# Patient Record
Sex: MALE | Race: WHITE | NOT HISPANIC OR LATINO | Employment: UNEMPLOYED | URBAN - METROPOLITAN AREA
[De-identification: names, ages, dates, MRNs, and addresses within clinical notes are randomized per-mention and may not be internally consistent; named-entity substitution may affect disease eponyms.]

---
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
## 2019-02-14 ENCOUNTER — HOSPITAL ENCOUNTER (EMERGENCY)
Facility: CLINIC | Age: 19
Discharge: HOME | End: 2019-02-14
Attending: EMERGENCY MEDICINE | Admitting: EMERGENCY MEDICINE
Payer: COMMERCIAL

## 2019-02-14 VITALS
DIASTOLIC BLOOD PRESSURE: 74 MMHG | SYSTOLIC BLOOD PRESSURE: 133 MMHG | BODY MASS INDEX: 19.46 KG/M2 | HEART RATE: 89 BPM | RESPIRATION RATE: 18 BRPM | TEMPERATURE: 99.1 F | HEIGHT: 71 IN | WEIGHT: 139 LBS | OXYGEN SATURATION: 100 %

## 2019-02-14 VITALS
DIASTOLIC BLOOD PRESSURE: 84 MMHG | RESPIRATION RATE: 18 BRPM | OXYGEN SATURATION: 99 % | SYSTOLIC BLOOD PRESSURE: 142 MMHG | TEMPERATURE: 98.2 F

## 2019-02-14 DIAGNOSIS — F19.11 HISTORY OF SUBSTANCE ABUSE (H): ICD-10-CM

## 2019-02-14 DIAGNOSIS — Z13.9 ENCOUNTER FOR MEDICAL SCREENING EXAMINATION: ICD-10-CM

## 2019-02-14 DIAGNOSIS — F91.9 DESTRUCTIVE BEHAVIOR: ICD-10-CM

## 2019-02-14 PROCEDURE — 99282 EMERGENCY DEPT VISIT SF MDM: CPT

## 2019-02-14 PROCEDURE — 99285 EMERGENCY DEPT VISIT HI MDM: CPT | Mod: 25

## 2019-02-14 PROCEDURE — 90791 PSYCH DIAGNOSTIC EVALUATION: CPT

## 2019-02-14 ASSESSMENT — MIFFLIN-ST. JEOR: SCORE: 1667.63

## 2019-02-14 NOTE — ED AVS SNAPSHOT
Emergency Department  [address] [place] [address]  Phone:  [phone]  Fax:  [phone]                                    Junior Fernández   MRN: [id number]    Department:   Emergency Department   Date of Visit:  2/14/2019           After Visit Summary Signature Page    I have received my discharge instructions, and my questions have been answered. I have discussed any challenges I see with this plan with the nurse or doctor.    ..........................................................................................................................................  Patient/Patient Representative Signature      ..........................................................................................................................................  Patient Representative Print Name and Relationship to Patient    ..................................................               ................................................  Date                                   Time    ..........................................................................................................................................  Reviewed by Signature/Title    ...................................................              ..............................................  Date                                               Time          22EPIC Rev 08/18

## 2019-02-14 NOTE — ED AVS SNAPSHOT
Emergency Department  [address] [place] [address]  Phone:  [phone]  Fax:  [phone]                                    Junior Fernández   MRN: [id number]    Department:   Emergency Department   Date of Visit:  2/14/2019           After Visit Summary Signature Page    I have received my discharge instructions, and my questions have been answered. I have discussed any challenges I see with this plan with the nurse or doctor.    ..........................................................................................................................................  Patient/Patient Representative Signature      ..........................................................................................................................................  Patient Representative Print Name and Relationship to Patient    ..................................................               ................................................  Date                                   Time    ..........................................................................................................................................  Reviewed by Signature/Title    ...................................................              ..............................................  Date                                               Time          22EPIC Rev 08/18

## 2019-02-14 NOTE — ED NOTES
Bed: ED17  Expected date:   Expected time:   Means of arrival:   Comments:  [address] Mental Health Eval [place] 8256
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
## 2019-02-15 NOTE — DISCHARGE INSTRUCTIONS
Stay with a friend or at a hotel or shelter.   Follow up with the resources that were provided at last visit for your mental health needs.

## 2019-02-15 NOTE — DISCHARGE INSTRUCTIONS
Discharge Instructions  Mental Health Concerns    You were seen today for mental health concerns, such as depression, anxiety, or suicidal thinking. Your provider feels that you do not require hospitalization at this time. However, your symptoms may become worse, and you may need to return to the Emergency Department. Most treatments of depression and suicidal thoughts are a process rather than a single intervention.  Medications and counseling can take several weeks or more to help.    Generally, every Emergency Department visit should have a follow-up clinic visit with either a primary or a specialty clinic/provider. Please follow-up as instructed by your emergency provider today.    By accepting these discharge instructions:  You promise to not harm yourself or others.  You agree that if you feel you are becoming unable to keep that promise, you will do something to help yourself before you do anything to harm yourself or others.   You agree to keep any safety plan arranged on your visit here today.  You agree to take any medication prescribed or recommended by your provider.  If you are getting worse, you can contact a friend or a family member, contact your counselor or family provider, contact a crisis line, or other options discussed with the provider or therapist today.  At any time, you can call 911 and return to the Emergency Department for more help.  You understand that follow-up is essential to your treatment, and you will make and keep appointments recommended on your visit today.    How to improve your mental health and prevent suicide:  Involve others by letting family, friends, counselors know.  Do not isolate yourself.  Avoid alcohol or drugs. Remove weapons, poisons from your home.  Try to stick to routines for eating, sleeping and getting regular exercise.    Try to get into sunlight. Bright natural light not only treats seasonal affective disorder but also depression.  Increase safe activities  that you enjoy.    If you feel worse, contact 1-800-suicide (1-685.389.9113), or call 911, or your primary provider/counselor for additional assistance.    If you were given a prescription for medicine here today, be sure to read all of the information (including the package insert) that comes with your prescription.  This will include important information about the medicine, its side effects, and any warnings that you need to know about.  The pharmacist who fills the prescription can provide more information and answer questions you may have about the medicine.  If you have questions or concerns that the pharmacist cannot address, please call or return to the Emergency Department.   Remember that you can always come back to the Emergency Department if you are not able to see your regular provider in the amount of time listed above, if you get any new symptoms, or if there is anything that worries you.

## 2019-02-18 ENCOUNTER — TELEPHONE (OUTPATIENT)
Dept: FAMILY MEDICINE | Facility: CLINIC | Age: 19
End: 2019-02-18

## 2019-02-18 NOTE — TELEPHONE ENCOUNTER
Patient's mother is calling, she states her son Junior was seen in [facility], brought in by police, due to agitation. H/o chemical dependency and recently stopped using. Mother is asking for psych eval options. Informed her that is usually done in the ED and they can determine if he meets criteria for admitting to psych unit there in hospital. Since he was not admitted, he likely was assessed as not needing placement at psych unit.  He currently has CD eval and has bed in [facility]. Informed her that [facility] can facilitate psych and CD evals. [place] also [place] [facility] [place] advice. [facility] and can go directly to [facility]. Mother states patient wants treatment and is agreeable to going to [facility]. All questions were answered and mother is agreeable to the plan.     Sherri Dhaliwal RN  02/18/19  11:00 AM

## 2019-08-15 ENCOUNTER — HOSPITAL ENCOUNTER (EMERGENCY)
Facility: CLINIC | Age: 19
Discharge: HOME | End: 2019-08-16
Attending: EMERGENCY MEDICINE | Admitting: EMERGENCY MEDICINE
Payer: COMMERCIAL

## 2019-08-15 DIAGNOSIS — R55 SYNCOPE, UNSPECIFIED SYNCOPE TYPE: ICD-10-CM

## 2019-08-15 PROCEDURE — 99284 EMERGENCY DEPT VISIT MOD MDM: CPT | Mod: 25

## 2019-08-15 PROCEDURE — 96360 HYDRATION IV INFUSION INIT: CPT

## 2019-08-15 PROCEDURE — 93005 ELECTROCARDIOGRAM TRACING: CPT

## 2019-08-15 NOTE — ED AVS SNAPSHOT
Emergency Department  [address] [place] [address]  Phone:  [phone]  Fax:  [phone]                                    Junior Fernández   MRN: [id number]    Department:   Emergency Department   Date of Visit:  8/15/2019           After Visit Summary Signature Page    I have received my discharge instructions, and my questions have been answered. I have discussed any challenges I see with this plan with the nurse or doctor.    ..........................................................................................................................................  Patient/Patient Representative Signature      ..........................................................................................................................................  Patient Representative Print Name and Relationship to Patient    ..................................................               ................................................  Date                                   Time    ..........................................................................................................................................  Reviewed by Signature/Title    ...................................................              ..............................................  Date                                               Time          22EPIC Rev 08/18

## 2019-08-16 VITALS
HEART RATE: 77 BPM | OXYGEN SATURATION: 98 % | DIASTOLIC BLOOD PRESSURE: 57 MMHG | TEMPERATURE: 98.6 F | SYSTOLIC BLOOD PRESSURE: 121 MMHG | RESPIRATION RATE: 16 BRPM

## 2019-08-16 LAB
ALBUMIN SERPL-MCNC: 3.8 G/DL (ref 3.4–5)
ALP SERPL-CCNC: 57 U/L (ref 65–260)
ALT SERPL W P-5'-P-CCNC: 15 U/L (ref 0–50)
ANION GAP SERPL CALCULATED.3IONS-SCNC: 8 MMOL/L (ref 3–14)
AST SERPL W P-5'-P-CCNC: 17 U/L (ref 0–35)
BASOPHILS # BLD AUTO: 0 10E9/L (ref 0–0.2)
BASOPHILS NFR BLD AUTO: 0.2 %
BILIRUB SERPL-MCNC: 0.3 MG/DL (ref 0.2–1.3)
BUN SERPL-MCNC: 17 MG/DL (ref 7–30)
CALCIUM SERPL-MCNC: 8.4 MG/DL (ref 8.5–10.1)
CHLORIDE SERPL-SCNC: 108 MMOL/L (ref 98–110)
CO2 SERPL-SCNC: 23 MMOL/L (ref 20–32)
CREAT SERPL-MCNC: 1.03 MG/DL (ref 0.5–1)
DIFFERENTIAL METHOD BLD: NORMAL
EOSINOPHIL # BLD AUTO: 0.1 10E9/L (ref 0–0.7)
EOSINOPHIL NFR BLD AUTO: 0.8 %
ERYTHROCYTE [DISTWIDTH] IN BLOOD BY AUTOMATED COUNT: 12.2 % (ref 10–15)
GFR SERPL CREATININE-BSD FRML MDRD: >90 ML/MIN/{1.73_M2}
GLUCOSE SERPL-MCNC: 91 MG/DL (ref 70–99)
HCT VFR BLD AUTO: 40.3 % (ref 40–53)
HGB BLD-MCNC: 14 G/DL (ref 13.3–17.7)
IMM GRANULOCYTES # BLD: 0 10E9/L (ref 0–0.4)
IMM GRANULOCYTES NFR BLD: 0.2 %
INTERPRETATION ECG - MUSE: NORMAL
LYMPHOCYTES # BLD AUTO: 1.8 10E9/L (ref 0.8–5.3)
LYMPHOCYTES NFR BLD AUTO: 19.8 %
MCH RBC QN AUTO: 29.9 PG (ref 26.5–33)
MCHC RBC AUTO-ENTMCNC: 34.7 G/DL (ref 31.5–36.5)
MCV RBC AUTO: 86 FL (ref 78–100)
MONOCYTES # BLD AUTO: 0.5 10E9/L (ref 0–1.3)
MONOCYTES NFR BLD AUTO: 5.7 %
NEUTROPHILS # BLD AUTO: 6.6 10E9/L (ref 1.6–8.3)
NEUTROPHILS NFR BLD AUTO: 73.3 %
NRBC # BLD AUTO: 0 10*3/UL
NRBC BLD AUTO-RTO: 0 /100
PLATELET # BLD AUTO: 185 10E9/L (ref 150–450)
POTASSIUM SERPL-SCNC: 3.9 MMOL/L (ref 3.4–5.3)
PROT SERPL-MCNC: 6.7 G/DL (ref 6.8–8.8)
RBC # BLD AUTO: 4.68 10E12/L (ref 4.4–5.9)
SODIUM SERPL-SCNC: 139 MMOL/L (ref 133–144)
WBC # BLD AUTO: 9 10E9/L (ref 4–11)

## 2019-08-16 PROCEDURE — 85025 COMPLETE CBC W/AUTO DIFF WBC: CPT | Performed by: EMERGENCY MEDICINE

## 2019-08-16 PROCEDURE — 96360 HYDRATION IV INFUSION INIT: CPT

## 2019-08-16 PROCEDURE — 25000128 H RX IP 250 OP 636: Performed by: EMERGENCY MEDICINE

## 2019-08-16 PROCEDURE — 80053 COMPREHEN METABOLIC PANEL: CPT | Performed by: EMERGENCY MEDICINE

## 2019-08-16 RX ADMIN — SODIUM CHLORIDE 1000 ML: 9 INJECTION, SOLUTION INTRAVENOUS at 00:09

## 2019-08-16 ASSESSMENT — ENCOUNTER SYMPTOMS
NAUSEA: 0
DIAPHORESIS: 0
PALPITATIONS: 0
CHILLS: 1
LIGHT-HEADEDNESS: 1
VOMITING: 0

## 2019-08-16 NOTE — ED PROVIDER NOTES
History     Chief Complaint:    Loss of Consciousness and Drug Overdose      HPI   Junior Fernández is a 18 year old male who presents to the emergency department today with loss of consciousness and drug overdose. The patient states that he had only about 5 hours of sleep last night and drank a lot of caffeine throughout the day. He states he is suppose to take his Seroquel at bed time, but tonight he was tired and took it earlier than normal around 2230 to go to sleep. He states he went to go watch some TV and forgot if he had taken his dose earlier and repeated a second dose. He states he became hungry and went up to the kitchen became light-headed with possible vision changes and passed out, woke up confused, ran into the wall and fell again in the bathroom. He states after falling he was experiencing chills. The patients mother became concerned and called EMS. Patient denies taking 2 doses to hurt himself. He also denies any chest pain, palpitations, diaphoresis, nausea, vomiting before passing out. The parents states that he is acting normally in the ED currently and patient states he is feeling much better.     Allergies:  Seasonal allergies    Medications:    Wellbutrin  Excitalopram oxalate  Seroquel    Past Medical History:    Anisometropia   Anxiety   Attention and concentration deficit   Depression   History of substance abuse   Mild tetrahydrocannabinol (THC) abuse   Multiple nevi   Pneumonia   RAD (reactive airway disease)   SARS (severe acute respiratory syndrome)   Substance abuse    Past Surgical History:    Left monteggia fracture surgery    Family History:    Father - hyperthyroidism, hyperlipidemia  Brother - anxiety    Social History:  The patient was accompanied to the ED by his parents.  Smoking Status: never  Smokeless Tobacco: never  Alcohol Use:  No  Drugs: marijuana and coocaine    Marital Status:  Single     Review of Systems   Constitutional: Positive for chills. Negative for  "diaphoresis.   Eyes: Positive for visual disturbance.   Cardiovascular: Negative for chest pain and palpitations.   Gastrointestinal: Negative for nausea and vomiting.   Neurological: Positive for syncope and light-headedness.   All other systems reviewed and are negative.        Physical Exam   First Vitals:  BP: 114/75  Pulse: 66  Temp: 98.6  F (37  C)  Resp: 16  SpO2: 99 %      Physical Exam  Vitals: reviewed by me  General: Pt seen on [facility], pleasant, cooperative, and alert to conversation  Eyes: Tracking well, clear conjunctiva BL  ENT: MMM, midline trachea.   Lungs:  No tachypnea, no accessory muscle use. No respiratory distress.   CV: Rate as above, regular rhythm.    Abd: Soft, non tender, no guarding, no rebound. Non distended  MSK: no peripheral edema or joint effusion.  No evidence of trauma  Skin: No rash, normal turgor and temperature  Neuro: Clear speech and no facial droop.  Psych: Not RIS, no e/o AH/VH      Emergency Department Course   ECG:  Indication: Drug overdose  Time: 2331  Vent. Rate 93 bpm. WV interval 132. QRS duration 92. QT/QTc 354/440. P-R-T axis 57 104 44.  Normal sinus and rhythm. Rightward axis. Borderline ECG.  Read time: 2333    Laboratory:  CBC: WBC: 9.0, HGB: 14.0, PLT: 185  CMP: Glucose: 91, Creatinine: 1.03 (H), calcium: 8.4 (L), Protein total\" 6.7 (L), Alkphos: 57 (L), o/w WNL    Interventions:  0009 NS 1,000 mLs IV    Emergency Department Course:  Nursing notes and vitals reviewed. (5219) I performed an exam of the patient as documented above.     IV inserted. Medicine administered as documented above. Blood drawn. This was sent to the lab for further testing, results above.     0050 I rechecked the patient and discussed the results of his workup thus far.     Findings and plan explained to the Patient and mother and father. Patient discharged home with instructions regarding supportive care, medications, and reasons to return. The importance of close follow-up was " reviewed.     I personally reviewed the laboratory results with the Patient and mother and father and answered all related questions prior to discharge.      Impression & Plan      Medical Decision Making:  Junior Fernández is a 18 year old male who presents to the ER with loss of consciousness. I think this likely multifactorial, he has endorsed 2 energy drinks as well as some coffee earlier today, has not been eating or drinking, then took a double dose of his Seroquel. Not suprisingly when he stood up off the couch he felt lightheaded, and appeared to faint. He is normal here with a neurological exam that is reassuring. No evidence of a seizure, acting normal per family at bedside as well. I see no troxidome, patient has a normal set of vitals and feels improved with IV fluids. He is ambulatory here in the ER and doing quite well on his feet. No evidence of arrythmogenic syncope, normal EKG as above. Will discharge with very clear return to ED precautions and primary care follow-up.    Diagnosis:    ICD-10-CM    1. Syncope, unspecified syncope type R55        Disposition:  discharged to home    Scribe Disclosure:  I, Kalen Grider, am serving as a scribe at 2:05 AM on 8/16/2019 to document services personally performed by Georgi Stout based on my observations and the provider's statements to me.     8/15/2019    EMERGENCY DEPARTMENT       Georgi Stout MD  08/16/19 0428
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
## 2019-08-21 ENCOUNTER — NURSE TRIAGE (OUTPATIENT)
Dept: NURSING | Facility: CLINIC | Age: 19
End: 2019-08-21

## 2019-08-21 NOTE — TELEPHONE ENCOUNTER
Rolanda (patient's mom) calling stating patient fainted Thursday night at 10 pm. She is currently not present with patient, but reports he is feeling faint again. Rolanda is calling for advice. During call, caller states her son was calling her. FNA was put on hold, then the caller hung up    Amaris Carlton RN/[facility]

## 2019-10-21 ENCOUNTER — OFFICE VISIT (OUTPATIENT)
Dept: FAMILY MEDICINE | Facility: CLINIC | Age: 19
End: 2019-10-21
Payer: COMMERCIAL

## 2019-10-21 VITALS
WEIGHT: 155 LBS | BODY MASS INDEX: 21.7 KG/M2 | DIASTOLIC BLOOD PRESSURE: 78 MMHG | OXYGEN SATURATION: 100 % | RESPIRATION RATE: 16 BRPM | HEART RATE: 75 BPM | SYSTOLIC BLOOD PRESSURE: 124 MMHG | HEIGHT: 71 IN

## 2019-10-21 DIAGNOSIS — Z87.898 HISTORY OF SUBSTANCE USE DISORDER: Chronic | ICD-10-CM

## 2019-10-21 DIAGNOSIS — R55 PRE-SYNCOPE: Primary | ICD-10-CM

## 2019-10-21 DIAGNOSIS — F41.1 GENERALIZED ANXIETY DISORDER: Chronic | ICD-10-CM

## 2019-10-21 PROBLEM — F39 MOOD DISORDER (H): Status: RESOLVED | Noted: 2017-11-20 | Resolved: 2019-10-21

## 2019-10-21 RX ORDER — ARIPIPRAZOLE 5 MG/1
5 TABLET ORAL DAILY
COMMUNITY
End: 2020-01-02

## 2019-10-21 RX ORDER — PROPRANOLOL HYDROCHLORIDE 10 MG/1
10 TABLET ORAL PRN
COMMUNITY
End: 2021-09-03

## 2019-10-21 RX ORDER — BENZTROPINE MESYLATE 0.5 MG/1
0.5 TABLET ORAL 2 TIMES DAILY
COMMUNITY
End: 2020-01-21

## 2019-10-21 ASSESSMENT — ANXIETY QUESTIONNAIRES
6. BECOMING EASILY ANNOYED OR IRRITABLE: MORE THAN HALF THE DAYS
1. FEELING NERVOUS, ANXIOUS, OR ON EDGE: NEARLY EVERY DAY
IF YOU CHECKED OFF ANY PROBLEMS ON THIS QUESTIONNAIRE, HOW DIFFICULT HAVE THESE PROBLEMS MADE IT FOR YOU TO DO YOUR WORK, TAKE CARE OF THINGS AT HOME, OR GET ALONG WITH OTHER PEOPLE: EXTREMELY DIFFICULT
3. WORRYING TOO MUCH ABOUT DIFFERENT THINGS: NEARLY EVERY DAY
5. BEING SO RESTLESS THAT IT IS HARD TO SIT STILL: NEARLY EVERY DAY
GAD7 TOTAL SCORE: 20
2. NOT BEING ABLE TO STOP OR CONTROL WORRYING: NEARLY EVERY DAY
7. FEELING AFRAID AS IF SOMETHING AWFUL MIGHT HAPPEN: NEARLY EVERY DAY

## 2019-10-21 ASSESSMENT — MIFFLIN-ST. JEOR: SCORE: 1739.95

## 2019-10-21 ASSESSMENT — PATIENT HEALTH QUESTIONNAIRE - PHQ9
5. POOR APPETITE OR OVEREATING: NEARLY EVERY DAY
SUM OF ALL RESPONSES TO PHQ QUESTIONS 1-9: 14

## 2019-10-21 NOTE — NURSING NOTE
"18 year old  Chief Complaint   Patient presents with     RECHECK     pt reports some fainting spells. he actually fainted once at the end of Aug.     Anxiety       Blood pressure 124/78, pulse 75, resp. rate 16, height 1.803 m (5' 10.98\"), weight 70.3 kg (155 lb), SpO2 100 %. Body mass index is 21.63 kg/m .  Patient Active Problem List   Diagnosis     Generalized anxiety disorder     Mood disorder (H)       Wt Readings from Last 2 Encounters:   10/21/19 70.3 kg (155 lb) (49 %)*   02/14/19 63 kg (139 lb) (26 %)*     * Growth percentiles are based on CDC (Boys, 2-20 Years) data.     BP Readings from Last 3 Encounters:   10/21/19 124/78   08/16/19 121/57   02/14/19 142/84         Current Outpatient Medications   Medication     ARIPiprazole (ABILIFY) 5 MG tablet     benztropine (COGENTIN) 0.5 MG tablet     melatonin 1 MG TABS tablet     propranolol (INDERAL) 10 MG tablet     buPROPion (WELLBUTRIN SR) 200 MG 12 hr tablet     ESCITALOPRAM OXALATE PO     No current facility-administered medications for this visit.        Social History     Tobacco Use     Smoking status: Never Smoker     Smokeless tobacco: Never Used   Substance Use Topics     Alcohol use: No     Drug use: Yes     Types: Marijuana, \"Crack\" cocaine     Comment: Use       Health Maintenance Due   Topic Date Due     HIV SCREENING  10/23/2014     PREVENTIVE CARE VISIT  11/20/2018     PHQ-2  01/01/2019     INFLUENZA VACCINE (1) 09/01/2019       No results found for: PAP      October 21, 2019 2:43 PM  "
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
## 2019-10-22 ENCOUNTER — ANCILLARY PROCEDURE (OUTPATIENT)
Dept: CARDIOLOGY | Facility: CLINIC | Age: 19
End: 2019-10-22
Attending: FAMILY MEDICINE
Payer: COMMERCIAL

## 2019-10-22 DIAGNOSIS — R55 PRE-SYNCOPE: ICD-10-CM

## 2019-10-22 PROCEDURE — 93227 XTRNL ECG REC<48 HR R&I: CPT | Mod: ZP | Performed by: INTERNAL MEDICINE

## 2019-10-22 PROCEDURE — 93226 XTRNL ECG REC<48 HR SCAN A/R: CPT | Mod: ZF

## 2019-10-22 ASSESSMENT — ANXIETY QUESTIONNAIRES: GAD7 TOTAL SCORE: 20

## 2019-10-22 NOTE — PROGRESS NOTES
Per Dr. Tamayo, patient to have 48 hour holter monitor placed.  Diagnosis: Pre-syncope  Monitor placed: Yes  Patient Instructed: Yes  Patient verbalized understanding: Yes  Holter # 6   Hank Helm

## 2019-10-30 ENCOUNTER — OFFICE VISIT (OUTPATIENT)
Dept: FAMILY MEDICINE | Facility: CLINIC | Age: 19
End: 2019-10-30
Payer: COMMERCIAL

## 2019-10-30 VITALS
RESPIRATION RATE: 16 BRPM | BODY MASS INDEX: 21.35 KG/M2 | OXYGEN SATURATION: 96 % | TEMPERATURE: 98.2 F | SYSTOLIC BLOOD PRESSURE: 121 MMHG | HEART RATE: 81 BPM | DIASTOLIC BLOOD PRESSURE: 85 MMHG | HEIGHT: 71 IN | WEIGHT: 152.5 LBS

## 2019-10-30 DIAGNOSIS — N50.82 SCROTAL PAIN: Primary | ICD-10-CM

## 2019-10-30 DIAGNOSIS — R35.0 URINARY FREQUENCY: ICD-10-CM

## 2019-10-30 LAB
BILIRUBIN UR: NEGATIVE MG/DL
BLOOD UR: NEGATIVE MG/DL
GLUCOSE URINE: NEGATIVE
KETONES UR QL: NEGATIVE MG/DL
LEUKOCYTE ESTERASE UR: NEGATIVE
NITRITE UR QL STRIP: NEGATIVE MG/DL
PH UR STRIP: 7 [PH] (ref 4.5–8)
PROTEIN UR: NEGATIVE MG/DL
SP GR UR STRIP: 1 (ref 1–1)
UROBILINOGEN UR STRIP-ACNC: NORMAL E.U./DL

## 2019-10-30 ASSESSMENT — MIFFLIN-ST. JEOR: SCORE: 1731.12
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
## 2019-10-30 NOTE — NURSING NOTE
"19 year old  Chief Complaint   Patient presents with     Testicular/scrotal Pain     swelling started 2 weeks ago, worse today starting on the right side, now both swollen        Blood pressure (!) 148/81, pulse 81, temperature 98.2  F (36.8  C), temperature source Oral, resp. rate 16, height 1.815 m (5' 11.46\"), weight 69.2 kg (152 lb 8 oz), SpO2 96 %. Body mass index is 21 kg/m .  Patient Active Problem List   Diagnosis     Generalized anxiety disorder     History of substance use disorder       Wt Readings from Last 2 Encounters:   10/30/19 69.2 kg (152 lb 8 oz)   10/21/19 70.3 kg (155 lb) (49 %)*     * Growth percentiles are based on CDC (Boys, 2-20 Years) data.     BP Readings from Last 3 Encounters:   10/30/19 (!) 148/81   10/21/19 124/78   08/16/19 121/57         Current Outpatient Medications   Medication     benztropine (COGENTIN) 0.5 MG tablet     melatonin 1 MG TABS tablet     propranolol (INDERAL) 10 MG tablet     ARIPiprazole (ABILIFY) 5 MG tablet     No current facility-administered medications for this visit.        Social History     Tobacco Use     Smoking status: Never Smoker     Smokeless tobacco: Never Used   Substance Use Topics     Alcohol use: No     Drug use: Not Currently     Types: Marijuana, \"Crack\" cocaine     Comment: Use       Health Maintenance Due   Topic Date Due     HIV SCREENING  10/23/2014     PREVENTIVE CARE VISIT  11/20/2018     INFLUENZA VACCINE (1) 09/01/2019       No results found for: PAP      October 30, 2019 1:54 PM    "

## 2019-10-31 LAB
BACTERIA SPEC CULT: NO GROWTH
C TRACH DNA SPEC QL NAA+PROBE: NEGATIVE
N GONORRHOEA DNA SPEC QL NAA+PROBE: NEGATIVE
SPECIMEN SOURCE: NORMAL

## 2020-01-02 ENCOUNTER — TRANSFERRED RECORDS (OUTPATIENT)
Dept: HEALTH INFORMATION MANAGEMENT | Facility: CLINIC | Age: 20
End: 2020-01-02

## 2020-01-02 ENCOUNTER — HOSPITAL ENCOUNTER (EMERGENCY)
Facility: CLINIC | Age: 20
Discharge: HOME | End: 2020-01-02
Attending: EMERGENCY MEDICINE | Admitting: EMERGENCY MEDICINE
Payer: COMMERCIAL

## 2020-01-02 VITALS
DIASTOLIC BLOOD PRESSURE: 88 MMHG | SYSTOLIC BLOOD PRESSURE: 129 MMHG | RESPIRATION RATE: 15 BRPM | TEMPERATURE: 98 F | HEART RATE: 71 BPM | OXYGEN SATURATION: 94 %

## 2020-01-02 DIAGNOSIS — F32.A DEPRESSION, UNSPECIFIED DEPRESSION TYPE: ICD-10-CM

## 2020-01-02 DIAGNOSIS — Z72.820 SLEEP DEPRIVATION: ICD-10-CM

## 2020-01-02 DIAGNOSIS — R05.9 COUGH: ICD-10-CM

## 2020-01-02 DIAGNOSIS — F19.10 POLYSUBSTANCE ABUSE (H): ICD-10-CM

## 2020-01-02 DIAGNOSIS — R45.86 EMOTIONAL LABILITY: ICD-10-CM

## 2020-01-02 DIAGNOSIS — R11.2 NAUSEA AND VOMITING, INTRACTABILITY OF VOMITING NOT SPECIFIED, UNSPECIFIED VOMITING TYPE: ICD-10-CM

## 2020-01-02 LAB — INTERPRETATION ECG - MUSE: NORMAL

## 2020-01-02 PROCEDURE — 25000132 ZZH RX MED GY IP 250 OP 250 PS 637: Performed by: EMERGENCY MEDICINE

## 2020-01-02 PROCEDURE — 90791 PSYCH DIAGNOSTIC EVALUATION: CPT

## 2020-01-02 PROCEDURE — 99284 EMERGENCY DEPT VISIT MOD MDM: CPT | Mod: Z6 | Performed by: EMERGENCY MEDICINE

## 2020-01-02 PROCEDURE — 99285 EMERGENCY DEPT VISIT HI MDM: CPT | Mod: 25 | Performed by: EMERGENCY MEDICINE

## 2020-01-02 RX ORDER — OLANZAPINE 10 MG/1
10 TABLET, ORALLY DISINTEGRATING ORAL ONCE
Status: COMPLETED | OUTPATIENT
Start: 2020-01-02 | End: 2020-01-02

## 2020-01-02 RX ADMIN — OLANZAPINE 10 MG: 10 TABLET, ORALLY DISINTEGRATING ORAL at 05:37

## 2020-01-02 ASSESSMENT — ENCOUNTER SYMPTOMS
FEVER: 1
COUGH: 0
VOMITING: 1
DIARRHEA: 0
ABDOMINAL PAIN: 0

## 2020-01-02 NOTE — DISCHARGE INSTRUCTIONS
Follow up with your therapist and psychiatrist as planned. Continue to abstain from substances. Return to the ER with new or worsening symptoms. See your primary care physician within a week.

## 2020-01-02 NOTE — ED AVS SNAPSHOT
[facility], [place], Emergency Department  [address]  [facility] [address]  Phone:  [phone]  Fax:  [phone]                                    Junior Fernández   MRN: [id number]    Department:  [facility], Emergency Department   Date of Visit:  1/2/2020           After Visit Summary Signature Page    I have received my discharge instructions, and my questions have been answered. I have discussed any challenges I see with this plan with the nurse or doctor.    ..........................................................................................................................................  Patient/Patient Representative Signature      ..........................................................................................................................................  Patient Representative Print Name and Relationship to Patient    ..................................................               ................................................  Date                                   Time    ..........................................................................................................................................  Reviewed by Signature/Title    ...................................................              ..............................................  Date                                               Time          22EPIC Rev 08/18
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
## 2020-01-02 NOTE — ED TRIAGE NOTES
Patient reports getting recently getting fired from job. Reports suicidal ideations without plan within the last week but currently denies. Patient reports he is sleep deprived.

## 2020-01-02 NOTE — ED PROVIDER NOTES
"  History     Chief Complaint   Patient presents with     Suicidal     Chest Pain     Chest pain center/right chest for last hour, near syncopal episode with EMS     HPI  Junior Fernández is a 19 year old male who presents with a primary complaint of mental health issues. The patient as well as his parents report that he's had 2 previous episodes of substance induced psychosis. Once a few years ago, and the second time in early 2019. He reports that the last time he was started on zyprexa and went into treatment, and still had psychotic symptoms for over a month. Due to some side effects, he's been off all antipsychotics since August. He reports a 3 week period where he was drinking 2-3 drinks per day, last drink 3 days ago. He also smoked marijuana during that time frame and used Vyvance. He states that since he stopped he has developed emotional lability, and insomnia - has had minimal sleep over the last 3 days. No current hallucinations or delusions, but he's worried that he's heading towards psychosis if he doesn't get some sleep. He's requesting a dose of medication to help him sleep. He denies suicidal or homicidal ideation.     He reports that he felt as if he was hyperventilating tonight, which caused him to have some mild right-sided chest soreness.  He states he does not feel he has an acute medical condition, rather feels that his chest is sore from all the hyperventilating.  He states that while in the ambulance he briefly felt as though he was going to pass out, but again relates that he feels he was hyperventilating.  He states he does not feel he needs medical evaluation for this and is declining further medical evaluation, aside from an EKG which is already been done.    Additionally, he tells me he had, \" flu symptoms,\" 3 days ago.  He states that this came on while he was using alcohol and smoking marijuana.  When I asked him to describe his symptoms he told me he had multiple episodes of vomiting " "as well as coughing and fever.  When I asked him further questions to clarify symptoms, the patient became acutely irritable stating that he is really here because he sleep deprived and afraid he is going to become psychotic.  He states he is not answering any more my questions.  He then states,\"You're a fucking moron if you don't know what flu symptoms are.\"  He refuses to cooperate with further questions in a productive fashion at this time.     Past Medical History:   Diagnosis Date     Anisometropia      Anxiety      Depression      Fracture 2003    Left clavicle     Fracture 2005    Left Monteggia     History of substance abuse (H) 2016    THC, ETOH, stimulants     Multiple nevi 10/14/2015     Pneumonia 2003    RUL     RAD (reactive airway disease)     outgrown     SARS (severe acute respiratory syndrome)        Past Surgical History:   Procedure Laterality Date     CIRCUMCISION,OTHER,<28 D/O       FRACTURE SURGERY  2005    Left Monteggia     HC TOOTH EXTRACTION W/FORCEP         Family History   Problem Relation Age of Onset     Anxiety Disorder Maternal Grandmother      Depression Maternal Grandmother      Hypertension Maternal Grandmother      Cerebrovascular Disease Maternal Grandmother      Other - See Comments Mother         on Celexa     Hyperthyroidism Father         Rx'd with methimazole. Father's side with mild allergy/asthma issues     Hyperlipidemia Father      Anxiety Disorder Brother         Stuttering and tics     Lymphoma Maternal Grandfather          from Lymphoma     Other - See Comments Paternal Grandmother         vaso-vagal syncope     Other - See Comments Paternal Grandfather          from kidney/liver CA; CAD and had pacemaker     Heart Disease Paternal Grandfather 65     Arrhythmia No family hx of        Social History     Tobacco Use     Smoking status: Never Smoker     Smokeless tobacco: Never Used   Substance Use Topics     Alcohol use: Yes     " Comment: 3-4 days ago         I have reviewed the Medications, Allergies, Past Medical and Surgical History, and Social History in the Epic system.    Review of Systems   Unable to perform ROS: Other   Constitutional: Positive for fever.   Respiratory: Negative for cough.    Gastrointestinal: Positive for vomiting. Negative for abdominal pain and diarrhea.   Patient not cooperative with complete ROS    Physical Exam   BP: (!) 130/101  Pulse: 77  Temp: 98.6  F (37  C)  Resp: 16  SpO2: 94 %      Physical Exam  Constitutional:       General: He is not in acute distress.     Appearance: He is not diaphoretic.   HENT:      Head: Atraumatic.   Eyes:      General: No scleral icterus.     Pupils: Pupils are equal, round, and reactive to light.   Cardiovascular:      Heart sounds: Normal heart sounds.   Pulmonary:      Effort: No respiratory distress.      Breath sounds: Normal breath sounds.   Abdominal:      Palpations: Abdomen is soft.      Tenderness: There is no abdominal tenderness.   Musculoskeletal:         General: No tenderness.   Skin:     General: Skin is warm.      Findings: No rash.   Psychiatric:      Comments: Irritable, sometimes smiling or laughing inappropriately - then cried.         ED Course        Procedures             EKG Interpretation:      Interpreted by Carolyn Mclean MD  Time reviewed: 0330  Symptoms at time of EKG: None   Rhythm: normal sinus   Rate: 72  Axis: Right Axis Deviation  Ectopy: none  Conduction: normal  ST Segments/ T Waves: No ST-T wave changes  Q Waves: none  Comparison to prior: No old EKG available    Clinical Impression: stable EKG                Critical Care time:  none             Labs Ordered and Resulted from Time of ED Arrival Up to the Time of Departure from the ED - No data to display         Assessments & Plan (with Medical Decision Making)   Patient is very irritable, not cooperative with me fully in regards to obtaining history.  He states he does not feel he  has an acute medical issue and does not desire acute medical work-up, aside from an EKG did cooperate with.  This was stable and compared with previous.    He was more cooperative with the mental health .  He is not acutely suicidal or homicidal.  He has had a history of what was believed to be drug-induced psychosis, and is possible he may be having some chacorta now.  He is concerned that if he does not sleep he will again become psychotic.  He declined admission, states he has a therapy appointment later today as well as psychiatry appointment tomorrow.  He did agree to take a single dose Zyprexa, would like to go home and try to sleep.  He is encouraged to return with concerns, follow-up with primary care.    Dictation Disclaimer: Some of this Note has been completed with voice-recognition dictation software. Although errors are generally corrected real-time, there is the potential for a rare error to be present in the completed chart.      I have reviewed the nursing notes.    I have reviewed the findings, diagnosis, plan and need for follow up with the patient.    Discharge Medication List as of 1/2/2020  5:33 AM          Final diagnoses:   Sleep deprivation   Emotional lability   Cough   Nausea and vomiting, intractability of vomiting not specified, unspecified vomiting type   Depression, unspecified depression type   Polysubstance abuse (H)       1/2/2020   [facility], [place], EMERGENCY DEPARTMENT     Carolyn Mclean MD  01/02/20 0649

## 2020-01-21 ENCOUNTER — OFFICE VISIT (OUTPATIENT)
Dept: FAMILY MEDICINE | Facility: CLINIC | Age: 20
End: 2020-01-21
Payer: COMMERCIAL

## 2020-01-21 VITALS
WEIGHT: 152.08 LBS | BODY MASS INDEX: 20.6 KG/M2 | TEMPERATURE: 98.6 F | RESPIRATION RATE: 16 BRPM | SYSTOLIC BLOOD PRESSURE: 129 MMHG | OXYGEN SATURATION: 96 % | DIASTOLIC BLOOD PRESSURE: 88 MMHG | HEIGHT: 72 IN | HEART RATE: 79 BPM

## 2020-01-21 DIAGNOSIS — Z87.898 HISTORY OF SUBSTANCE USE DISORDER: Chronic | ICD-10-CM

## 2020-01-21 DIAGNOSIS — Z01.89 LABORATORY TEST: Primary | ICD-10-CM

## 2020-01-21 DIAGNOSIS — F41.1 GENERALIZED ANXIETY DISORDER: Chronic | ICD-10-CM

## 2020-01-21 DIAGNOSIS — R51.9 NONINTRACTABLE EPISODIC HEADACHE, UNSPECIFIED HEADACHE TYPE: Primary | ICD-10-CM

## 2020-01-21 RX ORDER — ESCITALOPRAM OXALATE 10 MG/1
TABLET ORAL
COMMUNITY
Start: 2020-01-06 | End: 2020-06-18

## 2020-01-21 RX ORDER — OLANZAPINE 5 MG/1
TABLET ORAL PRN
COMMUNITY
Start: 2020-01-03 | End: 2020-06-18

## 2020-01-21 ASSESSMENT — PAIN SCALES - GENERAL: PAINLEVEL: NO PAIN (1)

## 2020-01-21 ASSESSMENT — MIFFLIN-ST. JEOR: SCORE: 1733.07

## 2020-01-21 NOTE — PROGRESS NOTES
Performed venipunctured for an outside provider.  Specimen get send to .    Terra Heath CMA,ESTER  January 21, 2020 2:55 PM

## 2020-01-21 NOTE — NURSING NOTE
"19 year old  Chief Complaint   Patient presents with     RECHECK     patient wants to do a follow up due to a fall and had a concussion in late August. He has been sensitive to lights ever since.     Sleep Problem     Headache     more frequently lately     Flu Shot       Blood pressure 129/88, pulse 79, temperature 98.6  F (37  C), temperature source Oral, resp. rate 16, height 1.821 m (5' 11.7\"), weight 69 kg (152 lb 1.3 oz), SpO2 96 %. Body mass index is 20.8 kg/m .  Patient Active Problem List   Diagnosis     Generalized anxiety disorder     History of substance use disorder       Wt Readings from Last 2 Encounters:   01/21/20 69 kg (152 lb 1.3 oz)   10/30/19 69.2 kg (152 lb 8 oz)     BP Readings from Last 3 Encounters:   01/21/20 129/88   01/02/20 129/88   10/30/19 121/85         Current Outpatient Medications   Medication     escitalopram (LEXAPRO) 10 MG tablet     melatonin 1 MG TABS tablet     OLANZapine (ZYPREXA) 5 MG tablet     propranolol (INDERAL) 10 MG tablet     benztropine (COGENTIN) 0.5 MG tablet     No current facility-administered medications for this visit.        Social History     Tobacco Use     Smoking status: Never Smoker     Smokeless tobacco: Never Used   Substance Use Topics     Alcohol use: Yes     Comment: 3-4 days ago     Drug use: Yes     Types: Marijuana, \"Crack\" cocaine     Comment: Used marijuanna 3 days ago, cocaine in feburary, vyvanse 3 days ago        Health Maintenance Due   Topic Date Due     DEPRESSION ACTION PLAN  1999     HIV SCREENING  10/23/2014     PREVENTIVE CARE VISIT  11/20/2018     INFLUENZA VACCINE (1) 09/01/2019       No results found for: RAHEEM Heath, ESTER, CMA  January 21, 2020 1:28 PM  "

## 2020-01-21 NOTE — PROGRESS NOTES
"      SUBJECTIVE:   Junior Fernández is a 19 year old male who presents to clinic today for a return visit.    # Head Injury  - Date/Time: 2019  - Mechanism: fainted and hit the back of his head  - was unconsciousness briefly, found by mother soon after    - since then he reports he has been having more headaches, more light sensitivity, and difficult with night vision    # Headache  - Diagnosed headache disorder: no  - Onset: associates onset with mental/cognitive strain, focusing on work or playing video games  - Frequency: more than half the days in a week  - Time to peak intensity: start as severe as they get  - Duration: last 10-15 minutes typically but sometimes longer until he takes medication  - Location: most commonly \"top left\" of head, can be more generalized  - Photophobia: yes, light sensitivity gets worse with headaches. Can still be in room with bright lights but stresses him out and can't look up as much. Being in a bright room can seem to bring on headaches  - Phonophobia: no  - Nausea: no  - Worse with activity?: not worse with exercise, has started back doing weights and cardio at [employer]  - Change with position?: no  - has not identified triggers other than mental strain and bright lights  - Alleviating Factors: stopping whatever as triggering the headache, no change with increased hydration  - NSAID use: rare, a few times in the past few weeks  - Sleep: averages 7-8 hours a night but doesn't have consistent sleep/wake times  - Vision: no changes other than more difficulty with visual \"contrast\" when driving at night. Last vision check was in May 2019.   - Caffeine intake: occasional, not every day    Post-Concussion Symptom Scale:  Each measure scored 0 (absent), 1-2 (mild), 3-4 (moderate), 5-6 (severe)  Headache: 2  Nausea: 0  Vomitin  Balance Problems: 0  Dizziness (spinning or movement sensation): 0  Lightheadedness: 1  Fatigue: 3  Trouble falling asleep: 4  Sleeping more " than usual: 0  Sleeping less than usual: 4  Drowsiness: 3  Sensitivity to light: 5  Sensitivity to noise: 0  Irritability: 2  Sadness: 0  Nervous/Anxious: 3  Feeling more emotional: 1  Numbness or tinglin  Feeling slowed down: 0  Feeling like  in a fog : 0  Difficulty concentrating: 3  Difficulty remembering: 3  Visual problems: 3 (night vision issues)    Total Score: 37      # Anxiety  # History of Substance Use  - follows with psychiatry and therapy  - goes to therapist (Alberto) at [facility], currently seeing twice a week    - was seen in ED for aggressive behavior and suicidal risk on 19  - Started on low dose of Zyprexa 5mg for sleep, took this for a week which helped, takes as needed now  - reports to me that he had relapse with MJ, alcohol, and a Vyvanse tablet at that time  - [facility] but did not feel that it was helpful so stopped last week. Was last in treatment before this in 2019.   - reports he has been sober again for 3 weeks now  - looking into [facility], not currently doing any 12 step programs    - Current regimen: Lexapro 10mg daily, Propranolol 10-20mg three times a day as needed (takes a few times a week), Zyprexa 5mg at bedtime as needed (has taken once in the past 2 weeks)    - planning to go back to college in the fall, thinking about [place]      ROS: Denies fevers, chills, chest pain, difficulty breathing, abdominal pain    Patient Active Problem List   Diagnosis     Generalized anxiety disorder     History of substance use disorder     Current Outpatient Medications   Medication     escitalopram (LEXAPRO) 10 MG tablet     melatonin 1 MG TABS tablet     OLANZapine (ZYPREXA) 5 MG tablet     propranolol (INDERAL) 10 MG tablet     No current facility-administered medications for this visit.      I have reviewed the patient's relevant past medical history.     OBJECTIVE:   /88 (BP Location: Left arm, Patient Position: Chair,  "Cuff Size: Adult Regular)   Pulse 79   Temp 98.6  F (37  C) (Oral)   Resp 16   Ht 1.821 m (5' 11.7\")   Wt 69 kg (152 lb 1.3 oz)   SpO2 96%   BMI 20.80 kg/m      Constitutional: well-appearing, appears stated age  Eyes: conjunctivae without erythema, sclera anicteric.   Skin: no rashes, lesions, or wounds  Psych: affect is full and appropriate, speech is fluent and non-pressured    Neuro: CN 2-12 intact, strength intact and symmetric in upper and lower extremities, DTRs 2+ and symmetric at biceps and patella. Gait normal.     ASSESSMENT AND PLAN:     (R51) Nonintractable episodic headache, unspecified headache type  (primary encounter diagnosis)  Comment: Sound most likely like tension headaches but mild photophobia and clear onset after head injury could be also be consistent with a post-concussive syndrome. Lack of worsening with physical activity would argue against symptoms being concussion related. Recommended optometry evaluation to assess vision, maintaining good hydration, and working on setting a regular sleep schedule. I went back and forth between referring to concussion clinic vs neurology, but I think sending to the headache clinic at the  likely makes the most sense. Based on mild nature of symptoms and normal exam without any concerning features by history, I don't know if referral is even necessary but given how bothersome symptoms have been and the uncertainty of Junior's primary headache disorder I think it is reasonable.   Plan: NEUROLOGY ADULT REFERRAL          (F41.1) Generalized anxiety disorder  (Z87.898) History of substance use disorder  Comment: Recent relapse. Now sober again for 3 weeks. Primarily follows with psychiatry/psychology.       Darius Tamayo MD   [facility]  01/21/2020, 3:35 PM  "

## 2020-01-28 ENCOUNTER — PRE VISIT (OUTPATIENT)
Dept: NEUROLOGY | Facility: CLINIC | Age: 20
End: 2020-01-28

## 2020-01-28 NOTE — TELEPHONE ENCOUNTER
FUTURE VISIT INFORMATION      FUTURE VISIT INFORMATION:    Date: 3/16/2020    Time: 930AM     Location: [facility]  REFERRAL INFORMATION:    Referring provider:  Dr. Tamayo -1/21/2020    Referring providers clinic:  [facility]     Reason for visit/diagnosis  Headaches     RECORDS REQUESTED FROM:       Clinic name Comments Records Status Imaging Status   [facility] 10/19/2019 Care Everywhere N/A

## 2020-03-10 ENCOUNTER — HEALTH MAINTENANCE LETTER (OUTPATIENT)
Age: 20
End: 2020-03-10

## 2020-04-28 NOTE — TELEPHONE ENCOUNTER
FUTURE VISIT INFORMATION      FUTURE VISIT INFORMATION:    Date: 5/5/2020    Time: 4:00 PM    Location: [facility]   REFERRAL INFORMATION:    Referring provider:  Dr. Darius Tamayo     Referring providers clinic:  [facility]     Reason for visit/diagnosis  Chronic nasal congestion     RECORDS REQUESTED FROM:       Clinic name Comments Records Status Imaging Status   [facility]  4/13/2020 [facility] Medical Advice EPIC

## 2020-05-01 ENCOUNTER — MYC MEDICAL ADVICE (OUTPATIENT)
Dept: OTOLARYNGOLOGY | Facility: CLINIC | Age: 20
End: 2020-05-01

## 2020-05-01 ENCOUNTER — TELEPHONE (OUTPATIENT)
Dept: OTOLARYNGOLOGY | Facility: CLINIC | Age: 20
End: 2020-05-01

## 2020-05-01 NOTE — TELEPHONE ENCOUNTER
Spoke with patient regarding virtual visit with Dr. Delgado on 5/5/20. Confirmed patient's email address. Also verified patient's appointment time, and informed him/her that we would be calling again 10-15 minutes prior to the appointment. Patient expressed understanding. Will send Tango message with virtual visit information.    Patient email: [email or web address]  Appointment date: 5/5/20  Appointment time: 4:00pm  MyChart active? YES, Tango message sent.    Emma Amezcua, EMT

## 2020-05-05 ENCOUNTER — VIRTUAL VISIT (OUTPATIENT)
Dept: OTOLARYNGOLOGY | Facility: CLINIC | Age: 20
End: 2020-05-05
Attending: FAMILY MEDICINE
Payer: COMMERCIAL

## 2020-05-05 ENCOUNTER — PRE VISIT (OUTPATIENT)
Dept: OTOLARYNGOLOGY | Facility: CLINIC | Age: 20
End: 2020-05-05

## 2020-05-05 VITALS — WEIGHT: 160 LBS | HEIGHT: 71 IN | BODY MASS INDEX: 22.4 KG/M2

## 2020-05-05 DIAGNOSIS — R09.81 NASAL CONGESTION: ICD-10-CM

## 2020-05-05 DIAGNOSIS — J34.3 NASAL TURBINATE HYPERTROPHY: Primary | ICD-10-CM

## 2020-05-05 RX ORDER — HYDROXYZINE HYDROCHLORIDE 25 MG/1
TABLET, FILM COATED ORAL
COMMUNITY
Start: 2020-04-17 | End: 2020-06-18

## 2020-05-05 ASSESSMENT — MIFFLIN-ST. JEOR: SCORE: 1757.89

## 2020-05-05 ASSESSMENT — PAIN SCALES - GENERAL: PAINLEVEL: NO PAIN (0)

## 2020-05-05 NOTE — PROGRESS NOTES
"Junior Fernández is a 19 year old male who is being evaluated via a billable video visit.      The patient has been notified of following:     \"This video visit will be conducted via a call between you and your physician/provider. We have found that certain health care needs can be provided without the need for an in-person physical exam.  This service lets us provide the care you need with a video conversation.  If a prescription is necessary we can send it directly to your pharmacy.  If lab work is needed we can place an order for that and you can then stop by our lab to have the test done at a later time.    Video visits are billed at different rates depending on your insurance coverage.  Please reach out to your insurance provider with any questions.    If during the course of the call the physician/provider feels a video visit is not appropriate, you will not be charged for this service.\"    Patient has given verbal consent for Video visit? Yes    How would you like to obtain your AVS? Charmainehar    Patient would like the video invitation sent by: Send to e-mail at: [email or web address]    Will anyone else be joining your video visit? No        Video-Visit Details    Type of service:  Video Visit    Video Start Time: 3:55pm  Video End Time: 4:08pm    Originating Location (pt. Location): Home    Distant Location (provider location):  [facility]     Platform used for Video Visit: Elmo Delgado MD    Otolaryngology Adult Consultation    Patient: Junior Fernández  : 2000          HPI:  Junior Fernández is a 19 year old male seen today in the Otolaryngology Clinic for difficulty breathing through his nose.  Patient reports that he has had difficulty breathing through his nose for about a year to a year and a half.  He admits that he used intranasal cocaine around this time.  He has since stopped using cocaine but has had difficulty with nasal congestion since then.  This that " is worse typically alternates.  It gets worse at night.  He does have a history of seasonal allergies in springtime is typically his worst time.  However he has not noticed an increase in nasal congestion this spring.  He feels as noted nasal congestion is year-round.  He has used Flonase on occasion but has not found it to be helpful right away.  He did see his primary care physician who mentioned that he might have a polyp.  He feels pressure along the side of his nose, close to his corner of his eye.  He denies any issues with nosebleeds.    Medications:  Current Outpatient Rx   Medication Sig Dispense Refill     hydrOXYzine (ATARAX) 25 MG tablet        propranolol (INDERAL) 10 MG tablet Take 10 mg by mouth as needed        escitalopram (LEXAPRO) 10 MG tablet        melatonin 1 MG TABS tablet Take 1 mg by mouth nightly as needed for sleep       OLANZapine (ZYPREXA) 5 MG tablet as needed         Allergies: Seasonal allergies and Seroquel [quetiapine]     PMH:  Past Medical History:   Diagnosis Date     Anisometropia      Anxiety      Depression      Fracture 07/01/2003    Left clavicle     Fracture 04/01/2005    Left Monteggia     History of substance abuse (H) 11/23/2016    THC, ETOH, stimulants     Multiple nevi 10/14/2015     Pneumonia 03/01/2003    RUL     RAD (reactive airway disease)     outgrown     SARS (severe acute respiratory syndrome)        PSH:  Past Surgical History:   Procedure Laterality Date     CIRCUMCISION,OTHER,<28 D/O       FRACTURE SURGERY  04/01/2005    Left Monteggia     HC TOOTH EXTRACTION W/FORCEP         FH:  Family History   Problem Relation Age of Onset     Anxiety Disorder Maternal Grandmother      Depression Maternal Grandmother      Hypertension Maternal Grandmother      Cerebrovascular Disease Maternal Grandmother      Other - See Comments Mother         on Celexa     Hyperthyroidism Father         Rx'd with methimazole. Father's side with mild allergy/asthma issues      "Hyperlipidemia Father      Anxiety Disorder Brother         Stuttering and tics     Lymphoma Maternal Grandfather          from Lymphoma     Other - See Comments Paternal Grandmother         vaso-vagal syncope     Other - See Comments Paternal Grandfather          from kidney/liver CA; CAD and had pacemaker     Heart Disease Paternal Grandfather 65     Arrhythmia No family hx of         SH:  Social History     Tobacco Use     Smoking status: Never Smoker     Smokeless tobacco: Never Used   Substance Use Topics     Alcohol use: Yes     Comment: 3-4 days ago     Drug use: Yes     Types: Marijuana, \"Crack\" cocaine     Comment: Used marijuanna 3 days ago, cocaine in feburary, vyvanse 3 days ago        Review of Systems   ENT ROS 2020   Constitutional Problems with sleep   Neurology Headache   Eyes Visual loss   Ears, Nose, Throat Nasal congestion or drainage, Sore throat   Gastrointestinal/Genitourinary Constipation   Allergy/Immunology Allergies or hay fever       Physical Exam:    GEN:  The patient is alert, oriented and in no acute distress.  HEAD:  Head, face scalp is grossly normal.  NOSE:  External nose is straight, skin is normal.               With nasal breathing he does feel like the left side is more blocked today.    Assessment/Plan: Patient presents with difficulty breathing through his nose.  Based on his description I discussed with patient it is unlikely that the nasal polyp is the main culprit for his blockage.  I suspect it is more of a turbinate issue due to the fact that it alternates from side to side.  I would recommend that he start consistently use Flonase for 4 to 6 weeks.  I would need to have him come into clinic so we can better assess to make sure he does not have a significantly deviated septum or polyp present.  I also like to make sure his nasal mucosa looks relatively healthy given the history of cocaine use.  If the Flonase is not helpful I did discuss with him that we " likely within the next need to proceed to a surgical option.

## 2020-06-18 ENCOUNTER — OFFICE VISIT (OUTPATIENT)
Dept: OTOLARYNGOLOGY | Facility: CLINIC | Age: 20
End: 2020-06-18
Payer: COMMERCIAL

## 2020-06-18 VITALS
RESPIRATION RATE: 16 BRPM | WEIGHT: 156 LBS | BODY MASS INDEX: 21.84 KG/M2 | TEMPERATURE: 98.5 F | OXYGEN SATURATION: 100 % | SYSTOLIC BLOOD PRESSURE: 129 MMHG | DIASTOLIC BLOOD PRESSURE: 78 MMHG | HEIGHT: 71 IN | HEART RATE: 64 BPM

## 2020-06-18 DIAGNOSIS — R09.81 NASAL CONGESTION: Primary | ICD-10-CM

## 2020-06-18 DIAGNOSIS — J34.3 NASAL TURBINATE HYPERTROPHY: ICD-10-CM

## 2020-06-18 ASSESSMENT — MIFFLIN-ST. JEOR: SCORE: 1739.74

## 2020-06-18 ASSESSMENT — PAIN SCALES - GENERAL: PAINLEVEL: NO PAIN (0)

## 2020-06-18 NOTE — NURSING NOTE
"Chief Complaint   Patient presents with     RECHECK     FOLLOW UP     Blood pressure 129/78, pulse 64, temperature 98.5  F (36.9  C), resp. rate 16, height 1.803 m (5' 11\"), weight 70.8 kg (156 lb), SpO2 100 %.    Keanu Swanson LPN'    "

## 2020-06-18 NOTE — LETTER
6/18/2020       RE: Junior Fernández  [address]   [place] [address]     Dear Colleague,    Thank you for referring your patient, Junior Fernández, to the [facility] at [facility]. Please see a copy of my visit note below.    CC: nasal congestion    HPI: Patient returns to clinic today for follow-up of difficulty breathing through his nose which is worse at night.  Patient did use the Flonase as directed.  He reports no significant change in his symptoms.  Nasal breathing still is the same.  Currently he feels like his right side is a little bit more restricted than his left side.  Overall the congestion is not too bad though.    PE:  GEN: nad  NOSE: Septum is slightly deviated both to the right and left but it is very mild.  Right turbinate is moderately hypertrophied.  Left turbinate is thin.  No masses or lesions.  Overall the nasal mucosa is very healthy appearing.  No septal perforation    A/P:  Patient presents with difficulty breathing through his nose.  Based off of his history as well as exam today I believe this is due to turbinate hypertrophy.  I reassured him that I did not see any polyps or any significant obvious damage to his nose due to his past history of cocaine use.  He has not responded to nasal steroid spray.  I discussed with him option of turbinate reductions in clinic.  We discussed the goals of the procedure as well as risks including failure to improve the nasal breathing.  Patient would like to proceed.  I will need to develop a protocol so that we can get the patient tested for COVID a couple days prior to the procedure.  We will call him once we have date and protocol in place.    I spent a total of 15 minutes face-to-face with Junior Fernández during today's office visit.  Over 50% of this time was spent counseling the patient on and/or coordinating care as documented in my assessment and plan.      Again, thank you for  allowing me to participate in the care of your patient.      Sincerely,    Savi Delgado MD

## 2020-06-18 NOTE — PATIENT INSTRUCTIONS
Turbinate Reduction:  A turbinate reduction done in clinic is completed under local anesthesia and radiofrequency is used to shrink the turbinates. This process can be slightly uncomfortable but not painful. Most patients do not experience any pain and return to work that day.       What to expect Post Turbinate Reduction:  -Avoid hard nose blowing for 24 hours. Gentle nose blowing for the next 48 hours.  -Irrigate nasal with saline twice daily for the next 2-3 weeks.  You can purchase this over the counter.  2 sprays each side.  This can be purchased over the counter. Generic is okay-[facility] [place]  -You may feel more congested for 1-2 weeks after the treatment and that the full effect of the treatment may not be felt until 4 weeks after procedure.  -Return to clinic in 4-6 weeks for another assessment.        Please call our clinic for any questions,concerns,or worsening symptoms.  Clinic #[phone]

## 2020-06-18 NOTE — PROGRESS NOTES
CC: nasal congestion    HPI: Patient returns to clinic today for follow-up of difficulty breathing through his nose which is worse at night.  Patient did use the Flonase as directed.  He reports no significant change in his symptoms.  Nasal breathing still is the same.  Currently he feels like his right side is a little bit more restricted than his left side.  Overall the congestion is not too bad though.    PE:  GEN: nad  NOSE: Septum is slightly deviated both to the right and left but it is very mild.  Right turbinate is moderately hypertrophied.  Left turbinate is thin.  No masses or lesions.  Overall the nasal mucosa is very healthy appearing.  No septal perforation    A/P:  Patient presents with difficulty breathing through his nose.  Based off of his history as well as exam today I believe this is due to turbinate hypertrophy.  I reassured him that I did not see any polyps or any significant obvious damage to his nose due to his past history of cocaine use.  He has not responded to nasal steroid spray.  I discussed with him option of turbinate reductions in clinic.  We discussed the goals of the procedure as well as risks including failure to improve the nasal breathing.  Patient would like to proceed.  I will need to develop a protocol so that we can get the patient tested for COVID a couple days prior to the procedure.  We will call him once we have date and protocol in place.    I spent a total of 15 minutes face-to-face with Junior Fernández during today's office visit.  Over 50% of this time was spent counseling the patient on and/or coordinating care as documented in my assessment and plan.

## 2020-07-13 ENCOUNTER — TELEPHONE (OUTPATIENT)
Dept: FAMILY MEDICINE | Facility: CLINIC | Age: 20
End: 2020-07-13

## 2020-07-13 NOTE — TELEPHONE ENCOUNTER
[facility] Call Center    Phone Message    May a detailed message be left on voicemail: yes     Reason for Call: Other: Pt needs clinic review for stomach issues. Please call pt to discuss.      Action Taken: Message routed to:  [place] [facility]: Nurses    Travel Screening: Not Applicable

## 2020-07-16 NOTE — PROGRESS NOTES
"  SUBJECTIVE:   Flynn is a 19 year old male who presents to clinic today for a return visit.    # Abdominal Pain  - Symptoms: dull discomfort between umbilicus and groin, \"maybe a little bloated,\" constant throughout the day  - Frequency of symptoms: most days, doesn't always notice it if he doesn't think about it  - Duration of symptoms: months  - worse if he eats a lot or slouches  - has identified fried chicken and mother's \"Mexican lasagna\" as triggers  - no bothered too much by spicy foods otherwise    - Bowel movements less regular than they used to be. Sometimes gets constipated on a regular basis. Stools become \"hard, lumpy.\" Other times stools are thinner  - Usually has 2 BMs a day  - think discomfort improves with defecation  - discomfort is worse with constipation    - no heart burn or reflux  - no rectal bleeding    Wt Readings from Last 5 Encounters:   07/17/20 71.6 kg (157 lb 12 oz)   06/18/20 70.8 kg (156 lb)   05/05/20 72.6 kg (160 lb)   01/21/20 69 kg (152 lb 1.3 oz)   10/30/19 69.2 kg (152 lb 8 oz)       Hemoglobin   Date Value Ref Range Status   07/17/2020 15.2 13.3 - 17.7 g/dL Final   08/16/2019 14.0 13.3 - 17.7 g/dL Final         # Nocturia  - waking up at 5am every morning to urinate for the past 3 months  - disruptive to his sleep  - takes him about 3 minutes to go back to sleep  - no increase in daytime urinating frequency  - will occasionally feel need to urinate again 15-30 minutes later, only will make a little urine  - no pain with urination or hematuria  - no penile discharge  - has not been sexually active in the past 6 months  - doesn't drink liquids past 8pm to try to improve this, hasn't helped    - I saw Flynn for testicular pain, urinary urgency and frequency last October 2019, at which time he had a normal UA, negative urine culture, and negative GC/CT NAAT    - lays in bed for about an hour before going to sleep  - goes to bed about 10:30 and doesn't fall asleep to 11:30  - wakes up " around 5-5:30 to wake up to urinate  - takes 30-60 minutes to fall back asleep  - sleep for another 2 hours then gets up  - feels like he needs 8-9 hours usually to get to sleep  - wakes up feeling not rested    - does not snore as far as he knows  - does wake up with morning headaches  - exercises regularly  - drinks coffee 2-3 days a week, medium or large coffee, not later than 2-3 pm  - will ocassionally have a small amount of soda    - last cocaine use 1.5 years ago, last marijuana use December 2019    ROS: Denies fevers, chills, chest pain, difficulty breathing    Patient Active Problem List   Diagnosis     Generalized anxiety disorder     History of substance use disorder     Current Outpatient Medications   Medication     propranolol (INDERAL) 10 MG tablet     No current facility-administered medications for this visit.        I have reviewed the patient's relevant past medical history.     OBJECTIVE:   /73 (BP Location: Left arm, Patient Position: Sitting, Cuff Size: Adult Regular)   Pulse 104   Temp 97.1  F (36.2  C) (Skin)   Resp 15   Wt 71.6 kg (157 lb 12 oz)   SpO2 97%   BMI 22.00 kg/m      Constitutional: well-appearing, appears stated age  Eyes: conjunctivae without erythema, sclera anicteric.   Abdomen: normal bowel sounds. Mildly tender in right and left mid abdomen. No guarding or rebound tenderness.   Skin: no rashes, lesions, or wounds  Psych: affect is full and appropriate, speech is fluent and non-pressured    Results for orders placed or performed in visit on 07/17/20   Urinalysis ([place])     Status: Abnormal   Result Value Ref Range    Leukocyte Esterase UR Negative Negative    Nitrite Urine Negative Negative mg/dL    Protein UR 1+ (A) Negative mg/dL    Glucose Urine Negative Negative    Ketones Urine Trace (A) Negative mg/dL    Urobilinogen mg/dL 0.2 E.U./dL 0.2 E.U./dL E.U./dL    Bilirubin UR Negative Negative mg/dL    Blood UR Negative Negative mg/dL    pH Urine 5.5 4.5 -  8.0    Specific Gravity Urine 1.0 1.0 - 1.0   CBC with Plt ([facility])     Status: None   Result Value Ref Range    WBC 6.6 4.0 - 11.0 K/uL    RBC 5.14 4.40 - 5.90 M/uL    Hemoglobin 15.2 13.3 - 17.7 g/dL    Hematocrit 46.7 40.0 - 53.0 %    MCV 90.9 78.0 - 100.0 fL    MCH 29.6 26.5 - 35.0 pg    MCHC 32.5 32.0 - 36.0 g/dL    Platelets 251.0 150.0 - 450.0 K/uL    RDW 12.2 %       ASSESSMENT AND PLAN:     (R10.9) Abdominal discomfort  (primary encounter diagnosis)  Comment: Symptoms most consistent with constipation predominant IBS given abdominal discomfort that worsens with change in stool consistency and improves with defecation for greater than 3 months. Checking TSH and CBC to evaluate for hypothyroidism and anemia. Provided with information about low FOD-MAP diet. Encouraged increasing fiber and water intake.   Plan: Urinalysis ([place]), TSH with free T4         reflex, CBC with Plt ([facility])          (R35.1) Nocturia  Comment: UA with 1+ protein and tract ketones. I would not consider Cam's one episode of nocturia nightly a sign of pathology, but it is disruptive to him, primarily because of his already poor sleep. We discussed this and I suggested we focus for now on improving his sleep quality. He is open to this. He is not currently seeing any mental health providers regularly. I have provided him with referral options for CBT-I.   Plan:     Darius Tamayo MD   [facility]  07/17/2020, 4:19 PM

## 2020-07-17 ENCOUNTER — OFFICE VISIT (OUTPATIENT)
Dept: FAMILY MEDICINE | Facility: CLINIC | Age: 20
End: 2020-07-17
Payer: COMMERCIAL

## 2020-07-17 VITALS
DIASTOLIC BLOOD PRESSURE: 73 MMHG | HEART RATE: 104 BPM | SYSTOLIC BLOOD PRESSURE: 121 MMHG | WEIGHT: 157.75 LBS | OXYGEN SATURATION: 97 % | RESPIRATION RATE: 15 BRPM | TEMPERATURE: 97.1 F | BODY MASS INDEX: 22 KG/M2

## 2020-07-17 DIAGNOSIS — R10.9 ABDOMINAL DISCOMFORT: Primary | ICD-10-CM

## 2020-07-17 DIAGNOSIS — R35.1 NOCTURIA: ICD-10-CM

## 2020-07-17 LAB
BILIRUBIN UR: NEGATIVE MG/DL
BLOOD UR: NEGATIVE MG/DL
ERYTHROCYTE [DISTWIDTH] IN BLOOD BY AUTOMATED COUNT: 12.2 %
GLUCOSE URINE: NEGATIVE
HCT VFR BLD AUTO: 46.7 % (ref 40–53)
HEMOGLOBIN: 15.2 G/DL (ref 13.3–17.7)
KETONES UR QL: ABNORMAL MG/DL
LEUKOCYTE ESTERASE UR: NEGATIVE
MCH RBC QN AUTO: 29.6 PG (ref 26.5–35)
MCHC RBC AUTO-ENTMCNC: 32.5 G/DL (ref 32–36)
MCV RBC AUTO: 90.9 FL (ref 78–100)
NITRITE UR QL STRIP: NEGATIVE MG/DL
PH UR STRIP: 5.5 [PH] (ref 4.5–8)
PLATELET # BLD AUTO: 251 K/UL (ref 150–450)
PROTEIN UR: ABNORMAL MG/DL
RBC # BLD AUTO: 5.14 M/UL (ref 4.4–5.9)
SP GR UR STRIP: 1 (ref 1–1)
TSH SERPL DL<=0.005 MIU/L-ACNC: 2.35 MU/L (ref 0.4–4)
UROBILINOGEN UR STRIP-ACNC: ABNORMAL E.U./DL
WBC # BLD AUTO: 6.6 K/UL (ref 4–11)

## 2020-07-17 ASSESSMENT — ANXIETY QUESTIONNAIRES
2. NOT BEING ABLE TO STOP OR CONTROL WORRYING: SEVERAL DAYS
3. WORRYING TOO MUCH ABOUT DIFFERENT THINGS: SEVERAL DAYS
IF YOU CHECKED OFF ANY PROBLEMS ON THIS QUESTIONNAIRE, HOW DIFFICULT HAVE THESE PROBLEMS MADE IT FOR YOU TO DO YOUR WORK, TAKE CARE OF THINGS AT HOME, OR GET ALONG WITH OTHER PEOPLE: NOT DIFFICULT AT ALL
7. FEELING AFRAID AS IF SOMETHING AWFUL MIGHT HAPPEN: NOT AT ALL
GAD7 TOTAL SCORE: 5
5. BEING SO RESTLESS THAT IT IS HARD TO SIT STILL: NOT AT ALL
6. BECOMING EASILY ANNOYED OR IRRITABLE: SEVERAL DAYS
1. FEELING NERVOUS, ANXIOUS, OR ON EDGE: SEVERAL DAYS

## 2020-07-17 ASSESSMENT — PATIENT HEALTH QUESTIONNAIRE - PHQ9
SUM OF ALL RESPONSES TO PHQ QUESTIONS 1-9: 4
5. POOR APPETITE OR OVEREATING: SEVERAL DAYS

## 2020-07-17 NOTE — NURSING NOTE
"19 year old  Chief Complaint   Patient presents with     Abdominal Pain     x 3 months lower abdomen/groin area     Sleep Problem     waking up every morning around 5 or 6 to use bathroom       Blood pressure 121/73, pulse 104, temperature 97.1  F (36.2  C), temperature source Skin, resp. rate 15, weight 71.6 kg (157 lb 12 oz), SpO2 97 %. Body mass index is 22 kg/m .  Patient Active Problem List   Diagnosis     Generalized anxiety disorder     History of substance use disorder       Wt Readings from Last 2 Encounters:   07/17/20 71.6 kg (157 lb 12 oz)   06/18/20 70.8 kg (156 lb)     BP Readings from Last 3 Encounters:   07/17/20 121/73   06/18/20 129/78   01/21/20 129/88         Current Outpatient Medications   Medication     propranolol (INDERAL) 10 MG tablet     No current facility-administered medications for this visit.        Social History     Tobacco Use     Smoking status: Never Smoker     Smokeless tobacco: Never Used   Substance Use Topics     Alcohol use: Yes     Comment: 3-4 days ago     Drug use: Yes     Types: Marijuana, \"Crack\" cocaine     Comment: Used marijuanna 3 days ago, cocaine in [place], vyvanse 3 days ago        Health Maintenance Due   Topic Date Due     DEPRESSION ACTION PLAN  1999     HIV SCREENING  10/23/2014     PREVENTIVE CARE VISIT  11/20/2018     PHQ-9  04/21/2020       No results found for: PAP      July 17, 2020 1:13 PM    "

## 2020-07-17 NOTE — PATIENT INSTRUCTIONS
[email or web address]  [facility] Sleep Psychologist  Appointments: [phone].      Georgi Sr PsyD, FREDDY  [facility]  [address]  [place] [address]  Appointments: [phone] [address]  [place] [address]  Appointments:[phone]      Vanessa Connor [facility]   [address]   [place], [place] [address] [phone]       There are two types of fiber, soluble and insoluble fiber, both of which are important for your health.    Soluble fiber dissolves in water and slows down digestion. It is found in founds like apples, oranges, beans, blueberries, lentils, and oatmeal. It can help improve the symptoms of irritable bowel syndrome, improves your body s sensitivity to insulin, may improve your cholesterol, and reduces the risk of cardiovascular disease and cancer. This is the type of fiber found in psyllium (Metamucil), methylcellulose (Citrucel), and wheat dextrin (Benefiber). Methylcellulose is less likely than psyllium to cause bloating and gas production.    Insoluble fiber does not dissolve in water and helps add bulk to stools. It is found in foods like wheat bran, whole grains, nuts, seeds, carrots, cucumbers, tomatoes, and zucchini. It also helps improve insulin sensitivity and reduces the risk of cardiovascular disease and cancer.

## 2020-07-18 ASSESSMENT — ANXIETY QUESTIONNAIRES: GAD7 TOTAL SCORE: 5

## 2020-12-27 ENCOUNTER — HEALTH MAINTENANCE LETTER (OUTPATIENT)
Age: 20
End: 2020-12-27

## 2021-04-24 ENCOUNTER — HEALTH MAINTENANCE LETTER (OUTPATIENT)
Age: 21
End: 2021-04-24

## 2021-09-03 ENCOUNTER — HOSPITAL ENCOUNTER (INPATIENT)
Facility: CLINIC | Age: 21
LOS: 17 days | Discharge: HOME | DRG: 885 | End: 2021-09-20
Attending: EMERGENCY MEDICINE | Admitting: PSYCHIATRY & NEUROLOGY
Payer: COMMERCIAL

## 2021-09-03 DIAGNOSIS — Z11.52 ENCOUNTER FOR SCREENING LABORATORY TESTING FOR SEVERE ACUTE RESPIRATORY SYNDROME CORONAVIRUS 2 (SARS-COV-2): ICD-10-CM

## 2021-09-03 DIAGNOSIS — G25.9 EXTRAPYRAMIDAL AND MOVEMENT DISORDER: ICD-10-CM

## 2021-09-03 DIAGNOSIS — F51.01 PRIMARY INSOMNIA: Primary | ICD-10-CM

## 2021-09-03 DIAGNOSIS — F29 PSYCHOSIS, UNSPECIFIED PSYCHOSIS TYPE (H): ICD-10-CM

## 2021-09-03 DIAGNOSIS — F41.1 GENERALIZED ANXIETY DISORDER: ICD-10-CM

## 2021-09-03 LAB
AMPHETAMINES UR QL SCN: NORMAL
BARBITURATES UR QL: NORMAL
BENZODIAZ UR QL: NORMAL
CANNABINOIDS UR QL SCN: NORMAL
COCAINE UR QL: NORMAL
OPIATES UR QL SCN: NORMAL
SARS-COV-2 RNA RESP QL NAA+PROBE: NEGATIVE

## 2021-09-03 PROCEDURE — C9803 HOPD COVID-19 SPEC COLLECT: HCPCS | Performed by: EMERGENCY MEDICINE

## 2021-09-03 PROCEDURE — 90791 PSYCH DIAGNOSTIC EVALUATION: CPT

## 2021-09-03 PROCEDURE — U0005 INFEC AGEN DETEC AMPLI PROBE: HCPCS | Performed by: EMERGENCY MEDICINE

## 2021-09-03 PROCEDURE — 99285 EMERGENCY DEPT VISIT HI MDM: CPT | Performed by: EMERGENCY MEDICINE

## 2021-09-03 PROCEDURE — 80307 DRUG TEST PRSMV CHEM ANLYZR: CPT | Performed by: EMERGENCY MEDICINE

## 2021-09-03 PROCEDURE — 99285 EMERGENCY DEPT VISIT HI MDM: CPT | Mod: 25 | Performed by: EMERGENCY MEDICINE

## 2021-09-03 PROCEDURE — 124N000002 HC R&B MH UMMC

## 2021-09-03 RX ORDER — TRAZODONE HYDROCHLORIDE 50 MG/1
50 TABLET, FILM COATED ORAL
Status: DISCONTINUED | OUTPATIENT
Start: 2021-09-03 | End: 2021-09-20 | Stop reason: HOSPADM

## 2021-09-03 RX ORDER — HYDROXYZINE HYDROCHLORIDE 25 MG/1
25 TABLET, FILM COATED ORAL EVERY 4 HOURS PRN
Status: DISCONTINUED | OUTPATIENT
Start: 2021-09-03 | End: 2021-09-20 | Stop reason: HOSPADM

## 2021-09-03 RX ORDER — AMOXICILLIN 250 MG
1 CAPSULE ORAL 2 TIMES DAILY PRN
Status: DISCONTINUED | OUTPATIENT
Start: 2021-09-03 | End: 2021-09-20 | Stop reason: HOSPADM

## 2021-09-03 RX ORDER — RAMELTEON 8 MG/1
8 TABLET ORAL AT BEDTIME
Status: ON HOLD | COMMUNITY
Start: 2021-09-01 | End: 2021-09-20

## 2021-09-03 RX ORDER — OLANZAPINE 15 MG/1
15 TABLET ORAL 2 TIMES DAILY PRN
Status: ON HOLD | COMMUNITY
Start: 2021-09-02 | End: 2021-09-20

## 2021-09-03 RX ORDER — OLANZAPINE 10 MG/2ML
10 INJECTION, POWDER, FOR SOLUTION INTRAMUSCULAR 3 TIMES DAILY PRN
Status: DISCONTINUED | OUTPATIENT
Start: 2021-09-03 | End: 2021-09-20 | Stop reason: HOSPADM

## 2021-09-03 RX ORDER — RAMELTEON 8 MG/1
8 TABLET ORAL AT BEDTIME
Status: DISCONTINUED | OUTPATIENT
Start: 2021-09-04 | End: 2021-09-20 | Stop reason: HOSPADM

## 2021-09-03 RX ORDER — MAGNESIUM HYDROXIDE/ALUMINUM HYDROXICE/SIMETHICONE 120; 1200; 1200 MG/30ML; MG/30ML; MG/30ML
30 SUSPENSION ORAL EVERY 4 HOURS PRN
Status: DISCONTINUED | OUTPATIENT
Start: 2021-09-03 | End: 2021-09-20 | Stop reason: HOSPADM

## 2021-09-03 RX ORDER — OLANZAPINE 15 MG/1
15 TABLET ORAL AT BEDTIME
Status: DISCONTINUED | OUTPATIENT
Start: 2021-09-04 | End: 2021-09-09

## 2021-09-03 RX ORDER — ACETAMINOPHEN 325 MG/1
650 TABLET ORAL EVERY 4 HOURS PRN
Status: DISCONTINUED | OUTPATIENT
Start: 2021-09-03 | End: 2021-09-20 | Stop reason: HOSPADM

## 2021-09-03 RX ORDER — OLANZAPINE 10 MG/1
10 TABLET ORAL 3 TIMES DAILY PRN
Status: DISCONTINUED | OUTPATIENT
Start: 2021-09-03 | End: 2021-09-20 | Stop reason: HOSPADM

## 2021-09-03 ASSESSMENT — ACTIVITIES OF DAILY LIVING (ADL)
TOILETING_ISSUES: NO
CONCENTRATING,_REMEMBERING_OR_MAKING_DECISIONS_DIFFICULTY: NO
DOING_ERRANDS_INDEPENDENTLY_DIFFICULTY: NO
WALKING_OR_CLIMBING_STAIRS_DIFFICULTY: NO
HEARING_DIFFICULTY_OR_DEAF: NO
DRESS: INDEPENDENT
HYGIENE/GROOMING: INDEPENDENT
ORAL_HYGIENE: INDEPENDENT
DIFFICULTY_COMMUNICATING: NO
DRESSING/BATHING_DIFFICULTY: NO
WEAR_GLASSES_OR_BLIND: NO
DIFFICULTY_EATING/SWALLOWING: NO
FALL_HISTORY_WITHIN_LAST_SIX_MONTHS: NO
LAUNDRY: WITH SUPERVISION

## 2021-09-03 ASSESSMENT — ENCOUNTER SYMPTOMS
DECREASED CONCENTRATION: 1
HALLUCINATIONS: 1
NERVOUS/ANXIOUS: 1
SLEEP DISTURBANCE: 1

## 2021-09-03 ASSESSMENT — MIFFLIN-ST. JEOR: SCORE: 1757.42

## 2021-09-04 LAB
ALBUMIN SERPL-MCNC: 4.2 G/DL (ref 3.4–5)
ALP SERPL-CCNC: 54 U/L (ref 40–150)
ALT SERPL W P-5'-P-CCNC: 22 U/L (ref 0–70)
ANION GAP SERPL CALCULATED.3IONS-SCNC: 6 MMOL/L (ref 3–14)
AST SERPL W P-5'-P-CCNC: 17 U/L (ref 0–45)
BASOPHILS # BLD AUTO: 0.1 10E3/UL (ref 0–0.2)
BASOPHILS NFR BLD AUTO: 1 %
BILIRUB SERPL-MCNC: 0.5 MG/DL (ref 0.2–1.3)
BUN SERPL-MCNC: 22 MG/DL (ref 7–30)
CALCIUM SERPL-MCNC: 9 MG/DL (ref 8.5–10.1)
CHLORIDE BLD-SCNC: 109 MMOL/L (ref 94–109)
CHOLEST SERPL-MCNC: 167 MG/DL
CO2 SERPL-SCNC: 23 MMOL/L (ref 20–32)
CREAT SERPL-MCNC: 1.04 MG/DL (ref 0.66–1.25)
EOSINOPHIL # BLD AUTO: 0.2 10E3/UL (ref 0–0.7)
EOSINOPHIL NFR BLD AUTO: 2 %
ERYTHROCYTE [DISTWIDTH] IN BLOOD BY AUTOMATED COUNT: 12.5 % (ref 10–15)
FASTING STATUS PATIENT QL REPORTED: YES
GFR SERPL CREATININE-BSD FRML MDRD: >90 ML/MIN/1.73M2
GLUCOSE BLD-MCNC: 104 MG/DL (ref 70–99)
HCT VFR BLD AUTO: 48.3 % (ref 40–53)
HDLC SERPL-MCNC: 66 MG/DL
HGB BLD-MCNC: 16.3 G/DL (ref 13.3–17.7)
IMM GRANULOCYTES # BLD: 0 10E3/UL
IMM GRANULOCYTES NFR BLD: 0 %
LDLC SERPL CALC-MCNC: 80 MG/DL
LYMPHOCYTES # BLD AUTO: 3.3 10E3/UL (ref 0.8–5.3)
LYMPHOCYTES NFR BLD AUTO: 47 %
MCH RBC QN AUTO: 29.9 PG (ref 26.5–33)
MCHC RBC AUTO-ENTMCNC: 33.7 G/DL (ref 31.5–36.5)
MCV RBC AUTO: 89 FL (ref 78–100)
MONOCYTES # BLD AUTO: 0.7 10E3/UL (ref 0–1.3)
MONOCYTES NFR BLD AUTO: 10 %
NEUTROPHILS # BLD AUTO: 2.8 10E3/UL (ref 1.6–8.3)
NEUTROPHILS NFR BLD AUTO: 40 %
NONHDLC SERPL-MCNC: 101 MG/DL
NRBC # BLD AUTO: 0 10E3/UL
NRBC BLD AUTO-RTO: 0 /100
PLATELET # BLD AUTO: 296 10E3/UL (ref 150–450)
POTASSIUM BLD-SCNC: 3.8 MMOL/L (ref 3.4–5.3)
PROT SERPL-MCNC: 7.7 G/DL (ref 6.8–8.8)
RBC # BLD AUTO: 5.46 10E6/UL (ref 4.4–5.9)
SODIUM SERPL-SCNC: 138 MMOL/L (ref 133–144)
TRIGL SERPL-MCNC: 107 MG/DL
TSH SERPL DL<=0.005 MIU/L-ACNC: 1.38 MU/L (ref 0.4–4)
WBC # BLD AUTO: 6.9 10E3/UL (ref 4–11)

## 2021-09-04 PROCEDURE — 124N000002 HC R&B MH UMMC

## 2021-09-04 PROCEDURE — 250N000013 HC RX MED GY IP 250 OP 250 PS 637: Performed by: NURSE PRACTITIONER

## 2021-09-04 PROCEDURE — 84443 ASSAY THYROID STIM HORMONE: CPT | Performed by: PSYCHIATRY & NEUROLOGY

## 2021-09-04 PROCEDURE — 85025 COMPLETE CBC W/AUTO DIFF WBC: CPT | Performed by: PSYCHIATRY & NEUROLOGY

## 2021-09-04 PROCEDURE — 250N000013 HC RX MED GY IP 250 OP 250 PS 637: Performed by: PSYCHIATRY & NEUROLOGY

## 2021-09-04 PROCEDURE — 80061 LIPID PANEL: CPT | Performed by: PSYCHIATRY & NEUROLOGY

## 2021-09-04 PROCEDURE — 36415 COLL VENOUS BLD VENIPUNCTURE: CPT | Performed by: PSYCHIATRY & NEUROLOGY

## 2021-09-04 PROCEDURE — 80053 COMPREHEN METABOLIC PANEL: CPT | Performed by: PSYCHIATRY & NEUROLOGY

## 2021-09-04 PROCEDURE — 99223 1ST HOSP IP/OBS HIGH 75: CPT | Mod: AI | Performed by: NURSE PRACTITIONER

## 2021-09-04 RX ORDER — GABAPENTIN 300 MG/1
300 CAPSULE ORAL 3 TIMES DAILY PRN
Status: DISCONTINUED | OUTPATIENT
Start: 2021-09-04 | End: 2021-09-20 | Stop reason: HOSPADM

## 2021-09-04 RX ORDER — OLANZAPINE 5 MG/1
5 TABLET ORAL EVERY MORNING
Status: DISCONTINUED | OUTPATIENT
Start: 2021-09-04 | End: 2021-09-09

## 2021-09-04 RX ADMIN — RAMELTEON 8 MG: 8 TABLET ORAL at 20:00

## 2021-09-04 RX ADMIN — GABAPENTIN 300 MG: 300 CAPSULE ORAL at 13:26

## 2021-09-04 RX ADMIN — OLANZAPINE 5 MG: 5 TABLET, FILM COATED ORAL at 08:14

## 2021-09-04 RX ADMIN — OLANZAPINE 15 MG: 15 TABLET, FILM COATED ORAL at 20:00

## 2021-09-04 ASSESSMENT — ACTIVITIES OF DAILY LIVING (ADL)
ORAL_HYGIENE: INDEPENDENT
DRESS: INDEPENDENT
HYGIENE/GROOMING: INDEPENDENT
ORAL_HYGIENE: INDEPENDENT
LAUNDRY: WITH SUPERVISION
HYGIENE/GROOMING: INDEPENDENT
DRESS: INDEPENDENT

## 2021-09-04 NOTE — PLAN OF CARE
"Pt came up to the desk asking when his hold is up. Writer explained to pt that he signed in as a voluntary patient this morning and he will meet with the team on Monday morning to discuss discharge planning. Pt at first seemed confused and did not seem to understand that he had signed himself in voluntary. After explanation, pt seemed accepting of this though continues to appear tense and anxious with flat affect. He has been visible on the unit and has been somewhat social with peers.  Pt denies SI/SIB/HI/AVH. Pt does appear to be preoccupied with internal stimuli at times. When prompted, pt does admit he may be having some paranoia. Pt reported he has heard staff talking about him multiple times this shift and asked if he will get transferred because \"I heard them talking about transferring me\". Pt states \"I try not to listen, but I overhear a lot\". Writer explained that no staff have been saying anything negative about him and that he may have overheard staff talking about transferring in new admission patients from the ER. Pt did appear to believe writer, but continued to appear tense and guarded. Pt reports he was able to get \"some sleep\" last night and states the ramelteon is helpful.     Writer inquired with pt about new allergy in chart to zyprexa. Pt states he does not want to take zyprexa due to restless legs side effects. Writer explained there are options for medications to help with side effects which we could speak to provider about. Pt does not want this however, pt states if the doctors want him to take zyprexa, he will take it because he wants to be able to get out of here as soon as possible. Writer contacted on call provider to discuss that pt now has zyprexa listed in his allergies and pt stating he gets restless legs and whether writer should give him his bedtime dose this evening. Provider stated pt is poor historian and we should still give the medication and to observe if the pt suffers from " "restless legs. Pt was medication compliant without issue. Restless legs have not been observed or reported by pt.     A staff reported to writer that pt misheard the overhead announcement about group and asked them \"what is a caretaker?\". The staff explained psych associates and the nurses are the caretakers here. The pt stated \"ok I accept, where do I go?\". The staff explained about group in the OT room and pt appeared confused, yet walked down the gould toward group, but never went in.   "
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
## 2021-09-04 NOTE — ED NOTES
"ED to Behavioral Floor Handoff    SITUATION  Junior Fernández is a 20 year old male who speaks English and lives in a home with family members The patient arrived in the ED by private car from home with a complaint of Insomnia (has been having difficulty sleeping , has been Rx Ramelteon and Zyprexa \" I just was recently dx with Psychosis because of my insomnia\" Denies SI/HI)  .The patient's current symptoms started/worsened 1 week(s) ago and during this time the symptoms have increased.   In the ED, pt was diagnosed with   Final diagnoses:   Psychosis, unspecified psychosis type (H)        Initial vitals were: BP: 121/75  Pulse: 110  Temp: 98.2  F (36.8  C)  Resp: 16  Weight: 74.4 kg (164 lb)  SpO2: 94 %   --------  Is the patient diabetic? No   If yes, last blood glucose? --     If yes, was this treated in the ED? --  --------  Is the patient inebriated (ETOH) No or Impaired on other substances? No  MSSA done? N/A  Last MSSA score: --    Were withdrawal symptoms treated? N/A  Does the patient have a seizure history? No. If yes, date of most recent seizure--  --------  Is the patient patient experiencing suicidal ideation? denies current or recent suicidal ideation     Homicidal ideation? denies current or recent homicidal ideation or behaviors.    Self-injurious behavior/urges? denies current or recent self injurious behavior or ideation.  ------  Was pt aggressive in the ED No  Was a code called No  Is the pt now cooperative? Yes  -------  Meds given in ED: Medications - No data to display   Family present during ED course? Yes  Family currently present? No    BACKGROUND  Does the patient have a cognitive impairment or developmental disability? No  Allergies:   Allergies   Allergen Reactions     Seasonal Allergies      Seroquel [Quetiapine]      Fainting and slowed breathing    .   Social demographics are   Social History     Socioeconomic History     Marital status: Single     Spouse name: Not on file     " "Number of children: Not on file     Years of education: Not on file     Highest education level: Not on file   Occupational History     Not on file   Tobacco Use     Smoking status: Never Smoker     Smokeless tobacco: Never Used   Substance and Sexual Activity     Alcohol use: Not Currently     Comment: 3-4 days ago     Drug use: Not Currently     Types: Marijuana, \"Crack\" cocaine     Comment: Used marijuanna 3 days ago, cocaine in [place], vyvanse 3 days ago      Sexual activity: Yes     Partners: Female   Other Topics Concern     Not on file   Social History Narrative    Going to start at [employer] Fall 2020     Social Determinants of Health     Financial Resource Strain:      Difficulty of Paying Living Expenses:    Food Insecurity:      Worried About Running Out of Food in the Last Year:      Ran Out of Food in the Last Year:    Transportation Needs:      Lack of Transportation (Medical):      Lack of Transportation (Non-Medical):    Physical Activity:      Days of Exercise per Week:      Minutes of Exercise per Session:    Stress:      Feeling of Stress :    Social Connections:      Frequency of Communication with Friends and Family:      Frequency of Social Gatherings with Friends and Family:      Attends L[employer] day Services:      Active Member of Clubs or Organizations:      Attends Club or Organization Meetings:      Marital Status:    Intimate Partner Violence:      Fear of Current or Ex-Partner:      Emotionally Abused:      Physically Abused:      Sexually Abused:         ASSESSMENT  Labs results   Labs Ordered and Resulted from Time of ED Arrival Up to the Time of Departure from the ED   DRUG ABUSE SCREEN 1 URINE (ED) - Normal   COVID-19 VIRUS (CORONAVIRUS) BY PCR - Normal    Narrative:     Testing was performed using the tammy  SARS-CoV-2 & Influenza A/B Assay on the tammy  Sarah Beth  System.  This test should be ordered for the detection of SARS-COV-2 in individuals who meet SARS-CoV-2 " clinical and/or epidemiological criteria. Test performance is unknown in asymptomatic patients.  This test is for in vitro diagnostic use under the FDA EUA for laboratories certified under CLIA to perform moderate and/or high complexity testing. This test has not been FDA cleared or approved.  A negative test does not rule out the presence of PCR inhibitors in the specimen or target RNA in concentration below the limit of detection for the assay. The possibility of a false negative should be considered if the patient's recent exposure or clinical presentation suggests COVID-19.  [facility] are certified under the Clinical Laboratory Improvement Amendments of 1988 (CLIA-88) as qualified to perform moderate and/or high complexity laboratory testing.   DOCUMENT   URINE DRUGS OF ABUSE SCREEN    Narrative:     The following orders were created for panel order Urine Drugs of Abuse Screen.  Procedure                               Abnormality         Status                     ---------                               -----------         ------                     Drug abuse screen 1 urin...[[id number]]  Normal              Final result                 Please view results for these tests on the individual orders.      Imaging Studies: No results found for this or any previous visit (from the past 24 hour(s)).   Most recent vital signs /75   Pulse 110   Temp 98.2  F (36.8  C) (Oral)   Resp 16   Wt 74.4 kg (164 lb)   SpO2 94%   BMI 22.87 kg/m     Abnormal labs/tests/findings requiring intervention:---   Pain control: pt had none  Nausea control: pt had none    RECOMMENDATION  Are any infection precautions needed (MRSA, VRE, etc.)? No If yes, what infection? --  ---  Does the patient have mobility issues? independently. If yes, what device does the pt use? ---  ---  Is patient on 72 hour hold or commitment? Yes If on 72 hour hold, have hold and rights been given to patient? Yes  Are admitting  orders written if after 10 p.m. ?No  Tasks needing to be completed:---     Anneliese Santiago RN   [facility]--    [phone]   [phone] ED
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
## 2021-09-04 NOTE — PROGRESS NOTES
"Around 0725, pt came up to nursing station and appeared very tense and agitated. Pt stared at writer and stated, \"What did you say was fake.\" Writer responded that they were unsure what pt was talking about. Pt stated, \"You swear the whole truth and nothing but the truth?' Pt then quickly walked away and into room. Pt appears paranoid. Will continue to monitor.   "
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
## 2021-09-04 NOTE — PLAN OF CARE
NOC Shift Report    Pt in bed at beginning of shift, breathing quiet and unlabored. Pt slept through shift. Pt slept 6.75 hours.     No pt complaints or concerns at this time.     No PRNs given. Will continue to monitor.     Precautions: SI,SIB
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
## 2021-09-04 NOTE — ED NOTES
Patient gave permission for his parents Rolanda and Van Fernández to be updated on his care. Parent contact information is available in patient's Facesheet:  PradeepJudsonn (Father) [phone] -- [phone]   KorinRolanda (Mother) [phone] -- [phone]      called and spoke with mother, informed her of inpatient placement 10 / Rajat.
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
## 2021-09-04 NOTE — H&P
"DATE OF ADMISSION: 9/3/2021                                     PATIENT'S [id number]   DATE OF SERVICE: 9/4/2021                                           PATIENT'S: 2000  ADMITTING PROVIDER: Nabeel Glover MD  ATTENDING PROVIDER: Radha MARKS CNP  LEGAL STATUS:  72 Hold, later signed in.   SOURCES OF INFORMATION: Information was obtained from the patient and available records.  CHIEF COMPLAIN: \"Not having self control\".   HISTORY F PRESENT ILLNESS: Per ED note: Junior Fernández is a 20 year old male with hx of psychosis who presents to the ED with family.  They say that about 3 weeks ago he and his mother were driving to his college in [place].  They stopped in [facility] and he took their car and abandoned mom in [place].  He \"ditched\" their car in [place] and then was \"off grid\" for a period of time. He turned off his cell phone and flew to [place].  He was picked up by some substance abuse center in [place].  He was having thoughts that the catholics and the mob were infiltrating the detox center in [place] and he eventually was brought to the hospital where he was placed on a hold and admitted to inpatient mental health.  He was placed on meds and left [place] with his father this past Monday.  He has been having paranoia, delusions and hallucinations since.  He feels that his cell phone is hacked.  He says that his name means the the devil and the kennedy is the devil.  He has not been taking the meds prescribed while inpatient. He makes his dad sleep with the lights on and dad has to talk to him in the bathroom where there isn't any cameras.  He feels that the [employer] refs are communicating to him through the TV.  He is having difficulty sleeping.   He has hx of psychosis in the past and hx of drug abuse in the past. He admits to using thc recently.   Junior Fernández is a 20 year old male with history of substance induced psychotic disorder.  The patient is a somewhat reliable " "historian.  He confirms the information in the previous paragraph.  Reports the reason for admission is \"not having self-control\", unable to explain what this means.  Reports that he has been having issues with his parents who are violating his privacy.  He is not feeling safe at home.  Believes that the mob will kill him and his family.  Wants his family to sleep with the lights on.  States that he went to CD treatment in [place] which he believes was not an actual CD place, it's where the mob made contact with him.  He called the police several times but instead of aresting other people, they locked him up and took his shoelaces.  Prior to going to the CD treatment in [place], the patient reports smoking marijuana and using some alcohol about a week prior to admission. He was then sent to the hospital where he was prescribed Zyprexa.  Once in [place], the patient stopped taking the medication.  He now understands that this was a bad idea. Denies seizures, head injuries, and loss of consciousness.  The patient has not used any alcohol or illegal substances since he came back.  He continues to believe that the government or the [employer] is after him.  He is not able to explain why he has been under surveillance.  He is denying auditory visual hallucinations.  Reports not be able to sleep for 4 days straight.  This has happened again 4 years ago when he used Vyvanse, prescribed to his brother.  He has not used any stimulants since then.  Reports several incidents where he would not sleep for days and would not feel tired.  He is impulsive.  Reports history of anxiety and panic attacks.  Lexapro and hydroxyzine have been helpful.  Anxiety comes and goes.  Unable to come up with symptoms of panic attacks except that he is extremely tense.  Denies feeling depressed.  He reports difficulties with sleep for several years.  He averages few hours a night.  Denies history of PTSD, OCD, eating disorders, " borderline personality disorder.  Reports stabbing himself with a pencil about a week ago as a suicide attempt on the arm.  Denies SIB. Per chart review, the patient was in the emergency room in August for suicidal ideation and in January for insomnia.  He had previous ED visits for similar issues.    SUBSTANCE USE HISTORY:   The patient reports using marijuana and alcohol primarily.  He used Vyvanse once 4 years ago.  The last time he used marijuana and alcohol was couple of weeks ago.  He used to use cocaine but not in many years.  He has been in treatment twice in the past.    PSYCHIATRIC HISTORY:   The patient has a history of substance-induced psychosis, insomnia, anxiety, and possibly bipolar disorder.  He has been hospitalized 3 or 4 times in the past for similar issues.  He was first diagnosed with depression and anxiety in middle school.  He has never had ECT treatment.  He has been Zoloft, Seroquel, citalopram which increased depression, irritability, and anger, escitalopram, Abilify, trazodone, Wellbutrin, mirtazapine, Zyprexa, Lamictal for 1 days only, hydroxyzine, and ramelteon.  No history of suicide attempts except for trying to stab himself with a pencil in his arm about a week ago.  There is no khadra notes indicate serious attempt.  No history of SIB.  PAST MEDICAL HISTORY:   Past Medical History:   Diagnosis Date     Anisometropia      Anxiety      Depression      Fracture 07/01/2003    Left clavicle     Fracture 04/01/2005    Left [facility]     History of substance abuse (H) 11/23/2016    THC, ETOH, stimulants     Multiple nevi 10/14/2015     Pneumonia 03/01/2003    RUL     RAD (reactive airway disease)     outgrown     SARS (severe acute respiratory syndrome)        Past Surgical History:   Procedure Laterality Date     CIRCUMCISION,OTHER,<28 D/O       FRACTURE SURGERY  04/01/2005    Left [facility]     HC TOOTH EXTRACTION W/FORCEP         ALLERGIES:    Allergies   Allergen Reactions     Seasonal  Allergies      Seroquel [Quetiapine]      Fainting and slowed breathing      FAMILY HISTORY:  Family history is positive depression in mom.  Family History   Problem Relation Age of Onset     Anxiety Disorder Maternal Grandmother      Depression Maternal Grandmother      Hypertension Maternal Grandmother      Cerebrovascular Disease Maternal Grandmother      Other - See Comments Mother         on Celexa     Hyperthyroidism Father         Rx'd with methimazole. Father's side with mild allergy/asthma issues     Hyperlipidemia Father      Anxiety Disorder Brother         Stuttering and tics     Lymphoma Maternal Grandfather          from Lymphoma     Other - See Comments Paternal Grandmother         vaso-vagal syncope     Other - See Comments Paternal Grandfather          from kidney/liver CA; CAD and had pacemaker     Heart Disease Paternal Grandfather 65     Arrhythmia No family hx of        SOCIAL HISTORY:         The patient is from [place].  He has 1 younger brother.  He is single.  No children.  He lives with his parents.  He has some college.  His major is finances.  Denies  history.  Reports legal history of destroying properties in his family home.  MEDICAL REVIEW OF SYSTEM: Please refer to the review of systems done by Judith Aquino MD on 2021, which I reviewed and confirmed. The remaining 10-point systems review was negative based on my exam.   MEDICATIONS PRIOR TO ADMISSION:   Prior to Admission medications    Medication Sig Start Date End Date Taking? Authorizing Provider   OLANZapine (ZYPREXA) 15 MG tablet Take 15 mg by mouth 2 times daily as needed  21  Yes Reported, Patient   ramelteon (ROZEREM) 8 MG tablet Take 8 mg by mouth At Bedtime 21  Yes Reported, Patient     LABORATORY DATA:   Recent Results (from the past 672 hour(s))   Drug abuse screen 1 urine (ED)    Collection Time: 21  6:18 PM   Result Value Ref Range    Amphetamines Urine Screen Negative Screen Negative  "   Barbiturates Urine Screen Negative Screen Negative    Benzodiazepines Urine Screen Negative Screen Negative    Cannabinoids Urine Screen Negative Screen Negative    Cocaine Urine Screen Negative Screen Negative    Opiates Urine Screen Negative Screen Negative   Asymptomatic COVID-19 Virus (Coronavirus) by PCR Nasopharyngeal    Collection Time: 09/03/21  8:48 PM    Specimen: Nasopharyngeal; Swab   Result Value Ref Range    SARS CoV2 PCR Negative Negative     PHYSICAL EXAMINATON:   Temp: 97.5  F (36.4  C) Temp src: Oral BP: 103/68 Pulse: 93   Resp: 16 SpO2: 96 % O2 Device: None (Room air)    5' 11\" 161 lbs 0 oz Body mass index is 22.45 kg/m .  MENTAL STATUS EXAM: Patient is a very pleasant,  male who is clean, and dressed in hospital scrubs.  He is laying in bed comfortably.  Does not appear to be in any distress.  Eye contact is good, mood is good, affect is anxious, speech is clear and coherent, psychomotor behavior is negative for agitation retardation, thought process is logical oriented, no loose associations, thought content is positive for paranoia and delusions, negative for auditory visual hallucinations, negative for suicidal homicidal ideation, insight and judgment are fair, he is oriented to self, date, place, and partially to situation, attention span and concentration are intact, recent remote memory are intact, he has no problems expressing himself, adequate for the level of education and training.    DIAGNOSIS:  1.  Unspecified psychosis.  Rule out bipolar disorder with psychosis versus substance-induced psychotic disorder  2.  Polysubstance abuse, primarily alcohol and marijuana.   3.  History of cocaine abuse, in sustained remission.  PLAN AND RECOMMENDATIONS: The patient is a very pleasant,  male who was admitted with paranoia and delusions.  The patient was agreeable to the following changes:   --Restart Zyprexa 15 mg at bedtime.    --Start Zyprexa 5 mg every morning.  "   --Continue ramelteon 8 mg at bedtime.    --Additional medications will include gabapentin for anxiety, hydroxyzine, Zyprexa, and trazodone.    --Blood work was reviewed and is unremarkable.    --U tox is negative.    --Internal medicine to follow-up on medical problems.    --Estimated length of stay 5 to 7 days.    --Disposition, to home.    --The patient was on 72 hours hold however, signed in voluntary.   --The patient was consulted on nature of illness and treatment options.   --Care was coordinated with the treatment team.  Attestation: Patient has been seen and evaluated by eugene MARKS CNP  9/4/2021  7:58 AM  This note was created with the help of Dragon dictation system. All grammatical/typing errors or context distortion are unintentional and inherent to software.

## 2021-09-04 NOTE — PHARMACY-ADMISSION MEDICATION HISTORY
Admission Medication History Completed by Pharmacy    See [facility] Admission Navigator for allergy information, preferred outpatient pharmacy, prior to admission medications and immunization status.     Medication History Sources:     Surescripts (fill history), CareEverywhere, and patient interview (via telephone)    Changes made to PTA medication list (reason):    Added: None    Deleted: None    Changed: None    Additional Information:    Ramelteon - new rx from 9/1/21; patient reports only took 1 dose prior to admission.    Prior to Admission medications    Medication Sig Last Dose Taking? Auth Provider   OLANZapine (ZYPREXA) 15 MG tablet Take 15 mg by mouth 2 times daily as needed  9/2/2021 at Unknown time Yes Reported, Patient   ramelteon (ROZEREM) 8 MG tablet Take 8 mg by mouth At Bedtime 9/3/2021 at Unknown time Yes Reported, Patient       Date completed: 09/04/21    Medication history completed by:     Nicollette McMann, PharmD  [facility] Building: Ascom *[id number]

## 2021-09-04 NOTE — PROGRESS NOTES
09/03/21 9521   Patient Belongings   Did you bring any home meds/supplements to the hospital?  No   Patient Belongings Put in Hospital Secure Location (Security or Locker, etc.) shoes;clothing;cell phone/electronics   Belongings Search Yes   Clothing Search Yes   Second Staff Shirley CINTRON               In Locker: 1 black/grey sweatpants, 1 pair of black socks, 1 light grey underarmor t-shirt, 1 white allbirds, 1 iPhone, wallet w/ various I.D's and contact cards    Security: James Roxie Juli ([id number])    Admission:  I am responsible for any personal items that are not sent to the safe or pharmacy.  [facility] is not responsible for loss, theft or damage of any property in my possession.    Signature:  _________________________________ Date: _______  Time: _____                                              Staff Signature:  ____________________________ Date: ________  Time: _____      2nd Staff person, if patient is unable/unwilling to sign:    Signature: ________________________________ Date: ________  Time: _____     Discharge:  [facility] has returned all of my personal belongings:    Signature: _________________________________ Date: ________  Time: _____                                          Staff Signature:  ____________________________ Date: ________  Time: _____

## 2021-09-04 NOTE — PLAN OF CARE
Pt has been withdrawn. Pt was out for medications, meals and to shower. Pt paced the hallway some. Pt is very paranoid. Pt came to the nurses station several times this shift, telling staff to stop talking about him or explaining why he did something. Pt took am zyprexa without issue. Ate breakfast and lunch. Pt is not social with peers or staff. Made some phone calls. Signed a DIMITRIS for mom and dad, first declined to do so. Pt signed in voluntary. NP requested VS recheck, pt became agitated with this and refused. He then called a female PA martha ospina and walked away to his room. Writer offered gabapentin or zyprexa for high anxiety and agitation. Pt was willing to take the gabapentin and stated the male PA could take his VS. On call was notified of this . VS much better. HR 91 vs 120 this am.

## 2021-09-05 PROCEDURE — 124N000002 HC R&B MH UMMC

## 2021-09-05 PROCEDURE — 250N000013 HC RX MED GY IP 250 OP 250 PS 637: Performed by: NURSE PRACTITIONER

## 2021-09-05 PROCEDURE — 250N000013 HC RX MED GY IP 250 OP 250 PS 637: Performed by: PSYCHIATRY & NEUROLOGY

## 2021-09-05 RX ORDER — BENZTROPINE MESYLATE 1 MG/1
2 TABLET ORAL DAILY PRN
Status: DISCONTINUED | OUTPATIENT
Start: 2021-09-05 | End: 2021-09-06

## 2021-09-05 RX ADMIN — BENZTROPINE MESYLATE 2 MG: 1 TABLET ORAL at 14:59

## 2021-09-05 RX ADMIN — OLANZAPINE 5 MG: 5 TABLET, FILM COATED ORAL at 08:59

## 2021-09-05 RX ADMIN — OLANZAPINE 10 MG: 10 TABLET, FILM COATED ORAL at 17:59

## 2021-09-05 RX ADMIN — OLANZAPINE 15 MG: 15 TABLET, FILM COATED ORAL at 20:16

## 2021-09-05 RX ADMIN — RAMELTEON 8 MG: 8 TABLET ORAL at 20:16

## 2021-09-05 ASSESSMENT — ACTIVITIES OF DAILY LIVING (ADL)
HYGIENE/GROOMING: INDEPENDENT
DRESS: INDEPENDENT
HYGIENE/GROOMING: INDEPENDENT
ORAL_HYGIENE: INDEPENDENT
ORAL_HYGIENE: INDEPENDENT
LAUNDRY: WITH SUPERVISION
LAUNDRY: WITH SUPERVISION
DRESS: INDEPENDENT

## 2021-09-05 NOTE — PLAN OF CARE
Pt observed in the milieu isolative and withdrawn for the most part to self. Pt presented with blunted flat affect but calm mood and brightens upon approach. Pt appears less preoccupied and paranoid this shift. Pt denies any anxiety and depression this shift. Pt took a shower this shift. Pt appropriate and social with staff and peers when out in the lounge.  Pt denies SI/Self harm thoughts, urges, plan, and intent. Pt denies any pain this shift. Pt was medication compliant after  lots of teaching and encouragement. Pt's dad visited and pt reported he enjoyed the company, Pt reported sleep and appetite as adequate.Staff will continue to monitor and support.

## 2021-09-05 NOTE — PLAN OF CARE
"When staff knocked on pt door to check VS, pt stated \"i'm staying in my room because the  told me to\". Writer went to check in with pt to make sure he was feeling safe. Pt tells writer \"the  is protecting me, they have been flying planes over here and where I was before to check on me\". Writer asked him why  is protecting him and he states \"there was a threat made on my life by the [employer], they think I am the devil because of my name\". Pt states \"I know it sounds crazy but it's true I swear\". Writer provided emotional support and also attempted to orient pt to reality by telling him there have not been any planes flying over. The pt told writer \"yes there has, ask the day staff\". Pt endorsed anxiety, however states he does feel safe here, just safer in his room because he feels his room is where the  can monitor his safety best. Pt states he feels like he has a lot of energy to expend and would like to go for a run if he was at home. Writer offered him to go on the stationary bike for awhile and pt did accept. he rode the bike for about 10 min and then returned to his room. Writer offered PRN zyprexa and pt declined. Staff will continue to assess. Pt ate 100% of dinner.     Pt denies SI/SIB.    After dinner, pt requested for PRN zyprexa for sleep. Writer explained that this is not ordered for sleep. Pt did appear anxious and is clearly having delusional and paranoid thinking, so zyprexa 10 mg was given. Later, pt comes to writer and apologizes stating \"I really was anxious earlier and i'm feeling better now\".      Around 2045, pt comes to desk and states, \"what was that big white medication you gave me earlier, because it was way too big to be 10 mg of zyprexa\". Writer explained it was the 10 mg zyprexa he had asked for and then pulled the medication out of the pyxis to show the packaging to the patient. Pt then stated \"ok I believe you, but I still don't think you guys have " "my best interest\". Writer explained that we do and pt walked away.     Pt denies any medication side effects. Pt denies getting restless legs from zyprexa and states \"maybe it was from caffeine\".   "

## 2021-09-06 PROCEDURE — 250N000013 HC RX MED GY IP 250 OP 250 PS 637: Performed by: PSYCHIATRY & NEUROLOGY

## 2021-09-06 PROCEDURE — 250N000013 HC RX MED GY IP 250 OP 250 PS 637: Performed by: NURSE PRACTITIONER

## 2021-09-06 PROCEDURE — 124N000002 HC R&B MH UMMC

## 2021-09-06 RX ORDER — BENZTROPINE MESYLATE 1 MG/1
2 TABLET ORAL DAILY PRN
Status: DISCONTINUED | OUTPATIENT
Start: 2021-09-06 | End: 2021-09-15

## 2021-09-06 RX ADMIN — OLANZAPINE 15 MG: 15 TABLET, FILM COATED ORAL at 20:48

## 2021-09-06 RX ADMIN — RAMELTEON 8 MG: 8 TABLET ORAL at 20:48

## 2021-09-06 RX ADMIN — HYDROXYZINE HYDROCHLORIDE 25 MG: 25 TABLET, FILM COATED ORAL at 17:03

## 2021-09-06 RX ADMIN — OLANZAPINE 5 MG: 5 TABLET, FILM COATED ORAL at 08:37

## 2021-09-06 NOTE — PROGRESS NOTES
"SPIRITUAL HEALTH SERVICES  SPIRITUAL ASSESSMENT Progress Note  [facility] Station 10     REFERRAL SOURCE: I did visit this morning patient Junior per Epic consult order. I introduced myself as the on-call  and shared all the info about the SHS. However, pt said, \"I am okay now and I don't have anything to share with you at this moment but if I do, I will let you know.\" Pt was so polite and respected my presence as a  but he is not interested the  support at this time. I informed him well, that if he change his mind that the unit  is willing to come for another  visit and the pt agreed on that.    PLAN: The unit  will follow up the pt to provide spiritual care as needed.     Komal Branch M.Div. ([place]), M.Th., D.Min., [facility]  Staff   Pager [phone]    "

## 2021-09-06 NOTE — PLAN OF CARE
Shift Summary  Mental  Pt is calm and cooperative with the interview questions. Mood is normal and affect is flat. Denies suicidal and homicidal ideations as well as auditory and visual hallucinations. Seems more relaxed compared to this morning. Pt came to nursing station around 1700 and requested bed time Zyprexa. Upon questioning why this early, pt said he wants to sleep. This writer encouraged pt to take hydroxyzine prn, eat dinner and watch TV a little bit with peer. Pt agreed. Denies all Mental Health Symptoms.   Visible in milieu for meals and short period of time after dinner. Isolative and withdrawn to self. Cognition appears intact. Talked about his parents especially his dad and expressed some anger towards him. Pt declined to elaborate on it. Pt said going to [place] was best way to stay away from parents ,but for now he needs to focus on his recovery. Insight and judgement is fair. No disorganized behavior noted. No evidence of psychosis, paranoid or delusional thoughts noted this evening.   Prn: Hydroxyzine.   Physical  Pt alert and oriented to self and place. Denies pain. Vital sings WNL (see flow sheet for details). Good medication compliance is noted. Seems tolerating medications well. No side effect reported by pt or noted by this writer. Appetite adequate. Ate dinner. No problem with bowel and bladder per pt.   Prn: none  Continue to monitor pt's status Q 15 minutes and stabilize the patient's symptoms with the use of medications and therapeutic programming.

## 2021-09-06 NOTE — PLAN OF CARE
NOC Shift Report     Pt in bed at beginning of shift, breathing quiet and unlabored. Pt slept through shift. Pt slept 7 hours.      No pt complaints or concerns at this time.      No PRNs given. Will continue to monitor.      Precautions: SI,SIB

## 2021-09-06 NOTE — PLAN OF CARE
Shift Summary  Mental  Pt is calm and cooperative with the interview questions. Eye contact is appropriate. Speech has normal rate, tone and volume and is not pushed or pressured. Mood is irritable with full affect. Pt does not look depressed, anxious or manic. Thought content is significant for apparent paranoia and delusions. No disorganized behavior noted. Denies suicidal and homicidal ideations as well as auditory and visual hallucinations. Insight and judgment are impaired. Pt is interested in quitting smoking ,but feels that he is going through Nicotine withdraw. Nicotine gums was offered and pt took it.   Prn: nicotine gums   Physical  Pt alert and oriented to self and place. Denies pain. Vital sings WNL (see flow sheet for details). Slept 6.75 hours last night. Good medication compliance is noted. Appetite adequate. Ate both breakfast and lunch. No problem with bowel and bladder per pt.   Prn: none  Continue to monitor pt's status Q 15 minutes and stabilize the patient's symptoms with the use of medications and therapeutic programming.

## 2021-09-06 NOTE — PROGRESS NOTES
Pt approached this writer complaining of restless legs and feeling of leg twitching and attributed the symptoms to medication side effects related to Zyprexa. Pt reported that Cogentin has been the mediation that works for him when he has experienced the symptoms in the past. On call provider was notified and order obtained and medication was administered to the patient.

## 2021-09-07 PROCEDURE — 250N000013 HC RX MED GY IP 250 OP 250 PS 637: Performed by: PSYCHIATRY & NEUROLOGY

## 2021-09-07 PROCEDURE — 250N000013 HC RX MED GY IP 250 OP 250 PS 637: Performed by: NURSE PRACTITIONER

## 2021-09-07 PROCEDURE — 99232 SBSQ HOSP IP/OBS MODERATE 35: CPT | Performed by: PSYCHIATRY & NEUROLOGY

## 2021-09-07 PROCEDURE — 124N000002 HC R&B MH UMMC

## 2021-09-07 RX ADMIN — RAMELTEON 8 MG: 8 TABLET ORAL at 19:56

## 2021-09-07 RX ADMIN — OLANZAPINE 5 MG: 5 TABLET, FILM COATED ORAL at 08:45

## 2021-09-07 RX ADMIN — OLANZAPINE 15 MG: 15 TABLET, FILM COATED ORAL at 19:56

## 2021-09-07 ASSESSMENT — ACTIVITIES OF DAILY LIVING (ADL)
ORAL_HYGIENE: INDEPENDENT
DRESS: INDEPENDENT
LAUNDRY: WITH SUPERVISION
HYGIENE/GROOMING: INDEPENDENT

## 2021-09-07 NOTE — PLAN OF CARE
Problem: Suicide Risk  Goal: Absence of Self-Harm  Outcome: Improving     Patient is awake in bed at start of shift. Isolative and withdrawn. Flat and blunted affect. Smiles upon approach. Calm, pleasant and cooperative. Stated he feels he is getting better and improving since admission. He denied SI, SIB, auditory visual hallucinations. He denied being depressed but endorsed a little anxiety but didn't require a PRN medication. He denied pain when asked. He said that the side effect of Zyprexa is it makes him more restless. His mom, Rolanda placed a call asking for updates regarding patient's status and writer spoke with her. Ate well during snacks and at supper. Compliant with medications. Did not attend groups this shift. Will continue to monitor and assess pt.

## 2021-09-07 NOTE — PLAN OF CARE
Assessment/Intervention/Current Symtoms and Care Coordination  CTC (writer) met with trx team, provided update, and reviewed pt's chart. CTC completed intial plan of care team note. CTC met with pt to introduce themselves and review trx/d/d planing. Pt expressed being open to alternative options to returning straight home, but would prefer to go home from the hospital if possible. Pt requested being set up with programing to help him develop skills with interacting/socializing/communicating with others. Pt reported being open to medications, but long term doesn't want to be on them. Pt was able to clarify current outpatient providers. During interview pt appear to be tracking well overall, was pleasant, and cooperative with CTC questions. However, pt appeared to be masking symptoms, minimizing what brought him into the hospital, and presented as responding to internal stimuli during the conversation by his gaze switching between CTC and something behind or to the side of CTC (ie. distracted).     Discharge Plan or Goal  TBD     Barriers to Discharge   Safe discharge plan, ongoing symptom severity (MDD with SI), and medication evaluation/assessment.     Referral Status  TBD     Legal Status  VOLUNTARY

## 2021-09-07 NOTE — PLAN OF CARE
BEHAVIORAL TEAM DISCUSSION    Participants: Dr. Glory FREITAS, Lelo Beaver RN, Aakash Fine [facility]  Progress: New Admit  Anticipated length of stay: 7 days  Continued Stay Criteria/Rationale: Evaluation and assessment for hospitalization  Medical/Physical: None reported in team  Precautions:   Behavioral Orders   Procedures     Code 1 - Restrict to Unit     Discontinue 1:1 attendant for suicide risk     Order Specific Question:   I have performed an in person assessment of the patient     Answer:   Based on this assessment the patient no longer requires a one on one attendant at this point in time.     Routine Programming     As clinically indicated     Self Injury Precaution     Status 15     Every 15 minutes.     Suicide precautions     Patients on Suicide Precautions should have a Combination Diet ordered that includes a Diet selection(s) AND a Behavioral Tray selection for Safe Tray - with utensils, or Safe Tray - NO utensils       Plan: Continue evaluation and assessment for stabilization and aftercare needs.  Rationale for change in precautions or plan: None.      Problem: Behavioral Health Plan of Care  Goal: Patient-Specific Goal (Individualization)  9/7/2021 1123 by Rod Fine  Flowsheets (Taken 9/7/2021 1123)  Patient Vulnerabilities:    adverse childhood experience(s)    lacks insight into illness    occupational insecurity    substance abuse/addiction    limited support system    poor impulse control  Patient-Specific Goals (Include Timeframe): Unable to assess at this time due to acuity, will reassess  Patient Personal Strengths:    family/social support    stable living environment    positive educational history    expressive of emotions

## 2021-09-07 NOTE — PLAN OF CARE
Problem: Psychotic Symptoms  Goal: Psychotic Symptoms  Description: Signs and symptoms of listed problems will be absent or manageable.  Outcome: No Change  Flowsheets (Taken 9/7/2021 0502)  Psychotic Symptoms Assessed: sleep  Note: Pt continues to sleep through the night with no concerns noted.     NOC Shift Report    Pt in bed at beginning of shift, breathing quiet and unlabored. Pt slept through shift. Pt slept 6.75 hours.     No pt complaints or concerns at this time.     No PRNs given. Will continue to monitor.     Precautions: Suicide, Self-Injury

## 2021-09-07 NOTE — PROGRESS NOTES
"[facility], [place]   Psychiatric Progress Note        Interim History:     The patient's care was discussed with the treatment team during the daily team meeting and/or staff's chart notes were reviewed.  Staff report patient is calm and cooperative but remains pretty isolative and withdrawn to self. Pt's insight and judgement are improving.     Met with patient:  Pt appears to be in a good mood along with congruent affect. Pt denies any auditory or visual hallucinations. Pt admit to having what seemed like a manic episode prior to admission and shared that he had nearly four sleepless nights after abruptly discontinuing all substance use including marijuana, alcohol, and his antidepressant medications.Currently, pt is med compliant but seems hesitant about long term med use. Pt spoke about his plans and goals to continue school after taking a semester break to focus on his health. Pt denies any SI/SIB/HI. He denied having any med side effects. He asked us about discharge, we advised him that he would be discharged soon enough after preparations for his discharge are completed. He indicated that he understood and had no further questions or concerns.             Medications:       OLANZapine  15 mg Oral At Bedtime     OLANZapine  5 mg Oral QAM     ramelteon  8 mg Oral At Bedtime          Allergies:     Allergies   Allergen Reactions     Seasonal Allergies      Seroquel [Quetiapine]      Fainting and slowed breathing      Zyprexa [Olanzapine] Other (See Comments)     Restless leg symptoms, arm twitching          Labs:   No results found for this or any previous visit (from the past 24 hour(s)).       Psychiatric Examination:     BP (!) 140/74   Pulse 99   Temp 99.1  F (37.3  C) (Tympanic)   Resp 16   Ht 1.803 m (5' 11\")   Wt 73 kg (161 lb)   SpO2 97%   BMI 22.45 kg/m    Weight is 161 lbs 0 oz  Body mass index is 22.45 kg/m .     Orthostatic Vitals       Most Recent      Sitting " Orthostatic /78 09/06 0831    Sitting Orthostatic Pulse (bpm) 83 09/06 0831    Standing Orthostatic /82 09/06 0831    Standing Orthostatic Pulse (bpm) 89 09/06 0831           Appearance: dressed in hospital scrubs, appeared as age stated and neatly groomed  Attitude:  cooperative  Eye Contact:  fair  Mood:  good  Affect:  mood congruent  Speech:  clear, coherent  Psychomotor Behavior:  no evidence of tardive dyskinesia, dystonia, or tics  Throught Process:  logical and linear  Associations:  no loose associations  Thought Content:  no evidence of suicidal ideation or homicidal ideation  Insight:  good  Judgement:  fair  Oriented to:  time, person, and place  Attention Span and Concentration:  intact  Recent and Remote Memory:  intact    Clinical Global Impressions  First:  Considering your total clinical experience with this particular patient population, how severe are the patient's symptoms at this time?: 7 (09/04/21 0756)  Compared to the patient's condition at the START of treatment, this patient's condition is: 7 (09/04/21 0756)  Most recent:  Considering your total clinical experience with this particular patient population, how severe are the patient's symptoms at this time?: 7 (09/04/21 0756)  Compared to the patient's condition at the START of treatment, this patient's condition is: 7 (09/04/21 0756)         Precautions:     Behavioral Orders   Procedures     Code 1 - Restrict to Unit     Discontinue 1:1 attendant for suicide risk     Order Specific Question:   I have performed an in person assessment of the patient     Answer:   Based on this assessment the patient no longer requires a one on one attendant at this point in time.     Routine Programming     As clinically indicated     Self Injury Precaution     Status 15     Every 15 minutes.     Suicide precautions     Patients on Suicide Precautions should have a Combination Diet ordered that includes a Diet selection(s) AND a Behavioral Tray  selection for Safe Tray - with utensils, or Safe Tray - NO utensils            DIagnoses:   1.  Bipolar disorder with psychosis versus substance-induced psychotic disorder  2.  Polysubstance abuse, primarily alcohol and marijuana.   3.  History of cocaine abuse, in sustained remission.         Plan:     No medication changes today. Will continue to provide support and structure and work on discharge preparations. Will, likely, be ready for discharge in 2-4 days.      I was present with PA student who participated in the service and in the documentation of the note. I have verified the history and personally performed the physical exam and medical decision making. I agree with the assessment and plan of care as documented in the note.     Sammy Earl MD  [facility] Psychiatry

## 2021-09-07 NOTE — PROGRESS NOTES
09/07/21 1425   General Information   Has Not Attended OT as of: 09/07/21     Plan: OT staff will meet with pt to review the role of occupational therapy and explain the value of having them involved in their treatment plan including options to meet current needs/self-identified goals. As group attendance is established, Pt will be given self-assessment to inform OT initial assessment.

## 2021-09-08 PROCEDURE — 99232 SBSQ HOSP IP/OBS MODERATE 35: CPT | Performed by: PSYCHIATRY & NEUROLOGY

## 2021-09-08 PROCEDURE — 250N000013 HC RX MED GY IP 250 OP 250 PS 637: Performed by: PSYCHIATRY & NEUROLOGY

## 2021-09-08 PROCEDURE — 124N000002 HC R&B MH UMMC

## 2021-09-08 PROCEDURE — 250N000013 HC RX MED GY IP 250 OP 250 PS 637: Performed by: NURSE PRACTITIONER

## 2021-09-08 RX ADMIN — OLANZAPINE 15 MG: 15 TABLET, FILM COATED ORAL at 20:18

## 2021-09-08 RX ADMIN — RAMELTEON 8 MG: 8 TABLET ORAL at 20:18

## 2021-09-08 RX ADMIN — OLANZAPINE 5 MG: 5 TABLET, FILM COATED ORAL at 08:50

## 2021-09-08 NOTE — PROGRESS NOTES
"SPIRITUAL HEALTH SERVICES  SPIRITUAL ASSESSMENT Progress Note  [facility] ([facility] [place] 10[facility]     REFERRAL SOURCE: Pt follow up     At time of visit, patient was in his room.  He told me that he has a personal relationship with God, which he expresses by praying and hopes to attend [employer] after he is discharged (he identifies as [employer]).  Patient told me his relationship with God is \"stronger than ever\" right now.  Pt identified no needs at this time from the , and I let him know we would continue to be available for any needs as he continues his stay in the hospital.  He agreed to let staff know if he wanted another visit.    PLAN: SHS will remain available     Usha Munoz    Pager: [phone]    "

## 2021-09-08 NOTE — PLAN OF CARE
Shift Summary  Legal status: Voluntary  Mental  Visible in milieu. Isolative and withdrawn to self. Pleasant and cooperative with the interview questions. Mood is calm and affect is flat. Thought content is significant for apparent paranoia and delusions ,however pt is able to mask his symptoms well. Denies suicidal and homicidal ideations, self injury behavior, racing thought as well as auditory and visual hallucinations. Insight and judgment are imporving. Cognition appears intact to interviewing including orientation, recent and remote memory and concentrations. Did not participate in any group activity in spite of being encouraged many times.   Prn: none  Physical  Pt alert and oriented x 3. Denies pain. Vital sings WNL (see flow sheet for details). Slept 7.0 hours last night. Good medication compliance is noted. Seems tolerating medications well. Pt reported to evening RN being restless possibly from Zyprexa ,but no side effect reported by pt to this writer or noted by this writer. Appetite adequate. Ate both breakfast and lunch. No problem with bowel and bladder per pt.   Prn: none  Continue to monitor pt's status Q 15 minutes and stabilize the patient's symptoms with the use of medications and therapeutic programming.
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
## 2021-09-08 NOTE — PROGRESS NOTES
"[facility], [place]   Psychiatric Progress Note        Interim History:   The patient's care was discussed with the treatment team during the daily team meeting and/or staff's chart notes were reviewed.  Staff report patient is pleasant, calm and cooperative but is still pretty isolative and withdrawn to self. Though pt presents very well, it was also reported that he seems to mask his symptoms well and appears to be responding to internal stimuli.     Met with patient: Pt presented with a flat affect and  expressed he is in a lower mood today but was in better control of his impulses. His impulses as he describes are to lash out in anger and to remain confined to his room. Pt believes he was told by someone to stay in his room, he was assured that staff continue to encourage the contrary and should try to be out in the milieu more often. Pt was not able to identify exactly who told him and when asked directly if he was experiencing Auditory hallucinations and voices told him to stay at his room, he denied that. Pt denies any SI/SIB/HI.          Medications:       OLANZapine  15 mg Oral At Bedtime     OLANZapine  5 mg Oral QAM     ramelteon  8 mg Oral At Bedtime          Allergies:     Allergies   Allergen Reactions     Seasonal Allergies      Seroquel [Quetiapine]      Fainting and slowed breathing      Zyprexa [Olanzapine] Other (See Comments)     Restless leg symptoms, arm twitching          Labs:   No results found for this or any previous visit (from the past 24 hour(s)).       Psychiatric Examination:     /80   Pulse 89   Temp 98.2  F (36.8  C) (Oral)   Resp 16   Ht 1.803 m (5' 11\")   Wt 73 kg (161 lb)   SpO2 97%   BMI 22.45 kg/m    Weight is 161 lbs 0 oz  Body mass index is 22.45 kg/m .  Orthostatic Vitals         Most Recent      Sitting Orthostatic /80 09/08 0828    Sitting Orthostatic Pulse (bpm) 89 09/08 0828    Standing Orthostatic /83 09/08 0828    " Standing Orthostatic Pulse (bpm) 132 09/08 0828            Appearance: awake, alert, appeared as age stated, poorly groomed and cooperative  Attitude:  guarded  Eye Contact:  fair  Mood:  sad   Affect:  intensity is flat  Speech:  clear, coherent, monotonous  Psychomotor Behavior:  no evidence of tardive dyskinesia, dystonia, or tics  Throught Process:  overall, logical  Associations:  no loose associations  Thought Content:  no evidence of suicidal ideation or homicidal ideation  Insight: fair  Judgement:  fair  Oriented to:  time, person, and place  Attention Span and Concentration:  intact  Recent and Remote Memory:  intact    Clinical Global Impressions  First:  Considering your total clinical experience with this particular patient population, how severe are the patient's symptoms at this time?: 7 (09/04/21 0756)  Compared to the patient's condition at the START of treatment, this patient's condition is: 7 (09/04/21 0756)  Most recent:  Considering your total clinical experience with this particular patient population, how severe are the patient's symptoms at this time?: 7 (09/04/21 0756)  Compared to the patient's condition at the START of treatment, this patient's condition is: 7 (09/04/21 0756)         Precautions:     Behavioral Orders   Procedures     Code 1 - Restrict to Unit     Discontinue 1:1 attendant for suicide risk     Order Specific Question:   I have performed an in person assessment of the patient     Answer:   Based on this assessment the patient no longer requires a one on one attendant at this point in time.     Routine Programming     As clinically indicated     Self Injury Precaution     Status 15     Every 15 minutes.     Suicide precautions     Patients on Suicide Precautions should have a Combination Diet ordered that includes a Diet selection(s) AND a Behavioral Tray selection for Safe Tray - with utensils, or Safe Tray - NO utensils            DIagnoses:   1.  Bipolar disorder with  psychosis versus substance-induced psychotic disorder  2.  Rule out developing schizophrenia spectrum disorder.  3.  Polysubstance abuse, primarily alcohol and marijuana.   4.  History of cocaine abuse, in sustained remission.         Plan:      No medication changes today. Will continue to provide support and structure and work on discharge preparation.  CTC will refer to Navigate program.      I was present with PA student who participated in the service and in the documentation of the note. I have verified the history and personally performed the physical exam and medical decision making. I agree with the assessment and plan of care as documented in the note.     Sammy Earl MD  [facility] Psychiatry

## 2021-09-08 NOTE — PLAN OF CARE
NOC Shift Report     Pt in bed at beginning of shift, breathing quiet and unlabored. Pt slept through shift. Pt slept 7 hours.      No pt complaints or concerns at this time.      No PRNs given. Will continue to monitor.    Markel Stokes RN

## 2021-09-08 NOTE — PLAN OF CARE
Assessment/Intervention/Current Symtoms and Care Coordination  CTC (writer) met with trx team, provided update, and reviewed pt's chart. Trx team discussed pt's case and agreed that the Navigate would be a good referral at this time. CTC went to check in with pt, but he was sleeping at the time, and CTC decided not to rouse pt today. AVS was started.      Discharge Plan or Goal  TBD     Barriers to Discharge   Safe discharge plan, ongoing symptom severity (MDD with SI), and medication evaluation/assessment.     Referral Status  In progress, being referred to Navigate program     Legal Status  VOLUNTARY

## 2021-09-09 PROCEDURE — 250N000013 HC RX MED GY IP 250 OP 250 PS 637: Performed by: PSYCHIATRY & NEUROLOGY

## 2021-09-09 PROCEDURE — 250N000013 HC RX MED GY IP 250 OP 250 PS 637: Performed by: NURSE PRACTITIONER

## 2021-09-09 PROCEDURE — 99207 PR CDG-MDM COMPONENT: MEETS MODERATE - DOWN CODED: CPT | Performed by: PSYCHIATRY & NEUROLOGY

## 2021-09-09 PROCEDURE — 99232 SBSQ HOSP IP/OBS MODERATE 35: CPT | Performed by: PSYCHIATRY & NEUROLOGY

## 2021-09-09 PROCEDURE — 124N000002 HC R&B MH UMMC

## 2021-09-09 RX ORDER — OLANZAPINE 10 MG/1
10 TABLET ORAL AT BEDTIME
Status: DISCONTINUED | OUTPATIENT
Start: 2021-09-09 | End: 2021-09-20 | Stop reason: HOSPADM

## 2021-09-09 RX ORDER — RISPERIDONE 0.5 MG/1
0.5 TABLET ORAL 2 TIMES DAILY
Status: DISCONTINUED | OUTPATIENT
Start: 2021-09-09 | End: 2021-09-13

## 2021-09-09 RX ADMIN — RISPERIDONE 0.5 MG: 0.5 TABLET ORAL at 19:45

## 2021-09-09 RX ADMIN — OLANZAPINE 5 MG: 5 TABLET, FILM COATED ORAL at 08:31

## 2021-09-09 RX ADMIN — OLANZAPINE 10 MG: 10 TABLET, FILM COATED ORAL at 20:31

## 2021-09-09 RX ADMIN — RAMELTEON 8 MG: 8 TABLET ORAL at 20:30

## 2021-09-09 RX ADMIN — HYDROXYZINE HYDROCHLORIDE 25 MG: 25 TABLET, FILM COATED ORAL at 19:45

## 2021-09-09 ASSESSMENT — ACTIVITIES OF DAILY LIVING (ADL)
DRESS: INDEPENDENT
ORAL_HYGIENE: INDEPENDENT
ORAL_HYGIENE: INDEPENDENT
LAUNDRY: WITH SUPERVISION
DRESS: INDEPENDENT
HYGIENE/GROOMING: INDEPENDENT
HYGIENE/GROOMING: INDEPENDENT

## 2021-09-09 ASSESSMENT — MIFFLIN-ST. JEOR: SCORE: 1757.87

## 2021-09-09 NOTE — PROGRESS NOTES
Behavioral Health  Note    Behavioral Health  Spirituality Group Note    [address]    Name: Junior Fernández YOB: 2000   MRN: [id number] Age: 20 year old      Patient attended -led group, which included discussion of spirituality, coping with illness and perseverance.    Patient attended group for 1.0 hrs.    The patient actively participated in group discussion and patient demonstrated an appreciation of topic's application for their personal circumstances.    Anneliese Mayers MDiv  Associate   Pager [phone]  Office [phone]

## 2021-09-09 NOTE — PROGRESS NOTES
[facility], [place]   Psychiatric Progress Note        Interim History:     The patient's care was discussed with the treatment team during the daily team meeting and/or staff's chart notes were reviewed.  Staff report patient is pleasant, calm and cooperative but is still pretty isolative and withdrawn to self. Though pt presents very well, it was also reported that he seems to mask his symptoms well and appears to be responding to internal stimuli. This has not changed since yesterday. Staff also report that patient appears to be paranoid about staff's intentions towards him.    Met with patient: he was seen in presence of PA student. He was more open today than yesterday and immediately told us that he had fear that he would be killed on the orders of [place] president. When asked what made him think that, he denied that he heard any voice/voices telling him that, but admitted that he got this idea in the same way he got previous ideas that [employer] wanted to kill him. 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 He could not explain why he thought that and what made him so influential. He did agree with us that if he were to be discharged from this hospital at his present state of mind, he would not last in community for a long time and would be readmitted. He agreed with our recommendations to evaluate his meds and, if needed, to replace them with others. He agreed to stay at this hospital. He also agreed with us that his ideas could be a part of his mental illness.         Medications:       OLANZapine  10 mg Oral At Bedtime     ramelteon  8 mg Oral At Bedtime     risperiDONE  0.5 mg Oral BID          Allergies:     Allergies   Allergen Reactions     Seasonal Allergies      Seroquel [Quetiapine]      Fainting and slowed breathing      Zyprexa [Olanzapine] Other (See Comments)     Restless leg symptoms, arm twitching        "   Labs:   No results found for this or any previous visit (from the past 24 hour(s)).       Psychiatric Examination:     /81   Pulse 87   Temp 98  F (36.7  C) (Oral)   Resp 16   Ht 1.803 m (5' 11\")   Wt 73.1 kg (161 lb 1.6 oz)   SpO2 96%   BMI 22.47 kg/m    Weight is 161 lbs 1.6 oz  Body mass index is 22.47 kg/m .    Orthostatic Vitals       Most Recent      Sitting Orthostatic /81 09/09 0845    Sitting Orthostatic Pulse (bpm) 123 09/09 0845    Standing Orthostatic /83 09/09 0845    Standing Orthostatic Pulse (bpm) 124 09/09 0845         Appearance: awake, alert, appeared as age stated, poorly groomed and cooperative  Attitude: less guarded  Eye Contact:  fair  Mood:  sad   Affect:  intensity is restricted  Speech:  clear, coherent, monotonous  Psychomotor Behavior:  no evidence of tardive dyskinesia, dystonia, or tics  Throught Process:  Overall, illogical  Associations:  no loose associations  Thought Content:  no evidence of suicidal ideation or homicidal ideation, delusions are present  Insight: partial  Judgement:  fair  Oriented to:  time, person, and place  Attention Span and Concentration:  intact  Recent and Remote Memory:  intact    Clinical Global Impressions  First:  Considering your total clinical experience with this particular patient population, how severe are the patient's symptoms at this time?: 7 (09/04/21 0756)  Compared to the patient's condition at the START of treatment, this patient's condition is: 7 (09/04/21 0756)  Most recent:  Considering your total clinical experience with this particular patient population, how severe are the patient's symptoms at this time?: 7 (09/04/21 0756)  Compared to the patient's condition at the START of treatment, this patient's condition is: 7 (09/04/21 0756)         Precautions:     Behavioral Orders   Procedures     Code 1 - Restrict to Unit     Discontinue 1:1 attendant for suicide risk     Order Specific Question:   I have " performed an in person assessment of the patient     Answer:   Based on this assessment the patient no longer requires a one on one attendant at this point in time.     Routine Programming     As clinically indicated     Self Injury Precaution     Status 15     Every 15 minutes.     Suicide precautions     Patients on Suicide Precautions should have a Combination Diet ordered that includes a Diet selection(s) AND a Behavioral Tray selection for Safe Tray - with utensils, or Safe Tray - NO utensils            DIagnoses:     1.  Bipolar disorder with psychosis versus substance-induced psychotic disorder  2.  Rule out developing schizophrenia spectrum disorder.  3.  Polysubstance abuse, primarily alcohol and marijuana.   4.  History of cocaine abuse, in sustained remission.         Plan:        Will start tapering off Zyprexa and start on Risperdal. Will continue to provide support and structure and work on discharge preparation. CTC will refer to Navigate program.      I was present with PA student who participated in the service and in the documentation of the note. I have verified the history and personally performed the physical exam and medical decision making. I agree with the assessment and plan of care as documented in the note.     Sammy Earl MD  [facility] Psychiatry

## 2021-09-09 NOTE — PLAN OF CARE
NOC Shift Report     Pt in bed at beginning of shift, breathing quiet and unlabored. Pt slept through shift. Pt slept 6.75 hours.      No pt complaints or concerns at this time.      No PRNs given. Will continue to monitor.    Markel Stokes RN

## 2021-09-09 NOTE — PLAN OF CARE
Pt has withdrawn and minimally social with peers and staff.   Pt came to the medication window this am and requested his am medications without prompting. Pt attended group. Flat affect Calm mood. Distractible.  Denies mental health concerns. Continues to appear paranoid at times. Medication compliant. States he is eating and sleeping good. Ate breakfast and lunch. 6.75 hours recorded for last night. Pt plans to discharge back home with parents at discharge.

## 2021-09-09 NOTE — PLAN OF CARE
Assessment/Intervention/Current Symtoms and Care Coordination  ZUNILDA (writer) met with trx team, provided update, and reviewed pt's chart. ZUNILDA spoke with pt's mom, Rolanda [phone], to discuss pt's baseline, current presentation, and navigate program. Rolanda was supportive of the [facility] program referral and reported that parents are supportive of therapy and meds are requirements to return home. Pt's baseline appears to be anxiety with intermittent depression, pt being described primarily as a worrier with poor sleep. Pt also has a hx of drug induced psychosis, with events happening in 2016, 2019, and now in 2021, with Rolanda recalling it taking almost a month to come out of it previously. Pt also went to [facility] in the past due to CD issues, though Rolanda thought it was mostly related to alcohol use. However, pt came in positive for cannabinoids and Rolanda reported that pt had been doing better about not drinking until two weeks ago. Mom does not think that pt has psychosis-like symptoms as baseline. She does report that pt hx has not stayed on meds and has been resistant to do so, due to believing they don't work or he doesn't need them. Mom requested a callback from attending to discuss meds and wanted passed on that previously it was recommended that pt not be on Seroquel due to poor response by another psychiatrist. ZUNILDA called [facility] again today and left a message requesting a callback to set up intake assessment. ZUNILDA did not hear back and will call them again tomorrow.      Discharge Plan or Goal  TBD     Barriers to Discharge   Safe discharge plan, ongoing symptom severity (MDD with SI), and medication evaluation/assessment.     Referral Status  In progress, being referred to [facility] program     Legal Status  VOLUNTARY

## 2021-09-10 ENCOUNTER — TELEPHONE (OUTPATIENT)
Dept: PSYCHIATRY | Facility: CLINIC | Age: 21
End: 2021-09-10

## 2021-09-10 PROCEDURE — 99232 SBSQ HOSP IP/OBS MODERATE 35: CPT | Performed by: PSYCHIATRY & NEUROLOGY

## 2021-09-10 PROCEDURE — 250N000013 HC RX MED GY IP 250 OP 250 PS 637: Performed by: PSYCHIATRY & NEUROLOGY

## 2021-09-10 PROCEDURE — 124N000002 HC R&B MH UMMC

## 2021-09-10 PROCEDURE — 90853 GROUP PSYCHOTHERAPY: CPT

## 2021-09-10 RX ADMIN — RISPERIDONE 0.5 MG: 0.5 TABLET ORAL at 21:17

## 2021-09-10 RX ADMIN — RISPERIDONE 0.5 MG: 0.5 TABLET ORAL at 08:39

## 2021-09-10 RX ADMIN — RAMELTEON 8 MG: 8 TABLET ORAL at 21:02

## 2021-09-10 NOTE — PROGRESS NOTES
09/10/21 1800   Groups   Details   (Psychotherapy)   Number of patients attending the group:  4  Group Length:  1 Hours    Group Therapy Type: Psychotherapy    Summary of Group / Topics Discussed:      The  Psychotherapy group goal is to promote insight to positive choice and change. Group processing is within a supportive and safe environment. Patients will process emotions using verbal group and expressive psychotherapy interventions including visual art/writing interventions.    Group interventions support patients by: cultivating resilience, creative self expression, self compassion, communication/social skills and supports and self efficacy/empowerment    Modalities to reach these goals include: positive/solution focused psychology , Narrative psychology and Expressive Arts Therapies    Subjective -patient report of mood today- good    Objective/ Intervention- Goal of group and Therapeutic modality utilized- affirmation and process     Group Response- engaged    Patient Response-Pt was engaged. He said he exercised on bike today which was positive. He spoke about family system anxiety.    Steven Jones, ATTILA, ATR-BC

## 2021-09-10 NOTE — PLAN OF CARE
Shift Summary  Legal status: Voluntary  Mental  Pt stayed in his room all this shift except for meals. Mood is calm and affect is flat. Cooperative.  Thought process is tangential, circumstantial or disorganized. Thought content is significant for apparent paranoia, delusions thought, psychosis or disorganized behavior. Pt asked this writer if he is dying tomorrow. Pt said from lefty on saff conversation, he knows he will die tomorrow. This writer assured pt that he is not dying tomorrow and voices are not real.   Denie suicidal/homicidal ideations, self injury behavior, racing thought as well as auditory and visual hallucinations. Insight and judgment are fair. Endorses both anxiety and depression. No evidence of self harm behavior. Did not participate in any group therapeutic therapy. Pt aware of new medication which he started yesterday (Respirdal). Denied need for education for it.  Prn: none  Physical  Pt alert and oriented x 3. Denies pain. Vital sings WNL (see flow sheet for details). No psychomotor abnormalities are noted. Slept 7.0 hours last night. Good medication compliance is noted. Seems tolerating medications well. No side effect reported by pt or noted by this writer. Appetite adequate. Ate both breakfast and lunch. No problem with bowel and bladder per pt.   Prn: none  Continue to monitor pt's status Q 15 minutes and stabilize the patient's symptoms with the use of medications and therapeutic programming.

## 2021-09-10 NOTE — PLAN OF CARE
"Patient has been cooperative this shift. However, he reported being \"low.\" staff check in with him earlier during the shift and he appeared paranoid stating \"just so you know I didn't do what you think I did. You know what Im talking about.\" He later came out reporting anxiety and was given PRN medications. He ate dinner and got a visit from dad. He has been isolative to his room however. Patient came to the medication window for his night time medication. He denies SI, SIB, HI or hallucinations. He still feels low and was appreciative that RN checked on him. He went to bed after taking his medication.  "

## 2021-09-10 NOTE — PROGRESS NOTES
"[facility], [place]   Psychiatric Progress Note        Interim History:   The patient's care was discussed with the treatment team during the daily team meeting and/or staff's chart notes were reviewed.  Staff report patient appears to be pleasant, calm, and cooperative. Pt is paranoid and appears to be responding to internal stimuli. Pt is isolative and socially withdrawn. Pt denies SI/SIB/HI.    Met with patient: Pt appears to be calm but sad mood. When asked how patient is feeling, he responded \"not okay at this very moment\". His reasoning was that he believes he will still be executed. Pt also expressed how he was sad that his visit with his father the previous evening may be the last time he sees him which is also why he is sad. Pt had no complaints about his medication he is med compliant for now and has no drug reactions. Pt was urged to go out into the milieu and attend group; he reported having social anxiety and just doesn't know how to socialize with others. We also encouraged him to shave and later on saw him standing in front of mirror and shaving.  Current med plan for pt is to taper him off of Zyprexa as Risperdal is slowly initiated. Pt denies symptoms of SI/SIB/HI.         Medications:       OLANZapine  10 mg Oral At Bedtime     ramelteon  8 mg Oral At Bedtime     risperiDONE  1 mg Oral BID          Allergies:     Allergies   Allergen Reactions     Seasonal Allergies      Seroquel [Quetiapine]      Fainting and slowed breathing      Zyprexa [Olanzapine] Other (See Comments)     Restless leg symptoms, arm twitching          Labs:   No results found for this or any previous visit (from the past 24 hour(s)).       Psychiatric Examination:     /88   Pulse 104   Temp 97.7  F (36.5  C) (Tympanic)   Resp 16   Ht 1.803 m (5' 11\")   Wt 73.1 kg (161 lb 1.6 oz)   SpO2 96%   BMI 22.47 kg/m    Weight is 161 lbs 1.6 oz  Body mass index is 22.47 kg/m .     Orthostatic " Vitals       Most Recent      Sitting Orthostatic /81 09/09 0845    Sitting Orthostatic Pulse (bpm) 123 09/09 0845    Standing Orthostatic /83 09/09 0845    Standing Orthostatic Pulse (bpm) 124 09/09 0845            Appearance: awake, alert, appeared as age stated and well groomed  Attitude:  cooperative  Eye Contact:  fair  Mood:  sad   Affect:  restricted range  Speech:  clear, coherent, monotonous   Psychomotor Behavior:  no evidence of tardive dyskinesia, dystonia, or tics  Throught Process: somewhat  illogical  Associations:  no loose associations  Thought Content:  no evidence of suicidal ideation or homicidal ideation, delusional ideas of persecution are present.   Insight:  partial  Judgement:  fair  Oriented to:  time, person, and place  Attention Span and Concentration:  intact  Recent and Remote Memory:  intact    Clinical Global Impressions  First:  Considering your total clinical experience with this particular patient population, how severe are the patient's symptoms at this time?: 7 (09/04/21 0756)  Compared to the patient's condition at the START of treatment, this patient's condition is: 7 (09/04/21 0756)  Most recent:  Considering your total clinical experience with this particular patient population, how severe are the patient's symptoms at this time?: 7 (09/04/21 0756)  Compared to the patient's condition at the START of treatment, this patient's condition is: 7 (09/04/21 0756)         Precautions:     Behavioral Orders   Procedures     Code 1 - Restrict to Unit     Discontinue 1:1 attendant for suicide risk     Order Specific Question:   I have performed an in person assessment of the patient     Answer:   Based on this assessment the patient no longer requires a one on one attendant at this point in time.     Routine Programming     As clinically indicated     Self Injury Precaution     Status 15     Every 15 minutes.     Suicide precautions     Patients on Suicide Precautions  should have a Combination Diet ordered that includes a Diet selection(s) AND a Behavioral Tray selection for Safe Tray - with utensils, or Safe Tray - NO utensils            DIagnoses:   1.  Bipolar disorder with psychosis versus substance-induced psychotic disorder  2.  Rule out developing schizophrenia spectrum disorder.  3.  Polysubstance abuse, primarily alcohol and marijuana.   4.  History of cocaine abuse, in sustained remission.         Plan:    Will continue tapering off Zyprexa and increase Risperdal. Will continue to provide support and structure.     I was present with PA student who participated in the service and in the documentation of the note. I have verified the history and personally performed the physical exam and medical decision making. I agree with the assessment and plan of care as documented in the note.     Sammy Earl MD  [facility]iat[facility]

## 2021-09-10 NOTE — TELEPHONE ENCOUNTER
Is the patient between the ages of 15-40? Yes  Is the patient experiencing or recently experienced symptoms of psychosis?  Hospitalized twice before for similar symptoms but at that time it was thought to be drug induced. Now that is not believed to be the case.  How long has pt been taking anti-psychotics? Has been prescribed meds in the past but not taken long term. Currently prescribed zyprexa and risperdal - started during current hospitalization on 9/4/21. Patient was on seroquel in the past for a short period but psychiatrist thought it wasn't a good med for him, didn't stay on it.

## 2021-09-10 NOTE — PLAN OF CARE
Assessment/Intervention/Current Symtoms and Care Coordination  ZUNILDA (writer) met with trx team, provided update, and reviewed pt's chart. ZUNILDA was able to connect with the [facility] program to arrange an intake assessment. Currently pt can't be seen until October, so pt should be provided temporary follow-up with psychiatry and therapy while waiting upon discharge. ZUNILDA continued to coordinate care and updated AVS. Pt continues to express being concerned that people are out to get him, but does not want to discuss it at length. Pt's insight appears to be limited at this time.      Discharge Plan or Goal  TBD     Barriers to Discharge   Safe discharge plan, ongoing symptom severity (MDD with SI), and medication evaluation/assessment.     Referral Status  [facility] program intake assessment - see AVS  Therapy and psychiatry in process    Legal Status  VOLUNTARY

## 2021-09-11 PROCEDURE — 124N000002 HC R&B MH UMMC

## 2021-09-11 PROCEDURE — 250N000013 HC RX MED GY IP 250 OP 250 PS 637: Performed by: PSYCHIATRY & NEUROLOGY

## 2021-09-11 RX ADMIN — RAMELTEON 8 MG: 8 TABLET ORAL at 20:22

## 2021-09-11 RX ADMIN — RISPERIDONE 0.5 MG: 0.5 TABLET ORAL at 20:22

## 2021-09-11 RX ADMIN — OLANZAPINE 10 MG: 10 TABLET, FILM COATED ORAL at 20:22

## 2021-09-11 RX ADMIN — RISPERIDONE 0.5 MG: 0.5 TABLET ORAL at 09:19

## 2021-09-11 NOTE — PLAN OF CARE
"Patient has been calm and cooperative this shift. He is isolative to his room. He came out for dinner and a visit from mother. He then went back to his room. He denies SI, SIB, HI or hallucinations. He reports anxiety of 8/10 but refused medications for it. He also refused his nighttime medications stating \"I don't need it.\" Staff had to encourage him before he took Rozerem for sleep. He said \"I will talk to the doctor about my medications.\" patient came out 5minutes later saying \"I actually change my mind. I would like to take one of the antipsychotics.\" he requested risperdal and he went to bed after taking it. Vitals are WNL.   "

## 2021-09-11 NOTE — PLAN OF CARE
NOC Shift Report     Pt in bed at beginning of shift, breathing quiet and unlabored. Pt slept through shift. Pt slept 5.75 hours.      No pt complaints or concerns at this time.      No PRNs given. Will continue to monitor.    Markel Stokes RN

## 2021-09-11 NOTE — PLAN OF CARE
Shift Summary  Pt responding to internal stimuli. Pt said he is happy that did not die because he heard from staff yesterday that he is dying today. Still denies hallucination. Isolative and withdrawn to self. Declined to talk to mom on phone. Expressed some anger toward parents. Stayed in room all this shift. Denies suicidal/homicidal ideations, self injury behavior, racing thought as well as auditory and visual hallucinations. Insight and judgment are fair.   Denies pain. Declined checking vital signs.  No psychomotor abnormalities are noted. Slept 5.75 hours last night. Good medication compliance is noted. Seems tolerating medications well. No side effect reported by pt or noted by this writer. Appetite adequate. Ate both breakfast ,but declined lunch No problem with bowel and bladder per pt.   Prn: none  Continue to monitor pt's status Q 15 minutes and stabilize the patient's symptoms with the use of medications and therapeutic programming.

## 2021-09-12 PROCEDURE — 250N000013 HC RX MED GY IP 250 OP 250 PS 637: Performed by: PSYCHIATRY & NEUROLOGY

## 2021-09-12 PROCEDURE — 124N000002 HC R&B MH UMMC

## 2021-09-12 RX ADMIN — OLANZAPINE 10 MG: 10 TABLET, FILM COATED ORAL at 20:09

## 2021-09-12 RX ADMIN — RISPERIDONE 0.5 MG: 0.5 TABLET ORAL at 08:33

## 2021-09-12 ASSESSMENT — MIFFLIN-ST. JEOR: SCORE: 1748.35

## 2021-09-12 NOTE — PLAN OF CARE
Patient has been isolative to his room all shift. He only came out for dinner. He had a check in at the start of the shift and told RN that he plans on staying in his room. His vitals were WNl except for an elevated BP. He denies SI, SIB, HI or hallucinations. Patient rated anxiety at 5/10 and said his mood was fine. However, during the shift it was reported that he was observed sobbing in his room. He told staff he was ok and was thankful for being checked on. He also did not want any assistance or medications. Patient took his night time medications this shift. RN will continue to monitor.

## 2021-09-12 NOTE — PLAN OF CARE
Shift Summary  Mental  Isolative and withdrawn as usual. Stayed in room all this shift. Cooperative. Mood is calm and affect is flat. Cognition appears intact. Still responding to internal stimuli ,but denies it. Pt is good in masking his symptoms. Not welling to share any insight or thought during interview. No delusional statement noted for today as of yet. Visible in milieu for meals and back to room quickly after eating. Pt was reading book in room. No phone conversation with family.   Prn: none  Physical  Pt alert and oriented x 3. Denies pain. BP seems slightly high possibly due to anxiety. Per reported anxiety and being nervous during meal time around staff and other peers. Declined Bp re-check. Denies any signs or symptoms of high Bp. No psychomotor abnormalities are noted. Slept 7.0 hours last night. Good medication compliance is noted. Seems tolerating medications well. No side effect reported by pt or noted by this writer. Appetite adequate. Ate both breakfast and lunch. No problem with bowel and bladder per pt.   Prn: none  Continue to monitor pt's status Q 15 minutes and stabilize the patient's symptoms with the use of medications and therapeutic programming.

## 2021-09-12 NOTE — PROGRESS NOTES
09/12/21 0600   Sleep/Rest/Relaxation   Sleep/Rest/Relaxation appears asleep   Night Time # Hours 7 hours     Patient appeared to have had a restful night. Was observed with even and unlabored breathing during safety checks. He did not request any prn medication. No safety concerns were noted.

## 2021-09-12 NOTE — PLAN OF CARE
Shift Summary  Visible in milieu watching football games with other peers. Isolative and withdrawn to self. Mood is calm and affect is flat. No hallucination noted. Denies suicidal/homicidal ideations, self injury behavior, racing thought as well as auditory and visual hallucinations. No evidence of self harm behavior noted. Pt reported that he is ready to be discharge home. This writer told pt his plan of care will be review by team tomorrow and pt will be updated. Father visited pt this evening. They played card. Visit went ok.   Pt called mom and told mom that president Araceli is after him to kill him. Upon checking in with pt he denied everything.   Declined Rozerem and Risperdal. Pt said he just need one anti psychotic medication. Scheduled Zyprexa was given per request.    Denies pain. Ate dinner in dinning room. Bp 153/83 sitting. Pt is asymptomatic.   Prn: none

## 2021-09-13 PROCEDURE — 99207 PR CDG-MDM COMPONENT: MEETS MODERATE - DOWN CODED: CPT | Performed by: PSYCHIATRY & NEUROLOGY

## 2021-09-13 PROCEDURE — 124N000002 HC R&B MH UMMC

## 2021-09-13 PROCEDURE — 99232 SBSQ HOSP IP/OBS MODERATE 35: CPT | Performed by: PSYCHIATRY & NEUROLOGY

## 2021-09-13 PROCEDURE — 250N000013 HC RX MED GY IP 250 OP 250 PS 637: Performed by: PSYCHIATRY & NEUROLOGY

## 2021-09-13 RX ORDER — HALOPERIDOL 5 MG/1
5 TABLET ORAL 2 TIMES DAILY
Status: DISCONTINUED | OUTPATIENT
Start: 2021-09-13 | End: 2021-09-16

## 2021-09-13 RX ADMIN — OLANZAPINE 10 MG: 10 TABLET, FILM COATED ORAL at 20:09

## 2021-09-13 RX ADMIN — HALOPERIDOL 5 MG: 5 TABLET ORAL at 20:09

## 2021-09-13 ASSESSMENT — ACTIVITIES OF DAILY LIVING (ADL)
HYGIENE/GROOMING: INDEPENDENT
ORAL_HYGIENE: INDEPENDENT
LAUNDRY: WITH SUPERVISION
DRESS: INDEPENDENT

## 2021-09-13 NOTE — PLAN OF CARE
"Assessment/Intervention/Current Symtoms and Care Coordination  ZUNILDA (writer) met with trx team, provided update, and reviewed pt's chart. ZUNILDA met with pt today, who was very pleasant and much brighter today. Pt had better eye contact and did not appear to be distracted during the conversation like last week. Pt also was more awake today and CTC observed pt spending more time in the milieu, lounge area watching TV with peers and using the exorcize bike. ZUNILDA discussed with pt that his parents are willing to let him go back to their home, but require pt to remain on meds and see a therapist. Pt was agreeable to both, but did express some belief that he can manage his MH symptoms with minimal meds. [facility] also informed pt about his [facility] program referral, which he was fine with. Pt expressed feeling bored and wanting to go home sooner rather then latter. Pt denied SI, HI, AVH, delusional thinking, and excessive anxiety/fear. Pt did admit to feeling \"down\" today and likened it to depression, but was uncertain if it was depression or just a low mood feeling. Pt admitted to not having a great meeting with the doctor today, but didn't want to elaborate. [facility] encouraged pt to work with attending to determine medications he was willing to take longer term and consider it in order to reduce his chances of coming back to the hospital in the future. Pt was receptive to this and agreed to talk with the doctor further about it.      Discharge Plan or Goal  Tentatively, pt will discharge back to his parents home with outpatient follow-up with therapy and psychiatry. Pt has been referred to the navigate program as well for an intake appointment.      Barriers to Discharge   Safe discharge plan, ongoing symptom severity (MDD with SI), and medication evaluation/assessment.     Referral Status  [facility] program intake assessment - see AVS  Therapy and psychiatry in process     Legal Status  VOLUNTARY  "

## 2021-09-13 NOTE — PROGRESS NOTES
"[facility], [place]   Psychiatric Progress Note        Interim History:   The patient's care was discussed with the treatment team during the daily team meeting and/or staff's chart notes were reviewed.  Staff report patient is calm with a flat affect. Pt has not been med compliant and declined two Risperdal doses. Pt is eager to get discharged. He denies any psychotic symptoms but presents otherwise. Talked about being afraid that President Araceli is being after him and wants him executed.     Met with patient: Pt is calm with a low mood and restricted affect. Pt denies current symptoms of SI/AH/VH and paranoia. Pt believes patients and staff are intentionally provoking him. He reports a few instances where he felt staff would slam his door in the middle of the night and call him names. He doesn't feel like his peers like him and he could just tell by their actions. Pt was asked about his med refusal, he says he feels that Risperdal makes his \"heart pound\" and doesn't like to feel slow and tired. He was offered an increase in his P.M. Risperdal dose - he declined. Pt wants to discontinue this med, he was offered an alternative Haldol. Pt was very hesitant; after much persuasion, he agreed. He made it clear that he didn't want to take neuroleptics, in general. said that he didn't believed that his symptoms were due to his MH as they were very real to him. Pt's primary concern was being committed; he expressed how he really doesn't want that to happen. Pt was urged to be receptive and open to treatment.          Medications:       OLANZapine  10 mg Oral At Bedtime     ramelteon  8 mg Oral At Bedtime     risperiDONE  0.5 mg Oral BID          Allergies:     Allergies   Allergen Reactions     Seasonal Allergies      Seroquel [Quetiapine]      Fainting and slowed breathing      Zyprexa [Olanzapine] Other (See Comments)     Restless leg symptoms, arm twitching          Labs:   No results found " "for this or any previous visit (from the past 24 hour(s)).       Psychiatric Examination:     /70 (BP Location: Left arm)   Pulse 110   Temp 97.6  F (36.4  C) (Tympanic)   Resp 16   Ht 1.803 m (5' 11\")   Wt 72.1 kg (159 lb)   SpO2 97%   BMI 22.18 kg/m    Weight is 159 lbs 0 oz  Body mass index is 22.18 kg/m .     Orthostatic Vitals       Most Recent      Sitting Orthostatic /70 09/13 0818    Sitting Orthostatic Pulse (bpm) 110 09/13 0818    Standing Orthostatic /72 09/13 0818    Standing Orthostatic Pulse (bpm) 130 09/13 0818          Appearance: awake, alert. Adequately groomed   Attitude:  guarded  Eye Contact:  fair  Mood:  sad   Affect:  restricted range,    Speech:  clear, coherent, monotonous   Psychomotor Behavior:  no evidence of tardive dyskinesia, dystonia, or tics  Throught Process: somewhat  illogical  Associations:  no loose associations  Thought Content:  no evidence of suicidal ideation or homicidal ideation, but has delusional ideas: believes staff and peers are taunting him  Insight:  partial  Judgement:  fair  Oriented to:  time, person, and place  Attention Span and Concentration:  intact  Recent and Remote Memory:  intact    Clinical Global Impressions  First:  Considering your total clinical experience with this particular patient population, how severe are the patient's symptoms at this time?: 7 (09/04/21 0756)  Compared to the patient's condition at the START of treatment, this patient's condition is: 7 (09/04/21 0756)  Most recent:  Considering your total clinical experience with this particular patient population, how severe are the patient's symptoms at this time?: 7 (09/11/21 0756)  Compared to the patient's condition at the START of treatment, this patient's condition is: 7 (09/11/21 0756)         Precautions:     Behavioral Orders   Procedures     Code 1 - Restrict to Unit     Discontinue 1:1 attendant for suicide risk     Order Specific Question:   I have " performed an in person assessment of the patient     Answer:   Based on this assessment the patient no longer requires a one on one attendant at this point in time.     Routine Programming     As clinically indicated     Self Injury Precaution     Status 15     Every 15 minutes.     Suicide precautions     Patients on Suicide Precautions should have a Combination Diet ordered that includes a Diet selection(s) AND a Behavioral Tray selection for Safe Tray - with utensils, or Safe Tray - NO utensils            DIagnoses:   1.  Bipolar disorder with psychosis versus substance-induced psychotic disorder  2.  Rule out developing schizophrenia spectrum disorder.  3.  Polysubstance abuse, primarily alcohol and marijuana.   4.  History of cocaine abuse, in sustained remission.         Plan:     Will continue to taper off of Zyprexa and Risperdal and initiate Haldol. Will continue to provide support and structure.      I was present with PA student who participated in the service and in the documentation of the note. I have verified the history and personally performed the physical exam and medical decision making. I agree with the assessment and plan of care as documented in the note.     Sammy Earl MD  [facility] Psychiatry

## 2021-09-13 NOTE — PLAN OF CARE
Shift Summary  Legal status: Voluntary  Mental  Pt is isolative and withdrawn to self. Visible in milieu only for meals. Denies suicidal/homicidal ideations, self injury behavior, racing thought as well as auditory and visual hallucinations. Mood is calm and affect is flat. Insight and judgement is impaired. Still responding to internal stimuli. Pt is able to mask his symptoms well. Denies any hallucination. Declined to take scheduled Risperidone. Provider Dr Earl was updated. Pt said he is going to be ok with only Zyprexa. No self harm evidence. Did not participate in any group activity.   Prn: none  Physical  Pt alert and oriented x 3. Denies pain. Bp and pulse elevated. Pt is asymtomatic. No psychomotor abnormalities are noted. Slept 6.75 hours last night. Appetite adequate. Ate both breakfast and lunch. No problem with bowel and bladder per pt. Took shower.   Prn: none  Continue to monitor pt's status Q 15 minutes and stabilize the patient's symptoms with the use of medications and therapeutic programming.

## 2021-09-13 NOTE — PLAN OF CARE
Problem: Sleep Disturbance  Goal: Adequate Sleep/Rest  9/13/2021 0616 by Angy Mike, RN  Outcome: Improving      Pt in bed sleeping at start of shift, breathing and unlabored. Appears to have slept 6.75 hours. No PRN medications needed this shift.    Pt continues on Suicide and Self-injury precautions with no any related events noted.    Will continue to monitor and assess pt.

## 2021-09-14 PROCEDURE — 124N000002 HC R&B MH UMMC

## 2021-09-14 PROCEDURE — 99233 SBSQ HOSP IP/OBS HIGH 50: CPT | Performed by: PSYCHIATRY & NEUROLOGY

## 2021-09-14 PROCEDURE — 250N000013 HC RX MED GY IP 250 OP 250 PS 637: Performed by: PSYCHIATRY & NEUROLOGY

## 2021-09-14 RX ORDER — OLANZAPINE 2.5 MG/1
2.5 TABLET, FILM COATED ORAL EVERY MORNING
Status: DISCONTINUED | OUTPATIENT
Start: 2021-09-15 | End: 2021-09-16

## 2021-09-14 RX ADMIN — BENZTROPINE MESYLATE 2 MG: 1 TABLET ORAL at 12:26

## 2021-09-14 RX ADMIN — HYDROXYZINE HYDROCHLORIDE 25 MG: 25 TABLET, FILM COATED ORAL at 18:20

## 2021-09-14 RX ADMIN — OLANZAPINE 10 MG: 10 TABLET, FILM COATED ORAL at 20:54

## 2021-09-14 RX ADMIN — HALOPERIDOL 5 MG: 5 TABLET ORAL at 08:56

## 2021-09-14 ASSESSMENT — ACTIVITIES OF DAILY LIVING (ADL)
LAUNDRY: WITH SUPERVISION
DRESS: INDEPENDENT;SCRUBS (BEHAVIORAL HEALTH)
HYGIENE/GROOMING: INDEPENDENT
HYGIENE/GROOMING: INDEPENDENT
LAUNDRY: WITH SUPERVISION
DRESS: INDEPENDENT
ORAL_HYGIENE: INDEPENDENT
ORAL_HYGIENE: INDEPENDENT

## 2021-09-14 ASSESSMENT — MIFFLIN-ST. JEOR: SCORE: 1760.6

## 2021-09-14 NOTE — PLAN OF CARE
"Pt was isolative and withdrawn to his room for most of the shift, he came out for meals, to get medication, and to walk in the halls for a short while. Pt's affect was blunted/flat and his mood was calm. Pt denied all mental health concerns including SI, SIB, AVH, and HI. Pt did not appear to be responding to any internal stimuli. Pt made some paranoid and delusional statements to writer. He asked writer if he had any chance to discharge today with his dad, who visited during visiting hour, and writer informed him that the provider has to make the decision to allow him to leave. Writer asked if he wanted to go home and he stated \"I want more good times with my dad. I am terrified for my life and I just want to feel safe again.\" Writer asked if he felt safe here and he said \"yeah I feel safe here\". Writer asked if he would feel safe if he left the hospital and he stated \"No, I don't think I would be safe if I left right now\". Writer asked if he thought he would harm himself or if someone would harm him and he replied \"Well I really can't say. All I can say is that if I were to leave it wouldn't be good. I can't say if someone is out to get me or not but I know I should be here tonight\". Writer provided reassurance that he is safe. Pt then asked if writer was Quaker and he later told another nurse that he thinks the catholics are out to get him and his dad and [facility] Pt was medication compliant with all medications except for the Rozerem, he stated \"I don't need that medication for sleep tonight, I sleep fine\". Pt did not endorsed pain and the only side effect he mentioned was an increase in appetite from the zyprexa.   "

## 2021-09-14 NOTE — PLAN OF CARE
BEHAVIORAL TEAM DISCUSSION    Participants: Dr. Rajat FREITAS, Waldemar Parrish RN, Aakash Fine [facility]  Progress: Improvement. Pt's presentation has improved and he appears to be responding to internal stimuli less. However, pt continues to present with paranoid thoughts related to other wanting to kill/harm him. Pt's report to trx team are inconsistent, with him endorsing the paranoid thoughts to attending and denying all MH symptoms to CTC. Pt has stated both a willingness to engage in medication changes, but is also resistant to recommendations made by attending.   Anticipated length of stay: 7 days  Continued Stay Criteria/Rationale: Ongoing symptoms severity (paranoid thinking/delusions), medication changes/evaluation, and Coordinating aftercare support.  Medical/Physical: Pt was reported to be tachycardic and RN staff will continue to monitor.  Precautions:   Behavioral Orders   Procedures     Code 1 - Restrict to Unit     Discontinue 1:1 attendant for suicide risk     Order Specific Question:   I have performed an in person assessment of the patient     Answer:   Based on this assessment the patient no longer requires a one on one attendant at this point in time.     Routine Programming     As clinically indicated     Self Injury Precaution     Status 15     Every 15 minutes.     Suicide precautions     Patients on Suicide Precautions should have a Combination Diet ordered that includes a Diet selection(s) AND a Behavioral Tray selection for Safe Tray - with utensils, or Safe Tray - NO utensils       Plan: Pt will be discharged back home with therapy and psychiatry in place with an intake referral to the [facility]. Pt's family has reported being willing to have pt move back to their home if pt is taking meds and going to therapy, which was passed onto pt and he expressed being agreeable to these conditions. However, pt has been flippant about medication when meeting with attending. Trx team will continue to  work with pt to encourage medication recommendations to best support pt's discharge back to home with family.  Rationale for change in precautions or plan: Ongoing stabilization and aftercare support efforts.

## 2021-09-14 NOTE — PLAN OF CARE
"Nursing Assessment    Recent Vitals: B/P: 138/84, T: 98, P: 94, R: 16     Sleep:  Hours of sleep at night: 7    General Shift Summary  Patient has been visible in the milieu, keeps to himself, presents as calm and cooperative. Groups were encouraged however he refused. Patient used the bike. When asking about spiritual thoughts and if he was having any he stated \"Well yes, not me specifically, no grandiosity, so no I am not having spiritual thoughts.\". Denies SI/HI/SIB/AVH. Hygiene is good. He is eating well. Incite and judgment are poor. Concentration is fair.    Patient is medication compliant. He stated his morning meds are making him feel tense and restless like he he to move around. He requested Cogentin and was provided this at 1215. Denies pain.    Plan is to continue to monitor patient status q 15 mins, assess response to medications, and maintain the patients safety.    Ida Oneil RN MSN  "

## 2021-09-14 NOTE — PLAN OF CARE
"Pt complains of \"restlessness and akathisia\" and requests PRN cogentin.  Pt already received his daily PRN of cogentin 2 mg at 1226 today. Writer did call on-call provider to see if there is anything else we could give. On-call states 2 mg daily is large enough dose and to continue monitoring for possible anxiety. Pt given PRN hydroxyzine 25 mg for anxiety and states this was also helpful with the restless feeling he was having. Pt did request that his cogentin be changed to be 1 mg BID PRN instead of 2 mg daily PRN. Pt also asking for Ramelteon to be PRN for sleep instead of scheduled \"because I don't want to get used to it and have it stop working\". These requests were added to provider sticky note. Pt refused his scheduled Ramelton tonight for above reason. Pt states he did not sleep well last night. Writer encouraged him to come and take the ramelteon if he is having trouble sleeping. Pt verbalized understanding. Pt also refused scheduled Haldol tonight because he feels it is causing him to have the restless/akathisia feelings. He states \"I agree that I need an antipsychotic, but I would rather take just zyprexa and not haldol\". Pt states he has been feeling slightly better stating vaguely \"the medications help with some of the symptoms\" but would not volunteer details. Pt has not displayed any paranoid behavior or made delusional statements this shift. He denies SI/SIB/HI/AVH. He had a visit with his dad tonight which appeared to go well.   "

## 2021-09-14 NOTE — PROGRESS NOTES
[facility], [place]   Psychiatric Progress Note        Interim History:     The patient's care was discussed with the treatment team during the daily team meeting and/or staff's chart notes were reviewed.  Staff report patient is calm with a flat affect. Pt with some encouragement took Haldol, but made it clear that he didn't like that med. Said to RN that Haldol made him restless. He requested and was given Cogentin, reported some improvement after taking it. Reported to RN that he was still afraid that if he were to be discharged home, he would not be safe. Specifically, said that he was afraid of catholics.    Met with patient: Pt was seen in his room. He was superficially pleasant, but made it clear that he didn't want to take haldol. Stated that he, in general, would prefer not to be on any neuroleptics, period and would, rather deal with his mental illness by eating healthy food and exercising. We reminded him that he previously promised to take Risperdal and refused to take it after taking for only couple of days. We also discussed that he earlier reported feeling restless with Abilify and having problems with breathing with Seroquel. Junior was not apologetic and stated that he still would not take Haldol or any other med than Zyprexa. He was not receptive when we told him that he was on Zyprexa and it didn't seem to help with his paranoia, despite being on 20 mg of it. Junior insisted on being on Zyprexa only. We agreed to try to increase his Zyprexa dose and for now hold off Haldol. Junior indicated that he would like to leave this hospital, but, eventually, agreed with us that leaving now would be premature and that risk of rehospitalization was high.  He had no other questions or concerns.         Medications:       haloperidol  5 mg Oral BID     OLANZapine  10 mg Oral At Bedtime     [START ON 9/15/2021] OLANZapine  2.5 mg Oral QAM     ramelteon  8 mg Oral At Bedtime        "   Allergies:     Allergies   Allergen Reactions     Seasonal Allergies      Seroquel [Quetiapine]      Fainting and slowed breathing           Labs:   No results found for this or any previous visit (from the past 24 hour(s)).       Psychiatric Examination:     /68   Pulse 100   Temp 98.6  F (37  C) (Oral)   Resp 16   Ht 1.803 m (5' 11\")   Wt 73.3 kg (161 lb 11.2 oz)   SpO2 97%   BMI 22.55 kg/m    Weight is 161 lbs 11.2 oz  Body mass index is 22.55 kg/m .     Orthostatic Vitals       Most Recent      Sitting Orthostatic /84 09/14 0849    Sitting Orthostatic Pulse (bpm) 94 09/14 0849    Standing Orthostatic /83 09/14 0849    Standing Orthostatic Pulse (bpm) 134 09/14 0849         Appearance: awake, alert. Adequately groomed   Attitude:  guarded  Eye Contact:  fair  Mood:  sad   Affect:  restricted range,    Speech:  clear, coherent, monotonous   Psychomotor Behavior:  no evidence of tardive dyskinesia, dystonia, or tics  Throught Process: somewhat  illogical  Associations:  no loose associations  Thought Content:  no evidence of suicidal ideation or homicidal ideation, but has delusional ideas of persecution  Insight: poor  Judgement: poor  Oriented to:  time, person, and place  Attention Span and Concentration:  intact  Recent and Remote Memory:  intact    Clinical Global Impressions  First:  Considering your total clinical experience with this particular patient population, how severe are the patient's symptoms at this time?: 7 (09/04/21 0756)  Compared to the patient's condition at the START of treatment, this patient's condition is: 7 (09/04/21 0756)  Most recent:  Considering your total clinical experience with this particular patient population, how severe are the patient's symptoms at this time?: 7 (09/11/21 0756)  Compared to the patient's condition at the START of treatment, this patient's condition is: 7 (09/11/21 0756)         Precautions:     Behavioral Orders   Procedures     " Code 1 - Restrict to Unit     Discontinue 1:1 attendant for suicide risk     Order Specific Question:   I have performed an in person assessment of the patient     Answer:   Based on this assessment the patient no longer requires a one on one attendant at this point in time.     Routine Programming     As clinically indicated     Self Injury Precaution     Status 15     Every 15 minutes.     Suicide precautions     Patients on Suicide Precautions should have a Combination Diet ordered that includes a Diet selection(s) AND a Behavioral Tray selection for Safe Tray - with utensils, or Safe Tray - NO utensils            DIagnoses:   1.  Bipolar disorder with psychosis versus substance-induced psychotic disorder  2.  Rule out developing schizophrenia spectrum disorder.  3.  Polysubstance abuse, primarily alcohol and marijuana.   4.  History of cocaine abuse, in sustained remission.         Plan:     Will increase Zyprexa and hold off Haldol. Will continue to provide support and structure. Patient agrees to stay at this hospital voluntarily.     I was present with PA student who participated in the service and in the documentation of the note. I have verified the history and personally performed the physical exam and medical decision making. I agree with the assessment and plan of care as documented in the note.     Sammy Earl MD  [facility]

## 2021-09-14 NOTE — PLAN OF CARE
Assessment/Intervention/Current Symtoms and Care Coordination  CTC (writer) met with trx team, provided update, and reviewed pt's chart. CTC complete weekly plan of care team note. CTC worked on coordinating follow-up that would work with the navigate program. CTC spoke with trx team, to review what was discussed with the family/mom yesterday, including the condition that pt be on medication and attend therapy. Attending team reported that pt has been resistant to additional med changes, and continues to be resistant to neuroleptic medication recommendations. CTC will follow-up with family tomorrow after follow-up appointments have been made/confirmed.      Discharge Plan or Goal  Tentatively, pt will discharge back to his parents home with outpatient follow-up with therapy and psychiatry. Pt has been referred to the [facility] program as well for an intake appointment.      Barriers to Discharge   Safe discharge plan, ongoing symptom severity (MDD with SI), and medication evaluation/assessment.     Referral Status  [facility] program intake assessment - see AVS  Therapy and psychiatry in process     Legal Status  VOLUNTARY

## 2021-09-15 LAB — SARS-COV-2 RNA RESP QL NAA+PROBE: NEGATIVE

## 2021-09-15 PROCEDURE — 250N000013 HC RX MED GY IP 250 OP 250 PS 637: Performed by: PSYCHIATRY & NEUROLOGY

## 2021-09-15 PROCEDURE — 90853 GROUP PSYCHOTHERAPY: CPT

## 2021-09-15 PROCEDURE — 124N000002 HC R&B MH UMMC

## 2021-09-15 PROCEDURE — 99232 SBSQ HOSP IP/OBS MODERATE 35: CPT | Performed by: PSYCHIATRY & NEUROLOGY

## 2021-09-15 PROCEDURE — U0005 INFEC AGEN DETEC AMPLI PROBE: HCPCS | Performed by: PSYCHIATRY & NEUROLOGY

## 2021-09-15 RX ORDER — BENZTROPINE MESYLATE 1 MG/1
1 TABLET ORAL EVERY 4 HOURS PRN
Status: DISCONTINUED | OUTPATIENT
Start: 2021-09-15 | End: 2021-09-20 | Stop reason: HOSPADM

## 2021-09-15 RX ADMIN — OLANZAPINE 2.5 MG: 2.5 TABLET, FILM COATED ORAL at 08:53

## 2021-09-15 RX ADMIN — OLANZAPINE 10 MG: 10 TABLET, FILM COATED ORAL at 20:36

## 2021-09-15 ASSESSMENT — ACTIVITIES OF DAILY LIVING (ADL)
ORAL_HYGIENE: INDEPENDENT
DRESS: INDEPENDENT
ORAL_HYGIENE: INDEPENDENT
HYGIENE/GROOMING: INDEPENDENT
DRESS: INDEPENDENT
LAUNDRY: WITH SUPERVISION
HYGIENE/GROOMING: INDEPENDENT

## 2021-09-15 NOTE — PROGRESS NOTES
09/15/21 1500   Groups   Details Processing Group   Number of patients attending the group:  3  Group Length:  1 Hours    Group Therapy     Summary of Group / Topics Discussed:      The  Psychotherapy group goal is to promote insight to positive choice and change. Group processing is within a supportive and safe environment. Patients will process emotions using verbal group and expressive psychotherapy interventions.        Assessment: CTC (writer) lead a group about stress identification, experiencing stress, and stress coping skill development. CTC provided psychoeducation on topic matter and encouraged group discussion for peers to share information. CTC also lead group through worksheets to help them demonstrate the topic matter being discussed.             Patient Response: Pt actively participated in activity and completed the worksheets/handouts. Pt responded to group questions and was able to demonstrate a understanding of topic matter. Pt identified how he could pro-actively utilize his coping skills and provided a good example of how practicing a coping skill can make using it easier over time.

## 2021-09-15 NOTE — PROGRESS NOTES
"[facility], [place]   Psychiatric Progress Note        Interim History:   The patient's care was discussed with the treatment team during the daily team meeting and/or staff's chart notes were reviewed.  Staff report patient is calm and more present in the milieu. Pt refused haldol dose and was willing to take zyprexa. Pt's reasoning is that haldol gives him akathisias.    Met with patient: Pt appears to be in a better mood with brighter affect. Pt wanted to make it known that he meant no disrespect declining his haldol because he truly believes it gives him med side affects of akathisias. For now, pt has no intention in taking his scheduled haldol. For that reason, he doesn't think he'll be needing his initial request for scheduled Cogentin. Pt was asked if he was having some of the same delusional thoughts he was having over the weekend, he says that it is minimal. It was noticed patient seemed to be contemplating and took long to respond; he was reassured that there are no right or wrong answers and the we only want to gain insight to his current status. He shared that though his thoughts of feeling unsafe are still present he is better at suppressing them. Pt inquired about discharge plans or date; he was told that there will need to be a discussion w/his parents before plans are set. Pt still denies SI/SIB/AVH.         Medications:       haloperidol  5 mg Oral BID     OLANZapine  10 mg Oral At Bedtime     OLANZapine  2.5 mg Oral QAM     ramelteon  8 mg Oral At Bedtime          Allergies:     Allergies   Allergen Reactions     Seasonal Allergies      Seroquel [Quetiapine]      Fainting and slowed breathing           Labs:   No results found for this or any previous visit (from the past 24 hour(s)).       Psychiatric Examination:     /68   Pulse 100   Temp 98.6  F (37  C) (Oral)   Resp 16   Ht 1.803 m (5' 11\")   Wt 73.3 kg (161 lb 11.2 oz)   SpO2 97%   BMI 22.55 kg/m  "   Weight is 161 lbs 11.2 oz  Body mass index is 22.55 kg/m .  Orthostatic Vitals       Most Recent      Sitting Orthostatic /84 09/14 0849    Sitting Orthostatic Pulse (bpm) 94 09/14 0849    Standing Orthostatic /83 09/14 0849    Standing Orthostatic Pulse (bpm) 134 09/14 0849            Appearance: awake, alert, adequately groomed and dressed in hospital scrubs  Attitude:  somewhat cooperative  Eye Contact:  fair  Mood:  better  Affect:  restricted range  Speech:  clear, coherent  Psychomotor Behavior:  no evidence of tardive dyskinesia, dystonia, or tics; pt reports having akathisias or restlessness  Throught Process:  logical  Associations:  no loose associations  Thought Content:  no evidence of suicidal ideation or homicidal ideation  Insight:  partial  Judgement:  fair  Oriented to:  time, person, and place  Attention Span and Concentration:  intact  Recent and Remote Memory:  intact    Clinical Global Impressions  First:  Considering your total clinical experience with this particular patient population, how severe are the patient's symptoms at this time?: 7 (09/04/21 0756)  Compared to the patient's condition at the START of treatment, this patient's condition is: 7 (09/04/21 0756)  Most recent:  Considering your total clinical experience with this particular patient population, how severe are the patient's symptoms at this time?: 7 (09/04/21 0756)  Compared to the patient's condition at the START of treatment, this patient's condition is: 7 (09/04/21 0756)         Precautions:     Behavioral Orders   Procedures     Code 1 - Restrict to Unit     Discontinue 1:1 attendant for suicide risk     Order Specific Question:   I have performed an in person assessment of the patient     Answer:   Based on this assessment the patient no longer requires a one on one attendant at this point in time.     Routine Programming     As clinically indicated     Self Injury Precaution     Status 15     Every 15  minutes.     Suicide precautions     Patients on Suicide Precautions should have a Combination Diet ordered that includes a Diet selection(s) AND a Behavioral Tray selection for Safe Tray - with utensils, or Safe Tray - NO utensils            DIagnoses:     1.  Bipolar disorder with psychosis versus substance-induced psychotic disorder  2.  Rule out developing schizophrenia spectrum disorder.  3.  Polysubstance abuse, primarily alcohol and marijuana.   4.  History of cocaine abuse, in sustained remission.       Plan:   Will continue to provide support and structure.

## 2021-09-15 NOTE — PLAN OF CARE
Pt has been withdrawn. Denies mental health issues. Pt showered. Declined to take haldol and was more than willing to take the 2.5 of zyprexa this am. Pt ate breakfast. Requested to have cogentin increased to BID PRN for akathisia and  was made aware of this.  Denies SI and SIB. Flat affect. Calm mood.  Covid swab preformed.

## 2021-09-15 NOTE — PLAN OF CARE
Assessment/Intervention/Current Symtoms and Care Coordination  ZUNILDA (writer) met with trx team, provided update, and reviewed pt's chart. [facility] was able to find a psychiatrist that would be able to see pt within 2 weeks, updating AVS. CTC is in the process of making a referral to the transition clinic for therapy to provide coverage while pt waits to attend his intake with [facility]. Pt was observed in the milieu more, and appeared to interact with peers slightly more. CTC met with pt briefly to check in and update him about the referral for psychiatry and that one for therapy was being made. Pt asked about when he would discharge and CTC informed him that the trx team would discuss this tomorrow morning, as medication was still being worked on. Pt confirmed that he was still working with the attending on changing meds. Pt declined having any other questions or concerns at this time.      Discharge Plan or Goal  Tentatively, pt will discharge back to his parents home with outpatient follow-up with therapy and psychiatry. Pt has been referred to the [facility] program as well for an intake appointment.      Barriers to Discharge   Safe discharge plan, ongoing symptom severity (MDD with SI), and medication evaluation/assessment.     Referral Status  [facility] program intake assessment - see AVS  [facility] - Psychiatry, see AVS (9/15/21)  [facility]- in progress    Legal Status  VOLUNTARY

## 2021-09-15 NOTE — PLAN OF CARE
"Pt was isolative and withdrawn to his room for most of the shift he told writer \"I tried socializing more this morning and early afternoon and now I just need some alone time\". Pt's affect appeared to be incongruent with how his mood was. He was smiling during the entire interaction but it appeared forced. Pt's mood was calm this shift. Pt denied all mental health concerns including SI, SIB, AVH, HI, anxiety and depression. Pt was agreeable with taking his zyprexa but declined to take the scheduled haldol and reported it made him feel \"sluggish and tired and it gives me akathisia like my legs won't stop moving\". Pt also did not want to take the scheduled Rozerem he told writer \"I really only want to take that as a PRN for when I'm anxious and can't sleep, I also don't want to build a tolerance up\". Pt denied pain and his VS were WNL. Pt did have one visitor this evening and he said the visit went well.   "

## 2021-09-15 NOTE — PLAN OF CARE
Pt slept for a total of 7 hours during this overnight shift. No PRN medications were administered and no concerns were noted.

## 2021-09-16 PROCEDURE — 124N000002 HC R&B MH UMMC

## 2021-09-16 PROCEDURE — G0177 OPPS/PHP; TRAIN & EDUC SERV: HCPCS

## 2021-09-16 PROCEDURE — 250N000013 HC RX MED GY IP 250 OP 250 PS 637: Performed by: PSYCHIATRY & NEUROLOGY

## 2021-09-16 PROCEDURE — 99232 SBSQ HOSP IP/OBS MODERATE 35: CPT | Performed by: PSYCHIATRY & NEUROLOGY

## 2021-09-16 RX ORDER — OLANZAPINE 5 MG/1
5 TABLET ORAL EVERY MORNING
Status: DISCONTINUED | OUTPATIENT
Start: 2021-09-17 | End: 2021-09-20 | Stop reason: HOSPADM

## 2021-09-16 RX ADMIN — OLANZAPINE 2.5 MG: 2.5 TABLET, FILM COATED ORAL at 08:23

## 2021-09-16 RX ADMIN — OLANZAPINE 10 MG: 10 TABLET, FILM COATED ORAL at 21:18

## 2021-09-16 ASSESSMENT — ACTIVITIES OF DAILY LIVING (ADL)
ORAL_HYGIENE: INDEPENDENT
HYGIENE/GROOMING: INDEPENDENT
LAUNDRY: WITH SUPERVISION
DRESS: INDEPENDENT

## 2021-09-16 ASSESSMENT — MIFFLIN-ST. JEOR: SCORE: 1757.87

## 2021-09-16 NOTE — PROGRESS NOTES
"[facility], [place]   Psychiatric Progress Note         Interim History:   The patient's care was discussed with the treatment team during the daily team meeting and/or staff's chart notes were reviewed.  Staff report patient is pleasant, calm and cooperative but is still pretty isolative and withdrawn to self. Though pt presents very well, it was also reported that he seems to mask his symptoms well and appears to be responding to internal stimuli.      Met with patient: Pt presented with a flat affect and  expressed he is in a lower mood today but was in better control of his impulses. His impulses as he describes are to lash out in anger and to remain confined to his room. Pt believes he was told by someone to stay in his room, he was assured that staff continue to encourage the contrary and should try to be out in the milieu more often. Pt was not able to identify exactly who told him and when asked directly if he was experiencing Auditory hallucinations and voices told him to stay at his room, he denied that. Pt denies any SI/SIB/HI.           Medications:        OLANZapine  15 mg Oral At Bedtime     OLANZapine  5 mg Oral QAM     ramelteon  8 mg Oral At Bedtime           Allergies:            Allergies   Allergen Reactions     Seasonal Allergies       Seroquel [Quetiapine]         Fainting and slowed breathing      Zyprexa [Olanzapine] Other (See Comments)       Restless leg symptoms, arm twitching           Labs:   Recent Results   No results found for this or any previous visit (from the past 24 hour(s)).          Psychiatric Examination:      /80   Pulse 89   Temp 98.2  F (36.8  C) (Oral)   Resp 16   Ht 1.803 m (5' 11\")   Wt 73 kg (161 lb)   SpO2 97%   BMI 22.45 kg/m    Weight is 161 lbs 0 oz  Body mass index is 22.45 kg/m .  Orthostatic Vitals           Most Recent       Sitting Orthostatic /80 09/08 0828     Sitting Orthostatic Pulse (bpm) 89 09/08 0828     " Standing Orthostatic /83 09/08 0828     Standing Orthostatic Pulse (bpm) 132 09/08 0828                Appearance: awake, alert, appeared as age stated, poorly groomed and cooperative  Attitude:  guarded  Eye Contact:  fair  Mood:  sad   Affect:  intensity is flat  Speech:  clear, coherent, monotonous  Psychomotor Behavior:  no evidence of tardive dyskinesia, dystonia, or tics  Throught Process:  overall, logical  Associations:  no loose associations  Thought Content:  no evidence of suicidal ideation or homicidal ideation  Insight: fair  Judgement:  fair  Oriented to:  time, person, and place  Attention Span and Concentration:  intact  Recent and Remote Memory:  intact     Clinical Global Impressions  First:  Considering your total clinical experience with this particular patient population, how severe are the patient's symptoms at this time?: 7 (09/04/21 0756)  Compared to the patient's condition at the START of treatment, this patient's condition is: 7 (09/04/21 0756)  Most recent:  Considering your total clinical experience with this particular patient population, how severe are the patient's symptoms at this time?: 7 (09/04/21 0756)  Compared to the patient's condition at the START of treatment, this patient's condition is: 7 (09/04/21 0756)          Precautions:            Behavioral Orders   Procedures     Code 1 - Restrict to Unit     Discontinue 1:1 attendant for suicide risk       Order Specific Question:   I have performed an in person assessment of the patient       Answer:   Based on this assessment the patient no longer requires a one on one attendant at this point in time.     Routine Programming       As clinically indicated     Self Injury Precaution     Status 15       Every 15 minutes.     Suicide precautions       Patients on Suicide Precautions should have a Combination Diet ordered that includes a Diet selection(s) AND a Behavioral Tray selection for Safe Tray - with utensils, or Safe  Tray - NO utensils              DIagnoses:   1.  Bipolar disorder with psychosis versus substance-induced psychotic disorder  2.  Rule out developing schizophrenia spectrum disorder.  3.  Polysubstance abuse, primarily alcohol and marijuana.   4.  History of cocaine abuse, in sustained remission.          Plan:      No medication changes today. Will continue to provide support and structure and work on discharge preparation.  CTC will refer to Navigate program.       I was present with PA student who participated in the service and in the documentation of the note. I have verified the history and personally performed the physical exam and medical decision making. I agree with the assessment and plan of care as documented in the note.      Sammy Earl MD  [facility] Psychiatry

## 2021-09-16 NOTE — PROGRESS NOTES
"[facility], [place]   Psychiatric Progress Note        Interim History:   The patient's care was discussed with the treatment team during the daily team meeting and/or staff's chart notes were reviewed.  Staff report patient is calm with flat affect. He is still isolative and withdrawn to self, and not attending groups. Pt denies SI/SIB/HI/AVH.     Met with patient: Pt was seen in his room. He appears to be in good mood with an affect that brightens on approach. We discussed medication changes with pt and he was informed that Zyprexa dose will increased and discontinuing Haldol (given his firm refusal). Pt was agreeable to these changes. Pt was asked about his general BP reading, he shared that it typically runs high. Pt was recommended to follow up with his PCP about his BP, he agreed. Pt inquired some information about his discharge plans, he was told possibly early next week given current medication changes and further need for improvement- since it was only recent when pt was having delusional thoughts. Pt reports no med side effect.          Medications:       haloperidol  5 mg Oral BID     OLANZapine  10 mg Oral At Bedtime     OLANZapine  2.5 mg Oral QAM     ramelteon  8 mg Oral At Bedtime          Allergies:     Allergies   Allergen Reactions     Seasonal Allergies      Seroquel [Quetiapine]      Fainting and slowed breathing           Labs:   No results found for this or any previous visit (from the past 24 hour(s)).       Psychiatric Examination:     /79   Pulse 91   Temp 97.6  F (36.4  C) (Oral)   Resp 16   Ht 1.803 m (5' 11\")   Wt 73.1 kg (161 lb 1.6 oz)   SpO2 96%   BMI 22.47 kg/m    Weight is 161 lbs 1.6 oz  Body mass index is 22.47 kg/m .      Appearance: awake, alert, adequately groomed and dressed in hospital scrubs  Attitude:  somewhat cooperative  Eye Contact:  fair  Mood:  good  Affect:  intensity is brighter  Speech:  clear, coherent  Psychomotor " Behavior:  no evidence of tardive dyskinesia, dystonia, or tics  Throught Process:  logical  Associations:  no loose associations  Thought Content:  no evidence of suicidal ideation or homicidal ideation, paranoia is still present  Insight:  partial  Judgement:  fair  Oriented to:  time, person, and place  Attention Span and Concentration:  intact  Recent and Remote Memory:  intact    Clinical Global Impressions  First:  Considering your total clinical experience with this particular patient population, how severe are the patient's symptoms at this time?: 7 (09/04/21 0756)  Compared to the patient's condition at the START of treatment, this patient's condition is: 7 (09/04/21 0756)  Most recent:  Considering your total clinical experience with this particular patient population, how severe are the patient's symptoms at this time?: 4 (9/15/2021)  Compared to the patient's condition at the START of treatment, this patient's condition is: 3 (09/15/21 0756)         Precautions:     Behavioral Orders   Procedures     Code 1 - Restrict to Unit     Discontinue 1:1 attendant for suicide risk     Order Specific Question:   I have performed an in person assessment of the patient     Answer:   Based on this assessment the patient no longer requires a one on one attendant at this point in time.     Routine Programming     As clinically indicated     Self Injury Precaution     Status 15     Every 15 minutes.     Suicide precautions     Patients on Suicide Precautions should have a Combination Diet ordered that includes a Diet selection(s) AND a Behavioral Tray selection for Safe Tray - with utensils, or Safe Tray - NO utensils            DIagnoses:   1.  Bipolar disorder with psychosis versus substance-induced psychotic disorder  2.  Rule out developing schizophrenia spectrum disorder.  3.  Polysubstance abuse, primarily alcohol and marijuana.   4.  History of cocaine abuse, in sustained remission.       Plan:   To increase dose  of Zyprexa. Will discontinue Haldol.  Continue to provide support and structure and continue to work on discharge planning.      I was present with PA student who participated in the service and in the documentation of the note. I have verified the history and personally performed the physical exam and medical decision making. I agree with the assessment and plan of care as documented in the note.     Sammy Earl MD  [facility] Psychiatry     9/16/2021

## 2021-09-16 NOTE — PROGRESS NOTES
"[facility], [place]   Psychiatric Progress Note         Interim History:   The patient's care was discussed with the treatment team during the daily team meeting and/or staff's chart notes were reviewed.  Staff report patient is calm and more present in the milieu. Pt refused haldol dose and was willing to take zyprexa. Pt's reasoning is that haldol gives him akathisias.     Met with patient: Pt appears to be in a better mood with brighter affect. Pt wanted to make it known that he meant no disrespect declining his haldol because he truly believes it gives him med side affects of akathisias. For now, pt has no intention in taking his scheduled haldol. For that reason, he doesn't think he'll be needing his initial request for scheduled Cogentin. Pt was asked if he was having some of the same delusional thoughts he was having over the weekend, he says that it is minimal. It was noticed patient seemed to be contemplating and took long to respond; he was reassured that there are no right or wrong answers and the we only want to gain insight to his current status. He shared that though his thoughts of feeling unsafe are still present he is better at suppressing them. Pt inquired about discharge plans or date; he was told that there will need to be a discussion w/his parents before plans are set. Pt still denies SI/SIB/AVH.          Medications:        haloperidol  5 mg Oral BID     OLANZapine  10 mg Oral At Bedtime     OLANZapine  2.5 mg Oral QAM     ramelteon  8 mg Oral At Bedtime           Allergies:            Allergies   Allergen Reactions     Seasonal Allergies       Seroquel [Quetiapine]         Fainting and slowed breathing            Labs:   Recent Results   No results found for this or any previous visit (from the past 24 hour(s)).          Psychiatric Examination:      /68   Pulse 100   Temp 98.6  F (37  C) (Oral)   Resp 16   Ht 1.803 m (5' 11\")   Wt 73.3 kg (161 lb 11.2 " oz)   SpO2 97%   BMI 22.55 kg/m    Weight is 161 lbs 11.2 oz  Body mass index is 22.55 kg/m .        Orthostatic Vitals        Most Recent       Sitting Orthostatic /84 09/14 0849     Sitting Orthostatic Pulse (bpm) 94 09/14 0849     Standing Orthostatic /83 09/14 0849     Standing Orthostatic Pulse (bpm) 134 09/14 0849                Appearance: awake, alert, adequately groomed and dressed in hospital scrubs  Attitude:  somewhat cooperative  Eye Contact:  fair  Mood:  better  Affect:  restricted range  Speech:  clear, coherent  Psychomotor Behavior:  no evidence of tardive dyskinesia, dystonia, or tics; pt reports having akathisias or restlessness  Throught Process:  logical  Associations:  no loose associations  Thought Content:  no evidence of suicidal ideation or homicidal ideation  Insight:  partial  Judgement:  fair  Oriented to:  time, person, and place  Attention Span and Concentration:  intact  Recent and Remote Memory:  intact     Clinical Global Impressions  First:  Considering your total clinical experience with this particular patient population, how severe are the patient's symptoms at this time?: 7 (09/04/21 0756)  Compared to the patient's condition at the START of treatment, this patient's condition is: 7 (09/04/21 0756)  Most recent:  Considering your total clinical experience with this particular patient population, how severe are the patient's symptoms at this time?: 7 (09/04/21 0756)  Compared to the patient's condition at the START of treatment, this patient's condition is: 7 (09/04/21 0756)          Precautions:            Behavioral Orders   Procedures     Code 1 - Restrict to Unit     Discontinue 1:1 attendant for suicide risk       Order Specific Question:   I have performed an in person assessment of the patient       Answer:   Based on this assessment the patient no longer requires a one on one attendant at this point in time.     Routine Programming       As clinically  indicated     Self Injury Precaution     Status 15       Every 15 minutes.     Suicide precautions       Patients on Suicide Precautions should have a Combination Diet ordered that includes a Diet selection(s) AND a Behavioral Tray selection for Safe Tray - with utensils, or Safe Tray - NO utensils              DIagnoses:      1.  Bipolar disorder with psychosis versus substance-induced psychotic disorder  2.  Rule out developing schizophrenia spectrum disorder.  3.  Polysubstance abuse, primarily alcohol and marijuana.   4.  History of cocaine abuse, in sustained remission.       Plan:   Will continue to provide support and structure and work on discharge planning.      I was present with PA student who participated in the service and in the documentation of the note. I have verified the history and personally performed the physical exam and medical decision making. I agree with the assessment and plan of care as documented in the note.     Sammy Earl MD  [facility] Psychiatry

## 2021-09-16 NOTE — PLAN OF CARE
Assessment/Intervention/Current Symtoms and Care Coordination  ZUNILDA (writer) met with trx team, provided update, and reviewed pt's chart. ZUNILDA spoke with pt's mom, Rolanda [phone], to see how the family felt pt was doing. Rolanda reported that both her  and herself had visited pt in the past few days and overall pt appeared to be doing better. She did notice some odd tracking, but overall wasn't concerned. ZUNILDA updated her about discharge plan including referrals. ZUNILDA also discussed pt returning to their home next Monday or Tuesday, which she stated would be fine if pt continue to show improvement. CTC called transition clinic to confirm they received the referral, which they said they would callback about. ZUNILDA did not hear from them by end of day. ZUNILDA met with pt to review current discharge plan/referrals, and informed him that current discharge would be early next week, Monday or Tuesday. Pt was accepting of this and declined having further questions/concern after meeting with ZUNILDA.     Discharge Plan or Goal  Tentatively, pt will discharge back to his parents home with outpatient follow-up with therapy and psychiatry. Pt has been referred to the navigate program as well for an intake appointment.      Barriers to Discharge   Safe discharge plan, ongoing symptom severity (MDD with SI), and medication evaluation/assessment.     Referral Status  [facility]igate program intake assessment - see [facility], see [facility] (9/15/21)  [facility]- in progress (waiting for confirmation that pt was placed on list 9/16/21)     Legal Status  VOLUNTARY

## 2021-09-16 NOTE — PROGRESS NOTES
"   09/16/21 1400   General Information   Date Initially Attended OT 09/16/21       Intervention: Pt attended 1 of 3 OT groups:General Health and Coping Group with 2 peers.     General Health and Coping:     Group skills/Focus:  leisure education,  symptom management/strategies, coping with stress       Topic detail: Pt participated in board game with peers involving visual-spatial processing (Mercedes) and engaged in conversation about coping skills to be used during time in hospital and after discharge.     Patient Response: Demonstrated understanding of material, was respectful and contributes to conversation    Additional information: Pt contributed to conversation about coping skills when peer asked for helpful skills by recommending reading as a coping skill that has worked well for him. He noted that \"it's like exercising where it sucks at first but then once you get into it, its nice to get you out of your head\".     Concentrated on task:  duration of  group - 45 min.      Mood/Affect:  Flat       Plan: Patient encouraged to maintain attendance for continued ongoing support in working towards occupational therapy goals to support overall treatment/care. More information is needed to complete initial OT assessment. As group attendance is established, OT will provide pt with self-assessment form to inform treatment planning/goal setting, and provide explanation of the benefit of participation in occupational therapy services to overall treatment/care during hospitalization.   "

## 2021-09-16 NOTE — PLAN OF CARE
Shift Summary  Legal status: Voluntary  Mental  Pt visible in milieu only for meals. Isolative and withdrawn to self. Mood is calm and affect is flat. Pt has appropriate eye contact. Does not seem overtly depressed, anxious or irritable. However pt is very good masking his symptoms. Denies suicidal/homicidal ideations, self injury behavior, racing thought as well as auditory and visual hallucinations. Insight and judgement seems poor. Did not take Haldol because pt feels that does not  needs it. No evidence of self harm behavior. Did not participate in any group activity. No delusional statement noted from pt.   Prn: none  Physical  Pt alert and oriented x 3. Denies pain. Bp elevated. Pt is asymptomatic. Later declined Bp re-check. No psychomotor abnormalities are noted. Slept 7.0 hours last night. Appetite adequate. Ate both breakfast and lunch. No problem with bowel and bladder per pt.   Prn: none  Continue to monitor pt's status Q 15 minutes and stabilize the patient's symptoms with the use of medications and therapeutic programming.

## 2021-09-17 PROCEDURE — 124N000002 HC R&B MH UMMC

## 2021-09-17 PROCEDURE — 99231 SBSQ HOSP IP/OBS SF/LOW 25: CPT | Performed by: PSYCHIATRY & NEUROLOGY

## 2021-09-17 PROCEDURE — 250N000013 HC RX MED GY IP 250 OP 250 PS 637: Performed by: PSYCHIATRY & NEUROLOGY

## 2021-09-17 PROCEDURE — G0177 OPPS/PHP; TRAIN & EDUC SERV: HCPCS

## 2021-09-17 RX ADMIN — OLANZAPINE 10 MG: 10 TABLET, FILM COATED ORAL at 20:25

## 2021-09-17 RX ADMIN — OLANZAPINE 5 MG: 5 TABLET, FILM COATED ORAL at 08:35

## 2021-09-17 ASSESSMENT — ACTIVITIES OF DAILY LIVING (ADL)
ORAL_HYGIENE: INDEPENDENT
LAUNDRY: WITH SUPERVISION
ORAL_HYGIENE: INDEPENDENT
DRESS: SCRUBS (BEHAVIORAL HEALTH)
HYGIENE/GROOMING: INDEPENDENT
HYGIENE/GROOMING: INDEPENDENT
DRESS: INDEPENDENT
LAUNDRY: UNABLE TO COMPLETE

## 2021-09-17 NOTE — PROGRESS NOTES
"[facility], [place]   Psychiatric Progress Note         Interim History:   The patient's care was discussed with the treatment team during the daily team meeting and/or staff's chart notes were reviewed.  Staff report patient is calm with a flat affect. Pt has not been med compliant and declined two Risperdal doses. Pt is eager to get discharged. He denies any psychotic symptoms but presents otherwise. Talked about being afraid that [employer] is being after him and wants him executed.      Met with patient: Pt is calm with a low mood and restricted affect. Pt denies current symptoms of SI/AH/VH and paranoia. Pt believes patients and staff are intentionally provoking him. He reports a few instances where he felt staff would slam his door in the middle of the night and call him names. He doesn't feel like his peers like him and he could just tell by their actions. Pt was asked about his med refusal, he says he feels that Risperdal makes his \"heart pound\" and doesn't like to feel slow and tired. He was offered an increase in his P.M. Risperdal dose - he declined. Pt wants to discontinue this med, he was offered an alternative Haldol. Pt was very hesitant; after much persuasion, he agreed. He made it clear that he didn't want to take neuroleptics, in general. said that he didn't believed that his symptoms were due to his MH as they were very real to him. Pt's primary concern was being committed; he expressed how he really doesn't want that to happen. Pt was urged to be receptive and open to treatment.           Medications:        OLANZapine  10 mg Oral At Bedtime     ramelteon  8 mg Oral At Bedtime     risperiDONE  0.5 mg Oral BID           Allergies:            Allergies   Allergen Reactions     Seasonal Allergies       Seroquel [Quetiapine]         Fainting and slowed breathing      Zyprexa [Olanzapine] Other (See Comments)       Restless leg symptoms, arm twitching           Labs: " "  Recent Results   No results found for this or any previous visit (from the past 24 hour(s)).          Psychiatric Examination:      /70 (BP Location: Left arm)   Pulse 110   Temp 97.6  F (36.4  C) (Tympanic)   Resp 16   Ht 1.803 m (5' 11\")   Wt 72.1 kg (159 lb)   SpO2 97%   BMI 22.18 kg/m    Weight is 159 lbs 0 oz  Body mass index is 22.18 kg/m .            Orthostatic Vitals        Most Recent       Sitting Orthostatic /70 09/13 0818     Sitting Orthostatic Pulse (bpm) 110 09/13 0818     Standing Orthostatic /72 09/13 0818     Standing Orthostatic Pulse (bpm) 130 09/13 0818             Appearance: awake, alert. Adequately groomed   Attitude:  guarded  Eye Contact:  fair  Mood:  sad   Affect:  restricted range,    Speech:  clear, coherent, monotonous   Psychomotor Behavior:  no evidence of tardive dyskinesia, dystonia, or tics  Throught Process: somewhat  illogical  Associations:  no loose associations  Thought Content:  no evidence of suicidal ideation or homicidal ideation, but has delusional ideas: believes staff and peers are taunting him  Insight:  partial  Judgement:  fair  Oriented to:  time, person, and place  Attention Span and Concentration:  intact  Recent and Remote Memory:  intact     Clinical Global Impressions  First:  Considering your total clinical experience with this particular patient population, how severe are the patient's symptoms at this time?: 7 (09/04/21 0756)  Compared to the patient's condition at the START of treatment, this patient's condition is: 7 (09/04/21 0756)  Most recent:  Considering your total clinical experience with this particular patient population, how severe are the patient's symptoms at this time?: 7 (09/11/21 0756)  Compared to the patient's condition at the START of treatment, this patient's condition is: 7 (09/11/21 0756)          Precautions:            Behavioral Orders   Procedures     Code 1 - Restrict to Unit     Discontinue 1:1 " attendant for suicide risk       Order Specific Question:   I have performed an in person assessment of the patient       Answer:   Based on this assessment the patient no longer requires a one on one attendant at this point in time.     Routine Programming       As clinically indicated     Self Injury Precaution     Status 15       Every 15 minutes.     Suicide precautions       Patients on Suicide Precautions should have a Combination Diet ordered that includes a Diet selection(s) AND a Behavioral Tray selection for Safe Tray - with utensils, or Safe Tray - NO utensils              DIagnoses:   1.  Bipolar disorder with psychosis versus substance-induced psychotic disorder  2.  Rule out developing schizophrenia spectrum disorder.  3.  Polysubstance abuse, primarily alcohol and marijuana.   4.  History of cocaine abuse, in sustained remission.          Plan:      Will continue to taper off of Zyprexa and Risperdal and initiate Haldol. Will continue to provide support and structure.       I was present with PA student who participated in the service and in the documentation of the note. I have verified the history and personally performed the physical exam and medical decision making. I agree with the assessment and plan of care as documented in the note.      Sammy Earl MD  [facility] Psychiatry

## 2021-09-17 NOTE — PLAN OF CARE
Problem: Sleep Disturbance  Goal: Adequate Sleep/Rest  Outcome: No Change  Note: Pt continues to sleep through the night with no concerns noted.     NOC Shift Report    Pt in bed at beginning of shift, breathing quiet and unlabored. Pt slept through shift. Pt slept 7 hours.     No pt complaints or concerns at this time.     No PRNs given. Will continue to monitor.     Precautions: Suicide, Self-Injury

## 2021-09-17 NOTE — PLAN OF CARE
"Pt has been isolative to his room all shift. He is not social with peers. He did come out for dinner and medications. He states he has been \"better, less paranoid\". Pt appears to be guarded and not sharing a lot about how he has been thinking/feeling. Affect appears to be full range, but seems incongruent. Pt denies AVH. Pt content with haldol being discontinued and continues to state that zyprexa is the best medication for him but does not give a reason why he believes this. Pt denies SI/SIB. Pt denies anxiety today and states his anxiety is \"more situational\". He expresses excitement for possible discharge home next week. He states his parents are a good support system for him. Pt reports poor sleep last night. Writer encouraged pt to take his Ramelteon tonight as this has been helpful for him in the past. Pt stated he will think about it. When medication pass came, pt declined the ramelteon. He only took his scheduled zyprexa.   "

## 2021-09-17 NOTE — PROGRESS NOTES
"[facility], [place]   Psychiatric Progress Note         Interim History:      The patient's care was discussed with the treatment team during the daily team meeting and/or staff's chart notes were reviewed.  Staff report patient is calm and cooperative but remains pretty isolative and withdrawn to self. Pt's insight and judgement are improving.      Met with patient:  Pt appears to be in a good mood along with congruent affect. Pt denies any auditory or visual hallucinations. Pt admit to having what seemed like a manic episode prior to admission and shared that he had nearly four sleepless nights after abruptly discontinuing all substance use including marijuana, alcohol, and his antidepressant medications.Currently, pt is med compliant but seems hesitant about long term med use. Pt spoke about his plans and goals to continue school after taking a semester break to focus on his health. Pt denies any SI/SIB/HI. He denied having any med side effects. He asked us about discharge, we advised him that he would be discharged soon enough after preparations for his discharge are completed. He indicated that he understood and had no further questions or concerns.               Medications:        OLANZapine  15 mg Oral At Bedtime     OLANZapine  5 mg Oral QAM     ramelteon  8 mg Oral At Bedtime           Allergies:            Allergies   Allergen Reactions     Seasonal Allergies       Seroquel [Quetiapine]         Fainting and slowed breathing      Zyprexa [Olanzapine] Other (See Comments)       Restless leg symptoms, arm twitching           Labs:   Recent Results   No results found for this or any previous visit (from the past 24 hour(s)).          Psychiatric Examination:      BP (!) 140/74   Pulse 99   Temp 99.1  F (37.3  C) (Tympanic)   Resp 16   Ht 1.803 m (5' 11\")   Wt 73 kg (161 lb)   SpO2 97%   BMI 22.45 kg/m    Weight is 161 lbs 0 oz  Body mass index is 22.45 kg/m .          "   Orthostatic Vitals        Most Recent       Sitting Orthostatic /78 09/06 0831     Sitting Orthostatic Pulse (bpm) 83 09/06 0831     Standing Orthostatic /82 09/06 0831     Standing Orthostatic Pulse (bpm) 89 09/06 0831             Appearance: dressed in hospital scrubs, appeared as age stated and neatly groomed  Attitude:  cooperative  Eye Contact:  fair  Mood:  good  Affect:  mood congruent  Speech:  clear, coherent  Psychomotor Behavior:  no evidence of tardive dyskinesia, dystonia, or tics  Throught Process:  logical and linear  Associations:  no loose associations  Thought Content:  no evidence of suicidal ideation or homicidal ideation  Insight:  good  Judgement:  fair  Oriented to:  time, person, and place  Attention Span and Concentration:  intact  Recent and Remote Memory:  intact     Clinical Global Impressions  First:  Considering your total clinical experience with this particular patient population, how severe are the patient's symptoms at this time?: 7 (09/04/21 0756)  Compared to the patient's condition at the START of treatment, this patient's condition is: 7 (09/04/21 0756)  Most recent:  Considering your total clinical experience with this particular patient population, how severe are the patient's symptoms at this time?: 7 (09/04/21 0756)  Compared to the patient's condition at the START of treatment, this patient's condition is: 7 (09/04/21 0756)          Precautions:            Behavioral Orders   Procedures     Code 1 - Restrict to Unit     Discontinue 1:1 attendant for suicide risk       Order Specific Question:   I have performed an in person assessment of the patient       Answer:   Based on this assessment the patient no longer requires a one on one attendant at this point in time.     Routine Programming       As clinically indicated     Self Injury Precaution     Status 15       Every 15 minutes.     Suicide precautions       Patients on Suicide Precautions should have a  Combination Diet ordered that includes a Diet selection(s) AND a Behavioral Tray selection for Safe Tray - with utensils, or Safe Tray - NO utensils              DIagnoses:   1.  Bipolar disorder with psychosis versus substance-induced psychotic disorder  2.  Polysubstance abuse, primarily alcohol and marijuana.   3.  History of cocaine abuse, in sustained remission.          Plan:      No medication changes today. Will continue to provide support and structure and work on discharge preparations. Will, likely, be ready for discharge in 2-4 days.       I was present with PA student who participated in the service and in the documentation of the note. I have verified the history and personally performed the physical exam and medical decision making. I agree with the assessment and plan of care as documented in the note.      Sammy Earl MD  [facility] Psychiatry

## 2021-09-17 NOTE — PROGRESS NOTES
"[facility], [place]   Psychiatric Progress Note         Interim History:   The patient's care was discussed with the treatment team during the daily team meeting and/or staff's chart notes were reviewed.  Staff report patient appears to be pleasant, calm, and cooperative. Pt is paranoid and appears to be responding to internal stimuli. Pt is isolative and socially withdrawn. Pt denies SI/SIB/HI.     Met with patient: Pt appears to be calm but sad mood. When asked how patient is feeling, he responded \"not okay at this very moment\". His reasoning was that he believes he will still be executed. Pt also expressed how he was sad that his visit with his father the previous evening may be the last time he sees him which is also why he is sad. Pt had no complaints about his medication he is med compliant for now and has no drug reactions. Pt was urged to go out into the milieu and attend group; he reported having social anxiety and just doesn't know how to socialize with others. We also encouraged him to shave and later on saw him standing in front of mirror and shaving.  Current med plan for pt is to taper him off of Zyprexa as Risperdal is slowly initiated. Pt denies symptoms of SI/SIB/HI.          Medications:        OLANZapine  10 mg Oral At Bedtime     ramelteon  8 mg Oral At Bedtime     risperiDONE  1 mg Oral BID           Allergies:            Allergies   Allergen Reactions     Seasonal Allergies       Seroquel [Quetiapine]         Fainting and slowed breathing      Zyprexa [Olanzapine] Other (See Comments)       Restless leg symptoms, arm twitching           Labs:   Recent Results   No results found for this or any previous visit (from the past 24 hour(s)).          Psychiatric Examination:      /88   Pulse 104   Temp 97.7  F (36.5  C) (Tympanic)   Resp 16   Ht 1.803 m (5' 11\")   Wt 73.1 kg (161 lb 1.6 oz)   SpO2 96%   BMI 22.47 kg/m    Weight is 161 lbs 1.6 oz  Body mass " index is 22.47 kg/m .            Orthostatic Vitals        Most Recent       Sitting Orthostatic /81 09/09 0845     Sitting Orthostatic Pulse (bpm) 123 09/09 0845     Standing Orthostatic /83 09/09 0845     Standing Orthostatic Pulse (bpm) 124 09/09 0845                Appearance: awake, alert, appeared as age stated and well groomed  Attitude:  cooperative  Eye Contact:  fair  Mood:  sad   Affect:  restricted range  Speech:  clear, coherent, monotonous   Psychomotor Behavior:  no evidence of tardive dyskinesia, dystonia, or tics  Throught Process: somewhat  illogical  Associations:  no loose associations  Thought Content:  no evidence of suicidal ideation or homicidal ideation, delusional ideas of persecution are present.   Insight:  partial  Judgement:  fair  Oriented to:  time, person, and place  Attention Span and Concentration:  intact  Recent and Remote Memory:  intact     Clinical Global Impressions  First:  Considering your total clinical experience with this particular patient population, how severe are the patient's symptoms at this time?: 7 (09/04/21 0756)  Compared to the patient's condition at the START of treatment, this patient's condition is: 7 (09/04/21 0756)  Most recent:  Considering your total clinical experience with this particular patient population, how severe are the patient's symptoms at this time?: 7 (09/04/21 0756)  Compared to the patient's condition at the START of treatment, this patient's condition is: 7 (09/04/21 0756)          Precautions:            Behavioral Orders   Procedures     Code 1 - Restrict to Unit     Discontinue 1:1 attendant for suicide risk       Order Specific Question:   I have performed an in person assessment of the patient       Answer:   Based on this assessment the patient no longer requires a one on one attendant at this point in time.     Routine Programming       As clinically indicated     Self Injury Precaution     Status 15       Every 15  minutes.     Suicide precautions       Patients on Suicide Precautions should have a Combination Diet ordered that includes a Diet selection(s) AND a Behavioral Tray selection for Safe Tray - with utensils, or Safe Tray - NO utensils              DIagnoses:   1.  Bipolar disorder with psychosis versus substance-induced psychotic disorder  2.  Rule out developing schizophrenia spectrum disorder.  3.  Polysubstance abuse, primarily alcohol and marijuana.   4.  History of cocaine abuse, in sustained remission.          Plan:    Will continue tapering off Zyprexa and increase Risperdal. Will continue to provide support and structure.      I was present with PA student who participated in the service and in the documentation of the note. I have verified the history and personally performed the physical exam and medical decision making. I agree with the assessment and plan of care as documented in the note.      Sammy Earl MD  [facility] Psychiatry

## 2021-09-17 NOTE — PLAN OF CARE
Assessment/Intervention/Current Symtoms and Care Coordination  ZUNILDA (writer) met with trx team, provided update, and reviewed pt's chart. CTC spoke with transition clinic and coordinated bridging therapy appointment. AVS was updated. No significant changes. Pt will be discharging on Monday.     Discharge Plan or Goal  Tentatively, pt will discharge back to his parents home with outpatient follow-up with therapy and psychiatry. Pt has been referred to the navigate program as well for an intake appointment.      Barriers to Discharge   Safe discharge plan, ongoing symptom severity (MDD with SI), and medication evaluation/assessment.     Referral Status  [facility] program intake assessment - see AVS  [facility] - Psychiatry, see AVS (9/15/21)  Transition/[facility]- therapy, see AVS (9/17/21)    Legal Status  VOLUNTARY

## 2021-09-17 NOTE — PLAN OF CARE
"  Problem: OT General Care Plan  Goal: OT Goal 1  Description: Pt will demonstrate consistent engagement in OT group activities that support recovery as evidenced by participating in >1 scheduled OT group/day for 5 days.     OT Groups Attended: Mental Health Management     Affect/Hygiene/Presentation: Calm/pleasant, engaged, congruent affect. No apparent hygiene concerns.     Patient Performance/Response: Pt actively participated in a mental health management group with 5 patients for 60 minutes focusing on reduction of anxiety, improvement of mood, coping skill exploration, and increase in self-compassion. The practice was offered via supportive approaches including a self-compassion journaling exercise, therapeutic discussion, deep breathing, gentle stretching, essential oils, and a loving kindness meditation. Pt verbalized feeling \"more relaxed\" at the end of the group, and was able to remain focused for the full duration.     Plan: Pt will be encouraged to maintain group attendance for continued ongoing assessment and support in completion of occupational therapy treatment goals.     Outcome: Improving     "

## 2021-09-17 NOTE — PLAN OF CARE
Pt observed in the milieu majority of this shift, social with select peers and staff. Pt presented with blunted affect and calm mood but affect brightens upon approach.Pt attending and participating in unit groups/activities.  Pt denies SI/Self harm thoughts, urges, plan, and intent. Pt denies pain this shift as well as hallucinations. Pt was medication compliant and denies any side effects. Pt's dad will be visiting this evening at 7 pm. Staff will continue to monitor and support. Discharge plan. Patient to discharge on Monday.

## 2021-09-17 NOTE — PROGRESS NOTES
[facility], [place]   Psychiatric Progress Note         Interim History:      The patient's care was discussed with the treatment team during the daily team meeting and/or staff's chart notes were reviewed.  Staff report patient is pleasant, calm and cooperative but is still pretty isolative and withdrawn to self. Though pt presents very well, it was also reported that he seems to mask his symptoms well and appears to be responding to internal stimuli. This has not changed since yesterday. Staff also report that patient appears to be paranoid about staff's intentions towards him.     Met with patient: he was seen in presence of PA student. He was more open today than yesterday and immediately told us that he had fear that he would be killed on the orders of [place] president. When asked what made him think that, he denied that he heard any voice/voices telling him that, but admitted that he got this idea in the same way he got previous ideas that [employer] wanted to kill him. 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 He could not explain why he thought that and what made him so influential. He did agree with us that if he were to be discharged from this hospital at his present state of mind, he would not last in community for a long time and would be readmitted. He agreed with our recommendations to evaluate his meds and, if needed, to replace them with others. He agreed to stay at this hospital. He also agreed with us that his ideas could be a part of his mental illness.          Medications:        OLANZapine  10 mg Oral At Bedtime     ramelteon  8 mg Oral At Bedtime     risperiDONE  0.5 mg Oral BID           Allergies:            Allergies   Allergen Reactions     Seasonal Allergies       Seroquel [Quetiapine]         Fainting and slowed breathing      Zyprexa [Olanzapine] Other (See Comments)       Restless leg symptoms,  "arm twitching           Labs:   Recent Results   No results found for this or any previous visit (from the past 24 hour(s)).          Psychiatric Examination:      /81   Pulse 87   Temp 98  F (36.7  C) (Oral)   Resp 16   Ht 1.803 m (5' 11\")   Wt 73.1 kg (161 lb 1.6 oz)   SpO2 96%   BMI 22.47 kg/m    Weight is 161 lbs 1.6 oz  Body mass index is 22.47 kg/m .           Orthostatic Vitals        Most Recent       Sitting Orthostatic /81 09/09 0845     Sitting Orthostatic Pulse (bpm) 123 09/09 0845     Standing Orthostatic /83 09/09 0845     Standing Orthostatic Pulse (bpm) 124 09/09 0845          Appearance: awake, alert, appeared as age stated, poorly groomed and cooperative  Attitude: less guarded  Eye Contact:  fair  Mood:  sad   Affect:  intensity is restricted  Speech:  clear, coherent, monotonous  Psychomotor Behavior:  no evidence of tardive dyskinesia, dystonia, or tics  Throught Process:  Overall, illogical  Associations:  no loose associations  Thought Content:  no evidence of suicidal ideation or homicidal ideation, delusions are present  Insight: partial  Judgement:  fair  Oriented to:  time, person, and place  Attention Span and Concentration:  intact  Recent and Remote Memory:  intact     Clinical Global Impressions  First:  Considering your total clinical experience with this particular patient population, how severe are the patient's symptoms at this time?: 7 (09/04/21 0756)  Compared to the patient's condition at the START of treatment, this patient's condition is: 7 (09/04/21 0756)  Most recent:  Considering your total clinical experience with this particular patient population, how severe are the patient's symptoms at this time?: 7 (09/04/21 0756)  Compared to the patient's condition at the START of treatment, this patient's condition is: 7 (09/04/21 0756)          Precautions:            Behavioral Orders   Procedures     Code 1 - Restrict to Unit     Discontinue 1:1 " attendant for suicide risk       Order Specific Question:   I have performed an in person assessment of the patient       Answer:   Based on this assessment the patient no longer requires a one on one attendant at this point in time.     Routine Programming       As clinically indicated     Self Injury Precaution     Status 15       Every 15 minutes.     Suicide precautions       Patients on Suicide Precautions should have a Combination Diet ordered that includes a Diet selection(s) AND a Behavioral Tray selection for Safe Tray - with utensils, or Safe Tray - NO utensils              DIagnoses:      1.  Bipolar disorder with psychosis versus substance-induced psychotic disorder  2.  Rule out developing schizophrenia spectrum disorder.  3.  Polysubstance abuse, primarily alcohol and marijuana.   4.  History of cocaine abuse, in sustained remission.          Plan:         Will start tapering off Zyprexa and start on Risperdal. Will continue to provide support and structure and work on discharge preparation. CTC will refer to [facility] program.       I was present with PA student who participated in the service and in the documentation of the note. I have verified the history and personally performed the physical exam and medical decision making. I agree with the assessment and plan of care as documented in the note.      Sammy Earl MD  [facility] Psychiatry

## 2021-09-17 NOTE — PROGRESS NOTES
"[facility], [place]   Psychiatric Progress Note        Interim History:   The patient's care was discussed with the treatment team during the daily team meeting and/or staff's chart notes were reviewed.  Staff report patient is calm and present in the milieu. He has been participating in groups. He denies SI/SIB.     Met with patient: Pt appears to be in a good mood, his affect is still flat. He reports attending some groups and that his social anxiety wasn't as big of a barrier as it typically is. Pt does not present with overt paranoia and delusional thinking. Pt is compliant with Zyprexa and has no med side affects. He is up to date about his upcoming discharge plans for Monday. Pt had no further questions or concerns.          Medications:       OLANZapine  10 mg Oral At Bedtime     OLANZapine  5 mg Oral QAM     ramelteon  8 mg Oral At Bedtime          Allergies:     Allergies   Allergen Reactions     Seasonal Allergies      Seroquel [Quetiapine]      Fainting and slowed breathing           Labs:   No results found for this or any previous visit (from the past 24 hour(s)).       Psychiatric Examination:     /89   Pulse 88   Temp 97.6  F (36.4  C) (Tympanic)   Resp 16   Ht 1.803 m (5' 11\")   Wt 73.1 kg (161 lb 1.6 oz)   SpO2 97%   BMI 22.47 kg/m    Weight is 161 lbs 1.6 oz  Body mass index is 22.47 kg/m .  Orthostatic Vitals       Most Recent      Sitting Orthostatic /81 09/16 0809    Sitting Orthostatic Pulse (bpm) 92 09/16 0809    Standing Orthostatic /96 09/17 0800    Standing Orthostatic Pulse (bpm) 105 09/17 0800        Appearance: awake, alert, adequately groomed and dressed in hospital scrubs  Attitude:  somewhat cooperative  Eye Contact:  fair  Mood:  good  Affect:  intensity is flat  Speech:  clear, coherent  Psychomotor Behavior:  no evidence of tardive dyskinesia, dystonia, or tics  Throught Process:  logical  Associations:  no loose " associations  Thought Content:  no evidence of suicidal ideation or homicidal ideation  Insight:  partial  Judgement:  fair  Oriented to:  time, person, and place  Attention Span and Concentration:  intact  Recent and Remote Memory:  intact    Clinical Global Impressions  First:  Considering your total clinical experience with this particular patient population, how severe are the patient's symptoms at this time?: 7 (09/04/21 0756)  Compared to the patient's condition at the START of treatment, this patient's condition is: 7 (09/04/21 0756)  Most recent:  Considering your total clinical experience with this particular patient population, how severe are the patient's symptoms at this time?: 4 (9/17/2021)  Compared to the patient's condition at the START of treatment, this patient's condition is: 3 (9/17/2021)         Precautions:     Behavioral Orders   Procedures     Code 1 - Restrict to Unit     Discontinue 1:1 attendant for suicide risk     Order Specific Question:   I have performed an in person assessment of the patient     Answer:   Based on this assessment the patient no longer requires a one on one attendant at this point in time.     Routine Programming     As clinically indicated     Self Injury Precaution     Status 15     Every 15 minutes.     Suicide precautions     Patients on Suicide Precautions should have a Combination Diet ordered that includes a Diet selection(s) AND a Behavioral Tray selection for Safe Tray - with utensils, or Safe Tray - NO utensils            DIagnoses:   1.  Bipolar disorder with psychosis versus substance-induced psychotic disorder  2.  Rule out developing schizophrenia spectrum disorder.  3.  Polysubstance abuse, primarily alcohol and marijuana.   4.  History of cocaine abuse, in sustained remission.         Plan:   Continue to provide support and structure. No medication changes today.   Work towards a discharge for Monday.

## 2021-09-18 PROCEDURE — G0177 OPPS/PHP; TRAIN & EDUC SERV: HCPCS

## 2021-09-18 PROCEDURE — 124N000002 HC R&B MH UMMC

## 2021-09-18 PROCEDURE — 250N000013 HC RX MED GY IP 250 OP 250 PS 637: Performed by: PSYCHIATRY & NEUROLOGY

## 2021-09-18 RX ADMIN — RAMELTEON 8 MG: 8 TABLET ORAL at 20:13

## 2021-09-18 RX ADMIN — OLANZAPINE 10 MG: 10 TABLET, FILM COATED ORAL at 20:13

## 2021-09-18 RX ADMIN — OLANZAPINE 5 MG: 5 TABLET, FILM COATED ORAL at 07:51

## 2021-09-18 ASSESSMENT — ACTIVITIES OF DAILY LIVING (ADL)
ORAL_HYGIENE: INDEPENDENT
HYGIENE/GROOMING: INDEPENDENT
LAUNDRY: WITH SUPERVISION
DRESS: SCRUBS (BEHAVIORAL HEALTH)
HYGIENE/GROOMING: INDEPENDENT
DRESS: SCRUBS (BEHAVIORAL HEALTH)
ORAL_HYGIENE: INDEPENDENT
LAUNDRY: WITH SUPERVISION

## 2021-09-18 NOTE — PLAN OF CARE
"Pt was visible in the milieu during the majority of the evening shift. Pt attended and participated in the evening therapeutic group. Pt sat in the lounge and watched movie/tv with peers interacting and socializing selectively. Pt had a visitor and reported that his visitation went well. Pt was calm, cooperative, and his affect was neutral to bright upon approach. Pt appears to have adequate hygiene and stated that he showered this morning. Pt denies SI/SIB/HI, A/V hallucinations, paranoia, anxiety and depression. Pt excitingly talked about his upcoming discharge. Pt ate dinner and had snacks. Pt denies physical pain, discomfort and had no other concerns. Pt was medication compliant, but declined to take Rozerem stating that \"I don't need\". The writer asked if there is a particular reason that he doesn't want to take Rozerem and pt replied that \"I don't need\". Pt expressed his wishes for having Rozerem as a PRN.       "

## 2021-09-18 NOTE — PLAN OF CARE
MH Assessment    Patient is alert and oriented, calm and cooperative. Patient's affect brightens upon approach. Speech is Normal. Patient's insight is Adequate. Patient is Neatly groomed, he showered this evening. Patient has been actively participating in group, present in the milieu, paces the gould and keeps to himself. The patient voices their needs appropriately. Patient denies depression, anxiety, SI,SIB, racing thoughts,Hallucinations, and HI. No evidence of delusional thinking or psychotic symptoms observed. He reported feeling ready to discharge and excited about it. Patient has  been eating, hydrating, and sleeping adequately. Patient is medication compliant, doesn't need reminders. Medication side effects were not observed or reported this shift. From what writer could assess, patient's skin is intact, free from injuries or open sores. Plan is to continue to monitor patient status q 15 mins, assess response to medications, and maintain the patients safety.    Vital signs are stable  Denied pain

## 2021-09-18 NOTE — PROGRESS NOTES
" 09/17/21 2100   Groups   Details    (Psychotherapy)   Number of patients attending the group:  4  Group Length:  1 Hours     Group Therapy Type: Psychotherapy     Summary of Group / Topics Discussed:Psychotherapy        The  Psychotherapy group goal is to promote insight to positive choice and change. Group processing is within a supportive and safe environment. Patients will process emotions using verbal group and expressive psychotherapy interventions including visual art/writing interventions.     Group interventions support patients by: fostering self awareness, creative self expression and mental health management     Modalities to reach these goals include: Mindfulness practices     Subjective -patient report of mood today- \" OK\"     Objective/ Intervention- Goal of group and Therapeutic modality utilized- Self Compassion,coloring, writing compassion mantra/ affirmation.     Group Response- engaged      Patient Response-Pt was pleasant, cooperative and engaged. He did a nice job challenging negative self talk and coming up with a self compassion affirmation.      Steven Jones, ATTILA, ATR-BC               "

## 2021-09-18 NOTE — PLAN OF CARE
Problem: Sleep Disturbance  Goal: Adequate Sleep/Rest  Outcome: No Change  Note: Pt continues to sleep through the night with no concerns noted.     NOC Shift Report    Pt in bed at beginning of shift, breathing quiet and unlabored. Pt slept through shift. Pt slept 6.5 hours.     No pt complaints or concerns at this time.     No PRNs given. Will continue to monitor.     Precautions: Suicide, Self-Injury

## 2021-09-18 NOTE — PROGRESS NOTES
Cam participated in OT clinic this morning, where he initiated a chosen project (sticker mosaic), followed through with plan, and asked for support with supplies as needed. Quiet and focused on task.      09/18/21 1400   Occupational Therapy   Type of Intervention structured groups   Response Initiates, socially acceptable   Hours 1

## 2021-09-19 PROCEDURE — 124N000002 HC R&B MH UMMC

## 2021-09-19 PROCEDURE — 250N000013 HC RX MED GY IP 250 OP 250 PS 637: Performed by: PSYCHIATRY & NEUROLOGY

## 2021-09-19 RX ADMIN — OLANZAPINE 5 MG: 5 TABLET, FILM COATED ORAL at 08:13

## 2021-09-19 RX ADMIN — HYDROXYZINE HYDROCHLORIDE 25 MG: 25 TABLET, FILM COATED ORAL at 09:29

## 2021-09-19 RX ADMIN — OLANZAPINE 10 MG: 10 TABLET, FILM COATED ORAL at 20:35

## 2021-09-19 RX ADMIN — RAMELTEON 8 MG: 8 TABLET ORAL at 20:35

## 2021-09-19 ASSESSMENT — ACTIVITIES OF DAILY LIVING (ADL)
DRESS: INDEPENDENT
DRESS: INDEPENDENT
LAUNDRY: WITH SUPERVISION
ORAL_HYGIENE: INDEPENDENT
ORAL_HYGIENE: INDEPENDENT
HYGIENE/GROOMING: INDEPENDENT
HYGIENE/GROOMING: INDEPENDENT
LAUNDRY: WITH SUPERVISION

## 2021-09-19 NOTE — PLAN OF CARE
Pt was visible in the milieu and was seen socializing and interacting with peers. Pt sat  in the lounge most of the shift and watched tv/movie with peers. Pt has a full range affect, calm mood, normal speech and no evidence of psychotic symptoms . Pt denies SI/SIB/HI, anxiety, depression and other mental health symptoms. Pt is eating well and hydrating well. Pt had a family visit which went well.     Pt denies pain and has no concerns.     Vital signs are stable. No PRNs given to pt this evening shift.    Pt is medication compliant and even took the ramelteon that he was refusing to take in the past. No observed or stated medication side effect.

## 2021-09-19 NOTE — PLAN OF CARE
Pt was isolative and withdrawn to his room for most of the shift. He came out for meals and to get medications but spent the rest of his time reading or resting in bed. He was polite and cooperative with staff. Pt's affect was blunted/flat and his mood was calm and he did brighten some upon approach. Pt denied all mental health symptoms including SI, SIB, AVH, and HI. Pt was medication compliant and endorsed facial tension earlier as a side effect. He asked for hydroxyzine 25 mg and received that as a PRN. Pt's VS were WNL and he denied pain.

## 2021-09-20 VITALS
DIASTOLIC BLOOD PRESSURE: 80 MMHG | HEIGHT: 71 IN | BODY MASS INDEX: 22.55 KG/M2 | SYSTOLIC BLOOD PRESSURE: 125 MMHG | RESPIRATION RATE: 16 BRPM | HEART RATE: 90 BPM | WEIGHT: 161.1 LBS | OXYGEN SATURATION: 96 % | TEMPERATURE: 98.1 F

## 2021-09-20 PROCEDURE — 250N000013 HC RX MED GY IP 250 OP 250 PS 637: Performed by: PSYCHIATRY & NEUROLOGY

## 2021-09-20 PROCEDURE — 99239 HOSP IP/OBS DSCHRG MGMT >30: CPT | Performed by: PSYCHIATRY & NEUROLOGY

## 2021-09-20 RX ORDER — RAMELTEON 8 MG/1
8 TABLET ORAL
Qty: 30 TABLET | Refills: 0 | Status: SHIPPED | OUTPATIENT
Start: 2021-09-20 | End: 2021-12-09

## 2021-09-20 RX ORDER — OLANZAPINE 5 MG/1
5 TABLET ORAL EVERY MORNING
Qty: 30 TABLET | Refills: 1 | Status: SHIPPED | OUTPATIENT
Start: 2021-09-21 | End: 2021-12-09

## 2021-09-20 RX ORDER — OLANZAPINE 10 MG/1
10 TABLET ORAL AT BEDTIME
Qty: 30 TABLET | Refills: 1 | Status: SHIPPED | OUTPATIENT
Start: 2021-09-20 | End: 2021-12-09

## 2021-09-20 RX ORDER — BENZTROPINE MESYLATE 1 MG/1
1 TABLET ORAL EVERY 4 HOURS PRN
Qty: 30 TABLET | Refills: 1 | Status: SHIPPED | OUTPATIENT
Start: 2021-09-20 | End: 2021-12-09

## 2021-09-20 RX ORDER — HYDROXYZINE HYDROCHLORIDE 25 MG/1
25 TABLET, FILM COATED ORAL EVERY 4 HOURS PRN
Qty: 60 TABLET | Refills: 1 | Status: SHIPPED | OUTPATIENT
Start: 2021-09-20 | End: 2021-12-09

## 2021-09-20 RX ORDER — GABAPENTIN 300 MG/1
300 CAPSULE ORAL 3 TIMES DAILY PRN
Qty: 90 CAPSULE | Refills: 1 | Status: SHIPPED | OUTPATIENT
Start: 2021-09-20 | End: 2021-12-09

## 2021-09-20 RX ADMIN — OLANZAPINE 5 MG: 5 TABLET, FILM COATED ORAL at 08:37

## 2021-09-20 RX ADMIN — HYDROXYZINE HYDROCHLORIDE 25 MG: 25 TABLET, FILM COATED ORAL at 09:38

## 2021-09-20 NOTE — PLAN OF CARE
48 Hours Nursing Assessment/Discharge note  Shift Summary: Pt alert and oriented x 3. Presents polite, cooperative and calm. Able to communicate needs. Speech is clear and coherent Affect is flat and mood is calm. Poor concentrations. Insight and judgement are improving. Hopeful for future. Visible in milieu but not social with other patient. Appetite adequate. Pt is medication compliance. Slept 7.0 hours last night.     Discharged today. All discharge medications and instructions were reviewed with pt. Copy of discharge instruction and unit address/phone number given to pt. Walking, escorted down stairs and transferred to car safely.     At the time of discharge, pt denied any SI, SIB, HI, hallucination, racing thought, suicidal or homicidal ideations. No evidence of psychosis or paranoid/delusional thoughts. Denies anxiety and depression. Pt denies access to guns. Denies feeling hopeless or helpless. Pt is determined to not be an immediate danger to himself, family and others.     Discharge medication: [employer] [facility] # [id number]  Discharge palace: Home with parents  Transportation: Father's car  Outcome: Progressing ok. Hopefully continues to take his medication.   New Medications Today: None  Prn Medication given/Reason/effectiveness: None  Pain: Denies   Sensitivity/side effect: Tolerating medications well. No side effect reported by pt or noted by this writer.  Valuable: Given to pt from security

## 2021-09-20 NOTE — PLAN OF CARE
Pt was visible in the milieu for almost the entire shift, he watched TV in the lounge and was social with some of his peers. Pt's affect was blunted/flat but brightened some upon approach. Pt's mood was calm. Pt denied all mental health concerns including SI, SIB, AVH, HI, anxiety and depression. He was medication compliant and reported no medication side effects. Pt had one visitor this evening, dad, and he said the visit went well. Pt reports feeling excited and hopeful for discharge tomorrow. VS were WNL and he denied pain.

## 2021-09-20 NOTE — DISCHARGE INSTRUCTIONS
Behavioral Discharge Planning and Instructions    Summary: You were admitted on 9/3/2021 due to Disorganized Thinking/Behaviors, Delusional Thoughts and Psychotic Symptomology. You were treated by Dr. Sammy Earl MD and discharged on 09/20/21 from [facility] to Home    Main Diagnosis:  1.  Bipolar disorder with psychosis versus substance-induced psychotic disorder  2.  Rule out developing schizophrenia spectrum disorder.  3.  Polysubstance abuse, primarily alcohol and marijuana.   4.  History of cocaine abuse, in sustained remission.    Health Care Follow-up:   Therapy Appointment, [facility] Please Fax AVS  Date/Time: 9/22/21 at 12:00pm, VIRTUAL APPOINTMENT ([facility])  Provider: Dat Varela  Phone: [phone]  The details for this appointment are located in your [facility] appointment list as well. Pleas log into [facility] to attend this meeting. If you have questions or concerns please call them.    Med-Management Appointment, [facility] Please Fax AVS  Date/Time: 9/29/21 at 2:30pm, IN-PERSON APPOINTMENT    Provider: Stephy Membreno  Address: [facility] [address], [place] [address]  Phone: [phone]  Fax: [phone]  The details for this appointment have also been forwarded to your [facility] MyChart. This is intended to provide you medication-management support while waiting for the [facility] program to coordinate psychiatry services with you after your intake. Please call [facility] if you have any questions or concerns about this appointment.     [facility]/First Episode Program Intake Appointment  Date/Time: 10/12/21 at 11:00am, IN-PERSON APPOINTMENT  Provider: Carolyn Cardona  Address: [facility] [address] [place] [address]  Phone: [phone] (Option 2)    [facility]  Address: [address], [address], [place] [address]  Phone: [phone] or  [phone]  Fax: [phone]  [facility] offer partial hospitalization or intensive outpatient programs specializing in: OCD/anxiety Disorders, Trauma Recovery (Partial Hospitalization only), Depression Treatment (FOCUS program)    [facility] [place]  Address: [address] [place] [address], [address], [place] [address]  Phone: [phone] or [phone]  Fax: [phone]  [facility] offer partial hospitalization programs specializing in: OCD/anxiety Disorders, MICD Recovery, Depression Treatment (FOCUS program)    Attend all scheduled appointments with your outpatient providers. Call at least 24 hours in advance if you need to reschedule an appointment to ensure continued access to your outpatient providers.     Major Treatments, Procedures and Findings:  You were provided with: a psychiatric assessment, assessed for medical stability, medication evaluation and/or management, group therapy and milieu management    Symptoms to Report: increased confusion, losing more sleep, mood getting worse, thoughts of suicide or increased urges/cravings to use    Early warning signs can include: increased depression or anxiety sleep disturbances increased thoughts or behaviors of suicide or self-harm  increased unusual thinking, such as paranoia or hearing voices or increased urges/cravings to use    Safety and Wellness:  Take all medicines as directed.  Make no changes unless your doctor suggests them.      Follow treatment recommendations.  Refrain from alcohol and non-prescribed drugs.  If there is a concern for safety, call 911.    Resources:   Crisis Intervention: 420.560.9684 or 571-418-9879 (TTY: 816.943.1233).  Call anytime for help.  National [place] on Mental Illness (www.[place].charla.org): 432.154.1179 or 180-897-4147.  Suicide Awareness Voices of Education (SAVE) (www.save.org): 791-243-TBOB (5933)  National Suicide Prevention Line (www.mentalhealthmn.org): 217-669-GXAA (2732)  Mental  "Health Consumer/Survivor Network of [place] (www.mhcsn.net): 457-345-5213 or 396-413-3041  Mental Health Association of [place] (www.mentalhealth.org): 478.682.7174 or 779-316-0321  Self- Management and Recovery Training., SMART-- Toll free: 385.287.3612  www.dloHaiti  [facility] Crisis (COPE) Response - Adult 039 737-8207  Text 4 Life: txt \"LIFE\" to 86480 for immediate support and crisis intervention    General Medication Instructions:   See your medication sheet(s) for instructions.   Take all medicines as directed.  Make no changes unless your doctor suggests them.   Go to all your doctor visits.  Be sure to have all your required lab tests. This way, your medicines can be refilled on time.  Do not use any drugs not prescribed by your doctor.  Avoid alcohol.    Advance Directives:   Scanned document on file with [facility]? No scanned doc  Is document scanned? Pt states no documents  Honoring Choices Your Rights Handout: Informed and given  Was more information offered? Pt declined    The Treatment team has appreciated the opportunity to work with you. If you have any questions or concerns about your recent admission, you can contact the unit which can receive your call 24 hours a day, 7 days a week. They will be able to get in touch with a Provider if needed. The unit number is [phone].  "

## 2021-09-20 NOTE — PLAN OF CARE
Assessment/Intervention/Current Symtoms and Care Coordination  ZUNILDA (writer) met with trx team, provided update, and reviewed pt's chart. CTC called family again today to confirm that pt was discharging, to coordinate pick-up and clarify community pharmacy location if needed. Pt's dad, Zheng, will be providing transportation and staff will call him to let him know when he can start coming down to  pt, due to living 30-45 minutes away. CTC followed-up with Zheng once discharge order was placed, leaving VM to confirm that pt was ready for  and that his meds would be at the community pharmacy. AVS was finalized.     Discharge Plan or Goal  Tentatively, pt will discharge back to his parents home with outpatient follow-up with therapy and psychiatry. Pt has been referred to the navigate program as well for an intake appointment.      Barriers to Discharge   Safe discharge plan, ongoing symptom severity (MDD with SI), and medication evaluation/assessment.     Referral Status  [facility] program intake assessment - see AVS  [facility] - Psychiatry, see AVS (9/15/21)  [facility]- therapy, see AVS (9/17/21)     Legal Status  VOLUNTARY

## 2021-09-21 ENCOUNTER — PATIENT OUTREACH (OUTPATIENT)
Dept: CARE COORDINATION | Facility: CLINIC | Age: 21
End: 2021-09-21

## 2021-09-21 DIAGNOSIS — Z71.89 OTHER SPECIFIED COUNSELING: ICD-10-CM
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
## 2021-09-21 NOTE — PROGRESS NOTES
Clinic Care Coordination Contact  [facility]/Voicemail       Clinical Data: Care Coordinator Outreach  Reason for referral: TCM outreach  Outreach attempted x 1.  Left message on patient's voicemail with call back information and requested return call.  Plan: Care Coordinator will try to reach patient again in 1-2 business days.       Emma Cintron  Community Health Worker  [facility]  Ph: [phone]

## 2021-09-22 NOTE — PROGRESS NOTES
Clinic Care Coordination Contact    Background: Care Coordination referral placed from [facility] discharge report for reason of patient meeting criteria for a TCM outreach call by [facility] [place] team.    Assessment: Upon chart review, CCRC Team member will cancel/close the referral for TCM outreach due to reason below:     Patient has a follow up appointment with an appropriate provider today for hospital discharge.     Emma Cintron  Community Health Worker  [facility]  Ph: [phone]

## 2021-10-04 ENCOUNTER — TELEPHONE (OUTPATIENT)
Dept: BEHAVIORAL HEALTH | Facility: CLINIC | Age: 21
End: 2021-10-04

## 2021-10-04 NOTE — TELEPHONE ENCOUNTER
"This writer returned voicemail left by pt's mother, Rolanda, inquiring regarding pt's attendance and recent concerns re: overall fx. Review of chart reflects authorization to communicate with parents and share information which \"...may include scheduling, medical and billing info.\"    This writer informed pt's mother that pt had missed his appt with TC, and that pt coordinators had also attempted to be in contact post-discharge from . She reported that he did not relate the same information when they had asked him the previous week, and that she was unsure if he had attended scheduled appt w/ [facility], as noted in AVS from recent hospitalization. This writer encouraged pt's mother to verify his transition to [facility] as he awaits intake with [facility], and that he could schedule transitional OP counseling sessions with this writer if services with [facility] had not yet begun.    Pt's mother to verify transition of services and/or contact this writer to schedule TC appt as needed.    Cash Varela, [facility]  10.04.2021    "

## 2021-10-09 ENCOUNTER — HEALTH MAINTENANCE LETTER (OUTPATIENT)
Age: 21
End: 2021-10-09

## 2021-10-10 ENCOUNTER — NURSE TRIAGE (OUTPATIENT)
Dept: NURSING | Facility: CLINIC | Age: 21
End: 2021-10-10

## 2021-10-10 NOTE — TELEPHONE ENCOUNTER
Parent Calling with concerns about patient isolating in room and not eating or drinking. Parent is concerned that he may be falling into psychosis again. Unable to triage patient. Parents have been able to talk briefly to him and at this point he has agreed to take more with them tomorrow. There is an intake meeting scheduled for the [facility] Program for Tuesday.    RN/Writer advised parent to call 911 if patient becomes suicidal or combative in any way.    Leah Matamoros RN  10/10/21 5:37 PM  [facility] Nurse Advisor       Reason for Disposition    [1] Caller is not with the adult (patient) AND [2] probable NON-URGENT symptoms    Protocols used: INFORMATION ONLY CALL - NO TRIAGE-A-

## 2021-10-12 ENCOUNTER — OFFICE VISIT (OUTPATIENT)
Dept: PSYCHIATRY | Facility: CLINIC | Age: 21
End: 2021-10-12
Payer: COMMERCIAL

## 2021-10-12 ENCOUNTER — TELEPHONE (OUTPATIENT)
Dept: PSYCHIATRY | Facility: CLINIC | Age: 21
End: 2021-10-12
Payer: COMMERCIAL

## 2021-10-12 DIAGNOSIS — F29 PSYCHOSIS, UNSPECIFIED PSYCHOSIS TYPE (H): Primary | ICD-10-CM
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
## 2021-10-12 NOTE — TELEPHONE ENCOUNTER
What is the concern that needs to be addressed by a nurse? Pt just had an intake appt today, mother has questions about what follow up is and what information she can get. Please call back.     May a detailed message be left on [employer]? Yes     Date of last office visit: 10/12/21    Message routed to: darío gan

## 2021-10-14 ENCOUNTER — TELEPHONE (OUTPATIENT)
Dept: BEHAVIORAL HEALTH | Facility: CLINIC | Age: 21
End: 2021-10-14

## 2021-10-14 ENCOUNTER — TELEPHONE (OUTPATIENT)
Dept: FAMILY MEDICINE | Facility: CLINIC | Age: 21
End: 2021-10-14

## 2021-10-14 NOTE — TELEPHONE ENCOUNTER
Anthony with [facility] is caller. He has questions about patient, informed caller that patient last at this clinic in July 2020 and no medication management from providers at this clinic at this time. All questions answered.   Sherri Dhaliwal MS RN-BC  10/14/21  11:06 AM

## 2021-10-14 NOTE — TELEPHONE ENCOUNTER
Who is calling? Anthony from [facility]  Reason for Call:Unknown requested to speak with a nurse    Lesley
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
## 2021-10-15 ASSESSMENT — ANXIETY QUESTIONNAIRES
1. FEELING NERVOUS, ANXIOUS, OR ON EDGE: NOT AT ALL
2. NOT BEING ABLE TO STOP OR CONTROL WORRYING: NOT AT ALL
GAD7 TOTAL SCORE: 2
7. FEELING AFRAID AS IF SOMETHING AWFUL MIGHT HAPPEN: NOT AT ALL
5. BEING SO RESTLESS THAT IT IS HARD TO SIT STILL: MORE THAN HALF THE DAYS
6. BECOMING EASILY ANNOYED OR IRRITABLE: NOT AT ALL
3. WORRYING TOO MUCH ABOUT DIFFERENT THINGS: NOT AT ALL

## 2021-10-15 ASSESSMENT — PATIENT HEALTH QUESTIONNAIRE - PHQ9
SUM OF ALL RESPONSES TO PHQ QUESTIONS 1-9: 0
5. POOR APPETITE OR OVEREATING: NOT AT ALL

## 2021-10-16 ASSESSMENT — ANXIETY QUESTIONNAIRES: GAD7 TOTAL SCORE: 2

## 2021-12-07 NOTE — PROGRESS NOTES
ASSESSMENT AND PLAN:     (F29) Psychosis, unspecified psychosis type (H)  (primary encounter diagnosis)  (F25.0) Schizoaffective disorder, bipolar type (H)  (Z87.898) History of substance use disorder  Comment: Unfortunately, Cam is in opposition to his mental health care team regarding his need for medication, which makes his situation quite challenging.  At the moment, he is complying with treatment as ordered by the court, but I think it is going to be very difficult for him to build trust in his care team.    He has appropriate and timely follow up scheduling with case management, counseling, and psychiatry, for ongoing management.     I encouraged him to express his preference to not be on medication to his family now that their relationship has improved, so that they know what/how he is feeling.      (Z23) Need for prophylactic vaccination and inoculation against influenza  Comment: Also encouraged scheduling COVID booster through pharmacy.   Plan: INFLUENZA VACCINE IM > 6 MONTHS VALENT IIV4         (AFLURIA/FLUZONE)         (R20.0) Left arm numbness  Comment: Improving. Presumably due to peripheral nerve injury during suicide attempt or subsequent repair. Expect recovery but advised Cam that it could take several months.   Plan:     (D62) Anemia due to blood loss, acute  Comment: Plan to recheck hemoglobin with next labs.  Plan:     Review of prior external note(s) from - [facility]where information from [facility] reviewed  I spent a total of 43 minutes on the day of the visit.   Time spent doing chart review, history and exam, documentation and further activities per the note    Darius Tamayo MD   12/07/21, 2:59 PM      SUBJECTIVE:   Junior Fernández is a 21 year old male who presents to clinic today for hospital discharge follow up.    Hospital/Nursing Home/IP Rehab Facility: [facility]  Date of Admission: 10/14/21  Date of Discharge: 11/29/21  Reason(s) for Admission: suicide  "attempt    Summary of hospitalization:  Hospital discharge summary not available but reviewed discharge instructions and hospital progress notes available in CareEverywhere.     # Schizoaffective disorder, bipolar type  # AUD, severe  # Cannabis use disorder, mild  # Cocaine use disorder, mild, in sustained remission  # History of amphetamine abuse  # Tobacco use disorder  # Acute psychosis  - hospitalized at [facility] 9/3/21 - 9/20/21 for a psychotic episode  - prior to that was in an inpatient substance use disorder treatment facility in [place], followed by inpatient psychiatric admission in [facility].     - Found by his father after suicide attempt 10/14/21 - cutting left arm  - Was taken to the OR, vascular surgery and trauma repaired arterial injuries  - Psychiatry recommended inpatient psychiatric admission.   - had paranoia, auditory hallucinations (including some command hallucinations, lacking incite into hallucinations), SI  - he was committed with court permission to use antipsychotic treatment without his consent  - was started on olanzapine and titrated up to 10mg twice a day, discharged on 20mg at bedtime    - Cam does agree that he was experiencing paranoid thoughts, denies that he was hearing voices  - thinks this all started with a psychotic episode  - was taking drugs over the summer, was stressed about starting back at school  - thinks that drug withdrawal and anxiety over returning to school triggered psychotic episode that lead to suicide attempt  - doesn't consider his suicide attempt a \"true suicide attempt\" and denies SI now  - has been sober since early August, beforehand was using nicotine, alcohol, and cannabis. Denies stimulant, amphematine, cocaine, opioid, hallucination, use.   - has done 12 steps in the past but feels that his social anxiety limits his benefit from them. Not doing any meetings right now    - really doesn't like taking the antipsychotic  - make it hard to think, makes it " "hard to have conversations because \"everything is slower\", gaining weight  - would prefer to not be taking any medications or working with therapist if he wasn't court-ordered to do so  - he does believe that he should not be using substances right now and \"possibly\" forever due to the withdrawal effects he experiences when he stops    - reports things have gotten better with relationship with family at home  - he has not talked about his opposition to medications with his family yet but is open to doing so     # Left Hand Numbness  - Had left hand numbness. Plastic surgery evaluated and recommended reassessing as swelling in arm improved.   - denies hand numbness  - still having some numbness along anterior left forearm    Post Discharge Medication Reconciliation: discharge medications reconciled, continue medications without change.  Problems taking medications regularly:  None  Medication changes since discharge: None    Diagnostic Tests/Treatments reviewed.      Other Healthcare Providers Involved in Patient s Care:  - 12/13/21 Therapist Steven Pina with [facility]  - 12/15/21 with psychiatry Lupe Walsh with [facility]  - 1/20/22 Navigate Program DA Carolyn Flores with [facility]    Care Coordination  - Linda Sorensen, [employer], ph: [phone]; fax: [phone].  - Brianna James,  [facility], ph: [phone] ; fax: [phone].  *Your assigned  will be Gray Flores, Brianna is her supervisor. Gray and Brianna will be contact with you following discharge from the hospital.   - has appointment with Gray tomorrow      Update since discharge: stable.    Plan of care communicated with patient    Review of Systems:   Constitutional - no fevers, chills, night sweats, unintentional weight loss/gain   Eyes - no vision concerns   Ears/Nose/Throat - no hearing concerns, no dysphagia/odynophagia, congestion   Cardiovascular - no chest pain, " "palpitations   Pulmonary - no shortness of breath, wheezing, coughing   GI - no abdominal pain, constipation, diarrhea, nausea, vomiting    - no dysuria, polyuria, hematuria   Musculoskeletal - no joint or muscle pain  Integument - no rash   Neuro - as above   Heme - no easy bruising/bleeding   Endocrine - no polyuria, weight loss/gain, dry skin, excessive sweating, hair loss   Psychiatric - as above   Allergic/Immunologic - no history of anaphylaxis, no history of recurrent infections    Patient Active Problem List   Diagnosis     Generalized anxiety disorder     History of substance use disorder     Psychosis, unspecified psychosis type (H)     Past Surgical History:   Procedure Laterality Date     CIRCUMCISION,OTHER,<28 D/O       FRACTURE SURGERY  2005    Left [place]     HC TOOTH EXTRACTION W/FORCEP       Family History   Problem Relation Age of Onset     Anxiety Disorder Maternal Grandmother      Depression Maternal Grandmother      Hypertension Maternal Grandmother      Cerebrovascular Disease Maternal Grandmother      Other - See Comments Mother         on Celexa     Hyperthyroidism Father         Rx'd with methimazole. Father's side with mild allergy/asthma issues     Hyperlipidemia Father      Anxiety Disorder Brother         Stuttering and tics     Lymphoma Maternal Grandfather          from Lymphoma     Other - See Comments Paternal Grandmother         vaso-vagal syncope     Other - See Comments Paternal Grandfather          from kidney/liver CA; CAD and had pacemaker     Heart Disease Paternal Grandfather 65     Arrhythmia No family hx of      Social History     Tobacco Use     Smoking status: Never Smoker     Smokeless tobacco: Never Used   Substance Use Topics     Alcohol use: Not Currently     Comment: 3-4 days ago     Drug use: Not Currently     Types: Marijuana, \"Crack\" cocaine     Comment: Used marijuanna 3 days ago, cocaine in [place], vyvanse 3 days ago      Social History " "    Social History Narrative    Completed first year at [employer] Fall 2020-Spring 2021    Taking a break [id number]    Living with parents and brother       Current Outpatient Medications   Medication     OLANZapine (ZYPREXA) 20 MG tablet     Vitamin D, Cholecalciferol, 25 MCG (1000 UT) TABS     No current facility-administered medications for this visit.     I have reviewed the patient's past medical, surgical, family, and social history.     OBJECTIVE:   /87 (BP Location: Right arm, Patient Position: Chair, Cuff Size: Adult Regular)   Pulse 106   Temp 97.9  F (36.6  C) (Temporal)   Resp 16   Ht 1.87 m (6' 1.62\")   Wt 82.1 kg (181 lb)   SpO2 95%   BMI 23.48 kg/m      Constitutional: well-appearing, appears stated age  Eyes: conjunctivae without erythema, sclera anicteric. Pupils equal, round, and reactive to light.   Skin: well healed scars on anterior left forearm  Psych: affect is blunted, speech is fluent and non-pressured, does not appear to be responding to internal stimuli    Neuro: absent fine touch with cotton swab along a small section (approx 10x5cm) of skin anterior forearm from most distal scar down.  strength intact and symmetric.   "

## 2021-12-09 ENCOUNTER — OFFICE VISIT (OUTPATIENT)
Dept: FAMILY MEDICINE | Facility: CLINIC | Age: 21
End: 2021-12-09
Payer: COMMERCIAL

## 2021-12-09 VITALS
DIASTOLIC BLOOD PRESSURE: 87 MMHG | TEMPERATURE: 97.9 F | WEIGHT: 181 LBS | HEIGHT: 74 IN | OXYGEN SATURATION: 95 % | HEART RATE: 106 BPM | BODY MASS INDEX: 23.23 KG/M2 | RESPIRATION RATE: 16 BRPM | SYSTOLIC BLOOD PRESSURE: 127 MMHG

## 2021-12-09 DIAGNOSIS — Z23 NEED FOR PROPHYLACTIC VACCINATION AND INOCULATION AGAINST INFLUENZA: ICD-10-CM

## 2021-12-09 DIAGNOSIS — Z87.898 HISTORY OF SUBSTANCE USE DISORDER: Chronic | ICD-10-CM

## 2021-12-09 DIAGNOSIS — F25.0 SCHIZOAFFECTIVE DISORDER, BIPOLAR TYPE (H): Chronic | ICD-10-CM

## 2021-12-09 DIAGNOSIS — F29 PSYCHOSIS, UNSPECIFIED PSYCHOSIS TYPE (H): Primary | ICD-10-CM

## 2021-12-09 DIAGNOSIS — R20.0 LEFT ARM NUMBNESS: ICD-10-CM

## 2021-12-09 DIAGNOSIS — D62 ANEMIA DUE TO BLOOD LOSS, ACUTE: ICD-10-CM

## 2021-12-09 PROBLEM — F17.200 TOBACCO USE DISORDER: Status: ACTIVE | Noted: 2021-11-05

## 2021-12-09 PROBLEM — F41.1 GENERALIZED ANXIETY DISORDER: Chronic | Status: RESOLVED | Noted: 2017-07-27 | Resolved: 2021-12-09

## 2021-12-09 RX ORDER — OLANZAPINE 20 MG/1
20 TABLET ORAL AT BEDTIME
Status: ON HOLD | COMMUNITY
End: 2022-03-24

## 2021-12-09 RX ORDER — MULTIVIT-MIN/IRON/FOLIC ACID/K 18-600-40
CAPSULE ORAL
COMMUNITY
End: 2021-12-09

## 2021-12-09 ASSESSMENT — PAIN SCALES - GENERAL: PAINLEVEL: NO PAIN (0)

## 2021-12-09 ASSESSMENT — MIFFLIN-ST. JEOR: SCORE: 1884.76

## 2021-12-09 NOTE — NURSING NOTE
"21 year old  Chief Complaint   Patient presents with     Hospital F/U     due to Schizoaffective episode.       Blood pressure 127/87, pulse 106, temperature 97.9  F (36.6  C), temperature source Temporal, resp. rate 16, height 1.87 m (6' 1.62\"), weight 82.1 kg (181 lb), SpO2 95 %. Body mass index is 23.48 kg/m .  Patient Active Problem List   Diagnosis     Generalized anxiety disorder     History of substance use disorder     Psychosis, unspecified psychosis type (H)       Wt Readings from Last 2 Encounters:   12/09/21 82.1 kg (181 lb)   09/16/21 73.1 kg (161 lb 1.6 oz)     BP Readings from Last 3 Encounters:   12/09/21 127/87   09/20/21 125/80   07/17/20 121/73         Current Outpatient Medications   Medication     OLANZapine (ZYPREXA) 20 MG tablet     benztropine (COGENTIN) 1 MG tablet     gabapentin (NEURONTIN) 300 MG capsule     hydrOXYzine (ATARAX) 25 MG tablet     OLANZapine (ZYPREXA) 10 MG tablet     OLANZapine (ZYPREXA) 5 MG tablet     ramelteon (ROZEREM) 8 MG tablet     No current facility-administered medications for this visit.       Social History     Tobacco Use     Smoking status: Never Smoker     Smokeless tobacco: Never Used   Substance Use Topics     Alcohol use: Not Currently     Comment: 3-4 days ago     Drug use: Not Currently     Types: Marijuana, \"Crack\" cocaine     Comment: Used marijuanna 3 days ago, cocaine in [place], vyvanse 3 days ago        Health Maintenance Due   Topic Date Due     ADVANCE CARE PLANNING  Never done     HIV SCREENING  Never done     HEPATITIS C SCREENING  Never done     PREVENTIVE CARE VISIT  11/20/2018     INFLUENZA VACCINE (1) 09/01/2021     COVID-19 Vaccine (3 - Booster for Moderna series) 12/12/2021       No results found for: RAHEEM Heath, ESTRE, CMA  December 9, 2021 11:22 AM  "

## 2022-01-20 ENCOUNTER — OFFICE VISIT (OUTPATIENT)
Dept: PSYCHIATRY | Facility: CLINIC | Age: 22
End: 2022-01-20
Payer: COMMERCIAL

## 2022-01-20 DIAGNOSIS — F29 PSYCHOSIS, UNSPECIFIED PSYCHOSIS TYPE (H): Primary | ICD-10-CM

## 2022-01-20 NOTE — PROGRESS NOTES
"[facility] NAVIGATE Diagnostic Assessment  A part of the [facility] First Episode of Psychosis Treatment Program    Junior Fernández MRN# [id number]   Age: 21 year old YOB: 2000      Date of Evaluation: 1/20/22  Start Time: 10:10am; End Time: 11:20am         Contributors to the Assessment     Chart Reviewed.   Interview completed with Junior Fernández.  Cassandra Palmer, Psychometrist, was present to administer the MINI interview.    Releases of information signed by Junior for , Gray Flores at [facility] (Phone: [phone])  Collateral information obtained from chart review.         Chief Complaint      \"I am required to be here as part of a commitment;\" has a country case with [facility]         History of Present Illness      Junior Fernández is a 21 year old male who presents for evaluation for [facility] NAVIGATE services to treat first episode psychosis.    Per medical records:  FEP Screening completed 10/12/21; please see chart review for details.       Per patient's report:  Patient reports is woven throughout the following assessment.     Per family's report:  No family present.          Psychiatric Review of Systems (Completed M.I.N.I. Version 7.0.2: Yes)     A. DEPRESSION  Past 2 Weeks:  none    Past Episode:  low mood nearly every day and anhedonia most of the time     Patient reports depression onset as early as 7th grade when he had a hard time socializing and fitting in with peers. He again experienced low mood in high school \"sporadically;\" last experienced low mood for two+ weeks at age 18.     B. SUICIDALITY: Current: Yes, risk High  -denies current SI, denies intent and plan  -denies current SIB/Self Injurious Behavior  -denies current HI    Reports suicide attempt in October 2021. Has thought of a plan to end his life with a gun. Denies access to firearms. Discussed safety plan to include reaching out to family for support and using crisis hotlines.     C. " LOVE/HYPOMANIA  Current Episode:  none    Past Episode:  elevated mood/energy, persistent irritability, grandiosity, pressured speech, distractability , increased drive and risk taking     Patient reports during drug use he may experience symptoms during and for two months after. Outside of substance use, the patient reports only experiencing the above symptoms for hours at a time.     D. PANIC:  none    E. AGORAPHOBIA:  none    F. SOCIAL ANXIETY:  none    G. OBSESSIVE-COMPULSIVE:  none    H. TRAUMA:  none    I. ALCOHOL & J. NON-ALCOHOL:  See below    K. PSYCHOSIS:     Present Symptoms:  none  Past Symptoms:  paranoia, thought broadcasting, ideas of reference and odd beliefs per family/friends     Patient reports three episode of psychosis:  1. 14 yo using stimulants; psychosis during use only  2. 19 yo using stimulants; psychosis during use and 2-3 months following use  3. 22 yo had a lot of life sterssors, alcohol, cannabis use; psychosis symptoms August-November 2021    L-M. EATING DISORDER: none    N. GENERALIZED ANXIETY:  none    O. RULE OUT MEDICAL, ORGANIC OR DRUG CAUSES FOR ALL DISORDERS  During any current disorder or past mood episode, patient reports:  A. Substance use or withdrawal: Yes and No  B. Medical illness: No    P. ANTISOCIAL PERSONALITY:  before age 14 - skipped school, ran away from home overnight, or stayed out against parents rules, deliberately damaged property or started fires and deliberately hurt people or animals and since age 14 -  impulsivity, physically fought or assaulted others, exposed others or yourself to danger without caring and lacked guilt          Past Psychiatric History     Past diagnoses:   Per [facility] October 2021, schizoaffective disorder  Per patient, depression, substance induced bipolar, schizophrenia, and unspecified psychosis    DISCHARGE DIAGNOSES per note on 9/20/21:    1.  Schizophrenia spectrum disorder is likely.  Rule out bipolar affective  "disorder.  2.  Acute chacorta, severe, with psychotic features.    3.  Polysubstance abuse, primarily alcohol and marijuana.    4.  History of cocaine abuse, in sustained remission.    Past medication trials: Abilify possibly    Hospitalizations: 2-3  Admitted in [place] for several weeks in August, was assessed back in [place] and admitted at [facility] from 9/3/21-9/20/21. [facility] hospitalization in October 2021.     Commitment: Yes, Current Nguyen order: Yes    ECT trials: No    Suicide attempts: Yes - October 2021    Self-injurious behavior: No    Violent behavior: No    Outpatient Programs & Services [Psychotherapy, DBT, Day Treatment, Eating Disorder Tx etc]:   [facility] for med mgmt and psychotherapy         Substance Use History: (review CAGE-AID)     Caffeine: 2 cups/day of coffee, sometimes    Tobacco: none; past use in the form of chewing, cigarretes and vaping; last use was August and was daily   Age of first tobacco use: 15   Amount of tobacco used per week: none   Frequency of use over the last 6 months: none    ETOH: none currently, last drink was in August at a frequency of weekly or daily when at its most frequent     Age of first alcohol use: 13    Cannabis: None currently, last use in August; used daily in high school; has had craving but going \"fine. Not that hard to stop\"   Age of first cannabis use: 14    Other Drugs: adderral and cocaine in high school and just after - last use Feb 2019   Age of first other drug use: 15   Number of days patient used other drugs over the last 30 days: none    CD treatment hx: Yes - March 2019 at [facility] with IP for one month and 8 mo OP; \"I viewed it as overkill. I have been able to stop using when I needed to. I learned some helpful things\"    Withdrawal hx: No    Current sober supports include family.         Past Medical History:      Patient Active Problem List    Diagnosis Date Noted     Left arm numbness 12/09/2021     Priority: Medium     - left " forearm, related to arm lacerations and repair 10/2021       Schizoaffective disorder, bipolar type (H) 11/05/2021     Priority: Medium     Tobacco use disorder 11/05/2021     Priority: Medium     Psychosis, unspecified psychosis type (H) 09/03/2021     Priority: Medium     History of substance use disorder 10/21/2019     Priority: Medium     - most recent substance use August 2021 (alcohol, nicotine, THC)  - has previously also used cocaine         Primary Care Physician: Darius Tamayo  Last PCP Appointment Date: Unknown    Medical problems: No  Surgical history: Yes - October 2021 cut on arm  - vein required surgical repair    History of seizures? No  History of head trauma/loss of consciousness? Yes - concussion at age 4 (severe) and another in middle school (mild); not aware of any brain imaging.           Allergies:      Allergies   Allergen Reactions     Seasonal Allergies      Seroquel [Quetiapine]      Fainting and slowed breathing             Medications:     Current Outpatient Medications   Medication Sig Dispense Refill     cholecalciferol 50 MCG (2000 UT) tablet Take 50 mcg by mouth daily       OLANZapine (ZYPREXA) 20 MG tablet Take 20 mg by mouth At Bedtime               Social History:      Living situation: Junior lives with mom and dad, and brother (20 yo), in a Private Residence.      Relationships: Significant relationships include:  Mom  Dad  20 yo brother  Aunts and Uncles    Get along pretty well, with family    Not currently connected with friends; reports he is taking a break from school and comfortable with taking a break from social life as well.     Education: Junior s highest level of education is grade school and some college. [employer]; took off this Fall and Spring off. Does not think he will be going back to that school. Was living in [place] for the previous academic year.  He does want to go to school. 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 He does want to work. Content with where things are at.    Finances: Junior is financial supported by Family Support.    Spiritual considerations: No    Cultural influences: Junior identifies is race as White. Junior reports  No  to cultural considerations to take into account when providing treatment.     Sexuality:  Junior identifies as male with unknown sexual orientation and preferred pronouns he, him, his.    Strengths & Coping Strategies:    Strengths: book smart, kind, empathetic  Coping: run, lift weights    Legal Hx: Yes - charged with 5th degree domestic assault in 2019 - plead not guilty so had a stay of ajudication   - During second episode of psychosis where he broke things in parents home    Abuse Hx: No     Hx: No           Developmental History:     Unremarkable. No hx 504 or IEP.         Family History:     Family history of: mom (anxiety and depression), no known hx of psychosis; denies history of completed suicides.         Most Recent Labs & Vitals (per EPIC):     Recent Labs   Lab Test 09/04/21  0733 11/20/17  1432   CHOL 167 167   TRIG 107 173*   LDL 80 69   HDL 66 63     Recent Labs   Lab Test 09/04/21  0733 08/16/19  0010 11/20/17  1428   * 91 80.0     Recent Labs   Lab Test 09/04/21  0733 07/17/20  1354 08/16/19  0010 11/20/17  1432   WBC 6.9  --  9.0 5.8   ANEU  --   --  6.6  --    HGB 16.3 15.2 14.0 15.6     --  185 266       There were no vitals taken for this visit.    RECENT BRAIN IMAGING:  Unknown          Screening/Assessment Measures     PHQ9 was completed today, 1/20/22  Scored at 6    Over the last 2 weeks, how often have you been bothered by any the following problems?    1. Little interest or pleasure in doing things: 0 - Not at all  2. Feeling down, depressed, or hopeless: 0 - Not at all  3. Trouble falling or staying asleep, or sleeping too  much: 3 - Nearly every day  4. Feeling tired or having little energy: 1 - Several days  5. Poor appetite or overeatin - More than half the days  6. Feeling bad about yourself - or that you are a failure or have let yourself or your family down: 0 - Not at all  7. Trouble concentrating on things, such as reading the newspaper or watching television: 0 - Not at all  8. Moving or speaking so slowly that other people could have noticed. Or the opposite-being so fidgety or restless that you have been moving around a lot more than usual: 0 - Not at all  9. Thoughts that you would be better off dead, or of hurting yourself in some way: 0 - Not at all    If you checked off any problems, how difficulty have these problems made it for you to do your work, take care of things at home, or get along with other people? Not difficult at all     GAD7 was completed today, 22  Scored at 3    Over the last 2 weeks, how often have you been bothered by the following problems?    1. Feeling nervous, anxious or on edge: 0 - Not at all  2. Not being able to stop or control worryin - Several days  3. Worrying too much about different things: 1 - Several days  4. Trouble relaxin - Not at all  5. Being so restless that it is hard to sit still: 0 - Not at all  6. Becoming easily annoyed or irritable: 0 - Not at all  7. Feeling afraid as if something awful might happen: 1 - Several days     CAGE-AID was completed today, 22  1. In the last three months, have you felt you should cut down or stop drinking or using drugs? yes  2. In the last three months, has anyone annoyed you or gotten on your nerves by telling you to cut down or stop drinking or using drugs? yes  3. In the last three months, have you felt guilty or bad about how much you drink or use drugs? yes  4. In the last three months, have you been waking up wanting to have an alcoholic drink or use drugs? no         Mental Status Exam     Alertness: alert  and  oriented  Appearance: adequately groomed  Behavior/Demeanor: cooperative and pleasant, with good  eye contact   Speech: regular rate and rhythm  Language: intact. Preferred language identified as English.  Psychomotor: normal or unremarkable  Mood: description consistent with euthymia  Affect: full range; was congruent to mood; was congruent to content  Thought Process/Associations: unremarkable  Thought Content:  Reports none;  Denies suicidal and violent ideation  Perception:  Reports none;  Denies auditory hallucinations and visual hallucinations  Insight: adequate   Judgment: adequate for safety  Cognition: does  appear grossly intact; formal cognitive testing was not done         Psychiatric Diagnoses     Unspecified schizophrenia spectrum and other psychotic disorder (298.9, F29); hx substance induced psychosis  Alcohol use disorder, moderate  Cannabis use disorder, moderate         Assessment     Jnuior Fernández is a 21 year old single (never )  male with a psychiatry history of psychosis and substance use who presents for evaluation to determine eligibility for enrollment in [facility] NAVIGATE services.     Today, Junior presents as a Adequate historian with Adequate insight.  He reports first onset of psychotic symptoms at age 15 in the context of substance use; reports this is his third episode of psychosis. Areas of functional impairment include family / partner relationships , social relationships, physical health, occupational performance and emotional wellbeing . Substance use seems to be a past concern. He does admit the aforementioned symptoms are worse with substance use.      Junior s reported symptoms could be consistent with an episode of major depression with psychotic features, bipolar disorder with psychotic features, schizoaffective disorder or a manifestation of positive/negative symptoms in schizophrenia. A substance induced component is also possible given history of  use.     Safety plan was discussed and included use of crisis hotlines, visiting the nearest ED or calling 9-1-1 with safety concerns for self or others.     Junior is currently participating in outpatient med mgmt and psychotherapy services at [facility]. He does seem appropriate for NAVIGATE due to diagnosis and limited use of antipsychotic medications. Writer has provided verbal and/or written information about [facility] NAVIGATE in the context of treating schizophrenia spectrum disorders. Patient reports he would rather stay with his current mental health providers. Discussed that Navigate is voluntary, patients are not committed to Navigate specifically. Patient opts to forgo services with Navigate at this time.     Junior agrees to treatment with the capacity to do so. Agrees to call clinic for any problems. The patient understands to call 911 or come to the nearest ED if life threatening or urgent symptoms present.         Plan     No follow-up planned. Patient reports he would rather stay with his current mental health providers. Discussed that Navigate is voluntary, patients are not committed to Navigate specifically. Patient opts to forgo services with Navigate at this time.     SEA Nina

## 2022-03-07 ENCOUNTER — TELEPHONE (OUTPATIENT)
Dept: PSYCHIATRY | Facility: CLINIC | Age: 22
End: 2022-03-07
Payer: COMMERCIAL

## 2022-03-07 NOTE — TELEPHONE ENCOUNTER
Writer wrote a letter addressed to the patient with treatment recommendations as a result of our 1/20/22 diagnostic evaluation.

## 2022-03-07 NOTE — TELEPHONE ENCOUNTER
"Patient requested to speak with Carolyn Flores. Said \"He had done Eval's for Navigate program few months ago but it was determined the services we offer would be redundant to services he was already receiving.\" Patient's  Theresa Flores is wanting that in writing. He did not have fax or email for it to be sent but had direct number to contact Theresa at [phone]  "

## 2022-03-07 NOTE — TELEPHONE ENCOUNTER
Writer returned call to patient.  Let him know that SEA Leon put a letter in his chart.  He is able to access it and will give it to his case management team.

## 2022-03-23 ENCOUNTER — HOSPITAL ENCOUNTER (EMERGENCY)
Facility: CLINIC | Age: 22
Discharge: NURSING, REHAB OR LONG-TERM CARE | End: 2022-03-24
Attending: FAMILY MEDICINE | Admitting: FAMILY MEDICINE
Payer: COMMERCIAL

## 2022-03-23 ENCOUNTER — TELEPHONE (OUTPATIENT)
Dept: BEHAVIORAL HEALTH | Facility: CLINIC | Age: 22
End: 2022-03-23
Payer: COMMERCIAL

## 2022-03-23 DIAGNOSIS — Z11.52 ENCOUNTER FOR SCREENING LABORATORY TESTING FOR SEVERE ACUTE RESPIRATORY SYNDROME CORONAVIRUS 2 (SARS-COV-2): ICD-10-CM

## 2022-03-23 DIAGNOSIS — R46.89 AGGRESSIVE BEHAVIOR OF ADULT: ICD-10-CM

## 2022-03-23 DIAGNOSIS — F25.0 SCHIZOAFFECTIVE DISORDER, BIPOLAR TYPE (H): ICD-10-CM

## 2022-03-23 PROCEDURE — 90791 PSYCH DIAGNOSTIC EVALUATION: CPT

## 2022-03-23 PROCEDURE — C9803 HOPD COVID-19 SPEC COLLECT: HCPCS

## 2022-03-23 PROCEDURE — 87635 SARS-COV-2 COVID-19 AMP PRB: CPT | Performed by: FAMILY MEDICINE

## 2022-03-23 PROCEDURE — 80307 DRUG TEST PRSMV CHEM ANLYZR: CPT | Performed by: FAMILY MEDICINE

## 2022-03-23 PROCEDURE — 99285 EMERGENCY DEPT VISIT HI MDM: CPT | Mod: 25

## 2022-03-23 PROCEDURE — 250N000013 HC RX MED GY IP 250 OP 250 PS 637: Performed by: FAMILY MEDICINE

## 2022-03-23 PROCEDURE — 99285 EMERGENCY DEPT VISIT HI MDM: CPT | Performed by: FAMILY MEDICINE

## 2022-03-23 RX ORDER — OLANZAPINE 10 MG/1
10 TABLET, ORALLY DISINTEGRATING ORAL 2 TIMES DAILY PRN
Status: DISCONTINUED | OUTPATIENT
Start: 2022-03-23 | End: 2022-03-24 | Stop reason: HOSPADM

## 2022-03-23 RX ADMIN — LURASIDONE HYDROCHLORIDE 60 MG: 40 TABLET, FILM COATED ORAL at 22:41

## 2022-03-24 ENCOUNTER — TELEPHONE (OUTPATIENT)
Dept: BEHAVIORAL HEALTH | Facility: CLINIC | Age: 22
End: 2022-03-24

## 2022-03-24 ENCOUNTER — HOSPITAL ENCOUNTER (INPATIENT)
Facility: CLINIC | Age: 22
LOS: 18 days | Discharge: HOME | DRG: 885 | End: 2022-04-11
Attending: PSYCHIATRY & NEUROLOGY | Admitting: PSYCHIATRY & NEUROLOGY
Payer: COMMERCIAL

## 2022-03-24 VITALS
SYSTOLIC BLOOD PRESSURE: 130 MMHG | RESPIRATION RATE: 16 BRPM | OXYGEN SATURATION: 99 % | TEMPERATURE: 96.7 F | HEART RATE: 90 BPM | DIASTOLIC BLOOD PRESSURE: 80 MMHG

## 2022-03-24 DIAGNOSIS — F41.9 ANXIETY: ICD-10-CM

## 2022-03-24 DIAGNOSIS — F25.0 SCHIZOAFFECTIVE DISORDER, BIPOLAR TYPE (H): Chronic | ICD-10-CM

## 2022-03-24 DIAGNOSIS — G47.09 OTHER INSOMNIA: Primary | ICD-10-CM

## 2022-03-24 PROBLEM — F20.9 SCHIZOPHRENIA (H): Status: ACTIVE | Noted: 2022-03-24

## 2022-03-24 PROCEDURE — 99223 1ST HOSP IP/OBS HIGH 75: CPT | Performed by: NURSE PRACTITIONER

## 2022-03-24 PROCEDURE — 250N000013 HC RX MED GY IP 250 OP 250 PS 637: Performed by: PSYCHIATRY & NEUROLOGY

## 2022-03-24 PROCEDURE — 128N000001 HC R&B CD/MH ADULT

## 2022-03-24 PROCEDURE — 250N000013 HC RX MED GY IP 250 OP 250 PS 637: Performed by: NURSE PRACTITIONER

## 2022-03-24 PROCEDURE — 250N000013 HC RX MED GY IP 250 OP 250 PS 637: Performed by: FAMILY MEDICINE

## 2022-03-24 RX ORDER — LURASIDONE HYDROCHLORIDE 60 MG/1
TABLET, FILM COATED ORAL EVERY EVENING
Status: ON HOLD | COMMUNITY
End: 2022-04-11

## 2022-03-24 RX ORDER — OLANZAPINE 10 MG/2ML
10 INJECTION, POWDER, FOR SOLUTION INTRAMUSCULAR 3 TIMES DAILY PRN
Status: DISCONTINUED | OUTPATIENT
Start: 2022-03-24 | End: 2022-04-11 | Stop reason: HOSPADM

## 2022-03-24 RX ORDER — HYDROXYZINE HYDROCHLORIDE 25 MG/1
25 TABLET, FILM COATED ORAL EVERY 4 HOURS PRN
Status: DISCONTINUED | OUTPATIENT
Start: 2022-03-24 | End: 2022-04-11 | Stop reason: HOSPADM

## 2022-03-24 RX ORDER — OLANZAPINE 10 MG/1
10 TABLET ORAL 3 TIMES DAILY PRN
Status: DISCONTINUED | OUTPATIENT
Start: 2022-03-24 | End: 2022-04-11 | Stop reason: HOSPADM

## 2022-03-24 RX ORDER — PHENOL 1.4 %
10 AEROSOL, SPRAY (ML) MUCOUS MEMBRANE
Status: ON HOLD | COMMUNITY
End: 2022-07-11

## 2022-03-24 RX ORDER — AMOXICILLIN 250 MG
1 CAPSULE ORAL 2 TIMES DAILY PRN
Status: DISCONTINUED | OUTPATIENT
Start: 2022-03-24 | End: 2022-04-11 | Stop reason: HOSPADM

## 2022-03-24 RX ORDER — PALIPERIDONE 3 MG/1
3 TABLET, EXTENDED RELEASE ORAL DAILY
Status: COMPLETED | OUTPATIENT
Start: 2022-03-24 | End: 2022-03-27

## 2022-03-24 RX ORDER — ACETAMINOPHEN 325 MG/1
650 TABLET ORAL EVERY 4 HOURS PRN
Status: DISCONTINUED | OUTPATIENT
Start: 2022-03-24 | End: 2022-04-11 | Stop reason: HOSPADM

## 2022-03-24 RX ORDER — TRAZODONE HYDROCHLORIDE 50 MG/1
50 TABLET, FILM COATED ORAL
Status: DISCONTINUED | OUTPATIENT
Start: 2022-03-24 | End: 2022-04-11 | Stop reason: HOSPADM

## 2022-03-24 RX ORDER — MAGNESIUM HYDROXIDE/ALUMINUM HYDROXICE/SIMETHICONE 120; 1200; 1200 MG/30ML; MG/30ML; MG/30ML
30 SUSPENSION ORAL EVERY 4 HOURS PRN
Status: DISCONTINUED | OUTPATIENT
Start: 2022-03-24 | End: 2022-04-11 | Stop reason: HOSPADM

## 2022-03-24 RX ADMIN — TRAZODONE HYDROCHLORIDE 50 MG: 50 TABLET ORAL at 21:14

## 2022-03-24 RX ADMIN — PALIPERIDONE 3 MG: 3 TABLET, EXTENDED RELEASE ORAL at 15:36

## 2022-03-24 RX ADMIN — OLANZAPINE 10 MG: 10 TABLET, ORALLY DISINTEGRATING ORAL at 01:21

## 2022-03-24 ASSESSMENT — ACTIVITIES OF DAILY LIVING (ADL)
CONCENTRATING,_REMEMBERING_OR_MAKING_DECISIONS_DIFFICULTY: NO
TOILETING_ISSUES: NO
HYGIENE/GROOMING: INDEPENDENT
DRESS: INDEPENDENT
FALL_HISTORY_WITHIN_LAST_SIX_MONTHS: NO
DRESSING/BATHING_DIFFICULTY: NO
WALKING_OR_CLIMBING_STAIRS_DIFFICULTY: NO
DOING_ERRANDS_INDEPENDENTLY_DIFFICULTY: NO
DIFFICULTY_EATING/SWALLOWING: NO
ORAL_HYGIENE: INDEPENDENT
HYGIENE/GROOMING: INDEPENDENT
DRESS: INDEPENDENT
LAUNDRY: WITH SUPERVISION
ORAL_HYGIENE: INDEPENDENT
WEAR_GLASSES_OR_BLIND: NO
CHANGE_IN_FUNCTIONAL_STATUS_SINCE_ONSET_OF_CURRENT_ILLNESS/INJURY: NO

## 2022-03-24 NOTE — PLAN OF CARE
Goal Outcome Evaluation:    Patient was admitted from [facility] at about 0520 hrs, he was calm and non-aggressive, partially compliant with admission procedure, because he needed to sleep. Admission was partially completed. No pain was reported by patient and no PRN was given. We will continue to observe him and report any changes.

## 2022-03-24 NOTE — PLAN OF CARE
03/24/22 1012   Individualization/Patient Specific Goals   Patient Personal Strengths community support;family/social support;socioeconomic stability;positive educational history   Patient Vulnerabilities lacks insight into illness;substance abuse/addiction   Patient-Specific Goals (Include Timeframe) 7-10 days   Interprofessional Rounds   Participants psychiatrist;social work;OT;nursing;pharmacy   Team Discussion   Participants RN - EO; ZUNILDA - JL; OT - AF; Pharmacist - MC; Provider - AA   Progress new admission   Anticipated length of stay 7-10 days   Continued Stay Criteria/Rationale new admission   Anticipated Discharge Disposition   (to be determined)      03/24/22 1012   Individualization/Patient Specific Goals   Patient Personal Strengths community support;family/social support;socioeconomic stability;positive educational history   Patient Vulnerabilities lacks insight into illness;substance abuse/addiction   Patient-Specific Goals (Include Timeframe) 7-10 days   Interprofessional Rounds   Participants psychiatrist;social work;OT;nursing;pharmacy   Team Discussion   Participants RN Casandra EO; CTC - JL; OT - AF; Pharmacist - MC; Provider - AA   Progress new admission   Anticipated length of stay 7-10 days   Continued Stay Criteria/Rationale new admission   Anticipated Discharge Disposition   (to be determined)     PRECAUTIONS AND SAFETY    Behavioral Orders   Procedures    Code 1 - Restrict to Unit    Routine Programming     As clinically indicated    Status 15     Every 15 minutes.    Suicide precautions     Patients on Suicide Precautions should have a Combination Diet ordered that includes a Diet selection(s) AND a Behavioral Tray selection for Safe Tray - with utensils, or Safe Tray - NO utensils       On CIWA

## 2022-03-24 NOTE — PHARMACY-ADMISSION MEDICATION HISTORY
Admission medication history interview status for the 3/24/2022 admission is complete. See EPIC admission navigator for allergy information, pharmacy, prior to admission medications and immunization status.     Medication history interview sources:  patient interview and EHR    Changes made to PTA medication list (reason)  Added: Latuda 60 mg q PM  Deleted: olanzapine 20 mfg q hs, melatonin 10 mg hs prn  Changed: none    Additional medication history information (including reliability of information, actions taken by pharmacist):None    Medication history completed by: pharmacist in interview with patient      Prior to Admission medications    Medication Sig Last Dose Taking? Auth Provider   cholecalciferol 50 MCG (2000 UT) tablet Take 50 mcg by mouth daily More than a month at Unknown time Yes Reported, Patient   lurasidone (LATUDA) 60 MG TABS tablet Take by mouth every evening Take with food PM at Unknown time Yes Unknown, Entered By History   Melatonin 10 MG TABS tablet Take 10 mg by mouth nightly as needed for sleep Unknown at Unknown time Yes Unknown, Entered By History
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
## 2022-03-24 NOTE — PLAN OF CARE
Problem: Depression  Goal: Improved Mood  Outcome: Ongoing, Not Progressing     Problem: Suicidal Behavior  Goal: Suicidal Behavior is Absent or Managed  Outcome: Ongoing, Progressing     Problem: Anxiety  Goal: Anxiety Reduction or Resolution  Outcome: Ongoing, Not Progressing        Patient endorsed anxiety 7/10, depression 8/10 and denied all other psych symptoms. Patient appears to be sad and  tearful this afternoon, declined to express this feeling. Withdrawn and isolative to the room, but visible for meals. Med compliant and contracted for safety. Declined prn anti-anxiety med.

## 2022-03-24 NOTE — ED NOTES
Pt. transported to  [place] [id number] with EMS.  Pt. calm and cooperative at time of transfer.  No acute issues noted.
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
## 2022-03-24 NOTE — SAFE
Junior Fernández  March 23, 2022  SAFE Note    Critical Safety Issues: Pt presents for assessment of psychotic symptoms, recently medication change, pt becoming more irritable, aggressive, paranoid despite believed compliance and no substance abuse, currently under commitment, recommend inpatient admissions on 72 hour hold.      Current Suicidal Ideation/Self-Injurious Concerns/Methods: None - N/A      Current or Historical Inappropriate Sexual Behavior: No      Current or Historical Aggression/Homicidal Ideation: Agitation/Hyperactivity and Impaired Self-Control      Triggers: Increased symptoms    Updated care team: Yes: ED staff and Central Intake    For additional details see full LMHP assessment.       Georgi Morel, MSW, LICSW
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
## 2022-03-24 NOTE — TELEPHONE ENCOUNTER
R: 4500 / Antonio  01:56 - Called [facility]. Awaiting callback from provider. 02:57 - On call, David, accepts for MH admission. Placed pt in queue for 4500. 03:48 - Notified unit. Report in 15 minutes. 03:51 - Notified [facility]

## 2022-03-25 ENCOUNTER — TELEPHONE (OUTPATIENT)
Dept: PSYCHIATRY | Facility: CLINIC | Age: 22
End: 2022-03-25
Payer: COMMERCIAL

## 2022-03-25 LAB
CHOLEST SERPL-MCNC: 158 MG/DL
HDLC SERPL-MCNC: 46 MG/DL
LDLC SERPL CALC-MCNC: 97 MG/DL
TRIGL SERPL-MCNC: 73 MG/DL
TSH SERPL DL<=0.005 MIU/L-ACNC: 1.71 UIU/ML (ref 0.3–5)

## 2022-03-25 PROCEDURE — 99232 SBSQ HOSP IP/OBS MODERATE 35: CPT | Performed by: NURSE PRACTITIONER

## 2022-03-25 PROCEDURE — 128N000001 HC R&B CD/MH ADULT

## 2022-03-25 PROCEDURE — 80061 LIPID PANEL: CPT | Performed by: PSYCHIATRY & NEUROLOGY

## 2022-03-25 PROCEDURE — 250N000013 HC RX MED GY IP 250 OP 250 PS 637: Performed by: NURSE PRACTITIONER

## 2022-03-25 PROCEDURE — 36415 COLL VENOUS BLD VENIPUNCTURE: CPT | Performed by: PSYCHIATRY & NEUROLOGY

## 2022-03-25 PROCEDURE — 84443 ASSAY THYROID STIM HORMONE: CPT | Performed by: PSYCHIATRY & NEUROLOGY

## 2022-03-25 RX ORDER — ESCITALOPRAM OXALATE 10 MG/1
10 TABLET ORAL DAILY
Status: DISCONTINUED | OUTPATIENT
Start: 2022-03-25 | End: 2022-03-28

## 2022-03-25 RX ORDER — PALIPERIDONE 6 MG/1
6 TABLET, EXTENDED RELEASE ORAL DAILY
Status: COMPLETED | OUTPATIENT
Start: 2022-03-28 | End: 2022-03-31

## 2022-03-25 RX ORDER — BUPROPION HYDROCHLORIDE 150 MG/1
150 TABLET ORAL DAILY
Status: DISCONTINUED | OUTPATIENT
Start: 2022-03-25 | End: 2022-03-25

## 2022-03-25 RX ADMIN — ESCITALOPRAM OXALATE 10 MG: 10 TABLET ORAL at 13:45

## 2022-03-25 RX ADMIN — PALIPERIDONE 3 MG: 3 TABLET, EXTENDED RELEASE ORAL at 08:21

## 2022-03-25 ASSESSMENT — ACTIVITIES OF DAILY LIVING (ADL)
LAUNDRY: UNABLE TO COMPLETE
HYGIENE/GROOMING: INDEPENDENT
DRESS: SCRUBS (BEHAVIORAL HEALTH)
HYGIENE/GROOMING: INDEPENDENT
ORAL_HYGIENE: INDEPENDENT
ORAL_HYGIENE: INDEPENDENT

## 2022-03-25 NOTE — PLAN OF CARE
Problem: Depression  Goal: Improved Mood  3/25/2022 0515 by Matheus Hall RN  Outcome: Ongoing, Progressing  3/24/2022 2040 by Matheus Hall RN  Outcome: Ongoing, Progressing     Problem: Anxiety  Goal: Anxiety Reduction or Resolution  3/25/2022 0515 by Matheus Hall RN  Outcome: Ongoing, Progressing  3/24/2022 2040 by Matheus Hall RN  Outcome: Ongoing, Progressing     D: Pt was in the bed all through the night and appeared to have slept fine with even and non-labored respiration observed during safety check. No complaint by Pt thus far. Patient denies pain, SI, HI, AV hallucination, and SIB.      A: Performed Q15 minute routine safety check per unit care protocol.      R: Pt slept majority of the night. Staff will continue to monitor Pt.

## 2022-03-25 NOTE — PROGRESS NOTES
"PSYCHIATRY  PROGRESS NOTE     DATE OF SERVICE   3/25/2022         CHIEF COMPLAINT   \"I am not happy to be here\"       SUBJECTIVE   Nursing reports :  Patient calm and cooperative, spent majority of the shift isolative to room. 72 Hr. Hold paperwork explained and handed to patient. Endorsed anxiety 7/10, depression 6/10, refused interventions.  denies pain and all other psych symptoms. Patient  is withdrawn and quiet, refused breakfast, out in the lounge for lunch and group. Med compliant and contracted for safety     reports: Assessment/Intervention/Current Symptoms and Care Coordination  Call received from patient's Mental Health  Theresa Flores with [facility] [phone] confirming that revocation was completed and will be faxed to the unit.  She also spoke with the [facility] and they are willing to have patient enroll.  She spoke with patient's parents and it is unclear if they are willing to have patient return home.     Consulted with provider and met with patient in the comfort room.  Patient remains guarded but cooperative.  He reported feeling \"anxious, frustrated and mad bout the commitment.\"  Patient stated willingness to comply with treatment recommendations despite his disagreement with them.  Discussed considerations for long term stability and life goals.  Patient reported willingness to comply with IM medication but stated he is generally opposed to anti-psychotic medication as it \"makes me more sick.\"  He gave several examples of how he feels the medication makes him drowsy and slows his cognitive processing.  Discussed revocation and recommendation for Navigate program.  Patient stated understanding and denied other concerns or questions.       OBJECTIVE   Chart reviewed and patient assessed. Patient was seen and evaluated in the consult room by himself. Upon patient interview, patient reports he is not happy to be here,saying he was depressed last night but starting " "feeling okay after accepting the fact he has to get better before he can be discharged. Patient presented as guarded, depressed, anxious with delayed responses during this assessment. Patient continues to be paranoid about his parents talking behind his back. Patient states he is no longer upset at his parents, saying he does not blame them because they are only embarrassed by him. Patient states his parents have right to be embarrassed by him because of his long struggle with mental illness and addiction. Patient states he has been depressed for sometime,saying he lacks motivation to do anything including taking his medications. Patient denies suicidal and homicidal thoughts. He also denies both auditory and visual hallucinations. Endorsed anxiety related to being in the hospital. Apart from paranoid ideation about his parents, no overt psychosis observed. Writer informed patient that he has been placed on a 72 hour hold due to patient's recent aggressive behaviors towards parents in the setting of treatment recommendations noncompliance. Patient informed patient his CM is planning to revoke his provisional discharge. Though patient took the news of being on 72 hour hold calmly, he did ask a rhetorical question saying \" If I don't take medication after leaving the hospital last time, what makes you think I'm going to take it this time even with the court order?\" Writer reiterated the importance of medication compliance as it will help with symptoms stabilizations while reducing frequent ED visits and inpatient admission. Writer discussed starting patient on Wellbutrin targeting depression and anxiety which he consented to. Patient later approached writer requesting to be placed on Lexapro instead of Wellbutrin, saying lexapro worked well for him the last time he was on it. Lexapro 10 mg daily started.        MEDICATIONS   Medications:  Scheduled Meds:    - Psychiatric Emergency -   Other See Admin Instructions     " paliperidone  3 mg Oral Daily     Continuous Infusions:  PRN Meds:.acetaminophen, alum & mag hydroxide-simethicone, hydrOXYzine, OLANZapine **OR** OLANZapine, senna-docusate, traZODone    Medication adherence issues: MS Med Adherence Y/N: No  Medication side effects: MEDICATION SIDE EFFECTS: no side effects reported  Benefit: Yes / No: Yes       ROS   A comprehensive review of systems was negative.       MENTAL STATUS EXAM   Vitals: BP (!) 143/88 (BP Location: Right arm, Patient Position: Sitting, Cuff Size: Adult Regular)   Pulse 93   Temp 98.3  F (36.8  C) (Oral)   Resp 18   SpO2 98%     Appearance:  Casually groomed  Mood:  {Mood: Depressed   Affect: blunted  was congruent to speech  Suicidal Ideation: PRESENT / ABSENT: absent   Homicidal Ideation: PRESENT / ABSENT: absent   Thought process: linear,no loose association noted   Thought content: denies suicidal and homicidal ideation, no delusion though endorses paranoid ideation.   Fund of Knowledge: Average  Attention/Concentration: Fair  Language ability:  Intact  Memory:  Immediate recall intact, Short-term memory intact and Long-term memory intact  Insight:  limited.  Judgement: limited  Orientation: Yes, x4  Psychomotor Behavior: denies tardive dyskinesia, dystonia or tics  Muscle Strength and Tone: MuscleStrength: Normal  Gait and Station: Normal       LABS   personally reviewed.     No results found for: PHENYTOIN, PHENOBARB, VALPROATE, CBMZ       DIAGNOSIS   Principal Problem:    Schizoaffective disorder, bipolar type (H)    Active Problem List:  Patient Active Problem List   Diagnosis     History of substance use disorder     Psychosis, unspecified psychosis type (H)     Schizoaffective disorder, bipolar type (H)     Tobacco use disorder     Left arm numbness     Schizophrenia (H)          PLAN   1. Ongoing education given regarding diagnostic and treatment options with risks, benefits and alternatives and adequate verbalization of understanding.  2.  Medications: Invega 3 mg daily                           Lexapro 10 mg daily  3. Hospitalist consult: Hospitalist to see patient as needed  4. Legal: 72 hour hold  5.  to follow regarding collecting and reviewing collateral information, referrals and disposition planning      Risk Assessment: [facility] RISK ASSESSMENT: Patient able to contract for safety and Patient on precautions    Coordination of Care:   Treatment Plan reviewed and physician signed, Care discussed with Care/Treatment Team Members, Chart reviewed and Patient seen      Re-Certification I certify that the inpatient psychiatric facility services furnished since the previous certification were, and continue to be, medically necessary for, either, treatment which could reasonably be expected to improve the patient s condition or diagnostic study and that the hospital records indicate that the services furnished were, either, intensive treatment services, admission and related services necessary for diagnostic study, or equivalent services.     I certify that the patient continues to need, on a daily basis, active treatment furnished directly by or requiring the supervision of inpatient psychiatric facility personnel.   I estimate about 10 days days of hospitalization is necessary for proper treatment of the patient. My plans for post-hospital care for this patient are  home with family     SELINA Gates CNP    -     3/25/2022     -     10:58 AM    Total time  25 minutes with > 50%spent on coordination of cares and psycho-education.    This note was created with help of Dragon dictation system. Grammatical / typing errors are not intentional.    SELINA Gates CNP

## 2022-03-25 NOTE — TELEPHONE ENCOUNTER
What is the concern that needs to be addressed by a nurse? Pt is now interested in joining the navigate program after originally denying after the first intake appt, please call back to discuss options.     May a detailed message be left on voicemail? yes    Date of last office visit: 1/20/22    Message routed to: darío disla

## 2022-03-25 NOTE — PLAN OF CARE
Problem: Anxiety  Goal: Anxiety Reduction or Resolution  Outcome: Ongoing, Progressing   Goal Outcome Evaluation:    Plan of Care Reviewed With: patient        Problem: Behavioral Health Plan of Care  Goal: Plan of Care Review  Recent Flowsheet Documentation  Taken 3/25/2022 1043 by Honey Jimenez RN  Plan of Care Reviewed With: patient  Goal: Adheres to Safety Considerations for Self and Others  Outcome: Met  Intervention: Develop and Maintain Individualized Safety Plan  Recent Flowsheet Documentation  Taken 3/25/2022 1043 by Honey Jimenez, RN  Safety Measures: safety rounds completed  Goal: Absence of New-Onset Illness or Injury  Intervention: Identify and Manage Fall Risk  Recent Flowsheet Documentation  Taken 3/25/2022 1043 by Honey Jimenez, RN  Safety Measures: safety rounds completed      Patient calm and cooperative, spent majority of the shift isolative to room. 72 Hr. Hold paperwork explained and handed to patient. Endorsed anxiety 7/10, depression 6/10, refused interventions.  denies pain and all other psych symptoms. Patient  is withdrawn and quiet, refused breakfast, out in the lounge for lunch and group. Med compliant and contracted for safety.

## 2022-03-25 NOTE — PLAN OF CARE
"Assessment/Intervention/Current Symptoms and Care Coordination  Call received from patient's Mental Health  Theresa Flores with [facility] [phone] confirming that revocation was completed and will be faxed to the unit.  She also spoke with the [facility] Program and they are willing to have patient enroll.  She spoke with patient's parents and it is unclear if they are willing to have patient return home.    Consulted with provider and met with patient in the comfort room.  Patient remains guarded but cooperative.  He reported feeling \"anxious, frustrated and mad bout the commitment.\"  Patient stated willingness to comply with treatment recommendations despite his disagreement with them.  Discussed considerations for long term stability and life goals.  Patient reported willingness to comply with IM medication but stated he is generally opposed to anti-psychotic medication as it \"makes me more sick.\"  He gave several examples of how he feels the medication makes him drowsy and slows his cognitive processing.  Discussed revocation and recommendation for Navigate program.  Patient stated understanding and denied other concerns or questions.    Discharge Plan or Goal  Home with Navigate program follow-up vs. IRTS    Barriers to Discharge   Symptom stabilization, transition to [facility]    Referral Status  None at this time    Important Contacts  Mental Health : Theresa Flores with [facility] [phone]    Legal Status  Revocation.  Patient is under MI Commitment with [facility] in [employer].    Chetna Kunz [facility], 3/25/2022, 3:17 PM       "

## 2022-03-25 NOTE — PLAN OF CARE
Problem: Depression  Goal: Improved Mood  Outcome: Ongoing, Progressing     Problem: Anxiety  Goal: Anxiety Reduction or Resolution  Outcome: Ongoing, Progressing

## 2022-03-26 PROBLEM — G47.09 OTHER INSOMNIA: Status: ACTIVE | Noted: 2022-03-26

## 2022-03-26 PROBLEM — R00.0 SINUS TACHYCARDIA: Status: ACTIVE | Noted: 2022-03-26

## 2022-03-26 PROBLEM — F41.9 ANXIETY: Status: ACTIVE | Noted: 2022-03-26

## 2022-03-26 LAB
ALBUMIN SERPL-MCNC: 4.7 G/DL (ref 3.5–5)
ALP SERPL-CCNC: 50 U/L (ref 45–120)
ALT SERPL W P-5'-P-CCNC: 15 U/L (ref 0–45)
ANION GAP SERPL CALCULATED.3IONS-SCNC: 14 MMOL/L (ref 5–18)
AST SERPL W P-5'-P-CCNC: 15 U/L (ref 0–40)
BASOPHILS # BLD AUTO: 0 10E3/UL (ref 0–0.2)
BASOPHILS NFR BLD AUTO: 1 %
BILIRUB SERPL-MCNC: 0.7 MG/DL (ref 0–1)
BUN SERPL-MCNC: 9 MG/DL (ref 8–22)
CALCIUM SERPL-MCNC: 9.8 MG/DL (ref 8.5–10.5)
CHLORIDE BLD-SCNC: 102 MMOL/L (ref 98–107)
CO2 SERPL-SCNC: 23 MMOL/L (ref 22–31)
CREAT SERPL-MCNC: 1.02 MG/DL (ref 0.7–1.3)
EOSINOPHIL # BLD AUTO: 0 10E3/UL (ref 0–0.7)
EOSINOPHIL NFR BLD AUTO: 0 %
ERYTHROCYTE [DISTWIDTH] IN BLOOD BY AUTOMATED COUNT: 13 % (ref 10–15)
GFR SERPL CREATININE-BSD FRML MDRD: >90 ML/MIN/1.73M2
GLUCOSE BLD-MCNC: 91 MG/DL (ref 70–125)
HCT VFR BLD AUTO: 44.9 % (ref 40–53)
HGB BLD-MCNC: 15.7 G/DL (ref 13.3–17.7)
IMM GRANULOCYTES # BLD: 0 10E3/UL
IMM GRANULOCYTES NFR BLD: 0 %
LYMPHOCYTES # BLD AUTO: 1.2 10E3/UL (ref 0.8–5.3)
LYMPHOCYTES NFR BLD AUTO: 28 %
MCH RBC QN AUTO: 29.4 PG (ref 26.5–33)
MCHC RBC AUTO-ENTMCNC: 35 G/DL (ref 31.5–36.5)
MCV RBC AUTO: 84 FL (ref 78–100)
MONOCYTES # BLD AUTO: 0.3 10E3/UL (ref 0–1.3)
MONOCYTES NFR BLD AUTO: 7 %
NEUTROPHILS # BLD AUTO: 2.6 10E3/UL (ref 1.6–8.3)
NEUTROPHILS NFR BLD AUTO: 64 %
NRBC # BLD AUTO: 0 10E3/UL
NRBC BLD AUTO-RTO: 0 /100
PLATELET # BLD AUTO: 238 10E3/UL (ref 150–450)
POTASSIUM BLD-SCNC: 3.8 MMOL/L (ref 3.5–5)
PROT SERPL-MCNC: 7.5 G/DL (ref 6–8)
RBC # BLD AUTO: 5.34 10E6/UL (ref 4.4–5.9)
SODIUM SERPL-SCNC: 139 MMOL/L (ref 136–145)
WBC # BLD AUTO: 4.1 10E3/UL (ref 4–11)

## 2022-03-26 PROCEDURE — 250N000013 HC RX MED GY IP 250 OP 250 PS 637: Performed by: PSYCHIATRY & NEUROLOGY

## 2022-03-26 PROCEDURE — 80053 COMPREHEN METABOLIC PANEL: CPT | Performed by: NURSE PRACTITIONER

## 2022-03-26 PROCEDURE — 85025 COMPLETE CBC W/AUTO DIFF WBC: CPT | Performed by: NURSE PRACTITIONER

## 2022-03-26 PROCEDURE — 93010 ELECTROCARDIOGRAM REPORT: CPT | Performed by: INTERNAL MEDICINE

## 2022-03-26 PROCEDURE — 128N000001 HC R&B CD/MH ADULT

## 2022-03-26 PROCEDURE — 93005 ELECTROCARDIOGRAM TRACING: CPT | Performed by: NURSE PRACTITIONER

## 2022-03-26 PROCEDURE — 999N000157 HC STATISTIC RCP TIME EA 10 MIN

## 2022-03-26 PROCEDURE — 999N000250 HC STATISTIC ECG ASSIST

## 2022-03-26 PROCEDURE — 250N000013 HC RX MED GY IP 250 OP 250 PS 637: Performed by: NURSE PRACTITIONER

## 2022-03-26 PROCEDURE — 93005 ELECTROCARDIOGRAM TRACING: CPT

## 2022-03-26 PROCEDURE — 36415 COLL VENOUS BLD VENIPUNCTURE: CPT | Performed by: NURSE PRACTITIONER

## 2022-03-26 RX ORDER — HYDRALAZINE HYDROCHLORIDE 25 MG/1
25 TABLET, FILM COATED ORAL EVERY 6 HOURS PRN
Status: DISCONTINUED | OUTPATIENT
Start: 2022-03-26 | End: 2022-04-11 | Stop reason: HOSPADM

## 2022-03-26 RX ADMIN — TRAZODONE HYDROCHLORIDE 50 MG: 50 TABLET ORAL at 21:24

## 2022-03-26 RX ADMIN — HYDROXYZINE HYDROCHLORIDE 25 MG: 25 TABLET, FILM COATED ORAL at 13:05

## 2022-03-26 RX ADMIN — PALIPERIDONE 3 MG: 3 TABLET, EXTENDED RELEASE ORAL at 08:18

## 2022-03-26 RX ADMIN — HYDROXYZINE HYDROCHLORIDE 25 MG: 25 TABLET, FILM COATED ORAL at 08:19

## 2022-03-26 RX ADMIN — ESCITALOPRAM OXALATE 10 MG: 10 TABLET ORAL at 08:18

## 2022-03-26 ASSESSMENT — ACTIVITIES OF DAILY LIVING (ADL)
DRESS: SCRUBS (BEHAVIORAL HEALTH)
LAUNDRY: WITH SUPERVISION
HYGIENE/GROOMING: HANDWASHING;SHOWER;INDEPENDENT
PREVIOUS_RESPONSIBILITIES: MEDICATION MANAGEMENT;DRIVING
LAUNDRY: UNABLE TO COMPLETE
ORAL_HYGIENE: INDEPENDENT
ORAL_HYGIENE: INDEPENDENT
HYGIENE/GROOMING: INDEPENDENT
DRESS: SCRUBS (BEHAVIORAL HEALTH)

## 2022-03-26 NOTE — PROGRESS NOTES
"   03/26/22 1100   Occupational Therapy Psychosocial Assessment   Assessment Type Interview;Interview Form;Chart Review;Interdisciplinary Team Report   Prior Level of Function   People in Home parent(s)   Current Living Arrangements house   Transportation Anticipated car, drives self   Instrumental Activities of Daily Living (IADL)   Previous Responsibilities medication management;driving   Functional Mobility   Functional Mobility Independents    Therapy Assessment   Patient Presentation Pt presents as guarded and delayed with responses, though willing to talk with therapist in a limited manner.  Pt has been mainly isolative to his room since admission.  Pt is avoidant of eye contact and gave a report on reason for admission that was consistent with chart review.  Pt lacks a daily routine and currently struggles with self management when upset or angry.  Pt does voice a desire to change this, though his willingness to participate in recovery strategies is limited, including willingness to take his medications.  Per chart review pt was agitation, irritable and paranoid prior to admit, police were even called to his residence due to physical acting out.   Patient-identified areas of success Concentrating on own tasks;Transportation;Managing basic needs (food, medicine, sleep)   Patient-identified areas of difficulty Motivation/mood;Memory problems;Finances;Lack of satisfying daily routine   Patient-identified sources of support Safe place to live;Pets;Access to email, social media, phone calls;Professional support   Patient-identified emotional, physical or mental health concerns \"Agitation, I have been increasingly upset at home ovr the past month, constantly feeling stressed out.\"   Patient-identified coping stategies for these concerns \"exercise, hanging out with my dad and brother, watching T.V and playing video games\"   Patient-identified stressors or changes in the past year \"commitment\"   Patient-identified " "values \"family\"   Patient's goal for the future \"Get a job and live independently.\"   Patient-identified community resources    Patient's goals to work on with OT Feel better;Learn ways to stay well and avoid coming to the hospital;Handle own frustrations   Clinical Impression   Patient Personal Strengths ability to maintain sobriety;community support;family/social support;independent living skills   Pt appears to have the following barriers/limitations Limited insight into mental health symptoms;Medication noncompliance;Low confidence;Poorly managed mental health symptoms;Lack of daily routine   Patient's barriers/limitations contribute to Exacerbation of mental health symptoms;Poor follow through with treatment recommendations;Limited cooperation with care team;Limited active engagement in recovery strategies;Lack of participation in therapeutic programming;Limited pursuit of recovery strategies   Planned Interventions Encourage group attendance;Identify and develop coping strategies;Identify and develop relapse prevention plan;Continue to build rapport;Engage in activities for insight development;Encourage engagement in meaningful activities;Encourage improved social interaction skills;Improve self-management skills     "

## 2022-03-26 NOTE — PLAN OF CARE
Problem: Depression  Goal: Improved Mood  Outcome: Ongoing, Progressing     Problem: Anxiety  Goal: Anxiety Reduction or Resolution  Outcome: Ongoing, Progressing   Goal Outcome Evaluation:    Plan of Care Reviewed With: patient       pt. Seen by N.P. for elevated pulse and BP, ECG 12- lead performed and labs drawn. Staff should encourage PRN Trazodone and atarax per N.P.  pt. Denies cardiac symptoms, he was calm and cooperative, spent majority of the shift isolative to room.  Endorsed anxiety 8 Atarax x 2 effective, depression 7  denies pain and all other psych symptoms. Patient  is withdrawn and quiet,  Med compliant and contracted for safety.

## 2022-03-26 NOTE — CONSULTS
[facility]  Consult Note - Hospitalist Service  Date of Admission:  3/24/2022  Consult Requested by: Quinn GARCIA  Reason for Consult: elevated blood pressure and heart rate     Assessment & Plan   Junior Fernández is a 21 year old male admitted on 3/24/2022 to [place] inpatient behavioral health unit for management of schizo affective disorder, bipolar type with episode of aggression and psychosis.  Presented to [facility] emergency department on 3/23/2022 after patient's parents were concerned about aggressive and paranoid behaviors.  Past medical history includes also includes anxiety, insomnia, vitamin D deficiency, elevated heart rate and elevated blood pressures. Holter monitor placed in 2019 which showed no abnormalities.      Suicide attempt October 2021 with left forearm laceration.  This laceration required vascular surgery consult and 10/14/2021 left arm wound exploration with repair/ligation of venous injuries and brachial artery repair.  Patient also required blood transfusions at that time.     Hospital medicine consulted to evaluate elevated heart rate and blood pressure.    ADDENDUM 3:50 PM:   - Reviewed EKG. NSR with ventricular rate 80. QT/QTC within normal range  - likely transient tachycardia related to acute psychiatric illness, anxiety and insomnia  - would favor on-going management of psychiatric illness, promotion of food and fluids which should improved both bp and heart rate  - CMP and CBC within normal range  - would not recommend daily anti-hypertensive at this time. Will add hydralazine 25 mg po every 6 hours as needed for sbp >170. Notify medicine if sbp consistently >170  - Discussed plan with patient and he is agreeable. Medicine will sign off    Elevated blood pressure without previous diagnosis of hypertension   -Blood pressure goal less than 140/90.  Blood pressure this afternoon 146/81, 133/73, 170/84.  Last evening blood pressure 160/71 sitting and  120/68 standing.  Heart rate 106-125  -No headaches, blurred vision, lower extremity swelling.  No chest pain, dizziness, palpitations or shortness of breath  -Had Holter monitor placed in 2019.  According to patient, no abnormalities found.  No previous heart history  -No recent lab work.  Reviewed lab results from November 2021.  -Check EKG, CMP and CBC today  -Patient does not required scheduled daily antihypertensive at this time.  Elevated blood pressure and heart rate are likely secondary to psychiatric illness and anxiety.  Hydroxyzine is available as needed.  He took a dose this morning which helped his anxiety.  He did not sleep last night.  Had trazodone 2 nights ago but did not take it last night.  Encouraged patient to utilize the as needed medications available to him and discussed with nursing  -Continue to monitor.  Consider a as needed hydralazine if blood pressures consistently greater than 160 systolic.  May also consider a low-dose propranolol scheduled daily    Sinus tachycardia  -Reassuring as client has no significant past heart history, no current chest pain, palpitations or shortness of breath  -Holter monitor in 2019.  Results not available but patient said it did not show any abnormalities  -EKG today  -Favor treatment of anxiety symptoms and promote sleep which will likely help heart rate.  Patient is not eating and drinking much.  I encouraged him to drink at least 2 L of fluid per day    Insomnia  -Trazodone available as needed.  Patient took a dose 2 nights ago which helped, but did not take a dose last evening and did not sleep.  Discussed with nursing to offer this at bedtime    Anxiety  -Psychiatry managing.  Recommended to patient utilization of as needed medications available to treat anxiety and spoke to nursing    Decreased appetite  -Did not eat breakfast this morning and patient says his appetite has been diminished for several days.  He is going to try to eat some lunch.  He  "has a glass of water at bedside and is drinking fluids.  I encouraged him to eat his meals and drink 2 L of fluids per day  -We will check CMP and assess his total protein and albumin level    Schizoaffective disorder, bipolar type  -Psychiatry managing     Total time spent on the unit 40 minutes in reviewing the record, examination of patient, lab results and completing documentation. 22 minutes was spent in discussion and counseling with patient concerning diagnosis, medications, lab results and treatment plan.  Signs and symptoms of uncontrolled hypertension discussed.  Avoiding high salt foods, anxiety management also discussed.  Care discussed and coordinated with patient and nurse.     SELINA [facility]  Securely message with the Vocera Web Console (learn more here)  Text page via [employer] Paging/Directory       ______________________________________________________________________    Chief Complaint   Elevated blood pressure and heart rate. \"  I am not doing good.  I am worrying so much about what is going to happen and what I have done.\"    History is obtained from the patient and chart review    History of Present Illness   Junior \"Cam\" MITZI Fernández is a 21 year old male admitted on 3/24/2022 to [place] inpatient behavioral health unit for management of schizo affective disorder, bipolar type with episode of aggression and psychosis.  Presented to [facility] emergency department on 3/23/2022 after patient's parents were concerned about aggressive and paranoid behaviors.  Past medical history includes also includes anxiety, insomnia, vitamin D deficiency, elevated heart rate and elevated blood pressures. Holter monitor placed in 2019 which showed no abnormalities.Suicide attempt October 2021 with left forearm laceration.  This laceration required vascular surgery consult and 10/14/2021 left arm wound exploration with repair/ligation of venous injuries and brachial " "artery repair.  Patient also required blood transfusions at that time.   Hospital medicine consulted to evaluate elevated heart rate and blood pressure.  Flynn was lying in his bed.  He is calm and cooperative, however appears anxious and somewhat slow to respond to questions.  Makes eye contact.  CAM tells me, \"I am not doing good.  I am really worried about what is going to happen and about all the things I have done.\"  He did not sleep last night.  He took trazodone 2 nights ago but did not take it last night.  He had a dose of hydroxyzine this morning which he thinks helped his anxiety a bit.  He says his anxiety currently is 7 out of 10.  We discussed his blood pressure and heart rate which have been elevated.  He has no history of heart disease or arrhythmias.  He remembers having a Holter monitor placed in 2019, which is verified in the past medical records.  Past records do not show the results of the Holter monitor, however patient tells me that \"it didn't show anything.\"  No recent colds or viruses.  Denies fever, chills, blurred vision, headaches, shortness of breath, palpitations, dizziness, chest pain, lower extremity swelling or nausea.  No constipation or loose stools.  Denies pain.  Patient tells me he has not been eating that much.  He did not eat breakfast this morning.  He is drinking some water.  He is going to try to eat some lunch.      Review of Systems   The 10 point Review of Systems is negative other than noted in the HPI     Past Medical History    I have reviewed this patient's medical history and updated it with pertinent information if needed.   Past Medical History:   Diagnosis Date     Anisometropia      Anxiety      Depression      Elevated blood pressure reading without diagnosis of hypertension      Fracture 07/01/2003    Left clavicle     Fracture 04/01/2005    Left [place]     History of substance abuse (H) 11/23/2016    THC, ETOH, stimulants     Multiple nevi 10/14/2015     " "Other insomnia      Pneumonia 2003    RUL     RAD (reactive airway disease)     outgrown     SARS (severe acute respiratory syndrome)      Sinus tachycardia        Past Surgical History   I have reviewed this patient's surgical history and updated it with pertinent information if needed.  Past Surgical History:   Procedure Laterality Date     CIRCUMCISION,OTHER,<28 D/O       FRACTURE SURGERY  2005    Left [place]     HC TOOTH EXTRACTION W/FORCEP       REPAIR ARTERY BRACHIAL Left 10/2021       Social History   I have reviewed this patient's social history and updated it with pertinent information if needed.  Social History     Tobacco Use     Smoking status: Never Smoker     Smokeless tobacco: Never Used   Substance Use Topics     Alcohol use: Not Currently     Comment: 3-4 days ago     Drug use: Not Currently     Types: Marijuana, \"Crack\" cocaine     Comment: Used marijuanna 3 days ago, cocaine in feburary, vyvanse 3 days ago        Family History   I have reviewed this patient's family history and updated it with pertinent information if needed.  Family History   Problem Relation Age of Onset     Anxiety Disorder Maternal Grandmother      Depression Maternal Grandmother      Hypertension Maternal Grandmother      Cerebrovascular Disease Maternal Grandmother      Other - See Comments Mother         on Celexa     Hyperthyroidism Father         Rx'd with methimazole. Father's side with mild allergy/asthma issues     Hyperlipidemia Father      Anxiety Disorder Brother         Stuttering and tics     Lymphoma Maternal Grandfather          from Lymphoma     Other - See Comments Paternal Grandmother         vaso-vagal syncope     Other - See Comments Paternal Grandfather          from kidney/liver CA; CAD and had pacemaker     Heart Disease Paternal Grandfather 65     Arrhythmia No family hx of        Medications   I have reviewed this patient's current medications    Allergies   Allergies   Allergen " Reactions     Seasonal Allergies      Seroquel [Quetiapine]      Fainting and slowed breathing        Physical Exam   Vital Signs: Temp: 98.1  F (36.7  C) Temp src: Oral BP: (!) 170/84 Pulse: (!) 125   Resp: 18 SpO2: 95 % O2 Device: None (Room air)    Weight: 0 lbs 0 oz  General: Alert, cooperative, in bed, slightly anxious,  no apparent distress  HEENT: Normocephalic, atraumatic, mucous membranes pink and moist, no lymphadenopathy   Respiratory: Lung sounds clear bilaterally, non-labored  Cardiovascular: S1, S2, rhythm rate regular, ventricular rate 110 bpm, negative murmur, negative lower extremity edema  Gastrointestinal: Bowel sounds positive x4, nondistended and non-tender  Musculoskeletal: No joint deformities  Neurological: Alert and oriented x3, BLANQUITA, speech intact, moves all extremities equally, sensation intact    Data   I personally reviewed the EKG tracing showing pending.  No results found for this or any previous visit (from the past 24 hour(s)).  Most Recent 3 CBC's:Recent Labs   Lab Test 09/04/21  0733 07/17/20  1354 08/16/19  0010 11/20/17  1432   WBC 6.9  --  9.0 5.8   HGB 16.3 15.2 14.0 15.6   MCV 89 90.9 86 93     --  185 266

## 2022-03-26 NOTE — PROGRESS NOTES
03/26/22 1049   Engagement   Intervention Group   Topic Detail Super Sc[employer]atch Art for creative expression, concentration, attention to detail, follow through, coping, symptom managment, healthy leisure, improving self-esteem and socialization   Attendance Did not attend

## 2022-03-26 NOTE — PLAN OF CARE
"Problem: Depression  Goal: Improved Mood  Outcome: Ongoing, Progressing     Problem: Behavioral Health Plan of Care  Goal: Adheres to Safety Considerations for Self and Others  Outcome: Ongoing, Progressing     Problem: Suicidal Behavior  Goal: Suicidal Behavior is Absent or Managed  Outcome: Ongoing, Progressing   Goal Outcome Evaluation:    [id number]  Pt isolative to room all evening, up for snack only ad peggy. Pleasant, no scheduled HS medications. Pulse rechecked per previous RN and was 99, improvement from earlier in day. Denied SI, hallucinations, pain. Endorsed anxiety 7 and depression 4 but reported \"manageable\".  Declined PRN's.  Pt remained in bed all NOC shift, No Safety or behavioral concerns overnight. Pt remains on 15 min rounding for safety.      "

## 2022-03-27 PROCEDURE — 128N000001 HC R&B CD/MH ADULT

## 2022-03-27 PROCEDURE — 250N000013 HC RX MED GY IP 250 OP 250 PS 637: Performed by: NURSE PRACTITIONER

## 2022-03-27 RX ADMIN — PALIPERIDONE 3 MG: 3 TABLET, EXTENDED RELEASE ORAL at 08:21

## 2022-03-27 RX ADMIN — ESCITALOPRAM OXALATE 10 MG: 10 TABLET ORAL at 08:21

## 2022-03-27 ASSESSMENT — ACTIVITIES OF DAILY LIVING (ADL)
HYGIENE/GROOMING: INDEPENDENT
ORAL_HYGIENE: INDEPENDENT
DRESS: SCRUBS (BEHAVIORAL HEALTH)
HYGIENE/GROOMING: INDEPENDENT
LAUNDRY: WITH SUPERVISION
LAUNDRY: WITH SUPERVISION
DRESS: INDEPENDENT
ORAL_HYGIENE: INDEPENDENT

## 2022-03-27 NOTE — PLAN OF CARE
Problem: Anxiety  Goal: Anxiety Reduction or Resolution  Outcome: Ongoing, Progressing   Intervention: Develop and Maintain Individualized Safety Plan  Recent Flowsheet Documentation  Taken 3/27/2022 0900 by Honey Jimenez, RN  Safety Measures: safety rounds completed  Goal: Absence of New-Onset Illness or Injury  Intervention: Identify and Manage Fall Risk  Recent Flowsheet Documentation  Taken 3/27/2022 0900 by Honey Jimenez, RN  Safety Measures: safety rounds completed   Goal Outcome Evaluation:    Plan of Care Reviewed With: patient       Pt. Out in the milieu for meals only, spent majority of the shift resting in bed, isolative to self. Pt. Reports feeling dizzy when standing, staff encouraging fluids Continue to encourage patient to utilize PRN for anxiety and sleep, Endorses anxiety 5  depression 0 denies pain, SI/HI/ and hallucinations and other psych symptoms. Pt. Refused PRN medication, Med compliant and contracted for safety.

## 2022-03-27 NOTE — PLAN OF CARE
Problem: Suicidal Behavior  Goal: Suicidal Behavior is Absent or Managed  Outcome: Ongoing, Progressing     Problem: Anxiety  Goal: Anxiety Reduction or Resolution  Outcome: Ongoing, Progressing     Problem: Depression  Goal: Improved Mood  Outcome: Ongoing, Progressing   Goal Outcome Evaluation:    [id number] Pt Remained in bed all NOC, asleep. Slept 7+ hours. No needs or behaviors noted overnight. Pt remains on 15 min safety checks per Unit Protocol.

## 2022-03-27 NOTE — PLAN OF CARE
Problem: Suicidal Behavior  Goal: Suicidal Behavior is Absent or Managed  Outcome: Ongoing, Progressing  Intervention: Provide Immediate and Ongoing Protective Physical Environment  Recent Flowsheet Documentation  Taken 3/26/2022 1630 by Shonda Hunt RN  Safe Transition Promotion: personal safety plan developed     Problem: Depression  Goal: Improved Mood  Outcome: Ongoing, Progressing  Intervention: Monitor and Manage Depressive Symptoms  Recent Flowsheet Documentation  Taken 3/26/2022 1630 by Shonda Hunt RN  Supportive Measures:    active listening utilized    journaling promoted    positive reinforcement provided    verbalization of feelings encouraged     Problem: Anxiety  Goal: Anxiety Reduction or Resolution  Outcome: Ongoing, Progressing  Intervention: Promote Anxiety Reduction  Recent Flowsheet Documentation  Taken 3/26/2022 1630 by Shonda Hunt RN  Supportive Measures:    active listening utilized    journaling promoted    positive reinforcement provided    verbalization of feelings encouraged     Problem: Behavioral Health Plan of Care  Goal: Plan of Care Review  Outcome: Ongoing, Progressing  Flowsheets (Taken 3/26/2022 1630)  Plan of Care Reviewed With: patient  Patient Agreement with Plan of Care: agrees  Goal: Patient-Specific Goal (Individualization)  Outcome: Ongoing, Progressing  Goal: Adheres to Safety Considerations for Self and Others  Outcome: Ongoing, Progressing  Intervention: Develop and Maintain Individualized Safety Plan  Recent Flowsheet Documentation  Taken 3/26/2022 1630 by Shonda Hunt RN  Safety Measures:    environmental rounds completed    safety rounds completed  Goal: Absence of New-Onset Illness or Injury  Outcome: Ongoing, Progressing  Intervention: Identify and Manage Fall Risk  Recent Flowsheet Documentation  Taken 3/26/2022 1630 by Shonda Hunt RN  Safety Measures:    environmental rounds completed    safety rounds completed  Goal: Optimal Comfort and  Wellbeing  Outcome: Ongoing, Progressing  Intervention: Provide Person-Centered Care  Recent Flowsheet Documentation  Taken 3/26/2022 1630 by Shonda Hunt RN  Trust Relationship/Rapport:    care explained    empathic listening provided    questions answered    thoughts/feelings acknowledged    reassurance provided  Goal: Optimized Coping Skills in Response to Life Stressors  Outcome: Ongoing, Progressing  Intervention: Promote Effective Coping Strategies  Recent Flowsheet Documentation  Taken 3/26/2022 1630 by Shonda Hunt RN  Supportive Measures:    active listening utilized    journaling promoted    positive reinforcement provided    verbalization of feelings encouraged  Goal: Develops/Participates in Therapeutic [place] to Support Successful Transition  Outcome: Ongoing, Progressing  Intervention: Foster Therapeutic [place]  Recent Flowsheet Documentation  Taken 3/26/2022 1630 by Shonda Hunt RN  Trust Relationship/Rapport:    care explained    empathic listening provided    questions answered    thoughts/feelings acknowledged    reassurance provided  Goal: Team Discussion  Outcome: Ongoing, Progressing   Goal Outcome Evaluation:    Plan of Care Reviewed With: patient       Patient's seen by NP for elevated pulse and BP, ECG 12- lead performed and labs drawn. No concerns at this time per NP report. Continue encourage patient to utilize PRN for anxiety and sleep. PRN Hydralazine for SBP >170. Denies cardiac symptoms, BP this evening WNL. Patient calm and cooperative, spent majority of the shift isolative to room except for meals and snacks.  Denies anxiety, depression, SI/HI/SIB, pain, hallucinations and other psych symptoms. Med compliant and contracted for safety. Patient's dad visited this evening. Patient's encouraged to utilize Atarax but declined.

## 2022-03-28 PROCEDURE — 99232 SBSQ HOSP IP/OBS MODERATE 35: CPT | Performed by: NURSE PRACTITIONER

## 2022-03-28 PROCEDURE — 128N000001 HC R&B CD/MH ADULT

## 2022-03-28 PROCEDURE — 90853 GROUP PSYCHOTHERAPY: CPT

## 2022-03-28 PROCEDURE — 250N000013 HC RX MED GY IP 250 OP 250 PS 637: Performed by: NURSE PRACTITIONER

## 2022-03-28 RX ADMIN — ESCITALOPRAM OXALATE 10 MG: 10 TABLET ORAL at 08:45

## 2022-03-28 RX ADMIN — PALIPERIDONE 6 MG: 6 TABLET, EXTENDED RELEASE ORAL at 08:45

## 2022-03-28 NOTE — PLAN OF CARE
"Assessment/Intervention/Current Symptoms and Care Coordination  Consulted with provider and IDT.  Met with patient at bedside.  Observed that patient may intermittent eye contact and his responses were brief and often delayed.  Patient reported awareness of transition to [facility].  When asked to describe his overall mental health patient stated \"not well.\"  He reported ongoing agitation which he feels has not improved since admission.  Ongoing paranoia noted as patient reports that he continues to feel \"upset with my parents for talking behind my back.\"  He stated that he believes other people on the unit are doing this also \"but I'm not upset with them.\"  Patient also reported \"my mind jumps from thought to thought\" and \"It's hard to come up with an answer\" when asked questions.  Discussed possible follow up with the [facility] after discharge.  Patient stated he doesn't want to do this but is willing if required.     Discharge Plan or Goal  Home with [facility] program follow-up vs. IRTS     Barriers to Discharge   Symptom stabilization, transition to [facility]     Referral Status  None at this time     Important Contacts  Mental Health : Theresa Flores with [facility] [phone]     Legal Status  Revocation.  Patient is under MI Commitment with [facility] in [employer].    LORENA Davis, LICSW 3:02 PM 3/28/2022       "

## 2022-03-28 NOTE — PROGRESS NOTES
"PSYCHIATRY  PROGRESS NOTE     DATE OF SERVICE   3/28/2022         CHIEF COMPLAINT   \"I am doing great\"       SUBJECTIVE   Nursing reports :Pt was polite on approach, stated that he slept well. He denied pain or discomfort, denied thoughts of self harm or harm toward others and he contracted for safety. Pt was isolative most of the morning, but later he was out and social with peers and staff. He was medication compliant and he denied side effects. He attend some unit activities and had adequate intake     reports: Assessment/Intervention/Current Symptoms and Care Coordination I have discussed patient with the  during earlier today during the IDT. Plan remains for the CM to revoke provisional discharge with the plan to transitioning from oral Invega to LEÓN.          OBJECTIVE   Chart reviewed and patient assessed. Patient was seen and evaluated in his room while sitting on his bed looking outside from the window. Upon patient interview, patient reports having a great weekend,saying he was able to talk to his parents and happy their relationship is now back to normal. Patient reports he is currently working on anger management, saying inability to effectively manage his anger is what led to this hospitalization. Patient presents as calm, appropriate but guarged. Patient was observed laughing with himself when writer entered his room. Patient continued to laugh intermittently during this evaluation, apparently responding to internal stimuli. When writer inquired further about the laughter, he stated he was just reflecting on everything that has witnessed this past week. Patient will not give further explanation than this. Patient denies suicidal and homicidal ideations. He also denies both auditory and visual hallucinations. He denies anxiety but endorses depression due to continued hospitalization. Writer reiterated plan to transitioning from oral Invega to LEÓN with the first short to be " given around 3/4. Patient verbalized good understanding. Lexapro titrated to 15 mg daily starting tomorrow 3/29 to effectively target depression.        MEDICATIONS   Medications:  Scheduled Meds:    - Psychiatric Emergency -   Other See Admin Instructions     escitalopram  10 mg Oral Daily     paliperidone  6 mg Oral Daily     Continuous Infusions:  PRN Meds:.acetaminophen, alum & mag hydroxide-simethicone, hydrALAZINE, hydrOXYzine, OLANZapine **OR** OLANZapine, senna-docusate, traZODone    Medication adherence issues: MS Med Adherence Y/N: No  Medication side effects: MEDICATION SIDE EFFECTS: no side effects reported  Benefit: Yes / No: Yes       ROS   A comprehensive review of systems was negative.       MENTAL STATUS EXAM   Vitals: BP (!) 146/83 (BP Location: Left arm, Patient Position: Sitting, Cuff Size: Adult Regular)   Pulse 98   Temp 98.1  F (36.7  C) (Oral)   Resp 19   SpO2 96%     Appearance:  Casually groomed  Mood:  {Mood: Depressed   Affect: blunted  was congruent to speech  Suicidal Ideation: PRESENT / ABSENT: absent   Homicidal Ideation: PRESENT / ABSENT: absent   Thought process: linear,no loose association noted   Thought content: denies suicidal and homicidal ideation, no delusion though endorses paranoid ideation.   Fund of Knowledge: Average  Attention/Concentration: Fair  Language ability:  Intact  Memory:  Immediate recall intact, Short-term memory intact and Long-term memory intact  Insight:  limited.  Judgement: limited  Orientation: Yes, x4  Psychomotor Behavior: denies tardive dyskinesia, dystonia or tics  Muscle Strength and Tone: MuscleStrength: Normal  Gait and Station: Normal       LABS   personally reviewed.     No results found for: PHENYTOIN, PHENOBARB, VALPROATE, CBMZ       DIAGNOSIS   Principal Problem:    Schizoaffective disorder, bipolar type (H)    Active Problem List:  Patient Active Problem List   Diagnosis     History of substance use disorder     Psychosis, unspecified  psychosis type (H)     Schizoaffective disorder, bipolar type (H)     Tobacco use disorder     Left arm numbness     Schizophrenia (H)     Anxiety     Sinus tachycardia     Other insomnia          PLAN   1. Ongoing education given regarding diagnostic and treatment options with risks, benefits and alternatives and adequate verbalization of understanding.  2. Medications: Invega 6 mg daily 3/28                           Lexapro 15 mg daily starting 3/29  3. Hospitalist consult: Hospitalist to see patient as needed  4. Legal: 72 hour hold  5.  to follow regarding collecting and reviewing collateral information, referrals and disposition planning      Risk Assessment: [facility] RISK ASSESSMENT: Patient able to contract for safety and Patient on precautions    Coordination of Care:   Treatment Plan reviewed and physician signed, Care discussed with Care/Treatment Team Members, Chart reviewed and Patient seen      Re-Certification I certify that the inpatient psychiatric facility services furnished since the previous certification were, and continue to be, medically necessary for, either, treatment which could reasonably be expected to improve the patient s condition or diagnostic study and that the hospital records indicate that the services furnished were, either, intensive treatment services, admission and related services necessary for diagnostic study, or equivalent services.     I certify that the patient continues to need, on a daily basis, active treatment furnished directly by or requiring the supervision of inpatient psychiatric facility personnel.   I estimate about 10 days days of hospitalization is necessary for proper treatment of the patient. My plans for post-hospital care for this patient are  home with family     SELINA Gates CNP    -     3/28/2022     -     1:09 PM    Total time  25 minutes with > 50%spent on coordination of cares and psycho-education.    This note was created  with help of Dragon dictation system. Grammatical / typing errors are not intentional.    SELINA Gates CNP

## 2022-03-28 NOTE — PROGRESS NOTES
03/28/22 1315   Engagement   Intervention Group   Topic Detail OT: Education on symptom management and creative hands-on endeavor (Super Scratch Art) for creative expression, concentration, attention to detail, follow through, coping, symptom management, healthy leisure, improving self-esteem, and socialization   Attendance Did not attend     Pt meeting with CTC during invite and therapist unable to return to room for follow-up invite.

## 2022-03-28 NOTE — TELEPHONE ENCOUNTER
Carolyn Flores, [facility]; [facility]  Caller: Unspecified (3 days ago,  1:34 PM)  [facility]ate[facility] Emma,     Can you triage this call and explain/confirm the following?     [facility] currently has a wait list. It could be weeks to several months before an opening in the [facility] program is anticipated. If interested in being place on the wait list, let us know. The next step would be for the  to reach out once there is an opening. Given there is a wait, we encourage individuals to either establish care elsewhere in the interim (e.g. med mgmt and therapy) and/or continue to see their current mental health providers until there is a [facility] opening.     Thanks!   Carolyn                 Writer called Gray back and left detailed message with the message above.

## 2022-03-28 NOTE — PLAN OF CARE
Problem: Suicidal Behavior  Goal: Suicidal Behavior is Absent or Managed  Outcome: Ongoing, Progressing     Problem: Behavioral Health Plan of Care  Goal: Adheres to Safety Considerations for Self and Others  Outcome: Ongoing, Progressing   Goal Outcome Evaluation:

## 2022-03-28 NOTE — PROVIDER NOTIFICATION
03/28/22 1300   Engagement   Intervention Group   Topic Detail SW Process   Attendance Attended   Patient Response Needs reinforcement/repetition to learn materials   Concentrated on Task 30 - 45 min   Cognition Preoccupied   Mood/Affect Pleasant   Social/Behavioral Cooperative   Thought Content Reality oriented     GROUP LENGTH (MINS):   40   RELEVANT PRIMARY DIAGNOSIS: Patient Active Problem List   Diagnosis     History of substance use disorder     Psychosis, unspecified psychosis type (H)     Schizoaffective disorder, bipolar type (H)     Tobacco use disorder     Left arm numbness     Schizophrenia (H)     Anxiety     Sinus tachycardia     Other insomnia        TREATMENT MODALITY:      []CBT       []DBT       []ACT       []Interpersonal psychotherapy                                 [x]Psychoeducational therapy       []Skill development       []Other:        ATTENDANCE:        []Stayed for entire group     [x]Arrived late    [] Left early       # OF PATIENTS IN GROUP: 7   BILLABLE:     [x]Yes            []No   Notes:

## 2022-03-28 NOTE — PLAN OF CARE
Goal Outcome Evaluation:    Plan of Care Reviewed With: patient     Patient presents calm and pleasant all evening,  denied pain and other psych symptoms.  Patient noted to spent more time in his room, was encourage to socialized with peers. No behaviors observed.  Ate 100% dinner with good fluids intake. Will continue plan of care.

## 2022-03-28 NOTE — PLAN OF CARE
Problem: Suicidal Behavior  Goal: Suicidal Behavior is Absent or Managed  Outcome: Ongoing, Progressing     Problem: Depression  Goal: Improved Mood  Outcome: Ongoing, Progressing     Problem: Anxiety  Goal: Anxiety Reduction or Resolution  Outcome: Ongoing, Progressing     Problem: Behavioral Health Plan of Care  Goal: Plan of Care Review  Outcome: Ongoing, Progressing   Goal Outcome Evaluation:  Pt was polite on approach, stated that he slept well. He denied pain or discomfort, denied thoughts of self harm or harm toward others and he contracted for safety. Pt was isolative most of the morning, but later he was out and social with peers and staff. He was medication compliant and he denied side effects. He attend some unit activities and had adequate intake.

## 2022-03-29 PROCEDURE — 250N000013 HC RX MED GY IP 250 OP 250 PS 637: Performed by: NURSE PRACTITIONER

## 2022-03-29 PROCEDURE — 99232 SBSQ HOSP IP/OBS MODERATE 35: CPT | Performed by: NURSE PRACTITIONER

## 2022-03-29 PROCEDURE — 128N000001 HC R&B CD/MH ADULT

## 2022-03-29 RX ADMIN — PALIPERIDONE 6 MG: 6 TABLET, EXTENDED RELEASE ORAL at 08:21

## 2022-03-29 RX ADMIN — ESCITALOPRAM OXALATE 15 MG: 5 TABLET, FILM COATED ORAL at 08:21

## 2022-03-29 NOTE — PROGRESS NOTES
Patient appeared to rest well during noc shift, slept approx 7 hours thus far, no reports of pain or discomfort, no prn's adm or requested, no precautionary behaviors noted or reported, 15 minute safety checks performed per protocol, will continue to monitor.

## 2022-03-29 NOTE — PROGRESS NOTES
03/29/22 1132   Engagement   Intervention Group   Topic Detail Bridge to Self Confidence processing activity for building self awareness and insight, coping, sharing thoughts/feelings, symptom management, reality based activity, healthy leisure, and expanding verbal communication skills   Attendance Did not attend

## 2022-03-29 NOTE — PLAN OF CARE
Goal Outcome Evaluation:    Problem: Suicidal Behavior  Goal: Suicidal Behavior is Absent or Managed  Outcome: Ongoing, Progressing     Problem: Depression  Goal: Improved Mood  Outcome: Ongoing, Progressing     Problem: Anxiety  Goal: Anxiety Reduction or Resolution  Outcome: Ongoing, Progressing  Pt is up on the unit and stated that he slept well. His goal is to try and be out of his room more. Pt was medication  compliant, denied thoughts of self harm or  harm toward others and he contracted for safety. Pt appeared to be smiling and laughing to himself inappropriately, appears to be responding to internal stimuli, but he denied it.

## 2022-03-29 NOTE — PROGRESS NOTES
03/29/22 1315   Engagement   Intervention Group   Topic Detail OT: Education on healthy leisure with creative hands on endeavor and cognitive wellness (detailed scratch art, Chips, and Spot-It) for creative expression, attention to detail, concentration, follow through, coping, relaxation, healthy leisure, symptom management, reality-based activity, and socialization   Attendance Did not attend   Reason for Not Attending Refused     Did not attend after face to face invite.

## 2022-03-29 NOTE — PLAN OF CARE
"Assessment/Intervention/Current Symptoms and Care Coordination  Met with patient at bedside.  Patient was resting with shades drawn and lights out upon approach.  He reported that he \"skipped lunch\" as he feels \"more stressed\" and has \"more symptoms\" when he is in the hospital than when at home.  Inquired about activities patient enjoys at home which he was able to identify as running, playing video games and spending time with his father.  Attempted to assess patient's symptoms.  Patient did state that \"it's hard to control what thoughts come up.\"  Upon further inquiry patient reported that these are often \"destructive thoughts\" that start small but then increase.  After sharing this patient retracted his statement and said that \"they're really all normal thoughts.\"  Writer observed that patient passed for extended periods of time before answering writer's questions.  He also was observed smiling and laughing to himself.  Patient denied responding to internal stimuli but this cannot be ruled out based on patient's presentation.  Patient reported that he was no longer taking Invega, only medication for sleep.  \"I don't need anything else.\"  Writer later confirmed that patient had been taking prescribed Invega.  Mentioned option of IRTS to patient.  Patient stated he prefers to go home.  Patient denied other concerns or questions.      Spoke with patient's mother and provided update.  She confirmed that patient is able to return home after discharge but she does support patient's participation in the [facility] program after discharge.  Mother reported that patient has not been calling as often and does appear \"more delayed and out of it\" on the phone.    Discharge Plan or Goal  Home with [facility] program follow-up vs. IRTS     Barriers to Discharge   Symptom stabilization, transition to [facility]     Referral Status  None at this time     Important Contacts  Mental Health : Theresa Flores with [facility] " [phone]     Legal Status  Revocation.  Patient is under MI Commitment with [facility] in [place]    LORENA Davis, LICSW 1:48 PM 3/29/2022

## 2022-03-29 NOTE — PROGRESS NOTES
"PSYCHIATRY  PROGRESS NOTE     DATE OF SERVICE   3/29/2022         CHIEF COMPLAINT   \"I am fine\"       SUBJECTIVE   Nursing reports :Pt is up on the unit and stated that he slept well. His goal is to try and be out of his room more. Pt was medication  compliant, denied thoughts of self harm or  harm toward others and he contracted for safety. Pt appeared to be smiling and laughing to himself, appears to be responding to internal stimuli, but he denied it     reports: Assessment/Intervention/Current Symptoms and Care Coordination I have discussed patient with the  during earlier today during the IDT. Plan remains for the CM to revoke provisional discharge with the plan to transitioning to LEÓN. Patient not open to going to IRTS facility as he preferred returning home to his parents. Patient's mother confirmed he can return home with outpatient support like the [facility] Program.          OBJECTIVE   Chart reviewed and patient assessed. Patient was seen and evaluated in his room while sitting on his bed reading a book. Upon patient interview, patient states he feels as though he can manage his anger very well now, saying he is prepared to utilize his coping skill at home when he discharges. Patient presents as calm, cooperative with somewhat odd affects. Patient noted to be laughing inappropriately at interval throughout this assessment. When writer inquired why the patient was overly excited, patient paused for a few second and started laughing again, saying \"it's not something you will like to hear\". Patient refused to share what makes him laughed inappropriately. Though he denied both auditory and visual hallucination, he appeared to be internally stimulated as evidenced by continued inappropriate laughter despite. Patient denies suicidal and homicidal ideations. He denies anxiety but endorses depression due to continued hospitalization. Lexapro and invega titrated with good tolerability.   "       MEDICATIONS   Medications:  Scheduled Meds:    - Psychiatric Emergency -   Other See Admin Instructions     escitalopram  15 mg Oral Daily     paliperidone  6 mg Oral Daily     Continuous Infusions:  PRN Meds:.acetaminophen, alum & mag hydroxide-simethicone, hydrALAZINE, hydrOXYzine, OLANZapine **OR** OLANZapine, senna-docusate, traZODone    Medication adherence issues: MS Med Adherence Y/N: No  Medication side effects: MEDICATION SIDE EFFECTS: no side effects reported  Benefit: Yes / No: Yes       ROS   A comprehensive review of systems was negative.       MENTAL STATUS EXAM   Vitals: /72   Pulse 96   Temp 98.3  F (36.8  C) (Oral)   Resp 16   SpO2 99%     Appearance:  Casually groomed  Mood:  {Mood: Depressed   Affect: blunted  was congruent to speech  Suicidal Ideation: PRESENT / ABSENT: absent   Homicidal Ideation: PRESENT / ABSENT: absent   Thought process: linear,no loose association noted   Thought content: denies suicidal and homicidal ideation, no delusion though endorses paranoid ideation.   Fund of Knowledge: Average  Attention/Concentration: Fair  Language ability:  Intact  Memory:  Immediate recall intact, Short-term memory intact and Long-term memory intact  Insight:  limited.  Judgement: limited  Orientation: Yes, x4  Psychomotor Behavior: denies tardive dyskinesia, dystonia or tics  Muscle Strength and Tone: MuscleStrength: Normal  Gait and Station: Normal       LABS   personally reviewed.     No results found for: PHENYTOIN, PHENOBARB, VALPROATE, CBMZ       DIAGNOSIS   Principal Problem:    Schizoaffective disorder, bipolar type (H)    Active Problem List:  Patient Active Problem List   Diagnosis     History of substance use disorder     Psychosis, unspecified psychosis type (H)     Schizoaffective disorder, bipolar type (H)     Tobacco use disorder     Left arm numbness     Schizophrenia (H)     Anxiety     Sinus tachycardia     Other insomnia          PLAN   1. Ongoing education  given regarding diagnostic and treatment options with risks, benefits and alternatives and adequate verbalization of understanding.  2. Medications: Invega 6 mg daily 3/28                           Lexapro 15 mg daily starting 3/29  3. Hospitalist consult: Hospitalist to see patient as needed  4. Legal: 72 hour hold  5.  to follow regarding collecting and reviewing collateral information, referrals and disposition planning      Risk Assessment: [facility] RISK ASSESSMENT: Patient able to contract for safety and Patient on precautions    Coordination of Care:   Treatment Plan reviewed and physician signed, Care discussed with Care/Treatment Team Members, Chart reviewed and Patient seen      Re-Certification I certify that the inpatient psychiatric facility services furnished since the previous certification were, and continue to be, medically necessary for, either, treatment which could reasonably be expected to improve the patient s condition or diagnostic study and that the hospital records indicate that the services furnished were, either, intensive treatment services, admission and related services necessary for diagnostic study, or equivalent services.     I certify that the patient continues to need, on a daily basis, active treatment furnished directly by or requiring the supervision of inpatient psychiatric facility personnel.   I estimate about 10 days days of hospitalization is necessary for proper treatment of the patient. My plans for post-hospital care for this patient are  home with family     SELINA Gates CNP    -     3/29/2022     -     1:43 PM    Total time  25 minutes with > 50%spent on coordination of cares and psycho-education.    This note was created with help of Dragon dictation system. Grammatical / typing errors are not intentional.    SELINA Gates CNP

## 2022-03-30 PROCEDURE — 99232 SBSQ HOSP IP/OBS MODERATE 35: CPT | Performed by: NURSE PRACTITIONER

## 2022-03-30 PROCEDURE — 128N000001 HC R&B CD/MH ADULT

## 2022-03-30 PROCEDURE — 250N000013 HC RX MED GY IP 250 OP 250 PS 637: Performed by: NURSE PRACTITIONER

## 2022-03-30 RX ADMIN — ESCITALOPRAM OXALATE 15 MG: 5 TABLET, FILM COATED ORAL at 08:14

## 2022-03-30 RX ADMIN — PALIPERIDONE 6 MG: 6 TABLET, EXTENDED RELEASE ORAL at 08:14

## 2022-03-30 ASSESSMENT — ACTIVITIES OF DAILY LIVING (ADL)
ORAL_HYGIENE: INDEPENDENT
LAUNDRY: WITH SUPERVISION
HYGIENE/GROOMING: INDEPENDENT
DRESS: INDEPENDENT
DRESS: INDEPENDENT
HYGIENE/GROOMING: INDEPENDENT
ORAL_HYGIENE: INDEPENDENT
LAUNDRY: WITH SUPERVISION

## 2022-03-30 NOTE — PLAN OF CARE
"Assessment/Intervention/Current Symptoms and Care Coordination  Approached patient in his room around 1100.  Patient was lying in bed with lights out and shades drawn.  He was willing to meet with writer, turn and lights and was superficially cordial.  Patient was difficult to engage in casual conversation responding with 1 or 2 word answers and no spontaneous back and forth in conversation.  When asked questions related to mental health symptoms, patient responded \"I don't want to say.\"  Provided education to patient about commitment, development of discharge plans, and mental health diagnosis and treatment in general.  When asked about his diagnosis patient commented that \"I had psychosis in the past but I don't think I'll have it again.\"  Encouraged patient to be forthcoming in sharing his experience so we can best provide treatment.  Encouraged patient to engage on the unit and suggested options for coping with paranoia and social anxiety.  Patient stated understanding but remained guarded.  Patient stated that he is not interested in the [facility] Program but \"I'll do it if I have to.\"  Observed that patient smiled and laughed to himself intermittently during interaction without correlation to the topic of conversation.  Patient was agreeable to opening his shades during conversation as writer shared that is had snowed outside.  Patient denied concerns or questions.  Immediately upon writer exiting the room patient closed his blinds, turned off his lights, and closed the door.      Discharge Plan or Goal  Home with [facility] program follow-up vs. IRTS     Barriers to Discharge   Symptom stabilization, transition to [facility]     Referral Status  None at this time     Important Contacts  Mental Health : Theresa Flores with [facility] [phone]     Legal Status  Revocation.  Patient is under MI Commitment with [facility] in [place]  "

## 2022-03-30 NOTE — PLAN OF CARE
Problem: Suicidal Behavior  Goal: Suicidal Behavior is Absent or Managed  Outcome: Ongoing, Progressing     Problem: Depression  Goal: Improved Mood  Outcome: Ongoing, Progressing     Problem: Anxiety  Goal: Anxiety Reduction or Resolution  Outcome: Ongoing, Progressing     Problem: Behavioral Health Plan of Care  Goal: Plan of Care Review  Outcome: Ongoing, Progressing   Goal Outcome Evaluation:    Plan of Care Reviewed With: (P) patient    Patient isolated himself in room most of the shift, declined breakfast but out for lunch with a lot of encouragement. Denied pain and all psych symptoms and contracted for safety.  Patient medications complaint, no outburst behaviors noted or reported. Will continue plan of care.

## 2022-03-30 NOTE — PLAN OF CARE
Problem: Suicidal Behavior  Goal: Suicidal Behavior is Absent or Managed  Outcome: Ongoing, Progressing     Problem: Sleep Disturbance  Goal: Adequate Sleep/Rest  Outcome: Ongoing, Progressing    D: Pt was in the bed all through the night and appeared to have slept fine with even and non-labored respiration observed during safety check. No complaint by Pt thus far. Patient denies pain, SI, HI, AV hallucination, and SIB. Will continue to monitor Pt.     A: Performed Q15 minute routine safety check     R: Pt slept majority of the night.

## 2022-03-30 NOTE — PROGRESS NOTES
"PSYCHIATRY  PROGRESS NOTE     DATE OF SERVICE   3/30/2022         CHIEF COMPLAINT   \"I'm okay\"       SUBJECTIVE   Nursing reports : Calm, polite and cooperative. Spent time in his room but came outside for meals. He was medication compliant. He denies psych symptoms     reports: Assessment/Intervention/Current Symptoms and Care Coordination Approached patient in his room around 1100.  Patient was lying in bed with lights out and shades drawn.  He was willing to meet with writer, turn and lights and was superficially cordial.  Patient was difficult to engage in casual conversation responding with 1 or 2 word answers and no spontaneous back and forth in conversation.  When asked questions related to mental health symptoms, patient responded \"I don't want to say.\"  Provided education to patient about commitment, development of discharge plans, and mental health diagnosis and treatment in general.  Encouraged patient to be forthcoming in sharing his experience so we can best provide treatment.  Encouraged patient to engage on the unit and suggested options for coping with paranoia and social anxiety.  Patient stated understanding but remained guarded.  Patient stated that he is not interested in the Navigate Program but \"I'll do it if I have to.\"  Observed that patient smiled and laughed to himself intermittently during interaction without correlation to the topic of conversation.  Patient was agreeable to opening his shades during conversation as writer shared that is had snowed.  Patient denied concerns or questions.  Immediately upon writer exiting the room patient closed his blinds, turned off his lights and closed the door.         OBJECTIVE   Chart reviewed and patient assessed. Patient was seen and evaluated in the consult room by himself. Patient presents as calm, cooperative, guarded and somewhat paranoid. Responses continued to be delayed. When writer inquired about the delayed responses, he " stated he has lots of things on his mind that he is currently processing. Patient refused to share what is on his mind when requested by the writer, saying they are nothing too problematic. Patient denies having intrusive thoughts. Patient denies suicidal and homicidal ideations. Patient denies both auditory and visual hallucinations. No inappropriate laugher noted today during this assessment unlike the previous days. Patient does mention he does not like taking medication, saying he is just taking his medication so he can be discharged from the hospital. Lexapro and invega titrated with good tolerability.         MEDICATIONS   Medications:  Scheduled Meds:    - Psychiatric Emergency -   Other See Admin Instructions     escitalopram  15 mg Oral Daily     paliperidone  6 mg Oral Daily     Continuous Infusions:  PRN Meds:.acetaminophen, alum & mag hydroxide-simethicone, hydrALAZINE, hydrOXYzine, OLANZapine **OR** OLANZapine, senna-docusate, traZODone    Medication adherence issues: MS Med Adherence Y/N: No  Medication side effects: MEDICATION SIDE EFFECTS: no side effects reported  Benefit: Yes / No: Yes       ROS   A comprehensive review of systems was negative.       MENTAL STATUS EXAM   Vitals: /72   Pulse 96   Temp 97.9  F (36.6  C) (Oral)   Resp 18   SpO2 95%     Appearance:  Casually groomed  Mood:  {Mood: Depressed   Affect: blunted  was congruent to speech  Suicidal Ideation: PRESENT / ABSENT: absent   Homicidal Ideation: PRESENT / ABSENT: absent   Thought process: linear,no loose association noted   Thought content: denies suicidal and homicidal ideation, no delusion though endorses paranoid ideation.   Fund of Knowledge: Average  Attention/Concentration: Fair  Language ability:  Intact  Memory:  Immediate recall intact, Short-term memory intact and Long-term memory intact  Insight:  limited.  Judgement: limited  Orientation: Yes, x4  Psychomotor Behavior: denies tardive dyskinesia, dystonia or  tics  Muscle Strength and Tone: MuscleStrength: Normal  Gait and Station: Normal       LABS   personally reviewed.     No results found for: PHENYTOIN, PHENOBARB, VALPROATE, CBMZ       DIAGNOSIS   Principal Problem:    Schizoaffective disorder, bipolar type (H)    Active Problem List:  Patient Active Problem List   Diagnosis     History of substance use disorder     Psychosis, unspecified psychosis type (H)     Schizoaffective disorder, bipolar type (H)     Tobacco use disorder     Left arm numbness     Schizophrenia (H)     Anxiety     Sinus tachycardia     Other insomnia          PLAN   1. Ongoing education given regarding diagnostic and treatment options with risks, benefits and alternatives and adequate verbalization of understanding.  2. Medications: Invega 6 mg daily 3/28                           Lexapro 15 mg daily starting 3/29  3. Hospitalist consult: Hospitalist to see patient as needed  4. Legal: 72 hour hold  5.  to follow regarding collecting and reviewing collateral information, referrals and disposition planning      Risk Assessment: [facility] RISK ASSESSMENT: Patient able to contract for safety and Patient on precautions    Coordination of Care:   Treatment Plan reviewed and physician signed, Care discussed with Care/Treatment Team Members, Chart reviewed and Patient seen      Re-Certification I certify that the inpatient psychiatric facility services furnished since the previous certification were, and continue to be, medically necessary for, either, treatment which could reasonably be expected to improve the patient s condition or diagnostic study and that the hospital records indicate that the services furnished were, either, intensive treatment services, admission and related services necessary for diagnostic study, or equivalent services.     I certify that the patient continues to need, on a daily basis, active treatment furnished directly by or requiring the supervision of  inpatient psychiatric facility personnel.   I estimate about 10 days days of hospitalization is necessary for proper treatment of the patient. My plans for post-hospital care for this patient are  home with family     SELINA Gates CNP    -     3/30/2022     -     1:09 PM    Total time  25 minutes with > 50%spent on coordination of cares and psycho-education.    This note was created with help of Dragon dictation system. Grammatical / typing errors are not intentional.    SELINA Gates CNP

## 2022-03-31 LAB
ANION GAP SERPL CALCULATED.3IONS-SCNC: 11 MMOL/L (ref 5–18)
BASOPHILS # BLD AUTO: 0 10E3/UL (ref 0–0.2)
BASOPHILS NFR BLD AUTO: 1 %
BUN SERPL-MCNC: 13 MG/DL (ref 8–22)
CALCIUM SERPL-MCNC: 9.6 MG/DL (ref 8.5–10.5)
CHLORIDE BLD-SCNC: 106 MMOL/L (ref 98–107)
CO2 SERPL-SCNC: 25 MMOL/L (ref 22–31)
CREAT SERPL-MCNC: 0.87 MG/DL (ref 0.7–1.3)
EOSINOPHIL # BLD AUTO: 0 10E3/UL (ref 0–0.7)
EOSINOPHIL NFR BLD AUTO: 1 %
ERYTHROCYTE [DISTWIDTH] IN BLOOD BY AUTOMATED COUNT: 13.1 % (ref 10–15)
GFR SERPL CREATININE-BSD FRML MDRD: >90 ML/MIN/1.73M2
GLUCOSE BLD-MCNC: 113 MG/DL (ref 70–125)
GLUCOSE BLDC GLUCOMTR-MCNC: 98 MG/DL (ref 70–99)
HCT VFR BLD AUTO: 43.6 % (ref 40–53)
HGB BLD-MCNC: 15.3 G/DL (ref 13.3–17.7)
IMM GRANULOCYTES # BLD: 0 10E3/UL
IMM GRANULOCYTES NFR BLD: 0 %
LYMPHOCYTES # BLD AUTO: 1.4 10E3/UL (ref 0.8–5.3)
LYMPHOCYTES NFR BLD AUTO: 34 %
MCH RBC QN AUTO: 29.3 PG (ref 26.5–33)
MCHC RBC AUTO-ENTMCNC: 35.1 G/DL (ref 31.5–36.5)
MCV RBC AUTO: 83 FL (ref 78–100)
MONOCYTES # BLD AUTO: 0.4 10E3/UL (ref 0–1.3)
MONOCYTES NFR BLD AUTO: 9 %
NEUTROPHILS # BLD AUTO: 2.2 10E3/UL (ref 1.6–8.3)
NEUTROPHILS NFR BLD AUTO: 55 %
NRBC # BLD AUTO: 0 10E3/UL
NRBC BLD AUTO-RTO: 0 /100
PLATELET # BLD AUTO: 251 10E3/UL (ref 150–450)
POTASSIUM BLD-SCNC: 3.7 MMOL/L (ref 3.5–5)
RBC # BLD AUTO: 5.23 10E6/UL (ref 4.4–5.9)
SARS-COV-2 RNA RESP QL NAA+PROBE: NEGATIVE
SODIUM SERPL-SCNC: 142 MMOL/L (ref 136–145)
WBC # BLD AUTO: 4 10E3/UL (ref 4–11)

## 2022-03-31 PROCEDURE — 87635 SARS-COV-2 COVID-19 AMP PRB: CPT | Performed by: NURSE PRACTITIONER

## 2022-03-31 PROCEDURE — 99233 SBSQ HOSP IP/OBS HIGH 50: CPT

## 2022-03-31 PROCEDURE — 93010 ELECTROCARDIOGRAM REPORT: CPT | Performed by: INTERNAL MEDICINE

## 2022-03-31 PROCEDURE — 93005 ELECTROCARDIOGRAM TRACING: CPT

## 2022-03-31 PROCEDURE — 36415 COLL VENOUS BLD VENIPUNCTURE: CPT

## 2022-03-31 PROCEDURE — 999N000157 HC STATISTIC RCP TIME EA 10 MIN

## 2022-03-31 PROCEDURE — 99233 SBSQ HOSP IP/OBS HIGH 50: CPT | Performed by: NURSE PRACTITIONER

## 2022-03-31 PROCEDURE — 85025 COMPLETE CBC W/AUTO DIFF WBC: CPT

## 2022-03-31 PROCEDURE — 128N000001 HC R&B CD/MH ADULT

## 2022-03-31 PROCEDURE — 250N000013 HC RX MED GY IP 250 OP 250 PS 637: Performed by: NURSE PRACTITIONER

## 2022-03-31 PROCEDURE — 80048 BASIC METABOLIC PNL TOTAL CA: CPT

## 2022-03-31 RX ORDER — HYDROXYZINE HYDROCHLORIDE 25 MG/1
25 TABLET, FILM COATED ORAL ONCE
Status: DISCONTINUED | OUTPATIENT
Start: 2022-03-31 | End: 2022-03-31

## 2022-03-31 RX ADMIN — PALIPERIDONE 6 MG: 6 TABLET, EXTENDED RELEASE ORAL at 08:08

## 2022-03-31 RX ADMIN — ESCITALOPRAM OXALATE 15 MG: 5 TABLET, FILM COATED ORAL at 08:08

## 2022-03-31 ASSESSMENT — ACTIVITIES OF DAILY LIVING (ADL)
LAUNDRY: WITH SUPERVISION
HYGIENE/GROOMING: INDEPENDENT
ORAL_HYGIENE: INDEPENDENT
DRESS: INDEPENDENT
LAUNDRY: WITH SUPERVISION
ORAL_HYGIENE: WITH ASSISTANCE
DRESS: INDEPENDENT
HYGIENE/GROOMING: INDEPENDENT

## 2022-03-31 NOTE — TELEPHONE ENCOUNTER
Carolyn Flores, [facility]  P Me Psychiatry Rn  Hello,     Can you contact this person back and triage for me? I think it is on Junior Fern[facility]dez. If so, you can pass along the information in my last inbasket message about him that is documented in the 3/25 telephone encounter.     I received this in an email from a [place]  - [facility]  from [employer] tried to call you on my line. Shonda Kunz [phone]. Patient is CS, you did a DA with them in January.     Thanks!   Carolyn         Writer called and left a message for Shonda asking for a return call.  Clinic number provided.

## 2022-03-31 NOTE — PROGRESS NOTES
03/31/22 0915   Engagement   Intervention Group   Topic Detail OT: Education on physical and cognitive wellness with interactive social activity (exercise dice & Jeremie Name It) to increase concentration, focus, memory recall, coping with stress, engagement in healthy distractions, social engagement, physical wellness, cognitive wellness, and overall wellness   Attendance Did not attend

## 2022-03-31 NOTE — PROGRESS NOTES
Pt slept throughout the shift, no labored breathing noted during 15 minutes safety checks. Staff will continue to follow plan of care.

## 2022-03-31 NOTE — PROGRESS NOTES
"PSYCHIATRY  PROGRESS NOTE     DATE OF SERVICE   3/31/2022         CHIEF COMPLAINT   \"I'm okay\"       SUBJECTIVE   Nursing reports :Patient having elevated heart rate, hospitalitist, consulted , EKG and labs ordered. Patient encourage to increase fluids intake. Denied pain and all psych symptoms and contracted for safety.  Patient medications complaint, no outburst behaviors noted or reported.  Phlebotomist was drawing blood this afternoon then noted patient leaning towards one side and eyes closing. RRT activated then patient was lowered to the floor,vital signs stable. Patient was transported to room via wheelchair for safety. Patient doing okay at this moment, alert and oriented x4.Will continue to monitor, no new orders.      reports: Assessment/Intervention/Current Symptoms and Care Coordination .Approached patient in his room where patient was lying in bed awake with lights out and shades drawn.  Patient remains guarded and brief.  He denied any mental health symptoms but continues to appear to be responding to internal stimuli as evidenced by delayed responses and staring at times.  Patient stated that he doesn't think he has a mental illness, \"I'm just stressed.\"  When writer reminded patient of history of mental health symptoms, patient stated 'I don't want to talk about.\"  Patient did report difficulty with social anxiety and a desire to return home.  Patient stated he doesn't like taking medications and feels they are not helpful.  \"I just feel generally not well.\"  Patient signed an CRISTINA for the Navigate Program. Spoke with Anne RN with [facility] [phone].  Patient was placed on their waitlist.  They do not have any current openings and wait time is a couple weeks to a couple months.       Spoke with patient's mother Rolanda [phone] and provided update. Rolanda reported that patient has been less responsive on the phone and when his dad visited on " Tuesday.  Left message for patient's Mental Health : Theresa Flores with [facility] [phone] with update.         OBJECTIVE   Chart reviewed and patient assessed. Patient was seen and evaluated in his room by himself while lying in bed resting.  Patient presents as calm, guarded and somewhat paranoid. He appears to be responding to internal stimuli as he kept laughing with himself while responses continued to be delayed, though he denied hearing voices. When inquired why patient has not been going to group, he stated he has not benefited much from the few ones he has attended. Patient stated he had a good phone conversation with his dad yesterday, saying dad would like him to return home when he leaves the hospital. Patient denies suicidal and homicidal ideations. Patient denies both auditory and visual hallucinations. Patient also denies depression, saying anxiety is at baseline. Invega titrated to 9 mg starting Friday 04/1. Later this afternoon, Code rapid response was called on patient due to having pre-syncope as his blood was being drawn by the Phlebotomist this afternoon. Patient stabilized after the episode with the VS returning to normal. When writer interviewed patient about his perception regarding the loss of consciousness during blood draw, he stated all he could remember was feeling dizzy as the blood was being drawn.. Patient stated he had passed out before in the community though under different circumstances. Patient currently denies dizziness, chest pain and anxiety. Writer had earlier placed hospitalist consult for elevated pulse when first notified by the charge nurse today. I called and updated patient's farther about the treatment plan and patient's responses to treatment in the hospital.      MEDICATIONS   Medications:  Scheduled Meds:    escitalopram  15 mg Oral Daily     paliperidone  6 mg Oral Daily     Continuous Infusions:  PRN Meds:.acetaminophen, alum & mag  hydroxide-simethicone, hydrALAZINE, hydrOXYzine, OLANZapine **OR** OLANZapine, senna-docusate, traZODone    Medication adherence issues: MS Med Adherence Y/N: No  Medication side effects: MEDICATION SIDE EFFECTS: no side effects reported  Benefit: Yes / No: Yes       ROS   A comprehensive review of systems was negative.       MENTAL STATUS EXAM   Vitals: /72   Pulse 96   Temp 98.6  F (37  C) (Oral)   Resp 17   SpO2 95%     Appearance:  Casually groomed  Mood:  {Mood: Depressed   Affect: blunted  was congruent to speech  Suicidal Ideation: PRESENT / ABSENT: absent   Homicidal Ideation: PRESENT / ABSENT: absent   Thought process: linear,no loose association noted   Thought content: denies suicidal and homicidal ideation, no delusion though endorses paranoid ideation.   Fund of Knowledge: Average  Attention/Concentration: Fair  Language ability:  Intact  Memory:  Immediate recall intact, Short-term memory intact and Long-term memory intact  Insight:  limited.  Judgement: limited  Orientation: Yes, x4  Psychomotor Behavior: denies tardive dyskinesia, dystonia or tics  Muscle Strength and Tone: MuscleStrength: Normal  Gait and Station: Normal       LABS   personally reviewed.     No results found for: PHENYTOIN, PHENOBARB, VALPROATE, CBMZ       DIAGNOSIS   Principal Problem:    Schizoaffective disorder, bipolar type (H)    Active Problem List:  Patient Active Problem List   Diagnosis     History of substance use disorder     Psychosis, unspecified psychosis type (H)     Schizoaffective disorder, bipolar type (H)     Tobacco use disorder     Left arm numbness     Schizophrenia (H)     Anxiety     Sinus tachycardia     Other insomnia          PLAN   1. Ongoing education given regarding diagnostic and treatment options with risks, benefits and alternatives and adequate verbalization of understanding.  2. Medications: Invega 6 mg daily 3/28 discontinued 3/31/22,invega 9 mg daily starting 4/1                             Lexapro 15 mg daily starting 3/29  3. Hospitalist consult: Hospitalist to see patient as needed  4. Legal: Committed and Nguyen  5.  to follow regarding collecting and reviewing collateral information, referrals and disposition planning      Risk Assessment: [facility] RISK ASSESSMENT: Patient able to contract for safety and Patient on precautions    Coordination of Care:   Treatment Plan reviewed and physician signed, Care discussed with Care/Treatment Team Members, Chart reviewed and Patient seen      Re-Certification I certify that the inpatient psychiatric facility services furnished since the previous certification were, and continue to be, medically necessary for, either, treatment which could reasonably be expected to improve the patient s condition or diagnostic study and that the hospital records indicate that the services furnished were, either, intensive treatment services, admission and related services necessary for diagnostic study, or equivalent services.     I certify that the patient continues to need, on a daily basis, active treatment furnished directly by or requiring the supervision of inpatient psychiatric facility personnel.   I estimate about 10 days days of hospitalization is necessary for proper treatment of the patient. My plans for post-hospital care for this patient are  home with family     SELINA Gates CNP    -     3/31/2022     -     11:38 AM    Total time  25 minutes with > 50%spent on coordination of cares and psycho-education.    This note was created with help of Dragon dictation system. Grammatical / typing errors are not intentional.    SELINA Gates CNP

## 2022-03-31 NOTE — PLAN OF CARE
03/31/22 1008   Individualization/Patient Specific Goals   Patient Personal Strengths ability to maintain sobriety;community support;family/social support;independent living skills   Patient Vulnerabilities lacks insight into illness;substance abuse/addiction   Anxieties, Fears or Concerns social anxiety   Patient-Specific Goals (Include Timeframe) 7-10 days   Interprofessional Rounds   Participants social work;OT;nursing;pharmacy;advanced practice nurse   Team Discussion   Participants RN - AA; CTC - JL; OT - RF; laure - DHAVAL; Provider - AA   Anticipated length of stay 7-10 days   Plan anticipated discharge home following initiation of LEÓN   Anticipated Discharge Disposition home or self-care     PRECAUTIONS AND SAFETY    Behavioral Orders   Procedures    Cheeking Precautions (behavioral units)    Code 1 - Restrict to Unit    Routine Programming     As clinically indicated    Status 15     Every 15 minutes.    Suicide precautions     Patients on Suicide Precautions should have a Combination Diet ordered that includes a Diet selection(s) AND a Behavioral Tray selection for Safe Tray - with utensils, or Safe Tray - NO utensils         Safety  Safety WDL: WDL  Patient Location: patient room, own  Observed Behavior: calm  Observed Behavior (Comment): sleeping  Safety Measures: safety rounds completed  Diversional Activity: television  Suicidality: Status 15  Assault: status 15  Elopement: status 15

## 2022-03-31 NOTE — PLAN OF CARE
Problem: Depression  Goal: Improved Mood  Outcome: Ongoing, Progressing     Problem: Anxiety  Goal: Anxiety Reduction or Resolution  Outcome: Ongoing, Progressing   Goal Outcome Evaluation:         Pt observed to have flat affect. Pt behavior observed as cooperative. Pt was visible on the unit for evening meal. Pt rated depression 4/10, anxiety 4/10, and no pain. Pt denied having SI or HI. Pt denied having hallucinations and all other psych symptoms. Pt contracted for safety. Pt remains on suicide and cheeking precautions for safety and monitoring at this time.

## 2022-03-31 NOTE — PROGRESS NOTES
03/31/22 1315   Engagement   Intervention Group   Topic Detail OT: Education on focus and concentration with creative hands on endeavor (beadie animals) creative expression, visual processing, logic/problem solving, coping, symptom management, reality based activity, healthy leisure, frustration tolerance, follow through, and socialization   Attendance Did not attend   Reason for Not Attending Refused     Did not attend after face to face invite. Pt entered group area, looked at samples on the table, and left group; pt did not return.

## 2022-03-31 NOTE — SIGNIFICANT EVENT
Significant Event Note    Time of event: 3:00 PM March 31, 2022    Description of event:  Rapid Response. Pt was near the nursing area sitting in a chair getting his blood drawn. Phlebotomist noted patient leaning to one side and eyes closing. Staff assisted him to the floor and called the Rapid. Prior to rapid, patient heart rate was up in the 150s. Vital signs during rapid he was normotensive and heart rate dropped to the 70s-80s. Pt denies hitting his head, losing consciousness, blurry vision, floaters. Blood sugar normal. Pt was able to answer questions. Appeared very pale and slightly diaphoretic  Differential diagnosis include situational syncope, vasovagal, cardioinhibitory response, vasodepressor response or a mix from the sudden drop in heart rate or the lab draw itself.   Had not received any medications close to event.   Increasing Invega. Adverse effects Per UptoDate: Cardiovascular: Tachycardia (3% to 14%), orthostatic hypotension (IM: <1%; oral: 2% to 4%)     Plan:  He should drink a full glass of water with his dose of Invega and take care with position changes after. Patient should be in bed for future lab draws.   CBC, BMP, EKG pending  - EKG - NSR without signs of left ventricular hypertrophy, ectopy, ischemia, ST or T waves changes, or prolonged QT    Discussed with: bedside nurse, Dr Janeen Sousa, APRN CNP

## 2022-03-31 NOTE — PROGRESS NOTES
[facility]    Medicine Progress Note - Hospitalist Service    Date of Admission:  3/24/2022    Assessment & Plan        Junior Fernández is a 21 year old male admitted on 3/24/2022 to [place] inpatient behavioral health unit for management of schizo affective disorder, bipolar type with episode of aggression and psychosis.  Presented to [facility] emergency department on 3/23/2022 after patient's parents were concerned about aggressive and paranoid behaviors.  Past medical history includes also includes anxiety, insomnia, vitamin D deficiency, elevated heart rate and elevated blood pressures. [facility] [place] was consulted 3/26 for tachycardia and high blood pressures. Asked to see again for heart rate up to 157 while standing.     # Tachycardia   Pt has no significant past heart history. Denies any type of pain, chest pain, palpitations, shortness of breath. Normotensive. Pt cannot think of anything that changed today. He states he is eating and drinking enough. Denied need for supplements between meals or different options of beverages like Gatorade. Pt does say he feels anxious. Had a benson monitor placed in 2019. According to patient, no abnormalities were found.   - EKG - NSR without signs of left ventricular hypertrophy, ectopy, ischemia, ST or T waves changes, or prolonged QT   - BMP- pending   - CBC- WNL, not anemic  - use hydroxyzine prn anxiety, will schedule a dose to be given now and see if that helps lower heart rate  - conclusion from Vandana Mclean CNP was that: likely transient tachycardia related to acute psychiatric illness, anxiety and insomnia    # Presyncope  Has a history of pre-syncope with workup back in 2019. Causes were found to be decreased sleep, increased caffeine intake, and accidental extra dose of nighttime seroquel. Had a normal CMP, CBC, EKG. Was thought to be from panic attacks.   - Rapid Response. Pt was near the nursing area sitting in a chair getting his blood  drawn. Phlebotomist noted patient leaning to one side and eyes closing. Staff assisted him to the floor and called the Rapid. Prior to rapid, patient heart rate was up in the 150s. Vital signs during rapid he was normotensive and heart rate dropped to the 70s-80s. Pt denies hitting his head, losing consciousness, blurry vision, floaters. Blood sugar normal. Pt was able to answer questions. Appeared very pale and slightly diaphoretic  - Differential diagnosis include situational syncope, vasovagal, cardioinhibitory response, vasodepressor response or a mix from the sudden drop in heart rate or the lab draw itself.   - Had not received any medications close to event.   - Increasing Invega. Adverse effects Per UptoDate: Cardiovascular: Tachycardia (3% to 14%), orthostatic hypotension (IM: <1%; oral: 2% to 4%)    - He should drink a full glass of water with his dose of Invega and take care with position changes after. Patient should be in bed for future lab draws.   - EKG, BMP, CBC noted above    Total time spent on the unit 30 minutes in reviewing the record, examination of patient, lab results and completing documentation. 25 minutes was spent in discussion and counseling with patient concerning diagnosis, medications, lab results and treatment plan. Care discussed and coordinated with patient and nurse.           Diet: Regular Diet Adult    DVT Prophylaxis: Low Risk/Ambulatory with no VTE prophylaxis indicated  Du Catheter: Not present  Central Lines: None  Cardiac Monitoring: None  Code Status: Full Code      Disposition Plan   Expected Discharge: 04/12/2022     Anticipated discharge location: home    Delays: per psychiatry      The patient's care was discussed with the Bedside Nurse and Patient.    SELINA Chung [facility]  Hospitalist Service  [facility]  Securely message with the Vocera Web Console (learn more here)  Text page via [employer] Paging/Directory         Clinically Significant  Risk Factors Present on Admission                    ______________________________________________________________________    Interval History   12 point review of systems negative except for pertinent positives mentioned in the HPI and Assessment and Plan.    Data reviewed today: I reviewed all medications, new labs and imaging results over the last 24 hours. I personally reviewed the EKG tracing showing NSR.    Physical Exam   Vital Signs: Temp: 98.6  F (37  C) Temp src: Oral       Resp: 17 SpO2: 95 % O2 Device: None (Room air)    Weight: 0 lbs 0 oz  Constitutional: awake, alert, cooperative, no apparent distress, and appears stated age  Respiratory: No increased work of breathing, good air exchange, clear to auscultation bilaterally, no crackles or wheezing  Cardiovascular: tachycardic with regular rhythm  Neuropsychiatric: General: normal, calm and normal eye contact    Data   Recent Labs   Lab 03/31/22  1500 03/31/22  1457 03/26/22  1429   WBC 4.0  --  4.1   HGB 15.3  --  15.7   MCV 83  --  84     --  238     --  139   POTASSIUM 3.7  --  3.8   CHLORIDE 106  --  102   CO2 25  --  23   BUN 13  --  9   CR 0.87  --  1.02   ANIONGAP 11  --  14   KURTIS 9.6  --  9.8    98 91   ALBUMIN  --   --  4.7   PROTTOTAL  --   --  7.5   BILITOTAL  --   --  0.7   ALKPHOS  --   --  50   ALT  --   --  15   AST  --   --  15

## 2022-03-31 NOTE — PLAN OF CARE
"Assessment/Intervention/Current Symptoms and Care Coordination  Approached patient in his room where patient was lying in bed awake with lights out and shades drawn.  Patient remains guarded and brief.  He denied any mental health symptoms but continues to appear to be responding to internal stimuli as evidenced by delayed responses and staring at times.  Patient stated that he doesn't think he has a mental illness, \"I'm just stressed.\"  When writer reminded patient of history of mental health symptoms, patient stated 'I don't want to talk about.\"  Patient did report difficulty with social anxiety and a desire to return home.  Patient stated he doesn't like taking medications and feels they are not helpful.  \"I just feel generally not well.\"  Patient signed an CRISTINA for the [facility] Program.      Spoke with RODDY Rodríguez with [facility] [phone].  Patient was placed on their waitlist.  They do not have any current openings and wait time is a couple weeks to a couple months.      Spoke with patient's mother Rolanda [phone] and provided update. Rolanda reported that patient has been less responsive on the phone and when his dad visited on Tuesday.  Left message for patient's Mental Health : Theresa Flores with [facility] [phone] with update.     Discharge Plan or Goal  Home with Navigate program follow-up.  Day treatment or PHP may need to be considered if long wait for Navigate program.     Barriers to Discharge   Symptom stabilization, transition to [facility]     Referral Status  Patient is on the waitlist for [facility]      Important Contacts  Mental Health : Theresa Flores with [facility] [phone]  ROIs on file for mother and father.     Legal Status  Revocation.  Patient is under MI Commitment with [facility] in [place].    LORENA Davis, LICSW 10:26 AM 3/31/2022     "

## 2022-03-31 NOTE — TELEPHONE ENCOUNTER
Writer received a call back from Shonda the  on the unit.  She would like to put patient on the waitlist.  They will check back in when he is ready for discharge.

## 2022-03-31 NOTE — PLAN OF CARE
Problem: Suicidal Behavior  Goal: Suicidal Behavior is Absent or Managed  Outcome: Ongoing, Progressing     Problem: Depression  Goal: Improved Mood  Outcome: Ongoing, Progressing     Problem: Anxiety  Goal: Anxiety Reduction or Resolution  Outcome: Ongoing, Progressing   Goal Outcome Evaluation:    Plan of Care Reviewed With: patient     Patient having elevated heart rate, hospitalitist, consulted , EKG and labs ordered. Patient encourage to increase fluids intake. Denied pain and all psych symptoms and contracted for safety.  Patient medications complaint, no outburst behaviors noted or reported.    Phlebotomist was drawing blood this afternoon then noted patient leaning towards one side and eyes closing. RRT activated then patient was lowered to the floor,vital signs stable. Patient was transported to room via wheelchair for safety. Patient doing okay at this moment, alert and oriented x4.Will continue to monitor, no new orders.

## 2022-04-01 LAB
ATRIAL RATE - MUSE: 91 BPM
DIASTOLIC BLOOD PRESSURE - MUSE: NORMAL MMHG
INTERPRETATION ECG - MUSE: NORMAL
P AXIS - MUSE: 54 DEGREES
PR INTERVAL - MUSE: 136 MS
QRS DURATION - MUSE: 96 MS
QT - MUSE: 340 MS
QTC - MUSE: 418 MS
R AXIS - MUSE: 96 DEGREES
SYSTOLIC BLOOD PRESSURE - MUSE: NORMAL MMHG
T AXIS - MUSE: 27 DEGREES
VENTRICULAR RATE- MUSE: 91 BPM

## 2022-04-01 PROCEDURE — 250N000013 HC RX MED GY IP 250 OP 250 PS 637: Performed by: NURSE PRACTITIONER

## 2022-04-01 PROCEDURE — 128N000001 HC R&B CD/MH ADULT

## 2022-04-01 PROCEDURE — 99232 SBSQ HOSP IP/OBS MODERATE 35: CPT

## 2022-04-01 PROCEDURE — 99232 SBSQ HOSP IP/OBS MODERATE 35: CPT | Performed by: NURSE PRACTITIONER

## 2022-04-01 RX ADMIN — PALIPERIDONE 9 MG: 6 TABLET, EXTENDED RELEASE ORAL at 08:58

## 2022-04-01 RX ADMIN — ESCITALOPRAM OXALATE 15 MG: 5 TABLET, FILM COATED ORAL at 08:57

## 2022-04-01 ASSESSMENT — ACTIVITIES OF DAILY LIVING (ADL)
LAUNDRY: UNABLE TO COMPLETE
ORAL_HYGIENE: INDEPENDENT
DRESS: INDEPENDENT
HYGIENE/GROOMING: INDEPENDENT
DRESS: INDEPENDENT
LAUNDRY: WITH SUPERVISION
HYGIENE/GROOMING: INDEPENDENT
ORAL_HYGIENE: INDEPENDENT

## 2022-04-01 NOTE — PLAN OF CARE
Problem: Relapse Prevention  Goal: Engage in OT Group  Description: Pt will attend at least 2 OT groups this review period.  Outcome: Ongoing, Not Progressing      Problem: Social Interaction  Goal: Communicate Appropriately  Description: Patient will respond in an open and reality based manner.  (Free of guardedness or paranoia.) Target 50% of interactions.  Outcome: Ongoing, Progressing     Goal review completed:     Patient Junior Fernández. Patient has attended and engaged in zero OT group since admission on 3/24/22. Per team report patient has been giving CTC more information but remains very guarded; CTC reports she feels patient is not being forthcoming regarding presence of symptoms because patient does not believe he has a mental illness. Patient has been isolating in his room and appears to be only out for meals. Patient entered one OT group during introduction, looked at the sample project on the table, and left. Patient will benefit from further focus on goal as he prepares for a safe discharge into the community. Care plan goals have been reviewed and are appropriate. Continue to establish rapport and encourage group participation with focus on goal area/s for further progress. Continue to correspond with interdisciplinary team regarding ongoing treatment plan, potential changes in patient's status, and discharge planning.    Therapist: Shaye Bustillo OT  4/1/2022

## 2022-04-01 NOTE — PROGRESS NOTES
"PSYCHIATRY  PROGRESS NOTE     DATE OF SERVICE   4/1/2022         CHIEF COMPLAINT   \"doing better\"       SUBJECTIVE   Nursing reports : Pt denied anxiety,depression, SI,HI, contracts for safety. Pt is medication compliant, staff will continue to monitor.     reports: Assessment/Intervention/Current Symptoms and Care Coordination Writer met with Cam, introduced self and role as coverage worker for his primary . Patient states, \"I feel ok, I get anxious here and there\". Per chart: pt seen by hospitalist due to Rapid Response yesterday. Writer discussed discharge plans to attend [facility], and informed him of unknown wait time to start. Writer offered to look for another program for him in the area where he lives near [facility]. Writer will explore options and discuss with patient and family. No other questions/concerns at this time         OBJECTIVE   Chart reviewed and patient assessed. Patient was seen and evaluated in his room by himself while lying in bed resting.  Patient presents as calm, pleasant paranoid and guarded. Patient states he is doing much better physically, saying he has not experienced any dizziness of feeling of passing out since last evening. Patient states he slept better last night compared to previous days. Patient who reports having lots going on in his mind, declined to share his thoughts with the writer despite much encouragements. Patient states what is on his mind are not symptoms or medication related, saying he is able to deal with it by himself. Patient does mention a times he feels as though certain individuals hating on him but will not elaborate further. Patient appears to be responding to internal stimuli as he continues to smile and laugh with himself. He denies suicidal and homicidal ideations. He also denies both auditory and visual hallucinations. Patient endorses anxiety and mild depression related to being in the hospital. Invega titrated " to 9 mg daily targeting mood, perception,thinking and behaviors.       MEDICATIONS   Medications:  Scheduled Meds:    escitalopram  15 mg Oral Daily     paliperidone  9 mg Oral Daily     Continuous Infusions:  PRN Meds:.acetaminophen, alum & mag hydroxide-simethicone, hydrALAZINE, hydrOXYzine, OLANZapine **OR** OLANZapine, senna-docusate, traZODone    Medication adherence issues: MS Med Adherence Y/N: No  Medication side effects: MEDICATION SIDE EFFECTS: no side effects reported  Benefit: Yes / No: Yes       ROS   A comprehensive review of systems was negative.       MENTAL STATUS EXAM   Vitals: /72   Pulse 96   Temp 98.3  F (36.8  C)   Resp 18   SpO2 96%     Appearance:  Casually groomed  Mood:  {Mood: Depressed   Affect: blunted  was congruent to speech  Suicidal Ideation: PRESENT / ABSENT: absent   Homicidal Ideation: PRESENT / ABSENT: absent   Thought process: linear,no loose association noted   Thought content: denies suicidal and homicidal ideation, no delusion though endorses paranoid ideation.   Fund of Knowledge: Average  Attention/Concentration: Fair  Language ability:  Intact  Memory:  Immediate recall intact, Short-term memory intact and Long-term memory intact  Insight:  limited.  Judgement: limited  Orientation: Yes, x4  Psychomotor Behavior: denies tardive dyskinesia, dystonia or tics  Muscle Strength and Tone: MuscleStrength: Normal  Gait and Station: Normal       LABS   personally reviewed.     No results found for: PHENYTOIN, PHENOBARB, VALPROATE, CBMZ       DIAGNOSIS   Principal Problem:    Schizoaffective disorder, bipolar type (H)    Active Problem List:  Patient Active Problem List   Diagnosis     History of substance use disorder     Psychosis, unspecified psychosis type (H)     Schizoaffective disorder, bipolar type (H)     Tobacco use disorder     Left arm numbness     Schizophrenia (H)     Anxiety     Sinus tachycardia     Other insomnia          PLAN   1. Ongoing education given  regarding diagnostic and treatment options with risks, benefits and alternatives and adequate verbalization of understanding.  2. Medications: Invega 6 mg daily 3/28 discontinued 3/31/22,invega 9 mg daily starting 4/1                            Lexapro 15 mg daily starting 3/29  3. Hospitalist consult: Hospitalist to see patient as needed  4. Legal: Committed and Nguyen  5.  to follow regarding collecting and reviewing collateral information, referrals and disposition planning      Risk Assessment: [facility] RISK ASSESSMENT: Patient able to contract for safety and Patient on precautions    Coordination of Care:   Treatment Plan reviewed and physician signed, Care discussed with Care/Treatment Team Members, Chart reviewed and Patient seen      Re-Certification I certify that the inpatient psychiatric facility services furnished since the previous certification were, and continue to be, medically necessary for, either, treatment which could reasonably be expected to improve the patient s condition or diagnostic study and that the hospital records indicate that the services furnished were, either, intensive treatment services, admission and related services necessary for diagnostic study, or equivalent services.     I certify that the patient continues to need, on a daily basis, active treatment furnished directly by or requiring the supervision of inpatient psychiatric facility personnel.   I estimate about 10 days days of hospitalization is necessary for proper treatment of the patient. My plans for post-hospital care for this patient are  home with family     SELINA Gates CNP    -     2/1/2022     -     2:29 PM    Total time  25 minutes with > 50%spent on coordination of cares and psycho-education.    This note was created with help of Dragon dictation system. Grammatical / typing errors are not intentional.    SELINA Gates CNP

## 2022-04-01 NOTE — PROGRESS NOTES
[facility]    Medicine Progress Note - Hospitalist Service    Date of Admission:  3/24/2022    Assessment & Plan        Junior Fernández is a 21 year old male admitted on 3/24/2022 to [place] inpatient behavioral health unit for management of schizo affective disorder, bipolar type with episode of aggression and psychosis.  Presented to [facility] emergency department on 3/23/2022 after patient's parents were concerned about aggressive and paranoid behaviors.  Past medical history includes also includes anxiety, insomnia, vitamin D deficiency, elevated heart rate and elevated blood pressures. [facility] was consulted 3/26 for tachycardia and high blood pressures. Asked to see again for heart rate up to 157 while standing.      # Tachycardia   Pt has no significant past heart history. Denies any type of pain, chest pain, palpitations, shortness of breath. Normotensive. Pt cannot think of anything that changed today. He states he is eating and drinking enough. Denied need for supplements between meals or different options of beverages like Gatorade. Pt does say he feels anxious. Had a benson monitor placed in 2019. According to patient, no abnormalities were found.   - HR returned to a normal rate during rapid response 3/31   - 4/1 HR back in the one teens- continue to monitor  - EKG - NSR without signs of left ventricular hypertrophy, ectopy, ischemia, ST or T waves changes, or prolonged QT   - BMP- normal electrolytes and kidney function   - CBC- WNL, not anemic  - use hydroxyzine prn anxiety  - conclusion from Vandana Mclean CNP was that: likely transient tachycardia related to acute psychiatric illness, anxiety and insomnia. Could also be related to psych medications     # Presyncope  Has a history of pre-syncope with workup back in 2019. Causes were found to be decreased sleep, increased caffeine intake, and accidental extra dose of nighttime seroquel. Had a normal CMP, CBC, EKG. Was thought to  be from panic attacks.   - Rapid Response 3/31 around 1500. Pt was near the nursing area sitting in a chair getting his blood drawn. Phlebotomist noted patient leaning to one side and eyes closing. Staff assisted him to the floor and called the Rapid. Prior to rapid, patient heart rate was up in the 150s. Vital signs during rapid he was normotensive and heart rate dropped to the 70s-80s. Pt denies hitting his head, losing consciousness, blurry vision, floaters. Blood sugar normal. Pt was able to answer questions. Appeared very pale and slightly diaphoretic  - orthostatic blood pressures Q shift.   - Differential diagnosis include situational syncope, vasovagal, cardioinhibitory response, vasodepressor response or a mix from the sudden drop in heart rate or the lab draw itself.   - Had not received any medications close to event.   - Increasing Invega to 9 mg 4/1. Adverse effects Per UptoDate: Cardiovascular: Tachycardia (3% to 14%), orthostatic hypotension (IM: <1%; oral: 2% to 4%).     - He should drink a full glass of water with his dose of Invega and take care with position changes after. Patient should be in bed for future lab draws.   - EKG, BMP, CBC noted above    Total time spent on the unit 20 minutes in reviewing the record, examination of patient, lab results and completing documentation. 5 minutes was spent in discussion and counseling with patient concerning diagnosis, medications, lab results and treatment plan. Care discussed and coordinated with patient and nurse.        Diet: Regular Diet Adult    DVT Prophylaxis: Low Risk/Ambulatory with no VTE prophylaxis indicated  Du Catheter: Not present  Central Lines: None  Cardiac Monitoring: None  Code Status: Full Code      Disposition Plan   Expected Discharge: 04/12/2022     Anticipated discharge location: home    Delays: per psychiatry team, as long as no more pre syncopal episodes     The patient's care was discussed with the Patient.    Lachelle BETANCUR  SELINA Sousa CNP  Hospitalist Service  [facility]  Securely message with the Engage Web Console (learn more here)  Text page via [employer] Paging/Directory         Clinically Significant Risk Factors Present on Admission                    ______________________________________________________________________    Interval History   12 point review of systems negative except for pertinent positives mentioned in the HPI and Assessment and Plan.    Data reviewed today: I reviewed all medications, new labs and imaging results over the last 24 hours. I personally reviewed no images or EKG's today.    Physical Exam   Vital Signs: Temp: 98.3  F (36.8  C) Temp src: Oral       Resp: 18 SpO2: 96 % O2 Device: None (Room air)    Weight: 0 lbs 0 oz  Constitutional: awake, alert, cooperative, no apparent distress, and appears stated age  Respiratory: No increased work of breathing, good air exchange, clear to auscultation bilaterally, no crackles or wheezing  Cardiovascular: Normal apical impulse, regular rate and rhythm, normal S1 and S2, no S3 or S4, and no murmur noted  Musculoskeletal: There is no redness, warmth, or swelling of the joints.  Full range of motion noted.  Motor strength is 5 out of 5 all extremities bilaterally.  Tone is normal.  Neurologic: Awake, alert, oriented to name, place and time.  Cranial nerves II-XII are grossly intact.  Motor is 5 out of 5 bilaterally.  Cerebellar finger to nose, heel to shin intact.  Sensory is intact.  Babinski down going, Romberg negative, and gait is normal.  Neuropsychiatric: General: normal, calm and normal eye contact    Data   Recent Labs   Lab 03/31/22  1500 03/31/22  1457 03/26/22  1429   WBC 4.0  --  4.1   HGB 15.3  --  15.7   MCV 83  --  84     --  238     --  139   POTASSIUM 3.7  --  3.8   CHLORIDE 106  --  102   CO2 25  --  23   BUN 13  --  9   CR 0.87  --  1.02   ANIONGAP 11  --  14   KURTIS 9.6  --  9.8    98 91   ALBUMIN  --    --  4.7   PROTTOTAL  --   --  7.5   BILITOTAL  --   --  0.7   ALKPHOS  --   --  50   ALT  --   --  15   AST  --   --  15      [id number] Comprehensive

## 2022-04-01 NOTE — PROGRESS NOTES
04/01/22 1315   Engagement   Intervention Group   Topic Detail OT: Education on healthy leisure engagement and social engagement with interactive social activities (bowling & bag toss) to increase coping with stress, symptom management, healthy distraction engagement, physical movement, social engagement, healthy leisure engagement, and prosocial behavior   Attendance Did not attend   Reason for Not Attending Refused     Did not attend after face to face invite.

## 2022-04-01 NOTE — PLAN OF CARE
Problem: Behavioral Health Plan of Care  Goal: Adheres to Safety Considerations for Self and Others  Outcome: Ongoing, Progressing     Problem: Sleep Disturbance  Goal: Adequate Sleep/Rest  Outcome: Ongoing, Progressing    No c/o pain or discomfort this shift. Pt. Slept greater than 6 hours. Safety checks completed every 15 minutes per policy. Suicide and Cheeking Precautions continued. No suicidal ideation reported this shift. No medications given this shift.

## 2022-04-01 NOTE — PLAN OF CARE
"Assesssment/Intervention/Current Symptoms and Care Coordination  Writer met with Cam, introduced self and role as coverage worker for his primary . Patient states, \"I feel ok, I get anxious here and there\". Per chart: pt seen by hospitalist due to Rapid Response yesterday. Writer discussed discharge plans to attend [facility] Program, and informed him of unknown wait time to start. Writer offered to look for another program for him in the area where he lives near [facility]. Writer will explore options and discuss with patient and family. No other questions/concerns at this time.      Discharge Plan or Goal  Home with Navigate program follow-up.  Day treatment or PHP may need to be considered if long wait for Navigate program.     Barriers to Discharge   Symptom stabilization, transition to LA[facility], Care Coordination     Referral Status  Patient is on the waitlist for [facility]: [phone]      Important Contacts  Mental Health : Theresa Flores with [facility] [phone]  ROIs on file for mother:  Rolanda [phone] and father.  CRISTINA for the Navigate Program.       Legal Status  Revocation.  Patient is under MI Commitment with [facility] in [place]  "

## 2022-04-01 NOTE — PROGRESS NOTES
Pt denied anxiety,depression, SI,HI, contracts for safety. Pt is medication compliant, staff will continue to monitor.

## 2022-04-01 NOTE — PLAN OF CARE
Problem: Suicidal Behavior  Goal: Suicidal Behavior is Absent or Managed  Outcome: Ongoing, Progressing   Goal Outcome Evaluation:

## 2022-04-01 NOTE — PROGRESS NOTES
04/01/22 0915   Engagement   Intervention Group   Topic Detail OT: Education on social and cognitive wellness with interactive social activities (Belen Landis & Larry) to increase concentration, focus, attention to task/detail, learning a new task, sequencing, healthy leisure engagement, reminiscing, sharing about oneself, and social engagement   Attendance Did not attend

## 2022-04-02 PROCEDURE — 128N000001 HC R&B CD/MH ADULT

## 2022-04-02 PROCEDURE — 90853 GROUP PSYCHOTHERAPY: CPT

## 2022-04-02 PROCEDURE — 250N000013 HC RX MED GY IP 250 OP 250 PS 637: Performed by: NURSE PRACTITIONER

## 2022-04-02 RX ADMIN — PALIPERIDONE 9 MG: 6 TABLET, EXTENDED RELEASE ORAL at 09:16

## 2022-04-02 ASSESSMENT — ACTIVITIES OF DAILY LIVING (ADL)
ORAL_HYGIENE: INDEPENDENT
ORAL_HYGIENE: INDEPENDENT;PROMPTS;WITH ASSISTANCE
HYGIENE/GROOMING: INDEPENDENT
LAUNDRY: WITH SUPERVISION
DRESS: INDEPENDENT
HYGIENE/GROOMING: HANDWASHING;INDEPENDENT;PROMPTS
DRESS: SCRUBS (BEHAVIORAL HEALTH)

## 2022-04-02 NOTE — PROGRESS NOTES
04/02/22 1058   Engagement   Intervention Group   Topic Detail OT: Positive affirmation calendars for insight development, strategy for coping with symptoms, opportunity for healthy leisure, socializing, and meaningful activity.   Attendance Did not attend   Reason for Not Attending Refused

## 2022-04-02 NOTE — PLAN OF CARE
Problem: Suicidal Behavior  Goal: Suicidal Behavior is Absent or Managed  Outcome: Ongoing, Progressing     Problem: Depression  Goal: Improved Mood  Outcome: Ongoing, Progressing     Problem: Anxiety  Goal: Anxiety Reduction or Resolution  Outcome: Ongoing, Progressing     Problem: Sleep Disturbance  Goal: Adequate Sleep/Rest  Outcome: Ongoing, Progressing   Goal Outcome Evaluation:    Plan of Care Reviewed With: patient      Pt endorses anxiety and rates at 6/10, depression 7/10, but denies the remainder of psych symptoms and contracts for safety. He declined intervention for anxiety. Pt is isolative in room all shift, but comes out for meals. Pt is pleasant on approach. He remains calm and cooperative. No concerns noted or verbalized this time. Will continue with same plan of care.

## 2022-04-02 NOTE — PLAN OF CARE
Problem: Behavioral Health Plan of Care  Goal: Adheres to Safety Considerations for Self and Others  Intervention: Develop and Maintain Individualized Safety Plan  Recent Flowsheet Documentation  Taken 4/2/2022 0235 by Mandi Olivarez, RN  Safety Measures:   environmental rounds completed   safety rounds completed     Problem: Suicidal Behavior  Goal: Suicidal Behavior is Absent or Managed  Outcome: Ongoing, Progressing     Problem: Sleep Disturbance  Goal: Adequate Sleep/Rest  Outcome: Ongoing, Progressing  Intervention: Promote Sleep/Rest  Recent Flowsheet Documentation  Taken 4/2/2022 0235 by Mandi Olivarez, RN  Sleep/Rest Enhancement: noise level reduced   Goal Outcome Evaluation:           Patient had 7 hrs of sleep. Safety interventions maintained throughout shift. No precautionary behaviors noted. Will continue to monitor.

## 2022-04-02 NOTE — PLAN OF CARE
04/02/22 1340   Group Therapy Session   Group Attendance attended group session   Time Session Began 1115   Time Session Ended 1200   Total Time patient participated (minutes) 10   Total # Attendees 5   Group Type psychoeducation   Group Topic Covered coping skills/lifestyle management   Group Session Detail Grounding   Patient Response/Contribution cooperative with task;did not share thoughts verbally;other (see comments)   Patient Response Detail Patient accepted handout left after check in

## 2022-04-03 PROCEDURE — 128N000001 HC R&B CD/MH ADULT

## 2022-04-03 PROCEDURE — 250N000013 HC RX MED GY IP 250 OP 250 PS 637: Performed by: NURSE PRACTITIONER

## 2022-04-03 RX ADMIN — ESCITALOPRAM OXALATE 15 MG: 5 TABLET, FILM COATED ORAL at 08:07

## 2022-04-03 RX ADMIN — PALIPERIDONE 9 MG: 6 TABLET, EXTENDED RELEASE ORAL at 08:06

## 2022-04-03 ASSESSMENT — ACTIVITIES OF DAILY LIVING (ADL)
ORAL_HYGIENE: INDEPENDENT
DRESS: SCRUBS (BEHAVIORAL HEALTH)
HYGIENE/GROOMING: INDEPENDENT
ORAL_HYGIENE: INDEPENDENT
HYGIENE/GROOMING: INDEPENDENT
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
## 2022-04-03 NOTE — PLAN OF CARE
Problem: Suicidal Behavior  Goal: Suicidal Behavior is Absent or Managed  Outcome: Ongoing, Progressing  Intervention: Provide Immediate and Ongoing Protective Physical Environment  Recent Flowsheet Documentation  Taken 4/3/2022 0200 by Mandi Olivarez RN  Safe Transition Promotion: protective factors promoted     Problem: Behavioral Health Plan of Care  Goal: Adheres to Safety Considerations for Self and Others  Intervention: Develop and Maintain Individualized Safety Plan  Recent Flowsheet Documentation  Taken 4/3/2022 0200 by Mandi Olivarez, RN  Safety Measures:    environmental rounds completed    safety rounds completed     Problem: Sleep Disturbance  Goal: Adequate Sleep/Rest  Intervention: Promote Sleep/Rest  Recent Flowsheet Documentation  Taken 4/3/2022 0200 by Mandi Olivarez, RN  Sleep/Rest Enhancement:    noise level reduced    comfort measures   Goal Outcome Evaluation:          Patient had 7 hrs of sleep. Safety interventions maintained throughout shift. No precautionary behaviors noted. Will continue to monitor

## 2022-04-03 NOTE — PLAN OF CARE
Patient denies  SI/HI depression and anxiety and has minimal interaction with others. Affect is flat, sad and he needs encouragement to remain med compliant and increase his nutritional intake and share feelings /stressors versus bottle ing up inside- we continue to offer frequent 1:1's   Problem: Depression  Goal: Improved Mood  Outcome: Ongoing, Progressing  Intervention: Monitor and Manage Depressive Symptoms  Recent Flowsheet Documentation  Taken 4/3/2022 1100 by Steven Duarte, RN  Supportive Measures: active listening utilized   Goal Outcome Evaluation:    Plan of Care Reviewed With: patient

## 2022-04-04 PROCEDURE — 99233 SBSQ HOSP IP/OBS HIGH 50: CPT | Performed by: NURSE PRACTITIONER

## 2022-04-04 PROCEDURE — 250N000013 HC RX MED GY IP 250 OP 250 PS 637: Performed by: NURSE PRACTITIONER

## 2022-04-04 PROCEDURE — 128N000001 HC R&B CD/MH ADULT

## 2022-04-04 RX ORDER — ESCITALOPRAM OXALATE 10 MG/1
20 TABLET ORAL DAILY
Status: DISCONTINUED | OUTPATIENT
Start: 2022-04-05 | End: 2022-04-11 | Stop reason: HOSPADM

## 2022-04-04 RX ADMIN — ESCITALOPRAM OXALATE 15 MG: 5 TABLET, FILM COATED ORAL at 08:07

## 2022-04-04 RX ADMIN — PALIPERIDONE 9 MG: 6 TABLET, EXTENDED RELEASE ORAL at 08:07

## 2022-04-04 ASSESSMENT — ACTIVITIES OF DAILY LIVING (ADL)
ORAL_HYGIENE: INDEPENDENT
ORAL_HYGIENE: INDEPENDENT
DRESS: SCRUBS (BEHAVIORAL HEALTH)
HYGIENE/GROOMING: INDEPENDENT
HYGIENE/GROOMING: INDEPENDENT

## 2022-04-04 NOTE — PLAN OF CARE
Problem: Depression  Goal: Improved Mood  Outcome: Ongoing, Progressing     Problem: Behavioral Health Plan of Care  Goal: Adheres to Safety Considerations for Self and Others  Outcome: Ongoing, Progressing  Intervention: Develop and Maintain Individualized Safety Plan  Recent Flowsheet Documentation  Taken 4/4/2022 1100 by Radha Ma RN  Safety Measures: safety rounds completed   Goal Outcome Evaluation:    Plan of Care Reviewed With: patient        Pt Med compliant. Pt observed to have flat affect. Pt behavior observed as cooperative.  Pt was visible on the unit.. Pt was A/O and thought content observed to be clear and organized. Pt rated depression 2/10, anxiety 2/10 and no pain. Pt denied having SI or HI. Pt denied having hallucinations and all other psych symptoms. Pt contracted for safety.  Pt encouraged to seek out staff with any questions or concerns that pt has. Pt remains on 15 minute checks for safety and monitoring at this time.
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
## 2022-04-04 NOTE — PLAN OF CARE
Problem: Depression  Goal: Improved Mood  4/4/2022 1659 by Radha Ma, RN  Outcome: Ongoing, Progressing  4/4/2022 1140 by Radha Ma RN  Outcome: Ongoing, Progressing     Problem: Anxiety  Goal: Anxiety Reduction or Resolution  4/4/2022 1659 by Radha Ma, RN  Outcome: Ongoing, Progressing  4/4/2022 1140 by Radha Ma RN  Outcome: Ongoing, Progressing   Goal Outcome Evaluation:    Plan of Care Reviewed With: patient      Pt observed to have flat affect. Pt behavior observed as cooperative and calm. Pt napped in room at the beginning of shift.  Pt was A/O and thought content observed to be clear and organized. Pt rated depression and anxiety as low and no pain. Pt denied having SI or HI. Pt denied having hallucinations and all other psych symptoms. Pt contracted for safety. No negative behavior observed this shift. Pt remains on 15 minute checks for safety and monitoring at this time.

## 2022-04-04 NOTE — PROGRESS NOTES
Pt slept through the shift without any problems. Checked patient every 15 minutes, he remained safe. Will continue to monitor patient.

## 2022-04-04 NOTE — PROVIDER NOTIFICATION
04/04/22 1300   Engagement   Intervention Group   Topic Detail SW Process   Attendance Attended   Patient Response Expressed feelings/issues   Concentrated on Task 15 - 30 min   Cognition Preoccupied   Mood/Affect Flat   Social/Behavioral Cooperative   Thought Content [place]     GROUP LENGTH (MINS):   35   RELEVANT PRIMARY DIAGNOSIS: Patient Active Problem List   Diagnosis     History of substance use disorder     Psychosis, unspecified psychosis type (H)     Schizoaffective disorder, bipolar type (H)     Tobacco use disorder     Left arm numbness     Schizophrenia (H)     Anxiety     Sinus tachycardia     Other insomnia        TREATMENT MODALITY:      []CBT       []DBT       []ACT       []Interpersonal psychotherapy                                 [x]Psychoeducational therapy       [x]Skill development       []Other:        ATTENDANCE:        []Stayed for entire group     []Arrived late    [x] Left early       # OF PATIENTS IN GROUP: 8   BILLABLE:     []Yes            [x]No   Notes:

## 2022-04-04 NOTE — PLAN OF CARE
Problem: Suicidal Behavior  Goal: Suicidal Behavior is Absent or Managed  Outcome: Ongoing, Progressing     Problem: Depression  Goal: Improved Mood  Outcome: Ongoing, Progressing     Problem: Anxiety  Goal: Anxiety Reduction or Resolution  Outcome: Ongoing, Progressing     Problem: Behavioral Health Plan of Care  Goal: Plan of Care Review  Outcome: Ongoing, Progressing  Flowsheets (Taken 4/3/2022 1700)  Plan of Care Reviewed With: patient  Patient Agreement with Plan of Care: agrees     Problem: Sleep Disturbance  Goal: Adequate Sleep/Rest  Outcome: Ongoing, Progressing   Goal Outcome Evaluation:    Plan of Care Reviewed With: patient      Pt denies pain, all psych symptoms and contracts for safety. Up and visible on the unit attending group, but not engaging with peers and staff. Pt is pleasant to approach, affect is flat and blunted. He remains calm and cooperative. No behavioral concerns noted thus far. Writer received a call from pt's mom who stated that pt called her and told her that voices from the TV are telling him that he is going to die tomorrow. That pt also stated that he does not want to bother staff that is why he keeps to himself and does not seek staff for anything. Writer informed pt's mom to encourage pt to seek staff with any concerns for that is what staff is here for. Writer also promised to pass it on so staff could encourage pt to speak out free. Pt could not be contacted at this time because he was in bed already. No other concerns noted or verbalized.

## 2022-04-05 PROCEDURE — 99232 SBSQ HOSP IP/OBS MODERATE 35: CPT | Performed by: NURSE PRACTITIONER

## 2022-04-05 PROCEDURE — 250N000013 HC RX MED GY IP 250 OP 250 PS 637: Performed by: NURSE PRACTITIONER

## 2022-04-05 PROCEDURE — 128N000001 HC R&B CD/MH ADULT

## 2022-04-05 RX ADMIN — ESCITALOPRAM OXALATE 20 MG: 10 TABLET ORAL at 10:08

## 2022-04-05 ASSESSMENT — ACTIVITIES OF DAILY LIVING (ADL)
ORAL_HYGIENE: INDEPENDENT
ORAL_HYGIENE: INDEPENDENT
DRESS: INDEPENDENT
HYGIENE/GROOMING: INDEPENDENT
HYGIENE/GROOMING: INDEPENDENT
DRESS: INDEPENDENT

## 2022-04-05 NOTE — PLAN OF CARE
Goal Outcome Evaluation:      Patient approached with AM Escitalopram; pt stated he did not want to take the medication because he is not depressed. Writer encouraged compliance and educated patient about the possible side effects of abruptly stopping an anti-depressant. Patient continued to refuse.   Thai Finley RN

## 2022-04-05 NOTE — PLAN OF CARE
Assesssment/Intervention/Current Symptoms and Care Coordination  Writer met with patient today, and explained [facility] options. Pt agreeable to signing ROIs. Pt was observed pacing the halls and appeared to be experiencing internal stimuli. Patient presented as calm and cooperative.   Writer Left message with [facility] in   [place] [phone] to inquire about intake process and availability.  Writer connected with pt's mother and provided an update. [facility] and [facility] was discussed, and she is in agreement.       Discharge Plan or Goal  Home with Navigate program follow-up.  Day treatment or PHP may need to be considered if long wait for Navigate program.     Barriers to Discharge   Symptom stabilization, transition to LAIM, Care Coordination     Referral Status  Patient is on the waitlist for [facility]: [phone]      Important Contacts  Mental Health : Theresa Flores with [facility] [phone]  ROIs on file for mother:  Rolanda [phone] and father.  CRISTINA for the Navigate Program.      Psychiatrist: [facility], [id number], [address], [place] [address].  [phone]  Therapist: [facility]     Legal Status  Revocation.  Patient is under MI Commitment with [facility] in [place]

## 2022-04-05 NOTE — PLAN OF CARE
"Problem: Suicidal Behavior  Goal: Suicidal Behavior is Absent or Managed  Outcome: Ongoing, Progressing     Problem: Anxiety  Goal: Anxiety Reduction or Resolution  Outcome: Ongoing, Progressing     Problem: Sleep Disturbance  Goal: Adequate Sleep/Rest  Outcome: Ongoing, Progressing   Goal Outcome Evaluation:    1930-0730 Pt up on the unit ad peggy this evening between room and lounge. Denied all psych symptoms or pain, reported feeling \"slow\" after receiving his first dose of IM Invega today. No HS scheduled medications and denied the need for PRN's. Pt contracted for safety. Pt remains on 15 min rounding her unit protocol.      "

## 2022-04-05 NOTE — PLAN OF CARE
Problem: Behavioral Health Plan of Care  Goal: Plan of Care Review  Recent Flowsheet Documentation  Taken 4/5/2022 1000 by Thai Finley RN  Plan of Care Reviewed With: patient  Patient Agreement with Plan of Care: agrees   Goal Outcome Evaluation:    Plan of Care Reviewed With: patient        Patient calm and cooperative; pleasant on approach. Anxiety and depression 0/10; denies all other psych symptoms. Noted to be responding to internal stimuli at times. Denies pain. Isolative and withdrawn. Present on unit for meals only. Initially refused am medication but was agreeable after talking to provider. Uneventful shift. Will continue to monitor.  Thai Finley RN

## 2022-04-05 NOTE — PROGRESS NOTES
"   04/05/22 1315   Engagement   Intervention Group   Topic Detail OT: Education on healthy leisure activities with creative hands on endeavor (fuse bead project) for creative expression, organization/planning, fine motor skills, coping, relaxation, building self-esteem, reality based activity, symptom management, and an opportunity for socialization   Attendance Attended   Patient Response Demonstrated understanding of materials provided;Was respectful   Concentrated on Task duration of group   Cognition Goal-directed;Follows through with task   Mood/Affect Content   Social/Behavioral Self-talk/hallucinating;Cooperative   Thought Content [place]     Pt reported during check-in he enjoys \"playing video games\" a healthy activity on a rainy day. Pt sat among peers to complete project but did not engage in social interactions with peers. Pt intermittently appeared internally stimulated as he was observed to be to smiling, despite not engaging with peers. Pt reported to therapist at the end of group he enjoyed keeping himself busy during group, even though he wasn't able to complete his project. Pt declined to problem solve way to complete project with 5 minutes left and elected to dump his beads. Pt progressing towards goal.         "

## 2022-04-05 NOTE — PROGRESS NOTES
Pt was pleasant and mildly invested in the group activities.  Pt remained for the full group session and demonstrated an understanding of all of the activities after a quick demonstration was given.       04/05/22 1123   Engagement   Intervention Group   Topic Detail Social questions, Left/Center/Right, and Chips games for coping, symptom management, turn taking/expanding social skills, delayed gratification, frustration tolerance, and an opportunity to experience success   Attendance Attended   Patient Response Demonstrated understanding of materials provided;Accepted feedback;Was respectful   Concentrated on Task duration of group   Cognition Goal-directed   Mood/Affect Pleasant   Social/Behavioral Engaged

## 2022-04-06 PROCEDURE — 99232 SBSQ HOSP IP/OBS MODERATE 35: CPT | Performed by: NURSE PRACTITIONER

## 2022-04-06 PROCEDURE — 250N000013 HC RX MED GY IP 250 OP 250 PS 637: Performed by: NURSE PRACTITIONER

## 2022-04-06 PROCEDURE — 128N000001 HC R&B CD/MH ADULT

## 2022-04-06 RX ADMIN — ESCITALOPRAM OXALATE 20 MG: 10 TABLET ORAL at 09:15

## 2022-04-06 ASSESSMENT — ACTIVITIES OF DAILY LIVING (ADL)
DRESS: INDEPENDENT
HYGIENE/GROOMING: INDEPENDENT
ORAL_HYGIENE: INDEPENDENT
LAUNDRY: WITH SUPERVISION
HYGIENE/GROOMING: INDEPENDENT

## 2022-04-06 NOTE — PROGRESS NOTES
Pt had a quiet day shift. Out early for am group but then rested off and on in room after that. Out for meals. Pt continues to deny any depression or anxiety. Denies SI. Guarded but cooperative.     Appetite good. Pt suspicious of staff at times but was med compliant and follows unit rules.

## 2022-04-06 NOTE — PROGRESS NOTES
04/06/22 1315   Engagement   Intervention Group   Topic Detail OT: Education on attention to task and creative hands on endeavor (tie pillows) to increase concentration, attention to task/detail, sequencing, following directions, problem solving, healthy leisure engagement, coping with stress, healthy distraction engagement, and social engagement   Attendance Did not attend   Reason for Not Attending Refused     Pt initially stated he wanted to attend group after being advised of activity. Pt entered group area and left before check-in process could begin. Pt did not return.

## 2022-04-06 NOTE — PROGRESS NOTES
"PSYCHIATRY  PROGRESS NOTE     DATE OF SERVICE   4/6/2022         CHIEF COMPLAINT   \"Everyhing is good\"       SUBJECTIVE   Nursing reports :  Patient refused his scheduled Lexapro despite encouragement. Patient attended community meeting. He denies all psych symtoms.      reports: Assessment/Intervention/Current Symptoms and Care Coordination I have reviewed patient with the  earlier today and the plan remains for patient discharge back home after transitioning to [facility]. Outpatient appointments for medication management and Therapy made today.         OBJECTIVE   Chart reviewed and patient assessed. Patient was seen and evaluated in the day area while attending the community meeting. Patient was excused out of the meeting and went back to the meeting after the completion of my assessment. Patient presents as calm, pleasant and less guarded and internally stimulated.  Patient observed smiling with himself during this meeting. When writer inquired why about his smiling presentation, he stated \"it's just a random thought on my head\". When writer encouraged him to share thought, he stated he just thought about the reason he came to the hospital and declined to explain further. Patient denies suicidal and homicidal ideations. He also denies both auditory and visual hallucinations. Of note, writer was present when patient refused to take his Lexapro when the primary nurse offered him this morning, saying he does not it. Patient insisted he would not take the lexapro despite education and encouragement by this writer and the primary nurse. Patient later told writer he can only take Laxapro if writer allows him to leave this week of which he already knew leaving this week is not realistic and not part of discharge plan.          MEDICATIONS   Medications:  Scheduled Meds:    escitalopram  20 mg Oral Daily     [START ON 4/11/2022] paliperidone  156 mg Intramuscular Once     [START ON 5/9/2022] " paliperidone  156 mg Intramuscular q28 days     Continuous Infusions:  PRN Meds:.acetaminophen, alum & mag hydroxide-simethicone, hydrALAZINE, hydrOXYzine, OLANZapine **OR** OLANZapine, senna-docusate, traZODone    Medication adherence issues: MS Med Adherence Y/N: No  Medication side effects: MEDICATION SIDE EFFECTS: no side effects reported  Benefit: Yes / No: Yes       ROS   A comprehensive review of systems was negative.       MENTAL STATUS EXAM   Vitals: /76 (BP Location: Right arm, Patient Position: Sitting, Cuff Size: Adult Regular)   Pulse 87   Temp 97.7  F (36.5  C) (Oral)   Resp 18   SpO2 96%     Appearance:  Casually groomed  Mood:  {Mood: Depressed   Affect: blunted  was congruent to speech  Suicidal Ideation: PRESENT / ABSENT: absent   Homicidal Ideation: PRESENT / ABSENT: absent   Thought process: linear,no loose association noted   Thought content: denies suicidal and homicidal ideation, no delusion though endorses paranoid ideation.   Fund of Knowledge: Average  Attention/Concentration: Fair  Language ability:  Intact  Memory:  Immediate recall intact, Short-term memory intact and Long-term memory intact  Insight:  limited.  Judgement: limited  Orientation: Yes, x4  Psychomotor Behavior: denies tardive dyskinesia, dystonia or tics  Muscle Strength and Tone: MuscleStrength: Normal  Gait and Station: Normal       LABS   personally reviewed.     No results found for: PHENYTOIN, PHENOBARB, VALPROATE, CBMZ       DIAGNOSIS   Principal Problem:    Schizoaffective disorder, bipolar type (H)    Active Problem List:  Patient Active Problem List   Diagnosis     History of substance use disorder     Psychosis, unspecified psychosis type (H)     Schizoaffective disorder, bipolar type (H)     Tobacco use disorder     Left arm numbness     Schizophrenia (H)     Anxiety     Sinus tachycardia     Other insomnia          PLAN   1. Ongoing education given regarding diagnostic and treatment options with risks,  benefits and alternatives and adequate verbalization of understanding.  2. Medications: Invega 6 mg daily 3/28 discontinued 3/31/22,invega 9 mg daily starting 4/1. Discontinued 4/5 after receiving the first                          invega shot.                            Invega 234 mg shot was administered this day 4/5/22                            Lexapro 15 mg daily starting 3/29, titrated to 20 mg starting 4/5/22  3. Hospitalist consult: Hospitalist to see patient as needed  4. Legal: Committed and Nguyen  5.  to follow regarding collecting and reviewing collateral information, referrals and disposition planning      Risk Assessment: [facility] RISK ASSESSMENT: Patient able to contract for safety and Patient on precautions    Coordination of Care:   Treatment Plan reviewed and physician signed, Care discussed with Care/Treatment Team Members, Chart reviewed and Patient seen      Re-Certification I certify that the inpatient psychiatric facility services furnished since the previous certification were, and continue to be, medically necessary for, either, treatment which could reasonably be expected to improve the patient s condition or diagnostic study and that the hospital records indicate that the services furnished were, either, intensive treatment services, admission and related services necessary for diagnostic study, or equivalent services.     I certify that the patient continues to need, on a daily basis, active treatment furnished directly by or requiring the supervision of inpatient psychiatric facility personnel.   I estimate about 10 days days of hospitalization is necessary for proper treatment of the patient. My plans for post-hospital care for this patient are  home with family     SELINA Gates CNP    -     4/6/2022     -     11:50 AM    Total time  25 minutes with > 50%spent on coordination of cares and psycho-education.    This note was created with help of Dragon dictation  system. Grammatical / typing errors are not intentional.    SELINA Gates CNP

## 2022-04-06 NOTE — PLAN OF CARE
"  Problem: Suicidal Behavior  Goal: Suicidal Behavior is Absent or Managed  Outcome: Ongoing, Progressing   Goal Outcome Evaluation:    Plan of Care Reviewed With: patient      Denies SI  Problem: Depression  Goal: Improved Mood  Outcome: Ongoing, Progressing     Problem: Depression  Goal: Improved Mood  Outcome: Ongoing, Progressing   Denies depression or anxiety  Pt initially refused morning lexapro. Provider notified. Pt then came back to this writer and said \"I guess I will tke those pills\" lexapro given.           "

## 2022-04-06 NOTE — PLAN OF CARE
Problem: Suicidal Behavior  Goal: Suicidal Behavior is Absent or Managed  Outcome: Ongoing, Progressing     Problem: Sleep Disturbance  Goal: Adequate Sleep/Rest  Outcome: Ongoing, Progressing   Goal Outcome Evaluation:        Pt slept for more than six hours during the night shift and no psych symptoms observed or reported during the night shift. Safety checks completed per protocol

## 2022-04-06 NOTE — PLAN OF CARE
Problem: Suicidal Behavior  Goal: Suicidal Behavior is Absent or Managed  Outcome: Ongoing, Progressing  Flowsheets (Taken 4/6/2022 1625)  Mutually Determined Action Steps (Suicidal Behavior Absent/Managed): other (see comments)     Problem: Depression  Goal: Improved Mood  Outcome: Ongoing, Progressing  Intervention: Monitor and Manage Depressive Symptoms  Recent Flowsheet Documentation  Taken 4/6/2022 1605 by Mitzi Cedeno RN  Supportive Measures:    active listening utilized    decision-making supported    positive reinforcement provided     Problem: Anxiety  Goal: Anxiety Reduction or Resolution  Outcome: Ongoing, Progressing  Intervention: Promote Anxiety Reduction  Recent Flowsheet Documentation  Taken 4/6/2022 1605 by Mitzi Cedeno RN  Supportive Measures:    active listening utilized    decision-making supported    positive reinforcement provided   Goal Outcome Evaluation:    Plan of Care Reviewed With: patient           Pt is isolative /guarded; spends most of time in his room. Pt is calm, alert and oriented on approach. Out on milieu for group,  and meals only. Pt reports no psychiatric symptoms, pain or discomfort.  Monitoring continues with plan of care.

## 2022-04-06 NOTE — DISCHARGE INSTRUCTIONS
Behavioral Discharge Planning and Instructions    Summary: You were admitted on 3/24/2022  due to psychosis.  You were treated by psychiatry and discharged on 04/11/2022 from [facility] to Home    Main Diagnosis: Schizoaffective disorder, bipolar type     Health Care Follow-up:     Appointment Type: Intake for Adult Day Program  Provider: ATTILA Farmer  Date & Time: Friday April 22, 2022 at 11am (video appt.) AND Thursday April 28, 2022 at 10am (in person).  Clinic: [facility]  [facility]  [address]  [place], [place] [address] [phone]     Appointment Type: Medication Management  Provider: Lupe Walsh  Date & Time:   April 14, 2022 at 12:05pm  Clinic: [facility]  [address], [address]  [place] [address]  [phone]  Note: This is a Video appt.    Appointment Type: Therapy  Provider:  Steven Pina  Date & Time:  April 18, 2022 at 12noon  Clinic: [facility]  [address], [address]  [place] [address]  [phone]  Note: This is an in person  appt.     Your mental health  is:  Theresa Flores with [facility] [phone]    You are on the wait list for the Navigate Program:  [facility]  [address], [phone]  Attend all scheduled appointments with your outpatient providers. Call at least 24 hours in advance if you need to reschedule an appointment to ensure continued access to your outpatient providers.     Major Treatments, Procedures and Findings:  You were provided with: a psychiatric assessment, assessed for medical stability, medication evaluation and/or management, group therapy and milieu management    Symptoms to Report: feeling more aggressive, increased confusion, losing more sleep, mood getting worse or thoughts of suicide    Early warning signs can include: increased depression or anxiety sleep disturbances increased thoughts  or behaviors of suicide or self-harm  increased unusual thinking, such as paranoia or hearing voices    Safety and Wellness:  Take all medicines as directed.  Make no changes unless your doctor suggests them.      Follow treatment recommendations.  Refrain from alcohol and non-prescribed drugs.  If there is a concern for safety, call 911.    Resources:   Crisis Intervention: 722.334.3433 or 618-319-2368 (TTY: 505.881.3023).  Call anytime for help.  National [place] [employer] Mental Illness (www.[place].charla.org): 699.768.9153 or 593-826-5596.  [facility] Crisis Response [phone]  [facility] Crisis Response [phone]  [facility] Crisis Response [phone]  [facility] Crisis (COPE) Response - Adult [phone]  [facility] Crisis Response - Adult [phone]  [facility] Rapid Response [phone]    General Medication Instructions:   See your medication sheet(s) for instructions.   Take all medicines as directed.  Make no changes unless your doctor suggests them.   Go to all your doctor visits.  Be sure to have all your required lab tests. This way, your medicines can be refilled on time.  Do not use any drugs not prescribed by your doctor.  Avoid alcohol.    Advance Directives:   Scanned document on file with [facility]? No scanned doc  Is document scanned? Pt states no documents  Honoring Choices Your Rights Handout: Informed and given  Was more information offered? Materials given    The Treatment team has appreciated the opportunity to work with you. If you have any questions or concerns about your recent admission, you can contact the unit which can receive your call 24 hours a day, 7 days a week. They will be able to get in touch with a Provider if needed. The unit number is [phone].

## 2022-04-06 NOTE — PROVIDER NOTIFICATION
04/06/22 1400   Engagement   Intervention Group   Topic Detail SW Process   Attendance Attended   Patient Response No evidence of learning   Concentrated on Task 5 - 10 min   Cognition Preoccupied   Mood/Affect Flat   Social/Behavioral Disengaged   Thought Content Disorganized     GROUP LENGTH (MINS):   30   RELEVANT PRIMARY DIAGNOSIS: Patient Active Problem List   Diagnosis     History of substance use disorder     Psychosis, unspecified psychosis type (H)     Schizoaffective disorder, bipolar type (H)     Tobacco use disorder     Left arm numbness     Schizophrenia (H)     Anxiety     Sinus tachycardia     Other insomnia        TREATMENT MODALITY:      []CBT       []DBT       []ACT       []Interpersonal psychotherapy                                 [x]Psychoeducational therapy       [x]Skill development       []Other:        ATTENDANCE:        []Stayed for entire group     [x]Arrived late    [x] Left early       # OF PATIENTS IN GROUP: 8   BILLABLE:     []Yes            [x]No   Notes:

## 2022-04-06 NOTE — PLAN OF CARE
Assesssment/Intervention/Current Symptoms and Care Coordination  Writer met with patient today and discussed discharge planning. Patient presented as cooperative and agreeable.  Writer scheduled below appointments for IOP and confirmed appointments for medication management and therapy all with [facility]. No return phone call back from [facility] [place] [facility]: First Episode Psychosis IOP as yet-Sindy [phone] to inquire about intake process and availability.  Writer connected with pt's mother and provided an update.    Discharge Plan or Goal  Home with [facility] and on waiting list for Navigate program.     Barriers to Discharge   Symptom stabilization, transition to [facility], Care Coordination     Referral Status  Patient is on the waitlist for [facility]: [phone]      Important Contacts  Mental Health : Theresa Flores with [facility] [phone]  ROIs on file for mother:  Rolanda [phone] and father.  CRISTINA for the Navigate Program.      Appointment Type: Intake for Adult Day Program  Provider: ATTILA Farmer  Date & Time: Friday April 22, 2022 at 11am (video appt.) AND Thursday April 28, 2022 at 10am (in person).  Clinic: [facility]  [facility]  [address]  [address]  [place] [address] [phone]     Appointment Type: Medication Management  Provider: Lupe Walsh  Date & Time:   April 14, 2022 at 12:05pm  Clinic: [facility]  [address]  [place] [address]  [phone]  Note: This is a Video appt.    Appointment Type: Therapy  Provider:  Steven Pina  Date & Time:  April 18, 2022 at 12noon  Clinic: [facility]  [address], [address]  [place] [address]  [phone]  Note: This is an in person  appt.    Legal Status  Revocation.  Patient is under MI Commitment with Patrick in [employer]

## 2022-04-07 PROCEDURE — 99232 SBSQ HOSP IP/OBS MODERATE 35: CPT | Performed by: NURSE PRACTITIONER

## 2022-04-07 PROCEDURE — 250N000013 HC RX MED GY IP 250 OP 250 PS 637: Performed by: NURSE PRACTITIONER

## 2022-04-07 PROCEDURE — 128N000001 HC R&B CD/MH ADULT

## 2022-04-07 RX ADMIN — ESCITALOPRAM OXALATE 20 MG: 10 TABLET ORAL at 08:09

## 2022-04-07 ASSESSMENT — ACTIVITIES OF DAILY LIVING (ADL)
HYGIENE/GROOMING: INDEPENDENT
ORAL_HYGIENE: INDEPENDENT
DRESS: SCRUBS (BEHAVIORAL HEALTH)
ORAL_HYGIENE: INDEPENDENT
LAUNDRY: WITH SUPERVISION
DRESS: SCRUBS (BEHAVIORAL HEALTH)
HYGIENE/GROOMING: INDEPENDENT

## 2022-04-07 NOTE — CARE PLAN
Assesssment/Intervention/Current Symptoms and Care Coordination  Writer met with patient today and discussed discharge plan. Writer observed patient going to morning community group meeting. He presented as pleasant and happy. Patient continues to appear as though he is listening to something inside his head. Writer consulted with psychiatric provider at team meeting this morning.  Writer left message with patient's  about provisional discharge set for Monday 4/11/2022.    Discharge Plan or Goal  Home with [facility] and on waiting list for Navigate program.     Barriers to Discharge   Symptom stabilization, transition to [facility], Care Coordination     Referral Status  Patient is on the waitlist for [facility]: [phone]      Important Contacts  Mental Health : Theresa Flores with [facility] [phone]  ROIs on file for mother:  Rolanda [phone] and father.  CRISTINA for the Navigate Program.       Appointment Type: Intake for Adult Day Program  Provider: ATTILA Farmer  Date & Time: Friday April 22, 2022 at 11am (video appt.) AND Thursday April 28, 2022 at 10am (in person).  Clinic: [facility]  [facility]  [address]  [place] [address] [phone]     Appointment Type: Medication Management  Provider: Lupe Walsh  Date & Time:   April 14, 2022 at 12:05pm  Clinic: [facility]  [address]  [place] [address]  [phone]  Note: This is a Video appt.     Appointment Type: Therapy  Provider:  Steven Pina  Date & Time:  April 18, 2022 at 12noon  Clinic: [facility]  [address]  [place] [address]  [phone]  Note: This is an in person  appt.     Legal Status  Revocation.  Patient is under MI Commitment with Patrick in [employer]

## 2022-04-07 NOTE — PLAN OF CARE
Problem: Suicidal Behavior  Goal: Suicidal Behavior is Absent or Managed  Outcome: Ongoing, Progressing     Problem: Depression  Goal: Improved Mood  Outcome: Ongoing, Progressing  Intervention: Monitor and Manage Depressive Symptoms  Recent Flowsheet Documentation  Taken 4/7/2022 1100 by Steven Duarte RN  Supportive Measures: active listening utilized   Goal Outcome Evaluation:    Plan of Care Reviewed With: patient     Patient denies SI/HI and admits to mild depression 3/10, anxiety 3/10 but with encouragement and education has remained med compliant. Patient denies pain and AH/VH, He is encouraged to attend groups and engage

## 2022-04-07 NOTE — PLAN OF CARE
Problem: Suicidal Behavior  Goal: Suicidal Behavior is Absent or Managed  Outcome: Ongoing, Progressing     Problem: Depression  Goal: Improved Mood  Outcome: Ongoing, Progressing     Problem: Behavioral Health Plan of Care  Goal: Plan of Care Review  Outcome: Ongoing, Progressing     Problem: Sleep Disturbance  Goal: Adequate Sleep/Rest  Outcome: Ongoing, Progressing   Goal Outcome Evaluation:      Pt appeared sleeping for more than 7 hours this shift  Safety checks completed per protocol  No pain or discomfort noted or verbalized  No precautionary behaviors observed or reported

## 2022-04-07 NOTE — PROGRESS NOTES
"PSYCHIATRY  PROGRESS NOTE     DATE OF SERVICE   4/7/2022         CHIEF COMPLAINT   \"I'm fine\"       SUBJECTIVE   Nursing reports : Patient denies SI/HI and admits to mild depression 3/10, anxiety 3/10 but with encouragement and education has remained med compliant. Patient denies pain and AH/VH, He is encouraged to attend groups and engage     reports: Assessment/Intervention/Current Symptoms and Care Coordination I have reviewed patient with the  earlier today.   Writer met with patient today and discussed discharge plan. He continues to appear as though he is listening to something inside his head. Writer consulted with psychiatric provider at team meeting this morning.  Writer left message with patient's  about provisional discharge set for Monday 4/11/2022.       OBJECTIVE   Chart reviewed and patient assessed. Patient was seen and evaluated in his room while lying in bed resting. Patient reports doing well, saying he has been to couple of groups. Patient also reports taking his scheduled Lexapro this morning without no being prompt. Patient still takes longer time to answer certain questions but response latency is overall improving. For instance, he paused for several seconds before he answered questions related to intrusive thoughts. Patient appears to be responding to internal stimuli though he continues to deny this. Patient talks about the activity he enjoys doing at home. Affect becomes more brighter when talking about video games. Patient denies suicidal and homicidal ideations. He also denies both auditory and visual hallucinations.            MEDICATIONS   Medications:  Scheduled Meds:    escitalopram  20 mg Oral Daily     [START ON 4/11/2022] paliperidone  156 mg Intramuscular Once     [START ON 5/9/2022] paliperidone  156 mg Intramuscular q28 days     Continuous Infusions:  PRN Meds:.acetaminophen, alum & mag hydroxide-simethicone, hydrALAZINE, hydrOXYzine, " OLANZapine **OR** OLANZapine, senna-docusate, traZODone    Medication adherence issues: MS Med Adherence Y/N: No  Medication side effects: MEDICATION SIDE EFFECTS: no side effects reported  Benefit: Yes / No: Yes       ROS   A comprehensive review of systems was negative.       MENTAL STATUS EXAM   Vitals: /68 (BP Location: Right arm, Patient Position: Sitting, Cuff Size: Adult Regular)   Pulse 95   Temp 98.1  F (36.7  C) (Oral)   Resp 16   SpO2 96%     Appearance:  Casually groomed  Mood:  {Mood: Depressed   Affect: blunted  was congruent to speech  Suicidal Ideation: PRESENT / ABSENT: absent   Homicidal Ideation: PRESENT / ABSENT: absent   Thought process: linear,no loose association noted   Thought content: denies suicidal and homicidal ideation, no delusion though endorses paranoid ideation.   Fund of Knowledge: Average  Attention/Concentration: Fair  Language ability:  Intact  Memory:  Immediate recall intact, Short-term memory intact and Long-term memory intact  Insight:  limited.  Judgement: limited  Orientation: Yes, x4  Psychomotor Behavior: denies tardive dyskinesia, dystonia or tics  Muscle Strength and Tone: MuscleStrength: Normal  Gait and Station: Normal       LABS   personally reviewed.     No results found for: PHENYTOIN, PHENOBARB, VALPROATE, CBMZ       DIAGNOSIS   Principal Problem:    Schizoaffective disorder, bipolar type (H)    Active Problem List:  Patient Active Problem List   Diagnosis     History of substance use disorder     Psychosis, unspecified psychosis type (H)     Schizoaffective disorder, bipolar type (H)     Tobacco use disorder     Left arm numbness     Schizophrenia (H)     Anxiety     Sinus tachycardia     Other insomnia          PLAN   1. Ongoing education given regarding diagnostic and treatment options with risks, benefits and alternatives and adequate verbalization of understanding.  2. Medications: Invega 6 mg daily 3/28 discontinued 3/31/22,invega 9 mg daily  starting 4/1. Discontinued 4/5 after receiving the first                          invega shot.                            Invega 234 mg shot was administered this day 4/5/22                            Lexapro 15 mg daily starting 3/29, titrated to 20 mg starting 4/5/22  3. Hospitalist consult: Hospitalist to see patient as needed  4. Legal: Committed and Nguyen  5.  to follow regarding collecting and reviewing collateral information, referrals and disposition planning      Risk Assessment: [facility] RISK ASSESSMENT: Patient able to contract for safety and Patient on precautions    Coordination of Care:   Treatment Plan reviewed and physician signed, Care discussed with Care/Treatment Team Members, Chart reviewed and Patient seen      Re-Certification I certify that the inpatient psychiatric facility services furnished since the previous certification were, and continue to be, medically necessary for, either, treatment which could reasonably be expected to improve the patient s condition or diagnostic study and that the hospital records indicate that the services furnished were, either, intensive treatment services, admission and related services necessary for diagnostic study, or equivalent services.     I certify that the patient continues to need, on a daily basis, active treatment furnished directly by or requiring the supervision of inpatient psychiatric facility personnel.   I estimate about 10 days days of hospitalization is necessary for proper treatment of the patient. My plans for post-hospital care for this patient are  home with family     SELINA Gates CNP    -     4/7/2022     -     12:34 PM    Total time  25 minutes with > 50%spent on coordination of cares and psycho-education.    This note was created with help of Dragon dictation system. Grammatical / typing errors are not intentional.    SELINA Gates CNP

## 2022-04-08 LAB — SARS-COV-2 RNA RESP QL NAA+PROBE: NEGATIVE

## 2022-04-08 PROCEDURE — 250N000013 HC RX MED GY IP 250 OP 250 PS 637: Performed by: NURSE PRACTITIONER

## 2022-04-08 PROCEDURE — 87635 SARS-COV-2 COVID-19 AMP PRB: CPT | Performed by: NURSE PRACTITIONER

## 2022-04-08 PROCEDURE — 128N000001 HC R&B CD/MH ADULT

## 2022-04-08 PROCEDURE — 99232 SBSQ HOSP IP/OBS MODERATE 35: CPT | Performed by: NURSE PRACTITIONER

## 2022-04-08 RX ADMIN — ESCITALOPRAM OXALATE 20 MG: 10 TABLET ORAL at 08:13

## 2022-04-08 ASSESSMENT — ACTIVITIES OF DAILY LIVING (ADL)
LAUNDRY: WITH SUPERVISION
ORAL_HYGIENE: INDEPENDENT
ORAL_HYGIENE: INDEPENDENT
HYGIENE/GROOMING: INDEPENDENT
DRESS: SCRUBS (BEHAVIORAL HEALTH)
DRESS: SCRUBS (BEHAVIORAL HEALTH);INDEPENDENT
HYGIENE/GROOMING: INDEPENDENT

## 2022-04-08 NOTE — PLAN OF CARE
Problem: Suicidal Behavior  Goal: Suicidal Behavior is Absent or Managed  Outcome: Ongoing, Progressing     Problem: Depression  Goal: Improved Mood  Outcome: Ongoing, Progressing  Intervention: Monitor and Manage Depressive Symptoms  Recent Flowsheet Documentation  Taken 4/7/2022 1900 by Mitzi Cedeno RN  Supportive Measures:    active listening utilized    problem-solving facilitated    self-care encouraged     Problem: Anxiety  Goal: Anxiety Reduction or Resolution  Outcome: Ongoing, Progressing  Intervention: Promote Anxiety Reduction  Recent Flowsheet Documentation  Taken 4/7/2022 1900 by Mitzi Cedeno RN  Supportive Measures:    active listening utilized    problem-solving facilitated    self-care encouraged     Problem: Behavioral Health Plan of Care  Goal: Plan of Care Review  Outcome: Ongoing, Progressing  Flowsheets  Taken 4/7/2022 2029  Plan of Care Reviewed With: patient  Overall Patient Progress: improving  Patient Agreement with Plan of Care: agrees  Taken 4/7/2022 1900  Plan of Care Reviewed With: patient  Patient Agreement with Plan of Care: agrees  Goal: Adheres to Safety Considerations for Self and Others  Outcome: Ongoing, Progressing  Intervention: Develop and Maintain Individualized Safety Plan  Recent Flowsheet Documentation  Taken 4/7/2022 1900 by Mitzi Cedeno RN  Safety Measures: safety rounds completed  Goal: Absence of New-Onset Illness or Injury  Outcome: Ongoing, Progressing  Intervention: Identify and Manage Fall Risk  Recent Flowsheet Documentation  Taken 4/7/2022 1900 by Mitzi Cedeno RN  Safety Measures: safety rounds completed   Goal Outcome Evaluation:    Plan of Care Reviewed With: patient     Overall Patient Progress: improving     Pt continues to be isolative, but all over seems to demonstrate improved mental health. Affect full range/consistent with mood.Spent most of time in room and comes out for meals, and group. Pt is pleasant/ cooperative upon approach. Denies SI/HI,  A/VH, anxiety , depression and contracts for safety.  No behavior concerns thus far

## 2022-04-08 NOTE — PLAN OF CARE
Problem: Relapse Prevention  Goal: Engage in OT Group  Description: Pt will attend at least 2 OT groups this review period.  Outcome: Ongoing, Progressing     Problem: Social Interaction  Goal: Communicate Appropriately  Description: Patient will respond in an open and reality based manner.  (Free of guardedness or paranoia.) Target 50% of interactions.  Outcome: Ongoing, Progressing     Goal review completed:     Patient Junior Fernández. Patient has attended and engaged in 2/11 OT groups this review period; on 1/11 occasions patient agreed to attend group, entered group area, and left before check-in activity began. On the two occassions patient attended and engaged in group, he appeared internally stimulated on one occasion. Despite internal stimulation, patient was able to engage in the activity correct and remained goal-directed for group. Overall patient continues to isolate in his room and appears to be only out for meals. Patient will benefit from further focus on goal as he prepares for a safe discharge into the community. Care plan goals have been reviewed and are appropriate. Continue to establish rapport and encourage group participation with focus on goal area/s for further progress. Continue to correspond with interdisciplinary team regarding ongoing treatment plan, potential changes in patient's status, and discharge planning.    Therapist: Shaye Bustillo OT  4/8/2022

## 2022-04-08 NOTE — PROGRESS NOTES
04/08/22 1315   Engagement   Intervention Group   Topic Detail OT: Education on healthy support systems and creative hands on endeavor (card making) to increase concentration, attention to task/detail, problem solving, reaching out to supports, creative expression, symptom management, healthy leisure engagement, and social engagement   Attendance Did not attend

## 2022-04-08 NOTE — PLAN OF CARE
Assessment/Intervention/Current Symtoms and Care Coordination    Writer is covering for primary CTC. Writer reviewed chart and received discharge planning information from CTC yesterday. Writer received a VM from patient's CM Theresa reporting she would be visiting patient on the unit today with patient's father. Theresa requests provisional discharge be sent to her. Writer informed her the PD will be sent along with discharge paperwork at discharge on Monday.      Discharge Plan or Goal  Home Monday, with [facility] and on waiting list for [facility] program.      Barriers to Discharge   Symptom stabilization, transition to [facility], Care Coordination     Referral Status  Patient is on the waitlist for [facility]: [phone]      Important Contacts  Mental Health : Theresa Flores with [facility] [phone]  ROIs on file for mother:  Rolanda [phone] and father.  CRISTINA for the [facility] Program.       Appointment Type: Intake for Adult Day Program  Provider: ATTILA Farmer  Date & Time: Friday April 22, 2022 at 11am (video appt.) AND Thursday April 28, 2022 at 10am (in person).  Clinic: [facility]  [facility]  [address]  [place], [place] [id number] [phone]     Appointment Type: Medication Management  Provider: Lupe Walsh  Date & Time:   April 14, 2022 at 12:05pm  Clinic: [facility]  [address]  [place] [address]  [phone]  Note: This is a Video appt.     Appointment Type: Therapy  Provider:  Steven Pina  Date & Time:  April 18, 2022 at 12noon  Clinic: [facility]  [address], [address]  [place] [address]  [phone]  Note: This is an in person  appt.     Legal Status  Revocation.  Patient is under MI Commitment with [facility] in [place]      Aline Hogue, [facility], 4/8/2022, 11:26 AM

## 2022-04-08 NOTE — PROGRESS NOTES
"PSYCHIATRY  PROGRESS NOTE     DATE OF SERVICE   4/8/2022         CHIEF COMPLAINT   \"Everything is fine\"       SUBJECTIVE   Nursing reports : Patient isolative . He was encouraged to attend group. Patient denies SI/HI and admits to mild depression 3/10, anxiety 3/10 . Patient denies pain and AH/VH, He was medication compliant.    reports: Assessment/Intervention/Current Symptoms and Care Coordination I have reviewed patient with the  earlier today. Plan remains for patient to discharge home after getting his invega shot on Monday 4/11. CM and parent are planing to visit patient this evening. PD to be sent to CM at discharge.   .       OBJECTIVE   Chart reviewed and patient assessed. Patient was seen and evaluated in the consult room by himself. Patient reports doing great, saying he is looking forward to discharging back home this coming Monday. Patient who appeared more conversational and futuristic today states he is planning to look for a part time job with the future plan to move to his own apartment. Patient still noted to be smiling with himself though not frequent like before. Patient continues to deny being internally stimulated. He continues to need much encouragement to attend and participate in the unit programming. Patient took scheduled medication with no hesitation today per the staff RN. Patient denies suicidal and homicidal ideations. He also denies both auditory and visual hallucinations. No overt psychosis noted during this assessment. Plan remains for patient to discharge after receiving his second Invega Shot this coming Monday 4/11.           MEDICATIONS   Medications:  Scheduled Meds:    escitalopram  20 mg Oral Daily     [START ON 4/11/2022] paliperidone  156 mg Intramuscular Once     [START ON 5/9/2022] paliperidone  156 mg Intramuscular q28 days     Continuous Infusions:  PRN Meds:.acetaminophen, alum & mag hydroxide-simethicone, hydrALAZINE, hydrOXYzine, " "OLANZapine **OR** OLANZapine, senna-docusate, traZODone    Medication adherence issues: MS Med Adherence Y/N: No  Medication side effects: MEDICATION SIDE EFFECTS: no side effects reported  Benefit: Yes / No: Yes       ROS   A comprehensive review of systems was negative.       MENTAL STATUS EXAM   Vitals: /82 (BP Location: Left arm, Patient Position: Sitting, Cuff Size: Adult Regular)   Pulse 96   Temp 98.3  F (36.8  C) (Oral)   Resp 17   SpO2 97%     Appearance:  Casually groomed  Mood:  \"great\"  Affect: blunted  was congruent to speech  Suicidal Ideation: PRESENT / ABSENT: absent   Homicidal Ideation: PRESENT / ABSENT: absent   Thought process: linear,no loose association noted   Thought content: denies suicidal and homicidal ideation, no delusion though endorses paranoid ideation.   Fund of Knowledge: Average  Attention/Concentration: Fair  Language ability:  Intact  Memory:  Immediate recall intact, Short-term memory intact and Long-term memory intact  Insight:  Fair  Judgement: Fair  Orientation: Yes, x4  Psychomotor Behavior: denies tardive dyskinesia, dystonia or tics  Muscle Strength and Tone: MuscleStrength: Normal  Gait and Station: Normal       LABS   personally reviewed.     No results found for: PHENYTOIN, PHENOBARB, VALPROATE, CBMZ       DIAGNOSIS   Principal Problem:    Schizoaffective disorder, bipolar type (H)    Active Problem List:  Patient Active Problem List   Diagnosis     History of substance use disorder     Psychosis, unspecified psychosis type (H)     Schizoaffective disorder, bipolar type (H)     Tobacco use disorder     Left arm numbness     Schizophrenia (H)     Anxiety     Sinus tachycardia     Other insomnia          PLAN   1. Ongoing education given regarding diagnostic and treatment options with risks, benefits and alternatives and adequate verbalization of understanding.  2. Medications: Invega 6 mg daily 3/28 discontinued 3/31/22,invega 9 mg daily starting 4/1. " Discontinued 4/5 after receiving the first                          invega shot.                            Invega 234 mg shot was administered this day 4/5/22                            Lexapro 15 mg daily starting 3/29, titrated to 20 mg starting 4/5/22  3. Hospitalist consult: Hospitalist to see patient as needed  4. Legal: Committed and Nguyen  5.  to follow regarding collecting and reviewing collateral information, referrals and disposition planning      Risk Assessment: [facility] RISK ASSESSMENT: Patient able to contract for safety and Patient on precautions    Coordination of Care:   Treatment Plan reviewed and physician signed, Care discussed with Care/Treatment Team Members, Chart reviewed and Patient seen      Re-Certification I certify that the inpatient psychiatric facility services furnished since the previous certification were, and continue to be, medically necessary for, either, treatment which could reasonably be expected to improve the patient s condition or diagnostic study and that the hospital records indicate that the services furnished were, either, intensive treatment services, admission and related services necessary for diagnostic study, or equivalent services.     I certify that the patient continues to need, on a daily basis, active treatment furnished directly by or requiring the supervision of inpatient psychiatric facility personnel.   I estimate about 10 days days of hospitalization is necessary for proper treatment of the patient. My plans for post-hospital care for this patient are  home with family     SELINA Gates CNP    -     4/8/2022     -     2:14 PM    Total time  25 minutes with > 50%spent on coordination of cares and psycho-education.    This note was created with help of Dragon dictation system. Grammatical / typing errors are not intentional.    SELINA Gates CNP

## 2022-04-08 NOTE — PLAN OF CARE
Problem: Suicidal Behavior  Goal: Suicidal Behavior is Absent or Managed  Outcome: Ongoing, Progressing  Intervention: Provide Immediate and Ongoing Protective Physical Environment  Recent Flowsheet Documentation  Taken 4/8/2022 0106 by Mandi Olivarez, RN  Safe Transition Promotion: protective factors promoted     Problem: Sleep Disturbance  Goal: Adequate Sleep/Rest  Intervention: Promote Sleep/Rest  Recent Flowsheet Documentation  Taken 4/8/2022 0106 by Mandi Olivarez, RN  Sleep/Rest Enhancement:   comfort measures   noise level reduced   Goal Outcome Evaluation:        Patient had greater than 6 hrs of sleep during this shift. No suicide behaviors noted. Safety checks completed per unit policy. Will continue to monitor.

## 2022-04-08 NOTE — PLAN OF CARE
Problem: Suicidal Behavior  Goal: Suicidal Behavior is Absent or Managed  Outcome: Met  Intervention: Provide Immediate and Ongoing Protective Physical Environment  Recent Flowsheet Documentation  Taken 4/8/2022 1000 by Steven Duarte RN  Safe Transition Promotion: protective factors promoted   Goal Outcome Evaluation:    Plan of Care Reviewed With: patient     Patient denies SI/HI and depression and anxiety, and pain and is able to contract for safety. Patient has met with family and SW today and looks forward to discharge 4-11-22, he has remained med compliant and we continue to educate and encourage group participation for continually learning / utilizing of coping skills and   Importance of med management

## 2022-04-09 PROCEDURE — 128N000001 HC R&B CD/MH ADULT

## 2022-04-09 PROCEDURE — 250N000013 HC RX MED GY IP 250 OP 250 PS 637: Performed by: NURSE PRACTITIONER

## 2022-04-09 RX ADMIN — ESCITALOPRAM OXALATE 20 MG: 10 TABLET ORAL at 08:08

## 2022-04-09 ASSESSMENT — ACTIVITIES OF DAILY LIVING (ADL)
ORAL_HYGIENE: INDEPENDENT
HYGIENE/GROOMING: INDEPENDENT
LAUNDRY: WITH SUPERVISION
ORAL_HYGIENE: INDEPENDENT
DRESS: SCRUBS (BEHAVIORAL HEALTH)
DRESS: SCRUBS (BEHAVIORAL HEALTH);INDEPENDENT
HYGIENE/GROOMING: INDEPENDENT

## 2022-04-09 NOTE — PLAN OF CARE
Problem: Suicidal Behavior  Goal: Suicidal Behavior is Absent or Managed  Outcome: Ongoing, Progressing     Problem: Anxiety  Goal: Anxiety Reduction or Resolution  Outcome: Ongoing, Progressing  Intervention: Promote Anxiety Reduction  Recent Flowsheet Documentation  Taken 4/8/2022 1800 by Clara Aleman RN  Family/Support System Care:   involvement promoted   support provided     Problem: Behavioral Health Plan of Care  Goal: Adheres to Safety Considerations for Self and Others  Outcome: Ongoing, Progressing  Intervention: Develop and Maintain Individualized Safety Plan  Recent Flowsheet Documentation  Taken 4/8/2022 1800 by Clara Aleman RN  Safety Measures:   safety rounds completed   suicide assessment completed   Patient denies all psych sx. Denies auditory hallucination however he is noted smiling to himself. Patient is isolative in room and to self. Spent majority of the shift in his room. Quiet and calm. Patient visible during mealtimes. No medication this shift. No suicidal behavior noted.

## 2022-04-09 NOTE — PROGRESS NOTES
Pt was present during community meeting, though pt declined face to face invite to join OT group directly afterwards.       04/09/22 1100   Engagement   Intervention Group   Topic Detail Happiness Collages for creative expression, self awareness/building insight, coping, relaxation, symptom management, reality based activity, healthy leisure and socialization   Attendance Did not attend   Reason for Not Attending Refused

## 2022-04-09 NOTE — PROGRESS NOTES
Pt slept through the shift without any problems. Checked patient every 15 minutes,he remained safe. Will continue to monitor patient.

## 2022-04-10 PROCEDURE — 128N000001 HC R&B CD/MH ADULT

## 2022-04-10 PROCEDURE — 250N000013 HC RX MED GY IP 250 OP 250 PS 637: Performed by: NURSE PRACTITIONER

## 2022-04-10 RX ADMIN — ESCITALOPRAM OXALATE 20 MG: 10 TABLET ORAL at 08:00

## 2022-04-10 ASSESSMENT — ACTIVITIES OF DAILY LIVING (ADL)
DRESS: SCRUBS (BEHAVIORAL HEALTH)
DRESS: INDEPENDENT
LAUNDRY: WITH SUPERVISION
ORAL_HYGIENE: INDEPENDENT
HYGIENE/GROOMING: INDEPENDENT
ORAL_HYGIENE: INDEPENDENT
HYGIENE/GROOMING: INDEPENDENT

## 2022-04-10 NOTE — PLAN OF CARE
Problem: Depression  Goal: Improved Mood  Intervention: Monitor and Manage Depressive Symptoms  Recent Flowsheet Documentation  Taken 4/10/2022 0831 by Honey Jimenez, RN  Supportive Measures: self-care encouraged   Goal Outcome Evaluation:    Plan of Care Reviewed With: patient      Pt.calm, cooperative and quiet, out in the lounge for meals and community group, Isolative to self. Endorses anxiety and depression 0, Denies pain and all other psych sx.

## 2022-04-10 NOTE — PLAN OF CARE
Problem: Suicidal Behavior  Goal: Suicidal Behavior is Absent or Managed  Outcome: Ongoing, Progressing     Problem: Depression  Goal: Improved Mood  Outcome: Ongoing, Progressing  Intervention: Monitor and Manage Depressive Symptoms  Recent Flowsheet Documentation  Taken 4/9/2022 1700 by Clara Aleman RN  Family/Support System Care:   involvement promoted   support provided     Problem: Anxiety  Goal: Anxiety Reduction or Resolution  Outcome: Ongoing, Progressing  Intervention: Promote Anxiety Reduction  Recent Flowsheet Documentation  Taken 4/9/2022 1700 by Clara Aleman RN  Family/Support System Care:   involvement promoted   support provided   Patient remains isolative in room. No interaction noted with peers. Denies all  psych sx. Comes out only for meals. Pleasant and calm. Pt says he has no problem withy appetite and sleep. No suicidal behavior noted. Patient's dad came to visit.

## 2022-04-10 NOTE — PLAN OF CARE
Goal Outcome Evaluation:      Patient slept all night, no aggressive behavior seen. Safety checks were done every 15 minutes, no pain reported and patient denied anxiety,  depression and hallucination. No PRN given and no medication scheduled for this shift.

## 2022-04-11 VITALS
RESPIRATION RATE: 18 BRPM | OXYGEN SATURATION: 96 % | HEART RATE: 78 BPM | DIASTOLIC BLOOD PRESSURE: 81 MMHG | TEMPERATURE: 97.9 F | SYSTOLIC BLOOD PRESSURE: 136 MMHG

## 2022-04-11 PROCEDURE — 99239 HOSP IP/OBS DSCHRG MGMT >30: CPT | Performed by: NURSE PRACTITIONER

## 2022-04-11 PROCEDURE — 250N000013 HC RX MED GY IP 250 OP 250 PS 637: Performed by: NURSE PRACTITIONER

## 2022-04-11 RX ORDER — PALIPERIDONE PALMITATE 156 MG/ML
156 INJECTION INTRAMUSCULAR
Qty: 1 ML | Refills: 0 | Status: ON HOLD | OUTPATIENT
Start: 2022-05-09 | End: 2022-05-24

## 2022-04-11 RX ORDER — TRAZODONE HYDROCHLORIDE 50 MG/1
50 TABLET, FILM COATED ORAL
Qty: 60 TABLET | Refills: 0 | Status: SHIPPED | OUTPATIENT
Start: 2022-04-11 | End: 2022-05-24

## 2022-04-11 RX ORDER — ESCITALOPRAM OXALATE 20 MG/1
20 TABLET ORAL DAILY
Qty: 30 TABLET | Refills: 0 | Status: ON HOLD | OUTPATIENT
Start: 2022-04-12 | End: 2022-05-24

## 2022-04-11 RX ADMIN — ESCITALOPRAM OXALATE 20 MG: 10 TABLET ORAL at 08:04

## 2022-04-11 ASSESSMENT — ACTIVITIES OF DAILY LIVING (ADL)
ORAL_HYGIENE: INDEPENDENT
HYGIENE/GROOMING: INDEPENDENT
DRESS: INDEPENDENT

## 2022-04-11 NOTE — PLAN OF CARE
Discharge plan or goal: Home with outpatient supports    Today's Plan: Writer met with patient and consulted with psychiatric provider during team meeting. Writer relayed concerns from patient's father to provider. Followup with community provider essential for continuity of care. Writer reviewed with patient his upcoming appointments, treatment plan going forward and details of his provisional discharge. Pt appeared to understand and signed the provisional discharge.  Pt has the following outpatient appointment(s) scheduled:  Appointment Type: Intake for Adult Day Program  Provider: ATTILA Farmer  Date & Time: Friday April 22, 2022 at 11am (video appt.) AND Thursday April 28, 2022 at 10am (in person).  Clinic: [facility]  [facility]  [address]  [place] [address] [phone]     Appointment Type: Medication Management  Provider: Lupe Walsh  Date & Time:   April 14, 2022 at 12:05pm  Clinic: [facility]  [address], [address]  [place] [address]  [phone]  Note: This is a Video appt.    Appointment Type: Therapy  Provider:  Steven Pina  Date & Time:  April 18, 2022 at 12noon  Clinic: [facility]  [address], [address]  [place] [address]  [phone]  Note: This is an in person  appt.         You are on the wait list for the Navigate Program:  [facility]igate [facility]  [address], [phone]   Pt has the following professional community support(s):   Your mental health  is:  Theresa Flores with [facility] [phone]    Legal Status:  Civil commitment with lopez.  Provisional discharge to home completed.    Diagnosis/Procedure:   Patient Active Problem List   Diagnosis     History of substance use disorder     Psychosis, unspecified psychosis type (H)     Schizoaffective disorder, bipolar type (H)     Tobacco use disorder     Left arm numbness      Schizophrenia (H)     Anxiety     Sinus tachycardia     Other insomnia       Care Rounds Attendance:   CM   RN   Charge RN   OT/TR  MD/CNP    LORENA Palomares [facility], 4/11/2022, 1:07 PM

## 2022-04-11 NOTE — PLAN OF CARE
Problem: Depression  Goal: Improved Mood  Outcome: Ongoing, Progressing     Problem: Anxiety  Goal: Anxiety Reduction or Resolution  Outcome: Ongoing, Progressing        Patient presented with a flat and blunted affect. Pleasant and cooperative, slow responding to questions. Patient continues to be isolative and withdrawn, came out for meals. Patient denied having any anxiety, depression, SI/SIB, HI, AH/VH;contracted for safety.    Magdalene Kraus RN

## 2022-04-11 NOTE — PLAN OF CARE
04/11/22 1301   Individualization/Patient Specific Goals   Patient Personal Strengths ability to maintain sobriety;community support;family/social support;independent living skills   Patient Vulnerabilities lacks insight into illness;substance abuse/addiction   Patient-Specific Goals (Include Timeframe) Return home after stabilizing with outpatient supports on 4/11/2022.   Interprofessional Rounds   Participants social work;OT;nursing;pharmacy;advanced practice nurse   Team Discussion   Participants RN - Sofia BRITO; CTC - Rosales CURTIS, OT - Jess CHU, Pharmacist - Di JAMES; Provider - Stevan ORTEGA   Progress progressing   Anticipated length of stay Discharging 4/11/2022   Continued Stay Criteria/Rationale Discharging 4/11/2022   Medical/Physical no significant events   Plan Return home after stabilizing with outpatient supports 4/11/2022   Anticipated Discharge Disposition home with family   PRECAUTIONS AND SAFETY    Behavioral Orders   Procedures    Cheeking Precautions (behavioral units)    Code 1 - Restrict to Unit    Routine Programming     As clinically indicated    Status 15     Every 15 minutes.    Suicide precautions     Patients on Suicide Precautions should have a Combination Diet ordered that includes a Diet selection(s) AND a Behavioral Tray selection for Safe Tray - with utensils, or Safe Tray - NO utensils         Safety  Safety WDL: WDL  Patient Location: patient room, own  Observed Behavior: sleeping  Observed Behavior (Comment):  (quiet)  Safety Measures: safety rounds completed  Diversional Activity: television  Suicidality: Status 15  Assault: status 15  Elopement: status 15  Sexual: status 15

## 2022-04-11 NOTE — DISCHARGE SUMMARY
PSYCHIATRY  DISCHARGE SUMMARY     DATE OF DISCHARGE   04/11/2022           DISCHARGE DIAGNOSIS   Schizoaffective disorder, bipolar type (H)    Patient Active Problem List   Diagnosis     History of substance use disorder     Psychosis, unspecified psychosis type (H)     Schizoaffective disorder, bipolar type (H)     Tobacco use disorder     Left arm numbness     Schizophrenia (H)     Anxiety     Sinus tachycardia     Other insomnia          REASON FOR ADMISSION   This is a 21 year old male with history of Schizoaffective disorder, bipolar type (H). This is a 21 year old male with the history of Schizoaffective disorder bipolar type, psychosis and suicide attempts who presented to the ED with the parents due to concerns related to increased agitation, irritability and paranoia. Reportedly, patient became increasingly agitated and paranoid yesterday, flipping table, slamming doors, making threatening gestures towards his mother while being physically aggressive towards his father. Parents reportedly called 911. Patient agreed to go to the hospital when the police arrived. Patient's strange behaviors noticed after recent changes to his medication. He recently started on Latuda as he complained Zyprexa made him tired all the time. Patient's legal status is voluntary. Patient is on commitment with possible revocation of provisional discharge by the .      Patient presents as somewhat guarded, calm and appropriate. He appeared to be minimizing symptoms as he only answered short yes or no question. Patient stated he came to the hospital following little argument between him and his parents, saying he should have handled the situation better. Patient denies currently denies both suicidal and homicidal ideations. He denies both auditory and visual hallucinations. Patient denies depression but endorses anxiety due to being in the hospital. Patient states he is currently taking Latuda 60 mg daily but has not seen  many much improvement. I discussed starting patient on Invega with the plan to transitioning to LEÓN. I discussed medication's benefits, risk and side effects with the patient. Patient is aware invega is part of his Jarvised order and agreeable to taking medication. Latuda discontinued due to poor efficacy           HOSPITAL COURSE   Admitted due to aforementioned presentation.  Education regarding diagnostic and treatment options with risks, benefits and alternatives and adequate verbalization of understanding.  Discussed reviewed in further detail, stressors and events leading to presentation  Upon arrival, the multidisciplinary treatment team met (RN, OT, , Physician) on a daily basis to discuss patient care and treatment planning. The psychiatric inpatient setting provided close nursing supervision and access to multiple treatment modalities and programming (group therapy, OT, one-to-one therapy.) Patient support systems such as family, , and other care providers were contacted as appropriate for collateral information and treatment planning.     Patient's provisional discharged revoked by the  during this hospitalization as legal status changed to full commitment with Nguyen. Patient was started on Invega 3 mg daily and continued to titrate up to 9 mg daily targeting psychosis as Latuda discontinued due to poor tolerability. Patient tolerated oral invega well. Patient agreeable to transitioning to Long Acting Injectable Invega Sustenna to help with medication non-compliance. Patient started on Lexapro 5 mg and titrated up to 20 mg targeting Anxiety, depression and intrusive thoughts with good tolerability and efficacy. Patient received the first and second invega short  while inpatient and will start the monthly injection in the outpatient basis. Patient started reporting improvement in symptoms as he exhibited improvement in mood, thinking and perception. He denied suicidal  ideation as well as self injurious behaviors. Intrusive thoughts intensity and frequency reduced considerably. Sleep improved and remained stabilized. Appetite atbaseline.   Patient was followed by the medical team during this hospitalization managing his situational high BP and one time near syncope episode.     Patient usually isolative himself to room for the most part, he did attend and participate in unit programming during this hospitalization. At the time of discharge, there appeared to be no evidence that the patient posed an imminent threat to self or others and a reasonable discharge plan was in place, we determined that the patient had achieved optimal medical benefit from hospitalization and was appropriate for outpatient level of care. Patient discharged home with outpatient supports. Provisional discharge paperwork completed by patient.  ...................................................... 's discharge summary......................................................  Discharge plan or goal: Home with outpatient supports     Today's Plan: Writer met with patient and consulted with psychiatric provider during team meeting. Writer relayed concerns from patient's father to provider. Followup with community provider essential for continuity of care. Writer reviewed with patient his upcoming appointments, treatment plan going forward and details of his provisional discharge. Pt appeared to understand and signed the provisional discharge.  Pt has the following outpatient appointment(s) scheduled:  Appointment Type: Intake for Adult Day Program  Provider: ATTILA Farmer  Date & Time: Friday April 22, 2022 at 11am (video appt.) AND Thursday April 28, 2022 at 10am (in person).  Clinic: [facility]  [facility]  [address]  [address]  [place] [address] [phone]     Appointment Type: Medication Management  Provider: Lupe Walsh  Date & Time:   April  "14, 2022 at 12:05pm  Clinic: [facility]  [address]  [place] [address]  [phone]  Note: This is a Video appt.     Appointment Type: Therapy  Provider:  Steven Pina  Date & Time:  April 18, 2022 at 12noon  Clinic: [facility]  [address], [address]  [place] [address]  [phone]  Note: This is an in person  appt.           You are on the wait list for the Navigate Program:  [facility]  [address], [phone]   Pt has the following professional community support(s):   Your mental health  is:  Theresa Flores with [facility] [phone]     Legal Status:  Civil commitment with lopez.     MENTAL STATUS EXAM   Vitals: /81 (Patient Position: Sitting)   Pulse 78   Temp 97.9  F (36.6  C) (Oral)   Resp 18   SpO2 96%     Mental Status:  Appearance:  Neatly groomed  Mood:  \" Happy about leaving the hospital and be with his family\"  Affect: full range was was congruent to speech Mood congruent, intensity is normal and reactive  Suicidal Ideation: PRESENT / ABSENT: absent   Homicidal Ideation: PRESENT / ABSENT: absent   Thought process: linear and organized   Thought content: denies suicidal ideation, violent ideation, delusions and paranoid ideation.   Fund of Knowledge: Average  Attention/Concentration: Fair  Language ability:  Intact  Memory:  Immediate recall intact, Short-term memory intact and Long-term memory intact  Insight:  fair.  Judgement: fair  Orientation: x 4  Psychomotor Behavior: denies tardive dyskinesia, dystonia or tics  Muscle Strength and Tone: MuscleStrength: Normal  Gait and Station: Normal         DISCHARGE MEDICATIONS   Discharge Medication Options:   Discharge Medication List as of 4/11/2022 12:39 PM      START taking these medications    Details   escitalopram (LEXAPRO) 20 MG tablet Take 1 tablet (20 mg) by mouth daily, Disp-30 tablet, R-0, E-Prescribe    "   paliperidone (INVEGA SUSTENNA) 156 MG/ML SEPIDEH injection Inject 1 mL (156 mg) into the muscle every 28 (twenty-eight) days Next INVEGA SUSTENNA INJECTION DUE ON 5/9/22, Disp-1 mL, R-0, Local Print      traZODone (DESYREL) 50 MG tablet Take 1 tablet (50 mg) by mouth nightly as needed for sleep (may repeat after 60 minutes), Disp-60 tablet, R-0, E-Prescribe         CONTINUE these medications which have NOT CHANGED    Details   cholecalciferol 50 MCG (2000 UT) tablet Take 50 mcg by mouth daily, Historical      Melatonin 10 MG TABS tablet Take 10 mg by mouth nightly as needed for sleep, Historical         STOP taking these medications       lurasidone (LATUDA) 60 MG TABS tablet Comments:   Reason for Stopping:               Medication adherence issues: MS Med Adherence Y/N: No  Medication side effects: MEDICATION SIDE EFFECTS: no side effects reported         DISCHARGE PLAN   1.  Education given regarding diagnostic and treatment options with risks, benefits and alternatives with adequate verbalization of understanding.  2.  Discharge to Home with outpatient support.  Upon detailed review of risk factors, patient amenable for release.   3.  Continue aforementioned medications and associated medication changes with follow-up by outpatient mental health provider.  4.  Crisis management planning in place.    5.  Continue efforts for sobriety.  6.  Nursing and  to review further discharge recommendations.     TOTAL TIME:  Greater than 30 minutes for discharge planning.    This note was created with help of Dragon dictation system. Grammatical / typing errors are not intentional.    SELINA Gates CNP

## 2022-04-11 NOTE — PLAN OF CARE
Problem: Suicidal Behavior  Goal: Suicidal Behavior is Absent or Managed  Outcome: Ongoing, Progressing     Problem: Sleep Disturbance  Goal: Adequate Sleep/Rest  Outcome: Ongoing, Progressing     Problem: Sleep Disturbance  Goal: Adequate Sleep/Rest  Outcome: Ongoing, Progressing     Patient has slept throughout the night. No safety concerns noted during Q 15 minute safety check rounds. Patient denied shortness of breath, pain, assault, cheeking, SI, HI, SIB, and A/VH. .Patient denies any concerns. Will continue to support per care plan and monitor.

## 2022-04-11 NOTE — PLAN OF CARE
Goal Outcome Evaluation:    Plan of Care Reviewed With: patient      Pt discharged to home today via father. Discharge paperwork explained to pt and pt allowed to ask questions.

## 2022-04-11 NOTE — PLAN OF CARE
Occupational Therapy Discharge Note    Patient Name:  Junior Fernández      Problem: Relapse Prevention  Goal: Engage in OT Group  Description: Pt will attend at least 2 OT groups this review period.  Outcome: Adequate for Care Transition     Problem: Social Interaction  Goal: Communicate Appropriately  Description: Patient will respond in an open and reality based manner.  (Free of guardedness or paranoia.) Target 50% of interactions.  Outcome: Adequate for Care Transition       D: Refer to daily doc flowsheets for details of progress toward goals.    A: Improvement seen in decreased severity of MH sx's.    P: Patient discharging to home with Adult Day Program and outpatient supports as directed by inpatient provider and social work.   Discontinue OT Care Plan goals and interventions.      Jess Patel OTR/L  Date: 4/11/2022  Time: 3:16 PM

## 2022-04-19 LAB
ATRIAL RATE - MUSE: 80 BPM
DIASTOLIC BLOOD PRESSURE - MUSE: NORMAL MMHG
INTERPRETATION ECG - MUSE: NORMAL
P AXIS - MUSE: 23 DEGREES
PR INTERVAL - MUSE: 134 MS
QRS DURATION - MUSE: 94 MS
QT - MUSE: 364 MS
QTC - MUSE: 419 MS
R AXIS - MUSE: 98 DEGREES
SYSTOLIC BLOOD PRESSURE - MUSE: NORMAL MMHG
T AXIS - MUSE: 62 DEGREES
VENTRICULAR RATE- MUSE: 80 BPM

## 2022-05-06 ENCOUNTER — TELEPHONE (OUTPATIENT)
Dept: BEHAVIORAL HEALTH | Facility: CLINIC | Age: 22
End: 2022-05-06
Payer: COMMERCIAL

## 2022-05-06 ENCOUNTER — HOSPITAL ENCOUNTER (INPATIENT)
Facility: CLINIC | Age: 22
LOS: 16 days | Discharge: NURSING, REHAB OR LONG-TERM CARE | DRG: 885 | End: 2022-05-26
Attending: PSYCHIATRY & NEUROLOGY | Admitting: PSYCHIATRY & NEUROLOGY
Payer: COMMERCIAL

## 2022-05-06 DIAGNOSIS — F20.3 UNDIFFERENTIATED SCHIZOPHRENIA (H): Primary | ICD-10-CM

## 2022-05-06 DIAGNOSIS — F41.9 ANXIETY: ICD-10-CM

## 2022-05-06 DIAGNOSIS — Z20.822 LAB TEST NEGATIVE FOR COVID-19 VIRUS: ICD-10-CM

## 2022-05-06 DIAGNOSIS — F25.0 SCHIZOAFFECTIVE DISORDER, BIPOLAR TYPE (H): Chronic | ICD-10-CM

## 2022-05-06 DIAGNOSIS — F25.9: ICD-10-CM

## 2022-05-06 PROCEDURE — 99291 CRITICAL CARE FIRST HOUR: CPT | Performed by: PSYCHIATRY & NEUROLOGY

## 2022-05-06 PROCEDURE — 250N000013 HC RX MED GY IP 250 OP 250 PS 637: Performed by: PSYCHIATRY & NEUROLOGY

## 2022-05-06 PROCEDURE — C9803 HOPD COVID-19 SPEC COLLECT: HCPCS | Performed by: PSYCHIATRY & NEUROLOGY

## 2022-05-06 PROCEDURE — 99284 EMERGENCY DEPT VISIT MOD MDM: CPT | Performed by: PSYCHIATRY & NEUROLOGY

## 2022-05-06 PROCEDURE — 87635 SARS-COV-2 COVID-19 AMP PRB: CPT | Performed by: PSYCHIATRY & NEUROLOGY

## 2022-05-06 PROCEDURE — 80307 DRUG TEST PRSMV CHEM ANLYZR: CPT | Performed by: PSYCHIATRY & NEUROLOGY

## 2022-05-06 PROCEDURE — 90791 PSYCH DIAGNOSTIC EVALUATION: CPT

## 2022-05-06 RX ORDER — LORAZEPAM 1 MG/1
1 TABLET ORAL ONCE
Status: COMPLETED | OUTPATIENT
Start: 2022-05-06 | End: 2022-05-06

## 2022-05-06 RX ADMIN — LORAZEPAM 1 MG: 1 TABLET ORAL at 22:50

## 2022-05-06 ASSESSMENT — ENCOUNTER SYMPTOMS
CONSTITUTIONAL NEGATIVE: 1
SLEEP DISTURBANCE: 1
MUSCULOSKELETAL NEGATIVE: 1
HALLUCINATIONS: 1
DECREASED CONCENTRATION: 1
EYES NEGATIVE: 1
CONFUSION: 1
CARDIOVASCULAR NEGATIVE: 1
NEUROLOGICAL NEGATIVE: 1
GASTROINTESTINAL NEGATIVE: 1
RESPIRATORY NEGATIVE: 1
NERVOUS/ANXIOUS: 1

## 2022-05-06 NOTE — TELEPHONE ENCOUNTER
"  S: 5:41p Beatriz w/ DEC called Intake w/ clinical on a 21/M present in the [place] [facility] w/ psychosis.    B:  The pt is currently at the [place] [facility] BIB police after parents called due to son asking parents for a gun within the household. Pt is psychotic; believes his dad is someone else and his dad is going to hurt him. Pt believes his real dad is somewhere else on earth. Pt reports \"God wants me to suffer for what I've done. God wants me to burn myself, or rip my penis off.\"  No SI. Pt reports SIB: has cut himself in the past, not currently. Pt denies AH/VH. Stressors: None indicated. Pt is currently calm and cooperative.    Guardian: Parents?? **In question**    The pts MH Hx is as follows: Schizoaffective, bipolar     The pt denies using any substances.    The pt has been/not been IP for MH before. Pt was at [facility] for 3 weeks and discharge on 4/11.     Medications are listed in Epic. The pt is not complaint.     OP Services: Therapist? Pt couldn't provide that info    There is no concern for aggression this visit. There is no concern for HI.     Per  the pt can ambulate independently.     Per  the pt is indep with ADLs.    The pt does not have any known medical concerns.     Covid collected.  Utox collected.    A: Vol; guardians     R: The pt is currently in the [place] [facility] awaiting bed placement.    5:52pm Pt has been added to the work-list. Intake waiting labs for bed placement. Intake working on identifying appropriate bed placement.         "

## 2022-05-06 NOTE — ED NOTES
Received a call from Brianna James,  for his Case Management Team. Brianna stated last night around 0000 pt walked into parent's room and turned the light on and was asking for the guns. He has been decompensating recently. Pt receives Invega injections and is due for the next one Monday. Parents expressed to her that they do not feel safe at home with pt's odd behavior.     Brianna James, [phone]

## 2022-05-06 NOTE — ED NOTES
Bed: HW01  Expected date: 5/6/22  Expected time: 2:37 PM  Means of arrival:   Comments:  Hen [address]  21M  Mental Health   On Hold

## 2022-05-06 NOTE — ED NOTES
5/6/2022  Junior Fernández 2000     [facility] Crisis Assessment    Patient was assessed: remote  Patient location: [facility] ED  Was a release of information signed: No. Reason: pt too psychotic    Referral Data and Chief Complaint  Cam is a 21 year old who uses he/him pronouns. Patient presented to the ED via police and was referred to the ED by family/friends. Patient is presenting to the ED for the following concerns: psychosis.      Informed Consent and Assessment Methods    Patient is his own guardian. Writer met with patient and explained the crisis assessment process, including applicable information disclosures and limits to confidentiality, assessed understanding of the process, and obtained consent to proceed with the assessment. Patient was observed to be able to participate in the assessment as evidenced by verbal engagement. Assessment methods included conducting a formal interview with patient, review of medical records, collaboration with medical staff, and obtaining relevant collateral information from family and community providers when available.    Narrative Summary of Presenting Problem and Current Functioning  What led to the patient presenting for crisis services, factors that make the crisis life threatening or complex, stressors, how is this disrupting the patient's life, and how current functioning is in comparison to baseline. How is patient presenting during the assessment.     Pt was brought in by the police after his parents, with whom he lives, called them because pt woke them up in the middle of the night asking if they had a gun. Parents contacted pt's  who recommended pt be seen and pt was put in a protective custody hold. Pt is currently under commitment. Pt was hospitalized at [facility] from 3/24/22-4/11/22 and pt admits that he has not taken his oral medication since then. Pt has a diagnosis of schizoaffective disorder, bipolar type. Pt does receive an Invega shot and he  "is due to get his next one in three days. Pt reported that he believes his father who he lives with is someone else and his real father is \"somewhere else on earth.\" Pt thought his father was going to hurt him last night because \"he looked mad.\" Pt reported that he believes he should stay at the hospital because \"God wants to punish me for the things I have done, by turning my back on him.\" Pt also believes that God wants pt to \"rip my tulio off or cut myself in half.\" When asked if pt would do those things, pt reported he would not cut himself in half but he might burn himself and he is \"most likely to cut or pull my tulio off.\" Pt's parents reported they want pt to be admitted because pt has been decopensating over the last couple of weeks and parents do not feel safe with him at home. Pt reported his coping skills are running and walking and jerking off and playing video games all day.        Collateral Information  Parents Van and Rolanda Langley, Epic notes, previous DEC assessment    Risk Assessment    Risk of Harm to Self     ESS-6  1.a. Over the past 2 weeks, have you had thoughts of killing yourself? No  1.b. Have you ever attempted to kill yourself and, if yes, when did this last happen? Yes September 2021 via cutting   2. Recent or current suicide plan? No   3. Recent or current intent to act on ideation? No  4. Lifetime psychiatric hospitalization? Yes  5. Pattern of excessive substance use? No  6. Current irritability, agitation, or aggression? Yes  Scoring note: BOTH 1a and 1b must be yes for it to score 1 point, if both are not yes it is zero. All others are 1 point per number. If all questions 1a/1b - 6 are no, risk is negligible. If one of 1a/1b is yes, then risk is mild. If either question 2 or 3, but not both, is yes, then risk is automatically moderate regardless of total score. If both 2 and 3 are yes, risk is automatically high regardless of total score.     Score: 3, moderate risk    The patient " has the following risk factors for suicide: isolation, lack of support, poor decision making, poor impulse control, prior suicide attempt, psychosis and significant behavioral changes    Is the patient experiencing current suicidal ideation: No    Is the patient engaging in preparatory suicide behaviors (formulating how to act on plan, giving away possessions, saying goodbye, displaying dramatic behavior changes, etc)? No    Does the patient have access to firearms or other lethal means? no    The patient has the following protective factors: social support, established relationship community mental health provider(s) and safe/stable housing    Support system information: parents,     Patient strengths: Pt is willing to be admitted    Does the patient engage in non-suicidal self-injurious behavior (NSSI/SIB)? yes. Method:cutting Frequency:not able to provide details Duration:not able to provide details History: not able to provide details    Is the patient vulnerable to sexual exploitation?  Yes pt is a vulnerable adult    Is the patient experiencing abuse or neglect? no    Is the patient a vulnerable adult? Yes      Risk of Harm to Others  The patient has the following risk factors of harm to others: agitation, history of violence and impaired self-control    Does the patient have thoughts of harming others? No    Is the patient engaging in sexually inappropriate behavior?  no       Current Substance Abuse    Is there recent substance abuse? no    Was a urine drug screen or blood alcohol level obtained: Yes results not in at this time          Current Symptoms/Concerns    Symptoms  Attention, hyperactivity, and impulsivity symptoms present: No    Anxiety symptoms present: No      Appetite symptoms present: No     Behavioral difficulties present: Yes: Agitation, Apathy and Impulsivity/Disinhibition     Cognitive impairment symptoms present: No    Depressive symptoms present: No    Eating disorder symptoms  present: No    Learning disabilities, cognitive challenges, and/or developmental disorder symptoms present: No     Manic/hypomanic symptoms present: No    Personality and interpersonal functioning difficulties present : Yes: Emotional Deregulation, Impaired Impulse Control and Impaired Interpersonal Functioning    Psychosis symptoms present: Yes: Paranoia      Sleep difficulties present: No    Substance abuse disorder symptoms present: No     Trauma and stressor related symptoms present: No       Mental Status Exam   Affect: Flat   Appearance: Appropriate    Attention Span/Concentration: Attentive?    Eye Contact: Intense   Fund of Knowledge: Delayed    Language /Speech Content: Fluent   Language /Speech Volume: Normal    Language /Speech Rate/Productions: Pressured    Recent Memory: Variable   Remote Memory: Variable   Mood: Apathetic    Orientation to Person: Yes    Orientation to Place: Yes   Orientation to Time of Day: Yes    Orientation to Date: Yes    Situation (Do they understand why they are here?): Yes    Psychomotor Behavior: Underactive    Thought Content: Paranoia   Thought Form: Intact       Mental Health and Substance Abuse History    History  Current and historical diagnoses or mental health concerns: schizoaffective disorder, bipolar type    Prior  services (inpatient, programmatic care, outpatient, etc) : Yes many admissions, most recent [facility] 3/24/22-4/11/22    Has the patient used [facility] crisis team services before?: No    History of substance abuse: No    Prior RALPH services (inpatient, programmatic care, detox, outpatient, etc) : No    History of commitment: Yes pt is currently under committment    Family history of MH/RALPH: No    Trauma history: No    Medication  Psychotropic medications: Invega, pt is non-compliant with oral medications    Current Care Team  Primary Care Provider: not able to provide details    Psychiatrist: not able to provide details    Therapist: not able to provide  details    : Brianna James, [phone]  CTSS or ARMHS: No    ACT Team: No    Other: No    Biopsychosocial Information    Socioeconomic Information  Current living situation: lives with parents    Employment/income source: not employed    Relevant legal issues: none noted    Cultural, Shinto, or spiritual influences on mental health care: Congregation    Is the patient active in the  or a : No      Relevant Medical Concerns   Patient identifies concerns with completing ADLs? No     Patient can ambulate independently? Yes     Other medical concerns? No     History of concussion or TBI? No        Diagnosis  Schizoaffective disorder, Bipolar type F25.0    Therapeutic Intervention  The following therapeutic methodologies were employed when working with the patient: establishing rapport, active listening, assessing dimensions of crisis, solution focused brief therapy, safety planning and treatment planning. Patient response to intervention: indiffernt.      Disposition  Recommended disposition: Inpatient Mental Health      Reviewed case and recommendations with attending provider. Attending Name: Max      Attending concurs with disposition: Yes      Patient concurs with disposition: Yes      Guardian concurs with disposition: NA     Final disposition: Inpatient mental health .     Inpatient Details (if applicable):  Is patient admitted voluntarily:Yes  And pt has pt is under committment  Patient aware of potential for transfer if there is not appropriate placement? Yes     Patient is willing to travel outside of the [facility] for placement? No      Behavioral Intake Notified? Yes: Date: 5/6/22 Time: 5:45pm.       Clinical Substantiation of Recommendations   Rationale with supporting factors for disposition and diagnosis.     Pt is psychotic and presents a risk for harm to himself and others.       Assessment Details  Patient interview started at: 5:25pm and completed at: 5:40pm.    Total  duration spent on the patient case in minutes: 1.25 hrs     CPT code(s) utilized: 26682 - Psychotherapy for Crisis - 60 (30-74*) min       Aftercare and Safety Planning  Follow up plans with MH/RALPH services: No      Aftercare plan placed in the AVS and provided to patient: No. Rationale: pt admitted    LORENA Espinoza

## 2022-05-06 NOTE — ED PROVIDER NOTES
ED Provider Note  [facility]      History     Chief Complaint   Patient presents with     Mental Health Problem     Schizo-affective disorder. Came from parents home     HPI  Junior Fernández is a 21 year old male who is here via EMS from parent's home. Patient has history of schizoaffective disorder. He has been hospitalized here previously, with latest hospitalization from 3/24/22 - 4/11/22. He was hospitalized 3 times last fall. He had attempted suicide by cutting his forearm in September 2021. He receives injectable Invega. He admits to not taking his oral meds. He admits to disclosing his fears about his safety regarding father is out to harm him; hence, he inquired whether they have guns in the home. He also feels guilty about turning his back on God. He has urges to cut himself in half. He feels his parents do not like him and wish for him to be dead. Parents grew concerned about his fears and called police.    Patient is in emotional and behavioral control here. He is cooperative and engaging. He appears subdued and guarded. He exhibits thought blocking. He has ongoing paranoia and fears for his safety.    Patient denies recent drug use. He has engaged in self-injury and had cut on his forearm 1-2 days ago. The cut is healing without evidence of infection. He denies other medical concerns. He denies COVID symptoms.    Please see DEC Crisis Assessment on 5/6/22 in Epic for further details.    PERSONAL MEDICAL HISTORY  Past Medical History:   Diagnosis Date     Anisometropia      Anxiety      Depression      Elevated blood pressure reading without diagnosis of hypertension      Fracture 07/01/2003    Left clavicle     Fracture 04/01/2005    Left Monteggia     History of substance abuse (H) 11/23/2016    THC, ETOH, stimulants     History of suicide attempt     10/2021 laceration of left arm     Multiple nevi 10/14/2015     Other insomnia      Pneumonia 03/01/2003    RUL     RAD  (reactive airway disease)     outgrown     SARS (severe acute respiratory syndrome)      Sinus tachycardia      PAST SURGICAL HISTORY  Past Surgical History:   Procedure Laterality Date     CIRCUMCISION,OTHER,<28 D/O       FRACTURE SURGERY  2005    Left Monteggia     HC TOOTH EXTRACTION W/FORCEP       REPAIR ARTERY BRACHIAL Left 10/2021     FAMILY HISTORY  Family History   Problem Relation Age of Onset     Anxiety Disorder Maternal Grandmother      Depression Maternal Grandmother      Hypertension Maternal Grandmother      Cerebrovascular Disease Maternal Grandmother      Other - See Comments Mother         on Celexa     Hyperthyroidism Father         Rx'd with methimazole. Father's side with mild allergy/asthma issues     Hyperlipidemia Father      Anxiety Disorder Brother         Stuttering and tics     Lymphoma Maternal Grandfather          from Lymphoma     Other - See Comments Paternal Grandmother         vaso-vagal syncope     Other - See Comments Paternal Grandfather          from kidney/liver CA; CAD and had pacemaker     Heart Disease Paternal Grandfather 65     Arrhythmia No family hx of      SOCIAL HISTORY  Social History     Tobacco Use     Smoking status: Never Smoker     Smokeless tobacco: Never Used   Substance Use Topics     Alcohol use: Not Currently     Comment: 3-4 days ago     MEDICATIONS  No current facility-administered medications for this encounter.     Current Outpatient Medications   Medication     cholecalciferol 50 MCG (2000) tablet     escitalopram (LEXAPRO) 20 MG tablet     Melatonin 10 MG TABS tablet     [START ON 2022] paliperidone (INVEGA SUSTENNA) 156 MG/ML SEPIDEH injection     traZODone (DESYREL) 50 MG tablet     ALLERGIES  Allergies   Allergen Reactions     Seasonal Allergies      Seroquel [Quetiapine]      Fainting and slowed breathing           Review of Systems   Constitutional: Negative.    HENT: Negative.    Eyes: Negative.    Respiratory: Negative.   "  Cardiovascular: Negative.    Gastrointestinal: Negative.    Genitourinary: Negative.    Musculoskeletal: Negative.    Skin: Negative.    Neurological: Negative.    Psychiatric/Behavioral: Positive for confusion, decreased concentration, hallucinations, self-injury and sleep disturbance. The patient is nervous/anxious.    All other systems reviewed and are negative.        Physical Exam   BP: 122/63  Pulse: (!) 156  Temp: 97.9  F (36.6  C)  Resp: 16  Height: 180.3 cm (5' 11\")  Weight: 74.8 kg (165 lb)  SpO2: 96 %  Physical Exam  Vitals and nursing note reviewed.   HENT:      Head: Normocephalic.   Eyes:      Pupils: Pupils are equal, round, and reactive to light.   Pulmonary:      Effort: Pulmonary effort is normal.   Musculoskeletal:         General: Normal range of motion.      Cervical back: Normal range of motion.   Neurological:      General: No focal deficit present.      Mental Status: He is alert.   Psychiatric:         Attention and Perception: He is inattentive. He does not perceive auditory or visual hallucinations.         Mood and Affect: Mood normal. Affect is blunt and inappropriate.         Speech: Speech is delayed.         Behavior: Behavior normal. Behavior is not agitated, aggressive, hyperactive or combative. Behavior is cooperative.         Thought Content: Thought content is paranoid and delusional.         Cognition and Memory: Cognition and memory normal.         Judgment: Judgment is inappropriate.         ED Course      Procedures         Mental Health Risk Assessment      PSS-3    Date and Time Over the past 2 weeks have you felt down, depressed, or hopeless? Over the past 2 weeks have you had thoughts of killing yourself? Have you ever attempted to kill yourself? When did this last happen? User   05/06/22 1529 yes yes yes more than 6 months ago IT      C-SSRS ([place])    Date and Time Q1 Wished to be Dead (Past Month) Q2 Suicidal Thoughts (Past Month) Q3 Suicidal Thought Method Q4 " Suicidal Intent without Specific Plan Q5 Suicide Intent with Specific Plan Q6 Suicide Behavior (Lifetime) Within the Past 3 Months? RETIRED: Level of Risk per Screen Screening Not Complete User   05/06/22 1529 yes yes no yes no yes -- -- -- IT              Suicide assessment completed by mental health (D.E.C., LCSW, etc.)       No results found for any visits on 05/06/22.  Medications - No data to display     Assessments & Plan (with Medical Decision Making)   Patient is here for a mental health evaluation. He has schizoaffective disorder versus schizophrenia. He has been struggling with heighten guilt and fears that parents do not like him and he is a burden to them. He inquired whether there are guns in the home and was fearful that parents would use them to kill him. He also wants to die. He appears impaired in his thought process.     Patient has decompensated from his mental illness and needs admission for stabilization. He is too unsafe for any outpatient intervention due to his decompensated state. He agrees to be hospitalized. Parents - allegedly his guardians - consent. He is referred. He is medically cleared.    Parents reports he is his own guardian. As he is voluntarily checking himself into the hospital he is allowed to be under voluntary status. If he changes his mind, then he should be placed on a 72 hour hold.    I have reviewed the nursing notes. I have reviewed the findings, diagnosis, plan and need for follow up with the patient.    New Prescriptions    No medications on file       Final diagnoses:   Acute exacerbation of subchronic schizoaffective schizophrenia (H)       --  Claus Santana MD  [facility] EMERGENCY DEPARTMENT  5/6/2022     Claus Santana MD  05/06/22 1745       Claus Santana MD  05/06/22 2015

## 2022-05-06 NOTE — ED TRIAGE NOTES
Picked up by EMS from parents' home. History of schizo-affective disorder. Woke up asking parents where the guns in the home were. Agitated with police so parents requested he be brought in for assessment.

## 2022-05-06 NOTE — ED NOTES
"Pt's parents informed that pt does not want them in the room and does not want any info shared to them.  Father stated that if pt being discharged \" we have to know, we don't want him back if we don't know what is going with him. I hope the doctor has talked to his CareTeam.\" Per pt's Father stated that pt is on \"commitment\".  Pt's Mother stated \" we want him admitted\"  Message shared with MD  "

## 2022-05-06 NOTE — ED NOTES
Junior Fernández  May 6, 2022  SAFE Note    Critical Safety Issues:       Current Suicidal Ideation/Self-Injurious Concerns/Methods: None - N/A      Current or Historical Inappropriate Sexual Behavior: No      Current or Historical Aggression/Homicidal Ideation: Impaired Self-Control      Triggers: unknown    Guardianship Status: [facility] Guardian: is his own guardian.. Guardianship paperwork is in [employer] / Epic ACP tab.     Updated care team: Yes:     For additional details see full [facility] assessment.       LORENA Espinoza

## 2022-05-07 ENCOUNTER — TELEPHONE (OUTPATIENT)
Dept: BEHAVIORAL HEALTH | Facility: CLINIC | Age: 22
End: 2022-05-07
Payer: COMMERCIAL

## 2022-05-07 PROCEDURE — 250N000011 HC RX IP 250 OP 636

## 2022-05-07 PROCEDURE — 250N000013 HC RX MED GY IP 250 OP 250 PS 637: Performed by: EMERGENCY MEDICINE

## 2022-05-07 RX ORDER — ESCITALOPRAM OXALATE 20 MG/1
20 TABLET ORAL DAILY
Status: DISCONTINUED | OUTPATIENT
Start: 2022-05-07 | End: 2022-05-26 | Stop reason: HOSPADM

## 2022-05-07 RX ORDER — TRAZODONE HYDROCHLORIDE 50 MG/1
50 TABLET, FILM COATED ORAL
Status: DISCONTINUED | OUTPATIENT
Start: 2022-05-07 | End: 2022-05-26 | Stop reason: HOSPADM

## 2022-05-07 RX ORDER — OLANZAPINE 10 MG/2ML
10 INJECTION, POWDER, FOR SOLUTION INTRAMUSCULAR ONCE
Status: COMPLETED | OUTPATIENT
Start: 2022-05-07 | End: 2022-05-07

## 2022-05-07 RX ORDER — OLANZAPINE 10 MG/2ML
INJECTION, POWDER, FOR SOLUTION INTRAMUSCULAR
Status: COMPLETED
Start: 2022-05-07 | End: 2022-05-07

## 2022-05-07 RX ADMIN — OLANZAPINE 10 MG: 10 INJECTION, POWDER, FOR SOLUTION INTRAMUSCULAR at 05:40

## 2022-05-07 RX ADMIN — ESCITALOPRAM OXALATE 20 MG: 20 TABLET ORAL at 17:32

## 2022-05-07 NOTE — ED NOTES
[facility] ED Mental Health Handoff Note:       Brief HPI:  This is a 21 year old male signed out to me by Dr. English.  See initial ED Provider note for full details of the presentation.  Patient required Zyprexa for agitation and swinging at security on the previous shift.  No issues during my shift.    Home meds reviewed and ordered/administered: Yes    Medically stable for inpatient mental health admission: Yes.    Evaluated by mental health: Yes. The recommendation is for inpatient mental health treatment. Bed search in process    Safety concerns: At the time I received sign out, there were no safety concerns.    Hold Status:  Active Orders   N/A            Exam:   Patient Vitals for the past 24 hrs:   BP Temp Temp src Pulse Resp SpO2   05/07/22 0800 122/74 -- -- 100 18 97 %   05/06/22 2309 128/80 98.8  F (37.1  C) Oral 104 16 96 %   05/06/22 1814 (!) 143/72 99.1  F (37.3  C) Oral (!) 131 16 --   05/06/22 1659 110/55 -- -- (!) 141 16 95 %       ED Course:    Medications   LORazepam (ATIVAN) tablet 1 mg (1 mg Oral Given 5/6/22 2250)   OLANZapine (zyPREXA) injection 10 mg (10 mg Intramuscular Given 5/7/22 0540)            There were no significant events during my shift.    Patient was signed out to the oncoming provider, Dr. Omalley      Impression:    ICD-10-CM    1. Acute exacerbation of subchronic schizoaffective schizophrenia (H)  F25.9        Plan:    1. Awaiting inpatient mental health admission/transfer.      RESULTS:   Results for orders placed or performed during the hospital encounter of 05/06/22 (from the past 24 hour(s))   Urine Drugs of Abuse Screen     Status: Normal    Collection Time: 05/06/22  5:42 PM    Narrative    The following orders were created for panel order Urine Drugs of Abuse Screen.  Procedure                               Abnormality         Status                     ---------                               -----------         ------                     Drug abuse screen 1  urin...[[id number]]  Normal              Final result                 Please view results for these tests on the individual orders.   Drug abuse screen 1 urine (ED)     Status: Normal    Collection Time: 05/06/22  5:42 PM   Result Value Ref Range    Amphetamines Urine Screen Negative Screen Negative    Barbiturates Urine Screen Negative Screen Negative    Benzodiazepines Urine Screen Negative Screen Negative    Cannabinoids Urine Screen Negative Screen Negative    Cocaine Urine Screen Negative Screen Negative    Opiates Urine Screen Negative Screen Negative   Asymptomatic COVID-19 Virus (Coronavirus) by PCR Nasopharyngeal     Status: Normal    Collection Time: 05/06/22  5:48 PM    Specimen: Nasopharyngeal; Swab   Result Value Ref Range    SARS CoV2 PCR Negative Negative    Narrative    Testing was performed using the tammy  SARS-CoV-2 & Influenza A/B Assay on the tammy  [employer]  System.  This test should be ordered for the detection of SARS-COV-2 in individuals who meet SARS-CoV-2 clinical and/or epidemiological criteria. Test performance is unknown in asymptomatic patients.  This test is for in vitro diagnostic use under the FDA EUA for laboratories certified under CLIA to perform moderate and/or high complexity testing. This test has not been FDA cleared or approved.  A negative test does not rule out the presence of PCR inhibitors in the specimen or target RNA in concentration below the limit of detection for the assay. The possibility of a false negative should be considered if the patient's recent exposure or clinical presentation suggests COVID-19.  [facility] are certified under the Clinical Laboratory Improvement Amendments of 1988 (CLIA-88) as qualified to perform moderate and/or high complexity laboratory testing.             MD Michael Maher Kyle, MD  05/07/22 1559

## 2022-05-07 NOTE — TELEPHONE ENCOUNTER
R:  Per chart, pt is his own guardian and [facility]. Bed search update @ 02:15:    [facility]: No appropriate beds available  [facility] @ cap  [facility]: @ cap per website  [facility]:@ cap per website  [facility]: @ cap per website  [facility]: @ cap per website  [facility]: @ cap per website  [facility]: @ cap per website  [facility]: @ cap per website    Pt remains on work list until appropriate placement is available

## 2022-05-07 NOTE — ED NOTES
Within an hour after restraint an in person face to face assessment was completed at 0620, including an evaluation of the patient's immediate reaction to the intervention, behavioral assessment and review/assessment of history, drugs and medications, recent labs, etc., and behavioral condition.  The patient experienced: No adverse physical outcome from seclusion/restraint initiation.  The intervention of restraint or seclusion needs to continue.     Rod English,   05/07/22 0700

## 2022-05-07 NOTE — ED NOTES
Pt has been staying in his room, withdrawn, flat affect. Pt takes awhile to answer any questions.  Pt independent with ADL's and ambulation.  Denies SI/HI. Offered patient personal hygiene items.  Pt declined.

## 2022-05-07 NOTE — ED NOTES
"Pt states to writer that we ARE ALLOWED to speak with his parents if they call. Writer asked if he wanted me to call right now and update them and he stated \"that is not necessary at this time\". Writer ensured he understands he can revoke this at anytime he wishes as well.   "
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
## 2022-05-07 NOTE — ED NOTES
"Pt came out of the room and started walking fast towards the exit door.  Security officers able to bring pt back to the room without any incidents. Writer offered pt medication, pt immediately responded \" I'll take the Ativan now\" Ativan given as previously ordered.  "

## 2022-05-08 PROCEDURE — 250N000013 HC RX MED GY IP 250 OP 250 PS 637: Performed by: EMERGENCY MEDICINE

## 2022-05-08 RX ADMIN — ESCITALOPRAM OXALATE 20 MG: 20 TABLET ORAL at 09:13

## 2022-05-08 NOTE — TELEPHONE ENCOUNTER
Inpatient Bed Call Log 5/08/2022 done at 6pm    Adults:    [facility] is posting 0 beds. Per call @ cap.     [facility] is posting 0 beds. Per Terri @ cap, call back at 8pm.    [facility] is posting 0 beds. Per Quynh, @ cap.    [facility] is posting 0 beds. Per Terri @ cap, call back at 8pm.    [facility] is posting 0 beds. Per Juice @ cap     [facility] is posting 0 beds. Per Terri @ cap, call back at 8pm.    [facility] is posting 0 beds. Per call @ cap.     [facility] is posting 0 beds. Per Terri @ cap, call back at 8pm.    [facility] is posting 0 beds. Mixed unit 12+. Low acuity only. Per Sherry @ cap.     [facility] is positing 0 beds. No aggression. Per Terri @ cap, call back at 8pm.    [facility] is posting 0 beds. Per Marguerite, @ cap.     [facility] is posting 0 beds. Low acuity only. No answer.     [facility] is posting 0 beds. Per Terri @ cap, call back at 8pm.    [facility] is posting 0 beds. Low acuity. No answer.    [facility] is posting 0 beds. 72 hr hold required. (Pt in review)     [facility] is posting 0 beds. No answer.    [facility] is posting 0 beds. Vol only, No Hx of aggression, violence or assault. No sexual offenders. No 72 hr holds. (Pt in review)     [facility] is posting 0 beds. (Must have the cognitive ability to do programming. No aggressive or violent behavior or recent HX in the last 2 yrs. MH must be primary.) (Pt in review)     [facility] is posting 0 beds. Low acuity only. Violence and aggression capped.  Per call @ cap.     [facility] is posting 0 beds. Low acuity, Neg Covid. Per website @cap     [facility] is posting 0 beds. Covid neg. Vol only. Combined adolescent and adult unit. No aggressive or violent behavior. No registered sex offenders. Per website @cap    [facility] is posting 6 beds. Call for details. Per Lupe @cap    [facility] is  posting 0 beds. No answer.     6pm No appropriate bed available.

## 2022-05-08 NOTE — ED NOTES
Pt has been calm and cooperative with cares. Pt took long nap and woke around 17:30. Pt was given dinner and extra drinks and used bathroom. Vitals obtained and medications given. Pt went back to sleep.

## 2022-05-08 NOTE — ED NOTES
[facility] ED Mental Health Handoff Note:       Brief HPI:  Patient was signed out to me by previous physician see initial ED Provider note for full details of the presentation.   Home meds reviewed and ordered/administered: Yes    Medically stable for inpatient mental health admission: Yes.    Evaluated by mental health: Yes. The recommendation is for inpatient mental health treatment. Bed search in process    Safety concerns: At the time I received sign out, there were no safety concerns.      Emergency department course:  Patient was signed out to me by previous physician.  Currently awaiting transfer or admission for mental health.  Patient was sleeping comfortably overnight during my shift.  There were no issues during my shift.  Patient care will be signed out to the oncoming physician.       Rod English,   05/08/22 0650

## 2022-05-08 NOTE — TELEPHONE ENCOUNTER
R:  No appropriate beds available within [facility] at this time. Per chart, pt is his own guardian and [facility]. Bed search update @ 04:37:    [facility]: @ cap per website  [facility]:@ cap per website  [facility]: @ cap per website  [facility]: @ cap per website  [facility]: @ cap per website  [facility]: @ cap per website  [facility]: @ cap per website    Pt remains on work list until appropriate placement is available

## 2022-05-09 ENCOUNTER — TELEPHONE (OUTPATIENT)
Dept: BEHAVIORAL HEALTH | Facility: CLINIC | Age: 22
End: 2022-05-09
Payer: COMMERCIAL

## 2022-05-09 PROCEDURE — 250N000013 HC RX MED GY IP 250 OP 250 PS 637: Performed by: EMERGENCY MEDICINE

## 2022-05-09 RX ADMIN — ESCITALOPRAM OXALATE 20 MG: 20 TABLET ORAL at 08:32

## 2022-05-09 NOTE — PHARMACY-CONSULT NOTE
Pharmacy consult note to assess conditions to be met prior to continuing Invega Sustenna while hospitalized.     The following conditions must be met to dispense Invega Sustenna per hospital policy:   1. Patient has been on Invega Sustenna at least 3 weeks prior to admission.   2. Patient has received both initiation doses.   3. Dose will be administered one week after the next scheduled outpatient maintenance dose is due.   4. The patient must still be hospitalized on the administration date determined by the pharmacy consult (one week after the next scheduled outpatient dose).   5. Order must include  dispense as written.    6. If six months or more have passed since the last maintenance dose, do not restart Invega Sustenna in the hospital, defer to outpatient treatment.     The criteria listed above allow for continuation of Invega Sustenna while this patient is hospitalized. The last dose of Invega Sustenna 156 mg was administered on 4/11/22 prior to admission (per MAR, during previous admission at [facility]). The next dose is due on 5/9/22, and will be scheduled for 5/16/22 (one week after the next scheduled outpatient dose) as listed above in condition #3. Medication may be administered sooner than 5/16/22 if using patient's own supply.    Please contact unit decentralized pharmacist if you have questions or concerns.    Nicollette McMann, PharmD  [facility]  Emergency Department: Ascom *[phone]

## 2022-05-09 NOTE — ED NOTES
Triage & Transition Services, Extended Care     Junior Fernández  May 9, 2022       Cam is followed related to Long wait time for admission: 69+ hours in the ED. Please see initial DEC/[facility] Crisis Assessment completed by Beatriz Alcala on 5/6/2022 for complete assessment information. Notable concerns include psychosis,commitment.  Aggression.    Clinician spoke with patient's nurse, Chris and received update.  Patient's  sent over some paperwork for him to sign.  Patient signed it.  Nurse states it appears to be paperwork regarding the continuation of his commitment.   Patient is scheduled for a hearing today at 1:15pm.  Requested the paperwork be sent to intake as well.  Nurse states patient is agreeable to visiting with clinician.    Met with patient via IPad.  He is currently calm.  Responses are primarily minimal 1-2 word replies that are dismissive.  Patient states he signed a waiver for the continuation of his commitment. Patient states he is okay with the plan for admission.       Current Supports and Contact Information  Clinician had left a message for patient's , Brianna James  [phone], to clarify commitment and revocation status or plans.  Received a call back.   confirms plan for revocation.  She strongly advocates for patient's admission.  She confirms patient was scheduled for a hearing today and she suspects patient's  had him sign a waiver for the hearing and agree to a shorter commitment time frame.   states patient's parents are fearful.  She reports patient went to his parents home in the night saying this is it and looking for guns.  CM states patient has been decompensating for quite some time.  CM notes the Invega was helpful at first and does not last the entirely until his next injection.  Parents reported to her noticing a down turn about 1.5 weeks ago.  Patient has not been following recommendations.  He is calm one moment and escalated  the next with concerns for violence.  CM states patient is due for his Invega injection today and inquires if he has received it.  Clinician clarified with Dr. Villaseñor that patient is currently not on a 72 hour hold.  Dr Villaseñor states he will order patient's Invega.   Updated  and central intake.   states she will work on getting revocation paperwork to us this afternoon.  Requested it be faxed the the main ED and central intake.    Plan  Plan for inpatient mental health.   revoking his provisional discharge.  Dr. Villaseñor is ordering patient's Invega injection which is due today.    Extended Care will follow and meet with patient/family/care team as able or requested.     Odette Mccain, [facility]  Extended Care   [phone]

## 2022-05-09 NOTE — TELEPHONE ENCOUNTER
R:  No appropriate beds available within [facility] at this time. Per chart, pt is his own guardian and [place] - currently [place]. Bed search update @ 05:53:     [facility]: @ cap per website  [facility]:@ cap per website  [facility]: @ cap per website  [facility]: @ cap per website  [facility]: @ cap per website  [facility]: @ cap per website  [facility]: @ cap per website     Pt remains on work list until appropriate placement is available

## 2022-05-10 PROBLEM — F25.9: Status: ACTIVE | Noted: 2022-05-10

## 2022-05-10 PROCEDURE — 250N000013 HC RX MED GY IP 250 OP 250 PS 637: Performed by: CLINICAL NURSE SPECIALIST

## 2022-05-10 PROCEDURE — 99222 1ST HOSP IP/OBS MODERATE 55: CPT | Mod: AI | Performed by: CLINICAL NURSE SPECIALIST

## 2022-05-10 PROCEDURE — 250N000013 HC RX MED GY IP 250 OP 250 PS 637: Performed by: EMERGENCY MEDICINE

## 2022-05-10 PROCEDURE — 124N000002 HC R&B MH UMMC

## 2022-05-10 RX ORDER — MAGNESIUM HYDROXIDE/ALUMINUM HYDROXICE/SIMETHICONE 120; 1200; 1200 MG/30ML; MG/30ML; MG/30ML
30 SUSPENSION ORAL EVERY 4 HOURS PRN
Status: DISCONTINUED | OUTPATIENT
Start: 2022-05-10 | End: 2022-05-26 | Stop reason: HOSPADM

## 2022-05-10 RX ORDER — HYDROXYZINE HYDROCHLORIDE 25 MG/1
25-50 TABLET, FILM COATED ORAL EVERY 4 HOURS PRN
Status: DISCONTINUED | OUTPATIENT
Start: 2022-05-10 | End: 2022-05-26 | Stop reason: HOSPADM

## 2022-05-10 RX ORDER — ACETAMINOPHEN 325 MG/1
650 TABLET ORAL EVERY 4 HOURS PRN
Status: DISCONTINUED | OUTPATIENT
Start: 2022-05-10 | End: 2022-05-26 | Stop reason: HOSPADM

## 2022-05-10 RX ORDER — PALIPERIDONE 3 MG/1
3 TABLET, EXTENDED RELEASE ORAL AT BEDTIME
Status: DISCONTINUED | OUTPATIENT
Start: 2022-05-10 | End: 2022-05-26 | Stop reason: HOSPADM

## 2022-05-10 RX ORDER — OLANZAPINE 10 MG/2ML
10 INJECTION, POWDER, FOR SOLUTION INTRAMUSCULAR 3 TIMES DAILY PRN
Status: DISCONTINUED | OUTPATIENT
Start: 2022-05-10 | End: 2022-05-26 | Stop reason: HOSPADM

## 2022-05-10 RX ORDER — TRAZODONE HYDROCHLORIDE 50 MG/1
50 TABLET, FILM COATED ORAL
Status: DISCONTINUED | OUTPATIENT
Start: 2022-05-10 | End: 2022-05-26 | Stop reason: HOSPADM

## 2022-05-10 RX ORDER — OLANZAPINE 5 MG/1
5-10 TABLET ORAL 3 TIMES DAILY PRN
Status: DISCONTINUED | OUTPATIENT
Start: 2022-05-10 | End: 2022-05-26 | Stop reason: HOSPADM

## 2022-05-10 RX ADMIN — ESCITALOPRAM OXALATE 20 MG: 20 TABLET ORAL at 08:56

## 2022-05-10 RX ADMIN — PALIPERIDONE 3 MG: 3 TABLET, EXTENDED RELEASE ORAL at 21:32

## 2022-05-10 ASSESSMENT — ACTIVITIES OF DAILY LIVING (ADL)
DRESS: SCRUBS (BEHAVIORAL HEALTH);INDEPENDENT
CONCENTRATING,_REMEMBERING_OR_MAKING_DECISIONS_DIFFICULTY: NO
WALKING_OR_CLIMBING_STAIRS_DIFFICULTY: NO
NUMBER_OF_TIMES_PATIENT_HAS_FALLEN_WITHIN_LAST_SIX_MONTHS: 0
TOILETING_ISSUES: NO
DOING_ERRANDS_INDEPENDENTLY_DIFFICULTY: NO
WEAR_GLASSES_OR_BLIND: NO
FALL_HISTORY_WITHIN_LAST_SIX_MONTHS: NO
ORAL_HYGIENE: INDEPENDENT
DIFFICULTY_EATING/SWALLOWING: NO
DRESSING/BATHING_DIFFICULTY: NO
HYGIENE/GROOMING: INDEPENDENT
CHANGE_IN_FUNCTIONAL_STATUS_SINCE_ONSET_OF_CURRENT_ILLNESS/INJURY: NO
LAUNDRY: WITH SUPERVISION
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
## 2022-05-10 NOTE — PLAN OF CARE
05/10/22 1150   Patient Belongings   Did you bring any home meds/supplements to the hospital?  No   Patient Belongings locker   Patient Belongings Put in Hospital Secure Location (Security or Locker, etc.) other (see comments)   Belongings Search Yes   Clothing Search Yes   Second Staff Ali (PA from 3B)       Unit Bin  - 1 grey t shirt  - 1 pair navy blue joggers  - 1 pair socks  Brought in on 05/25/2022  - 1 pair of shoes  - 1 pair of socks  - 1 boxers  - 1 pants  - 1 shorts  - 1 t-shirt  - 1 sweatshirt  Sent to Security (envelope #[phone])  - $35    Remains w/ Pt  - I pair boxers    Brought in 5/12  - 1 pair boxers   - 1 pack wet wipes  - Hospital hygiene products   A               Admission:  I am responsible for any personal items that are not sent to the safe or pharmacy.  [facility] is not responsible for loss, theft or damage of any property in my possession.    Signature:  _________________________________ Date: _______  Time: _____                                              Staff Signature:  ____________________________ Date: ________  Time: _____      2nd Staff person, if patient is unable/unwilling to sign:    Signature: ________________________________ Date: ________  Time: _____     Discharge:  [facility] has returned all of my personal belongings:    Signature: _________________________________ Date: ________  Time: _____                                          Staff Signature:  ____________________________ Date: ________  Time: _____

## 2022-05-10 NOTE — TELEPHONE ENCOUNTER
R:  No appropriate beds available within [facility] at this time.     Per chart, pt is his own guardian and [place] - currently voluntary. Bed search update @ 03:32:     [facility]: @ cap per website  [facility]:@ cap per website  [facility]: @ cap per website  [facility]: @ cap per website  [facility]: @ cap per website  [facility]: @ cap per website  [facility]: @ cap per website     Pt remains on work list until appropriate placement is available
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
## 2022-05-10 NOTE — TELEPHONE ENCOUNTER
R: Patient cleared and ready for behavioral bed placement: Yes    8:44am Intake paged provider Nikki for pt placement on 4A/[facility].     10:11am Intake paged provider Nikki for pt placement on 4A/[facility].     10:12 Intake received a call from provider Nikki accepting the pt onto the unit 4A/[facility].     10:18am Intake called 4A to provide disposition to charge (Chantelle). Nurse to nurse: 11am.    10:21am Intake called [facility] and provided placement info to [facility].

## 2022-05-10 NOTE — PROGRESS NOTES
1100 Writer called ED to obtain nurse to nurse report. Patient is able to be transported to  [facility] as soon as possible.ED will send patient soon.

## 2022-05-10 NOTE — PLAN OF CARE
05/10/22 1500   General Information   Has Not Attended OT as of: 05/10/22     Pt has not attended scheduled occupational therapy sessions. Encourage attendance and participation. Pt will be given self-assessment form, and OT staff will explain the purpose of including them in their treatment plan and offer options for meeting their needs.

## 2022-05-10 NOTE — H&P
"Admitted: 05/06/2022    CHIEF COMPLAINT:  Evaluation for psychosis.    IDENTIFYING INFORMATION:  Junior Fernández is a 21-year-old single  male presenting with increased psychosis and suicidal thinking.    HISTORY OF PRESENT ILLNESS:  Junior Fernández is a 21-year-old single  male presenting with a history of schizoaffective disorder.  The patient is presenting with breakthrough symptoms of paranoia.  The patient reports feeling guilty about turning his back on God.  The patient states \"I thought Prince Coyle was God.\"  The patient also stated \"I thought I was God's son.\"  Patient reports that his parents do not like him and wish to be dead.  Per mother's report, the patient came into the parents' room during the night and asked where are the guns. The patient was also throwing things away like his wallet prior to his breakthrough symptoms.  Goal for this hospitalization is stabilization with medications.    PSYCHIATRIC REVIEW OF SYSTEMS:  The patient reports that he does not feel he is depressed at this time.  The patient reports that he does prefer to be alone, he does not have friends.  He has been isolating.  He prefers to watch TV and play video games.  The patient denies suicidal ideation at this time, although the patient was vague about his suicidal thinking, at 1 time saying yes, he did have passive thoughts and at other times saying no, he did not.  The patient reports he has social anxiety.  Patient reports he has had a panic attack in the past.  The patient is not endorsing any symptoms of chacorta.  The patient denies auditory and visual hallucinations.  The patient reports he no longer thinks that Prince Coyle is God or that he is God's son.  The patient denies any symptoms of PTSD, eating disorder or OCD.  The patient denies auditory or visual hallucinations.  The patient denies any paranoid thinking.    PSYCHIATRIC HISTORY:  The patient has been hospitalized in 09/2021 and 10/2021 for " psychosis.  The patient reports that he had a suicide attempt by cutting his left arm in the bathtub in October.  The patient has a history of commitment starting in 11/2021 and ending on 05/13/2022.  Commitment was extended. [facility]  Ms. Theresa Kowalski, another [facility]  is Brianna James.  The patient has medication management with Lupe Roberts at [facility] in [place], has therapy with Case Soliman at [facility] in [place].  Mother reports that the patient has missing appointments.  The patient has a history of being treated with Invega Sustenna.      PAST MEDICAL HISTORY:  Anisometropia, sinus tachycardia, and SARS.  Admission labs are pending.    ALLERGIES:  SEROQUEL, SEASONAL ALLERGIES.    SUBSTANCE ABUSE HISTORY:  U-tox is negative.  The patient reports substance abuse history of alcohol and cannabis.    FAMILY HISTORY:  Parents are .  The patient has a younger brother.  Mother endorses depression and anxiety.    SOCIAL HISTORY:  The patient lives at home with parents.  The patient does not drive.  The patient denies trauma history.  The patient graduated from [employer] in 2019.  The patient reports less than a semester at [employer] at [employer] for 1 week.  The patient reports he was at [employer] for 1 year studying business.  The patient currently is unemployed and spends most of his time watching TV and playing video games.    MEDICAL REVIEW OF SYSTEMS:  Reviewed documentation for a 10-point systems review completed by Claus Santana MD, dated 05/06/2022.  Only changes noted is that the patient is not confused.  He is alert, oriented.  She is denying hallucinations.    PHYSICAL EXAMINATION:    VITAL SIGNS:  Blood pressure 122/63, pulse 66, temperature 97.9 Fahrenheit, respirations 16, height 5 feet 11 inches, weight 165 pounds, SpO2 at 96%.  Reviewed documentation for physical examination completed by Claus Santana MD, dated 05/06/2022.  Patient is not been taking any paranoid or delusional statements at this time.    MENTAL STATUS EXAMINATION:  The patient appears his stated age.  He is dressed in scrubs, adequate hygiene.  The patient was in his room, lying down.  He was cooperative in meeting with provider in the interview room.  He was calm and cooperative throughout the interview.  Eye contact:  Adequate.  She did not display psychomotor abnormalities.  Speech was spontaneous, but he used a very soft volume.  He elaborated appropriately.  Mood is described as okay.  Affect blunted, not congruent.  Thought process was linear.  Associations were intact.  Thought content did not display evidence of psychosis.  He denies passive suicidal thinking.  No active intent.  He denies homicidal thinking.  Insight and judgment appear to be fair.  Cognition appears intact to interviewing including orientation to person, place, time, situation, use of language and fund of knowledge.  Recent and remote memory are grossly intact.  Muscle strength, tone, gait appears normal upon observation.    ASSESSMENT:    1.  Schizoaffective disorder with psychosis, severe, with psychosis.  2.  History of mood disorder.  3.  History of cannabis use disorder.    PLAN:    1.  The patient has been admitted to Behavioral Unit 4A on a voluntary basis.  The patient currently is under MI commitment with [facility] which includes Zyprexa, Abilify, Prolixin and Invega.  The patient is in the hospital on a voluntary basis.  Current  plans on revoking provisional discharge.  2.  Discussed medications with the patient and with mother.  The patient had breakthrough symptoms after being on Invega Sustenna long-acting injectable at 156 mg.  Patient received of 234 mg Invega Sustenna on 4/4/2022 and 156 mg 7 days later (4/11) for loading doses.   The patient received Invega Sustenna 156  mgin November. Maintenance dose of Invega Sustenna  156 mg  05/09/2022. Provider will add oral 3  mg of Invega to prevent breakthrough symptoms for patient.  We discussed risks, benefits, and side effects of medications with the patient and with mother.  3.  Psychosocial treatments to be addressed with CTC.  Provider discussed outpatient treatment with mother.  [facility] reports [facility] for patient since he has not been doing well and not following through with treatment.  4.  Estimated length of stay is 3 to 5 days.    Debra A. Naegele, APRN, CNS        D: 05/10/2022   T: 05/10/2022   MT: MARLON    Name:     ROYCE FOWLER  MRN:      [id number]        Account:     [id number]   :      2000           Admitted:    2022       Document: [id number]

## 2022-05-11 LAB
ALBUMIN SERPL-MCNC: 4.3 G/DL (ref 3.4–5)
ALP SERPL-CCNC: 51 U/L (ref 40–150)
ALT SERPL W P-5'-P-CCNC: 24 U/L (ref 0–70)
ANION GAP SERPL CALCULATED.3IONS-SCNC: 7 MMOL/L (ref 3–14)
AST SERPL W P-5'-P-CCNC: 12 U/L (ref 0–45)
BASOPHILS # BLD AUTO: 0.1 10E3/UL (ref 0–0.2)
BASOPHILS NFR BLD AUTO: 1 %
BILIRUB SERPL-MCNC: 0.6 MG/DL (ref 0.2–1.3)
BUN SERPL-MCNC: 17 MG/DL (ref 7–30)
CALCIUM SERPL-MCNC: 9.3 MG/DL (ref 8.5–10.1)
CHLORIDE BLD-SCNC: 104 MMOL/L (ref 94–109)
CHOLEST SERPL-MCNC: 160 MG/DL
CO2 SERPL-SCNC: 27 MMOL/L (ref 20–32)
CREAT SERPL-MCNC: 0.93 MG/DL (ref 0.66–1.25)
EOSINOPHIL # BLD AUTO: 0.2 10E3/UL (ref 0–0.7)
EOSINOPHIL NFR BLD AUTO: 3 %
ERYTHROCYTE [DISTWIDTH] IN BLOOD BY AUTOMATED COUNT: 13 % (ref 10–15)
GFR SERPL CREATININE-BSD FRML MDRD: >90 ML/MIN/1.73M2
GLUCOSE BLD-MCNC: 98 MG/DL (ref 70–99)
HCT VFR BLD AUTO: 45.1 % (ref 40–53)
HDLC SERPL-MCNC: 65 MG/DL
HGB BLD-MCNC: 15.2 G/DL (ref 13.3–17.7)
IMM GRANULOCYTES # BLD: 0 10E3/UL
IMM GRANULOCYTES NFR BLD: 0 %
LDLC SERPL CALC-MCNC: 78 MG/DL
LYMPHOCYTES # BLD AUTO: 1.9 10E3/UL (ref 0.8–5.3)
LYMPHOCYTES NFR BLD AUTO: 28 %
MCH RBC QN AUTO: 30 PG (ref 26.5–33)
MCHC RBC AUTO-ENTMCNC: 33.7 G/DL (ref 31.5–36.5)
MCV RBC AUTO: 89 FL (ref 78–100)
MONOCYTES # BLD AUTO: 0.6 10E3/UL (ref 0–1.3)
MONOCYTES NFR BLD AUTO: 9 %
NEUTROPHILS # BLD AUTO: 3.9 10E3/UL (ref 1.6–8.3)
NEUTROPHILS NFR BLD AUTO: 59 %
NONHDLC SERPL-MCNC: 95 MG/DL
NRBC # BLD AUTO: 0 10E3/UL
NRBC BLD AUTO-RTO: 0 /100
PLATELET # BLD AUTO: 264 10E3/UL (ref 150–450)
POTASSIUM BLD-SCNC: 3.8 MMOL/L (ref 3.4–5.3)
PROT SERPL-MCNC: 7.8 G/DL (ref 6.8–8.8)
RBC # BLD AUTO: 5.07 10E6/UL (ref 4.4–5.9)
SODIUM SERPL-SCNC: 138 MMOL/L (ref 133–144)
TRIGL SERPL-MCNC: 87 MG/DL
TSH SERPL DL<=0.005 MIU/L-ACNC: 0.94 MU/L (ref 0.4–4)
WBC # BLD AUTO: 6.7 10E3/UL (ref 4–11)

## 2022-05-11 PROCEDURE — 80061 LIPID PANEL: CPT | Performed by: CLINICAL NURSE SPECIALIST

## 2022-05-11 PROCEDURE — 250N000013 HC RX MED GY IP 250 OP 250 PS 637: Performed by: EMERGENCY MEDICINE

## 2022-05-11 PROCEDURE — 84443 ASSAY THYROID STIM HORMONE: CPT | Performed by: CLINICAL NURSE SPECIALIST

## 2022-05-11 PROCEDURE — 124N000002 HC R&B MH UMMC

## 2022-05-11 PROCEDURE — 36415 COLL VENOUS BLD VENIPUNCTURE: CPT | Performed by: CLINICAL NURSE SPECIALIST

## 2022-05-11 PROCEDURE — 80053 COMPREHEN METABOLIC PANEL: CPT | Performed by: CLINICAL NURSE SPECIALIST

## 2022-05-11 PROCEDURE — 99232 SBSQ HOSP IP/OBS MODERATE 35: CPT | Performed by: CLINICAL NURSE SPECIALIST

## 2022-05-11 PROCEDURE — 82040 ASSAY OF SERUM ALBUMIN: CPT | Performed by: CLINICAL NURSE SPECIALIST

## 2022-05-11 PROCEDURE — 250N000013 HC RX MED GY IP 250 OP 250 PS 637: Performed by: CLINICAL NURSE SPECIALIST

## 2022-05-11 PROCEDURE — 85014 HEMATOCRIT: CPT | Performed by: CLINICAL NURSE SPECIALIST

## 2022-05-11 RX ADMIN — PALIPERIDONE 3 MG: 3 TABLET, EXTENDED RELEASE ORAL at 21:11

## 2022-05-11 RX ADMIN — ESCITALOPRAM OXALATE 20 MG: 20 TABLET ORAL at 08:28

## 2022-05-11 ASSESSMENT — ACTIVITIES OF DAILY LIVING (ADL)
LAUNDRY: WITH SUPERVISION
DRESS: INDEPENDENT
DRESS: INDEPENDENT
LAUNDRY: WITH SUPERVISION
ORAL_HYGIENE: INDEPENDENT
HYGIENE/GROOMING: INDEPENDENT
ORAL_HYGIENE: INDEPENDENT

## 2022-05-11 NOTE — PLAN OF CARE
Psych:  Pt presents flat. Pt denies SI/SIB/AH/VH/HI. Pt spent most of the evening withdrawn to his room. Pt started taking Invega this evening, RN offered education on this, pt denied.    Medical:  Denies any pain. Pt reports appetite and sleep are good.    PRNs Given:  None    Goal Outcome Evaluation:  Problem: Suicidal Behavior  Goal: Suicidal Behavior is Absent or Managed  Outcome: Ongoing, Progressing

## 2022-05-11 NOTE — PROGRESS NOTES
[facility], [place]   Psychiatric Progress Note        Interim History:   The patient's care was discussed with the treatment team during the daily team meeting and/or staff's chart notes were reviewed.  Staff report patient is viisble in the milieu.     Psychiatric symptoms and interventions:   Patient presents with a flat affect. He was able to attend to the conversation.   Patient reports he is isolating to his room because he prefers to be alone . Patient denies psychotic thinking. He denies grandiose thinking. Patient has been cooperative with taking oral Invega. Provider explained oral Invega will help with breakthrough psychosis. Patient denies suicidal thinking. No behavior issues.     Provider called mother and explained why oral Invega was added. She agreed .     Patient states he has trouble interacting with people. Patient has been up for meds and meals. Not going to groups. Patient he does not see the reason he needs to take medications. Patient reports he knows he needs to take medications because of his Nguyen.          Medications:       escitalopram  20 mg Oral Daily     paliperidone  3 mg Oral At Bedtime          Allergies:     Allergies   Allergen Reactions     Seasonal Allergies      Seroquel [Quetiapine]      Fainting and slowed breathing           Labs:     Recent Results (from the past 24 hour(s))   Lipid panel    Collection Time: 05/11/22  8:17 AM   Result Value Ref Range    Cholesterol 160 <200 mg/dL    Triglycerides 87 <150 mg/dL    Direct Measure HDL 65 >=40 mg/dL    LDL Cholesterol Calculated 78 <=100 mg/dL    Non HDL Cholesterol 95 <130 mg/dL   Comprehensive metabolic panel    Collection Time: 05/11/22  8:17 AM   Result Value Ref Range    Sodium 138 133 - 144 mmol/L    Potassium 3.8 3.4 - 5.3 mmol/L    Chloride 104 94 - 109 mmol/L    Carbon Dioxide (CO2) 27 20 - 32 mmol/L    Anion Gap 7 3 - 14 mmol/L    Urea Nitrogen 17 7 - 30 mg/dL    Creatinine 0.93 0.66 -  "1.25 mg/dL    Calcium 9.3 8.5 - 10.1 mg/dL    Glucose 98 70 - 99 mg/dL    Alkaline Phosphatase 51 40 - 150 U/L    AST 12 0 - 45 U/L    ALT 24 0 - 70 U/L    Protein Total 7.8 6.8 - 8.8 g/dL    Albumin 4.3 3.4 - 5.0 g/dL    Bilirubin Total 0.6 0.2 - 1.3 mg/dL    GFR Estimate >90 >60 mL/min/1.73m2   TSH with free T4 reflex and/or T3 as indicated    Collection Time: 05/11/22  8:17 AM   Result Value Ref Range    TSH 0.94 0.40 - 4.00 mU/L   CBC with platelets and differential    Collection Time: 05/11/22  8:17 AM   Result Value Ref Range    WBC Count 6.7 4.0 - 11.0 10e3/uL    RBC Count 5.07 4.40 - 5.90 10e6/uL    Hemoglobin 15.2 13.3 - 17.7 g/dL    Hematocrit 45.1 40.0 - 53.0 %    MCV 89 78 - 100 fL    MCH 30.0 26.5 - 33.0 pg    MCHC 33.7 31.5 - 36.5 g/dL    RDW 13.0 10.0 - 15.0 %    Platelet Count 264 150 - 450 10e3/uL    % Neutrophils 59 %    % Lymphocytes 28 %    % Monocytes 9 %    % Eosinophils 3 %    % Basophils 1 %    % Immature Granulocytes 0 %    NRBCs per 100 WBC 0 <1 /100    Absolute Neutrophils 3.9 1.6 - 8.3 10e3/uL    Absolute Lymphocytes 1.9 0.8 - 5.3 10e3/uL    Absolute Monocytes 0.6 0.0 - 1.3 10e3/uL    Absolute Eosinophils 0.2 0.0 - 0.7 10e3/uL    Absolute Basophils 0.1 0.0 - 0.2 10e3/uL    Absolute Immature Granulocytes 0.0 <=0.4 10e3/uL    Absolute NRBCs 0.0 10e3/uL          Psychiatric Examination:     /73 (BP Location: Right arm)   Pulse 71   Temp 97.4  F (36.3  C) (Temporal)   Resp 15   Ht 1.803 m (5' 11\")   Wt 74.8 kg (165 lb)   SpO2 98%   BMI 23.01 kg/m    Weight is 165 lbs 0 oz  Body mass index is 23.01 kg/m .  Orthostatic Vitals  Report      Most Recent      Standing Orthostatic /87 05/10 1143    Standing Orthostatic Pulse (bpm) 91 05/10 1143            Appearance: awake, alert, adequately groomed and dressed in hospital scrubs  Attitude:  guarded  Eye Contact:  good  Mood:  better  Affect:  intensity is blunted  Speech:  normal prosody  Psychomotor Behavior:  no evidence of " tardive dyskinesia, dystonia, or tics  Throught Process:  goal oriented  Associations:  no loose associations  Thought Content:  no evidence of suicidal ideation or homicidal ideation, no auditory hallucinations present and no visual hallucinations present  Insight:  limited  Judgement:  limited  Oriented to:  time, person, and place  Attention Span and Concentration:  intact  Recent and Remote Memory:  intact    Clinical Global Impressions  First:     Most recent:            Precautions:     Behavioral Orders   Procedures     Code 1 - Restrict to Unit     Discontinue 1:1 attendant for suicide risk     Order Specific Question:   I have performed an in person assessment of the patient     Answer:   Based on this assessment the patient no longer requires a one on one attendant at this point in time.     Order Specific Question:   Rationale     Answer:   Routine observations are sufficient to monitor safety.     Order Specific Question:   Rationale     Answer:   Modifications to care environment made to mitigate safety risk     Order Specific Question:   Rationale     Answer:   Patient States able to remain safe in hospital     Routine Programming     As clinically indicated     Status 15     Every 15 minutes.     Suicide precautions     Patients on Suicide Precautions should have a Combination Diet ordered that includes a Diet selection(s) AND a Behavioral Tray selection for Safe Tray - with utensils, or Safe Tray - NO utensils            DIagnoses:   1.  Schizoaffective disorder with psychosis, severe, with psychosis.  2.  History of mood disorder.  3.  History of cannabis use disorder.         Plan:     Legal status: Patient is under MI commitment with [facility]. CM revoked provisional discharge. Patient is in hospital on a voluntary basis.     Medication management:   Invega Sustenna maintenence dose given on 5/9 in [place] [facility]   Added oral Invega 3 mg to address breakthrough psychotic symptoms.   escitalopram 20 mg  to address mood flattening from Invega.     Medical: Anisometropia, sinus tachycardia, and SARS  Admission labs are unremarkable, COVID screen negative, UTOX negative.     Behavioral/psychological/social;   Encouraged patient to attend therapeutic hospital programming as tolerated.     Disposition:   Reason for continued hospitalization: Patient experiencing breakthrough psychotic symptoms, PD is revoked.   Stabilization with medications, provide structured and supportive environemt, group   Estimated length of stay is 3-5 days   Discharge: TBD

## 2022-05-11 NOTE — PLAN OF CARE
Problem: Suicidal Behavior  Goal: Suicidal Behavior is Absent or Managed  Outcome: Ongoing, Progressing  Flowsheets (Taken 5/11/2022 2204)  Mutually Determined Action Steps (Suicidal Behavior Absent/Managed):    verbalizes safety check rationale    shares suicidal thoughts   Goal Outcome Evaluation:    Plan of Care Reviewed With: patient. Pt denies questions at this time.      Pt is calm and cooperative with staff. Pt is denying SI, SIB and HI at this time. Pt denies pain and adverse reactions related to medications. Pt is present in the milieu although not interacting with other patients. Pt is withdrawn and quiet. Pt declining attendance in groups.    Pt is eating well and tolerating PO intake. Denies bowel issues. Vital signs were stable. Will continue to monitor.

## 2022-05-11 NOTE — PLAN OF CARE
Pt observed in the milieu most of the shift withdrawn to self. Pt presents with blunted flat affect and calm mood. Pt brightens up upon approach. Pt denies SI/SIB/HI/AVH as well as anxiety and depression this shift. Pt attended unit groups this shift. Pt denied any paranoid thoughts this shift. Pt reported sleep and appetite as adequate. Staff will continue to monitor and support with current POC

## 2022-05-11 NOTE — PLAN OF CARE
05/11/22 1149   Individualization/Patient Specific Goals   Patient Personal Strengths family/social support   Patient Vulnerabilities limited social skills;limited support system;history of unsuccessful treatment;poor impulse control;lacks insight into illness   Interprofessional Rounds   Summary Day 2. Patient is on a commitment with Nguyen and a revoked PD. Pt's CM wants IRTS referrals made.   Participants advanced practice nurse;nursing;CTC   Team Discussion   Participants Deb Naegele APRN CNP, Avis MAURO, Presley ROMAN RN   Progress No progresss yet, pt is staying isolative to his room   Anticipated length of stay 2-3 weeks   Continued Stay Criteria/Rationale PD was revoked today, need to pursue IRTS placements.   Plan Patient has been admitted to the psychiatric unit for stabilization.  Patient will be seen and assessed by psychiatric doctor.  Medication changes will be reviewed and monitored.  Groups will be offered to assist patient with increasing knowledge, strategies, and copping techniques to help manage mental health.  CTC will meet with patient to provide individualized mental health resources and support throughout patient s hospitalization.  CTC will assist with developing a Care Plan and after care appointments.   Rationale for change in precautions or plan New admission   Safety Plan Monitor pt on the unit for safety   Anticipated Discharge Disposition IRTS     PRECAUTIONS AND SAFETY    Behavioral Orders   Procedures    Code 1 - Restrict to Unit    Discontinue 1:1 attendant for suicide risk     Order Specific Question:   I have performed an in person assessment of the patient     Answer:   Based on this assessment the patient no longer requires a one on one attendant at this point in time.     Order Specific Question:   Rationale     Answer:   Routine observations are sufficient to monitor safety.     Order Specific Question:   Rationale     Answer:   Modifications to care environment made to  mitigate safety risk     Order Specific Question:   Rationale     Answer:   Patient States able to remain safe in hospital    Routine Programming     As clinically indicated    Status 15     Every 15 minutes.    Suicide precautions     Patients on Suicide Precautions should have a Combination Diet ordered that includes a Diet selection(s) AND a Behavioral Tray selection for Safe Tray - with utensils, or Safe Tray - NO utensils         Safety  Safety WDL: WDL  Patient Location: patient room, own  Observed Behavior: calm, sleeping  Safety Measures: environmental rounds completed, safety rounds completed, self-directed behavior promoted, suicide check-in completed  Suicidality: Status 15, Behavioral scrubs (pajamas), Minimal furniture in room, Minimal personal belongings in room

## 2022-05-11 NOTE — PLAN OF CARE
Assessment/Intervention/Current Symptoms and Care Coordination  WR attended team and reviewed chart.     Received call from Gray Flores [phone]. She is the  for the pt, Brianna James is the alternate CCM that was assisting while Gray was on vacation. She is making sure the Notice of Intent to Revoke comes in shortly, and also requested that WR complete IRTS referrals. Reasoning for IRTS: Pt has had his PD revoked 2x recently so they believe he needs a higher level of care upon discharge.     Received Notice of Intent to Revoke PD and Revoked PD. Gave a copy to patient, he expressed understanding. HUC to make copies for chart and scanning.    WR contacted pt's mother Rolanda [phone]. She wanted an update. Mom thinks IRTS is going to be a hard sell     Discharge Plan or Goal  IRTS    Barriers to Discharge   New revoked PD. Pt needs stabilization and medication management.     Referral Status  Will submit IRTS referrals tomorrow     Legal Status  Commitment/Nguyen  PD is being revoked

## 2022-05-12 PROCEDURE — 250N000013 HC RX MED GY IP 250 OP 250 PS 637: Performed by: CLINICAL NURSE SPECIALIST

## 2022-05-12 PROCEDURE — 250N000013 HC RX MED GY IP 250 OP 250 PS 637: Performed by: EMERGENCY MEDICINE

## 2022-05-12 PROCEDURE — 99232 SBSQ HOSP IP/OBS MODERATE 35: CPT | Performed by: CLINICAL NURSE SPECIALIST

## 2022-05-12 PROCEDURE — 124N000002 HC R&B MH UMMC

## 2022-05-12 PROCEDURE — G0177 OPPS/PHP; TRAIN & EDUC SERV: HCPCS

## 2022-05-12 RX ADMIN — ESCITALOPRAM OXALATE 20 MG: 20 TABLET ORAL at 08:04

## 2022-05-12 RX ADMIN — PALIPERIDONE 3 MG: 3 TABLET, EXTENDED RELEASE ORAL at 21:36

## 2022-05-12 ASSESSMENT — ACTIVITIES OF DAILY LIVING (ADL)
ORAL_HYGIENE: INDEPENDENT
HYGIENE/GROOMING: INDEPENDENT
DRESS: INDEPENDENT
LAUNDRY: WITH SUPERVISION

## 2022-05-12 NOTE — PLAN OF CARE
Assessment/Intervention/Current Symtoms and Care Coordination:    Upcoming appointments:  Date Time Provider Department   5/26/2022 11:00 AM Darius Tamayo MD Primary Care     Chart Review    Provided copies of intent to revoke and Order for Continued Commitment to patient.    Discharge Plan or Goal:  Pending stabilization & development of a safe discharge plan.  Considerations include: Intensive Residential Treatment Services (IRTS):      Barriers to Discharge:  Patient requires further psychiatric stabilization due to current symptomology and Medication management with possible adjustments and PD revocation.    Referral Status:  IRTS referrels still need to be made.    Legal Status:  Patient is under MI commitment in [facility] with [facility] PD revoked.

## 2022-05-12 NOTE — PLAN OF CARE
Occupational Therapy Group Note:     05/12/22 1500   Group Therapy Session   Group Attendance attended group session   Time Session Began 1315   Time Session Ended 1510   Total Time patient participated (minutes) 115   Total # Attendees 5   Group Type task skill   Group Topic Covered cognitive activities;coping skills/lifestyle management;problem-solving   Group Session Detail OT clinic   Patient Response/Contribution cooperative with task;organized   Patient Response Detail Pt actively participated in occupational therapy clinic to facilitate coping skill exploration, creative expression within personally meaningful activities, and clinical observation of social, cognitive, and kinesthetic performance skills. Pt response: Independent to initiate, gather materials, sequence, and adjust to workspace demands as needed. Demonstrated excellent focus, planning, and problem solving for this moderately complex, cognitive, goal-directed task. Efficient and organized in his task approach. Able to ask for assistance and appeared comfortable interacting with peers and staff when conversation was initiated with him, otherwise spent a majority of group quietly focused on his task. Calm and polite on approach. Will continue to assess.

## 2022-05-12 NOTE — PLAN OF CARE
Problem: Plan of Care - These are the overarching goals to be used throughout the patient stay.    Goal: Optimal Comfort and Wellbeing  Outcome: Ongoing, Progressing   Goal Outcome Evaluation:  Pt was up this morning and ate and took his medication. He denies SI. He declined to talk at this moment. We will continue to assess.

## 2022-05-12 NOTE — PROGRESS NOTES
"[facility], [place]   Psychiatric Progress Note        Interim History:   The patient's care was discussed with the treatment team during the daily team meeting and/or staff's chart notes were reviewed.  Staff report patient isolates to room.     Psychiatric symptoms and interventions:   Continues to isolate to his room. Patient was agreeable to try to attend one group.   Patient is not endorsing psychotic thinking. He denies auditory hallucinations. He denies suicidal thinking.     Patient has been cooperative with taking oral Invega. Patient denies side effects.     Sleep and appetite are adequate.     Oral Invega 3 mg added to address break throu psychotic symptoms . Patient is on Invega Sustenna [id number] mg maintenance dose. Next dose should be increased to 234 mg due to break through symptoms.          Medications:       escitalopram  20 mg Oral Daily     paliperidone  3 mg Oral At Bedtime          Allergies:     Allergies   Allergen Reactions     Seasonal Allergies      Seroquel [Quetiapine]      Fainting and slowed breathing           Labs:   No results found for this or any previous visit (from the past 24 hour(s)).       Psychiatric Examination:     /84   Pulse 78   Temp 98.2  F (36.8  C) (Oral)   Resp 15   Ht 1.803 m (5' 11\")   Wt 74.8 kg (165 lb)   SpO2 97%   BMI 23.01 kg/m    Weight is 165 lbs 0 oz  Body mass index is 23.01 kg/m .  Orthostatic Vitals  Report    None        Appearance: awake, alert, adequately groomed and dressed in hospital scrubs  Attitude:  guarded  Eye Contact:  good  Mood:  better  Affect:  intensity is blunted  Speech:  normal prosody  Psychomotor Behavior:  no evidence of tardive dyskinesia, dystonia, or tics  Throught Process:  goal oriented  Associations:  no loose associations  Thought Content:  no evidence of suicidal ideation or homicidal ideation, no auditory hallucinations present and no visual hallucinations present  Insight:  " limited  Judgement:  limited  Oriented to:  time, person, and place  Attention Span and Concentration:  intact  Recent and Remote Memory:  intact     Clinical Global Impressions  First:     Most recent:            Precautions:     Behavioral Orders   Procedures     Code 1 - Restrict to Unit     Discontinue 1:1 attendant for suicide risk     Order Specific Question:   I have performed an in person assessment of the patient     Answer:   Based on this assessment the patient no longer requires a one on one attendant at this point in time.     Order Specific Question:   Rationale     Answer:   Routine observations are sufficient to monitor safety.     Order Specific Question:   Rationale     Answer:   Modifications to care environment made to mitigate safety risk     Order Specific Question:   Rationale     Answer:   Patient States able to remain safe in hospital     Routine Programming     As clinically indicated     Status 15     Every 15 minutes.     Suicide precautions     Patients on Suicide Precautions should have a Combination Diet ordered that includes a Diet selection(s) AND a Behavioral Tray selection for Safe Tray - with utensils, or Safe Tray - NO utensils            DIagnoses:   1.  Schizoaffective disorder with psychosis, severe, with psychosis.  2.  History of mood disorder.  3.  History of cannabis use disorder.         Plan:      Legal status: Patient is under MI commitment with [facility]. CM revoked provisional discharge. Patient is in hospital on a voluntary basis.      Medication management:   Invega Sustenna maintenence dose given on 5/9 in [facility]   Added oral Invega 3 mg to address breakthrough psychotic symptoms.   escitalopram 20 mg to address mood flattening from Invega.      Medical: Anisometropia, sinus tachycardia, and SARS  Admission labs are unremarkable, COVID screen negative, UTOX negative.      Behavioral/psychological/social;   Encouraged patient to attend [facility]  programming as tolerated.      Disposition:   Reason for continued hospitalization: Patient experiencing breakthrough psychotic symptoms, PD is revoked.   Stabilization with medications, provide structured and supportive environemt, group   Estimated length of stay is 3-5 days   Discharge: TBD

## 2022-05-13 PROCEDURE — 99232 SBSQ HOSP IP/OBS MODERATE 35: CPT | Performed by: CLINICAL NURSE SPECIALIST

## 2022-05-13 PROCEDURE — 124N000002 HC R&B MH UMMC

## 2022-05-13 PROCEDURE — 250N000013 HC RX MED GY IP 250 OP 250 PS 637: Performed by: EMERGENCY MEDICINE

## 2022-05-13 PROCEDURE — 250N000013 HC RX MED GY IP 250 OP 250 PS 637: Performed by: CLINICAL NURSE SPECIALIST

## 2022-05-13 RX ADMIN — ESCITALOPRAM OXALATE 20 MG: 20 TABLET ORAL at 08:09

## 2022-05-13 RX ADMIN — PALIPERIDONE 3 MG: 3 TABLET, EXTENDED RELEASE ORAL at 21:08

## 2022-05-13 ASSESSMENT — ACTIVITIES OF DAILY LIVING (ADL)
LAUNDRY: WITH SUPERVISION
HYGIENE/GROOMING: INDEPENDENT
DRESS: INDEPENDENT
ORAL_HYGIENE: INDEPENDENT
DRESS: INDEPENDENT
LAUNDRY: WITH SUPERVISION
ORAL_HYGIENE: INDEPENDENT
HYGIENE/GROOMING: INDEPENDENT

## 2022-05-13 NOTE — DISCHARGE INSTRUCTIONS
Behavioral Discharge Planning and Instructions    Summary: You were admitted on 5/6/2022  due to Psychotic Symptomology, Suicidal Ideations, and Self Injurious Behaviors.  You were treated by [facility] and discharged on 5/26/22 from [facility].   [address] [place] [address].     You are being discharged under a Provisional Discharge Agreement as you are under [facility].  You have been given a copy of your Provisional Discharge Agreement.      Main Diagnosis:   1.  Schizoaffective disorder with psychosis, severe, with psychosis.  2.  History of mood disorder.  3.  History of cannabis use disorder.     Health Care Follow-up:   : Gray Flores [phone]; Fax [phone]     Medication Management: Friday, June 17th, 2022 at 7:30am (virtual visit)   [facility]  [address], [address]  [place], [place] [id number] [phone]     Therapy:  Thursday, June 2nd, 2022 at 11am (in person visit)   [facility]  [address], [address]  [place], [place] [id number] [phone]    Attend all scheduled appointments with your outpatient providers. Call at least 24 hours in advance if you need to reschedule an appointment to ensure continued access to your outpatient providers.     Major Treatments, Procedures and Findings:  You were provided with: a psychiatric assessment, assessed for medical stability, medication evaluation and/or management, and group therapy    Symptoms to Report: feeling more aggressive, increased confusion, losing more sleep, mood getting worse, or thoughts of suicide    Early warning signs can include: increased depression or anxiety sleep disturbances increased thoughts or behaviors of suicide or self-harm  increased unusual thinking, such as paranoia or hearing voices    Safety and Wellness:  Take all medicines as directed.  Make no changes unless your  "doctor suggests them.      Follow treatment recommendations.  Refrain from alcohol and non-prescribed drugs.  If there is a concern for safety, call 911.    Resources:   Crisis Intervention: 154.839.1222 or 470-728-7955 (TTY: 830.773.9360).  Call anytime for help.  [employer] [place] [employer] Mental Illness (www.[place].charla.org): 611.525.4830 or 995-784-3218.  [facility] Crisis (COPE) Response - Adult 059 052-7262  Text 4 Life: txt \"LIFE\" to 26719 for immediate support and crisis intervention  Crisis text line: Text \"[place]\" to 238785. Free, confidential, 24/7.    General Medication Instructions:   See your medication sheet(s) for instructions.   Take all medicines as directed.  Make no changes unless your doctor suggests them.   Go to all your doctor visits.  Be sure to have all your required lab tests. This way, your medicines can be refilled on time.  Do not use any drugs not prescribed by your doctor.  Avoid alcohol.    Advance Directives:   Scanned document on file with [employer]? No scanned doc  Is document scanned? Pt unable to confirm  Honoring Choices Your Rights Handout: Informed and given  Was more information offered? Pt unable to request    The Treatment team has appreciated the opportunity to work with you. If you have any questions or concerns about your recent admission, you can contact the unit which can receive your call 24 hours a day, 7 days a week. They will be able to get in touch with a Provider if needed. The unit number is [phone] .  "

## 2022-05-13 NOTE — PLAN OF CARE
Writer submitted referrals to the following IRTS Programs; [place], [facility], [facility], [facility], [facility], [facility] and [facility] . Follow up calls will need to be made regarding bed availability.

## 2022-05-13 NOTE — PLAN OF CARE
"  Problem: Suicidal Behavior  Goal: Suicidal Behavior is Absent or Managed  Outcome: Ongoing, Progressing   Goal Outcome Evaluation:      Patient stated: \"I feel OK today\". Denied suicidal thoughts or wishing to be dead. Rated depression at 3/10, anxiety at 2/10. Denied HI, hallucinations, or delusions.     Behavior was controlled, patient followed directions and communicated needs well. Spent the evening isolating to his room. Patient came out for dinner and after he ate went back to his room. Patient was visited by his dad this evening. Visit appeared to go well.    Mood was calm. Affect was blunted. Memory is intact. patient kept self clean and well groomed.     Goal for the shift: to participate in groups.   Concerns: none verbalized this shift. No questions for staff.    Patient denied pain and all other physical issues.     Took medications as prescribed. Denied side effects, none assessed by staff at this time.     PRNs: None                 "

## 2022-05-13 NOTE — PLAN OF CARE
Problem: Suicidal Behavior  Goal: Suicidal Behavior is Absent or Managed  Outcome: Ongoing, Progressing       Pt isolated in his room for the entire evening. Pt did come out to eat his supper but returns to his room shortly after. Pt did not attend groups. Pt was medication compliant and polite upon approach. During check in he denies SI, SIB, AH, VH, and HI. Will continue to monitor.

## 2022-05-13 NOTE — PLAN OF CARE
"  Problem: Suicidal Behavior  Goal: Suicidal Behavior is Absent or Managed  Outcome: Ongoing, Progressing     Pt isolated in his room for the majority of the morning and afternoon. He came out to eat breakfast and lunch but returns to his room shortly after eating. Pt was medication compliant. He didnot attend groups. Pt mother called at 1400 and wanted an update on Cam. Mother states \"Cam can be introverted and he doesn't have many friends. He doesn't talk to his friends from high school anymore and he has a difficult time making them.'\" Writer reassured mother that staff and I would do our best to help him feel comfortable around his peers. During check in he denies SI, SIB, AH, VH, and HI. Will continue to monitor.     "

## 2022-05-13 NOTE — PROGRESS NOTES
"[facility], [place]   Psychiatric Progress Note        Interim History:   The patient's care was discussed with the treatment team during the daily team meeting and/or staff's chart notes were reviewed.  Staff report patient  patient isolates to room.      Psychiatric symptoms and interventions:   Patient reports he went to 2 groups yesterday. Patient asked about discharge. Provider explained that a higher level of care is recommended due to his 2 recent hospitalizations. Provider discussed IRT's with patient. Provider explained he will need to attend groups. Patient said he would go to more groups.     Patient denies psychotic symptoms. He denies suicidal thinking. He denies paranoia.     Patient has been cooperative with taking oral Invega. Patient denies side effects.      Sleep and appetite are adequate.      Oral Invega 3 mg added to address break throu psychotic symptoms . Patient is on Invega Sustenna [id number] mg maintenance dose. Next dose should be increased to 234 mg due to break through symptoms.             Medications:       escitalopram  20 mg Oral Daily     paliperidone  3 mg Oral At Bedtime          Allergies:     Allergies   Allergen Reactions     Seasonal Allergies      Seroquel [Quetiapine]      Fainting and slowed breathing           Labs:   No results found for this or any previous visit (from the past 24 hour(s)).       Psychiatric Examination:     /84   Pulse 78   Temp 97.4  F (36.3  C) (Temporal)   Resp 15   Ht 1.803 m (5' 11\")   Wt 74.8 kg (165 lb)   SpO2 96%   BMI 23.01 kg/m    Weight is 165 lbs 0 oz  Body mass index is 23.01 kg/m .  Orthostatic Vitals  Report      Most Recent      Sitting Orthostatic /79 05/13 0820    Sitting Orthostatic Pulse (bpm) 68 05/13 0820    Standing Orthostatic /78 05/13 0820    Standing Orthostatic Pulse (bpm) 92 05/13 0820            Appearance: awake, alert, adequately groomed and dressed in hospital " scrubs  Attitude:  guarded  Eye Contact:  good  Mood:  better  Affect:  intensity is blunted  Speech:  normal prosody  Psychomotor Behavior:  no evidence of tardive dyskinesia, dystonia, or tics  Throught Process:  goal oriented  Associations:  no loose associations  Thought Content:  no evidence of suicidal ideation or homicidal ideation, no auditory hallucinations present and no visual hallucinations present  Insight:  limited  Judgement:  limited  Oriented to:  time, person, and place  Attention Span and Concentration:  intact  Recent and Remote Memory:  intact       Clinical Global Impressions  First:     Most recent:            Precautions:     Behavioral Orders   Procedures     Code 1 - Restrict to Unit     Discontinue 1:1 attendant for suicide risk     Order Specific Question:   I have performed an in person assessment of the patient     Answer:   Based on this assessment the patient no longer requires a one on one attendant at this point in time.     Order Specific Question:   Rationale     Answer:   Routine observations are sufficient to monitor safety.     Order Specific Question:   Rationale     Answer:   Modifications to care environment made to mitigate safety risk     Order Specific Question:   Rationale     Answer:   Patient States able to remain safe in hospital     Routine Programming     As clinically indicated     Status 15     Every 15 minutes.     Suicide precautions     Patients on Suicide Precautions should have a Combination Diet ordered that includes a Diet selection(s) AND a Behavioral Tray selection for Safe Tray - with utensils, or Safe Tray - NO utensils            DIagnoses:   1.  Schizoaffective disorder with psychosis, severe, with psychosis.  2.  History of mood disorder.  3.  History of cannabis use disorder.         Plan:      Legal status: Patient is under MI commitment with [facility]. CM revoked provisional discharge. Patient is in hospital on a voluntary basis.      Medication  management:   Invega Sustenna maintenence dose given on 5/9 in [place] [facility]   Added oral Invega 3 mg to address breakthrough psychotic symptoms.   escitalopram 20 mg to address mood flattening from Invega.      Medical: Anisometropia, sinus tachycardia, and SARS  Admission labs are unremarkable, COVID screen negative, UTOX negative.      Behavioral/psychological/social;   Encouraged patient to attend therapeutic hospital programming as tolerated.      Disposition:   Reason for continued hospitalization: Patient experiencing breakthrough psychotic symptoms, PD is revoked.   Stabilization with medications, provide structured and supportive environemt, group   Estimated length of stay is 3-5 days   Discharge: IRT's

## 2022-05-14 PROCEDURE — 250N000013 HC RX MED GY IP 250 OP 250 PS 637: Performed by: CLINICAL NURSE SPECIALIST

## 2022-05-14 PROCEDURE — 124N000002 HC R&B MH UMMC

## 2022-05-14 PROCEDURE — 250N000013 HC RX MED GY IP 250 OP 250 PS 637: Performed by: EMERGENCY MEDICINE

## 2022-05-14 RX ADMIN — PALIPERIDONE 3 MG: 3 TABLET, EXTENDED RELEASE ORAL at 21:01

## 2022-05-14 RX ADMIN — ESCITALOPRAM OXALATE 20 MG: 20 TABLET ORAL at 09:19

## 2022-05-14 ASSESSMENT — ACTIVITIES OF DAILY LIVING (ADL)
HYGIENE/GROOMING: INDEPENDENT
LAUNDRY: WITH SUPERVISION
DRESS: INDEPENDENT
ORAL_HYGIENE: INDEPENDENT

## 2022-05-14 NOTE — PLAN OF CARE
"  Problem: Suicidal Behavior  Goal: Suicidal Behavior is Absent or Managed  Outcome: Ongoing, Progressing   Goal Outcome Evaluation:       Patient stated: \"I feel better today\". Denied suicidal thoughts or wishing to be dead. Rated depression at 2/10, anxiety at 1/10. Denied HI, hallucinations, or delusions.      Behavior was controlled, patient followed directions and communicated needs well. Spent the evening out on the unit, participated in offered groups, watched TV and socialized with other patients. Patient is less isolative today.     Mood was calm. Affect was blunted. Memory is intact. patient kept self clean and well groomed.      Goal for the shift: to participate in groups.   Concerns: none verbalized this shift. No questions for staff.     Patient denied pain and all other physical issues.      Took medications as prescribed. Denied side effects, none assessed by staff at this time.      PRNs: None                 "

## 2022-05-14 NOTE — PLAN OF CARE
Problem: Suicidal Behavior  Goal: Suicidal Behavior is Absent or Managed  Outcome: Ongoing, Progressing     Pt has been calm and cooperative this morning and afternoon. He ate his breakfast and spent more time in the milieu today. Pt was medication compliant and alternated between room and lounge. During check in he denies SI, SIB, AH, VH, and HI. Will continue to monitor.

## 2022-05-15 PROCEDURE — 124N000002 HC R&B MH UMMC

## 2022-05-15 PROCEDURE — 250N000013 HC RX MED GY IP 250 OP 250 PS 637: Performed by: CLINICAL NURSE SPECIALIST

## 2022-05-15 PROCEDURE — 250N000013 HC RX MED GY IP 250 OP 250 PS 637: Performed by: EMERGENCY MEDICINE

## 2022-05-15 RX ADMIN — PALIPERIDONE 3 MG: 3 TABLET, EXTENDED RELEASE ORAL at 21:03

## 2022-05-15 RX ADMIN — ESCITALOPRAM OXALATE 20 MG: 20 TABLET ORAL at 10:07

## 2022-05-15 RX ADMIN — NICOTINE POLACRILEX 2 MG: 2 GUM, CHEWING ORAL at 12:43

## 2022-05-15 ASSESSMENT — ACTIVITIES OF DAILY LIVING (ADL)
HYGIENE/GROOMING: INDEPENDENT
DRESS: INDEPENDENT
DRESS: INDEPENDENT
ORAL_HYGIENE: INDEPENDENT
LAUNDRY: WITH SUPERVISION
ORAL_HYGIENE: INDEPENDENT
LAUNDRY: WITH SUPERVISION
HYGIENE/GROOMING: INDEPENDENT

## 2022-05-15 NOTE — PLAN OF CARE
"  Problem: Suicidal Behavior  Goal: Suicidal Behavior is Absent or Managed  Outcome: Ongoing, Progressing   Goal Outcome Evaluation:       Patient stated: \"I feel better today\". Denied suicidal thoughts or wishing to be dead. Rated depression at 2/10, anxiety at 1/10. Denied HI, hallucinations, or delusions.      Behavior was controlled, patient followed directions and communicated needs well. Spent the majority of the  evening isolating and sleeping in his room.     Mood was calm. Affect was blunted. Memory is intact. patient kept self clean and well groomed.      Goal for the shift: to participate in groups.   Concerns: none verbalized this shift. No questions for staff.     Patient denied pain and all other physical issues.      Took medications as prescribed. Denied side effects, none assessed by staff at this time.      PRNs: None                 "

## 2022-05-15 NOTE — PLAN OF CARE
Problem: Suicidal Behavior  Goal: Suicidal Behavior is Absent or Managed  Outcome: Ongoing, Progressing   Goal Outcome Evaluation:    Pt was calm and cooperative. He came out to the [facility] for breakfast. He was social with select peers. He was medication compliant. He watched movies with peers and played games. At noon he ate his lunch. During check in he denies SI, SIB, AH, VH, and HI. Will continue to monitor.

## 2022-05-16 ENCOUNTER — HEALTH MAINTENANCE LETTER (OUTPATIENT)
Age: 22
End: 2022-05-16

## 2022-05-16 PROCEDURE — 124N000002 HC R&B MH UMMC

## 2022-05-16 PROCEDURE — G0177 OPPS/PHP; TRAIN & EDUC SERV: HCPCS

## 2022-05-16 PROCEDURE — 99232 SBSQ HOSP IP/OBS MODERATE 35: CPT | Performed by: CLINICAL NURSE SPECIALIST

## 2022-05-16 PROCEDURE — 250N000013 HC RX MED GY IP 250 OP 250 PS 637: Performed by: CLINICAL NURSE SPECIALIST

## 2022-05-16 PROCEDURE — 250N000013 HC RX MED GY IP 250 OP 250 PS 637: Performed by: EMERGENCY MEDICINE

## 2022-05-16 RX ADMIN — ESCITALOPRAM OXALATE 20 MG: 20 TABLET ORAL at 09:03

## 2022-05-16 RX ADMIN — NICOTINE POLACRILEX 2 MG: 2 GUM, CHEWING ORAL at 06:18

## 2022-05-16 RX ADMIN — NICOTINE POLACRILEX 2 MG: 2 GUM, CHEWING ORAL at 09:05

## 2022-05-16 RX ADMIN — PALIPERIDONE 3 MG: 3 TABLET, EXTENDED RELEASE ORAL at 21:09

## 2022-05-16 ASSESSMENT — ACTIVITIES OF DAILY LIVING (ADL)
HYGIENE/GROOMING: INDEPENDENT
DRESS: INDEPENDENT
ORAL_HYGIENE: INDEPENDENT
HYGIENE/GROOMING: INDEPENDENT
LAUNDRY: WITH SUPERVISION
DRESS: INDEPENDENT
LAUNDRY: WITH SUPERVISION
ORAL_HYGIENE: INDEPENDENT

## 2022-05-16 NOTE — PLAN OF CARE
Assessment/Intervention/Current Symptoms and Care Coordination:  Attend Team Meeting  Review Chart  Spoke with Lupe at [facility], they have the referral and have beds 2 -3 weeks out          Discharge Plan or Goal  Pending stabilization & development of a safe discharge plan.  Considerations include: Intensive Residential Treatment Services (IRTS): Several referrals in place         Barriers to Discharge   Patient requires further psychiatric stabilization due to current symptomology        Referral Status  [facility]idential Treatment Services (IRTS):         Legal Status  Patient is under MI commitment in [facility]

## 2022-05-16 NOTE — PLAN OF CARE
Pt was seen isolating in his room more than the last couple days. When in his room pt enjoys reading [employer]. Pt did eat his breakfast and lunch. He was medication compliant. During check in pt denies SI, SIB, AH, VH, and HI. Pt did not attend groups. Pt was encouraged to join groups and spend more time in the milieu. Will continue to monitor.

## 2022-05-16 NOTE — PROGRESS NOTES
"[facility], [place]   Psychiatric Progress Note        Interim History:   The patient's care was discussed with the treatment team during the daily team meeting and/or staff's chart notes were reviewed.  Staff report patient has been going to groups.      Psychiatric symptoms and interventions:   Patient reports his mood is improving and he denies suicidal ideation. Patient has been putting in more effort in attending groups. Patient is not endorsing any psychotic symptoms.   Patient has been cooperative in taking oral Invega as supplement to Invega Sustenna LEÓN.     Patient denies side effects from medications.     Sleep and appetite are adequate.      Oral Invega 3 mg added to address break throu psychotic symptoms . Patient is on Invega Sustenna LEÓN 156 mg maintenance dose. Next dose should be increased to 234 mg due to break through symptoms.          Medications:       escitalopram  20 mg Oral Daily     paliperidone  3 mg Oral At Bedtime          Allergies:     Allergies   Allergen Reactions     Seasonal Allergies      Seroquel [Quetiapine]      Fainting and slowed breathing           Labs:   No results found for this or any previous visit (from the past 24 hour(s)).       Psychiatric Examination:     /84   Pulse 77   Temp 97.9  F (36.6  C) (Oral)   Resp 15   Ht 1.803 m (5' 11\")   Wt 73.9 kg (163 lb)   SpO2 97%   BMI 22.73 kg/m    Weight is 163 lbs 0 oz  Body mass index is 22.73 kg/m .  Orthostatic Vitals  Report      Most Recent      Sitting Orthostatic /80 05/15 0900    Sitting Orthostatic Pulse (bpm) 95 05/15 0900    Standing Orthostatic /75 05/16 0838    Standing Orthostatic Pulse (bpm) 103 05/16 0838             Appearance: awake, alert, adequately groomed and dressed in hospital scrubs  Attitude:  guarded  Eye Contact:  good  Mood:  better  Affect:  intensity is blunted  Speech:  normal prosody  Psychomotor Behavior:  no evidence of tardive dyskinesia, " dystonia, or tics  Throught Process:  goal oriented  Associations:  no loose associations  Thought Content:  no evidence of suicidal ideation or homicidal ideation, no auditory hallucinations present and no visual hallucinations present  Insight:  limited  Judgement:  limited  Oriented to:  time, person, and place  Attention Span and Concentration:  intact  Recent and Remote Memory:  intact         Clinical Global Impressions  First:     Most recent:            Precautions:     Behavioral Orders   Procedures     Code 1 - Restrict to Unit     Discontinue 1:1 attendant for suicide risk     Order Specific Question:   I have performed an in person assessment of the patient     Answer:   Based on this assessment the patient no longer requires a one on one attendant at this point in time.     Order Specific Question:   Rationale     Answer:   Routine observations are sufficient to monitor safety.     Order Specific Question:   Rationale     Answer:   Modifications to care environment made to mitigate safety risk     Order Specific Question:   Rationale     Answer:   Patient States able to remain safe in hospital     Routine Programming     As clinically indicated     Status 15     Every 15 minutes.     Suicide precautions     Patients on Suicide Precautions should have a Combination Diet ordered that includes a Diet selection(s) AND a Behavioral Tray selection for Safe Tray - with utensils, or Safe Tray - NO utensils            DIagnoses:   1.  Schizoaffective disorder with psychosis, severe, with psychosis.  2.  History of mood disorder.  3.  History of cannabis use disorder.         Plan:   Legal status: Patient is under MI commitment with [facility]. CM revoked provisional discharge. Patient is in hospital on a voluntary basis.      Medication management:   Invega Sustenna maintenence dose given on 5/9 in [facility]   Added oral Invega 3 mg to address breakthrough psychotic symptoms.   escitalopram 20 mg to address mood  flattening from Invega.      Medical: Anisometropia, sinus tachycardia, and SARS  Admission labs are unremarkable, COVID screen negative, UTOX negative.      Behavioral/psychological/social;   Encouraged patient to attend therapeutic hospital programming as tolerated.      Disposition:   Reason for continued hospitalization: Patient experiencing breakthrough psychotic symptoms, PD is revoked.   Stabilization with medications, provide structured and supportive environemt, group   Estimated length of stay is 3-5 days   Discharge: IRT's

## 2022-05-16 NOTE — PLAN OF CARE
05/16/22 1505   Group Therapy Session   Group Attendance attended group session   Time Session Began 1030   Time Session Ended 1215   Total Time patient participated (minutes) 52   Total # Attendees 6   Group Type expressive therapy;task skill   Group Topic Covered coping skills/lifestyle management;balanced lifestyle   Group Session Detail OT Clinic   Patient Response/Contribution listened actively;cooperative with task   Patient Response Detail Pt IND self-selected cognitive worksheets to engage in for the group duration. Pt did not ask for supplies as needed, but when prompted he accepted. Pt remained guarded and quiet, but was appreciative of group upon his exit from group.

## 2022-05-17 PROCEDURE — 124N000002 HC R&B MH UMMC

## 2022-05-17 PROCEDURE — 99232 SBSQ HOSP IP/OBS MODERATE 35: CPT | Performed by: CLINICAL NURSE SPECIALIST

## 2022-05-17 PROCEDURE — 250N000013 HC RX MED GY IP 250 OP 250 PS 637: Performed by: CLINICAL NURSE SPECIALIST

## 2022-05-17 PROCEDURE — 250N000013 HC RX MED GY IP 250 OP 250 PS 637: Performed by: EMERGENCY MEDICINE

## 2022-05-17 RX ADMIN — NICOTINE POLACRILEX 2 MG: 2 GUM, CHEWING ORAL at 11:10

## 2022-05-17 RX ADMIN — ESCITALOPRAM OXALATE 20 MG: 20 TABLET ORAL at 09:06

## 2022-05-17 RX ADMIN — PALIPERIDONE 3 MG: 3 TABLET, EXTENDED RELEASE ORAL at 21:12

## 2022-05-17 ASSESSMENT — ACTIVITIES OF DAILY LIVING (ADL)
HYGIENE/GROOMING: INDEPENDENT
DRESS: INDEPENDENT
HYGIENE/GROOMING: INDEPENDENT
LAUNDRY: WITH SUPERVISION
ORAL_HYGIENE: INDEPENDENT
DRESS: INDEPENDENT
ORAL_HYGIENE: INDEPENDENT
LAUNDRY: WITH SUPERVISION

## 2022-05-17 NOTE — PROGRESS NOTES
"[facility], [place]   Psychiatric Progress Note        Interim History:   The patient's care was discussed with the treatment team during the daily team meeting and/or staff's chart notes were reviewed.  Staff report patient has been going to groups.      Psychiatric symptoms and interventions:   Patient has been making more of an effort to go to groups. Patient reports he is agreeable to go to an IRT's. Patient reports he is an introvert and does not like socializing.     Patient deneis any depressive symptoms.  He deneis suicidal thinking. Patient does not endorse any psychotic symptoms. Patient is organized. He is able to participate in group.     Patient denies side effects from medications. He will continue oral Invega until his nest Invega Sustenna LEÓN which should be increased to 234 mg.     Sleep and appetite are adequate.        Medications:       escitalopram  20 mg Oral Daily     paliperidone  3 mg Oral At Bedtime          Allergies:     Allergies   Allergen Reactions     Seasonal Allergies      Seroquel [Quetiapine]      Fainting and slowed breathing           Labs:   No results found for this or any previous visit (from the past 24 hour(s)).       Psychiatric Examination:     /78 (BP Location: Right arm)   Pulse 82   Temp 97.2  F (36.2  C) (Oral)   Resp 16   Ht 1.803 m (5' 11\")   Wt 71.8 kg (158 lb 6.4 oz)   SpO2 96%   BMI 22.09 kg/m    Weight is 158 lbs 6.4 oz  Body mass index is 22.09 kg/m .  Orthostatic Vitals  Report      Most Recent      Sitting Orthostatic /78 05/17 0700    Sitting Orthostatic Pulse (bpm) 82 05/17 0700    Standing Orthostatic /80 05/17 0700    Standing Orthostatic Pulse (bpm) 81 05/17 0700           Appearance: awake, alert, adequately groomed and dressed in hospital scrubs  Attitude:  guarded  Eye Contact:  good  Mood:  better  Affect:  intensity is blunted  Speech:  normal prosody  Psychomotor Behavior:  no evidence of " tardive dyskinesia, dystonia, or tics  Throught Process:  goal oriented  Associations:  no loose associations  Thought Content:  no evidence of suicidal ideation or homicidal ideation, no auditory hallucinations present and no visual hallucinations present  Insight:  limited  Judgement:  limited  Oriented to:  time, person, and place  Attention Span and Concentration:  intact  Recent and Remote Memory:  intact         Clinical Global Impressions  First:     Most recent:            Precautions:     Behavioral Orders   Procedures     Code 1 - Restrict to Unit     Discontinue 1:1 attendant for suicide risk     Order Specific Question:   I have performed an in person assessment of the patient     Answer:   Based on this assessment the patient no longer requires a one on one attendant at this point in time.     Order Specific Question:   Rationale     Answer:   Routine observations are sufficient to monitor safety.     Order Specific Question:   Rationale     Answer:   Modifications to care environment made to mitigate safety risk     Order Specific Question:   Rationale     Answer:   Patient States able to remain safe in hospital     Routine Programming     As clinically indicated     Status 15     Every 15 minutes.     Suicide precautions     Patients on Suicide Precautions should have a Combination Diet ordered that includes a Diet selection(s) AND a Behavioral Tray selection for Safe Tray - with utensils, or Safe Tray - NO utensils            DIagnoses:   1.  Schizoaffective disorder with psychosis, severe, with psychosis.  2.  History of mood disorder.  3.  History of cannabis use disorder.            Plan:   Legal status: Patient is under MI commitment with [facility]. CM revoked provisional discharge. Patient is in hospital on a voluntary basis.      Medication management:   Invega Sustenna maintenence dose given on 5/9 in [place] ED   Added oral Invega 3 mg to address breakthrough psychotic symptoms.   escitalopram  20 mg to address mood flattening from Invega.      Medical: Anisometropia, sinus tachycardia, and SARS  Admission labs are unremarkable, COVID screen negative, UTOX negative.      Behavioral/psychological/social;   Encouraged patient to attend therapeutic hospital programming as tolerated.      Disposition:   Reason for continued hospitalization: Patient experiencing breakthrough psychotic symptoms, PD is revoked.   Stabilization with medications, provide structured and supportive environemt, group   Estimated length of stay is 3-5 days   Discharge: IRT's

## 2022-05-17 NOTE — PLAN OF CARE
Problem: Plan of Care - These are the overarching goals to be used throughout the patient stay.    Goal: Plan of Care Review/Shift Note  Outcome: Ongoing, Progressing  Flowsheets  Taken 5/16/2022 2034  Plan of Care Reviewed With: patient  Taken 5/16/2022 1645  Plan of Care Reviewed With: patient   Goal Outcome Evaluation:    Plan of Care Reviewed With: patient and patient's mother . Pt is calm and cooperative. Pt denies questions. Pt is denying thoughts of SI, SIB and hallucinations at this time. Pt is fredy for safety. Pt has been present in the milieu and watched movies with other patients. Pt did not attend evening group.     Pt's father visited and they played cards. Pt did not receive any PRNs this shift. Pt is compliant with medications and denies adverse effects from medications. Pt ate well and tolerating oral intake. Vital signs have remained stable and will continue to monitor patient.

## 2022-05-17 NOTE — PLAN OF CARE
Problem: Seclusion/Restraint, Behavioral  Goal: Absence of Harm or Injury  Outcome: Ongoing, Progressing   Goal Outcome Evaluation:\    Pt has been calm and cooperative this morning and afternoon. He came out to the lounge this morning and watched tv. Pt was medication compliant and ate his breakfast. During check in pt denies SI, SIB, AH, VH, and HI. Will continue to monitor.

## 2022-05-18 PROCEDURE — 250N000013 HC RX MED GY IP 250 OP 250 PS 637: Performed by: CLINICAL NURSE SPECIALIST

## 2022-05-18 PROCEDURE — 250N000013 HC RX MED GY IP 250 OP 250 PS 637: Performed by: EMERGENCY MEDICINE

## 2022-05-18 PROCEDURE — 99232 SBSQ HOSP IP/OBS MODERATE 35: CPT | Performed by: CLINICAL NURSE SPECIALIST

## 2022-05-18 PROCEDURE — 124N000002 HC R&B MH UMMC

## 2022-05-18 RX ADMIN — PALIPERIDONE 3 MG: 3 TABLET, EXTENDED RELEASE ORAL at 21:14

## 2022-05-18 RX ADMIN — ESCITALOPRAM OXALATE 20 MG: 20 TABLET ORAL at 08:02

## 2022-05-18 NOTE — PROGRESS NOTES
05/18/22 0927   Individualization/Patient Specific Goals   Patient Personal Strengths family/social support   Patient Vulnerabilities limited social skills;limited support system;history of unsuccessful treatment;poor impulse control;lacks insight into illness   Anxieties, Fears or Concerns Social Anxiety   Individualized Care Needs Encouragement to go to groups   Patient-Specific Goals (Include Timeframe) Symptom stabilization, decrease in social anxiety   Interprofessional Rounds   Summary Dicussed pt progress on unit and discharge planning   Participants advanced practice nurse;nursing;CTC;OT   Team Discussion   Participants Deb Naegele APRN CNP, Destinee Ashraf CTC, Steven Cintron, RODDY, Jessenia Ng, OT, Елена James, Nurse Sup   Progress Improving   Anticipated length of stay 2-3 weeks   Continued Stay Criteria/Rationale PD was revoked today, need to pursue IRTS placements.   Plan CTC will coordinate disposition and after care plan   Rationale for change in precautions or plan No Change   Anticipated Discharge Disposition IRTS   PRECAUTIONS AND SAFETY    Behavioral Orders   Procedures    Code 1 - Restrict to Unit    Discontinue 1:1 attendant for suicide risk     Order Specific Question:   I have performed an in person assessment of the patient     Answer:   Based on this assessment the patient no longer requires a one on one attendant at this point in time.     Order Specific Question:   Rationale     Answer:   Routine observations are sufficient to monitor safety.     Order Specific Question:   Rationale     Answer:   Modifications to care environment made to mitigate safety risk     Order Specific Question:   Rationale     Answer:   Patient States able to remain safe in hospital    Routine Programming     As clinically indicated    Status 15     Every 15 minutes.    Suicide precautions     Patients on Suicide Precautions should have a Combination Diet ordered that includes a Diet selection(s) AND a Behavioral  Tray selection for Safe Tray - with utensils, or Safe Tray - NO utensils         Safety  Safety WDL: WDL  Patient Location: patient room, own  Observed Behavior: calm  Observed Behavior (Comment): reading  Safety Measures: safety plan reviewed, self-directed behavior promoted, suicide assessment completed, suicide check-in completed, safety rounds completed  Diversional Activity: journaling, music, reading  Suicidality: Status 15, Behavioral scrubs (rajiv)

## 2022-05-18 NOTE — PROGRESS NOTES
"[facility], [place]   Psychiatric Progress Note        Interim History:   The patient's care was discussed with the treatment team during the daily team meeting and/or staff's chart notes were reviewed.  Staff report patient has been going to groups.      Psychiatric symptoms and interventions:   Patient denies psychotic symptoms. He is endorsing organized thinking. He does not endorse AH or VH. tient denies any depressive symptoms.     Patient denies side effects from medications. He will continue oral Invega until his next Invega Sustenna LEÓN which should be increased to 234 mg.      Sleep and appetite are adequate.          Medications:       escitalopram  20 mg Oral Daily     paliperidone  3 mg Oral At Bedtime          Allergies:     Allergies   Allergen Reactions     Seasonal Allergies      Seroquel [Quetiapine]      Fainting and slowed breathing           Labs:   No results found for this or any previous visit (from the past 24 hour(s)).       Psychiatric Examination:     /76 (BP Location: Left arm)   Pulse 84   Temp 98.4  F (36.9  C) (Oral)   Resp 16   Ht 1.803 m (5' 11\")   Wt 71.8 kg (158 lb 6.4 oz)   SpO2 96%   BMI 22.09 kg/m    Weight is 158 lbs 6.4 oz  Body mass index is 22.09 kg/m .  Orthostatic Vitals  Report      Most Recent      Sitting Orthostatic /78 05/17 0700    Sitting Orthostatic Pulse (bpm) 82 05/17 0700    Standing Orthostatic /80 05/17 0700    Standing Orthostatic Pulse (bpm) 81 05/17 0700           Appearance: awake, alert, adequately groomed and dressed in hospital scrubs  Attitude:  guarded  Eye Contact:  good  Mood:  better  Affect:  intensity is blunted  Speech:  normal prosody  Psychomotor Behavior:  no evidence of tardive dyskinesia, dystonia, or tics  Throught Process:  goal oriented  Associations:  no loose associations  Thought Content:  no evidence of suicidal ideation or homicidal ideation, no auditory hallucinations present " and no visual hallucinations present  Insight:  limited  Judgement:  limited  Oriented to:  time, person, and place  Attention Span and Concentration:  intact  Recent and Remote Memory:  intact    Clinical Global Impressions  First:     Most recent:            Precautions:     Behavioral Orders   Procedures     Code 1 - Restrict to Unit     Discontinue 1:1 attendant for suicide risk     Order Specific Question:   I have performed an in person assessment of the patient     Answer:   Based on this assessment the patient no longer requires a one on one attendant at this point in time.     Order Specific Question:   Rationale     Answer:   Routine observations are sufficient to monitor safety.     Order Specific Question:   Rationale     Answer:   Modifications to care environment made to mitigate safety risk     Order Specific Question:   Rationale     Answer:   Patient States able to remain safe in hospital     Routine Programming     As clinically indicated     Status 15     Every 15 minutes.     Suicide precautions     Patients on Suicide Precautions should have a Combination Diet ordered that includes a Diet selection(s) AND a Behavioral Tray selection for Safe Tray - with utensils, or Safe Tray - NO utensils            DIagnoses:   1.  Schizoaffective disorder with psychosis, severe, with psychosis.  2.  History of mood disorder.  3.  History of cannabis use disorder.            Plan:     Legal status: Patient is under MI commitment with [facility]. CM revoked provisional discharge. Patient is in hospital on a voluntary basis.      Medication management:   Invega Sustenna maintenence dose given on 5/9 in [place] ED   Added oral Invega 3 mg to address breakthrough psychotic symptoms.   escitalopram 20 mg to address mood flattening from Invega.      Medical: Anisometropia, sinus tachycardia, and SARS  Admission labs are unremarkable, COVID screen negative, UTOX negative.      Behavioral/psychological/social;    Encouraged patient to attend therapeutic hospital programming as tolerated.      Disposition:   Reason for continued hospitalization: Patient experiencing breakthrough psychotic symptoms, PD is revoked.   Stabilization with medications, provide structured and supportive environemt, group   Estimated length of stay is 3-5 days   Discharge: IRT's

## 2022-05-18 NOTE — PLAN OF CARE
Problem: Suicidal Behavior  Goal: Suicidal Behavior is Absent or Managed  Flowsheets (Taken 5/17/2022 1913)  Mutually Determined Action Steps (Suicidal Behavior Absent/Managed):    shares suicidal thoughts    verbalizes safety check rationale   Goal Outcome Evaluation: met    Plan of Care Reviewed With: patient. Pt denies questions and expressed understanding.    Pt continue to deny all thoughts of SI, SIB and HI. Pt denies hallucinations. Pt denies all adverse reactions from medications. Pt had been present in the milieu and conversing with other patients. Pt had a visitor in which he played cards with in the dinning room. Pt tolerating PO intake and ate 100% of his dinner. Pt denied the need for PRN's.      Pt is compliant with his activities of daily living and treatment plan including medications. Vital signs have been stable and will monitor.

## 2022-05-18 NOTE — PROGRESS NOTES
NOC Shift Report    Pt in bed at beginning of shift, breathing quiet and unlabored. Pt slept through shift. Pt slept 7 hours.     No pt complaints or concerns at this time.     No PRNs given. Will continue to monitor.     Precautions:Suicide

## 2022-05-18 NOTE — PLAN OF CARE
Assessment/Intervention/Current Symptoms and Care Coordination:  -Attended Team Meeting and Reviewed Chart  -WR reached out to [place] re: IRTS referral. Pt has interview Friday at 10am with Chuyita from [place]  -WR called [facility] ([facility]) intake to follow up on IRTS referrals. Areli in intake said that she didn't receive a referral on this pt and asked for information to be resent.   -WR resent info to [facility]- IRTS.      Discharge Plan or Goal: IRTS        Barriers to Discharge: IRTS acceptance and bed availability        Referral Status  Intensive Residential Treatment Services (IRTS):   PT was referred to the follow IRTS on 5/13  [place], [facility], [facility], [facility] [place], [facility], [facility] and [facility]         Legal Status  Patient is under MI commitment in [facility]

## 2022-05-19 PROCEDURE — 250N000013 HC RX MED GY IP 250 OP 250 PS 637: Performed by: CLINICAL NURSE SPECIALIST

## 2022-05-19 PROCEDURE — 250N000013 HC RX MED GY IP 250 OP 250 PS 637: Performed by: EMERGENCY MEDICINE

## 2022-05-19 PROCEDURE — 124N000002 HC R&B MH UMMC

## 2022-05-19 PROCEDURE — 99232 SBSQ HOSP IP/OBS MODERATE 35: CPT | Performed by: CLINICAL NURSE SPECIALIST

## 2022-05-19 RX ADMIN — ESCITALOPRAM OXALATE 20 MG: 20 TABLET ORAL at 07:45

## 2022-05-19 RX ADMIN — NICOTINE POLACRILEX 2 MG: 2 GUM, CHEWING ORAL at 08:55

## 2022-05-19 RX ADMIN — PALIPERIDONE 3 MG: 3 TABLET, EXTENDED RELEASE ORAL at 21:22

## 2022-05-19 RX ADMIN — NICOTINE POLACRILEX 2 MG: 2 GUM, CHEWING ORAL at 10:35

## 2022-05-19 NOTE — PLAN OF CARE
"  Problem: Suicidal Behavior  Goal: Suicidal Behavior is Absent or Managed  Outcome: Ongoing, Progressing   Goal Outcome Evaluation:    Plan of Care Reviewed With: patient   Patient stated: \"I feel better today\". Denied suicidal thoughts or wishing to be dead. Rated depression at 2/10, anxiety at 2/10. Denied HI, hallucinations, or delusions.      Behavior was controlled, patient followed directions and communicated needs well. Spent the majority of the  evening isolating and sleeping in his room.     Mood was calm. Affect was blunted. Memory is intact. patient kept self clean and well groomed.      Goal for the shift: to participate in groups.   Concerns: none verbalized this shift. No questions for staff.     Patient denied pain and all other physical issues.      Took medications as prescribed. Denied side effects, none assessed by staff at this time.      PRNs: None               "

## 2022-05-19 NOTE — PLAN OF CARE
"  Problem: Suicidal Behavior  Goal: Suicidal Behavior is Absent or Managed  Outcome: Ongoing, Not Progressing   Goal Outcome Evaluation:  /76 (BP Location: Left arm, Patient Position: Sitting, Cuff Size: Adult Regular)   Pulse 78   Temp 98.8  F (37.1  C) (Temporal)   Resp 16   Ht 1.803 m (5' 11\")   Wt 71.8 kg (158 lb 6.4 oz)   SpO2 95%   BMI 22.09 kg/m       Pt was sleep before dinner and came out for dinner and he went back to his room after dinner. Good appetite and fluid intake. Denied having pain and discomfort. Pt was isolated in his room , encouraged him to come out and socialized with other pt pt refused. He did not participated in a group activity. Pt denied having SI, SIB, AH, VH and anxiety. He was calm ,cooperative and complaint with his meds.                "

## 2022-05-19 NOTE — PROGRESS NOTES
"[facility], [place]   Psychiatric Progress Note        Interim History:   The patient's care was discussed with the treatment team during the daily team meeting and/or staff's chart notes were reviewed.  Staff report patient  has been going to groups.      Psychiatric symptoms and interventions:     Patient presents with a full affect. Patient is looking forward in phone interview on Friday for IRT's. Provider explained to patient about writing down questions before the interview so that he gets his needs met.     Patient's mood is improving. He deneis suicidal thinking. Patient does not endorse psychotic thinking. He does not endorse AH or VH.     Patient has been cooperative with his medications.     Oral Invega 3 mg added to address break through psychotic symptoms . Patient is on Invega Sustenna LEÓN 156 mg maintenance dose. Next dose should be increased to 234 mg due to break through symptoms.         Medications:       escitalopram  20 mg Oral Daily     paliperidone  3 mg Oral At Bedtime          Allergies:     Allergies   Allergen Reactions     Seasonal Allergies      Seroquel [Quetiapine]      Fainting and slowed breathing           Labs:   No results found for this or any previous visit (from the past 24 hour(s)).       Psychiatric Examination:     BP 97/67   Pulse 95   Temp 97.8  F (36.6  C) (Temporal)   Resp 16   Ht 1.803 m (5' 11\")   Wt 71.8 kg (158 lb 6.4 oz)   SpO2 96%   BMI 22.09 kg/m    Weight is 158 lbs 6.4 oz  Body mass index is 22.09 kg/m .  Orthostatic Vitals  Report      Most Recent      Standing Orthostatic /58 05/19 0807    Standing Orthostatic Pulse (bpm) 94 05/19 0807        Appearance: awake, alert, adequately groomed and dressed in hospital scrubs  Attitude:  guarded  Eye Contact:  good  Mood:  better  Affect:  intensity is blunted  Speech:  normal prosody  Psychomotor Behavior:  no evidence of tardive dyskinesia, dystonia, or tics  Throught " Process:  goal oriented  Associations:  no loose associations  Thought Content:  no evidence of suicidal ideation or homicidal ideation, no auditory hallucinations present and no visual hallucinations present  Insight:  limited  Judgement:  limited  Oriented to:  time, person, and place  Attention Span and Concentration:  intact  Recent and Remote Memory:  intact        Clinical Global Impressions  First:     Most recent:            Precautions:     Behavioral Orders   Procedures     Code 1 - Restrict to Unit     Discontinue 1:1 attendant for suicide risk     Order Specific Question:   I have performed an in person assessment of the patient     Answer:   Based on this assessment the patient no longer requires a one on one attendant at this point in time.     Order Specific Question:   Rationale     Answer:   Routine observations are sufficient to monitor safety.     Order Specific Question:   Rationale     Answer:   Modifications to care environment made to mitigate safety risk     Order Specific Question:   Rationale     Answer:   Patient States able to remain safe in hospital     Routine Programming     As clinically indicated     Status 15     Every 15 minutes.     Suicide precautions     Patients on Suicide Precautions should have a Combination Diet ordered that includes a Diet selection(s) AND a Behavioral Tray selection for Safe Tray - with utensils, or Safe Tray - NO utensils            DIagnoses:   1.  Schizoaffective disorder with psychosis, severe, with psychosis.  2.  History of mood disorder.  3.  History of cannabis use disorder.          Plan:   Legal status: Patient is under MI commitment with [facility]. CM revoked provisional discharge. Patient is in hospital on a voluntary basis.      Medication management:   Invega Sustenna maintenence dose given on 5/9 in [place] ED   Added oral Invega 3 mg to address breakthrough psychotic symptoms.   escitalopram 20 mg to address mood flattening from Invega.       Medical: Anisometropia, sinus tachycardia, and SARS  Admission labs are unremarkable, COVID screen negative, UTOX negative.      Behavioral/psychological/social;   Encouraged patient to attend therapeutic hospital programming as tolerated.      Disposition:   Reason for continued hospitalization: Patient experiencing breakthrough psychotic symptoms, PD is revoked.   Stabilization with medications, provide structured and supportive environemt, group   Estimated length of stay is 3-5 days   Discharge: IRT's

## 2022-05-19 NOTE — PLAN OF CARE
Assessment/Intervention/Current Symptoms and Care Coordination:  -Attended Team Meeting and Reviewed Chart  -WR informed pt of his phone interview for tomorrow with [place]-IRTS.     Discharge Plan or Goal: IRTS        Barriers to Discharge: IRTS acceptance and bed availability        Referral Status  Intensive Residential Treatment Services (IRTS):   PT was referred to the follow IRTS on 5/13  [place], [facility], [facility], [facility] [place], [facility], [facility] and [facility]         Legal Status  Patient is under MI commitment in [facility]

## 2022-05-20 PROCEDURE — 124N000002 HC R&B MH UMMC

## 2022-05-20 PROCEDURE — 250N000013 HC RX MED GY IP 250 OP 250 PS 637: Performed by: EMERGENCY MEDICINE

## 2022-05-20 PROCEDURE — 250N000013 HC RX MED GY IP 250 OP 250 PS 637: Performed by: CLINICAL NURSE SPECIALIST

## 2022-05-20 PROCEDURE — 99232 SBSQ HOSP IP/OBS MODERATE 35: CPT | Performed by: CLINICAL NURSE SPECIALIST

## 2022-05-20 RX ADMIN — PALIPERIDONE 3 MG: 3 TABLET, EXTENDED RELEASE ORAL at 21:56

## 2022-05-20 RX ADMIN — ESCITALOPRAM OXALATE 20 MG: 20 TABLET ORAL at 08:19

## 2022-05-20 NOTE — PLAN OF CARE
"  Problem: Suicidal Behavior  Goal: Suicidal Behavior is Absent or Managed  Outcome: Ongoing, Progressing   Goal Outcome Evaluation: Patient is awake early as usual routine per pt. Presents with a flat, blunted affect. Agreeable to check in with writer,denies SI/SIB AH or VH,denies anxiety or depression.    Pt is compliant with scheduled meds, denies side effects.    Pt Showered.  Pt received a phone call from the group home and his goal today was \"for the phone call to go okay\", he said the call went okay.    No PRN's administered,Will continue to monitor.                        "

## 2022-05-20 NOTE — PROGRESS NOTES
"[facility], [place]   Psychiatric Progress Note        Interim History:   The patient's care was discussed with the treatment team during the daily team meeting and/or staff's chart notes were reviewed.  Staff report patient has been going to groups.      Psychiatric symptoms and interventions:   Patient completed his interview with Mary. Patient reports the interview went well. Patient continues to stabilize. He has been going to some groups. Patient tends to isolate to self. He does not endorse psychosis or auditory hallucinations. He does not endorse suicidal ideation.     Sleep and appetite are adequate.     Patient has been cooperative with his medications.      Oral Invega 3 mg added to address break through psychotic symptoms . Patient is on Invega Sustenna LEÓN 156 mg maintenance dose. Next dose should be increased to 234 mg due to break through symptoms.         Medications:       escitalopram  20 mg Oral Daily     paliperidone  3 mg Oral At Bedtime          Allergies:     Allergies   Allergen Reactions     Seasonal Allergies      Seroquel [Quetiapine]      Fainting and slowed breathing           Labs:   No results found for this or any previous visit (from the past 24 hour(s)).       Psychiatric Examination:     /76   Pulse 95   Temp 96.8  F (36  C) (Temporal)   Resp 16   Ht 1.803 m (5' 11\")   Wt 71.8 kg (158 lb 6.4 oz)   SpO2 95%   BMI 22.09 kg/m    Weight is 158 lbs 6.4 oz  Body mass index is 22.09 kg/m .  Orthostatic Vitals  Report      Most Recent      Standing Orthostatic /76 05/20 0850    Standing Orthostatic Pulse (bpm) 111 05/20 0850        Appearance: awake, alert, adequately groomed and dressed in hospital scrubs  Attitude:  guarded  Eye Contact:  good  Mood:  better  Affect:  intensity is blunted  Speech:  normal prosody  Psychomotor Behavior:  no evidence of tardive dyskinesia, dystonia, or tics  Throught Process:  goal oriented  Associations:  no " loose associations  Thought Content:  no evidence of suicidal ideation or homicidal ideation, no auditory hallucinations present and no visual hallucinations present  Insight:  limited  Judgement:  limited  Oriented to:  time, person, and place  Attention Span and Concentration:  intact  Recent and Remote Memory:  intact        Clinical Global Impressions  First:     Most recent:            Precautions:     Behavioral Orders   Procedures     Code 1 - Restrict to Unit     Discontinue 1:1 attendant for suicide risk     Order Specific Question:   I have performed an in person assessment of the patient     Answer:   Based on this assessment the patient no longer requires a one on one attendant at this point in time.     Order Specific Question:   Rationale     Answer:   Routine observations are sufficient to monitor safety.     Order Specific Question:   Rationale     Answer:   Modifications to care environment made to mitigate safety risk     Order Specific Question:   Rationale     Answer:   Patient States able to remain safe in hospital     Routine Programming     As clinically indicated     Status 15     Every 15 minutes.     Suicide precautions     Patients on Suicide Precautions should have a Combination Diet ordered that includes a Diet selection(s) AND a Behavioral Tray selection for Safe Tray - with utensils, or Safe Tray - NO utensils            DIagnoses:   1.  Schizoaffective disorder with psychosis, severe, with psychosis.  2.  History of mood disorder.  3.  History of cannabis use disorder.         Plan:   Legal status: Patient is under MI commitment with [facility]. CM revoked provisional discharge. Patient is in hospital on a voluntary basis.      Medication management:   Invega Sustenna maintenence dose given on 5/9 in [place] ED   Added oral Invega 3 mg to address breakthrough psychotic symptoms.   escitalopram 20 mg to address mood flattening from Invega.      Medical: Anisometropia, sinus tachycardia,  and SARS  Admission labs are unremarkable, COVID screen negative, UTOX negative.      Behavioral/psychological/social;   Encouraged patient to attend therapeutic hospital programming as tolerated.      Disposition:   Reason for continued hospitalization: Patient experiencing breakthrough psychotic symptoms, PD is revoked.   Stabilization with medications, provide structured and supportive environemt, group   Estimated length of stay is 3-5 days   Discharge: IRT's

## 2022-05-21 PROCEDURE — 250N000013 HC RX MED GY IP 250 OP 250 PS 637: Performed by: CLINICAL NURSE SPECIALIST

## 2022-05-21 PROCEDURE — 124N000002 HC R&B MH UMMC

## 2022-05-21 PROCEDURE — 90853 GROUP PSYCHOTHERAPY: CPT

## 2022-05-21 PROCEDURE — 250N000013 HC RX MED GY IP 250 OP 250 PS 637: Performed by: EMERGENCY MEDICINE

## 2022-05-21 RX ADMIN — PALIPERIDONE 3 MG: 3 TABLET, EXTENDED RELEASE ORAL at 21:26

## 2022-05-21 RX ADMIN — ESCITALOPRAM OXALATE 20 MG: 20 TABLET ORAL at 07:58

## 2022-05-21 RX ADMIN — NICOTINE POLACRILEX 2 MG: 2 GUM, CHEWING ORAL at 17:46

## 2022-05-21 ASSESSMENT — ACTIVITIES OF DAILY LIVING (ADL)
HYGIENE/GROOMING: INDEPENDENT
ORAL_HYGIENE: INDEPENDENT
LAUNDRY: UNABLE TO COMPLETE
DRESS: INDEPENDENT

## 2022-05-21 NOTE — PLAN OF CARE
"  Problem: Suicidal Behavior  Goal: Suicidal Behavior is Absent or Managed  Outcome: Ongoing, Not Progressing   Goal Outcome Evaluation:      /68   Pulse 89   Temp 98  F (36.7  C) (Oral)   Resp 16   Ht 1.803 m (5' 11\")   Wt 71.8 kg (158 lb 6.4 oz)   SpO2 96%   BMI 22.09 kg/m      Alert and oriented x 4. Good appetite and fluid intake. Denied having pain and discomfort. Pt had full affect, and bright insight. Pt was in his room isolated and came out to watch TV for about 30mins then his mom came to visit and he played cards with her. Pt is excited to get discharge to IRTS next week. 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 He was calm and cooperative and complaint with his medications. Pt denied having SI, SIB, AH, VH and anxiety. Pt likes to be alone most of the time.               "

## 2022-05-21 NOTE — PLAN OF CARE
"  Problem: Suicidal Behavior  Goal: Suicidal Behavior is Absent or Managed  Outcome: Ongoing, Progressing   Goal Outcome Evaluation:    Plan of Care Reviewed With: patient     Pt had a good day. Pleasant, and cooperative with staff.  Isolative and withdrawn to self. Does not socialize with staff.  Paces the hallway for the majority of the shift.  Pt ate 100% of breakfast and lunch.  Drinks adequate fluids. Medication compliant.  Did not attend any group activities this shift.  No c/o pain or discomfort.  Pt's goal today is to get \"a good visit with dad\".  Denies all mental health.   Pt's dad came to visit,visit went well.  Will continue to monitor.              "

## 2022-05-21 NOTE — PLAN OF CARE
"  Problem: Suicidal Behavior  Goal: Suicidal Behavior is Absent or Managed  Outcome: Ongoing, Progressing  Flowsheets (Taken 5/21/2022 1813)  Mutually Determined Action Steps (Suicidal Behavior Absent/Managed): verbalizes safety check rationale  Intervention: Provide Immediate and Ongoing Protective Physical Environment  Flowsheets (Taken 5/21/2022 1813)  Safe Transition Promotion:    access to lethal means addressed    protective factors promoted    personal safety plan developed   Goal Outcome Evaluation:    Plan of Care Reviewed With: patient     Patient is visible in the milieu, likes pacing in the hallway, denies all mental health symptoms, patient said  \" I feel good \".. Patient is  isolative and withdrawn to self but brightens up upon approach.  Patient had good appetite and attended groups.  Remained calm and cooperative with staff.  Took all scheduled meds without issues.   Will continue to monitor.     Requested for prn nicotine gum x1.  "

## 2022-05-22 PROCEDURE — 124N000002 HC R&B MH UMMC

## 2022-05-22 PROCEDURE — 250N000013 HC RX MED GY IP 250 OP 250 PS 637: Performed by: EMERGENCY MEDICINE

## 2022-05-22 PROCEDURE — 250N000013 HC RX MED GY IP 250 OP 250 PS 637: Performed by: CLINICAL NURSE SPECIALIST

## 2022-05-22 RX ADMIN — ESCITALOPRAM OXALATE 20 MG: 20 TABLET ORAL at 08:34

## 2022-05-22 RX ADMIN — PALIPERIDONE 3 MG: 3 TABLET, EXTENDED RELEASE ORAL at 22:23

## 2022-05-22 NOTE — PLAN OF CARE
05/21/22 211   Group Therapy Session   Group Attendance attended group session   Time Session Began 2015   Time Session Ended 2100   Total Time patient participated (minutes) 45   Total # Attendees 6   Group Type psychotherapeutic   Group Topic Covered coping skills/lifestyle management   Group Session Detail Discussed relationships, boundaries, and traits. Watched the relationship tree by [employer] video. Discussed the video, the group members shared areas in their lives with which it resonated and the struggle of figuring out what relationships are 'roots' and which are 'leaves or branches'. Used the bullseye diagram to discuss levels of intimacy and boundaries.   Patient Response/Contribution did not discuss personal experience;listened actively   Patient Response Detail Pt was very quiet, did appear to attend to what others were saying

## 2022-05-22 NOTE — PLAN OF CARE
Problem: Suicidal Behavior  Goal: Suicidal Behavior is Absent or Managed  Outcome: Ongoing, Progressing  Flowsheets (Taken 5/22/2022 0840)  Mutually Determined Action Steps (Suicidal Behavior Absent/Managed): (Denies suicidal thoughts) shares suicidal thoughts  Note: Patient had a good night sleep reported by NOC (7hrs). Patient remains calm, pleasant, cooperative and compliant with medication.   Patient denies all mental health symptoms this shift. However, patient presents with a flat affect but brightens up upon approach.   Patient has been pacing the hallway and laughing to himself most of the time.   Patient's appetite is good, he is eating and drinking well (100% of breakfast and lunch).   APPETITE: Good  PRN: None

## 2022-05-22 NOTE — PLAN OF CARE
Problem: Suicidal Behavior  Goal: Suicidal Behavior is Absent or Managed  5/22/2022 4463 by Elder Oropeza RN  Outcome: Ongoing, Not Progressing  Note: Patient had uneventful shift. Patient spent the most part of the shift sleeping in his room. Patient's affect is flat, brightens upon approach. Patient paces the gould when awake and smiles to himself most of the time. Patient denies all mental health symptoms but appears to be responding to internal stimuli. However, patient remains calm, cooperative and compliant with medications. Denies any medication side effects at this time.   Patient is eating and drinking well. No safety concerns recorded this shift.   APPETITE: Good  PRN: None given this shift.

## 2022-05-23 PROCEDURE — 250N000013 HC RX MED GY IP 250 OP 250 PS 637: Performed by: EMERGENCY MEDICINE

## 2022-05-23 PROCEDURE — 250N000013 HC RX MED GY IP 250 OP 250 PS 637: Performed by: CLINICAL NURSE SPECIALIST

## 2022-05-23 PROCEDURE — 124N000002 HC R&B MH UMMC

## 2022-05-23 PROCEDURE — 99232 SBSQ HOSP IP/OBS MODERATE 35: CPT | Performed by: CLINICAL NURSE SPECIALIST

## 2022-05-23 RX ADMIN — ESCITALOPRAM OXALATE 20 MG: 20 TABLET ORAL at 08:50

## 2022-05-23 RX ADMIN — PALIPERIDONE 3 MG: 3 TABLET, EXTENDED RELEASE ORAL at 20:22

## 2022-05-23 RX ADMIN — NICOTINE POLACRILEX 2 MG: 2 GUM, CHEWING ORAL at 11:21

## 2022-05-23 ASSESSMENT — ACTIVITIES OF DAILY LIVING (ADL)
ORAL_HYGIENE: INDEPENDENT
HYGIENE/GROOMING: INDEPENDENT
ORAL_HYGIENE: INDEPENDENT
HYGIENE/GROOMING: INDEPENDENT
DRESS: INDEPENDENT
DRESS: STREET CLOTHES
HYGIENE/GROOMING: INDEPENDENT
DRESS: INDEPENDENT
LAUNDRY: UNABLE TO COMPLETE
LAUNDRY: UNABLE TO COMPLETE
ORAL_HYGIENE: INDEPENDENT

## 2022-05-23 NOTE — PLAN OF CARE
Assessment/Intervention/Current Symptoms and Care Coordination  CTC checked in with patient this afternoon.  He confirms he had his phone interview with [facility] at 10am today. He indicates the interview went ok, but did not want to meet with writer for further conversation regarding the interview.    Writer took call from Nima Bass MA treatment supervisor at [facility] who indicates patient is appropriate for admission.  She will be sending over the admission packet for completion and we will further discuss admission date etc tomorrow.   She indicates the need for us to send the patient with a 30 day supply of medications at the time of admission.  CTC will continue to work on arrangements.      Discharge Plan or Goal  IRTS when stable    Barriers to Discharge   Medication adjustments continue to better target current symptoms. Needs IRTS bed    Referral Status  IRTS referrals in process  [facility] and [facility]    Legal Status  Commitment/[facility] Marlys, revoked ETHEL

## 2022-05-23 NOTE — PLAN OF CARE
Problem: Seclusion/Restraint, Behavioral  Goal: Absence of Harm or Injury  Outcome: Ongoing, Progressing   Goal Outcome Evaluation: Patient reports feeling safe and denies SI, SIB, and Hallucinations but appears to be responding to internal stimuli due to smiling to self while pacing in the hallways. Patient ate 100% of meals without issue and denies pain or having other concerns. Patient reports his mood is content and appears calm and affect appears calm. Patient has been getting good quality of sleep and feels rested when getting up. Patient's affect is animated and expressive; patient reports he has no goal for today. Patient declined to attend groups and remained withdrawn and isolative to him self in his room. Patient avoids social contact. Patient accepted a phone call from [facility] at 1000 today for interview for placement.

## 2022-05-23 NOTE — PROGRESS NOTES
"[facility], [place]   Psychiatric Progress Note        Interim History:   The patient's care was discussed with the treatment team during the daily team meeting and/or staff's chart notes were reviewed.  Staff report patient goes to some groups.   as been going to groups.      Psychiatric symptoms and interventions:   Patient presents as somewhat disheveled. He was in his room. Patient smiled and was pleasant. Patient reports his mother visited him over the weekend the and visit went well.     Patient interviewed with Ace this morning. Patient felt the [place] went well. He thinks he would have more  independence at [facility].     Patient's mood continues to improve. He denies suicidal thinking. Patient denies AH, VH and his thinking is organized. Patient is cooperative with his medications. He deneis side effect form Invega.     Oral Invega 3 mg added to address break through psychotic symptoms . Patient is on Invega Sustenna LEÓN 156 mg maintenance dose. Next dose should be increased to 234 mg due to break through symptoms. Next dose due on 6/13         Medications:       escitalopram  20 mg Oral Daily     paliperidone  3 mg Oral At Bedtime          Allergies:     Allergies   Allergen Reactions     Seasonal Allergies      Seroquel [Quetiapine]      Fainting and slowed breathing           Labs:   No results found for this or any previous visit (from the past 24 hour(s)).       Psychiatric Examination:     BP (!) 143/77 (BP Location: Left arm, Patient Position: Sitting, Cuff Size: Adult Regular)   Pulse 94   Temp 97.4  F (36.3  C) (Temporal)   Resp 16   Ht 1.803 m (5' 11\")   Wt 71.8 kg (158 lb 6.4 oz)   SpO2 96%   BMI 22.09 kg/m    Weight is 158 lbs 6.4 oz  Body mass index is 22.09 kg/m .  Orthostatic Vitals  Report      Most Recent      Sitting Orthostatic /84 05/23 0900    Sitting Orthostatic Pulse (bpm) 80 05/23 0900    Standing Orthostatic /73 05/23 0900 "    Standing Orthostatic Pulse (bpm) 101 05/23 0900             Appearance: awake, alert, adequately groomed and dressed in hospital scrubs  Attitude:  guarded  Eye Contact:  good  Mood:  better  Affect:  intensity is blunted  Speech:  normal prosody  Psychomotor Behavior:  no evidence of tardive dyskinesia, dystonia, or tics  Throught Process:  goal oriented  Associations:  no loose associations  Thought Content:  no evidence of suicidal ideation or homicidal ideation, no auditory hallucinations present and no visual hallucinations present  Insight:  limited  Judgement:  limited  Oriented to:  time, person, and place  Attention Span and Concentration:  intact  Recent and Remote Memory:  intact      Clinical Global Impressions  First:     Most recent:            Precautions:     Behavioral Orders   Procedures     Code 1 - Restrict to Unit     Discontinue 1:1 attendant for suicide risk     Order Specific Question:   I have performed an in person assessment of the patient     Answer:   Based on this assessment the patient no longer requires a one on one attendant at this point in time.     Order Specific Question:   Rationale     Answer:   Routine observations are sufficient to monitor safety.     Order Specific Question:   Rationale     Answer:   Modifications to care environment made to mitigate safety risk     Order Specific Question:   Rationale     Answer:   Patient States able to remain safe in hospital     Routine Programming     As clinically indicated     Status 15     Every 15 minutes.     Suicide precautions     Patients on Suicide Precautions should have a Combination Diet ordered that includes a Diet selection(s) AND a Behavioral Tray selection for Safe Tray - with utensils, or Safe Tray - NO utensils            DIagnoses:   1.  Schizoaffective disorder with psychosis, severe, with psychosis.  2.  History of mood disorder.  3.  History of cannabis use disorder.          Plan:     Legal status: Patient is  under MI commitment with [facility]. CM revoked provisional discharge. Patient is in hospital on a voluntary basis.      Medication management:   Invega Sustenna maintenence dose given on 5/9 in [place] ED   Added oral Invega 3 mg to address breakthrough psychotic symptoms.   escitalopram 20 mg to address mood flattening from Invega.      Medical: Anisometropia, sinus tachycardia, and SARS  Admission labs are unremarkable, COVID screen negative, UTOX negative.      Behavioral/psychological/social;   Encouraged patient to attend therapeutic hospital programming as tolerated.      Disposition:   Reason for continued hospitalization: Patient experiencing breakthrough psychotic symptoms, PD is revoked.   Stabilization with medications, provide structured and supportive environemt, group   Estimated length of stay is 3-5 days   Discharge: IRT's

## 2022-05-24 ENCOUNTER — DOCUMENTATION ONLY (OUTPATIENT)
Dept: BEHAVIORAL HEALTH | Facility: CLINIC | Age: 22
End: 2022-05-24
Payer: COMMERCIAL

## 2022-05-24 LAB — SARS-COV-2 RNA RESP QL NAA+PROBE: NEGATIVE

## 2022-05-24 PROCEDURE — 99232 SBSQ HOSP IP/OBS MODERATE 35: CPT | Performed by: CLINICAL NURSE SPECIALIST

## 2022-05-24 PROCEDURE — U0005 INFEC AGEN DETEC AMPLI PROBE: HCPCS | Performed by: CLINICAL NURSE SPECIALIST

## 2022-05-24 PROCEDURE — 250N000013 HC RX MED GY IP 250 OP 250 PS 637: Performed by: EMERGENCY MEDICINE

## 2022-05-24 PROCEDURE — 250N000013 HC RX MED GY IP 250 OP 250 PS 637: Performed by: CLINICAL NURSE SPECIALIST

## 2022-05-24 PROCEDURE — 124N000002 HC R&B MH UMMC

## 2022-05-24 RX ORDER — PALIPERIDONE 3 MG/1
3 TABLET, EXTENDED RELEASE ORAL AT BEDTIME
Qty: 30 TABLET | Refills: 0 | Status: SHIPPED | OUTPATIENT
Start: 2022-05-24 | End: 2022-07-11

## 2022-05-24 RX ORDER — ESCITALOPRAM OXALATE 20 MG/1
20 TABLET ORAL DAILY
Qty: 30 TABLET | Refills: 0 | Status: SHIPPED | OUTPATIENT
Start: 2022-05-24 | End: 2022-07-11

## 2022-05-24 RX ADMIN — ESCITALOPRAM OXALATE 20 MG: 20 TABLET ORAL at 08:14

## 2022-05-24 RX ADMIN — PALIPERIDONE 3 MG: 3 TABLET, EXTENDED RELEASE ORAL at 20:11

## 2022-05-24 RX ADMIN — NICOTINE POLACRILEX 2 MG: 2 GUM, CHEWING ORAL at 11:00

## 2022-05-24 ASSESSMENT — ACTIVITIES OF DAILY LIVING (ADL)
HYGIENE/GROOMING: INDEPENDENT
ORAL_HYGIENE: INDEPENDENT
DRESS: INDEPENDENT
LAUNDRY: WITH SUPERVISION

## 2022-05-24 NOTE — PROGRESS NOTES
Prior Authorization **APPROVED**    Authorization Effective Date: 4/24/2022  Authorization Expiration Date: 5/24/2023  Medication: Paliperidone ER 3mg tabs **APPROVED**  Approved Dose/Quantity: 1 tablet daily  Reference #: CoverMyMeds Key: [id number] - [place] Case ID: [id number]   Insurance Company: [employer] - Phone [phone] Fax [phone]  Expected CoPay: $0.00     CoPay Card Available: No    Foundation Assistance Needed: n/a  Which Pharmacy is filling the prescription (Not needed for infusion/clinic administered): [facility] [place], [place] - [address]  Pharmacy Notified: Yes  Patient Notified: Yes  Comments:  Discharge pharmacy sent patient with supply while auth is pending. *Retroactively Billed*          Jess Collins CPhT  [place] [facility] Liaison  Pronouns: She/Her/Hers    [facility]  [address] [facility]  [address] [facility] [address], [place] [address]   [email or web address]  [email or web address]   Phone: [phone]  Pager: [phone]  Fax: [phone]

## 2022-05-24 NOTE — PLAN OF CARE
Assessment/Intervention/Current Symptoms and Care Coordination  Application form for [facility] was completed, sent and accepted. Patient can be admitted on 5/26.  He will need to arrive between 10 and 11am. He will need a 30 day supply of medication to take with him.  He is pleased to hear this. He indicates he will tell his family and [facility] can also inform them and provide details.  He feels certain that his mother will be able to transport him at 10am on Thursday.  We will confirm this. Patient's mental health  Gray Flores, [facility], [phone] has been informed and is in agreement with plan.   Mom Rolanda reached out to [facility] for details.  She plans to contact [employer] to confirm the coverage at [facility]. Otherwise is also in favor of the plan.  She will plan on continued communication with [facility] to finalize discharge.  She can be reached at [phone].    Discharge Plan or Goal  [facility] on 5/26/22.    Barriers to Discharge   Medication management continues, placement bed not available yet.    Referral Status  [facility] referral completed and patient has been accepted.    Legal Status  Commitment/[facility]/PD revoked.  [facility] [id number]

## 2022-05-24 NOTE — PLAN OF CARE
Precautions: Suicide    Legal Status: Committed, [facility]    Mental Health:  Pt presents calm and cooperative. Pt denies SI/SIB/AH/VH/HI. Pt denies any anxiety. Pt is mostly withdrawn to his room and does not interact much with others. Pt does laugh and smile to self, appearing to be responding to internal stimuli.    Medical:  Pt denies any pain or acute concerns. Pt reports sleep and appetite are good.    PRNs Given:  None    Goal Outcome Evaluation:  Problem: Suicidal Behavior  Goal: Suicidal Behavior is Absent or Managed  Outcome: Ongoing, Progressing

## 2022-05-24 NOTE — PLAN OF CARE
Problem: Seclusion/Restraint, Behavioral  Goal: Absence of Harm or Injury  Outcome: Ongoing, Progressing   Goal Outcome Evaluation: Patient remains safe and denies all mental health symptoms and says he is not having thoughts of SI/SIB nor hallucinations. Patient appears to be responding to internal stimuli due to seeing patient smiling to self at various times through out the shift. Patient reports mood is positive and he feels good today. Patient remains animated and makes appropriate requests through out shift.     Patient has been accepted by [facility] for placement and is expected to discharge 5/26/22 and patient will need to leave the hospital to be admitted in [place] 1000 and will have to leave here around 0930 and this time will be confirmed tomorrow.

## 2022-05-24 NOTE — PROGRESS NOTES
"[facility], [place]   Psychiatric Progress Note        Interim History:   The patient's care was discussed with the treatment team during the daily team meeting and/or staff's chart notes were reviewed.  Staff report patient goes to some groups    Psychiatric symptoms and interventions:   Patient presented with a smile. He is looking [place] to [place] [employer]. He reports his mood as \"good\". He denies suicidal ideation. Patient deneis AH and paranoia. Patient demonstrates organized thinking. Patient reports social anxiety but has attend some groups.      Patient has been cooperative with taking his medications. Patient denies side effects.     Patient reports adequate sleep and appetite.     Patient was cooperative with COVID screen.          Medications:       escitalopram  20 mg Oral Daily     [START ON 6/13/2022] paliperidone  234 mg Intramuscular Q28 Days     paliperidone  3 mg Oral At Bedtime          Allergies:     Allergies   Allergen Reactions     Seasonal Allergies      Seroquel [Quetiapine]      Fainting and slowed breathing           Labs:   No results found for this or any previous visit (from the past 24 hour(s)).       Psychiatric Examination:     /75 (BP Location: Left arm, Patient Position: Sitting, Cuff Size: Adult Regular)   Pulse 75   Temp 97.6  F (36.4  C) (Temporal)   Resp 16   Ht 1.803 m (5' 11\")   Wt 71.8 kg (158 lb 6.4 oz)   SpO2 97%   BMI 22.09 kg/m    Weight is 158 lbs 6.4 oz  Body mass index is 22.09 kg/m .  Orthostatic Vitals  Report      Most Recent      Sitting Orthostatic /84 05/23 0900    Sitting Orthostatic Pulse (bpm) 80 05/23 0900    Standing Orthostatic /73 05/23 0900    Standing Orthostatic Pulse (bpm) 101 05/23 0900          Appearance: awake, alert, adequately groomed and dressed in hospital scrubs  Attitude:  guarded  Eye Contact:  good  Mood: good  Affect:  full range  Speech:  normal prosody  Psychomotor Behavior:  no " evidence of tardive dyskinesia, dystonia, or tics  Throught Process:  goal oriented  Associations:  no loose associations  Thought Content:  no evidence of suicidal ideation or homicidal ideation, no auditory hallucinations present and no visual hallucinations present  Insight:  limited  Judgement:  limited  Oriented to:  time, person, and place  Attention Span and Concentration:  intact      Clinical Global Impressions  First:     Most recent:            Precautions:     Behavioral Orders   Procedures     Code 1 - Restrict to Unit     Discontinue 1:1 attendant for suicide risk     Order Specific Question:   I have performed an in person assessment of the patient     Answer:   Based on this assessment the patient no longer requires a one on one attendant at this point in time.     Order Specific Question:   Rationale     Answer:   Routine observations are sufficient to monitor safety.     Order Specific Question:   Rationale     Answer:   Modifications to care environment made to mitigate safety risk     Order Specific Question:   Rationale     Answer:   Patient States able to remain safe in hospital     Routine Programming     As clinically indicated     Status 15     Every 15 minutes.     Suicide precautions     Patients on Suicide Precautions should have a Combination Diet ordered that includes a Diet selection(s) AND a Behavioral Tray selection for Safe Tray - with utensils, or Safe Tray - NO utensils            DIagnoses:   1.  Schizoaffective disorder with psychosis, severe, with psychosis.  2.  History of mood disorder.  3.  History of cannabis use disorder.       Plan:      Legal status: Patient is under MI commitment with [facility]. CM revoked provisional discharge. Patient is in hospital on a voluntary basis.      Medication management:   Invega Sustenna maintenence dose given on 5/9 in [place] ED , Next dose is due on 6/13/2022  Added oral Invega 3 mg to address breakthrough psychotic symptoms.    escitalopram 20 mg to address mood flattening from Invega.      Medical: Anisometropia, sinus tachycardia, and SARS  Admission labs are unremarkable, COVID screen negative, UTOX negative.      Behavioral/psychological/social;   Encouraged patient to attend therapeutic hospital programming as tolerated.      Disposition:   Reason for continued hospitalization: Patient experiencing breakthrough psychotic symptoms, PD is revoked.   Stabilization with medications, provide structured and supportive environemt, group   Discharge to [facility] on Wednesday 5/24. Meds ordered, paperwork is completed, COVID screen is negative.

## 2022-05-25 PROCEDURE — 250N000013 HC RX MED GY IP 250 OP 250 PS 637: Performed by: CLINICAL NURSE SPECIALIST

## 2022-05-25 PROCEDURE — 124N000002 HC R&B MH UMMC

## 2022-05-25 PROCEDURE — 99232 SBSQ HOSP IP/OBS MODERATE 35: CPT | Performed by: CLINICAL NURSE SPECIALIST

## 2022-05-25 PROCEDURE — 250N000013 HC RX MED GY IP 250 OP 250 PS 637: Performed by: EMERGENCY MEDICINE

## 2022-05-25 RX ADMIN — ESCITALOPRAM OXALATE 20 MG: 20 TABLET ORAL at 07:48

## 2022-05-25 RX ADMIN — PALIPERIDONE 3 MG: 3 TABLET, EXTENDED RELEASE ORAL at 20:00

## 2022-05-25 NOTE — PLAN OF CARE
Assessment/Intervention/Current Symptoms and Care Coordination  Confirmed with [facility] addmissions [phone] that admission is scheduled for tomorrow 5/26 between 10-11am. The covid test he had yesterday is fine.  He should be sent with 30 day supply of meds. Mom will  at 9:30.     Discharge Plan or Goal  [facility] IRTS on 5/26/22.     Barriers to Discharge   Medication management continues, placement bed not available yet.     Referral Status  [facility] referral completed and patient has been accepted.     Legal Status  Commitment/[facility]/PD revoked.  [facility] [id number]  PD and change of status form faxed to 's office and CM.

## 2022-05-25 NOTE — PLAN OF CARE
Problem: Plan of Care - These are the overarching goals to be used throughout the patient stay.    Goal: Plan of Care Review/Shift Note  Description: The Plan of Care Review/Shift note should be completed every shift.  The Outcome Evaluation is a brief statement about your assessment that the patient is improving, declining, or no change.  This information will be displayed automatically on your shift note.  Outcome: Ongoing, Progressing   Goal Outcome Evaluation:         Pt took a nap at start of shift. Came out around dinner time. Ate 100%. Denies feeling depressed or anxious. Attended groups. Parents visited. Med compliant.

## 2022-05-25 NOTE — PROGRESS NOTES
"[facility], [place]   Psychiatric Progress Note        Interim History:   The patient's care was discussed with the treatment team during the daily team meeting and/or staff's chart notes were reviewed.  Staff report patient goes to some groups     Psychiatric symptoms and interventions:   Patient presents with a bright affect. Provider observed patient laughing and smiling to self. Patient deneis any auditory hallucinations Patient did not appear distracted in conversation. Patient denies paranoia.     Patient is able to describe his medications and why he is taking them. Patient knows conditions of PD. Provider educated patient on the detremental effect of cannabis on his mood and possible adverse effects with his prescribed medications.     Patient reports adequate sleep and appetite..     COVID screen negative.          Medications:       escitalopram  20 mg Oral Daily     [START ON 6/13/2022] paliperidone  234 mg Intramuscular Q28 Days     paliperidone  3 mg Oral At Bedtime          Allergies:     Allergies   Allergen Reactions     Seasonal Allergies      Seroquel [Quetiapine]      Fainting and slowed breathing           Labs:   No results found for this or any previous visit (from the past 24 hour(s)).       Psychiatric Examination:     /86   Pulse 81   Temp 97.5  F (36.4  C) (Oral)   Resp 16   Ht 1.803 m (5' 11\")   Wt 71.8 kg (158 lb 6.4 oz)   SpO2 97%   BMI 22.09 kg/m    Weight is 158 lbs 6.4 oz  Body mass index is 22.09 kg/m .  Orthostatic Vitals  Report      Most Recent      Standing Orthostatic /86 05/25 0805    Standing Orthostatic Pulse (bpm) 77 05/25 0805        Appearance: awake, alert, adequately groomed and dressed in hospital scrubs  Attitude:  guarded  Eye Contact:  good  Mood: good  Affect:  full range  Speech:  normal prosody  Psychomotor Behavior:  no evidence of tardive dyskinesia, dystonia, or tics  Throught Process:  goal " oriented  Associations:  no loose associations  Thought Content:  no evidence of suicidal ideation or homicidal ideation, no auditory hallucinations present and no visual hallucinations present  Insight:  limited  Judgement:  limited  Oriented to:  time, person, and place  Attention Span and Concentration:  intact      Clinical Global Impressions  First:     Most recent:            Precautions:     Behavioral Orders   Procedures     Code 1 - Restrict to Unit     Discontinue 1:1 attendant for suicide risk     Order Specific Question:   I have performed an in person assessment of the patient     Answer:   Based on this assessment the patient no longer requires a one on one attendant at this point in time.     Order Specific Question:   Rationale     Answer:   Routine observations are sufficient to monitor safety.     Order Specific Question:   Rationale     Answer:   Modifications to care environment made to mitigate safety risk     Order Specific Question:   Rationale     Answer:   Patient States able to remain safe in hospital     Routine Programming     As clinically indicated     Status 15     Every 15 minutes.          DIagnoses:   1.  Schizoaffective disorder with psychosis, severe, with psychosis.  2.  History of mood disorder.  3.  History of cannabis use disorder.         Plan:   Legal status: Patient is under MI commitment with [facility]. CM revoked provisional discharge. Patient is in hospital on a voluntary basis.      Medication management:   Invega Sustenna maintenence dose given on 5/9 in [facility] , Next dose is due on 6/13/2022  Added oral Invega 3 mg to address breakthrough psychotic symptoms.   escitalopram 20 mg to address mood flattening from Invega.      Medical: Anisometropia, sinus tachycardia, and SARS  Admission labs are unremarkable, COVID screen negative, UTOX negative.      Behavioral/psychological/social;   Encouraged patient to attend therapeutic hospital programming as tolerated.       Disposition:   Reason for continued hospitalization: Patient experiencing breakthrough psychotic symptoms, PD is revoked.   Stabilization with medications, provide structured and supportive environemt, group   Discharge to [facility] on Thursday 5/25. Meds ordered, paperwork is completed, COVID screen is negative.

## 2022-05-25 NOTE — PLAN OF CARE
Problem: Suicidal Behavior  Goal: Suicidal Behavior is Absent or Managed  Outcome: Met   Goal Outcome Evaluation:      Pt has presented as pleasant but is walking the [place] laughing and talking to self this morning which has increased since last week but he is easily engaged in conversation.  He is waiting for discharge.

## 2022-05-26 VITALS
HEART RATE: 75 BPM | DIASTOLIC BLOOD PRESSURE: 88 MMHG | OXYGEN SATURATION: 97 % | RESPIRATION RATE: 16 BRPM | WEIGHT: 168.6 LBS | TEMPERATURE: 97.1 F | SYSTOLIC BLOOD PRESSURE: 131 MMHG | BODY MASS INDEX: 23.6 KG/M2 | HEIGHT: 71 IN

## 2022-05-26 PROCEDURE — 250N000013 HC RX MED GY IP 250 OP 250 PS 637: Performed by: EMERGENCY MEDICINE

## 2022-05-26 PROCEDURE — 99239 HOSP IP/OBS DSCHRG MGMT >30: CPT | Performed by: CLINICAL NURSE SPECIALIST

## 2022-05-26 RX ADMIN — ESCITALOPRAM OXALATE 20 MG: 20 TABLET ORAL at 08:30

## 2022-05-26 NOTE — DISCHARGE SUMMARY
"Psychiatric Discharge Summary    Junior Fernández MRN# [id number]   Age: 21 year old YOB: 2000     Date of Admission:  5/6/2022  Date of Discharge:  5/26/2022  Admitting Physician:  Sammy Earl MD  Discharge Physician:  Debra A. Naegele, APRN CNS (Contact: [phone])         Event Leading to Hospitalization:   Junior Fernández is a 21-year-old single  male presenting with a history of schizoaffective disorder.  The patient is presenting with breakthrough symptoms of paranoia.  The patient reports feeling guilty about turning his back on God.  The patient states \"I thought Prince Coyle was God.\"  The patient also stated \"I thought I was God's son.\"  Patient reports that his parents do not like him and wish to be dead.  Per mother's report, the patient came into the parents' room during the night and asked where are the guns. The patient was also throwing things away like his wallet prior to his breakthrough symptoms.  Goal for this hospitalization is stabilization with medications.            See Admission note by Naegele, Debra Ann, APRN CNS found on 5/10/2022  for additional details.          DIagnoses:     1.  Schizoaffective disorder with psychosis, severe, with psychosis.  2.  History of mood disorder.  3.  History of cannabis use disorder.            Labs:     Results for orders placed or performed during the hospital encounter of 05/06/22   Drug abuse screen 1 urine (ED)     Status: Normal   Result Value Ref Range    Amphetamines Urine Screen Negative Screen Negative    Barbiturates Urine Screen Negative Screen Negative    Benzodiazepines Urine Screen Negative Screen Negative    Cannabinoids Urine Screen Negative Screen Negative    Cocaine Urine Screen Negative Screen Negative    Opiates Urine Screen Negative Screen Negative   Asymptomatic COVID-19 Virus (Coronavirus) by PCR Nasopharyngeal     Status: Normal    Specimen: Nasopharyngeal; Swab   Result Value Ref Range    SARS " CoV2 PCR Negative Negative    Narrative    Testing was performed using the tammy  SARS-CoV-2 & Influenza A/B Assay on the tammy  Sarah [employer]  System.  This test should be ordered for the detection of SARS-COV-2 in individuals who meet SARS-CoV-2 clinical and/or epidemiological criteria. Test performance is unknown in asymptomatic patients.  This test is for in vitro diagnostic use under the FDA EUA for laboratories certified under CLIA to perform moderate and/or high complexity testing. This test has not been FDA cleared or approved.  A negative test does not rule out the presence of PCR inhibitors in the specimen or target RNA in concentration below the limit of detection for the assay. The possibility of a false negative should be considered if the patient's recent exposure or clinical presentation suggests COVID-19.  [facility] are certified under the Clinical Laboratory Improvement Amendments of 1988 (CLIA-88) as qualified to perform moderate and/or high complexity laboratory testing.   Lipid panel     Status: Normal   Result Value Ref Range    Cholesterol 160 <200 mg/dL    Triglycerides 87 <150 mg/dL    Direct Measure HDL 65 >=40 mg/dL    LDL Cholesterol Calculated 78 <=100 mg/dL    Non HDL Cholesterol 95 <130 mg/dL    Narrative    Cholesterol  Desirable:  <200 mg/dL    Triglycerides  Normal:  Less than 150 mg/dL  Borderline High:  150-199 mg/dL  High:  200-499 mg/dL  Very High:  Greater than or equal to 500 mg/dL    Direct Measure HDL  Female:  Greater than or equal to 50 mg/dL   Male:  Greater than or equal to 40 mg/dL    LDL Cholesterol  Desirable:  <100mg/dL  Above Desirable:  100-129 mg/dL   Borderline High:  130-159 mg/dL   High:  160-189 mg/dL   Very High:  >= 190 mg/dL    Non HDL Cholesterol  Desirable:  130 mg/dL  Above Desirable:  130-159 mg/dL  Borderline High:  160-189 mg/dL  High:  190-219 mg/dL  Very High:  Greater than or equal to 220 mg/dL   Comprehensive metabolic panel     Status:  Normal   Result Value Ref Range    Sodium 138 133 - 144 mmol/L    Potassium 3.8 3.4 - 5.3 mmol/L    Chloride 104 94 - 109 mmol/L    Carbon Dioxide (CO2) 27 20 - 32 mmol/L    Anion Gap 7 3 - 14 mmol/L    Urea Nitrogen 17 7 - 30 mg/dL    Creatinine 0.93 0.66 - 1.25 mg/dL    Calcium 9.3 8.5 - 10.1 mg/dL    Glucose 98 70 - 99 mg/dL    Alkaline Phosphatase 51 40 - 150 U/L    AST 12 0 - 45 U/L    ALT 24 0 - 70 U/L    Protein Total 7.8 6.8 - 8.8 g/dL    Albumin 4.3 3.4 - 5.0 g/dL    Bilirubin Total 0.6 0.2 - 1.3 mg/dL    GFR Estimate >90 >60 mL/min/1.73m2   TSH with free T4 reflex and/or T3 as indicated     Status: Normal   Result Value Ref Range    TSH 0.94 0.40 - 4.00 mU/L   CBC with platelets and differential     Status: None   Result Value Ref Range    WBC Count 6.7 4.0 - 11.0 10e3/uL    RBC Count 5.07 4.40 - 5.90 10e6/uL    Hemoglobin 15.2 13.3 - 17.7 g/dL    Hematocrit 45.1 40.0 - 53.0 %    MCV 89 78 - 100 fL    MCH 30.0 26.5 - 33.0 pg    MCHC 33.7 31.5 - 36.5 g/dL    RDW 13.0 10.0 - 15.0 %    Platelet Count 264 150 - 450 10e3/uL    % Neutrophils 59 %    % Lymphocytes 28 %    % Monocytes 9 %    % Eosinophils 3 %    % Basophils 1 %    % Immature Granulocytes 0 %    NRBCs per 100 WBC 0 <1 /100    Absolute Neutrophils 3.9 1.6 - 8.3 10e3/uL    Absolute Lymphocytes 1.9 0.8 - 5.3 10e3/uL    Absolute Monocytes 0.6 0.0 - 1.3 10e3/uL    Absolute Eosinophils 0.2 0.0 - 0.7 10e3/uL    Absolute Basophils 0.1 0.0 - 0.2 10e3/uL    Absolute Immature Granulocytes 0.0 <=0.4 10e3/uL    Absolute NRBCs 0.0 10e3/uL   Asymptomatic COVID-19 Virus (Coronavirus) by PCR Nose     Status: Normal    Specimen: Nose; Swab   Result Value Ref Range    SARS CoV2 PCR Negative Negative    Narrative    Testing was performed using the tammy  SARS-CoV-2 & Influenza A/B Assay on the tammy  Sarah [employer]  System.  This test should be ordered for the detection of SARS-COV-2 in individuals who meet SARS-CoV-2 clinical and/or epidemiological criteria. Test performance  is unknown in asymptomatic patients.  This test is for in vitro diagnostic use under the FDA EUA for laboratories certified under CLIA to perform moderate and/or high complexity testing. This test has not been FDA cleared or approved.  A negative test does not rule out the presence of PCR inhibitors in the specimen or target RNA in concentration below the limit of detection for the assay. The possibility of a false negative should be considered if the patient's recent exposure or clinical presentation suggests COVID-19.  [facility] are certified under the Clinical Laboratory Improvement Amendments of 1988 (CLIA-88) as qualified to perform moderate and/or high complexity laboratory testing.   Urine Drugs of Abuse Screen     Status: Normal    Narrative    The following orders were created for panel order Urine Drugs of Abuse Screen.  Procedure                               Abnormality         Status                     ---------                               -----------         ------                     Drug abuse screen 1 urin...[[id number]]  Normal              Final result                 Please view results for these tests on the individual orders.   CBC with platelets differential     Status: None    Narrative    The following orders were created for panel order CBC with platelets differential.  Procedure                               Abnormality         Status                     ---------                               -----------         ------                     CBC with platelets and d...[[id number]]                      Final result                 Please view results for these tests on the individual orders.            Consults:   No consultations were requested during this admission         Hospital Course:   Junior Fernández was admitted to [facility] with attending Sammy Gastelum MD found under an ongoing civil commitment with [employer]. The patient was placed under status 15 (15  minute checks) to ensure patient safety.     Invega Sustenna maintenence dose given on 5/9 in [place] [facility] , Next dose is due on 6/13/2022 which should be increased to 234 mg to prevent breakthrough psychotic symptoms.   Added oral Invega 3 mg to address breakthrough psychotic symptoms.   escitalopram 20 mg to address mood flattening from Invega.     Junior Fernández did participate in some groups and was visible in the milieu.     The patient's symptoms of suicidal thinking and psychosis improved. Patient rpeorts imporved mood. Patient stated he had socail anxiety and did not go to many gorups. quintin smiled to self but denied auditory hallcuaitons. Patient was cooperative with his medicaitons. No behavior issues.     Patient was educated on the detrimental effects of cannabis on his mood and possible adverse side effects with his prescribed medication. Recommendation is to abstain from cannabis.     Junior Fernández was released to [facility]. At the time of discharge Junior Fernández was determined to not be a danger to himself or others.          Discharge Medications:     Discharge Medication List as of 5/26/2022  8:36 AM      START taking these medications    Details   paliperidone ER (INVEGA) 3 MG 24 hr tablet Take 1 tablet (3 mg) by mouth At Bedtime, Disp-30 tablet, R-0, E-Prescribe         CONTINUE these medications which have CHANGED    Details   escitalopram (LEXAPRO) 20 MG tablet Take 1 tablet (20 mg) by mouth daily, Disp-30 tablet, R-0, E-Prescribe         CONTINUE these medications which have NOT CHANGED    Details   cholecalciferol 50 MCG (2000 UT) tablet Take 50 mcg by mouth daily, Historical      Melatonin 10 MG TABS tablet Take 10 mg by mouth nightly as needed for sleep, Historical      paliperidone (INVEGA SUSTENNA) 234 MG/1.5ML SEPIDEH Inject 234 mg into the muscle every 30 days, Historical         STOP taking these medications       paliperidone (INVEGA SUSTENNA) 156 MG/ML SEPIDEH injection  Comments:   Reason for Stopping:         traZODone (DESYREL) 50 MG tablet Comments:   Reason for Stopping:                    Psychiatric Examination:   Appearance:  awake, alert and adequately groomed  Attitude:  guarded  Eye Contact:  fair  Mood:  better  Affect:  appropriate and in normal range  Speech:  normal prosody  Psychomotor Behavior:  no evidence of tardive dyskinesia, dystonia, or tics  Thought Process:  goal oriented  Associations:  no loose associations, Patient smiles to self  Thought Content:  no evidence of suicidal ideation or homicidal ideation and no auditory hallucinations present  Insight:  limited  Judgment:  limited  Oriented to:  time, person, and place  Attention Span and Concentration:  intact  Recent and Remote Memory:  intact  Language: Able to name objects, Able to repeat phrases and Able to read and write  Fund of Knowledge: low-normal  Muscle Strength and Tone: normal  Gait and Station: Normal         Discharge Plan:       Further instructions from your care team       Behavioral Discharge Planning and Instructions    Summary: You were admitted on 5/6/2022  due to Psychotic Symptomology, Suicidal Ideations, and Self Injurious Behaviors.  You were treated by Debra Naegele CNS and discharged on 5/26/22 from [facility] to [facility].   [address] [place] [address].     You are being discharged under a Provisional Discharge Agreement as you are under [facility].  You have been given a copy of your Provisional Discharge Agreement.      Main Diagnosis:   1.  Schizoaffective disorder with psychosis, severe, with psychosis.  2.  History of mood disorder.  3.  History of cannabis use disorder.     Health Care Follow-up:   : Gray Flores [phone]; Fax [phone]     Medication Management: Friday, June 17th, 2022 at 7:30am (virtual visit)   [facility]  [address], [address]  [place] [address]  [phone]     Therapy:  Thursday, June 2nd, 2022 at 11am (in person visit)   [facility]  [address], [address]  [place] [address]  [phone]    Attend all scheduled appointments with your outpatient providers. Call at least 24 hours in advance if you need to reschedule an appointment to ensure continued access to your outpatient providers.     Major Treatments, Procedures and Findings:  You were provided with: a psychiatric assessment, assessed for medical stability, medication evaluation and/or management, and group therapy    Symptoms to Report: feeling more aggressive, increased confusion, losing more sleep, mood getting worse, or thoughts of suicide    Early warning signs can include: increased depression or anxiety sleep disturbances increased thoughts or behaviors of suicide or self-harm  increased unusual thinking, such as paranoia or hearing voices    Safety and Wellness:  Take all medicines as directed.  Make no changes unless your doctor suggests them.      Follow treatment recommendations.  Refrain from alcohol and non-prescribed drugs.  If there is a concern for safety, call 911.    Resources:   Crisis Intervention: 995.410.1057 or 620-890-1217 (TTY: 657.382.4626).  Call anytime for help.  [employer] [place] [employer]ness (www.[place].charla.org): 687.955.9685 or 537-421-4851.  [facility] Crisis (COPE) Response - Adult [phone]  Text 4 Life: txt \"LIFE\" to 17935 for immediate support and crisis intervention  Crisis text line: Text \"[place]\" to 495880. Free, confidential, 24/7.    General Medication Instructions:   See your medication sheet(s) for instructions.   Take all medicines as directed.  Make no changes unless your doctor suggests them.   Go to all your doctor visits.  Be sure to have all your required lab tests. This way, your medicines can be refilled on time.  Do not use any drugs not prescribed by your doctor.  Avoid " alcohol.    Advance Directives:   Scanned document on file with [facility]? No scanned doc  Is document scanned? Pt unable to confirm  Honoring Choices Your Rights Handout: Informed and given  Was more information offered? Pt unable to request    The Treatment team has appreciated the opportunity to work with you. If you have any questions or concerns about your recent admission, you can contact the unit which can receive your call 24 hours a day, 7 days a week. They will be able to get in touch with a Provider if needed. The unit number is [phone] .        Attestation:  The patient has been seen and evaluated by me,  Debra A. Naegele, SELINA CNS on 5/26/2022  Discharge sumamry time > 30 minutes

## 2022-05-26 NOTE — PLAN OF CARE
"  Problem: Plan of Care - These are the overarching goals to be used throughout the patient stay.    Goal: Optimal Comfort and Wellbeing  Outcome: Ongoing, Progressing   Goal Outcome Evaluation:  /86   Pulse 81   Temp 97.5  F (36.4  C) (Oral)   Resp 16   Ht 1.803 m (5' 11\")   Wt 71.8 kg (158 lb 6.4 oz)   SpO2 97%   BMI 22.09 kg/m       Pt was in his room isolative came out for dinner and went back to his room . Good appetite and fluid intake. Pt denied having pain and discomfort. Pt was calm cooperative and complaint with his medication. Pt denied having SI, SIB, AH, and VH but pt appeared to be responding to internal stimuli. Pt did not participated in a group activity.                 "

## 2022-05-26 NOTE — PLAN OF CARE
Goal Outcome Evaluation:      DISCHARGE:  This RN and pt have reviewed all meds and aftercare plan. All belongings returned. Pt denies SI , anxiety or depression at this time. Pt bright and smiling (internal stimuli )upon DC.  Mom will transport to [facility] this morning.

## 2022-05-27 ENCOUNTER — HOSPITAL ENCOUNTER (EMERGENCY)
Facility: CLINIC | Age: 22
Discharge: PSYCHIATRIC OR SUBSTANCE USE FACILITY | End: 2022-05-28
Attending: EMERGENCY MEDICINE | Admitting: EMERGENCY MEDICINE
Payer: COMMERCIAL

## 2022-05-27 ENCOUNTER — TELEPHONE (OUTPATIENT)
Dept: BEHAVIORAL HEALTH | Facility: CLINIC | Age: 22
End: 2022-05-27

## 2022-05-27 VITALS
RESPIRATION RATE: 16 BRPM | SYSTOLIC BLOOD PRESSURE: 117 MMHG | HEART RATE: 124 BPM | OXYGEN SATURATION: 97 % | HEIGHT: 71 IN | WEIGHT: 162.7 LBS | DIASTOLIC BLOOD PRESSURE: 68 MMHG | BODY MASS INDEX: 22.78 KG/M2 | TEMPERATURE: 98.4 F

## 2022-05-27 DIAGNOSIS — F29 PSYCHOSIS, UNSPECIFIED PSYCHOSIS TYPE (H): ICD-10-CM

## 2022-05-27 LAB
AMPHETAMINES UR QL SCN: NORMAL
BARBITURATES UR QL: NORMAL
BENZODIAZ UR QL: NORMAL
CANNABINOIDS UR QL SCN: NORMAL
COCAINE UR QL: NORMAL
HOLD SPECIMEN: NORMAL
OPIATES UR QL SCN: NORMAL
PCP UR QL SCN: NORMAL
SARS-COV-2 RNA RESP QL NAA+PROBE: NEGATIVE

## 2022-05-27 PROCEDURE — U0005 INFEC AGEN DETEC AMPLI PROBE: HCPCS | Performed by: EMERGENCY MEDICINE

## 2022-05-27 PROCEDURE — 96374 THER/PROPH/DIAG INJ IV PUSH: CPT

## 2022-05-27 PROCEDURE — 250N000011 HC RX IP 250 OP 636: Performed by: EMERGENCY MEDICINE

## 2022-05-27 PROCEDURE — 99285 EMERGENCY DEPT VISIT HI MDM: CPT | Mod: 25

## 2022-05-27 PROCEDURE — 250N000011 HC RX IP 250 OP 636

## 2022-05-27 PROCEDURE — 90791 PSYCH DIAGNOSTIC EVALUATION: CPT

## 2022-05-27 PROCEDURE — 80307 DRUG TEST PRSMV CHEM ANLYZR: CPT | Performed by: EMERGENCY MEDICINE

## 2022-05-27 PROCEDURE — C9803 HOPD COVID-19 SPEC COLLECT: HCPCS

## 2022-05-27 RX ORDER — LORAZEPAM 2 MG/ML
1 INJECTION INTRAMUSCULAR ONCE
Status: COMPLETED | OUTPATIENT
Start: 2022-05-27 | End: 2022-05-27

## 2022-05-27 RX ORDER — LORAZEPAM 2 MG/ML
INJECTION INTRAMUSCULAR
Status: COMPLETED
Start: 2022-05-27 | End: 2022-05-27

## 2022-05-27 RX ADMIN — LORAZEPAM 1 MG: 2 INJECTION INTRAMUSCULAR; INTRAVENOUS at 18:56

## 2022-05-27 RX ADMIN — LORAZEPAM 1 MG: 2 INJECTION INTRAMUSCULAR; INTRAVENOUS at 18:38

## 2022-05-27 RX ADMIN — LORAZEPAM 1 MG: 2 INJECTION INTRAMUSCULAR at 18:38

## 2022-05-27 NOTE — ED TRIAGE NOTES
Arrives via EMS. Per EMS [place]  and brought to a residence in [place]. Pt does not live in [place]. Patient talking to reta and rome peñaloza upon arrival and pointing across room. , ST 150s. Pt reports taking cocaine. Hx of schizoaffective disorder.

## 2022-05-27 NOTE — ED PROVIDER NOTES
History   Chief Complaint:  Altered Mental Status     HPI   Junior Fernández is a 21 year old male with history of substance abuse and schizophrenia who presents with altered mental status.  The patient was recently discharged from the hospital and had been at a group home.  He apparently left the group home today on a bike ride and then was found by a  profoundly altered.  He does have a history of substance abuse but his thoughts are so disorganized that he is unable to tell me if he has used any drugs.  He is clearly responding to internal stimuli and is unable to engage in any conversation with me.    Review of Systems  10 systems reviewed and negative except as above and in HPI.    Allergies:  Seroquel     Medications:  Lexapro  Paliperidone  Cholecalciferol  Melatonin    Past Medical History:     Severe acute respiratory syndrome  Reactive airway disease  Suicide attempt   Substance abuse  Anisometropia  Depression  Anxiety  Multiple nevi  Insomnia  Pneumonia  Sinus tachycardia    Past Surgical History:    Circumcision  Left monteggia fracture surgery  Tooth extraction   Repair brachial artery     Family History:    Father- hyperthyroidism, hyperlipidemia    Social History:  Presents via EMS  No PCP    Physical Exam     Patient Vitals for the past 24 hrs:   BP Temp Temp src Pulse Resp SpO2   05/27/22 1836 -- 99.6  F (37.6  C) Temporal -- -- --   05/27/22 1831 (!) 144/76 -- -- (!) 149 21 96 %       Physical Exam  General: Resting on the gurney, appears anxious  Head:  The scalp, face, and head appear normal, no evidence of trauma  Mouth/Throat: Mucus membranes are moist  CV:  Rapid but regular rate    Normal S1 and S2  No pathological murmur   Resp:  Breath sounds clear and equal bilaterally    Non-labored, no retractions or accessory muscle use    No coarseness    No wheezing   GI:  Abdomen is soft, no rigidity    No tenderness to palpation  MS:  Normal motor assessment of all  extremities.    Good capillary refill noted.  Skin:  No rash or lesions noted.  Neuro: Speech is clear. No focal deficit.  Psych:  Awake. Alert.  Very internally preoccupied.  Rambling tangential thoughts.  Appears to be responding to internal stimuli.  Poor insight.  Poor judgment.      Emergency Department Course       Laboratory:  Labs Ordered and Resulted from Time of ED Arrival to Time of ED Departure   COVID-19 VIRUS (CORONAVIRUS) BY PCR - Normal       Result Value    SARS CoV2 PCR Negative     DRUG ABUSE SCREEN 77 URINE (FL, RH, SH) - Normal    Amphetamines Urine Screen Negative      Barbiturates Urine Screen Negative      Benzodiazepines Urine Screen Negative      Cannabinoids Urine Screen Negative      Cocaine Urine Screen Negative      Opiates Urine Screen Negative      PCP Urine Screen Negative        Emergency Department Course:    Reviewed:  I reviewed nursing notes, vitals, past medical history and Care Everywhere    Assessments:   I obtained history and examined the patient as noted above.    I rechecked the patient and explained findings.     Interventions:  Medications   LORazepam (ATIVAN) injection 1 mg (1 mg Intravenous Given 5/27/22 1838)     Disposition:  The patient was transferred to [facility].     Impression & Plan         Medical Decision Making:  Junior Fernández is a 21 year old male who presents for evaluation of hallucinations.  They have  a history of previous psychiatric illness and at this point appear decompensated psychiatrically.  A 72 hour hold was  placed and security watch initiated.   Plan will be [facility] for further psychiatric management.      The patient was seen by DEC who agrees with this assessment.      Diagnosis:    ICD-10-CM    1. Psychosis, unspecified psychosis type (H)  F29             Lisha Cates MD  05/28/22 0214

## 2022-05-27 NOTE — ED NOTES
"Patient talking nonsensically to himself in the room. Unable to answer questions. Statements as follows: \"I drew Mars. You get this one.\" \"You play rivas. One gets cut in half. That would be funny. It's sarcastic, never get it. Autistic, its nothing. Cam, you're one of two gods, you wake up\".   "

## 2022-05-27 NOTE — ED NOTES
Bed: ED22  Expected date:   Expected time:   Means of arrival:   Comments:  [place] [address] 21 M unk ingestion, alert, uncooperative ETA 1819

## 2022-05-28 ENCOUNTER — TRANSFERRED RECORDS (OUTPATIENT)
Dept: BEHAVIORAL HEALTH | Facility: HOSPITAL | Age: 22
End: 2022-05-28

## 2022-05-28 ENCOUNTER — HOSPITAL ENCOUNTER (INPATIENT)
Facility: HOSPITAL | Age: 22
LOS: 47 days | Discharge: NURSING, REHAB OR LONG-TERM CARE | DRG: 885 | End: 2022-07-14
Attending: STUDENT IN AN ORGANIZED HEALTH CARE EDUCATION/TRAINING PROGRAM | Admitting: STUDENT IN AN ORGANIZED HEALTH CARE EDUCATION/TRAINING PROGRAM
Payer: COMMERCIAL

## 2022-05-28 DIAGNOSIS — G47.09 OTHER INSOMNIA: ICD-10-CM

## 2022-05-28 DIAGNOSIS — F41.9 ANXIETY: ICD-10-CM

## 2022-05-28 DIAGNOSIS — F25.9: ICD-10-CM

## 2022-05-28 DIAGNOSIS — F25.0 SCHIZOAFFECTIVE DISORDER, BIPOLAR TYPE (H): Primary | Chronic | ICD-10-CM

## 2022-05-28 DIAGNOSIS — E55.9 VITAMIN D DEFICIENCY: ICD-10-CM

## 2022-05-28 PROBLEM — F29: Status: ACTIVE | Noted: 2022-05-28

## 2022-05-28 PROCEDURE — 99223 1ST HOSP IP/OBS HIGH 75: CPT | Performed by: NURSE PRACTITIONER

## 2022-05-28 PROCEDURE — 250N000011 HC RX IP 250 OP 636: Performed by: NURSE PRACTITIONER

## 2022-05-28 PROCEDURE — 124N000004

## 2022-05-28 PROCEDURE — 124N000001 HC R&B MH

## 2022-05-28 PROCEDURE — 250N000013 HC RX MED GY IP 250 OP 250 PS 637: Performed by: NURSE PRACTITIONER

## 2022-05-28 RX ORDER — HYDROXYZINE HYDROCHLORIDE 25 MG/1
25 TABLET, FILM COATED ORAL EVERY 4 HOURS PRN
Status: DISCONTINUED | OUTPATIENT
Start: 2022-05-28 | End: 2022-07-14 | Stop reason: HOSPADM

## 2022-05-28 RX ORDER — LORAZEPAM 2 MG/ML
2 INJECTION INTRAMUSCULAR EVERY 8 HOURS PRN
Status: DISCONTINUED | OUTPATIENT
Start: 2022-05-28 | End: 2022-05-30

## 2022-05-28 RX ORDER — DIPHENHYDRAMINE HYDROCHLORIDE 50 MG/ML
50 INJECTION INTRAMUSCULAR; INTRAVENOUS EVERY 8 HOURS PRN
Status: DISCONTINUED | OUTPATIENT
Start: 2022-05-28 | End: 2022-05-30

## 2022-05-28 RX ORDER — HALOPERIDOL 5 MG/1
5 TABLET ORAL EVERY 8 HOURS PRN
Status: DISCONTINUED | OUTPATIENT
Start: 2022-05-28 | End: 2022-05-30

## 2022-05-28 RX ORDER — HALOPERIDOL 5 MG/ML
5 INJECTION INTRAMUSCULAR ONCE
Status: COMPLETED | OUTPATIENT
Start: 2022-05-28 | End: 2022-05-28

## 2022-05-28 RX ORDER — HALOPERIDOL 5 MG/ML
5 INJECTION INTRAMUSCULAR EVERY 8 HOURS PRN
Status: DISCONTINUED | OUTPATIENT
Start: 2022-05-28 | End: 2022-05-30

## 2022-05-28 RX ORDER — DIPHENHYDRAMINE HCL 50 MG
50 CAPSULE ORAL EVERY 8 HOURS PRN
Status: DISCONTINUED | OUTPATIENT
Start: 2022-05-28 | End: 2022-05-30

## 2022-05-28 RX ORDER — DIVALPROEX SODIUM 500 MG/1
500 TABLET, EXTENDED RELEASE ORAL DAILY
Status: DISCONTINUED | OUTPATIENT
Start: 2022-05-29 | End: 2022-05-30

## 2022-05-28 RX ORDER — PALIPERIDONE 3 MG/1
3 TABLET, EXTENDED RELEASE ORAL DAILY
Status: DISCONTINUED | OUTPATIENT
Start: 2022-05-28 | End: 2022-06-06

## 2022-05-28 RX ORDER — TRAZODONE HYDROCHLORIDE 50 MG/1
50 TABLET, FILM COATED ORAL
Status: DISCONTINUED | OUTPATIENT
Start: 2022-05-28 | End: 2022-07-14 | Stop reason: HOSPADM

## 2022-05-28 RX ORDER — OLANZAPINE 10 MG/2ML
10 INJECTION, POWDER, FOR SOLUTION INTRAMUSCULAR 3 TIMES DAILY PRN
Status: DISCONTINUED | OUTPATIENT
Start: 2022-05-28 | End: 2022-07-14 | Stop reason: HOSPADM

## 2022-05-28 RX ORDER — LORAZEPAM 1 MG/1
2 TABLET ORAL EVERY 8 HOURS PRN
Status: DISCONTINUED | OUTPATIENT
Start: 2022-05-28 | End: 2022-05-30

## 2022-05-28 RX ORDER — ACETAMINOPHEN 325 MG/1
650 TABLET ORAL EVERY 4 HOURS PRN
Status: DISCONTINUED | OUTPATIENT
Start: 2022-05-28 | End: 2022-07-14 | Stop reason: HOSPADM

## 2022-05-28 RX ORDER — OLANZAPINE 10 MG/1
10 TABLET ORAL 3 TIMES DAILY PRN
Status: DISCONTINUED | OUTPATIENT
Start: 2022-05-28 | End: 2022-07-14 | Stop reason: HOSPADM

## 2022-05-28 RX ORDER — DIVALPROEX SODIUM 250 MG/1
250 TABLET, EXTENDED RELEASE ORAL ONCE
Status: DISCONTINUED | OUTPATIENT
Start: 2022-05-28 | End: 2022-06-08

## 2022-05-28 RX ADMIN — LORAZEPAM 2 MG: 2 INJECTION INTRAMUSCULAR; INTRAVENOUS at 17:29

## 2022-05-28 RX ADMIN — HALOPERIDOL LACTATE 5 MG: 5 INJECTION, SOLUTION INTRAMUSCULAR at 17:29

## 2022-05-28 RX ADMIN — DIPHENHYDRAMINE HYDROCHLORIDE 50 MG: 50 INJECTION INTRAMUSCULAR; INTRAVENOUS at 17:29

## 2022-05-28 RX ADMIN — HALOPERIDOL 5 MG: 5 TABLET ORAL at 17:05

## 2022-05-28 ASSESSMENT — ACTIVITIES OF DAILY LIVING (ADL)
CONCENTRATING,_REMEMBERING_OR_MAKING_DECISIONS_DIFFICULTY: NO
NUMBER_OF_TIMES_PATIENT_HAS_FALLEN_WITHIN_LAST_SIX_MONTHS: 0
DIFFICULTY_EATING/SWALLOWING: NO
TOILETING_ISSUES: NO
WALKING_OR_CLIMBING_STAIRS_DIFFICULTY: NO
CHANGE_IN_FUNCTIONAL_STATUS_SINCE_ONSET_OF_CURRENT_ILLNESS/INJURY: NO
DRESSING/BATHING_DIFFICULTY: NO
DOING_ERRANDS_INDEPENDENTLY_DIFFICULTY: NO
WEAR_GLASSES_OR_BLIND: NO
FALL_HISTORY_WITHIN_LAST_SIX_MONTHS: NO

## 2022-05-28 NOTE — ED NOTES
Pt was accepted at [facility] in [place]. Pt was informed. Transfer process has been initiated. Pt report was given to RODDY Ramirez at [facility]. Awaiting on transport to arrive.

## 2022-05-28 NOTE — PROGRESS NOTES
[phone] pt returned your call   Per ED report: Pt BIB EMS. Per EMS report, [place] by a  and brought to a residence in [place]. Pt does not live in [place]. Patient talking to reta and rome peñaloza upon arrival and pointing across room.   Upon arrival to [facility] pt is calm and cooperative. Pt could be seen laughing to himself inappropriately. Pt denies AV/H but he appears to be responding to internal stimuli. Pt's response to questions is delayed. He denies SI or HI.   INursing and risk assessments completed. Assessments reviewed with LMHP and physician. Video monitoring in progress, patient informed.  Admission information reviewed with patient. Patient given a tour of [facility] and instructions on using the facility. Questions regarding [facility] addressed. Pt search completed and belongings inventoried.

## 2022-05-28 NOTE — ED NOTES
EMS arrived and transported patient out of the unit. All belongings were returned to the patient. Patient was cooperative with the process.

## 2022-05-28 NOTE — TREATMENT PLAN
[facility] Treatment Plan    Client's Name: Junior Fernández  YOB: 2000    DSM-5 Diagnoses: F25.0    Psychosocial / Contextual Factors: Patient presented to the [facility] unit with the following concerns: Per EMS report, [place] by a  and brought to a residence in [place]. Pt does not live in [place]. Patient talking to reta and rome peñaloza upon arrival and pointing across room.   Upon arrival to [facility] pt is calm and cooperative. Pt could be seen laughing to himself inappropriately. Pt denies AV/H but he appears to be responding to internal stimuli. Pt's response to questions is delayed. He denies SI or HI.     Anticipated number of sessions or this episode of care: 1-4    MeasurableTreatment Goal(s) related to diagnosis / functional impairment(s)    Goal 1: Patient will increase insight into psychotic symptoms    Objective #A     Patient will identify internal and environmental triggers of psychotic symptoms  Status: New as of May 27, 2022    Intervention(s)  LMHP will assist pt in identifying specific behaviors, situations, thoughts, and feelings associated with symptoms exacerbations    Objective #B  Patient will identify at least 1 coping skill to use in response to triggers  Status: New as of May 27, 2022    Intervention(s)  LMHP will guide pt in structured discussion and provide psychoeducation              Affect: Flat   Appearance: Appropriate    Attention Span/Concentration: Attentive  Eye Contact: Variable   Fund of Knowledge: Delayed    Language /Speech Content: Fluent   Language /Speech Volume: Normal    Language /Speech Rate/Productions: Normal    Recent Memory: Variable   Remote Memory: Variable   Mood: Apathetic    Orientation to Person: Yes    Orientation to Place: Yes   Orientation to Time of Day: Yes    Orientation to Date: Yes    Situation (Do they understand why they are here?): Yes    Psychomotor Behavior:  Underactive    Thought Content: Paranoia   Thought Form: Intact           PLAN:   Patient will board in [facility] until patient and treatment deem it appropriate to either discharge or admit to a higher level of care. Details: pt is currently on the waitlist for inpatient      DEBORAH Ferrell, MGC                                                           ________

## 2022-05-28 NOTE — PLAN OF CARE
Problem: Behavioral Health Plan of Care  Goal: Patient-Specific Goal (Individualization)  Description: Pt. Will consume >50% of meals provided     Pt. Will sleep 4-6 Hours Nightly     Pt. Will attempt groups daily   5/28/2022 1437 by Hilda Carmona RN  Outcome: Ongoing, Progressing  5/28/2022 1221 by Hilda Carmona RN  Outcome: Ongoing, Progressing  5/28/2022 1209 by Hilda Carmona RN  Outcome: Ongoing, Progressing     Problem: Thought Process Alteration  Goal: Optimal Thought Clarity  Description: Pt will demonstrate Optimal Thought Clarity before discharge     Pt. Will establish and maintain linear conversations with staff  5/28/2022 1437 by Hilda Carmona RN  Outcome: Ongoing, Progressing  5/28/2022 1209 by Hilda Carmona RN  Note:   Pt. Will demonstrate Optimal Thought Clarity before discharge.     Pt. Will maintain linear conversations with staff  5/28/2022 1152 by Hilda Carmona RN  Note: Pt. Will maintain linear with staff.              Pt. Admitted via co-nurse.    Pt. Remaining isolated in own room/hallway watching TV.  Pt. Somber.      Face to face report will be communicated to oncoming RODDY.    Hilda Carmona RN  5/28/2022  2:38 PM

## 2022-05-28 NOTE — H&P
[facility] PSYCHIATRY   HISTORY AND PHYSICAL     ADMISSION DATA     Junior Fernández MRN# [id number]   Age: 21 year old YOB: 2000     Date of Admission: 5/28/2022  Primary Physician: Lupe Walsh        CHIEF COMPLAINT   None- pt just looks staff at [facility]       HISTORY OF PRESENT ILLNESS   Per EMS [place]- [place] a  and brought to a residence in [place]. Pt does not live in [place].     'I went for a long walk and left my treatment facility.' He states that he ended up at a house. He provided an address of [address] to the  that picked him up on the side of the road. At first, pt stated that he knew the house he was going to, and then stated he doesn't know who lives at the house. He states that he laid down on the couch that was on the porch of this house. Others noticed him laying on the couch and the police got called. He states he was planning to get a ride back to the treatment program         PSYCHIATRIC HISTORY   Pt is currently under commitment    Pt was discharged from station 4A at [facility] two days ago and then went to [facility].   Per Crisis assessment- The following information was received from Van and Rolanda Fernández whose relationship to the patient is parents. Information was obtained in person. Their phone number is Rolanda ([phone]), Van ([phone]) and they last had contact with patient on 5/27/22. I spoke to Mother Rolanda this afternoon and let her know Cam is safe, doing well, and medication plan.  What happened yesterday: Per mom, yesterday in the am he was at [facility]. He went for a bike ride by himself, even though he wasn't supposed to leave the facility for the first 10 days by himself. When he got back to [facility], he said he was afraid he was going to be tortured there and that one of the staff was his brother. Around 3:30 the counselor called mom and said  that he left again and asked for Cam's cell number. They made contact with him and he was making non sensical statements. Mom states that it sounded like someone was with him trying to help him get back to [facility] or [facility] and then Cam's phone . He was telling dad random things and not making sense. He was saying an address, [address].      When dad got here at the [facility], he was okay to see him. Told dad, 'you and I are God.' He thought Prince Coyle was talking to him. Asking dad, 'Do you think I'm crazy.'     Mother reports this is his 5th hospitalization in 6 months. He was willing to go [facility]. They state that Cam isn't sure about being on medications. Mother reported that Cam can be at [facility] for up to 90 days.     The patient has been hospitalized in 2021 and 10/2021 for psychosis. EMR noted that he had a suicide attempt by cutting his left arm in the bathtub in October. The patient has a history of commitment starting in 2021 and ending on 2022.  Commitment was extended.     [facility]  : MsTerra Theresa Kowalski, another [facility]  is Brianna James.   Medication management : with Lupe Roberts at [facility] in [place]  Therapist: Case Soliman at [facility] in [place].     No ARMHS or CTSS  No ACT team.    Cam wants to return to [facility], states that he likes it there. He likes his independence.     SUBSTANCE USE HISTORY   History   Drug Use Unknown     Comment: Used marijuanna 3 days ago, cocaine in [place], vyvanse 3 days ago      SUBSTANCE ABUSE HISTORY:  U-tox is negative.  The patient reports substance abuse history of alcohol and cannabis.       History   Smoking Status     Never Smoker   Smokeless Tobacco     Never Used            SOCIAL HISTORY   Social History     Socioeconomic History     Marital status: Single     Spouse name: n/a     Number of children: none     Years of education: 12     Highest education level: 1 year beyond HS  "  Occupational History     unemployed   Tobacco Use     Smoking status: Never Smoker     Smokeless tobacco: Never Used   Substance and Sexual Activity     Alcohol use: Not Currently     Comment: 3-4 days ago     Drug use: Not Currently     Types: Marijuana, \"Crack\" cocaine     Comment: Used marijuanna 3 days ago, cocaine in [place], vyvanse 3 days ago      Sexual activity: Yes     Partners: Female   Other Topics Concern     Not on file   Social History Narrative    Completed first year at [employer] 2020-Spring 2021    Taking a break [phone]    Living at [facility], parents unable to keep him at home.     Social Determinants of Health     Financial Resource Strain: No   Food Insecurity: No   Transportation Needs: yes-family is involved   Physical Activity: likes to ride bike   Stress: Pt is struggling with acceptance of his mental health   Social Connections: No   Intimate Partner Violence: No   Housing Stability: no-needing a place to live which supports his mental health needs.            FAMILY HISTORY   Family History   Problem Relation Age of Onset     Anxiety Disorder Maternal Grandmother      Depression Maternal Grandmother      Hypertension Maternal Grandmother      Cerebrovascular Disease Maternal Grandmother      Other - See Comments Mother         on Celexa     Hyperthyroidism Father         Rx'd with methimazole. Father's side with mild allergy/asthma issues     Hyperlipidemia Father      Anxiety Disorder Brother         Stuttering and tics     Lymphoma Maternal Grandfather          from Lymphoma     Other - See Comments Paternal Grandmother         vaso-vagal syncope     Other - See Comments Paternal Grandfather          from kidney/liver CA; CAD and had pacemaker     Heart Disease Paternal Grandfather 65     Arrhythmia No family hx of          PAST MEDICAL HISTORY   Past Medical History:   Diagnosis Date     Anisometropia      Anxiety      Depression      Elevated " blood pressure reading without diagnosis of hypertension      Fracture 07/01/2003    Left clavicle     Fracture 04/01/2005    Left Monte[place]ia     History of substance abuse (H) 11/23/2016    THC, ETOH, stimulants     History of suicide attempt     10/2021 laceration of left arm     Multiple nevi 10/14/2015     Other insomnia      Pneumonia 03/01/2003    RUL     RAD (reactive airway disease)     outgrown     SARS (severe acute respiratory syndrome)      Sinus tachycardia        Past Surgical History:   Procedure Laterality Date     CIRCUMCISION,OTHER,<28 D/O       FRACTURE SURGERY  04/01/2005    Left Monteggia     HC TOOTH EXTRACTION W/FORCEP       REPAIR ARTERY BRACHIAL Left 10/2021       Seasonal allergies and Seroquel [quetiapine]     MEDICATIONS   Lexapro 20 mg daily  Melatonin 10 mg at hs  Invega Sustenna 234 mg IM  Invega 3 mg daily  Cholecalciferol 50 mcg daily      The patient had breakthrough symptoms after being on Invega Sustenna long-acting injectable at 156 mg.  Patient received of 234 mg Invega Sustenna on 4/4/2022 and 156 mg 7 days later (4/11) for loading doses.   The patient received Invega Sustenna 156  mgin November. Maintenance dose of Invega Sustenna  156 mg  05/09/2022.          PHYSICAL EXAM/ROS     I have reviewed the physical exam as documented by the medical team and agree with findings and assessment and have no additional findings to add at this time. The review of systems is negative other than noted in the HPI.       LABS   Recent Results (from the past 24 hour(s))   Extra Red Top Tube    Collection Time: 05/27/22  6:37 PM   Result Value Ref Range    Hold Specimen JIC    Extra Green Top (Lithium Heparin) Tube    Collection Time: 05/27/22  6:37 PM   Result Value Ref Range    Hold Specimen JIC    Extra Purple Top Tube    Collection Time: 05/27/22  6:37 PM   Result Value Ref Range    Hold Specimen JIC    Asymptomatic COVID-19 Virus (Coronavirus) by PCR Nasopharyngeal    Collection Time:  05/27/22  7:40 PM    Specimen: Nasopharyngeal; Swab   Result Value Ref Range    SARS CoV2 PCR Negative Negative   Drug abuse screen 77 urine (FL, RH, SH)    Collection Time: 05/27/22  7:40 PM   Result Value Ref Range    Amphetamines Urine Screen Negative Screen Negative    Barbiturates Urine Screen Negative Screen Negative    Benzodiazepines Urine Screen Negative Screen Negative    Cannabinoids Urine Screen Negative Screen Negative    Cocaine Urine Screen Negative Screen Negative    Opiates Urine Screen Negative Screen Negative    PCP Urine Screen Negative Screen Negative         MENTAL STATUS EXAM   Vitals: /95   Pulse 95   Resp 14   SpO2 96%     Appearance:  awake, alert, dressed in hospital scrubs and appeared as age stated  Attitude:  cooperative as he is able  Eye Contact:  intense  Mood:  anxious  Affect:  labile  Speech:  increased speech latency-often could not answer question and would just laugh  Psychomotor Behavior:  no evidence of tardive dyskinesia, dystonia, or tics  Thought Process:  disorganized and illogical  Associations:  loosening of associations present  Thought Content:  no evidence of suicidal ideation or homicidal ideation, visual hallucinations present and patient appears to be responding to internal stimuli  Insight:  none  Judgment:  poor  Oriented to:  self only  Attention Span and Concentration:  poor  Recent and Remote Memory:  variable, was able to tell us fathers phone #.  Language: Unable to name objects, Unable to repeat phrases and Unable to read and write at this time  Fund of Knowledge: unable to accurately assess  Muscle Strength and Tone: normal  Gait and Station: Normal       ASSESSMENT     This is a 21 year old male with a PMH of schizoaffective disorder, bipolar type who is currently under civil commitment and living at [facility]. Pt left the facility on 5.27.22 and was later picked up while he was walking on the highway. He was brought to a home where someone  called EMS and pt was transported to the ED where he was evaluated. It was determined that pt requires inpatient treatment and stabilization for his safety.    Pt's current diagnosis of schizoaffective disorder bipolar type is managed with neuroleptics and a selective serotonin reuptake inhibitor. Will discontinue pts selective serotonin reuptake inhibitor as this may be contributing to his behavior. Will  add a mood stabilizer, Depakote, discussed with mother who is in agreement. No changes in Invega at this time.         DIAGNOSIS     1.Schizoaffective disorder, Bipolar type   2.Under Civil Commitment and Nguyen         PLAN     Location: Unit 5  Legal Status: Orders Placed This Encounter      Court Hold    Safety Assessment:    Behavioral Orders   Procedures     Code 1 - Restrict to Unit     Routine Programming     As clinically indicated     Status 15     Every 15 minutes.      PTA medications held:   Lexapro 20 mg daily    PTA medications continued/changed:   Invega 3 mg daily    New medications initiated:   Depakote 250 mg tonight -> 500 mg on 5.29      Programming: Patient will be treated in a therapeutic milieu with appropriate individual and group therapies. Education will be provided on diagnoses, medications, and treatments.     Medical diagnoses:  Per medicine    Consult: not at this time  Tests: none    Anticipated LOS: 2-3 weeks-pt is under civil committment   Disposition: to be determined       ATTESTATION      SELINA Duarte CNP

## 2022-05-28 NOTE — PLAN OF CARE
ADMISSION NOTE    Reason for admission altered mental status.  Safety concerns History of violence.  Risk for or history of violence history of violence.   Full skin assessment: Patient has a 3 inch cut that is healing on the right fore arm and a 2 inch cut on the inside of the left forearm that is healing. No other open areas, wounds, or bruises noted.     Patient arrived on unit from [facility] accompanied by EMS and secuirty on 5/28/2022  10:43 PM.   Status on arrival: calm, cooperative, slow to respond, slightly confused.  /95   Pulse 95   Resp 14   SpO2 96%   Patient given tour of unit and Welcome to  unit papers given to patient, wanding completed, belongings inventoried, and admission assessment completed.   Patient's legal status on arrival is 72 hour hold. Appropriate legal rights discussed with and copy given to patient. Patient Bill of Rights discussed with and copy given to patient.   Patient denies SI, HI, and thoughts of self harm and contracts for safety while on unit.      Yury Andrade RN  5/28/2022  12:29 PM

## 2022-05-28 NOTE — SAFE
Junior Fernández  May 27, 2022  SAFE Note    Critical Safety Issues:       Current Suicidal Ideation/Self-Injurious Concerns/Methods: None - N/A      Current or Historical Inappropriate Sexual Behavior: No      Current or Historical Aggression/Homicidal Ideation: Impaired Self-Control      Triggers: unknown    Guardianship Status: is his own guardian.. Guardianship paperwork is in [place] / [employer] ACP tab.     This patient is a child/adolescent: No    This patient has additional special visitor precautions: No    Updated care team: Yes    For additional details see full LMHP assessment.       Glo Beverly, LPCC, LADC

## 2022-05-28 NOTE — PROGRESS NOTES
05/28/22 1118   Patient Belongings   Did you bring any home meds/supplements to the hospital?  No   Patient Belongings locker;sent to security per site process   Patient Belongings Put in Hospital Secure Location (Security or Locker, etc.) cell phone/electronics;clothing;shoes   Belongings Search Yes   Clothing Search Yes   Second Staff Yury RN   Comment white tennis shoes, white t-shirt, white socks, black shorts,plaid boxers, black face mask, tan hospital socks   List items sent to safe: black cell phone ( no cracks)    Addendum: Phone does have small cracks in corner    All other belongings put in assigned cubby in belongings room.       I have reviewed my belongings list on admission and verify that it is correct.     Patient signature_______________________________    Second staff witness (if patient unable to sign) ______________________________       I have received all my belongings at discharge.    Patient signature________________________________    Dai   5/28/2022  11:21 AM

## 2022-05-28 NOTE — CONSULTS
Diagnostic Evaluation Crisis   Assessment    Patient was assessed: in person  Patient location: [facility] ED   Was a release of information signed: Yes. Providers included on the release: Brianna James, ; [facility]; Lupe Walsh, psychiatrist; Steven Pina, therapist      Referral Data and Chief Complaint  Pacheco (Cam) is a 21 year old who uses he/him pronouns. presented to the ED via EMS and was referred to the ED by community provider(s). Patient is presenting to the ED for the following concerns:     Per EMS report, [place] by a  and brought to a residence in [place]. Pt does not live in [place]. Patient talking to reta and rome peñaloza upon arrival and pointing across room.   Upon arrival to [facility] pt is calm and cooperative. Pt could be seen laughing to himself inappropriately. Pt denies AV/H but he appears to be responding to internal stimuli. Pt's response to questions is delayed. He denies SI or HI.     Informed Consent and Assessment Methods     Patient is his own guardian. Writer met with patient and explained the crisis assessment process, including applicable information disclosures and limits to confidentiality, assessed understanding of the process, and obtained consent to proceed with the assessment. Patient was observed to be able to participate in the assessment as evidenced by acknowledged role of writer and purpose of assessment, engaged in answering assessment questions, and explored disposition plans.. Assessment methods included conducting a formal interview with patient, review of medical records, collaboration with medical staff, and obtaining relevant collateral information from family and community providers when available.     Summary of Patient Situation  What is the patient's presentation and history of crisis/emergency services, including frequency and prevalance of chief complaint  "resolution that mitigate arrival life threatening symptom reporting.    'I went for a long walk and left my treatment facility.' He states that he ended up at a house. He provided an address of [address] to the  that picked him up on the side of the road. At first, pt stated that he knew the house he was going to, and then stated he doesn't know who lives at the house. He states that he laid down on the couch that was on the porch of this house. Others noticed him laying on the couch and the police got called. He states he was planning to get a ride back to the treatment program.     Pt denies using any substances today.     Pt reports that he has not ever gone to someone else's house before. He isn't sure why he decided to do this today.     Pt is currently under commitment.     While in the ED, pt struggled with making sense and speaking in complete sentences. Per nursing note: Patient talking nonsensically to himself in the room. Unable to answer questions. Statements as follows: \"I drew Mars. You get this one.\" \"You play rivas. One gets cut in half. That would be funny. It's sarcastic, never get it. Autistic, its nothing. Cam, you're one of two gods, you wake up\".     During assessment, pt denied SI/HI/AH. Pt denied any current mental health symptoms. He appeared to answer in short, concise answers and did not want to talk about any mental health related questions. He appeared to have internal stimuli happening. His answers were delayed at times.     Pt was discharged from station 4A at [place] two days ago and then went to [facility]. Pt last had a crisis assessment completed on 5/6/22.     Information from assessment completed on 5/6/22:    Pt was brought in by the police after his parents, with whom he lives, called them because pt woke them up in the middle of the night asking if they had a gun. Parents contacted pt's  who recommended pt be seen and pt was put in a " "protective custody hold. Pt is currently under commitment. Pt was hospitalized at [facility] from 3/24/22-4/11/22 and pt admits that he has not taken his oral medication since then. Pt has a diagnosis of schizoaffective disorder, bipolar type. Pt does receive an Invega shot and he is due to get his next one in three days. Pt reported that he believes his father who he lives with is someone else and his real father is \"somewhere else on earth.\" Pt thought his father was going to hurt him last night because \"he looked mad.\" Pt reported that he believes he should stay at the hospital because \"God wants to punish me for the things I have done, by turning my back on him.\" Pt also believes that God wants pt to \"rip my tulio off or cut myself in half.\" When asked if pt would do those things, pt reported he would not cut himself in half but he might burn himself and he is \"most likely to cut or pull my tulio off.\" Pt's parents reported they want pt to be admitted because pt has been decopensating over the last couple of weeks and parents do not feel safe with him at home. Pt reported his coping skills are running and walking and jerking off and playing video games all day.      Brief Psychosocial History  Current living situation; brief family history; employment and income source;  status; hobbies; supports; relevant legal issues; cultural, Caodaism, or spiritual influences on mental health care     Current living situation: pt was discharged from inpatient on 4A and released 2 days ago. He went to [facility]. He would like to get back there. He likes his independence. Prior to this, pt lived with his parents    Employment: not employed    Hobbies: play video games, go for a run/walk, workout    Legal issues: He is his own guardian. He is currently on commitment.     Spiritual beliefs: no      status: none    Significant clinical changes since last assessment   Duration of the current crisis, coping " skills attempted to reduce the crisis and community resources used.     Duration: pt left [facility] today and was found wandering on the side of the road and then laid down on a couch at random persons home. The police were then called.     Coping skills: 'good at working out, keeping a cool demeanor'     Previous MH diagnoses or symptoms: 'not concerned about any right now'       Collateral Information    The following information was received from Van and Rolanda Fernández whose relationship to the patient is parents. Information was obtained in person. Their phone number is Rolanda ([phone]), Van ([phone]) and they last had contact with patient on 22.    What happened today: Per mom, this morning he was at [facility]. He went for a bike ride by himself, even though he wasn't supposed to leave the facility for the first 10 days by himself. When he got back to [facility], he said he was afraid he was going to be tortured there and that one of the staff was his brother. Around 3:30 the counselor called mom and said that he left again and asked for Cam's cell number. They made contact with him and he was making non sensical statements. Mom states that it sounded like someone was with him trying to help him get back to [facility] or [facility] and then Cam's phone . He was telling dad random things and not making sense. He was saying an address, [address].     When dad got here at the [facility], he was okay to see him. Told dad, 'you and I are God.' He thought Prince Coyle was talking to him. Asking dad, 'Do you think I'm crazy.'    Parents state this is his 5th hospitalization in 6 months. He was willing to go [facility]. They state that Cam isn't sure about being on medications. They state that Cam can be at [facility] for up to 90 days.       What is different about patient's functioning: [facility] Parents state that when he was discharged a few days ago  from inpatient, they noticed that he was still laughing at himself, however, trusted the doctors that he was safe to go.     Concern about alcohol/drug use: Unsure, Dad states that Cam told him he used but his UA came back clean.     What do you think the patient needs: They don't feel he stable enough to be discharged tonight. Mom states that [facility] would like him to stablize first and they will save a bed for him. Parents feel it would be helpful for him to go inpatient     Has patient made comments about wanting to kill themselves/others:  No    If d/c is recommended, can they take part in safety/aftercare planning: Yes parents state they will help in whatever way they can. They do not feel he is safe or stable to discharge this evening    Other information: NA      Risk Assessment     ESS-6  1.a. Over the past 2 weeks, have you had thoughts of killing yourself? No  1.b. Have you ever attempted to kill yourself and, if yes, when did this last happen? Yes September 2021 via cutting   2. Recent or current suicide plan? No   3. Recent or current intent to act on ideation? No  4. Lifetime psychiatric hospitalization? Yes  5. Pattern of excessive substance use? No  6. Current irritability, agitation, or aggression? No  Scoring note: BOTH 1a and 1b must be yes for it to score 1 point, if both are not yes it is zero. All others are 1 point per number. If all questions 1a/1b - 6 are no, risk is negligible. If one of 1a/1b is yes, then risk is mild. If either question 2 or 3, but not both, is yes, then risk is automatically moderate regardless of total score. If both 2 and 3 are yes, risk is automatically high regardless of total score.      Score: 1, moderate risk      Is the patient a vulnerable adult? Yes     Does the patient engage in non-suicidal self-injurious behavior (NSSI/SIB)? no     Does the patient have thoughts of harming others? No     Is the patient engaging in sexually inappropriate behavior?  no       Current Substance Abuse     Is there recent substance abuse? no Pt initially reported that he used cocaine, however, denied to writer that he used any substances.      Was a urine drug screen or blood alcohol level obtained: Yes negative      Mental Status Exam     Affect: Flat   Appearance: Appropriate    Attention Span/Concentration: Attentive  Eye Contact: Variable   Fund of Knowledge: Delayed    Language /Speech Content: Fluent   Language /Speech Volume: Normal    Language /Speech Rate/Productions: Normal    Recent Memory: Variable   Remote Memory: Variable   Mood: Apathetic    Orientation to Person: Yes    Orientation to Place: Yes   Orientation to Time of Day: Yes    Orientation to Date: Yes    Situation (Do they understand why they are here?): Yes    Psychomotor Behavior: Underactive    Thought Content: Paranoia   Thought Form: Intact      History of commitment: Yes pt is currently under commitment     Medication    Psychotropic medications: yes    No current facility-administered medications for this encounter.     Current Outpatient Medications   Medication     escitalopram (LEXAPRO) 20 MG tablet     Melatonin 10 MG TABS tablet     [START ON 6/13/2022] paliperidone (INVEGA SUSTENNA) 234 MG/1.5ML SEPIDEH     paliperidone ER (INVEGA) 3 MG 24 hr tablet     cholecalciferol 50 MCG (2000 UT) tablet        Current Care Team    Primary Care Provider: No  Psychiatrist: Yes. Name: Lupe Walsh. Location: [facility]. Date of last visit: over a month ago. Frequency: once per month. Perceived helpfulness: yes.  Therapist: Yes. Name: Steven Pina. Location: [facility]. Date of last visit: over a month ago. Frequency: once every 2 weeks. Perceived helpfulness: yes.  : Yes. Name: Brianna James. Location: unsure. Date of last visit: unsure. Frequency: unsure. Perceived helpfulness: yes. Brianna's phone number is: [phone].     CTSS or ARMHS: No  ACT Team: No  Other: No      Diagnosis    Schizoaffective disorder, Bipolar type F25.0     Disposition    Recommended disposition: Inpatient Mental Health       Reviewed case and recommendations with attending provider. Attending Name: Dr. Lisha Cates        Attending concurs with disposition: Yes       Patient concurs with disposition: Yes       Guardian concurs with disposition: NA      Final disposition: Inpatient mental health . Pt is currently on observation on the [facility] unit.     Inpatient Details (if applicable):  Is patient admitted voluntarily:No, 72 hr hold. Hold start date/time: 5/27      Patient aware of potential for transfer if there is not appropriate placement? Yes       Patient is willing to travel outside of the [facility] for placement? No      Behavioral Intake Notified? Yes: Date: 5/27/22 Time:  10:10pm.     Outpatient Details (if applicable):   Aftercare plan and appointments placed in the AVS and provided to patient: No. Rationale: will be provided at time of discharge      Clinical Substantiation of Recommendations   Pt was assessed in the [facility] ED. He now is on [facility] unit to await inpatient bed. Per EMS report, [place] a  and brought to a residence in [place]. Pt does not live in [place]. Patient talking to reta and rome peñaloza upon arrival and pointing across room. Pt has a history of hospitalizations, most recently was discharged from inpatient two days ago. Pt is agreeable to inpatient recommendation. Pt will be on observation status at this time on the [facility] unit.   Assessment Details    Patient interview started at: 8:48pm and completed at: 9:50pm.     Total duration spent on the patient case in minutes: 1.25 hrs      CPT code(s) utilized: 61101 - Psychotherapy for Crisis - 60 (30-74*) sina Beverly, [facility], LADC  Psychotherapist, Behavioral Healthcare Providers - Triage & Transition Services

## 2022-05-28 NOTE — TELEPHONE ENCOUNTER
S [facility] ER, may transfer to [facility]    CHRISTIANO Recent discharge form 4A to [facility]. Left [facility], found [place] [address], went to a strangers house and fell asleep on their porch; police called and brought him to ER. Talking to self, making nonsensical statements, IPMH needed to further stabilize. MH DX of schizoffective, civil commitment with Nguyen order of injections of Invega. Most recent shot on 5/10 and possible booster shot. No SI or HI, no aggression. No major medical concerns. Ambulatory, eating, drinking.     A 72HH    R In [facility] ER awaiting placement  Patient cleared and ready for behavioral bed placement: Yes

## 2022-05-28 NOTE — PLAN OF CARE
"  Problem: Behavioral Health Plan of Care  Goal: Patient-Specific Goal (Individualization)  Description: Pt. Will consume >50% of meals provided     Pt. Will sleep 4-6 Hours Nightly     Pt. Will attempt groups daily when able     5/28/22- PT no wean due to disorganized behavior, treatment team to assess daily   Outcome: Ongoing, Progressing     Problem: Thought Process Alteration  Goal: Optimal Thought Clarity  Description: Pt will demonstrate Optimal Thought Clarity before discharge     Pt. Will establish and maintain linear conversations with staff  Outcome: Ongoing, Progressing   Goal Outcome Evaluation:           PT in room, has a very intense stare and delay in conversation.   PT is not oriented to place or time or situation. He make odd statements to me.   PT presents to me as labile and disoriented, confused and unpredictable.   PT mumbling words, stating to me he is confused, also makes very calm threatening like statements, \"does that make you happy what you do to me.\"  \"I am very anxious and scared of torture.\"  \"Its bad for one, its better for the rest.\"  \"I really want you to be Cam\"  \"Margaret, I want you to meet her\"  \"I hope it makes you happy\"    Pt reported Xanax helps for his anxiety and requests some.        1700 PT entered onto open unit after housekeeping left open MHICU door, PT disoriented and confused and headed toward the exit but was able to be redirected back to room with his dinner tray.   During this time patient was mumbling \"women, machines, machine, woman, women, machines, machines, machines....\" He expressed to us that he was very fearful and anxious and scared of torture and that we were all machines and not real.     Attempted to administer oral medication. PT only took Halidol 5mg and refused the remainder. PT then layed down on the MHICU floor for approximately 3 minutes.     1716 Code green was called and due to staff that arrived a code 21 was called at 1721 for more personnel. "   Provider called and gave order to give and additional halidol 5mg IM with ativan 2mg and benadryl 50 IM.   1730 We enter patients room, PT was compliant with administration of IM's to each glutteal, tolerated well.   PT remained in bed asleep throughout the remainder of shift. Regular respirations noted.     HS medications not given due to patient sleeping.       HX shows Substance Use treatment at [facility]  Chart history shows patient was committed until May 13th 2022 with lopez.   Has a past suicide attempt by laceration and was found in tub in hemorraghic shock,  2019 PT at [facility] for treatment.   Has a history of cocaine and THC use.   2/4/19- Into ED after destroying property at his parents house  03/23/22-into ED after becoming aggressive with parents and paranoid behavior  05/06/22-Into ED after asking parents where their guns were to harm himself.     HX dystonia with halidol use, provider Pastora notified of this, no s/s of this after administration.Will continue to monitor.     Mom called and reported he has made attempts to go to school at [facility], then in [place], both times unable to continue, due to substance use and manic like behaviors.    Face to face end of shift report communicated to oncoming RN    May Gonzalez RN  5/28/2022  11:26 PM

## 2022-05-29 PROCEDURE — 250N000013 HC RX MED GY IP 250 OP 250 PS 637: Performed by: NURSE PRACTITIONER

## 2022-05-29 PROCEDURE — 124N000001 HC R&B MH

## 2022-05-29 PROCEDURE — 124N000004

## 2022-05-29 PROCEDURE — 99233 SBSQ HOSP IP/OBS HIGH 50: CPT | Performed by: NURSE PRACTITIONER

## 2022-05-29 RX ADMIN — DIVALPROEX SODIUM 500 MG: 500 TABLET, FILM COATED, EXTENDED RELEASE ORAL at 20:13

## 2022-05-29 RX ADMIN — PALIPERIDONE 3 MG: 3 TABLET, EXTENDED RELEASE ORAL at 08:07

## 2022-05-29 RX ADMIN — OLANZAPINE 10 MG: 10 TABLET, FILM COATED ORAL at 10:47

## 2022-05-29 RX ADMIN — OLANZAPINE 10 MG: 10 TABLET, FILM COATED ORAL at 20:13

## 2022-05-29 NOTE — PROGRESS NOTES
[facility] [place] PSYCHIATRY  PROGRESS NOTE     SUBJECTIVE      Flynn is a 20 yo male under civil commitment and lopez admitted to our facility after he left [facility] in the [facility]. His time on the unit thus far has been in the MICU as he remains disorganized. He answered 2 questions for me this am spontaneously then became delayed in response. He spoke of being frightened he would be tortured her, provided reassurance. He denies AH/ endorses VH- does not want to speak about these are they are too horrible. Offered oral Olanzapine which pt accepted. Is cooperative, often appears tense. Dad was in to visit today, he will be coming tomorrow also-he is fishing about 1 hour away [place]        Discussed w/ team moving out of the MICU, Flynn was beginning to feel frightened in the back alone. He appears more comfortable out on the open unit. Was playing cards with his father, conversing.         MEDICATIONS   Scheduled Meds:    divalproex sodium extended-release  250 mg Oral Once     divalproex sodium extended-release  500 mg Oral Daily     paliperidone  3 mg Oral Daily     PRN Meds:.acetaminophen, haloperidol **AND** LORazepam **AND** diphenhydrAMINE, haloperidol lactate **AND** LORazepam **AND** diphenhydrAMINE, hydrOXYzine, OLANZapine **OR** OLANZapine, traZODone     ALLERGIES   Allergies   Allergen Reactions     Seasonal Allergies      Seroquel [Quetiapine]      Fainting and slowed breathing         MENTAL STATUS EXAM   Vitals: /82   Pulse 116   Temp 99.6  F (37.6  C) (Tympanic)   Resp 14   SpO2 97%     Appearance:  adequately groomed and dressed in hospital scrubs  Attitude:  cooperative and  then becomes guarded  Eye Contact:  intense  Mood:  anxious and sad   Affect:  intensity is blunted  Speech:  increased speech latency  Psychomotor Behavior:  no evidence of tardive dyskinesia, dystonia, or tics  Thought Process:  disorganized and illogical-although improving for  small periods of time  Associations:  loosening of associations present  Thought Content:  no evidence of suicidal ideation or homicidal ideation, visual hallucinations present and patient appears to be responding to internal stimuli  Insight:  limited  Judgment:  limited  Oriented to:  person  Attention Span and Concentration:  limited  Recent and Remote Memory:  poor  Language: Able to name objects  Fund of Knowledge: appropriate  Muscle Strength and Tone: normal  Gait and Station: Normal       LABS   No results found for this or any previous visit (from the past 24 hour(s)).      IMPRESSION   Pt is currently under commitment     Pt was discharged from station 4A at [place] two days ago and then went to [facility].   Per Crisis assessment- The following information was received from Van and Rolanda Fernández whose relationship to the patient is parents. Information was obtained in person. Their phone number is Rolanda ([phone]), Van ([phone]) and they last had contact with patient on 22.   What happened yesterday: Per mom, yesterday in the am he was at [facility]. He went for a bike ride by himself, even though he wasn't supposed to leave the facility for the first 10 days by himself. When he got back to [facility], he said he was afraid he was going to be tortured there and that one of the staff was his brother. Around 3:30 the counselor called mom and said that he left again and asked for Cam's cell number. They made contact with him and he was making non sensical statements. Mom states that it sounded like someone was with him trying to help him get back to [facility] or [facility] and then Cam's phone . He was telling dad random things and not making sense. He was saying an address, [address].      When dad got here at the [facility], he was okay to see him. Told dad, 'you and I are God.' He thought Prince Coyle was talking to him. Asking dad, 'Do you think I'm crazy.'     Mother  reports this is his 5th hospitalization in 6 months. He was willing to go [facility]. They state that Cam isn't sure about being on medications. Mother reported that Cam can be at [facility] for up to 90 days.      The patient has been hospitalized in 09/2021 and 10/2021 for psychosis. EMR noted that he had a suicide attempt by cutting his left arm in the bathtub in October. The patient has a history of commitment starting in 11/2021 and ending on 05/13/2022.  Commitment was extended.             DIAGNOSES     1.Schizoaffective disorder, Bipolar type   2.Under Civil Commitment and Nguyen         PLAN     Location: Unit 5  Legal Status: Orders Placed This Encounter      Court Hold    Safety Assessment:    Behavioral Orders   Procedures     Code 1 - Restrict to Unit     Routine Programming     As clinically indicated     Status 15     Every 15 minutes.      PTA medications held:   Lexapro 20 mg daily      PTA medications continued/changed:   Continue Invega 3 mg daily      New medications tried and stopped:     -None    New medications initiated:   Depakote 250 mg tonight -> 500 mg on 5.29      Today's Changes:  None    Programming: Patient will be treated in a therapeutic milieu with appropriate individual and group therapies. Education will be provided on diagnoses, medications, and treatments.     Medical diagnoses:  Per medicine    Consult: None  Tests: None    Anticipated LOS: 2-3 weeks-pt is under Civil Committment  Disposition: to be determined       TREATMENT TEAM CARE PLAN     Progress: Continued symptoms.    Continued Stay Criteria/Rationale: Continued symptoms without sufficient improvement/resolution.    Medical/Physical: See above.    Precautions: See above.     Plan: Continue inpatient care with unit support and medication management.    Rationale for change in precautions or plan: NA due to no change.    Participants: SELINA Duarte CNP, Nursing, SW, OT.    The patient's care was discussed with  the treatment team and chart notes were reviewed.       ATTESTATION      SELINA Duarte CNP

## 2022-05-29 NOTE — PLAN OF CARE
Face to face shift report received from Nurse. Rounding completed, pt observed.           Problem: Behavioral Health Plan of Care  Goal: Patient-Specific Goal (Individualization)  Description: Pt. Will consume >50% of meals provided     Pt. Will sleep 4-6 Hours Nightly     Pt. Will attempt groups daily when able     5/28/22- PT no wean due to disorganized behavior, treatment team to assess daily   Outcome: Ongoing, Progressing     Problem: Thought Process Alteration  Goal: Optimal Thought Clarity  Description: Pt will demonstrate Optimal Thought Clarity before discharge     Pt. Will establish and maintain linear conversations with staff  Outcome: Ongoing, Not Progressing        Observed Pt. Laying in bed / Eyes Closed / Noted Breathing without difficulty.      Pt. Up walking MHICU.     Zyprexa as ordered  Administered at 10.47 to Pt./ agitation and anxiety, VH.     Pt. Tends to stare intensely before he answers questions.   Pt. Guarded and delayed when answering questions. Pt. Is polite but seems frightened about the situation he is in. Pt. Seems to not fully understand why he was admitted on this unit.     Approximately 3652-1242 Pt. Meeting with parent today (father).  Pt. Father reports Pt. A lot different from last fall until now. Father reports Pt. Is normally isolative. However Pt. has been more withdrawn socially and has had more bouts of what the father stated as Marixa. Father also described Pt. Behavior has been getting worse but has improved at times.  Father reports Pt. Is highly intelligent. He is concerned about his son and wanting him to improve.    Pt. Moved to the open unit this shift.     Pt. mainly om own room has not attended any groups so far this shift but he is consuming meals.      Face to face report will be communicated to oncgus PEREYRA.    Hilda Carmona RN  5/29/2022  2:29 PM

## 2022-05-29 NOTE — PLAN OF CARE
Problem: Behavioral Health Plan of Care  Goal: Patient-Specific Goal (Individualization)  Description: Pt. Will consume >50% of meals provided     Pt. Will sleep 4-6 Hours Nightly     Pt. Will attempt groups daily when able     5/28/22- PT no wean due to disorganized behavior, treatment team to assess daily   Outcome: Ongoing, Not Progressing  Note: Report received from Kym Rounding complete. Pt observed sleeping in right side lying position with regular and unlabored respirations. Pt has been observed awake at several checks but just stares at you intensely when asked questions and then rolls back over without answering.    Pt has been in bed with eyes closed and regular respirations. 15 minute and PRN checks all night. No complaints offered. Will continue to monitor.    Pt slept approx  9  hours this NOC shift.    Face to face end of shift report communicated to oncoming RN.    Danica NOLASCO RN  May 29, 2022  4:13 AM          Problem: Thought Process Alteration  Goal: Optimal Thought Clarity  Description: Pt will demonstrate Optimal Thought Clarity before discharge     Pt. Will establish and maintain linear conversations with staff  Outcome: Ongoing, Not Progressing  Note: Unable to assess. Pt will not speak to staff when asked questions    Update 0600- Pt appears more cognizant this morning and answers questions appropriately although giggles a lot. He is pleasant and cooperative and has no s/s dystonia, cogwheeling, or other adverse reactions.    Goal Outcome Evaluation:

## 2022-05-30 PROCEDURE — 250N000013 HC RX MED GY IP 250 OP 250 PS 637: Performed by: NURSE PRACTITIONER

## 2022-05-30 PROCEDURE — 250N000011 HC RX IP 250 OP 636: Performed by: NURSE PRACTITIONER

## 2022-05-30 PROCEDURE — 99233 SBSQ HOSP IP/OBS HIGH 50: CPT | Performed by: NURSE PRACTITIONER

## 2022-05-30 PROCEDURE — 124N000001 HC R&B MH

## 2022-05-30 PROCEDURE — 124N000004

## 2022-05-30 RX ORDER — DIVALPROEX SODIUM 500 MG/1
1000 TABLET, EXTENDED RELEASE ORAL DAILY
Status: DISCONTINUED | OUTPATIENT
Start: 2022-05-30 | End: 2022-06-03

## 2022-05-30 RX ORDER — LORAZEPAM 1 MG/1
2 TABLET ORAL EVERY 6 HOURS PRN
Status: DISCONTINUED | OUTPATIENT
Start: 2022-05-30 | End: 2022-07-04

## 2022-05-30 RX ORDER — HALOPERIDOL 5 MG/ML
10 INJECTION INTRAMUSCULAR EVERY 6 HOURS PRN
Status: DISCONTINUED | OUTPATIENT
Start: 2022-05-30 | End: 2022-07-04

## 2022-05-30 RX ORDER — HALOPERIDOL 5 MG/1
10 TABLET ORAL EVERY 6 HOURS PRN
Status: DISCONTINUED | OUTPATIENT
Start: 2022-05-30 | End: 2022-07-04

## 2022-05-30 RX ORDER — DIPHENHYDRAMINE HYDROCHLORIDE 50 MG/ML
50 INJECTION INTRAMUSCULAR; INTRAVENOUS EVERY 6 HOURS PRN
Status: DISCONTINUED | OUTPATIENT
Start: 2022-05-30 | End: 2022-07-04

## 2022-05-30 RX ORDER — LORAZEPAM 2 MG/ML
2 INJECTION INTRAMUSCULAR EVERY 6 HOURS PRN
Status: DISCONTINUED | OUTPATIENT
Start: 2022-05-30 | End: 2022-07-04

## 2022-05-30 RX ORDER — DIPHENHYDRAMINE HCL 50 MG
50 CAPSULE ORAL EVERY 6 HOURS PRN
Status: DISCONTINUED | OUTPATIENT
Start: 2022-05-30 | End: 2022-07-04

## 2022-05-30 RX ADMIN — HALOPERIDOL LACTATE 10 MG: 5 INJECTION, SOLUTION INTRAMUSCULAR at 22:21

## 2022-05-30 RX ADMIN — HALOPERIDOL LACTATE 5 MG: 5 INJECTION, SOLUTION INTRAMUSCULAR at 11:24

## 2022-05-30 RX ADMIN — OLANZAPINE 10 MG: 10 TABLET, FILM COATED ORAL at 17:40

## 2022-05-30 RX ADMIN — DIPHENHYDRAMINE HYDROCHLORIDE 50 MG: 50 INJECTION INTRAMUSCULAR; INTRAVENOUS at 22:21

## 2022-05-30 RX ADMIN — PALIPERIDONE 3 MG: 3 TABLET, EXTENDED RELEASE ORAL at 08:40

## 2022-05-30 RX ADMIN — LORAZEPAM 2 MG: 2 INJECTION INTRAMUSCULAR; INTRAVENOUS at 22:21

## 2022-05-30 RX ADMIN — DIPHENHYDRAMINE HYDROCHLORIDE 50 MG: 50 INJECTION INTRAMUSCULAR; INTRAVENOUS at 11:24

## 2022-05-30 RX ADMIN — LORAZEPAM 2 MG: 2 INJECTION INTRAMUSCULAR; INTRAVENOUS at 11:24

## 2022-05-30 RX ADMIN — DIVALPROEX SODIUM 1000 MG: 500 TABLET, FILM COATED, EXTENDED RELEASE ORAL at 20:25

## 2022-05-30 ASSESSMENT — ACTIVITIES OF DAILY LIVING (ADL)
DRESS: INDEPENDENT
HYGIENE/GROOMING: INDEPENDENT
ORAL_HYGIENE: INDEPENDENT
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
## 2022-05-30 NOTE — PLAN OF CARE
Problem: Behavioral Health Plan of Care  Goal: Patient-Specific Goal (Individualization)  Description: Pt. Will consume >50% of meals provided     Pt. Will sleep 4-6 Hours Nightly     Pt. Will attempt groups daily when able     5/28/22- PT no wean due to disorganized behavior, treatment team to assess daily   Outcome: Ongoing, Progressing  Note: Report received from Kym Rounding complete. Pt observed sleeping in right side lying position with regular and unlabored respirations.    Pt has been in bed with eyes closed and regular respirations. 15 minute and PRN checks all night. No complaints offered. Will continue to monitor.    Pt slept approx  6.5  hours this NOC shift.    Face to face end of shift report communicated to oncoming RN.    Danica NOLASCO RN  May 30, 2022  3:45 AM          Problem: Thought Process Alteration  Goal: Optimal Thought Clarity  Description: Pt will demonstrate Optimal Thought Clarity before discharge     Pt. Will establish and maintain linear conversations with staff  Outcome: Ongoing, Progressing  Note: Unable to assess due to pt sleeping. No issues or concerns noted at this time.       Problem: Suicidal Behavior  Goal: Suicidal Behavior is Absent or Managed  Outcome: Ongoing, Progressing  Note: Unable to assess due to pt sleeping. Pt has remained free of self-harm.     Goal Outcome Evaluation:

## 2022-05-30 NOTE — PLAN OF CARE
"PT in bed throughout majority of shift. He did come out for dinner and stayed out in the lounge for about an hour. He was isolative and withdrawn and appeared uncomfortable. He went back into his bed.   I offered him reading materials or puzzles, he declined.  PT still has delayed speech and appears to be preoccupied either with visual or auditory stimulus, neither of which he will talk about other than saying \"I'm still really scared.\"   After our conversation about this patient agreeable to Zyprexa 10mg.       Face to face end of shift report communicated to oncomng RN   May Gonzalez RN  5/29/2022  10:50 PM                     Problem: Behavioral Health Plan of Care  Goal: Patient-Specific Goal (Individualization)  Description: Pt. Will consume >50% of meals provided     Pt. Will sleep 4-6 Hours Nightly     Pt. Will attempt groups daily when able     5/28/22- PT no wean due to disorganized behavior, treatment team to assess daily   Outcome: Ongoing, Progressing     Problem: Thought Process Alteration  Goal: Optimal Thought Clarity  Description: Pt will demonstrate Optimal Thought Clarity before discharge     Pt. Will establish and maintain linear conversations with staff  Outcome: Ongoing, Progressing     Problem: Suicidal Behavior  Goal: Suicidal Behavior is Absent or Managed  Outcome: Ongoing, Progressing   Goal Outcome Evaluation:                      "
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
## 2022-05-31 PROCEDURE — 99233 SBSQ HOSP IP/OBS HIGH 50: CPT | Performed by: NURSE PRACTITIONER

## 2022-05-31 PROCEDURE — 250N000013 HC RX MED GY IP 250 OP 250 PS 637: Performed by: NURSE PRACTITIONER

## 2022-05-31 PROCEDURE — 124N000004

## 2022-05-31 RX ADMIN — OLANZAPINE 10 MG: 10 TABLET, FILM COATED ORAL at 18:10

## 2022-05-31 RX ADMIN — HYDROXYZINE HYDROCHLORIDE 25 MG: 25 TABLET, FILM COATED ORAL at 10:05

## 2022-05-31 RX ADMIN — DIVALPROEX SODIUM 1000 MG: 500 TABLET, FILM COATED, EXTENDED RELEASE ORAL at 21:26

## 2022-05-31 RX ADMIN — PALIPERIDONE 3 MG: 3 TABLET, EXTENDED RELEASE ORAL at 09:10

## 2022-05-31 ASSESSMENT — ACTIVITIES OF DAILY LIVING (ADL)
ADLS_ACUITY_SCORE: 20
HYGIENE/GROOMING: INDEPENDENT
ADLS_ACUITY_SCORE: 20
ORAL_HYGIENE: INDEPENDENT
LAUNDRY: UNABLE TO COMPLETE
ORAL_HYGIENE: INDEPENDENT
DRESS: SCRUBS (BEHAVIORAL HEALTH);INDEPENDENT
ADLS_ACUITY_SCORE: 20
ADLS_ACUITY_SCORE: 20
LAUNDRY: UNABLE TO COMPLETE
HYGIENE/GROOMING: INDEPENDENT
DRESS: SCRUBS (BEHAVIORAL HEALTH)
ADLS_ACUITY_SCORE: 20
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
## 2022-05-31 NOTE — DISCHARGE INSTRUCTIONS
Behavioral Discharge Planning and Instructions    Summary: Patient is a 21 year old male that was admitted due to altered mental status.     Main Diagnosis:     1.Schizoaffective disorder, Bipolar type   2.Under Civil Commitment and [facility] Care Follow-up:     [facility] Case management    [place] - [phone]  Alternate  Brianna Kenyon - [phone]  Head  [place] - [phone]    [address] [address]  [phone]  Fax: [phone]  [email or web address]., phone: [phone].      Medication Management:   Appointment: 7/28/2022 @ 8:20 am with [facility]  [address], [address]  [place] [address] [phone]     Therapy:   [facility]  [address], [address]  [place] [address] [phone]    [facility]  Appointment: 7/25/2022 @ 10:45 am with Dr. Nikolas Keller for after hospital follow up.    ([phone])  Fax: [phone]                    Attend all scheduled appointments with your outpatient providers. Call at least 24 hours in advance if you need to reschedule an appointment to ensure continued access to your outpatient providers.     Major Treatments, Procedures and Findings:  You were provided with: a psychiatric assessment, assessed for medical stability, medication evaluation and/or management, group therapy, family therapy, individual therapy, CD evaluation/assessment, milieu management, and medical interventions    Symptoms to Report: feeling more aggressive, increased confusion, losing more sleep, mood getting worse, or thoughts of suicide    Early warning signs can include: increased depression or anxiety sleep disturbances increased thoughts or behaviors of suicide or self-harm  increased unusual thinking, such as paranoia or hearing voices          Resources:   Crisis Intervention:  "172.404.5261 or 237-416-9650 (TTY: 119.685.1698).  Call anytime for help.  National [place] on Mental Illness (www.[place].charla.org): 830.465.7184 or 810-648-0973.  [place] Association for Children's Mental Health (www.mac.org): 462.167.5205.  Alcoholics Anonymous (www.alcoholics-anonymous.org): Check your phone book for your local chapter.  Suicide Awareness Voices of Education (SAVE) (www.save.org): 768-531-HXTT (5057)  National Suicide Prevention Line (www.mentalhealthmn.org): 450-627-RYEY (2472)  Mental Health Consumer/Survivor Network of [place] (www.mhcsn.net): 998.763.4808 or 843-974-0101  Mental Health Association of [place] (www.mentalhealth.org): 248.707.1698 or 614-560-6731  Self- Management and Recovery Training., SMART-- Toll free: 371.255.8810  www.[employer].ReviewPro  [facility] Crisis (COPE) Response - Adult 895 774-7275  Text 4 Life: txt \"LIFE\" to 17632 for immediate support and crisis intervention  Crisis text line: Text \"[place]\" to 722168. Free, confidential, 24/7.  Crisis Intervention: 182.686.7569 or 858-166-3326. Call anytime for help.   [facility] Crisis Team - Child: [phone]    General Medication Instructions:   See your medication sheet(s) for instructions.   Take all medicines as directed.  Make no changes unless your doctor suggests them.   Go to all your doctor visits.  Be sure to have all your required lab tests. This way, your medicines can be refilled on time.  Do not use any drugs not prescribed by your doctor.  Avoid alcohol.    Advance Directives:   Scanned document on file with [place]? No scanned doc  Is document scanned? Pt states no documents  Honoring Choices Your Rights Handout: Informed and given  Was more information offered? Pt declined    The Treatment team has appreciated the opportunity to work with you. If you have any questions or concerns about your recent admission, you can contact the unit which can receive your call 24 hours a day, 7 days a week. They " "will be able to get in touch with a Provider if needed. The unit number is [phone] .    [place]:  [facility], Crisis stabilization housing- 618.301.3405  [facility] Crisis Line: 1-940.272.4261  Advocates For Family Peace: 618-2521  Sexual Assault Program of [facility]: 368.267.6246 or 1-172.177.2388  [place] Battered Women's Program: 1-668.430.6919 Ext: 279       Calls answered Mon-Fri-8:00 am--4:30 pm    [place]:  Advocates for Family Peace: 1-152.183.8522  [facility] - 1-730.429.5045  [facility] first call for help: 7-514-105-8019  [facility]:  (561) 266-8229    [place] Area:  Warm Line: 1-613.729.8092       Calls answered Tuesday--Saturday 4:00 pm--10:00 pm  [place] Crisis Line - 677.768.9369  [place] Crisis Stabilization 914-148-0924    [place] Statewide:  [place] Crisis and Referral Services: 1-595-312-9786  National Suicide Prevention Lifeline: 8-516-140-TALK (8655)   - cem8edhl- Text \"Life\" to 19582  First Call for Help: 2-1-1  [place] Helpline- 1-442-ZNML-HELP   Crisis Text Line: Text  [place]  to 549225   "

## 2022-05-31 NOTE — PLAN OF CARE
Problem: Behavioral Health Plan of Care  Goal: Patient-Specific Goal (Individualization)  Description: Pt. Will consume >50% of meals provided     Pt. Will sleep 4-6 Hours Nightly     Pt. Will attempt groups daily when able     5/28/22- PT no wean due to disorganized behavior, treatment team to assess daily   5/31/2022 0157 by Monika Saucedo RN  Outcome: Ongoing, Progressing  Note:     0001 Patient in bed with eyes closed.   0600 Patient remains in bed all shift appears to have slept 7.5 hours this shift.    Face to face end of shift report communicated to 7-3 shift RN.     Monika Saucedo RN  5/31/2022  6:12 AM        Problem: Thought Process Alteration  Goal: Optimal Thought Clarity  Description: Pt will demonstrate Optimal Thought Clarity before discharge     Pt. Will establish and maintain linear conversations with staff  5/31/2022 0157 by Monika Saucedo RN  Outcome: Ongoing, Progressing     Problem: Suicidal Behavior  Goal: Suicidal Behavior is Absent or Managed  5/31/2022 0157 by Monika Saucedo RN  Outcome: Ongoing, Progressing   Goal Outcome Evaluation:    Plan of Care Reviewed With: patient
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
## 2022-05-31 NOTE — CARE PLAN
Prior to Admission Medication Reconciliation:     Medications added:   [x] None  [] As listed below:    Medications deleted:   [x] None  [] As listed below:    Medications marked for review/removal by attending:  [x] None  [] As listed below:    Changes made to existing medications:   [x] None  [] Updated strengths and frequencies to most current  [] As listed below:      Last times/dates taken verified with patient:  [x] Yes- completed myself: per staff from [facility]  [] Prepared PTA medlist for review only. (will not be available to review personally)  [] Did not review with patient. Rx verification only. Review completed by nursing.    [] Nurse completed no changes made (double checked entries)  [] Unable to review with patient at this time:  [] Nurse completed/changes made:     Allergies listed at another location:  []Allergies match allergies listed in Epic  []Other allergies listed:    Allergy review:    [x]Did not review: reviewed by nursing  []Did not review: pt unable at this time  []Patient/MAR verified NKDA  []Patient/MAR verified current existing allergies: no changes made  []Patient confirmed current existing allergies and new allergies added:    Medication reconciliation sources:   []Patient  []Patient family member/emergency contact: **  [facility]  [x]Epic Chart Review: discharged from [place] 5/26 (see chart)  [x]Care Everywhere review  []Pharmacy med list: **  []Pharmacy phone call  [x]Outside meds dispense report: see below  []Nursing home or Assisted Living MAR:  [x]Other: [facility]- reviewed over the phone    Pt took his AM meds on Friday before going to the ER.     PRN's/standing orders were signed off on but patient never received any while at facillity- will not be adding to PTA meds    Pharmacy desired at discharge: will need to input at discharge. [facility] uses [place].     Is patient on coumadin?  [x]No    Requests for consultation by provider or  pharmacist:   [] Patient understands why all of their meds were prescribed and how to take them. No questions.   [x] Managing party has no questions.   [] Patient/ managing party has questions about the following:  [] Did not review with patient. Cannot assess.     Fill dates and reported compliancy:  [x] Fill dates coincide with reported compliancy for all/most maintenance meds.   [] Fill dates do not coincide with compliancy with maintenance meds. See notes in PTA medlist and in comments.    [] Fill dates do not coincide with the following medications but pt reports compliancy:  [] Did not review with patient. Cannot assess.     Historian accuracy:  [] Excellent- alert and oriented, understands why meds were prescribed and how to take, able to answer specifics  [] Good- alert and oriented, understands why meds were prescribed and how to take, some confusion   [] Fair- alert and oriented, doesn't know medications without list, cannot answer specifics about medications, but has a decent process for which to take at home  [] Poor- does not know medications, may not have a process to take at home, may be cognitively unable to review at this time  []Medication management done by family member or facility, no concerns about historian accuracy.   [x] Did not review with patient. Cannot assess.     Medication Management:  [] Manages meds independently  [] Family member/ other party manages meds/assists:  [x] Meds managed by staff at facility  [] Meds set up by home care, family/other party helps administer  [] Meds set up by home care, self administers  [] Did not review with patient. Cannot assess.     Other medications aside from PTA:  [x] Denies taking any medications aside from those listed in PTA meds  [] Reports taking another medication(s) but cannot recall the name(s)  [] Refuses to say.  [] Did not review with patient. Cannot assess.     Comments: pt recently hospitalized and then discharged to [facility]. Ended  up in the ER again the next day. Next due for his invega injection on 6/13/22 per staff at [facility].       Flakita Mcdaniels on 5/31/2022 at 12:00 PM       Discrepancies: [x] No []Not Applicable []Yes: listed below    Issues completing PTA medication reconciliation:  [] On hold for a long time  [] Waited for a call back  [] Fax didn't come through  [] Fax took a long time  [] Other:    Notifying appropriate party of changes/additions/discrepancies:  [x]No pertinent changes made, notification not necessary.   [] Notified attending provider via text page/phone call  [] Notified attending provider in person  [] Notified pharmacy  [] Notified nurse  [] Medications have not been reconciled by a provider yet, notification not necessary  [] Pt is not admitted to floor yet, PTA meds completed before admission.   Medications Prior to Admission   Medication Sig Dispense Refill Last Dose     Melatonin 10 MG TABS tablet Take 10 mg by mouth nightly as needed for sleep   Unknown at Unknown time     [START ON 6/13/2022] paliperidone (INVEGA SUSTENNA) 234 MG/1.5ML SEPIDEH Inject 234 mg into the muscle every 30 days   Past Month at Unknown time     cholecalciferol 50 MCG (2000 UT) tablet Take 50 mcg by mouth daily   5/27/2022 at AM     escitalopram (LEXAPRO) 20 MG tablet Take 1 tablet (20 mg) by mouth daily 30 tablet 0 5/27/2022 at AM     paliperidone ER (INVEGA) 3 MG 24 hr tablet Take 1 tablet (3 mg) by mouth At Bedtime 30 tablet 0 5/26/2022 at HS                 Medication Dispense History (from 5/31/2021 to 5/31/2022)  Expand All  Collapse All    Benztropine Mesylate     Dispensed Days Supply Quantity Provider Pharmacy   BENZTROPINE 1MG TABLETS 09/20/2021 5 30 Units [facility]...        Escitalopram Oxalate     Dispensed Days Supply Quantity Provider Pharmacy   ESCITALOPRAM OXALATE 20 MG TABLET 05/24/2022 30 30 tablet S [facility]   ESCITALOPRAM OXALATE 20 MG TABLET  04/11/2022 30 30 tablet ALEYDA MATHEW [facility]   ESCITALOPRAM OXALATE 10 MG TABLET 06/03/2021 90 90 tablet JACKIE HERNANDEZEN [facility] [id number] IN [employer] - ...        Gabapentin     Dispensed Days Supply Quantity Provider Pharmacy   GABAPENTIN 300MG CAPSULES 09/20/2021 30 90 Units [facility]...        Lurasidone HCl     Dispensed Days Supply Quantity Provider Pharmacy   LATUDA 60MG TABLETS 03/05/2022 30 30 Units EMIGDIO ROSS [facility] #...   LATUDA 40MG TABLETS 02/14/2022 7 7 Units [facility] #...   LATUDA 20MG TABLETS 02/11/2022 7 7 Units YARELIK[facility]it [facility] #...   LATUDA 60MG TABLETS 02/11/2022 30 30 Units [facility] #...        OLANZapine     Dispensed Days Supply Quantity Provider Pharmacy   OLANZAPINE 20 MG TABLET 11/29/2021 30 [facility]   OLANZAPINE 10MG TABLETS 09/20/2021 30 30 Units [facility] #...   OLANZAPINE 5MG TABLETS 09/20/2021 30 30 Units [facility] #...   OLANZAPINE 15 MG TABLET 09/02/2021 30 60 Units CARYN CONNELLY [facility] [id number] IN [employer] - ...        Paliperidone     Dispensed Days Supply Quantity Provider Pharmacy   PALIPERIDONE ER 3 MG TAB ER 24 05/24/2022 14 14 tablet MASTERS NAEGELE,DEBRA [facility]...        Paliperidone Palmitate     Dispensed Days Supply Quantity Provider Pharmacy   INVEGA SUSTENNA 156 MG/ML SYRINGE 04/27/2022 28 1 mL EMIGDIO ROSS [facility]...        Ramelteon     Dispensed Days Supply Quantity Provider Pharmacy   RAMELTEON 8MG TABLETS 09/20/2021 30 30 Units [facility] #...   RAMELTEON 8 MG TABLET 09/01/2021 5 5 Units PATRICIA VALLEJO [facility] [id number] [employer] - ...        hydrOXYzine HCl     Dispensed Days Supply Quantity Provider Pharmacy   HYDROXYZINE HCL 25MG TABS  (WHITE) 09/20/2021 10 60 Units SAMMY CEDEÑO [facility] #...        traZODone HCl     Dispensed Days Supply Quantity Provider Pharmacy   TRAZODONE HCL 50 MG TABLET 04/11/2022 30 60 tablet ALEYDA MATHEW [facility]  P...        Disclaimer    Certain dispenses may not be available or accurate in this report, including over-the-counter medications, low cost prescriptions, prescriptions paid for by the patient or non-participating sources, or errors in insurance claims information. The provider should independently verify medication history with the patient.    External Sources

## 2022-05-31 NOTE — PROGRESS NOTES
KAROLINE called Brianna James - [facility]  -KAROLINE left a phone message asking if the patient can return to [facility] and if she could fax the SW a copy of the patient's revocation and commitment/ and Nguyen paper work.     KAROLINE called  RICHARD for the patient Gray and left a phone message asking for the patient's Committment Paper work and Nguyen paper work. KAROLINE also requested them to contact KAROLINE in regards to going back to [facility].     KAROLINE called [facility] Court Admin and spoke Vivien. She will be emailing the SW the patient's Committment, Revocation, and Nguyen paper work.       KAROLINE received the Court paper work for the patient via email. SW put copies of the Committment, Revocation, and Nguyen paper work in the patient's Chart.    KAROLINE received another email from the patient's  Gray - She stated she is wondering the Treatment Teams thoughts and weather she needs to complete and file additional revocation paper work. KAROLINE told her that they will update her after Treatment Team in the morning. Gray also stated she left numerous messages for [facility] to find out if the patient can return there.

## 2022-05-31 NOTE — PLAN OF CARE
KAROLINE called and spoke to the patient's mom. She stated she is under the understanding that the patient will be able to return to [facility].    The patient's mom also stated she will be coming to [facility] to visit the patient and wanted to be put on the visitor log for 6:00 pm to 6:30 am 6/01/2022 and 1:00 pm for Thursday 6/02/2022. KAROLINE explained to the patient's mom that they will bring up her wishes in treatment team in the morning and call and update her after.

## 2022-05-31 NOTE — PLAN OF CARE
Problem: Behavioral Health Plan of Care  Goal: Patient-Specific Goal (Individualization)  Description: Pt. Will consume >50% of meals provided     Pt. Will sleep 4-6 Hours Nightly     Pt. Will attempt groups daily when able     5/28/22- PT no wean due to disorganized behavior, treatment team to assess daily   Outcome: Ongoing, Progressing     Pt in MHICU this shift, no weaning at this time. Pt denied all symptoms of pain, depression, SI, HI and hallucinations. Pt did report some anxiety, rated anxiety 1/10 and requested a prn. Pt given hydroxyzine 25mg at 1005. Pt got up for breakfast and lunch. Pt pleasant but only answered nursing assessment questions with yes/no answers. Pt requested to watch some tv, requested sports but only watched for a few minutes then went back to bed. Pt requested snacks a couple times during the shift and ate willian crackers and juice.       Problem: Thought Process Alteration  Goal: Optimal Thought Clarity  Description: Pt will demonstrate Optimal Thought Clarity before discharge     Pt. Will establish and maintain linear conversations with staff  Outcome: Ongoing, Progressing     Problem: Suicidal Behavior  Goal: Suicidal Behavior is Absent or Managed  Outcome: Ongoing, Progressing   Goal Outcome Evaluation:        Face to face end of shift report communicated to evening shift RN.     Ángela Mcfadden RN  5/31/2022  7:58 AM

## 2022-06-01 PROCEDURE — 99232 SBSQ HOSP IP/OBS MODERATE 35: CPT | Performed by: NURSE PRACTITIONER

## 2022-06-01 PROCEDURE — 124N000004

## 2022-06-01 PROCEDURE — 250N000013 HC RX MED GY IP 250 OP 250 PS 637: Performed by: NURSE PRACTITIONER

## 2022-06-01 RX ORDER — VITAMIN B COMPLEX
50 TABLET ORAL DAILY
Status: DISCONTINUED | OUTPATIENT
Start: 2022-06-02 | End: 2022-06-08

## 2022-06-01 RX ADMIN — DIVALPROEX SODIUM 1000 MG: 500 TABLET, FILM COATED, EXTENDED RELEASE ORAL at 21:23

## 2022-06-01 RX ADMIN — PALIPERIDONE 3 MG: 3 TABLET, EXTENDED RELEASE ORAL at 08:59

## 2022-06-01 ASSESSMENT — ACTIVITIES OF DAILY LIVING (ADL)
LAUNDRY: UNABLE TO COMPLETE
ADLS_ACUITY_SCORE: 20
DRESS: SCRUBS (BEHAVIORAL HEALTH);INDEPENDENT
ADLS_ACUITY_SCORE: 20
HYGIENE/GROOMING: INDEPENDENT
ORAL_HYGIENE: INDEPENDENT
HYGIENE/GROOMING: INDEPENDENT
ADLS_ACUITY_SCORE: 20
ORAL_HYGIENE: INDEPENDENT
LAUNDRY: UNABLE TO COMPLETE
DRESS: SCRUBS (BEHAVIORAL HEALTH)
ADLS_ACUITY_SCORE: 20
ADLS_ACUITY_SCORE: 20
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
## 2022-06-01 NOTE — PLAN OF CARE
SHIFT SUMMARY:  Calm and cooperative. Guarded. Answers appropriately and is able to hold a reality-based conversation. Denies anxiety, depression, auditory or visual hallucinations, and suicidal or homicidal ideation. Weaned the majority of this shift from the MHICU. Mom visited. No physical or verbal aggression observed.     Problem: Behavioral Health Plan of Care  Goal: Patient-Specific Goal (Individualization)  Description: Pt. Will consume >50% of meals provided     Pt. Will sleep 4-6 Hours Nightly     Pt. Will attempt groups daily when able     5/28/22- PT no wean due to disorganized behavior, treatment team to assess daily   Outcome: Ongoing, Progressing  Goal: Absence of New-Onset Illness or Injury  Outcome: Ongoing, Progressing     Problem: Thought Process Alteration  Goal: Optimal Thought Clarity  Description: Pt will demonstrate Optimal Thought Clarity before discharge     Pt. Will establish and maintain linear conversations with staff  Outcome: Ongoing, Progressing     Problem: Suicidal Behavior  Goal: Suicidal Behavior is Absent or Managed  Outcome: Ongoing, Progressing   Goal Outcome Evaluation:    Plan of Care Reviewed With: patient

## 2022-06-01 NOTE — PLAN OF CARE
Problem: Behavioral Health Plan of Care  Goal: Patient-Specific Goal (Individualization)  Description: Pt. Will consume >50% of meals provided     Pt. Will sleep 4-6 Hours Nightly     Pt. Will attempt groups daily when able     5/28/22- PT no wean due to disorganized behavior, treatment team to assess daily   Outcome: Ongoing, Progressing      Pt in MHICU at the start of the shift. Pt pleasant and cooperative with nursing assessment. Pt denies pain, depression, SI, HI and hallucinations. Pt does report anxiety but states it is due to being here. Pt stayed in the MHICU in the morning and read a book. Pt did wean out to the open unit after lunch. Pt watched tv with staff and peers. Pt answered questions appropriately but there is still a delay in his answers.      Problem: Thought Process Alteration  Goal: Optimal Thought Clarity  Description: Pt will demonstrate Optimal Thought Clarity before discharge     Pt. Will establish and maintain linear conversations with staff  Outcome: Ongoing, Progressing     Problem: Suicidal Behavior  Goal: Suicidal Behavior is Absent or Managed  Outcome: Ongoing, Progressing   Goal Outcome Evaluation:    Plan of Care Reviewed With: patient        Face to face end of shift report communicated to evening shift RN. Reported that pt is a risk for suicide.     Ángela Mcfadden, RN  6/1/2022  7:47 AM
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
## 2022-06-01 NOTE — PLAN OF CARE
Problem: Behavioral Health Plan of Care  Goal: Patient-Specific Goal (Individualization)  Description: Pt. Will consume >50% of meals provided   Pt. Will sleep 4-6 Hours Nightly   Pt. Will attempt groups daily when able     5/28/22- PT no wean due to disorganized behavior, treatment team to assess daily   Outcome: Ongoing, Progressing     Problem: Thought Process Alteration  Goal: Optimal Thought Clarity  Description: Pt will demonstrate Optimal Thought Clarity before discharge   Pt. Will establish and maintain linear conversations with staff  Outcome: Ongoing, Progressing     Problem: Suicidal Behavior  Goal: Suicidal Behavior is Absent or Managed  Outcome: Ongoing, Progressing     Face to face shift report received from RODDY Aranda. Rounding completed, pt observed laying in bed in MHICU, appeared to be sleeping.    Patient appeared to be sleeping for approximately 8.5 hours since 2045 last shift.    Patient had no reported or observed hallucinations or self harm this shift.    Face to face report will be communicated to oncoming RN.    Vivi Carter RN  6/1/2022  6:22 AM

## 2022-06-01 NOTE — PLAN OF CARE
SHIFT SUMMARY:  Yelling and mildly agitated - administered PRN PO zyprexa 10 mg - effective. Remains no-wean in the MHICU. Denies suicidal ideation, depression, anxiety, and hallucinations.      Problem: Behavioral Health Plan of Care  Goal: Patient-Specific Goal (Individualization)  Description: Pt. Will consume >50% of meals provided     Pt. Will sleep 4-6 Hours Nightly     Pt. Will attempt groups daily when able     5/28/22- PT no wean due to disorganized behavior, treatment team to assess daily   Outcome: Ongoing, Progressing  Goal: Absence of New-Onset Illness or Injury  Outcome: Ongoing, Progressing     Problem: Thought Process Alteration  Goal: Optimal Thought Clarity  Description: Pt will demonstrate Optimal Thought Clarity before discharge     Pt. Will establish and maintain linear conversations with staff  Outcome: Ongoing, Progressing     Problem: Suicidal Behavior  Goal: Suicidal Behavior is Absent or Managed  Outcome: Ongoing, Progressing   Goal Outcome Evaluation:

## 2022-06-02 LAB — VALPROATE SERPL-MCNC: 47 MG/L

## 2022-06-02 PROCEDURE — 124N000004

## 2022-06-02 PROCEDURE — 250N000013 HC RX MED GY IP 250 OP 250 PS 637: Performed by: NURSE PRACTITIONER

## 2022-06-02 PROCEDURE — 80164 ASSAY DIPROPYLACETIC ACD TOT: CPT | Performed by: NURSE PRACTITIONER

## 2022-06-02 PROCEDURE — 36415 COLL VENOUS BLD VENIPUNCTURE: CPT | Performed by: NURSE PRACTITIONER

## 2022-06-02 PROCEDURE — 99232 SBSQ HOSP IP/OBS MODERATE 35: CPT | Performed by: NURSE PRACTITIONER

## 2022-06-02 RX ADMIN — PALIPERIDONE 3 MG: 3 TABLET, EXTENDED RELEASE ORAL at 08:45

## 2022-06-02 RX ADMIN — DIVALPROEX SODIUM 1000 MG: 500 TABLET, FILM COATED, EXTENDED RELEASE ORAL at 20:57

## 2022-06-02 RX ADMIN — Medication 50 MCG: at 08:45

## 2022-06-02 ASSESSMENT — ACTIVITIES OF DAILY LIVING (ADL)
ADLS_ACUITY_SCORE: 20
ORAL_HYGIENE: INDEPENDENT
ADLS_ACUITY_SCORE: 20
HYGIENE/GROOMING: INDEPENDENT
ADLS_ACUITY_SCORE: 20
DRESS: SCRUBS (BEHAVIORAL HEALTH);INDEPENDENT
LAUNDRY: UNABLE TO COMPLETE
ADLS_ACUITY_SCORE: 20

## 2022-06-02 NOTE — PROGRESS NOTES
KAROLINE called and left a message for the patient's  requesting an update on what her overall plan is for the patient. If the patient is going to [facility] or if he would be going to an [facility].

## 2022-06-02 NOTE — PLAN OF CARE
"  Problem: Behavioral Health Plan of Care  Goal: Patient-Specific Goal (Individualization)  Description: Pt. Will consume >50% of meals provided   Pt. Will sleep 4-6 Hours Nightly   Pt. Will attempt groups daily when able     6/20/2022: Wean out of MH-ICU as long as behaviors remain appropriate. Treatment to discuss daily.     Note: Patient is weaning to open unit for short periods off/on throughout shift, otherwise withdrawn to bedroom reading and laying down. Affect appears flat and patient provides minimal responses during conversation attempts. Denies all assessment criteria at this time, stating \"I'm just waiting to get out of here\". This writer did not observe patient responding to internal stimuli although he appears preoccupied at times.   Cooperative with lab draw this evening, Valproic Acid at 47. Compliant with scheduled Depakote after this and laying down to rest again by 2100 for remainder of shift. Continue to monitor at this time.      Problem: Thought Process Alteration  Goal: Optimal Thought Clarity  Description: Pt will demonstrate Optimal Thought Clarity before discharge     Pt. Will establish and maintain linear conversations with staff  Note: Continue to monitor at this time.      Problem: Suicidal Behavior  Goal: Suicidal Behavior is Absent or Managed  Note: Continue to monitor at this time.     Face to face end of shift report communicated to oncoming RODDY.     Tati Dyer RN  6/2/2022  5:48 PM        "

## 2022-06-02 NOTE — PLAN OF CARE
Problem: Behavioral Health Plan of Care  Goal: Patient-Specific Goal (Individualization)  Description: Pt. Will consume >50% of meals provided     Pt. Will sleep 4-6 Hours Nightly   Pt. Will attempt groups daily when able   6/02/2022: Pt may wean out of MH-ICU as long as behaviors remain appropriate.       Outcome: Ongoing, Progressing  Note: 07:30: Received end of shift report from RODDY Ramirez. Pt requesting in bed upon arrival, showered, requesting to come out on open unit.     14:20: Approved visitation with mother, played card game, tolerated well. Brighter affect. This writer et SW privately updated mother on pt's progress et POC. Is pleased et thankful with current care received.      15:00: Pt out et about on unit majority of shift, overall improved psychological status et less reduction to internal stimuli.  Improved conversation, less distractible. Participates in AM groups. Compliant with AM medication administration. No prn pharmacological this AM shift.     Face to face end of shift report to be communicated to on-coming PM staff.     Lupe Tripp RN  6/2/2022  3:03 PM      Problem: Thought Process Alteration  Goal: Optimal Thought Clarity  Description: Pt will demonstrate Optimal Thought Clarity before discharge     Pt. Will establish and maintain linear conversations with staff  Outcome: Ongoing, Progressing     Problem: Suicidal Behavior  Goal: Suicidal Behavior is Absent or Managed  Outcome: Ongoing, Progressing   Goal Outcome Evaluation:

## 2022-06-02 NOTE — PROGRESS NOTES
"[facility] PSYCHIATRY  PROGRESS NOTE     SUBJECTIVE        Cam has been tolerating weaning from the MH-ICU and has worked his way up to wean at will. Patient smiling today and less delayed in his responses. Reports feeling \"safer\" and \"more in control\" today and acknowledges that he was doing things and saying things that didn't make sense. States he enjoyed his visit with his mother last night. He was observed playing cards with her on the unit this afternoon. He is eating and sleeping well. He is taking his medications as prescribed and denies side effects. He asks about his discharge plan and was notified that the SW will reach out to his CM. Patient verbalized understanding and denies and further questions/concerns.          MEDICATIONS   Scheduled Meds:    divalproex sodium extended-release  1,000 mg Oral Daily     divalproex sodium extended-release  250 mg Oral Once     [START ON 6/6/2022] paliperidone  234 mg Intramuscular Q30 Days     paliperidone  3 mg Oral Daily     Vitamin D3  50 mcg Oral Daily     PRN Meds:.acetaminophen, haloperidol **AND** LORazepam **AND** diphenhydrAMINE, haloperidol lactate **AND** LORazepam **AND** diphenhydrAMINE, hydrOXYzine, nicotine, OLANZapine **OR** OLANZapine, traZODone     ALLERGIES   Allergies   Allergen Reactions     Seasonal Allergies      Seroquel [Quetiapine]      Fainting and slowed breathing         MENTAL STATUS EXAM   Vitals: /70   Pulse 94   Temp 99.5  F (37.5  C) (Temporal)   Resp 16   SpO2 97%     Appearance:  adequately groomed and dressed in hospital scrubs  Attitude:  Guarded  Eye Contact: good  Mood: \"OK\"  Affect: Restricted    Speech: clear, coherent, some latency noted   Psychomotor Behavior:  no evidence of tardive dyskinesia, dystonia, or tics  Thought Process:  disorganized-although improving  Associations: no loosening of associations present  Thought Content: no evidence of suicidal ideation or homicidal ideation, denies auditory and " visual hallucinations  Insight:  limited  Judgment:  limited  Oriented to:  person  Attention Span and Concentration:  limited  Recent and Remote Memory:  poor  Language: normal  Fund of Knowledge: appropriate  Muscle Strength and Tone: normal  Gait and Station: Normal       LABS   No results found for this or any previous visit (from the past 24 hour(s)).      IMPRESSION   Pt is currently under commitment.      Pt was discharged from station 4A at [place] two days ago and then went to [facility].   Per Crisis assessment- The following information was received from Van and Rolanda Fernández whose relationship to the patient is parents. Information was obtained in person. Their phone number is Rolanda ([phone]), Van ([phone]) and they last had contact with patient on 22.   What happened PTA: Per mom, 22 in the am he was at [facility]. He went for a bike ride by himself, even though he wasn't supposed to leave the facility for the first 10 days by himself. When he got back to [facility], he said he was afraid he was going to be tortured there and that one of the staff was his brother. Around 3:30 the counselor called mom and said that he left again and asked for Cam's cell number. They made contact with him and he was making non sensical statements. Mom states that it sounded like someone was with him trying to help him get back to [facility] or [facility] and then Cam's phone . He was telling dad random things and not making sense. He was saying an address, [address].      [facility] okay to see him. Told dad, 'you and I are God.' He thought Prince Coyle was talking to him. Asking dad, 'Do you think I'm crazy.'     Mother reports this is his 5th hospitalization in 6 months. He was willing to go [facility]. They state that Cam isn't sure about being on medications. Mother reported that Cam can be at [facility] for up to 90 days.      The patient has  been hospitalized in 09/2021 and 10/2021 for psychosis. EMR noted that he had a suicide attempt by cutting his left arm in the bathtub in October. The patient has a history of commitment starting in 11/2021 and ending on 05/13/2022.  Commitment was extended as of 5/9/2022.         DIAGNOSES     1.Schizoaffective disorder, Bipolar type   2.Under Civil Commitment and Nguyen         PLAN     Location: Unit 5  Legal Status: Orders Placed This Encounter      Court Hold    Safety Assessment:    Behavioral Orders   Procedures     Code 1 - Restrict to Unit     Routine Programming     As clinically indicated     Status 15     Every 15 minutes.      PTA medications held:   Lexapro 20 mg daily      PTA medications continued/changed:   Continue Invega 3 mg daily  Continue Invega Sustenna 234 mg Q28D, next due on 6/6/22 - order entered       New medications tried and stopped:   None    New medications initiated:   Depakote 250 mg tonight -> 500 mg on 5.29 ->1,000 mg on 5.30 -> order to check level 6/2/22 entered      Today's Changes:  No changes made today    Programming: Patient will be treated in a therapeutic milieu with appropriate individual and group therapies. Education will be provided on diagnoses, medications, and treatments.     Medical diagnoses:  Per medicine    Consult: None  Tests: None    Anticipated LOS: 2-3 weeks-pt is under Civil Committment  Disposition: to be determined       ATTESTATION      Patient has been seen and evaluated by me,  Diana Ashraf NP

## 2022-06-02 NOTE — PLAN OF CARE
Face to face report received from Manoj PEREYRA. Pt. Observed.     Problem: Behavioral Health Plan of Care  Goal: Patient-Specific Goal (Individualization)  Description: Pt. Will consume >50% of meals provided     Pt. Will sleep 4-6 Hours Nightly     Pt. Will attempt groups daily when able     5/28/22- PT no wean due to disorganized behavior, treatment team to assess daily   Outcome: Ongoing, Progressing  Note: Pt has been in bed with eyes closed and regular respirations for a total of 6 hours this noc shift. 15 minute and PRN checks all night. No complaints offered. Will continue to monitor.       Face to face end of shift report to be communicated to oncoming RN.     Ashley Fink RN  6/2/2022

## 2022-06-03 PROCEDURE — 124N000004

## 2022-06-03 PROCEDURE — 250N000013 HC RX MED GY IP 250 OP 250 PS 637: Performed by: NURSE PRACTITIONER

## 2022-06-03 PROCEDURE — 99233 SBSQ HOSP IP/OBS HIGH 50: CPT | Performed by: NURSE PRACTITIONER

## 2022-06-03 RX ORDER — DIVALPROEX SODIUM 500 MG/1
1500 TABLET, EXTENDED RELEASE ORAL DAILY
Status: DISCONTINUED | OUTPATIENT
Start: 2022-06-03 | End: 2022-06-28

## 2022-06-03 RX ADMIN — Medication 50 MCG: at 08:40

## 2022-06-03 RX ADMIN — DIVALPROEX SODIUM 1500 MG: 500 TABLET, FILM COATED, EXTENDED RELEASE ORAL at 20:45

## 2022-06-03 RX ADMIN — PALIPERIDONE 3 MG: 3 TABLET, EXTENDED RELEASE ORAL at 08:40

## 2022-06-03 ASSESSMENT — ACTIVITIES OF DAILY LIVING (ADL)
ADLS_ACUITY_SCORE: 20
ADLS_ACUITY_SCORE: 20
ORAL_HYGIENE: INDEPENDENT
ADLS_ACUITY_SCORE: 20
DRESS: SCRUBS (BEHAVIORAL HEALTH);INDEPENDENT
LAUNDRY: UNABLE TO COMPLETE
ADLS_ACUITY_SCORE: 20
HYGIENE/GROOMING: INDEPENDENT
ADLS_ACUITY_SCORE: 20
HYGIENE/GROOMING: INDEPENDENT
ADLS_ACUITY_SCORE: 20
ORAL_HYGIENE: INDEPENDENT
ADLS_ACUITY_SCORE: 20
ADLS_ACUITY_SCORE: 20
LAUNDRY: UNABLE TO COMPLETE
ADLS_ACUITY_SCORE: 20
DRESS: SCRUBS (BEHAVIORAL HEALTH);INDEPENDENT

## 2022-06-03 NOTE — PLAN OF CARE
Problem: Behavioral Health Plan of Care  Goal: Patient-Specific Goal (Individualization)  Description: Pt. Will consume >50% of meals provided   Pt. Will sleep 4-6 Hours Nightly   Pt. Will attempt groups daily when able     6/03/2022: Wean out of MH-ICU as long as behaviors remain appropriate. Treatment to discuss daily.     Outcome: Ongoing, Progressing  Note: 07:30: Received end of shift report from RODDY Ramirez. Pt requesting to come out onto open unit upon arrival, mood is calm, behavior appropriate.     12:30: Excellent food et fluid intake, compliant with AM medication administration.     13:00: Pt out on open unit majority shift, no group participation @ present. Withdrawn activity. Denies SI/HI, hallucinations, pain or any side effects from increased depakote. States adequate sleep. Makes basic needs known, compliant with staff requests. Appears preoccupied, withdrawn activity. Guarded behavior. Improved thought process et reality based conversations.    Face to face end of shift report to be communicated to on-coming PM staff.    Lupe Tripp RN  6/3/2022  1:24 PM              Problem: Thought Process Alteration  Goal: Optimal Thought Clarity  Description: Pt will demonstrate Optimal Thought Clarity before discharge     Pt. Will establish and maintain linear conversations with staff  Outcome: Ongoing, Progressing     Problem: Suicidal Behavior  Goal: Suicidal Behavior is Absent or Managed  Outcome: Ongoing, Progressing   Goal Outcome Evaluation:    Plan of Care Reviewed With: patient

## 2022-06-03 NOTE — PROGRESS NOTES
"[facility] PSYCHIATRY  PROGRESS NOTE     SUBJECTIVE        I found Cam in the lounge watching TV. I do observe him to be smiling/laughing to himself. He states he is eating and sleeping well. He is taking his medications as prescribed and denies any side effects. Patient denies suicidal or homicidal ideation and any hallucinations or delusions, though he appears to be responding to internal stimuli. He is aware that KAROLINE Mistry and his CM are collaborating on placement, which may include returning to [facility] or going to a different IRTS program. We discussed the plan to increase his next Invega Sustenna injection to 234 mg and stop the oral dose. He was also informed that his VPA level was slightly low at 47, so will increase Depakote to 1,500 mg tonight and recheck his level again on 6/6. Patient doesn't offer any additional complaints or concerns and agrees with the medication changes mentioned above.        MEDICATIONS   Scheduled Meds:    divalproex sodium extended-release  1,500 mg Oral Daily     divalproex sodium extended-release  250 mg Oral Once     [START ON 6/6/2022] paliperidone  234 mg Intramuscular Q30 Days     paliperidone  3 mg Oral Daily     Vitamin D3  50 mcg Oral Daily     PRN Meds:.acetaminophen, haloperidol **AND** LORazepam **AND** diphenhydrAMINE, haloperidol lactate **AND** LORazepam **AND** diphenhydrAMINE, hydrOXYzine, nicotine, OLANZapine **OR** OLANZapine, traZODone     ALLERGIES   Allergies   Allergen Reactions     Seasonal Allergies      Seroquel [Quetiapine]      Fainting and slowed breathing         MENTAL STATUS EXAM   Vitals: /71   Pulse 90   Temp 98.7  F (37.1  C) (Temporal)   Resp 16   SpO2 97%     Appearance:  adequately groomed and dressed in hospital scrubs  Attitude:  Guarded  Eye Contact: fair  Mood: \"OK\"  Affect: appears euthymic  Speech: clear, coherent, no latency noted today  Psychomotor Behavior:  no evidence of tardive dyskinesia, dystonia, or tics  Thought " Process:  Disorganized, but improving  Associations: no loosening of associations present  Thought Content: no evidence of suicidal ideation or homicidal ideation, denies auditory and visual hallucinations, but appears to be responding to internal stimuli  Insight:  limited  Judgment:  limited  Oriented to:  person  Attention Span and Concentration:  limited  Recent and Remote Memory:  poor  Language: normal  Fund of Knowledge: appropriate  Muscle Strength and Tone: normal  Gait and Station: Normal       LABS   Recent Results (from the past 24 hour(s))   Valproic acid    Collection Time: 22  8:09 PM   Result Value Ref Range    Valproic acid 47   mg/L         IMPRESSION   Pt is currently under commitment with Nguyen.     Pt was discharged from station 4A at [facility] two days ago and then went to [facility].   Per Crisis assessment- The following information was received from Van and Rolanda Fernández whose relationship to the patient is parents. Information was obtained in person. Their phone number is Rolanda ([phone]), Van ([phone]) and they last had contact with patient on 22.   What happened PTA: Per mom, 22 in the am he was at [facility]. He went for a bike ride by himself, even though he wasn't supposed to leave the facility for the first 10 days by himself. When he got back to [facility], he said he was afraid he was going to be tortured there and that one of the staff was his brother. Around 3:30 the counselor called mom and said that he left again and asked for Cam's cell number. They made contact with him and he was making non sensical statements. Mom states that it sounded like someone was with him trying to help him get back to [facility] or [facility] and then Cam's phone . He was telling dad random things and not making sense. He was saying an address, [address].      When dad got here at the [facility], he was okay to see him. Told dad, 'you and I are God.' He  thought Prince Coyle was talking to him. Asking dad, 'Do you think I'm crazy.'     Mother reports this is his 5th hospitalization in 6 months. He was willing to go [facility]. They state that Cam isn't sure about being on medications. Mother reported that Cam can be at [facility] for up to 90 days.      The patient has been hospitalized in 09/2021 and 10/2021 for psychosis. EMR noted that he had a suicide attempt by cutting his left arm in the bathtub in October. The patient has a history of commitment starting in 11/2021 and ending on 05/13/2022.  Commitment was extended as of 5/9/2022.         DIAGNOSES     1.Schizoaffective disorder, Bipolar type   2.Under Civil Commitment and Nguyen         PLAN     Location: Unit 5  Legal Status: Orders Placed This Encounter      Court Hold    Safety Assessment:    Behavioral Orders   Procedures     Code 1 - Restrict to Unit     Routine Programming     As clinically indicated     Status 15     Every 15 minutes.      PTA medications held:   Lexapro 20 mg daily      PTA medications continued/changed:   Continue Invega 3 mg daily - will stop this when he gets his next injection  Continue Invega Sustenna, increase maintenance dose to 234 mg Q28D, next due on 6/6/22 - order entered       New medications tried and stopped:   None    New medications initiated:   Depakote 250 mg on 5/28 -> 500 mg on 5/29 ->1,000 mg on 5/30 -> 1,500 mg on 6/3       Today's Changes:  Target mood lability and impulsivity    Programming: Patient will be treated in a therapeutic milieu with appropriate individual and group therapies. Education will be provided on diagnoses, medications, and treatments.     Medical diagnoses:  Per medicine    Consult: None  Tests: VPA subtherapeutic at 47 on 6/2. VPA to be rechecked on 6/6    Anticipated LOS: 2-3 weeks-pt is under Civil Committment  Disposition: [facility] IRTS vs. [facility] IRTS. SW collaborating with CM for placement       ATTESTATION      Patient has been seen  and evaluated by me,  SELINA Harding CNP

## 2022-06-03 NOTE — PLAN OF CARE
Face to face report received from Tati PEREYRA. Pt. Observed.     Problem: Behavioral Health Plan of Care  Goal: Patient-Specific Goal (Individualization)  Description: Pt. Will consume >50% of meals provided   Pt. Will sleep 4-6 Hours Nightly   Pt. Will attempt groups daily when able     6/20/2022: Wean out of MH-ICU as long as behaviors remain appropriate. Treatment to discuss daily.     Outcome: Ongoing, Progressing  Note: Pt has been in bed with eyes closed and regular respirations x 5.5 hours this noc shift. Pt. awoke at approximately 0430 and was unable to return to sleep this noc shift. 15 minute and PRN checks all night. No complaints offered. Will continue to monitor.       Face to face end of shift report to be communicated to oncoming RN.     Ashley Fink RN  6/3/2022

## 2022-06-04 LAB — SARS-COV-2 RNA RESP QL NAA+PROBE: NEGATIVE

## 2022-06-04 PROCEDURE — U0003 INFECTIOUS AGENT DETECTION BY NUCLEIC ACID (DNA OR RNA); SEVERE ACUTE RESPIRATORY SYNDROME CORONAVIRUS 2 (SARS-COV-2) (CORONAVIRUS DISEASE [COVID-19]), AMPLIFIED PROBE TECHNIQUE, MAKING USE OF HIGH THROUGHPUT TECHNOLOGIES AS DESCRIBED BY CMS-2020-01-R: HCPCS | Performed by: NURSE PRACTITIONER

## 2022-06-04 PROCEDURE — 250N000013 HC RX MED GY IP 250 OP 250 PS 637: Performed by: NURSE PRACTITIONER

## 2022-06-04 PROCEDURE — 99232 SBSQ HOSP IP/OBS MODERATE 35: CPT | Performed by: NURSE PRACTITIONER

## 2022-06-04 PROCEDURE — 124N000004

## 2022-06-04 RX ADMIN — DIVALPROEX SODIUM 1500 MG: 500 TABLET, FILM COATED, EXTENDED RELEASE ORAL at 20:31

## 2022-06-04 RX ADMIN — PALIPERIDONE 3 MG: 3 TABLET, EXTENDED RELEASE ORAL at 08:34

## 2022-06-04 RX ADMIN — Medication 50 MCG: at 08:34

## 2022-06-04 ASSESSMENT — ACTIVITIES OF DAILY LIVING (ADL)
ADLS_ACUITY_SCORE: 20
HYGIENE/GROOMING: INDEPENDENT
ORAL_HYGIENE: INDEPENDENT
ADLS_ACUITY_SCORE: 20
DRESS: SCRUBS (BEHAVIORAL HEALTH)
ADLS_ACUITY_SCORE: 20
HYGIENE/GROOMING: INDEPENDENT
ORAL_HYGIENE: INDEPENDENT
ADLS_ACUITY_SCORE: 20
LAUNDRY: UNABLE TO COMPLETE
LAUNDRY: UNABLE TO COMPLETE
DRESS: SCRUBS (BEHAVIORAL HEALTH);INDEPENDENT
ADLS_ACUITY_SCORE: 20

## 2022-06-04 NOTE — PLAN OF CARE
SHIFT SUMMARY:  Calm and cooperative. Denies auditory hallucinations, anxiety, depression or pain. In the lounge, coloring with a peer. Eating at least 50% of meals. Did not attend group, preferring to walk the halls or talk with peers outside of group. Remains on the open unit.      Problem: Behavioral Health Plan of Care  Goal: Patient-Specific Goal (Individualization)  Description: Pt. Will consume >50% of meals provided   Pt. Will sleep 4-6 Hours Nightly   Pt. Will attempt groups daily when able     6/03/2022: Wean out of MH-ICU as long as behaviors remain appropriate. Treatment to discuss daily.     Outcome: Ongoing, Progressing  Goal: Absence of New-Onset Illness or Injury  Outcome: Ongoing, Progressing     Problem: Thought Process Alteration  Goal: Optimal Thought Clarity  Description: Pt will demonstrate Optimal Thought Clarity before discharge     Pt. Will establish and maintain linear conversations with staff  Outcome: Ongoing, Progressing     Problem: Suicidal Behavior  Goal: Suicidal Behavior is Absent or Managed  Outcome: Ongoing, Progressing   Goal Outcome Evaluation:

## 2022-06-04 NOTE — PLAN OF CARE
Problem: Behavioral Health Plan of Care  Goal: Patient-Specific Goal (Individualization)  Description: Pt. Will consume >50% of meals provided   Pt. Will sleep 4-6 Hours Nightly   Pt. Will attempt groups daily when able         Outcome: Ongoing, Not Progressing   Goal Outcome Evaluation:    Plan of Care Reviewed With: patient       Patient is A&O, able to make needs known. VSS, but heart rate noted in the 100s. Afebrile. Assessment and vitals as charted. Denies pain.   Denies all mental health criteria. Denies hallucinations but appears to responding to internal stimulus. Does smile and giggle to self inappropriately during conversation with this writer. Affect is otherwise flat, blunt. Was moved to room 556-1 d/t ICU acuity. He was weaning out to open unit and maintained appropriate boundaries with staff and peers. He was cooperative with medication administration. Does not have any questions regarding medications. Gait is steady. No falls. Does not attend groups.    Face to face report will be given with opportunity to observe patient.    Report given to NOC, RN.    Polly Cox RN   6/3/2022  10:42 PM                Problem: Thought Process Alteration  Goal: Optimal Thought Clarity  Description: Pt will demonstrate Optimal Thought Clarity before discharge     Pt. Will establish and maintain linear conversations with staff  Outcome: Ongoing, Not Progressing   Able to have reality based conversation, but conversations are short and minimal. Guarded at times.         Problem: Suicidal Behavior  Goal: Suicidal Behavior is Absent or Managed  Outcome: Ongoing, Not Progressing   Patient denies SI or SIB.

## 2022-06-04 NOTE — PLAN OF CARE
Face to face shift report received from nurse. Rounding completed, pt observed.    Problem: Behavioral Health Plan of Care  Goal: Patient-Specific Goal (Individualization)  Description: Pt. Will consume >50% of meals provided   Pt. Will sleep 4-6 Hours Nightly   Pt. Will attempt groups daily when able   Outcome: Ongoing, Not Progressing     Problem: Thought Process Alteration  Goal: Optimal Thought Clarity  Description: Pt will demonstrate Optimal Thought Clarity before discharge   Pt. Will establish and maintain linear conversations with staff  Outcome: Ongoing, Not Progressing     Problem: Suicidal Behavior  Goal: Suicidal Behavior is Absent or Managed  Outcome: Ongoing, Progressing     Pt has been in bed with eyes closed and regular respirations observed all night. Will continue to monitor.    Patient care continued onto day shift.    Patient was up in the morning watching tv. Patient took medications without issue. Patient has been walking the halls most of the shift, patient was noticeably responding in internal stimuli when he wasn't walking with this peers. Patient denies hallucinations.     Face to face report will be communicated to oncoming RN.    Yury Andrade RN  6/4/2022

## 2022-06-05 PROCEDURE — 124N000004

## 2022-06-05 PROCEDURE — 250N000013 HC RX MED GY IP 250 OP 250 PS 637: Performed by: NURSE PRACTITIONER

## 2022-06-05 RX ADMIN — DIVALPROEX SODIUM 1500 MG: 500 TABLET, FILM COATED, EXTENDED RELEASE ORAL at 21:38

## 2022-06-05 RX ADMIN — PALIPERIDONE 3 MG: 3 TABLET, EXTENDED RELEASE ORAL at 08:17

## 2022-06-05 RX ADMIN — Medication 50 MCG: at 08:17

## 2022-06-05 ASSESSMENT — ACTIVITIES OF DAILY LIVING (ADL)
ADLS_ACUITY_SCORE: 20
HYGIENE/GROOMING: INDEPENDENT
ADLS_ACUITY_SCORE: 20
LAUNDRY: UNABLE TO COMPLETE
ADLS_ACUITY_SCORE: 20
HYGIENE/GROOMING: INDEPENDENT
ADLS_ACUITY_SCORE: 20
DRESS: SCRUBS (BEHAVIORAL HEALTH)
ADLS_ACUITY_SCORE: 20
DRESS: SCRUBS (BEHAVIORAL HEALTH)
ORAL_HYGIENE: INDEPENDENT
ADLS_ACUITY_SCORE: 20
ORAL_HYGIENE: INDEPENDENT
ADLS_ACUITY_SCORE: 20

## 2022-06-05 NOTE — PLAN OF CARE
SHIFT SUMMARY:  Denies auditory or visual hallucinations, but appears responding to internal stimuli. Denies pain, anxiety and depression. Walks the halls and watches TV. Does not attend group. Eating greater than 50% of meals, maintaining linear conversations.      Problem: Behavioral Health Plan of Care  Goal: Patient-Specific Goal (Individualization)  Description: Pt. Will consume >50% of meals provided   Pt. Will sleep 4-6 Hours Nightly   Pt. Will attempt groups daily when able     6/03/2022: Wean out of MH-ICU as long as behaviors remain appropriate. Treatment to discuss daily.     Outcome: Ongoing, Progressing  Goal: Absence of New-Onset Illness or Injury  Outcome: Ongoing, Progressing     Problem: Thought Process Alteration  Goal: Optimal Thought Clarity  Description: Pt will demonstrate Optimal Thought Clarity before discharge     Pt. Will establish and maintain linear conversations with staff  Outcome: Ongoing, Progressing     Problem: Suicidal Behavior  Goal: Suicidal Behavior is Absent or Managed  Outcome: Ongoing, Progressing   Goal Outcome Evaluation:    Plan of Care Reviewed With: patient

## 2022-06-05 NOTE — PLAN OF CARE
Problem: Behavioral Health Plan of Care  Goal: Patient-Specific Goal (Individualization)  Description: Pt. Will consume >50% of meals provided   Pt. Will sleep 4-6 Hours Nightly   Pt. Will attempt groups daily when able     6/03/2022: Wean out of MH-ICU as long as behaviors remain appropriate. Treatment to discuss daily.     Outcome: Ongoing, Progressing     Problem: Thought Process Alteration  Goal: Optimal Thought Clarity  Description: Pt will demonstrate Optimal Thought Clarity before discharge     Pt. Will establish and maintain linear conversations with staff  Outcome: Ongoing, Progressing     Problem: Suicidal Behavior  Goal: Suicidal Behavior is Absent or Managed  Outcome: Ongoing, Progressing   Goal Outcome Evaluation:    Plan of Care Reviewed With: patient        Pt. Was up and ate breakfast in dayroom, appetite is good, ate 100%, taking prescribed medications, is able to make needs be known, has been ambulating in the halls frequently, has a flat and blunted affect, denies depression, anxiety, suicidal ideation and hallucinations, appears to be responding to internal stimuli at times, will softly laugh to self on occasion, spending time resting in bed, will continue to monitor progress.    Face to face end of shift report will be communicated to oncoming afternoon shift RN.     Danica Murrieta RN  6/5/2022  11:42 AM

## 2022-06-05 NOTE — PLAN OF CARE
Face to face shift report received from nurse. Rounding completed, pt observed.    Problem: Behavioral Health Plan of Care  Goal: Patient-Specific Goal (Individualization)  Description: Pt. Will consume >50% of meals provided   Pt. Will sleep 4-6 Hours Nightly   Pt. Will attempt groups daily when able   Outcome: Ongoing, Progressing    Pt has been in bed with eyes closed and regular respirations observed all night. Will continue to monitor. Patient slept 6.25 hours.     Face to face report will be communicated to oncoming RN.    Yury nAdrade RN  6/5/2022

## 2022-06-06 LAB — VALPROATE SERPL-MCNC: 90 MG/L

## 2022-06-06 PROCEDURE — 36415 COLL VENOUS BLD VENIPUNCTURE: CPT | Performed by: NURSE PRACTITIONER

## 2022-06-06 PROCEDURE — 124N000004

## 2022-06-06 PROCEDURE — 250N000011 HC RX IP 250 OP 636: Performed by: NURSE PRACTITIONER

## 2022-06-06 PROCEDURE — 99232 SBSQ HOSP IP/OBS MODERATE 35: CPT | Performed by: NURSE PRACTITIONER

## 2022-06-06 PROCEDURE — 80164 ASSAY DIPROPYLACETIC ACD TOT: CPT | Performed by: NURSE PRACTITIONER

## 2022-06-06 PROCEDURE — 250N000013 HC RX MED GY IP 250 OP 250 PS 637: Performed by: NURSE PRACTITIONER

## 2022-06-06 RX ADMIN — DIVALPROEX SODIUM 1500 MG: 500 TABLET, FILM COATED, EXTENDED RELEASE ORAL at 20:44

## 2022-06-06 RX ADMIN — PALIPERIDONE 3 MG: 3 TABLET, EXTENDED RELEASE ORAL at 08:24

## 2022-06-06 RX ADMIN — Medication 50 MCG: at 08:24

## 2022-06-06 RX ADMIN — PALIPERIDONE PALMITATE 234 MG: 234 INJECTION INTRAMUSCULAR at 14:12

## 2022-06-06 ASSESSMENT — ACTIVITIES OF DAILY LIVING (ADL)
ADLS_ACUITY_SCORE: 20
ORAL_HYGIENE: INDEPENDENT
DRESS: SCRUBS (BEHAVIORAL HEALTH);INDEPENDENT
LAUNDRY: UNABLE TO COMPLETE
ADLS_ACUITY_SCORE: 20
HYGIENE/GROOMING: INDEPENDENT
ADLS_ACUITY_SCORE: 20

## 2022-06-06 NOTE — PROGRESS NOTES
"[facility] PSYCHIATRY  PROGRESS NOTE     SUBJECTIVE        I found Junior Fernández in the shower today. Per nursing, he continues to be responding to internal stimuli and was seen smiling and laughing into his water cup. Nursing still needs to administer LEÓN and I am hopeful the increased dose will help decrease symptoms of psychosis. Nursing also reports that his mother called the unit and stated [facility] is \"holding a bed for him.\" Nursing to inform SW of this and I know KAROLINE Mistry has been working diligently to coordinate care with patient's CM. Will try to meet with patient again tomorrow.       MEDICATIONS   Scheduled Meds:    divalproex sodium extended-release  1,500 mg Oral Daily     divalproex sodium extended-release  250 mg Oral Once     paliperidone  234 mg Intramuscular Q30 Days     paliperidone  3 mg Oral Daily     Vitamin D3  50 mcg Oral Daily     PRN Meds:.acetaminophen, haloperidol **AND** LORazepam **AND** diphenhydrAMINE, haloperidol lactate **AND** LORazepam **AND** diphenhydrAMINE, hydrOXYzine, nicotine, OLANZapine **OR** OLANZapine, traZODone     ALLERGIES   Allergies   Allergen Reactions     Seasonal Allergies      Seroquel [Quetiapine]      Fainting and slowed breathing         MENTAL STATUS EXAM   Vitals: /60   Pulse 100   Temp 98.8  F (37.1  C) (Temporal)   Resp 14   Wt 74.9 kg (165 lb 3.2 oz)   SpO2 96%   BMI 23.04 kg/m      Appearance:  Unable to assess due to patient showering  Attitude: Unable to assess due to patient showering  Eye Contact:  Unable to assess due to patient showering  Mood: Unable to assess due to patient showering  Affect:  Unable to assess due to patient showering  Speech:  Unable to assess due to patient showering  Psychomotor Behavior:  no evidence of tardive dyskinesia, dystonia, or tics - per nursing  Thought Process:  Preoccupied  Associations:  no loose associations - per nursing  Thought Content:  no evidence of suicidal ideation or " homicidal ideation and appears to be responding to internal stimuli - per nursing  Insight:  limited - per nursing  Judgment:  limited - per nursing  Oriented to:  time, person, and place - per nursing  Attention Span and Concentration:  fair - per nursing  Recent and Remote Memory:  fair - per nursing  Fund of Knowledge: appropriate - per nursing  Muscle Strength and Tone: normal - per nursing  Gait and Station: Normal - per nursing       LABS   No results found for this or any previous visit (from the past 24 hour(s)).      IMPRESSION   Pt is currently under commitment with Patrick.     Pt was discharged from station 4A at [place] two days ago and then went to [facility].   Per Crisis assessment- The following information was received from Van and Rolanda Fernández whose relationship to the patient is parents. Information was obtained in person. Their phone number is Rolanda ([phone]), Van ([phone]) and they last had contact with patient on 22.   What happened PTA: Per mom, 22 in the am he was at [facility]. He went for a bike ride by himself, even though he wasn't supposed to leave the facility for the first 10 days by himself. When he got back to [facility], he said he was afraid he was going to be tortured there and that one of the staff was his brother. Around 3:30 the counselor called mom and said that he left again and asked for Cam's cell number. They made contact with him and he was making non sensical statements. Mom states that it sounded like someone was with him trying to help him get back to [facility] or [facility] and then Cam's phone . He was telling dad random things and not making sense. He was saying an address, [address].      [facility] he was okay to see him. Told dad, 'you and I are God.' He thought Prince Coyle was talking to him. Asking dad, 'Do you think I'm crazy.'     Mother reports this is his 5th hospitalization in 6 months.  He was willing to go [facility]. They state that Cam isn't sure about being on medications. Mother reported that Cam can be at [facility] for up to 90 days.      The patient has been hospitalized in 09/2021 and 10/2021 for psychosis. EMR noted that he had a suicide attempt by cutting his left arm in the bathtub in October. The patient has a history of commitment starting in 11/2021 and ending on 05/13/2022.  Commitment was extended as of 5/9/2022.         DIAGNOSES     1.Schizoaffective disorder, Bipolar type   2.Under Civil Commitment and Nguyen         PLAN     Location: Unit 5  Legal Status: Orders Placed This Encounter      Court Hold    Safety Assessment:    Behavioral Orders   Procedures     Code 1 - Restrict to Unit     Routine Programming     As clinically indicated     Status 15     Every 15 minutes.      PTA medications held:   Lexapro 20 mg daily      PTA medications continued/changed:   Stop oral Invega 3 mg daily d/t patient getting 234 mg maintenance dose of his LEÓN  Continue Invega Sustenna, increase maintenance dose to 234 mg Q28D, next due on 6/6/22 - order entered       New medications tried and stopped:   None    New medications initiated:   Depakote 250 mg on 5/28 -> 500 mg on 5/29 ->1,000 mg on 5/30 -> 1,500 mg on 6/3       Today's Changes:  Target mood lability and impulsivity    Programming: Patient will be treated in a therapeutic milieu with appropriate individual and group therapies. Education will be provided on diagnoses, medications, and treatments.     Medical diagnoses:  Per medicine    Consult: None  Tests: VPA subtherapeutic at 47 on 6/2. VPA to be rechecked on 6/6 @ 2000    Anticipated LOS: 2-3 weeks-pt is under Civil Committment  Disposition: [facility] I[facility]S vs. [facility]. KAROLINE collaborating with CM for placement       ATTESTATION      Patient has been seen and evaluated by me,  SELINA Harding CNP

## 2022-06-06 NOTE — PLAN OF CARE
Problem: Behavioral Health Plan of Care  Goal: Plan of Care Review  Recent Flowsheet Documentation  Taken 6/6/2022 1622 by Chris Murillo RN  Plan of Care Reviewed With: patient  Patient Agreement with Plan of Care: agrees  Goal: Patient-Specific Goal (Individualization)  Description: Pt. Will consume >50% of meals provided   Pt. Will sleep 4-6 Hours Nightly   Pt. Will attempt groups daily when able       6/6/2022 1730 by Chris Murillo RN  Outcome: Ongoing, Progressing  Note: He has been up on the unit. He is social at times. He is noted to be working on a puzzle in the lounge. He did not attend group. He is guarded in conversation. He denies hallucinations but is noted to be preoccupied. He denies any intrusive thoughts. Some of his responses are delayed. He denies feeling depressed or suicidal. He denies feeling anxious. He required staff encouragement to come retrieve his meal tray. He did eat in the lounge. He went to his room to lie down after supper.  1830: patients father is here for a visit.   6/6/2022 1730 by Chris Murillo RN  Outcome: Ongoing, Progressing  Goal: Optimal Comfort and Wellbeing  Intervention: Provide Person-Centered Care  Recent Flowsheet Documentation  Taken 6/6/2022 1622 by Chris Murillo RN  Trust Relationship/Rapport:    care explained    questions answered    questions encouraged    reassurance provided    thoughts/feelings acknowledged  Goal: Develops/Participates in Therapeutic Moore to Support Successful Transition  Intervention: Foster Therapeutic Moore  Recent Flowsheet Documentation  Taken 6/6/2022 1622 by Chris Murillo RN  Trust Relationship/Rapport:    care explained    questions answered    questions encouraged    reassurance provided    thoughts/feelings acknowledged     Problem: Thought Process Alteration  Goal: Optimal Thought Clarity  Description: Pt will demonstrate Optimal Thought Clarity before discharge     Pt. Will establish and maintain linear conversations with  staff  Outcome: Ongoing, Progressing  Note: He is able to manage a linear conversation. He denies any intrusive thoughts. He is preoccupied and responses to questions are delayed at times.      Problem: Suicidal Behavior  Goal: Suicidal Behavior is Absent or Managed  Outcome: Ongoing, Progressing  Note: He denies any suicidal thinking.    Face to face end of shift report to be communicated to night shift RN.     Chris Murillo RN  6/6/2022  9:47 PM

## 2022-06-06 NOTE — PROGRESS NOTES
KAROLINE called the patient's  Gray. She explained that [facility] would let her know when there is another opening and that would be dependant on how adrián the patient is at that time.

## 2022-06-06 NOTE — PLAN OF CARE
Problem: Behavioral Health Plan of Care  Goal: Plan of Care Review  Outcome: Ongoing, Progressing  Note: Report received from Manoj. Beau complete. Pt observed sleeping in prone position with regular and unlabored respirations.    Pt has been in bed with eyes closed and regular respirations. 15 minute and PRN checks all night. No complaints offered. Will continue to monitor.    Pt slept approx 8   hours this NOC shift.    Face to face end of shift report communicated to oncoming RN.    Danica NOLASCO RN  June 6, 2022  2:43 AM          Problem: Thought Process Alteration  Goal: Optimal Thought Clarity  Description: Pt will demonstrate Optimal Thought Clarity before discharge     Pt. Will establish and maintain linear conversations with staff  Outcome: Ongoing, Progressing  Note: Unable to assess due to pt sleeping. No issues or concerns noted at this time.       Problem: Suicidal Behavior  Goal: Suicidal Behavior is Absent or Managed  Outcome: Ongoing, Progressing  Note: Unable to assess due to pt sleeping. Pt has remained free of self-harm.

## 2022-06-06 NOTE — PLAN OF CARE
Problem: Behavioral Health Plan of Care  Goal: Patient-Specific Goal (Individualization)  Description: Pt. Will consume >50% of meals provided   Pt. Will sleep 4-6 Hours Nightly   Pt. Will attempt groups daily when able     Pt up walking in the halls at the start of the shift. Pt denies all symptoms of pain, depression, SI, HI, anxiety and hallucinations. Pt states he plans on watching tv today, asked about going to groups pt states he gets bored in groups. Discussed that he will need to go to groups in an IRTS program which is his plan for discharge. Pt did agree to try to go to groups today.           Outcome: Ongoing, Progressing     Problem: Thought Process Alteration  Goal: Optimal Thought Clarity  Description: Pt will demonstrate Optimal Thought Clarity before discharge     Pt. Will establish and maintain linear conversations with staff  Outcome: Ongoing, Progressing     Problem: Suicidal Behavior  Goal: Suicidal Behavior is Absent or Managed  Outcome: Ongoing, Progressing   Goal Outcome Evaluation:    Pt denies suicidal ideation.         Face to face end of shift report communicated to evening shift RN. Reported that pt is a risk for suicide.     Ángela Mcfadden RN  6/6/2022  7:38 AM

## 2022-06-07 PROCEDURE — 250N000013 HC RX MED GY IP 250 OP 250 PS 637: Performed by: NURSE PRACTITIONER

## 2022-06-07 PROCEDURE — 99232 SBSQ HOSP IP/OBS MODERATE 35: CPT | Performed by: NURSE PRACTITIONER

## 2022-06-07 PROCEDURE — 124N000004

## 2022-06-07 RX ADMIN — Medication 50 MCG: at 08:47

## 2022-06-07 RX ADMIN — DIVALPROEX SODIUM 1500 MG: 500 TABLET, FILM COATED, EXTENDED RELEASE ORAL at 20:17

## 2022-06-07 ASSESSMENT — ACTIVITIES OF DAILY LIVING (ADL)
ADLS_ACUITY_SCORE: 20
LAUNDRY: UNABLE TO COMPLETE
ADLS_ACUITY_SCORE: 20
DRESS: SCRUBS (BEHAVIORAL HEALTH);INDEPENDENT
ORAL_HYGIENE: INDEPENDENT
ADLS_ACUITY_SCORE: 20
HYGIENE/GROOMING: INDEPENDENT

## 2022-06-07 NOTE — PLAN OF CARE
Problem: Behavioral Health Plan of Care  Goal: Plan of Care Review  Recent Flowsheet Documentation  Taken 6/7/2022 1603 by Chris Murillo RN  Plan of Care Reviewed With: patient  Patient Agreement with Plan of Care: agrees  Goal: Patient-Specific Goal (Individualization)  Description: Pt. Will consume >50% of meals provided   Pt. Will sleep 4-6 Hours Nightly   Pt. Will attempt groups daily when able       Outcome: Ongoing, Progressing  Note: He is up on the unit. He is minimally social with others. He is noted to spend time walking in the [place]. He is preoccupied and noted to be responding to internal stimuli. When asked about it, he denies hallucinations or intrusive thoughts. He continues to be delayed with some of his responses. He is guarded with his responses as well. He is anticipating that his father will visit again this evening and is looking forward to the visit.   Goal: Optimal Comfort and Wellbeing  Intervention: Provide Person-Centered Care  Recent Flowsheet Documentation  Taken 6/7/2022 1603 by Chris Murillo RN  Trust Relationship/Rapport:    care explained    questions encouraged    questions answered    reassurance provided    thoughts/feelings acknowledged  Goal: Develops/Participates in Therapeutic [place] to Support Successful Transition  Intervention: Foster Therapeutic [place]  Recent Flowsheet Documentation  Taken 6/7/2022 1603 by Chris Murillo RN  Trust Relationship/Rapport:    care explained    questions encouraged    questions answered    reassurance provided    thoughts/feelings acknowledged     Problem: Thought Process Alteration  Goal: Optimal Thought Clarity  Description: Pt will demonstrate Optimal Thought Clarity before discharge     Pt. Will establish and maintain linear conversations with staff  Outcome: Ongoing, Progressing  Note: He is guarded and preoccupied. He is circumstantial with his responses in conversation.      Problem: Suicidal Behavior  Goal: Suicidal Behavior is  Absent or Managed  Outcome: Ongoing, Progressing  Note: He denies having any suicidal thoughts. He is agreeable to safe behavior on the unit.     Face to face end of shift report to be communicated to night shift RN.     Chris Murillo RN  6/7/2022  10:56 PM

## 2022-06-07 NOTE — PLAN OF CARE
Problem: Behavioral Health Plan of Care  Goal: Patient-Specific Goal (Individualization)  Description: Pt. Will consume >50% of meals provided   Pt. Will sleep 4-6 Hours Nightly   Pt. Will attempt groups daily when able     Outcome: Ongoing, Progressing    Pt up in lounge at the start of the shift. Pt denies all symptoms of pain, anxiety, depression, HI, SI and hallucinations although pt could be seen in the lounge laughing to himself.   Pt went to bed after breakfast, pt states he is tired since getting his injection yesterday.      Problem: Thought Process Alteration  Goal: Optimal Thought Clarity  Description: Pt will demonstrate Optimal Thought Clarity before discharge     Pt. Will establish and maintain linear conversations with staff  Outcome: Ongoing, Progressing     Problem: Suicidal Behavior  Goal: Suicidal Behavior is Absent or Managed  Outcome: Ongoing, Progressing   Goal Outcome Evaluation:    Plan of Care Reviewed With: patient      Face to face end of shift report communicated to evening shift RN. Reported that pt is a risk for suicide.     Ángela Mcfadden, RN  6/7/2022  7:40 AM

## 2022-06-07 NOTE — PROGRESS NOTES
[facility] PSYCHIATRY  PROGRESS NOTE     SUBJECTIVE        I found Cam pacing the halls today. He denies all mental health issues, but is observed laughing and smiling to himself and appears to be responding to internal stimuli. He denies any side effects from his Invega Sustenna injection yesterday. Apurva VÁSQUEZ is considering taking him back, but I know KAROLINE Mistry has been working diligently to coordinate care with patient's CM, so more to come on that. Patient denies suicidal or homicidal ideation. Has not been a behavioral problem.        MEDICATIONS   Scheduled Meds:    divalproex sodium extended-release  1,500 mg Oral Daily     divalproex sodium extended-release  250 mg Oral Once     paliperidone  234 mg Intramuscular Q30 Days     Vitamin D3  50 mcg Oral Daily     PRN Meds:.acetaminophen, haloperidol **AND** LORazepam **AND** diphenhydrAMINE, haloperidol lactate **AND** LORazepam **AND** diphenhydrAMINE, hydrOXYzine, nicotine, OLANZapine **OR** OLANZapine, traZODone     ALLERGIES   Allergies   Allergen Reactions     Seasonal Allergies      Seroquel [Quetiapine]      Fainting and slowed breathing         MENTAL STATUS EXAM   Vitals: /62 (BP Location: Right arm)   Pulse 87   Temp 99.5  F (37.5  C) (Temporal)   Resp 16   Wt 74.9 kg (165 lb 3.2 oz)   SpO2 98%   BMI 23.04 kg/m      Appearance:  awake, alert, adequately groomed and dressed in hospital scrubs  Attitude:  guarded  Eye Contact:  fair  Mood:  good  Affect:  appropriate and in normal range  Speech:  clear, coherent  Psychomotor Behavior:  no evidence of tardive dyskinesia, dystonia, or tics, does pace the halls often  Thought Process:  goal oriented  Associations:  no loose associations  Thought Content:  no evidence of suicidal ideation or homicidal ideation and patient appears to be responding to internal stimuli  Insight:  limited  Judgment:  limited  Oriented to:  time, person, and place  Attention Span and Concentration:   limited  Recent and Remote Memory:  limited  Fund of Knowledge: appropriate  Muscle Strength and Tone: normal  Gait and Station: Normal       LABS   Recent Results (from the past 24 hour(s))   Valproic acid    Collection Time: 22  7:38 PM   Result Value Ref Range    Valproic acid 90   mg/L         IMPRESSION   Pt is currently under commitment with [facility].     Pt was discharged from station 4A at [facility] two days ago and then went to [facility].   Per Crisis assessment- The following information was received from Van and Rolanda Fernández whose relationship to the patient is parents. Information was obtained in person. Their phone number is Rolanda ([phone]), Van ([phone]) and they last had contact with patient on 22.   What happened PTA: Per mom, 22 in the am he was at [facility]. He went for a bike ride by himself, even though he wasn't supposed to leave the facility for the first 10 days by himself. When he got back to [facility], he said he was afraid he was going to be tortured there and that one of the staff was his brother. Around 3:30 the counselor called mom and said that he left again and asked for Cam's cell number. They made contact with him and he was making non sensical statements. Mom states that it sounded like someone was with him trying to help him get back to [facility] or [facility] and then Cam's phone . He was telling dad random things and not making sense. He was saying an address, [address].      [facility] okay to see him. Told dad, 'you and I are God.' He thought Prince Coyle was talking to him. Asking dad, 'Do you think I'm crazy.'     Mother reports this is his 5th hospitalization in 6 months. He was willing to go [facility]. They state that Cam isn't sure about being on medications. Mother reported that Cam can be at [facility] for up to 90 days.      The patient has been hospitalized in 2021 and 10/2021 for  psychosis. EMR noted that he had a suicide attempt by cutting his left arm in the bathtub in October. The patient has a history of commitment starting in 11/2021 and ending on 05/13/2022.  Commitment was extended as of 5/9/2022.         DIAGNOSES     1.Schizoaffective disorder, Bipolar type   2.Under Civil Commitment and Nguyen         PLAN     Location: Unit 5  Legal Status: Orders Placed This Encounter      Court Hold    Safety Assessment:    Behavioral Orders   Procedures     Code 1 - Restrict to Unit     Routine Programming     As clinically indicated     Status 15     Every 15 minutes.      PTA medications held:   Lexapro 20 mg daily      PTA medications continued/changed:   Stop oral Invega 3 mg daily d/t patient getting 234 mg maintenance dose of his LEÓN  Continue Invega Sustenna, maintenance dose increased from 156 -> 234 mg Q28D, next injection due on 7/4/22      New medications tried and stopped:   None    New medications initiated:   Depakote 250 mg on 5/28 -> 500 mg on 5/29 ->1,000 mg on 5/30 -> 1,500 mg on 6/3       Today's Changes:  No changes made    Programming: Patient will be treated in a therapeutic milieu with appropriate individual and group therapies. Education will be provided on diagnoses, medications, and treatments.     Medical diagnoses:  Per medicine    Consult: None  Tests: VPA subtherapeutic at 47 on 6/2. VPA on 6/6 is 90    Anticipated LOS: 2-3 weeks-pt is under Civil Committment  Disposition: [facility] IRTS vs. [facility] IRTS. KAROLINE collaborating with CM for placement       ATTESTATION      Patient has been seen and evaluated by me,  SELINA Harding CNP

## 2022-06-07 NOTE — PLAN OF CARE
Problem: Behavioral Health Plan of Care  Goal: Patient-Specific Goal (Individualization)  Description: Pt. Will consume >50% of meals provided   Pt. Will sleep 4-6 Hours Nightly   Pt. Will attempt groups daily when able       Outcome: Ongoing, Progressing  Note: Report received from Gurdeep griffin. Pt observed sleeping in left side lying position with regular and unlabored respirations.    Pt has been in bed with eyes closed and regular respirations. 15 minute and PRN checks all night. No complaints offered. Will continue to monitor.    Pt slept approx 5.5   hours this NOC shift.    Face to face end of shift report communicated to oncoming RN.    Danica NOLASCO RN  June 7, 2022  4:19 AM          Problem: Thought Process Alteration  Goal: Optimal Thought Clarity  Description: Pt will demonstrate Optimal Thought Clarity before discharge     Pt. Will establish and maintain linear conversations with staff  Outcome: Ongoing, Progressing  Note: Unable to assess due to pt sleeping. No issues or concerns noted at this time.       Problem: Suicidal Behavior  Goal: Suicidal Behavior is Absent or Managed  Outcome: Ongoing, Progressing  Note: Unable to assess due to pt sleeping. Pt has remained free of self-harm.     Goal Outcome Evaluation:

## 2022-06-08 PROCEDURE — 124N000004

## 2022-06-08 PROCEDURE — 250N000013 HC RX MED GY IP 250 OP 250 PS 637: Performed by: NURSE PRACTITIONER

## 2022-06-08 RX ORDER — BENZTROPINE MESYLATE 1 MG/1
1 TABLET ORAL 2 TIMES DAILY PRN
Status: DISCONTINUED | OUTPATIENT
Start: 2022-06-08 | End: 2022-07-14 | Stop reason: HOSPADM

## 2022-06-08 RX ORDER — VITAMIN B COMPLEX
50 TABLET ORAL AT BEDTIME
Status: DISCONTINUED | OUTPATIENT
Start: 2022-06-09 | End: 2022-07-14 | Stop reason: HOSPADM

## 2022-06-08 RX ADMIN — DIVALPROEX SODIUM 1500 MG: 500 TABLET, FILM COATED, EXTENDED RELEASE ORAL at 20:09

## 2022-06-08 RX ADMIN — Medication 50 MCG: at 09:05

## 2022-06-08 ASSESSMENT — ACTIVITIES OF DAILY LIVING (ADL)
ADLS_ACUITY_SCORE: 20
LAUNDRY: UNABLE TO COMPLETE
DRESS: SCRUBS (BEHAVIORAL HEALTH);INDEPENDENT
ORAL_HYGIENE: INDEPENDENT
ADLS_ACUITY_SCORE: 20
HYGIENE/GROOMING: INDEPENDENT
ADLS_ACUITY_SCORE: 20

## 2022-06-08 NOTE — PLAN OF CARE
"Problem: Behavioral Health Plan of Care  Goal: Plan of Care Review  Recent Flowsheet Documentation  Taken 6/8/2022 1652 by Chris Murillo RN  Plan of Care Reviewed With: patient  Patient Agreement with Plan of Care: agrees  Goal: Patient-Specific Goal (Individualization)  Description: Pt. Will consume >50% of meals provided   Pt. Will sleep 4-6 Hours Nightly   Pt. Will attempt groups daily when able       Outcome: Ongoing, Progressing  Note: He is up on the unit and is noted to be walking in the halls. He is preoccupied and responding to internal stimuli. He is smiling and laughing to himself as he is walking in the hallway. When asked what he is smiling and laughing about, he states \"nothing, just happy\". He denies feeling depressed or suicidal. He talks of having good visits with his father the last couple of days. He is encouraged to attend group but declines. He is taking his medication as prescribed. He denies any medication side effects.   2245: patient continues to be walking in the hallway. He is preoccupied and responding to internal stimuli. He is offered trazodone for sleep and declines. When asked about being preoccupied, laughing and smiling to himself he says \"yeah\". Then asked if he could elaborate on his thoughts and he declined.   Goal: Optimal Comfort and Wellbeing  Intervention: Provide Person-Centered Care  Recent Flowsheet Documentation  Taken 6/8/2022 1652 by Chris Murillo RN  Trust Relationship/Rapport:    care explained    questions answered    questions encouraged    reassurance provided    thoughts/feelings acknowledged  Goal: Develops/Participates in Therapeutic [place] to Support Successful Transition  Intervention: Foster Therapeutic [place]  Recent Flowsheet Documentation  Taken 6/8/2022 1652 by Chris Murillo RN  Trust Relationship/Rapport:    care explained    questions answered    questions encouraged    reassurance provided    thoughts/feelings acknowledged     Problem: Thought " Process Alteration  Goal: Optimal Thought Clarity  Description: Pt will demonstrate Optimal Thought Clarity before discharge     Pt. Will establish and maintain linear conversations with staff  Outcome: Ongoing, Progressing  Note: He is preoccupied and responding to internal stimuli. He continues with poor insight. He denies hallucinations or intrusive thoughts.      Problem: Suicidal Behavior  Goal: Suicidal Behavior is Absent or Managed  Outcome: Ongoing, Progressing  Note: He denies any suicidal thinking. He is agreeable to safe behavior on the unit.    Face to face end of shift report to be communicated to night shift RN.     Chris Murillo RN  6/8/2022  8:50 PM

## 2022-06-08 NOTE — PLAN OF CARE
Problem: Behavioral Health Plan of Care  Goal: Patient-Specific Goal (Individualization)  Description: Pt. Will consume >50% of meals provided   Pt. Will sleep 4-6 Hours Nightly   Pt. Will attempt groups daily when able   Outcome: Ongoing, Progressing    Pt walking in the halls at the start of the shift. Pt denies all symptoms of pain, anxiety, depression, SI, HI and hallucinations. Pt given his shoes out of his belongings with laces taken out.   Pt in bed after breakfast.      Problem: Thought Process Alteration  Goal: Optimal Thought Clarity  Description: Pt will demonstrate Optimal Thought Clarity before discharge     Pt. Will establish and maintain linear conversations with staff  Outcome: Ongoing, Progressing     Problem: Suicidal Behavior  Goal: Suicidal Behavior is Absent or Managed  Outcome: Ongoing, Progressing   Goal Outcome Evaluation:    Plan of Care Reviewed With: patient        Face to face end of shift report communicated to evening shift RN. Reported that pt is a risk for suicide.     Ángela Mcfadden, RN  6/8/2022  8:01 AM

## 2022-06-08 NOTE — PLAN OF CARE
Problem: Behavioral Health Plan of Care  Goal: Patient-Specific Goal (Individualization)  Description: Pt. Will consume >50% of meals provided   Pt. Will sleep 4-6 Hours Nightly   Pt. Will attempt groups daily when able       Outcome: Ongoing, Progressing  Note: Report received from Gurdeep griffin. Pt observed sleeping in left side lying position with regular and unlabored respirations.    Pt has been in bed with eyes closed and regular respirations. 15 minute and PRN checks all night. No complaints offered. Will continue to monitor.    Pt slept approx 6.5   hours this NOC shift.    Face to face end of shift report communicated to oncoming RN.    Danica NOLASCO RN  June 8, 2022  1:29 AM          Problem: Thought Process Alteration  Goal: Optimal Thought Clarity  Description: Pt will demonstrate Optimal Thought Clarity before discharge     Pt. Will establish and maintain linear conversations with staff  Outcome: Ongoing, Progressing  Note: Unable to assess due to pt sleeping. No issues or concerns noted at this time.       Problem: Suicidal Behavior  Goal: Suicidal Behavior is Absent or Managed  Outcome: Ongoing, Progressing  Note: Unable to assess due to pt sleeping. Pt has remained free of self-harm.     Goal Outcome Evaluation:

## 2022-06-09 PROCEDURE — 99232 SBSQ HOSP IP/OBS MODERATE 35: CPT | Performed by: NURSE PRACTITIONER

## 2022-06-09 PROCEDURE — 124N000004

## 2022-06-09 PROCEDURE — 250N000013 HC RX MED GY IP 250 OP 250 PS 637: Performed by: NURSE PRACTITIONER

## 2022-06-09 RX ADMIN — DIVALPROEX SODIUM 1500 MG: 500 TABLET, FILM COATED, EXTENDED RELEASE ORAL at 20:05

## 2022-06-09 RX ADMIN — Medication 50 MCG: at 20:05

## 2022-06-09 ASSESSMENT — ACTIVITIES OF DAILY LIVING (ADL)
ADLS_ACUITY_SCORE: 20
HYGIENE/GROOMING: INDEPENDENT
ADLS_ACUITY_SCORE: 20

## 2022-06-09 NOTE — PROGRESS NOTES
"[facility] PSYCHIATRY  PROGRESS NOTE     SUBJECTIVE        Junior is up walking the halls when I see him today. He denies any hallucinations or racing thoughts. He denies feeling depressed or anxious. He denies that he has ever heard voices. When asked about why he has been whispering and laughing to himself he indicates that he is aware of this though is unable to indicate why he is doing this and denies that he is speaking or laughing to anyone.  He asks whether I have heard anything from [facility] IRTS yet. When asked about his reason for initially leaving the IRTS placement he replies \"because of my delusions\". He reports that he has been sleeping well at night. He denies any side effects from medications. Did recently receive increased dose of Invega Sustenna  mg on 6/6/22. He has been out of his room more, though has not been attending groups. He does request to take all of his medications once a day at bedtime, will change Vitamin D to bedtime along with his already scheduled Depakote.        MEDICATIONS   Scheduled Meds:    divalproex sodium extended-release  1,500 mg Oral Daily     paliperidone  234 mg Intramuscular Q30 Days     Vitamin D3  50 mcg Oral At Bedtime     PRN Meds:.acetaminophen, benztropine, haloperidol **AND** LORazepam **AND** diphenhydrAMINE, haloperidol lactate **AND** LORazepam **AND** diphenhydrAMINE, hydrOXYzine, nicotine, OLANZapine **OR** OLANZapine, traZODone     ALLERGIES   Allergies   Allergen Reactions     Seasonal Allergies      Seroquel [Quetiapine]      Fainting and slowed breathing         MENTAL STATUS EXAM   Vitals: /68   Pulse 89   Temp 97.8  F (36.6  C) (Temporal)   Resp 16   Wt 74.9 kg (165 lb 3.2 oz)   SpO2 97%   BMI 23.04 kg/m      Appearance:  awake, alert, adequately groomed and dressed in hospital scrubs  Attitude:  guarded, cooperative  Eye Contact:  fair  Mood: \"good\", denies feeling depressed or anxious  Affect:  appropriate and in normal " range  Speech:  clear, coherent, slight delay noted  Psychomotor Behavior:  no evidence of tardive dyskinesia, dystonia, or tics, does pace the halls often  Thought Process:  goal oriented  Associations:  no loose associations  Thought Content:  no evidence of suicidal ideation or homicidal ideation and patient appears to be responding to internal stimuli, denies hallucinations  Insight:  limited  Judgment:  limited  Oriented to:  time, person, and place  Attention Span and Concentration:  limited  Recent and Remote Memory:  limited  Fund of Knowledge: appropriate for education  Muscle Strength and Tone: normal  Gait and Station: Normal       LABS   No results found for this or any previous visit (from the past 24 hour(s)).      IMPRESSION   Pt is currently under commitment with [employer].     Pt was discharged from station 4A at [place] two days ago and then went to [facility].   Per Crisis assessment- The following information was received from Van and Rolanda Fernández whose relationship to the patient is parents. Information was obtained in person. Their phone number is Rolanda ([phone]), Van ([phone]) and they last had contact with patient on 22.   What happened PTA: Per mom, 22 in the AM he was at [facility]. He went for a bike ride by himself, even though he wasn't supposed to leave the facility for the first 10 days by himself. When he got back to [facility], he said he was afraid he was going to be tortured there and that one of the staff was his brother. Around 3:30 the counselor called mom and said that he left again and asked for Cam's cell number. They made contact with him and he was making non sensical statements. Mom states that it sounded like someone was with him trying to help him get back to [facility] or [facility] and then Cam's phone . He was telling dad random things and not making sense. He was saying an address, [address].      When dad got here at the  hosptial, he was okay to see him. Told dad, 'you and I are God.' He thought Prince Coyle was talking to him. Asking dad, 'Do you think I'm crazy.'     Mother reports this is his 5th hospitalization in 6 months. He was willing to go [facility]. They state that Cam isn't sure about being on medications. Mother reported that Cam can be at [facility] for up to 90 days.      The patient has been hospitalized in 09/2021 and 10/2021 for psychosis. EMR noted that he had a suicide attempt by cutting his left arm in the bathtub in October. The patient has a history of commitment starting in 11/2021 and ending on 05/13/2022.  Commitment was extended as of 5/9/2022.         DIAGNOSES     1.Schizoaffective disorder, Bipolar type   2.Under Civil Commitment and Nguyen         PLAN     Location: Unit 5  Legal Status: committed with Nguyen (Zyprexa, Abilify, Prolixin, Invega)    Safety Assessment:    Behavioral Orders   Procedures     Code 1 - Restrict to Unit     Routine Programming     As clinically indicated     Status 15     Every 15 minutes.      PTA medications held:   Lexapro 20 mg daily      PTA medications continued/changed:   Continue Invega Sustenna, maintenance dose increased from 156 -> 234 mg Q28D on 6/6/22, next injection due on 7/4/22      New medications tried and stopped:   None    New medications initiated:   Depakote 250 mg on 5/28 -> 500 mg on 5/29 ->1,000 mg on 5/30 -> 1,500 mg on 6/3   Add Cogentin 1 mg BID prn EPSE    Today's Changes:  No changes made    Programming: Patient will be treated in a therapeutic milieu with appropriate individual and group therapies. Education will be provided on diagnoses, medications, and treatments.     Medical diagnoses:  Per medicine    Consult: None  Tests: VPA subtherapeutic at 47 on 6/2. VPA on 6/6 is 90    Anticipated LOS: 2-3 weeks-pt is under Civil Committment  Disposition: [facility] IRTS vs. [facility] IRTS. KAROLINE collaborating with CM for placement       ATTESTATION      Patient  has been seen and evaluated by me,  Marilu Del Toro, NP

## 2022-06-09 NOTE — PLAN OF CARE
Problem: Behavioral Health Plan of Care  Goal: Patient-Specific Goal (Individualization)  Description: Pt. Will consume >50% of meals provided   Pt. Will sleep 4-6 Hours Nightly   Pt. Will attempt groups daily when able       Outcome: Ongoing, Progressing  Note: Report received from Gurdeep griffin. Pt observed sleeping in right side lying position with regular and unlabored respirations.    Pt has been in bed with eyes closed and regular respirations. 15 minute and PRN checks all night. No complaints offered. Will continue to monitor.    Pt slept approx 7.5   hours this NOC shift.    Face to face end of shift report communicated to oncoming RN.    Danica NOLASCO RN  June 9, 2022  1:44 AM          Problem: Thought Process Alteration  Goal: Optimal Thought Clarity  Description: Pt will demonstrate Optimal Thought Clarity before discharge     Pt. Will establish and maintain linear conversations with staff  Outcome: Ongoing, Progressing  Note: Unable to assess due to pt sleeping. No issues or concerns noted at this time.       Problem: Suicidal Behavior  Goal: Suicidal Behavior is Absent or Managed  Outcome: Ongoing, Progressing  Note: Unable to assess due to pt sleeping. Pt has remained free of self-harm.     Goal Outcome Evaluation:

## 2022-06-09 NOTE — PLAN OF CARE
Problem: Behavioral Health Plan of Care  Goal: Patient-Specific Goal (Individualization)  Description: Pt. Will consume >50% of meals provided   Pt. Will sleep 4-6 Hours Nightly   Pt. Will attempt groups daily when able       Outcome: Ongoing, Progressing  Goal: Adheres to Safety Considerations for Self and Others  Intervention: Develop and Maintain Individualized Safety Plan  Recent Flowsheet Documentation  Taken 6/9/2022 0811 by Lucia Calix RN  Safety Measures: safety rounds completed  Goal: Absence of New-Onset Illness or Injury  Intervention: Identify and Manage Fall Risk  Recent Flowsheet Documentation  Taken 6/9/2022 0811 by Lucia Calix RN  Safety Measures: safety rounds completed   Goal Outcome Evaluation:      Pt calm and cooperative. Appears to smile to self with occasionally whispering noted. Denies auditory hallucinations when questioned. Does not socialize with peers but is walking the halls frequently. Denies depression or anxiety- no statements of suicidal thoughts or plans.     Face to face end of shift report communicated to RODDY Calix RN  6/9/2022  9:28 AM

## 2022-06-09 NOTE — PLAN OF CARE
"  Problem: Behavioral Health Plan of Care  Goal: Plan of Care Review  Recent Flowsheet Documentation  Taken 6/9/2022 1620 by Chris Murillo RN  Plan of Care Reviewed With: patient  Patient Agreement with Plan of Care: agrees  Goal: Patient-Specific Goal (Individualization)  Description: Pt. Will consume >50% of meals provided   Pt. Will sleep 4-6 Hours Nightly   Pt. Will attempt groups daily when able       Outcome: Ongoing, Progressing  Note: He is up on the unit and walking in the hallway. He is noted to be responding to internal stimuli. He is smiling and laughing to himself. He denies responding to internal stimuli or having any hallucinations when asked about it.  \"No, I'm just happy\". He doesn't offer conversation. He is maintaining appropriate boundaries with staff and peers. He is taking his medications as prescribed. He denies any medication side effects.  2050: patient's mother called and was updated on patients condition. Patient is now talking with his mother on the phone. Patients mother plans on setting up a visit for this weekend.    Goal: Optimal Comfort and Wellbeing  Intervention: Provide Person-Centered Care  Recent Flowsheet Documentation  Taken 6/9/2022 1620 by Chris Murillo RN  Trust Relationship/Rapport:    care explained    questions answered    questions encouraged    reassurance provided    thoughts/feelings acknowledged  Goal: Develops/Participates in Therapeutic Marvin to Support Successful Transition  Intervention: Foster Therapeutic Marvin  Recent Flowsheet Documentation  Taken 6/9/2022 1620 by Chris Murillo RN  Trust Relationship/Rapport:    care explained    questions answered    questions encouraged    reassurance provided    thoughts/feelings acknowledged     Problem: Thought Process Alteration  Goal: Optimal Thought Clarity  Description: Pt will demonstrate Optimal Thought Clarity before discharge     Pt. Will establish and maintain linear conversations with staff  Outcome: " Ongoing, Progressing  Note: He is preoccupied and responding to internal stimuli. He answers question with short answers. He doesn't elaborate.      Problem: Suicidal Behavior  Goal: Suicidal Behavior is Absent or Managed  Outcome: Ongoing, Progressing  Note: He denies having suicidal thinking. He is agreeable to safe behavior at this time.    Face to face end of shift report to be communicated to night shift RN.     Chris Murillo RN  6/9/2022  8:58 PM

## 2022-06-10 PROCEDURE — 124N000004

## 2022-06-10 PROCEDURE — 250N000013 HC RX MED GY IP 250 OP 250 PS 637: Performed by: NURSE PRACTITIONER

## 2022-06-10 RX ADMIN — DIVALPROEX SODIUM 1500 MG: 500 TABLET, FILM COATED, EXTENDED RELEASE ORAL at 20:32

## 2022-06-10 RX ADMIN — Medication 50 MCG: at 20:32

## 2022-06-10 ASSESSMENT — ACTIVITIES OF DAILY LIVING (ADL)
HYGIENE/GROOMING: INDEPENDENT
DRESS: SCRUBS (BEHAVIORAL HEALTH)
ADLS_ACUITY_SCORE: 20
ORAL_HYGIENE: INDEPENDENT
HYGIENE/GROOMING: INDEPENDENT
ADLS_ACUITY_SCORE: 20
ADLS_ACUITY_SCORE: 20

## 2022-06-10 NOTE — PLAN OF CARE
Problem: Behavioral Health Plan of Care  Goal: Patient-Specific Goal (Individualization)  Description: Pt. Will consume >50% of meals provided   Pt. Will sleep 4-6 Hours Nightly   Pt. Will attempt groups daily when able       Outcome: Ongoing, Progressing  Note:     1610 Patient is up on the unit and walking the halls by himself. Patient has a limited interaction with peers. When talking with writer patient simply answers the questions asked of him with a few words only and does not elaborate on condition. When asked if he heard voices he denies this but he frequently is seen smiling laughing or talking to himself in the hallway. Patient denies depression or thoughts of suicide. Patient denies physical problems or problems with sleep. Patient is pleasant and has no behavioral issues at this time.    2110 Patient took HS medications without incident. Continues to pace in the hallway while grinning widely. Patients mother continued the unit stating she intends to come up on Sunday through Tuesday and requests to visit Sunday evening at 6-730 and Monday also. Requests to visit at 1 pm prior to her return home. Patient is aware.     Face to face end of shift report communicated to 11-7 shift RN.     Monika Saucedo RN  6/10/2022  9:10 PM            Problem: Thought Process Alteration  Goal: Optimal Thought Clarity  Description: Pt will demonstrate Optimal Thought Clarity before discharge     Pt. Will establish and maintain linear conversations with staff  Outcome: Ongoing, Progressing     Problem: Suicidal Behavior  Goal: Suicidal Behavior is Absent or Managed  Outcome: Ongoing, Progressing      Goal Outcome Evaluation:    Plan of Care Reviewed With: patient

## 2022-06-10 NOTE — PROGRESS NOTES
KAROLINE emailed and left the patient's  another message asking her to add the patient to the [facility] list.

## 2022-06-10 NOTE — PROGRESS NOTES
KAROLINE called and left a message with the patients  asking her to put the patient on the [facility] list. Explained in the messages that KAROLINE contacted [facility] and the patient was not yet on the list.

## 2022-06-10 NOTE — PLAN OF CARE
Problem: Behavioral Health Plan of Care  Goal: Patient-Specific Goal (Individualization)  Description: Pt. Will consume >50% of meals provided   Pt. Will sleep 4-6 Hours Nightly   Pt. Will attempt groups daily when able       Outcome: Ongoing, Progressing     Problem: Thought Process Alteration  Goal: Optimal Thought Clarity  Description: Pt will demonstrate Optimal Thought Clarity before discharge     Pt. Will establish and maintain linear conversations with staff  Outcome: Ongoing, Progressing     Problem: Suicidal Behavior  Goal: Suicidal Behavior is Absent or Managed  Outcome: Ongoing, Progressing   Goal Outcome Evaluation:      Pt spends most of his day walking the halls smiling to self. Is pleasant and calm during assessment. No depression, anxiety or suicidal thoughts. Denies auditory or visual hallucinations but appears to be responding to internal stimuli. Denies pain .     Face to face end of shift report communicated to RODDY Calix RN  6/10/2022  11:52 AM

## 2022-06-10 NOTE — PLAN OF CARE
Problem: Behavioral Health Plan of Care  Goal: Patient-Specific Goal (Individualization)  Description: Pt. Will consume >50% of meals provided   Pt. Will sleep 4-6 Hours Nightly   Pt. Will attempt groups daily when able       Outcome: Ongoing, Progressing  Note: Report received from Gurdeep griffin. Pt observed sleeping in right side lying position with regular and unlabored respirations.    Pt has been in bed with eyes closed and regular respirations. 15 minute and PRN checks all night. No complaints offered. Will continue to monitor.    Pt slept approx  7.5  hours this NOC shift.    Face to face end of shift report communicated to oncoming RN.    Danica NOLASCO RN  Shirin 10, 2022  4:24 AM          Problem: Thought Process Alteration  Goal: Optimal Thought Clarity  Description: Pt will demonstrate Optimal Thought Clarity before discharge     Pt. Will establish and maintain linear conversations with staff  Outcome: Ongoing, Progressing  Note: Unable to assess due to pt sleeping. No issues or concerns noted at this time.       Problem: Suicidal Behavior  Goal: Suicidal Behavior is Absent or Managed  Outcome: Ongoing, Progressing  Note: Unable to assess due to pt sleeping. Pt has remained free of self-harm.     Goal Outcome Evaluation:

## 2022-06-10 NOTE — PROGRESS NOTES
1:1 time with the patient.  No changes made to the discharge plan at this time.   See the AVS for follow up appointments and recommendations.       SW spoke to the patient. He stated he was feeling better and he walked the gould smiling to himself and laughing.

## 2022-06-10 NOTE — PROGRESS NOTES
KAROLINE submitted [facility] referral to [facility].       KAROLINE called [facility] and let them know that the patient was not able to make the current appointments and would be rescheduled at discharge.

## 2022-06-11 PROCEDURE — 99232 SBSQ HOSP IP/OBS MODERATE 35: CPT | Performed by: NURSE PRACTITIONER

## 2022-06-11 PROCEDURE — 250N000013 HC RX MED GY IP 250 OP 250 PS 637: Performed by: NURSE PRACTITIONER

## 2022-06-11 PROCEDURE — 124N000004

## 2022-06-11 RX ADMIN — DIVALPROEX SODIUM 1500 MG: 500 TABLET, FILM COATED, EXTENDED RELEASE ORAL at 21:09

## 2022-06-11 RX ADMIN — Medication 50 MCG: at 21:09

## 2022-06-11 ASSESSMENT — ACTIVITIES OF DAILY LIVING (ADL)
ADLS_ACUITY_SCORE: 20
ORAL_HYGIENE: INDEPENDENT
LAUNDRY: UNABLE TO COMPLETE
ADLS_ACUITY_SCORE: 20
DRESS: SCRUBS (BEHAVIORAL HEALTH);INDEPENDENT
ADLS_ACUITY_SCORE: 20
HYGIENE/GROOMING: INDEPENDENT
ADLS_ACUITY_SCORE: 20

## 2022-06-11 NOTE — PLAN OF CARE
Problem: Behavioral Health Plan of Care  Goal: Patient-Specific Goal (Individualization)  Description: Pt. Will consume >50% of meals provided   Pt. Will sleep 4-6 Hours Nightly   Pt. Will attempt groups daily when able       Outcome: Ongoing, Progressing  Note: Report received from Monika griffin. Pt observed sleeping in right side lying position with regular and unlabored respirations.    Pt has been in bed with eyes closed and regular respirations. 15 minute and PRN checks all night. No complaints offered. Will continue to monitor.    Pt slept approx  8  hours this NOC shift.    Face to face end of shift report communicated to oncoming RN.    Danica NOLASCO RN  June 11, 2022  3:36 AM          Problem: Thought Process Alteration  Goal: Optimal Thought Clarity  Description: Pt will demonstrate Optimal Thought Clarity before discharge     Pt. Will establish and maintain linear conversations with staff  Outcome: Ongoing, Progressing  Note: Unable to assess due to pt sleeping. No issues or concerns noted at this time.       Problem: Suicidal Behavior  Goal: Suicidal Behavior is Absent or Managed  Outcome: Ongoing, Progressing  Note: Unable to assess due to pt sleeping. Pt has remained free of self-harm.     Goal Outcome Evaluation:

## 2022-06-11 NOTE — PROGRESS NOTES
"[facility] PSYCHIATRY  PROGRESS NOTE     SUBJECTIVE        Junior is resting in bed when I see him today. Odd affect at times, smiling to himself. I did not observe him to be talking to himself at all today. Patient denies any side effects from Invega or Depakote. Denies feeling restless and reports that he has been sleeping well at night. He denies any hallucinations though question whether he is responding to internal stimuli. He does spend time out of his room and walking the halls. He does report that his mother is coming to visit tomorrow and he is looking forward to this. I believe that she plans on staying in the area a few days. After their meetings will see what her thoughts are in regards to whether there has been improvement in patient presentation. There have been no reported incidents of agitation or aggression since 5/30.     MEDICATIONS   Scheduled Meds:    divalproex sodium extended-release  1,500 mg Oral Daily     paliperidone  234 mg Intramuscular Q30 Days     Vitamin D3  50 mcg Oral At Bedtime     PRN Meds:.acetaminophen, benztropine, haloperidol **AND** LORazepam **AND** diphenhydrAMINE, haloperidol lactate **AND** LORazepam **AND** diphenhydrAMINE, hydrOXYzine, nicotine, OLANZapine **OR** OLANZapine, traZODone     ALLERGIES   Allergies   Allergen Reactions     Seasonal Allergies      Seroquel [Quetiapine]      Fainting and slowed breathing         MENTAL STATUS EXAM   Vitals: /69   Pulse 85   Temp 98.4  F (36.9  C) (Temporal)   Resp 14   Wt 74.9 kg (165 lb 3.2 oz)   SpO2 98%   BMI 23.04 kg/m      Appearance:  awake, alert, adequately groomed and dressed in hospital scrubs  Attitude:  guarded, cooperative  Eye Contact:  fair, intermittent  Mood: \"fine\", denies feeling depressed or anxious  Affect:  appropriate and in normal range  Speech:  clear, coherent  Psychomotor Behavior:  no evidence of tardive dyskinesia, dystonia, or tics, no abnormal or involuntary movements " noted  Thought Process:  goal oriented  Associations:  no loose associations  Thought Content:  no evidence of suicidal ideation or homicidal ideation and patient appears to be responding to internal stimuli, denies hallucinations  Insight:  limited  Judgment:  limited  Oriented to:  time, person, and place  Attention Span and Concentration:  limited  Recent and Remote Memory:  limited  Fund of Knowledge: appropriate for education  Muscle Strength and Tone: normal  Gait and Station: Normal       LABS   No results found for this or any previous visit (from the past 24 hour(s)).      IMPRESSION   Pt is currently under commitment with Patrick.     Pt was discharged from station 4A at [facility] two days ago and then went to [facility].   Per Crisis assessment- The following information was received from Van and Rolanda Fernández whose relationship to the patient is parents. Information was obtained in person. Their phone number is Rolanda ([phone]), Van ([phone]) and they last had contact with patient on 22.   What happened PTA: Per mom, 22 in the AM he was at [facility]. He went for a bike ride by himself, even though he wasn't supposed to leave the facility for the first 10 days by himself. When he got back to [facility], he said he was afraid he was going to be tortured there and that one of the staff was his brother. Around 3:30 the counselor called mom and said that he left again and asked for Cam's cell number. They made contact with him and he was making non sensical statements. Mom states that it sounded like someone was with him trying to help him get back to [facility] or [facility] and then Cam's phone . He was telling dad random things and not making sense. He was saying an address, [address].      When dad got here at the [facility], he was okay to see him. Told dad, 'you and I are God.' He thought Prince Coyle was talking to him. Asking dad, 'Do you think I'm  tania.'     Mother reports this is his 5th hospitalization in 6 months. He was willing to go [facility]. They state that Cam isn't sure about being on medications. Mother reported that Cam can be at [facility] for up to 90 days.      The patient has been hospitalized in 09/2021 and 10/2021 for psychosis. EMR noted that he had a suicide attempt by cutting his left arm in the bathtub in October. The patient has a history of commitment starting in 11/2021 and ending on 05/13/2022.  Commitment was extended as of 5/9/2022.         DIAGNOSES     1.Schizoaffective disorder, Bipolar type   2.Under Civil Commitment and Nguyen         PLAN     Location: Unit 5  Legal Status: committed with Nguyen (Zyprexa, Abilify, Prolixin, Invega)    Safety Assessment:    Behavioral Orders   Procedures     Code 1 - Restrict to Unit     Routine Programming     As clinically indicated     Status 15     Every 15 minutes.      PTA medications held:   Lexapro 20 mg daily      PTA medications continued/changed:   Continue Invega Sustenna, maintenance dose increased from 156 -> 234 mg Q28D on 6/6/22, next injection due on 7/4/22      New medications tried and stopped:   None    New medications initiated:   Depakote 250 mg on 5/28 -> 500 mg on 5/29 ->1,000 mg on 5/30 -> 1,500 mg on 6/3   Add Cogentin 1 mg BID prn EPSE    Today's Changes:  No changes made    Programming: Patient will be treated in a therapeutic milieu with appropriate individual and group therapies. Education will be provided on diagnoses, medications, and treatments.     Medical diagnoses:  Per medicine    Consult: None  Tests: VPA on 6/6 is 90    Anticipated LOS: 2-3 weeks-pt is under Civil Committment  [facility]. SW collaborating with CM for placement       ATTESTATION      Patient has been seen and evaluated by me,  Marilu Del Toro NP

## 2022-06-11 NOTE — PLAN OF CARE
Problem: Behavioral Health Plan of Care  Goal: Patient-Specific Goal (Individualization)  Description: Pt. Will consume >50% of meals provided   Pt. Will sleep 4-6 Hours Nightly   Pt. Will attempt groups daily when able     Pt walking in the hallways at the start of the shift. Pt denies pain, anxiety, depression, SI, HI and hallucinations. Pt walked in halls with smile on his face but did not appear to be responding to stimuli. Pt polite and willing to talk to nursing for nursing assessment.     Outcome: Ongoing, Progressing     Problem: Thought Process Alteration  Goal: Optimal Thought Clarity  Description: Pt will demonstrate Optimal Thought Clarity before discharge     Pt. Will establish and maintain linear conversations with staff  Outcome: Ongoing, Progressing     Problem: Suicidal Behavior  Goal: Suicidal Behavior is Absent or Managed  Outcome: Ongoing, Progressing   Goal Outcome Evaluation:        Face to face end of shift report communicated to night shift RN. Reported that pt is a risk for suicide.     Ángela Mcfadden, RN  6/11/2022  3:36 PM

## 2022-06-11 NOTE — PLAN OF CARE
"  Problem: Behavioral Health Plan of Care  Goal: Patient-Specific Goal (Individualization)  Description: Pt. Will consume >50% of meals provided   Pt. Will sleep 4-6 Hours Nightly   Pt. Will attempt groups daily when able       Note: Patient up to unit all shift, walking hallways almost entire time. Offers little responses during assessment attempts and very minimal eye contact. Denies all criteria at this time with quick \"no\" responses.   Observed responding to internal stimuli throughout shift, laughing and talking to self. Patient does not attend any groups or appear to socialize much with peers but is appropriate when approached by others. No medications administered this shift. Continue to monitor at this time.      Problem: Thought Process Alteration  Goal: Optimal Thought Clarity  Description: Pt will demonstrate Optimal Thought Clarity before discharge     Pt. Will establish and maintain linear conversations with staff  Note: Continue to monitor at this time.      Problem: Suicidal Behavior  Goal: Suicidal Behavior is Absent or Managed  Note: Continue to monitor at this time.     Face to face end of shift report communicated to oncoming RN.     Tati Dyer RN  6/11/2022  7:59 AM         "

## 2022-06-12 PROCEDURE — 250N000013 HC RX MED GY IP 250 OP 250 PS 637: Performed by: NURSE PRACTITIONER

## 2022-06-12 PROCEDURE — 124N000004

## 2022-06-12 RX ADMIN — DIVALPROEX SODIUM 1500 MG: 500 TABLET, FILM COATED, EXTENDED RELEASE ORAL at 20:36

## 2022-06-12 RX ADMIN — Medication 50 MCG: at 20:36

## 2022-06-12 ASSESSMENT — ACTIVITIES OF DAILY LIVING (ADL)
ADLS_ACUITY_SCORE: 20

## 2022-06-12 NOTE — PLAN OF CARE
Problem: Behavioral Health Plan of Care  Goal: Patient-Specific Goal (Individualization)  Description: Pt. Will consume >50% of meals provided   Pt. Will sleep 4-6 Hours Nightly   Pt. Will attempt groups daily when able     Outcome: Ongoing, Progressing    Pt in hallways walking at the start of the shift. Pt denies all symptoms of pain, anxiety, depression, SI, HI and hallucinations. Pt encouraged to go to exercise group and walk on the treadmill since he is already walking. Pt stated that it was a good idea and went to the group room, pt left after a few minutes and watched tv. Pt stated that he is looking forward to his mom's visit this evening.     6pm-Pt's mom here to visit, pt and mom played cards in the lounge.      Problem: Thought Process Alteration  Goal: Optimal Thought Clarity  Description: Pt will demonstrate Optimal Thought Clarity before discharge     Pt. Will establish and maintain linear conversations with staff  Outcome: Ongoing, Progressing     Problem: Suicidal Behavior  Goal: Suicidal Behavior is Absent or Managed  Outcome: Ongoing, Progressing   Goal Outcome Evaluation:        Face to face end of shift report communicated to night shift RN. Reported that pt is a risk for suicide.     Ángela Mcfadden, RN  6/12/2022  3:59 PM

## 2022-06-12 NOTE — PLAN OF CARE
Problem: Behavioral Health Plan of Care  Goal: Patient-Specific Goal (Individualization)  Description: Pt. Will consume >50% of meals provided   Pt. Will sleep 4-6 Hours Nightly   Pt. Will attempt groups daily when able       Outcome: Ongoing, Progressing  Note: Report received from Destinee. Rounding complete. Pt observed sleeping in right side lying position with regular and unlabored respirations.    Pt has been in bed with eyes closed and regular respirations. 15 minute and PRN checks all night. No complaints offered. Will continue to monitor.    Pt slept approx  7.5  hours this NOC shift.    Face to face end of shift report communicated to oncoming RN.    Danica NOLASCO RN  June 12, 2022  2:45 AM          Problem: Thought Process Alteration  Goal: Optimal Thought Clarity  Description: Pt will demonstrate Optimal Thought Clarity before discharge     Pt. Will establish and maintain linear conversations with staff  Outcome: Ongoing, Progressing  Note: Unable to assess due to pt sleeping. No issues or concerns noted at this time.       Problem: Suicidal Behavior  Goal: Suicidal Behavior is Absent or Managed  Outcome: Ongoing, Progressing  Note: Unable to assess due to pt sleeping. Pt has remained free of self-harm.     Goal Outcome Evaluation:

## 2022-06-12 NOTE — PLAN OF CARE
Problem: Behavioral Health Plan of Care  Goal: Patient-Specific Goal (Individualization)  Description: Pt. Will consume >50% of meals provided   Pt. Will sleep 4-6 Hours Nightly   Pt. Will attempt groups daily when able       Note: Patient up to unit walking hallways for majority of shift again. Continues to keep to self, minimal to no eye contact and offers very little during assessment attempts. Affect appears a bit animated and observed laughing and talking to self continuously throughout shift. Denies all assessment criteria but again, does not engage much in conversation.   Patient declines group encouragements and does not socialize with peers but is polite when approached by others. No medications administered this shift. Continue to monitor at this time.       Problem: Thought Process Alteration  Goal: Optimal Thought Clarity  Description: Pt will demonstrate Optimal Thought Clarity before discharge     Pt. Will establish and maintain linear conversations with staff  Note: Continue to monitor at this time.      Problem: Suicidal Behavior  Goal: Suicidal Behavior is Absent or Managed  Note: Continue to monitor at this time.    Goal Outcome Evaluation:      Face to face end of shift report communicated to oncoming RN.      Tati Dyer RN  6/12/2022  7:44 AM

## 2022-06-13 LAB — SARS-COV-2 RNA RESP QL NAA+PROBE: NEGATIVE

## 2022-06-13 PROCEDURE — U0005 INFEC AGEN DETEC AMPLI PROBE: HCPCS | Performed by: NURSE PRACTITIONER

## 2022-06-13 PROCEDURE — 250N000013 HC RX MED GY IP 250 OP 250 PS 637: Performed by: NURSE PRACTITIONER

## 2022-06-13 PROCEDURE — 99232 SBSQ HOSP IP/OBS MODERATE 35: CPT | Performed by: NURSE PRACTITIONER

## 2022-06-13 PROCEDURE — 124N000004

## 2022-06-13 RX ORDER — PALIPERIDONE 3 MG/1
3 TABLET, EXTENDED RELEASE ORAL AT BEDTIME
Status: DISCONTINUED | OUTPATIENT
Start: 2022-06-13 | End: 2022-07-04

## 2022-06-13 RX ADMIN — DIVALPROEX SODIUM 1500 MG: 500 TABLET, FILM COATED, EXTENDED RELEASE ORAL at 20:26

## 2022-06-13 RX ADMIN — Medication 50 MCG: at 20:26

## 2022-06-13 RX ADMIN — PALIPERIDONE 3 MG: 3 TABLET, EXTENDED RELEASE ORAL at 20:26

## 2022-06-13 ASSESSMENT — ACTIVITIES OF DAILY LIVING (ADL)
DRESS: INDEPENDENT
ADLS_ACUITY_SCORE: 20
ADLS_ACUITY_SCORE: 20
ORAL_HYGIENE: INDEPENDENT
ADLS_ACUITY_SCORE: 20
HYGIENE/GROOMING: INDEPENDENT
ADLS_ACUITY_SCORE: 20

## 2022-06-13 NOTE — PROGRESS NOTES
1:1 time with the patient.  No changes made to the discharge plan at this time.   See the AVS for follow up appointments and recommendations.     SW spoke to the patient. The patient stated he was feeling good today.    SW called and left message with the patient's  Gray to call KAROLINE back.       KAROLINE called [facility]. They updated the SW that as soon as he stabilizes enough for placement they will add him back to there waiting list. They will not hold beds.

## 2022-06-13 NOTE — PROGRESS NOTES
"[facility] PSYCHIATRY  PROGRESS NOTE     SUBJECTIVE        Junior is up walking the halls when I see him today. He continues to deny any mental health concerns, denies any hallucinations or feelings of paranoia. He does state that his mother visited yesterday evening and he feels this went well. He reports that she told him that he seems to be \"getting better\". He does still appear preoccupied, smiling and laughing to himself at times. He denies any issues with Invega, received the LEÓN about a week ago. Will add back small dose of oral Invega to see if psychosis improves. In review of chart it appears that he has been tried on several different neuroleptic medications with either limited results or experienced side effects: Latuda (ineffective), Zyprexa (stopped on own due to feeling too sedated), Haldol (muscle contractions in face), Risperdal (akathisia), Seroquel (listed as allergy, \"fainted\") Abilify (akathisia).      MEDICATIONS   Scheduled Meds:    divalproex sodium extended-release  1,500 mg Oral Daily     paliperidone  234 mg Intramuscular Q30 Days     paliperidone  3 mg Oral At Bedtime     Vitamin D3  50 mcg Oral At Bedtime     PRN Meds:.acetaminophen, benztropine, haloperidol **AND** LORazepam **AND** diphenhydrAMINE, haloperidol lactate **AND** LORazepam **AND** diphenhydrAMINE, hydrOXYzine, nicotine, OLANZapine **OR** OLANZapine, traZODone     ALLERGIES   Allergies   Allergen Reactions     Seasonal Allergies      Seroquel [Quetiapine]      Fainting and slowed breathing         MENTAL STATUS EXAM   Vitals: /65   Pulse 99   Temp 97.5  F (36.4  C) (Temporal)   Resp 16   Wt 76.1 kg (167 lb 12.8 oz)   SpO2 99%   BMI 23.40 kg/m      Appearance:  awake, alert, adequately groomed and dressed in hospital scrubs  Attitude:  guarded, cooperative  Eye Contact: intermittent, tends to avoid eye contact  Mood: \"fine\", denies feeling depressed or anxious  Affect: incongruent at times, laughing and " smiling to self  Speech:  clear, coherent  Psychomotor Behavior:  no evidence of tardive dyskinesia, dystonia, or tics, no abnormal or involuntary movements noted  Thought Process:  goal oriented, difficult to assess as he offers little in conversation  Associations:  no loose associations  Thought Content:  no evidence of suicidal ideation or homicidal ideation and patient appears to be responding to internal stimuli, denies hallucinations  Insight:  limited  Judgment:  limited  Oriented to:  time, person, and place  Attention Span and Concentration:  limited  Recent and Remote Memory:  limited  Fund of Knowledge: appropriate for education  Muscle Strength and Tone: normal  Gait and Station: Normal       LABS   Recent Results (from the past 24 hour(s))   Asymptomatic COVID-19 Virus (Coronavirus) by PCR Nasopharyngeal    Collection Time: 06/13/22  8:27 AM    Specimen: Nasopharyngeal; Swab   Result Value Ref Range    SARS CoV2 PCR Negative Negative         IMPRESSION   Pt is currently under commitment with Patrick.     Pt was discharged from station 4A at [facility] two days ago and then went to [facility].   Per Crisis assessment- The following information was received from Van and Rolanda Fernández whose relationship to the patient is parents. Information was obtained in person. Their phone number is Rolanda ([phone]), Van ([phone]) and they last had contact with patient on 5/27/22.   What happened PTA: Per mom, 5/26/22 in the AM he was at [facility]. He went for a bike ride by himself, even though he wasn't supposed to leave the facility for the first 10 days by himself. When he got back to [facility], he said he was afraid he was going to be tortured there and that one of the staff was his brother. Around 3:30 the counselor called mom and said that he left again and asked for Cam's cell number. They made contact with him and he was making non sensical statements. Mom states that it sounded like  someone was with him trying to help him get back to [facility] or [facility] and then Cam's phone . He was telling dad random things and not making sense. He was saying an address, [address].      [facility] okay to see him. Told dad, 'you and I are God.' He thought Prince Coyle was talking to him. Asking dad, 'Do you think I'm crazy.'     Mother reports this is his 5th hospitalization in 6 months. He was willing to go [facility]. They state that Cam isn't sure about being on medications. Mother reported that Cam can be at [facility] for up to 90 days.      The patient has been hospitalized in 2021 and 10/2021 for psychosis. EMR noted that he had a suicide attempt by cutting his left arm in the bathtub in October. The patient has a history of commitment starting in 2021 and ending on 2022.  Commitment was extended as of 2022.         DIAGNOSES     1.Schizoaffective disorder, Bipolar type   2.Under Civil Commitment and Nguyen         PLAN     Location: Unit 5  Legal Status: committed with Nguyen (Zyprexa, Abilify, Prolixin, Invega)    Safety Assessment:    Behavioral Orders   Procedures     Code 1 - Restrict to Unit     Routine Programming     As clinically indicated     Status 15     Every 15 minutes.      PTA medications held:   Lexapro 20 mg daily      PTA medications continued/changed:   Continue Invega Sustenna, maintenance dose increased from 156 -> 234 mg Q28D on 22, next injection due on 22  Will add back oral Invega 3 mg at bedtime d/t ongoing psychosis    New medications tried and stopped:   None    New medications initiated:   Depakote 250 mg on  -> 500 mg on  ->1,000 mg on  -> 1,500 mg on 6/3 (VPA level on )  Cogentin 1 mg BID prn EPSE      Today's Changes:  Add back oral Invega to treat ongoing psychosis    Programming: Patient will be treated in a therapeutic milieu with appropriate individual and group therapies.  Education will be provided on diagnoses, medications, and treatments.     Medical diagnoses:  Per medicine. Nothing acute    Consult: None  Tests: None    Anticipated LOS: 2-3 weeks-pt is under Civil Committment  [facility] SW collaborating with CM for placement       ATTESTATION      Patient has been seen and evaluated by Marilu knight NP       TREATMENT TEAM CARE PLAN     Progress: Symptoms improving though continues to appear preoccupied.    Continued Stay Criteria/Rationale: Ongoing treatment and safe discharge planning.    Medical/Physical: See above.    Precautions: See above.     Plan: Continue inpatient care with unit support and medication management. IRTS placement once stable.    Rationale for change in precautions or plan: restarting oral Invega    Participants: Marilu Del Toro NP, Nursing, SW, OT.    The patient's care was discussed with the treatment team and chart notes were reviewed.

## 2022-06-13 NOTE — PLAN OF CARE
Problem: Behavioral Health Plan of Care  Goal: Patient-Specific Goal (Individualization)  Description: Pt. Will consume >50% of meals provided   Pt. Will sleep 4-6 Hours Nightly   Pt. Will attempt groups daily when able       Outcome: Ongoing, Not Progressing  Note:     1850 Patient alert and up on the unit. Ate well for supper meal. Patient's mother here to visit. Patient appropriate and paces in the hallway. Patient smiles and laughs to himself but denies auditory hallucinations. Patient denies depression, suicidal thought and anxiety. Patient denies physical problems. Patient is steady on his feet.     2130 Patient remains up on the unit. Had HS snack and walking in the hallway. Patient had a visit with mom earlier in the shift.    Face to face end of shift report communicated to 11-7 shift RN.     Monika Saucedo RN  6/13/2022  9:38 PM           Problem: Thought Process Alteration  Goal: Optimal Thought Clarity  Description: Pt will demonstrate Optimal Thought Clarity before discharge     Pt. Will establish and maintain linear conversations with staff  Outcome: Ongoing, Progressing     Problem: Suicidal Behavior  Goal: Suicidal Behavior is Absent or Managed  Outcome: Ongoing, Not Progressing   Goal Outcome Evaluation:    Plan of Care Reviewed With: patient

## 2022-06-13 NOTE — PLAN OF CARE
Problem: Behavioral Health Plan of Care  Goal: Plan of Care Review  Recent Flowsheet Documentation  Taken 6/13/2022 0910 by Chris Murillo RN  Plan of Care Reviewed With: patient  Patient Agreement with Plan of Care: agrees  Goal: Patient-Specific Goal (Individualization)  Description: Pt. Will consume >50% of meals provided   Pt. Will sleep 4-6 Hours Nightly   Pt. Will attempt groups daily when able       Outcome: Ongoing, Progressing  Note: He is up on the unit. He is noted to be walking in the halls. He does walk in and out of the group room during group. He doesn't participate. He is preoccupied. He is noted to be responding to internal stimuli. He is smiling, laughing and talking to himself. He denies feeling depressed or suicidal. He is agreeable to safe behavior on the unit.   Goal: Optimal Comfort and Wellbeing  Intervention: Provide Person-Centered Care  Recent Flowsheet Documentation  Taken 6/13/2022 0910 by Chris Murillo RN  Trust Relationship/Rapport:    care explained    questions answered    questions encouraged    reassurance provided    thoughts/feelings acknowledged  Goal: Develops/Participates in Therapeutic Parks to Support Successful Transition  Intervention: Foster Therapeutic Parks  Recent Flowsheet Documentation  Taken 6/13/2022 0910 by Chris Murillo RN  Trust Relationship/Rapport:    care explained    questions answered    questions encouraged    reassurance provided    thoughts/feelings acknowledged     Problem: Thought Process Alteration  Goal: Optimal Thought Clarity  Description: Pt will demonstrate Optimal Thought Clarity before discharge     Pt. Will establish and maintain linear conversations with staff  Outcome: Ongoing, Progressing  Note: He is preoccupied and responding to internal stimuli. He is able to make his needs known appropriately. He denies hallucinations even though he is responding to internal stimuli.     Face to face end of shift report to be communicated to  evening shift RN.     Chris Murillo RN  6/13/2022  1:52 PM

## 2022-06-13 NOTE — PLAN OF CARE
Problem: Behavioral Health Plan of Care  Goal: Patient-Specific Goal (Individualization)  Description: Pt. Will consume >50% of meals provided   Pt. Will sleep 4-6 Hours Nightly   Pt. Will attempt groups daily when able       Outcome: Ongoing, Progressing  Note: Report received from Destinee. Rounding complete. Pt observed sleeping in right side lying position with regular and unlabored respirations.    Pt has been in bed with eyes closed and regular respirations. 15 minute and PRN checks all night. No complaints offered. Will continue to monitor.    Pt slept approx 7.5   hours this NOC shift.    Face to face end of shift report communicated to oncoming RN.    Danica NOLASCO RN  June 13, 2022  12:02 AM          Problem: Thought Process Alteration  Goal: Optimal Thought Clarity  Description: Pt will demonstrate Optimal Thought Clarity before discharge     Pt. Will establish and maintain linear conversations with staff  Outcome: Ongoing, Progressing  Note: Unable to assess due to pt sleeping. No issues or concerns noted at this time.       Problem: Suicidal Behavior  Goal: Suicidal Behavior is Absent or Managed  Outcome: Ongoing, Progressing  Note: Unable to assess due to pt sleeping. Pt has remained free of self-harm.     Goal Outcome Evaluation:

## 2022-06-14 PROCEDURE — 250N000013 HC RX MED GY IP 250 OP 250 PS 637: Performed by: NURSE PRACTITIONER

## 2022-06-14 PROCEDURE — 99232 SBSQ HOSP IP/OBS MODERATE 35: CPT | Performed by: NURSE PRACTITIONER

## 2022-06-14 PROCEDURE — 124N000004

## 2022-06-14 RX ADMIN — Medication 50 MCG: at 20:11

## 2022-06-14 RX ADMIN — DIVALPROEX SODIUM 1500 MG: 500 TABLET, FILM COATED, EXTENDED RELEASE ORAL at 20:11

## 2022-06-14 RX ADMIN — PALIPERIDONE 3 MG: 3 TABLET, EXTENDED RELEASE ORAL at 20:11

## 2022-06-14 ASSESSMENT — ACTIVITIES OF DAILY LIVING (ADL)
DRESS: INDEPENDENT
HYGIENE/GROOMING: INDEPENDENT
ADLS_ACUITY_SCORE: 20
LAUNDRY: UNABLE TO COMPLETE
ADLS_ACUITY_SCORE: 20
HYGIENE/GROOMING: INDEPENDENT
DRESS: SCRUBS (BEHAVIORAL HEALTH)
ADLS_ACUITY_SCORE: 20
ADLS_ACUITY_SCORE: 20
ORAL_HYGIENE: INDEPENDENT
ADLS_ACUITY_SCORE: 20
ORAL_HYGIENE: INDEPENDENT
ADLS_ACUITY_SCORE: 20

## 2022-06-14 NOTE — PLAN OF CARE
"  Problem: Behavioral Health Plan of Care  Goal: Patient-Specific Goal (Individualization)  Description: Pt. Will consume >50% of meals provided   Pt. Will sleep 4-6 Hours Nightly   Pt. Will attempt groups daily when able     Patient has been out on the open unit, restless and pacing. Affect is full range, mood is calm. He denies SI, HI, anxiety, depression, hallucinations, and pain. States he is good. He appears responding to internal stimuli, he laughs and smiles to himself, when asked what's so funny, he replies \"ah, nothing\". Speech is hesitant, responses are minimal. He does not engage in conversation with his peers. Has been appropriate with staff and peers.   Mom visited this afternoon.   Face to face end of shift report communicated to oncoming RN.     Galilea Hill RN  6/14/2022  2:38 PM    Outcome: Ongoing, Progressing     Problem: Thought Process Alteration  Goal: Optimal Thought Clarity  Description: Pt will demonstrate Optimal Thought Clarity before discharge     Pt. Will establish and maintain linear conversations with staff    Patient is able to make his needs known.   Outcome: Ongoing, Progressing     Problem: Suicidal Behavior  Goal: Suicidal Behavior is Absent or Managed    Patient denies SI, has remained free from self harm/injury.   Outcome: Ongoing, Progressing   Goal Outcome Evaluation:    Plan of Care Reviewed With: patient                 "

## 2022-06-14 NOTE — PLAN OF CARE
Face to face end of shift report will be communicated to oncoming RN.    Problem: Behavioral Health Plan of Care  Goal: Patient-Specific Goal (Individualization)  Description: Pt. Will consume >50% of meals provided   Pt. Will sleep 4-6 Hours Nightly   Pt. Will attempt groups daily when able       Outcome: Ongoing, Progressing     Problem: Thought Process Alteration  Goal: Optimal Thought Clarity  Description: Pt will demonstrate Optimal Thought Clarity before discharge     Pt. Will establish and maintain linear conversations with staff  Outcome: Ongoing, Progressing     Problem: Suicidal Behavior  Goal: Suicidal Behavior is Absent or Managed  Outcome: Ongoing, Progressing   Goal Outcome Evaluation:  Face to face end of shift report obtained from RODDY Frank. Pt observed resting in bed.  Pt appears to be sleeping in bed with eyes closed, having regular respirations, and position changes. 15 minutes and PRN safety checks completed with no noted complains. No delusional thoughts or self harm noted or reported so far this shift.      0600-Pt appeared to have slept all night.

## 2022-06-14 NOTE — PROGRESS NOTES
"[facility] PSYCHIATRY  PROGRESS NOTE     SUBJECTIVE        Junior is up in the lounge when I go to see him today. Continues to appear preoccupied, does smile and laugh to himself though denied any hallucinations or racing thoughts. Does not engage in conversation with peers, though will answer questions asked of him. Pacing in hallway though denies feeling restless, denies muscle stiffness or any other side effect. Received higher dose of Invega Sustenna on 6/6. It could likely take weeks to determine full effect at this dose so will supplement with oral Invega while monitoring for any side effects. He has been cooperative and calm on the unit, no agitation. Encouraged to try to attend groups as able to help facilitate discharge to IR.     In review of chart it appears that he has been tried on several different neuroleptic medications with either limited results or experienced side effects: Latuda (ineffective), Zyprexa (stopped on own due to feeling too sedated), Haldol (muscle contractions in face), Risperdal (akathisia), Seroquel (listed as allergy, \"fainted\") Abilify (akathisia).      MEDICATIONS   Scheduled Meds:    divalproex sodium extended-release  1,500 mg Oral Daily     paliperidone  234 mg Intramuscular Q30 Days     paliperidone  3 mg Oral At Bedtime     Vitamin D3  50 mcg Oral At Bedtime     PRN Meds:.acetaminophen, benztropine, haloperidol **AND** LORazepam **AND** diphenhydrAMINE, haloperidol lactate **AND** LORazepam **AND** diphenhydrAMINE, hydrOXYzine, nicotine, OLANZapine **OR** OLANZapine, traZODone     ALLERGIES   Allergies   Allergen Reactions     Seasonal Allergies      Seroquel [Quetiapine]      Fainting and slowed breathing         MENTAL STATUS EXAM   Vitals: /72   Pulse 104   Temp 98.1  F (36.7  C) (Temporal)   Resp 16   Wt 76.1 kg (167 lb 12.8 oz)   SpO2 96%   BMI 23.40 kg/m      Appearance:  awake, alert, adequately groomed and dressed in hospital scrubs  Attitude:  " "guarded, cooperative  Eye Contact: intermittent, tends to avoid eye contact  Mood: \"fine\", denies feeling depressed or anxious  Affect: incongruent at times, laughing and smiling to self  Speech:  clear, coherent  Psychomotor Behavior:  no evidence of tardive dyskinesia, dystonia, or tics, no abnormal or involuntary movements noted  Thought Process:  goal oriented, difficult to assess as he offers little in conversation  Associations:  no loose associations  Thought Content:  no evidence of suicidal ideation or homicidal ideation and patient appears to be responding to internal stimuli, denies hallucinations  Insight:  limited  Judgment:  limited  Oriented to:  time, person, and place  Attention Span and Concentration:  limited  Recent and Remote Memory:  limited  Fund of Knowledge: appropriate for education  Muscle Strength and Tone: normal  Gait and Station: Normal       LABS   No results found for this or any previous visit (from the past 24 hour(s)).      IMPRESSION   Pt is currently under commitment with Patrick.     Pt was discharged from station 4A at [facility] two days ago and then went to [facility].   Per Crisis assessment- The following information was received from Van and Rolanda Fernández whose relationship to the patient is parents. Information was obtained in person. Their phone number is Rolanda ([phone]), Van ([phone]) and they last had contact with patient on 5/27/22.   What happened PTA: Per mom, 5/26/22 in the AM he was at [facility]. He went for a bike ride by himself, even though he wasn't supposed to leave the facility for the first 10 days by himself. When he got back to [facility], he said he was afraid he was going to be tortured there and that one of the staff was his brother. Around 3:30 the counselor called mom and said that he left again and asked for Cam's cell number. They made contact with him and he was making non sensical statements. Mom states that it sounded like " someone was with him trying to help him get back to [facility] or [facility] and then Cam's phone . He was telling dad random things and not making sense. He was saying an address, [address].      [facility] to see him. Told dad, 'you and I are God.' He thought Prince Coyle was talking to him. Asking dad, 'Do you think I'm crazy.'     Mother reports this is his 5th hospitalization in 6 months. He was willing to go [facility]. They state that Cam isn't sure about being on medications. Mother reported that Cam can be at [facility] for up to 90 days.      The patient has been hospitalized in 2021 and 10/2021 for psychosis. EMR noted that he had a suicide attempt by cutting his left arm in the bathtub in October. The patient has a history of commitment starting in 2021 and ending on 2022.  Commitment was extended as of 2022.         DIAGNOSES     1.Schizoaffective disorder, Bipolar type   2.Under Civil Commitment and Nguyen         PLAN     Location: Unit 5  Legal Status: committed with Nguyen (Zyprexa, Abilify, Prolixin, Invega)    Safety Assessment:    Behavioral Orders   Procedures     Code 1 - Restrict to Unit     Routine Programming     As clinically indicated     Status 15     Every 15 minutes.      PTA medications held:   Lexapro 20 mg daily      PTA medications continued/changed:   Continue Invega Sustenna, maintenance dose increased from 156 -> 234 mg Q28D on 22, next injection due on 22  Will add back oral Invega 3 mg at bedtime d/t ongoing psychosis    New medications tried and stopped:   None    New medications initiated:   Depakote 250 mg on  -> 500 mg on  ->1,000 mg on  -> 1,500 mg on 6/3 (VPA level on )  Cogentin 1 mg BID prn EPSE      Today's Changes:  Add back oral Invega to treat ongoing psychosis    Programming: Patient will be treated in a therapeutic milieu with appropriate individual and group therapies.  Education will be provided on diagnoses, medications, and treatments.     Medical diagnoses:  Per medicine. Nothing acute    Consult: None  Tests: None    Anticipated LOS: 2-3 weeks-pt is under Civil Commitment and Nguyen  [facility]- to be screened by Ryanne SANDOVAL on 6/16       ATTESTATION      Patient has been seen and evaluated by me,  Marilu Del Toro, NP

## 2022-06-14 NOTE — PROGRESS NOTES
1:1 time with the patient.  No changes made to the discharge plan at this time.   See the AVS for follow up appointments and recommendations.     KAROLINE spoke to the patient. The patient stated he was doing good. And the the patient asked nursing staff for a snack. And he shared his visitation with his mom went good.     KAROLINE called [facility] and spoke to Anthony. Anthony stated the patient is on the waiting list for [facility]. Anthony further stated they will be reaching out to  in the next few days.     KAROLINE received a call from Carolyn Fontenot with [facility]. She will call Thursday 6/16/2022 @ 2:00 pm to screen the patient for intake. She will call the unit at that time.

## 2022-06-15 PROCEDURE — 250N000013 HC RX MED GY IP 250 OP 250 PS 637: Performed by: NURSE PRACTITIONER

## 2022-06-15 PROCEDURE — 124N000004

## 2022-06-15 PROCEDURE — 99232 SBSQ HOSP IP/OBS MODERATE 35: CPT | Performed by: NURSE PRACTITIONER

## 2022-06-15 RX ADMIN — PALIPERIDONE 3 MG: 3 TABLET, EXTENDED RELEASE ORAL at 20:19

## 2022-06-15 RX ADMIN — Medication 50 MCG: at 20:19

## 2022-06-15 RX ADMIN — DIVALPROEX SODIUM 1500 MG: 500 TABLET, FILM COATED, EXTENDED RELEASE ORAL at 20:19

## 2022-06-15 ASSESSMENT — ACTIVITIES OF DAILY LIVING (ADL)
ADLS_ACUITY_SCORE: 20
ADLS_ACUITY_SCORE: 20
HYGIENE/GROOMING: INDEPENDENT
ADLS_ACUITY_SCORE: 20
HYGIENE/GROOMING: INDEPENDENT
ADLS_ACUITY_SCORE: 20
DRESS: SCRUBS (BEHAVIORAL HEALTH)
ADLS_ACUITY_SCORE: 20
ADLS_ACUITY_SCORE: 20
LAUNDRY: UNABLE TO COMPLETE
ORAL_HYGIENE: INDEPENDENT
DRESS: SCRUBS (BEHAVIORAL HEALTH);INDEPENDENT
LAUNDRY: UNABLE TO COMPLETE
ORAL_HYGIENE: INDEPENDENT
ADLS_ACUITY_SCORE: 20

## 2022-06-15 NOTE — PLAN OF CARE
Problem: Behavioral Health Plan of Care  Goal: Patient-Specific Goal (Individualization)  Description: Pt. Will consume >50% of meals provided   Pt. Will sleep 4-6 Hours Nightly   Pt. Will attempt groups daily when able       Outcome: Ongoing, Progressing  Note:     1800 Patient continues pacing in the hallway. Smiles and pleasant but does not interact much with peers. Patient ate well for supper. Answers questions with one to two word answers. Patient does not attend groups. Denies any current problems or concerns.    2127 Patient had HS snack and paced in hallway, patient is now in bed.    Face to face end of shift report communicated to 11-7 shift RN.     Monika Saucedo RN  6/14/2022  10:30 PM           Problem: Thought Process Alteration  Goal: Optimal Thought Clarity  Description: Pt will demonstrate Optimal Thought Clarity before discharge     Pt. Will establish and maintain linear conversations with staff  Outcome: Ongoing, Progressing     Problem: Suicidal Behavior  Goal: Suicidal Behavior is Absent or Managed  Outcome: Ongoing, Progressing   Goal Outcome Evaluation:    Plan of Care Reviewed With: patient

## 2022-06-15 NOTE — PLAN OF CARE
Problem: Behavioral Health Plan of Care  Goal: Patient-Specific Goal (Individualization)  Description: Pt. Will consume >50% of meals provided   Pt. Will sleep 4-6 Hours Nightly   Pt. Will attempt groups daily when able       6/15/2022 1607 by Una Farias RN  Outcome: Ongoing, Progressing  Note: Patient denies SI, HI, hallucinations, pain, anxiety, depression.  Patient is withdrawn from others.  Spends most of the shift walking the hallways.  He smiles and mumbles to self while walking the hallway. He appears to be responding to internal stimuli though he denies this.  Does not have scheduled medications this shift.  No PRN medications administered.      Problem: Thought Process Alteration  Goal: Optimal Thought Clarity  Description: Pt will demonstrate Optimal Thought Clarity before discharge     Pt. Will establish and maintain linear conversations with staff  Outcome: Ongoing, Progressing  Note: Patient appears to be responding to internal stimuli.      Problem: Suicidal Behavior  Goal: Suicidal Behavior is Absent or Managed  Outcome: Ongoing, Progressing  Note: Patient had no noted self harm this shift.    Goal Outcome Evaluation:

## 2022-06-15 NOTE — PLAN OF CARE
Face to face end of shift report will be communicated to oncoming RN.     Problem: Behavioral Health Plan of Care  Goal: Patient-Specific Goal (Individualization)  Description: Pt. Will consume >50% of meals provided   Pt. Will sleep 4-6 Hours Nightly   Pt. Will attempt groups daily when able       Outcome: Ongoing, Progressing     Problem: Thought Process Alteration  Goal: Optimal Thought Clarity  Description: Pt will demonstrate Optimal Thought Clarity before discharge     Pt. Will establish and maintain linear conversations with staff  Outcome: Ongoing, Progressing     Problem: Suicidal Behavior  Goal: Suicidal Behavior is Absent or Managed  Outcome: Ongoing, Progressing   Goal Outcome Evaluation:  Face to face end of shift report obtained from RODDY Frank. Pt observed resting in bed.  Pt appears to be sleeping in bed with eyes closed, having regular respirations, and position changes. 15 minutes and PRN safety checks completed with no noted complains. No delusional thoughts or self harm noted or reported so far this shift.     0600-Pt appeared to had slept 6.5 hours so far this shift.

## 2022-06-16 PROCEDURE — 250N000013 HC RX MED GY IP 250 OP 250 PS 637: Performed by: NURSE PRACTITIONER

## 2022-06-16 PROCEDURE — 124N000004

## 2022-06-16 RX ADMIN — DIVALPROEX SODIUM 1500 MG: 500 TABLET, FILM COATED, EXTENDED RELEASE ORAL at 20:11

## 2022-06-16 RX ADMIN — PALIPERIDONE 3 MG: 3 TABLET, EXTENDED RELEASE ORAL at 20:11

## 2022-06-16 RX ADMIN — Medication 50 MCG: at 20:11

## 2022-06-16 ASSESSMENT — ACTIVITIES OF DAILY LIVING (ADL)
ADLS_ACUITY_SCORE: 20
ORAL_HYGIENE: INDEPENDENT
ADLS_ACUITY_SCORE: 20
DRESS: SCRUBS (BEHAVIORAL HEALTH)
ADLS_ACUITY_SCORE: 20
HYGIENE/GROOMING: INDEPENDENT
ADLS_ACUITY_SCORE: 20
LAUNDRY: UNABLE TO COMPLETE
ADLS_ACUITY_SCORE: 20
ADLS_ACUITY_SCORE: 20

## 2022-06-16 NOTE — PLAN OF CARE
Problem: Behavioral Health Plan of Care  Goal: Patient-Specific Goal (Individualization)  Description: Pt. Will consume >50% of meals provided   Pt. Will sleep 4-6 Hours Nightly   Pt. Will attempt groups daily when able       Note: Pt denies all criteria.  He is pleasant.  He walks the hallways, laughing and smiling.  He talked on the phone.  Pt is withdrawn and gives one word answers.  He relaxed in room after lunch.       Problem: Thought Process Alteration  Goal: Optimal Thought Clarity  Description: Pt will demonstrate Optimal Thought Clarity before discharge     Pt. Will establish and maintain linear conversations with staff  Outcome: Ongoing, Progressing     Problem: Suicidal Behavior  Goal: Suicidal Behavior is Absent or Managed  Outcome: Ongoing, Progressing   Goal Outcome Evaluation:

## 2022-06-16 NOTE — PROGRESS NOTES
[facility] @ 2:00 pm today. The patient stated he feels it went good.

## 2022-06-16 NOTE — PROGRESS NOTES
"[facility] PSYCHIATRY  PROGRESS NOTE     SUBJECTIVE        Junior is up in the lounge when I go to see him today. Continues to appear preoccupied, does smile and laugh to himself though denied any hallucinations or racing thoughts. Does not engage in conversation with peers, though will answer questions asked of him. Pacing in hallway though denies feeling restless, denies muscle stiffness or any other side effect. Received higher dose of Invega Sustenna on 6/6. It could likely take weeks to determine full effect at this dose so will supplement with oral Invega while monitoring for any side effects. He has been cooperative and calm on the unit, no agitation. Encouraged to try to attend groups as able to help facilitate discharge to [facility]. I did attempt to engage Cam in conversation about how I was a safe person to talk to about any auditory or visual hallucinations he might be experiencing, but he denies this and states he is \"just thinking of funny things inside his head.\" He declines to elaborate on this any further, but is pleasant in his declination.      In review of chart it appears that he has been tried on several different neuroleptic medications with either limited results or experienced side effects: Latuda (ineffective), Zyprexa (stopped on own due to feeling too sedated), Haldol (muscle contractions in face), Risperdal (akathisia), Seroquel (listed as allergy, \"fainted\") Abilify (akathisia).      MEDICATIONS   Scheduled Meds:    divalproex sodium extended-release  1,500 mg Oral Daily     paliperidone  234 mg Intramuscular Q30 Days     paliperidone  3 mg Oral At Bedtime     Vitamin D3  50 mcg Oral At Bedtime     PRN Meds:.acetaminophen, benztropine, haloperidol **AND** LORazepam **AND** diphenhydrAMINE, haloperidol lactate **AND** LORazepam **AND** diphenhydrAMINE, hydrOXYzine, nicotine, OLANZapine **OR** OLANZapine, traZODone     ALLERGIES   Allergies   Allergen Reactions     Seasonal Allergies      " "Seroquel [Quetiapine]      Fainting and slowed breathing         MENTAL STATUS EXAM   Vitals: /79   Pulse 88   Temp 98.7  F (37.1  C) (Temporal)   Resp 18   Wt 76.1 kg (167 lb 12.8 oz)   SpO2 96%   BMI 23.40 kg/m      Appearance:  awake, alert, adequately groomed and dressed in hospital scrubs  Attitude:  guarded, cooperative  Eye Contact: intermittent, tends to avoid eye contact  Mood: \"fine\", denies feeling depressed or anxious  Affect: incongruent at times, laughing and smiling to self  Speech:  clear, coherent  Psychomotor Behavior:  no evidence of tardive dyskinesia, dystonia, or tics, no abnormal or involuntary movements noted  Thought Process:  goal oriented, difficult to assess as he offers little in conversation  Associations:  no loose associations  Thought Content:  no evidence of suicidal ideation or homicidal ideation and patient appears to be responding to internal stimuli, denies hallucinations  Insight:  limited  Judgment:  limited  Oriented to:  time, person, and place  Attention Span and Concentration:  limited  Recent and Remote Memory:  limited  Fund of Knowledge: appropriate for education  Muscle Strength and Tone: normal  Gait and Station: Normal       LABS   No results found for this or any previous visit (from the past 24 hour(s)).      IMPRESSION   Pt is currently under commitment with [facility].     Pt was discharged from station 4A at [facility] two days ago and then went to [facility].   Per Crisis assessment- The following information was received from Van and Rolanda Fernández whose relationship to the patient is parents. Information was obtained in person. Their phone number is Rolanda ([phone]), Van ([phone]) and they last had contact with patient on 5/27/22.   What happened PTA: Per mom, 5/26/22 in the AM he was at [facility]. He went for a bike ride by himself, even though he wasn't supposed to leave the facility for the first 10 days by himself. When he got " back to [facility], he said he was afraid he was going to be tortured there and that one of the staff was his brother. Around 3:30 the counselor called mom and said that he left again and asked for Cam's cell number. They made contact with him and he was making non sensical statements. Mom states that it sounded like someone was with him trying to help him get back to [facility] or [facility] and then Cam's phone . He was telling dad random things and not making sense. He was saying an address, [address].      When dad got here at the [facility], he was okay to see him. Told dad, 'you and I are God.' He thought Prince Coyle was talking to him. Asking dad, 'Do you think I'm crazy.'     Mother reports this is his 5th hospitalization in 6 months. He was willing to go [facility]. They state that Cam isn't sure about being on medications. Mother reported that Cam can be at [facility] for up to 90 days.      The patient has been hospitalized in 2021 and 10/2021 for psychosis. EMR noted that he had a suicide attempt by cutting his left arm in the bathtub in October. The patient has a history of commitment starting in 2021 and ending on 2022.  Commitment was extended as of 2022.         DIAGNOSES     1.Schizoaffective disorder, Bipolar type      PLAN     Location: Unit 5  Legal Status: committed with [facility] (Zyprexa, Abilify, Prolixin, Invega)    Safety Assessment:    Behavioral Orders   Procedures     Code 1 - Restrict to Unit     Routine Programming     As clinically indicated     Status 15     Every 15 minutes.      PTA medications held:   Lexapro 20 mg daily      PTA medications continued/changed:   Continue Invega Sustenna, maintenance dose increased from 156 -> 234 mg Q28D on 22, next injection due on 22  Continue oral Invega 3 mg at bedtime d/t ongoing psychosis and supplement until LEÓN kicks in    New medications tried and stopped:   None    New medications initiated:   Depakote 250 mg  on 5/28 -> 500 mg on 5/29 ->1,000 mg on 5/30 -> 1,500 mg on 6/3 (VPA level on 6/6 of 90)  Cogentin 1 mg BID prn EPSE      Today's Changes:  No changes made    Programming: Patient will be treated in a therapeutic milieu with appropriate individual and group therapies. Education will be provided on diagnoses, medications, and treatments.     Medical diagnoses:  Per medicine. Nothing acute    Consult: None  Tests: None    Anticipated LOS: 2-3 weeks-pt is under Civil Commitment and Nguyen  Disposition: [facility]- to be screened by Ryanne SANDOVAL on 6/16       ATTESTATION      Patient has been seen and evaluated by me,  SELINA Harding CNP

## 2022-06-16 NOTE — PLAN OF CARE
Face to face end of shift report will be communicated to oncoming RN.     Problem: Behavioral Health Plan of Care  Goal: Patient-Specific Goal (Individualization)  Description: Pt. Will consume >50% of meals provided   Pt. Will sleep 4-6 Hours Nightly   Pt. Will attempt groups daily when able       Outcome: Ongoing, Progressing     Problem: Thought Process Alteration  Goal: Optimal Thought Clarity  Description: Pt will demonstrate Optimal Thought Clarity before discharge     Pt. Will establish and maintain linear conversations with staff  Outcome: Ongoing, Progressing     Problem: Suicidal Behavior  Goal: Suicidal Behavior is Absent or Managed  Outcome: Ongoing, Progressing   Goal Outcome Evaluation:  Face to face end of shift report obtained from RODDY Gonzalez. Pt observed resting in bed.   Pt appears to be sleeping in bed with eyes closed, having regular respirations, and position changes. 15 minutes and PRN safety checks completed with no noted complains. No self harm or delusional comments noted or reported so far this shift.    0600-Pt appeared to had slept all night.

## 2022-06-16 NOTE — PLAN OF CARE
Problem: Thought Process Alteration  Goal: Optimal Thought Clarity  Description: Pt will demonstrate Optimal Thought Clarity before discharge     Pt. Will establish and maintain linear conversations with staff  Outcome: Ongoing, Progressing     Patient is able to have a linear conversation with staff.  He does oddly smile to himself and mumble when walking in the hallways.  When sitting in the lounge patient will stare off into space and is slow to respond.        Problem: Behavioral Health Plan of Care  Goal: Patient-Specific Goal (Individualization)  Description: Pt. Will consume >50% of meals provided   Pt. Will sleep 4-6 Hours Nightly   Pt. Will attempt groups daily when able       Outcome: Ongoing, Progressing  Patient has been calm, cooperative, and medication complaint this shift.  He spent most of the day walking in the hallways and was briefly in the group room.   His affect is flat but he reports having no mental health concerns at this time.  No complaints of pain.  VS WNL.  Mom called and was given update on patient.  Face to face end of shift report communicated to night shift RN.     Lisa Garcia RN  6/15/2022  9:25 PM

## 2022-06-17 PROCEDURE — 124N000004

## 2022-06-17 PROCEDURE — 99232 SBSQ HOSP IP/OBS MODERATE 35: CPT | Performed by: NURSE PRACTITIONER

## 2022-06-17 PROCEDURE — 250N000013 HC RX MED GY IP 250 OP 250 PS 637: Performed by: NURSE PRACTITIONER

## 2022-06-17 RX ADMIN — DIVALPROEX SODIUM 1500 MG: 500 TABLET, FILM COATED, EXTENDED RELEASE ORAL at 20:21

## 2022-06-17 RX ADMIN — PALIPERIDONE 3 MG: 3 TABLET, EXTENDED RELEASE ORAL at 20:21

## 2022-06-17 RX ADMIN — Medication 50 MCG: at 20:21

## 2022-06-17 ASSESSMENT — ACTIVITIES OF DAILY LIVING (ADL)
LAUNDRY: UNABLE TO COMPLETE
ADLS_ACUITY_SCORE: 20
ORAL_HYGIENE: INDEPENDENT
DRESS: SCRUBS (BEHAVIORAL HEALTH)
ADLS_ACUITY_SCORE: 20
HYGIENE/GROOMING: INDEPENDENT
ADLS_ACUITY_SCORE: 20
ADLS_ACUITY_SCORE: 20

## 2022-06-17 NOTE — PROGRESS NOTES
"[facility] PSYCHIATRY  PROGRESS NOTE     SUBJECTIVE        I found Junior pacing the halls today. [facility] is [facility] and KAROLINE Fede is hopeful for an update on acceptance sometime early next week. He continues to appear to respond to internal stimuli and is noted to be smiling, talking, and at times, laughing to himself. The patient denies any issues with sleep or appetite. He denies any side effects from his current medication regimen. Patient denies suicidal or homicidal ideation.      In review of chart it appears that he has been tried on several different neuroleptic medications with either limited results or experienced side effects: Latuda (ineffective), Zyprexa (stopped on own due to feeling too sedated), Haldol (muscle contractions in face), Risperdal (akathisia), Seroquel (listed as allergy, \"fainted\") Abilify (akathisia).      MEDICATIONS   Scheduled Meds:    divalproex sodium extended-release  1,500 mg Oral Daily     paliperidone  234 mg Intramuscular Q30 Days     paliperidone  3 mg Oral At Bedtime     Vitamin D3  50 mcg Oral At Bedtime     PRN Meds:.acetaminophen, benztropine, haloperidol **AND** LORazepam **AND** diphenhydrAMINE, haloperidol lactate **AND** LORazepam **AND** diphenhydrAMINE, hydrOXYzine, nicotine, OLANZapine **OR** OLANZapine, traZODone     ALLERGIES   Allergies   Allergen Reactions     Seasonal Allergies      Seroquel [Quetiapine]      Fainting and slowed breathing         MENTAL STATUS EXAM   Vitals: /75   Pulse 90   Temp 99.1  F (37.3  C) (Temporal)   Resp 16   Wt 76.1 kg (167 lb 12.8 oz)   SpO2 97%   BMI 23.40 kg/m      Appearance:  awake, alert, adequately groomed and dressed in hospital scrubs  Attitude:  guarded, cooperative  Eye Contact: intermittent, tends to avoid eye contact  Mood: \"fine\", denies feeling depressed or anxious  Affect: incongruent at times, laughing and smiling to self  Speech:  clear, coherent  Psychomotor " Behavior:  no evidence of tardive dyskinesia, dystonia, or tics, no abnormal or involuntary movements noted  Thought Process:  goal oriented, difficult to assess as he offers little in conversation  Associations:  no loose associations  Thought Content:  no evidence of suicidal ideation or homicidal ideation and patient appears to be responding to internal stimuli, denies hallucinations  Insight:  limited  Judgment:  limited  Oriented to:  time, person, and place  Attention Span and Concentration:  limited  Recent and Remote Memory:  limited  Fund of Knowledge: appropriate for education  Muscle Strength and Tone: normal  Gait and Station: Normal       LABS   No results found for this or any previous visit (from the past 24 hour(s)).      IMPRESSION   Pt is currently under commitment with [facility].     Pt was discharged from station 4A at [facility] two days ago and then went to [facility].   Per Crisis assessment- The following information was received from Van and Rolanda Fernández whose relationship to the patient is parents. Information was obtained in person. Their phone number is Rolanda ([phone]), Van ([phone]) and they last had contact with patient on 22.   What happened PTA: Per mom, 22 in the AM he was at [facility] He went for a bike ride by himself, even though he wasn't supposed to leave the facility for the first 10 days by himself. When he got back to [facility] he said he was afraid he was going to be tortured there and that one of the staff was his brother. Around 3:30 the counselor called mom and said that he left again and asked for Cam's cell number. They made contact with him and he was making non sensical statements. Mom states that it sounded like someone was with him trying to help him get back to [facility] or [facility] and then Cam's phone . He was telling dad random things and not making sense. He was saying an address, [address].      When dad got  here at the hosptial, he was okay to see him. Told dad, 'you and I are God.' He thought Prince Coyle was talking to him. Asking dad, 'Do you think I'm crazy.'     Mother reports this is his 5th hospitalization in 6 months. He was willing to go [facility]. They state that Cam isn't sure about being on medications. Mother reported that Cam can be at [facility] for up to 90 days.      The patient has been hospitalized in 09/2021 and 10/2021 for psychosis. EMR noted that he had a suicide attempt by cutting his left arm in the bathtub in October. The patient has a history of commitment starting in 11/2021 and ending on 05/13/2022.  Commitment was extended as of 5/9/2022.         DIAGNOSES     1.Schizoaffective disorder, Bipolar type      PLAN     Location: Unit 5  Legal Status: committed with [facility] (Zyprexa, Abilify, Prolixin, Invega)    Safety Assessment:    Behavioral Orders   Procedures     Code 1 - Restrict to Unit     Routine Programming     As clinically indicated     Status 15     Every 15 minutes.      PTA medications held:   Lexapro 20 mg daily      PTA medications continued/changed:   Continue Invega Sustenna, maintenance dose increased from 156 -> 234 mg Q28D on 6/6/22, next injection due on 7/4/22  Continue oral Invega 3 mg at bedtime d/t ongoing psychosis and supplement until LEÓN kicks in    New medications tried and stopped:   None    New medications initiated:   Depakote 250 mg on 5/28 -> 500 mg on 5/29 ->1,000 mg on 5/30 -> 1,500 mg on 6/3 (VPA level on 6/6 of 90)  Cogentin 1 mg BID prn EPSE      Today's Changes:  No changes made    Programming: Patient will be treated in a therapeutic milieu with appropriate individual and group therapies. Education will be provided on diagnoses, medications, and treatments.     Medical diagnoses:  Per medicine. Nothing acute    Consult: None  Tests: None    Anticipated LOS: 2-3 weeks-pt is under Civil Commitment and Nguyen  [facility]- to be screened by  Ryanne SANDOVAL on 6/16       ATTESTATION      Patient has been seen and evaluated by me,  SELINA Harding CNP

## 2022-06-17 NOTE — PROGRESS NOTES
KAROLINE called Carolyn with [facility]. She felt the screening went good. She stated she should know sometime next week if theTeam there approved him or not.     KAROLINE faxed updated notes to [facility].

## 2022-06-17 NOTE — PLAN OF CARE
Face to face end of shift report will be communicated to oncoming RN.     Problem: Behavioral Health Plan of Care  Goal: Patient-Specific Goal (Individualization)  Description: Pt. Will consume >50% of meals provided   Pt. Will sleep 4-6 Hours Nightly   Pt. Will attempt groups daily when able       Outcome: Ongoing, Progressing     Problem: Thought Process Alteration  Goal: Optimal Thought Clarity  Description: Pt will demonstrate Optimal Thought Clarity before discharge     Pt. Will establish and maintain linear conversations with staff  Outcome: Ongoing, Progressing     Problem: Suicidal Behavior  Goal: Suicidal Behavior is Absent or Managed  Outcome: Ongoing, Progressing   Goal Outcome Evaluation:  Face to face end of shift report obtained from RODDY Gonzalez. Pt observed resting in bed.   Pt appears to be sleeping in bed with eyes closed, having regular respirations, and position changes. 15 minutes and PRN safety checks completed with no noted complains. No self harm or delusional comments noted or reported so far this shift.    0600-Pt appeared to had slept 6.5 hours.

## 2022-06-17 NOTE — PLAN OF CARE
Problem: Thought Process Alteration  Goal: Optimal Thought Clarity  Description: Pt will demonstrate Optimal Thought Clarity before discharge     Pt. Will establish and maintain linear conversations with staff  Outcome: Ongoing, Progressing     Patient is able to answer questions appropriately but he does appear responding to internal stimuli.  While walking in the hallways he will smile to himself and stare off into space.       Problem: Behavioral Health Plan of Care  Goal: Patient-Specific Goal (Individualization)  Description: Pt. Will consume >50% of meals provided   Pt. Will sleep 4-6 Hours Nightly   Pt. Will attempt groups daily when able       Outcome: Ongoing, Progressing  Patient has been calm, cooperative, and medication complaint this shift.  He paced the hallways 75% of the shift and reported having difficulty falling asleep.  Affect is blunted and flat.  Polite with peers and staff.  No complaints of pain.  VS WNL.  Face to face end of shift report communicated to night shift RN.     Lisa Garcia RN  6/16/2022  10:09 PM

## 2022-06-17 NOTE — PLAN OF CARE
"  Problem: Behavioral Health Plan of Care  Goal: Plan of Care Review  Recent Flowsheet Documentation  Taken 6/17/2022 0944 by Chris Murillo RN  Plan of Care Reviewed With: patient  Patient Agreement with Plan of Care: agrees  Goal: Patient-Specific Goal (Individualization)  Description: Pt. Will consume >50% of meals provided   Pt. Will sleep 4-6 Hours Nightly   Pt. Will attempt groups daily when able       Outcome: Ongoing, Progressing  Note: He is up on the unit. He is noted to be walking in the hallway smiling and talking at times to himself. He doesn't offer conversation but answer questions when asked. His responses are delayed at times. He gives short answers. He states \"I'm fine\". He denies hallucinations although he is preoccupied and noted to be responding to internal stimuli.   Goal: Optimal Comfort and Wellbeing  Intervention: Provide Person-Centered Care  Recent Flowsheet Documentation  Taken 6/17/2022 0944 by Chris Murillo RN  Trust Relationship/Rapport:    care explained    questions answered    questions encouraged    reassurance provided    thoughts/feelings acknowledged  Goal: Develops/Participates in Therapeutic [place] to Support Successful Transition  Intervention: Foster Therapeutic [place]  Recent Flowsheet Documentation  Taken 6/17/2022 0944 by Chris Murillo RN  Trust Relationship/Rapport:    care explained    questions answered    questions encouraged    reassurance provided    thoughts/feelings acknowledged     Problem: Thought Process Alteration  Goal: Optimal Thought Clarity  Description: Pt will demonstrate Optimal Thought Clarity before discharge     Pt. Will establish and maintain linear conversations with staff  Outcome: Ongoing, Progressing  Note: He is not able to manage an in depth conversation. He responses are delayed and short. He is preoccupied and responding by laughing and talking to himself.       Problem: Suicidal Behavior  Goal: Suicidal Behavior is Absent or " Managed  Outcome: Ongoing, Progressing  Note: He is not having suicidal thoughts. He is agreeable to safe behavior on the unit.    Face to face end of shift report to be communicated to evening shift RN.     Chris Murillo RN  6/17/2022  1:55 PM

## 2022-06-18 PROCEDURE — 124N000004

## 2022-06-18 PROCEDURE — 250N000013 HC RX MED GY IP 250 OP 250 PS 637: Performed by: NURSE PRACTITIONER

## 2022-06-18 RX ADMIN — DIVALPROEX SODIUM 1500 MG: 500 TABLET, FILM COATED, EXTENDED RELEASE ORAL at 20:11

## 2022-06-18 RX ADMIN — Medication 50 MCG: at 20:11

## 2022-06-18 RX ADMIN — PALIPERIDONE 3 MG: 3 TABLET, EXTENDED RELEASE ORAL at 20:11

## 2022-06-18 ASSESSMENT — ACTIVITIES OF DAILY LIVING (ADL)
ADLS_ACUITY_SCORE: 20
HYGIENE/GROOMING: INDEPENDENT
LAUNDRY: UNABLE TO COMPLETE
DRESS: SCRUBS (BEHAVIORAL HEALTH)
ADLS_ACUITY_SCORE: 20
ORAL_HYGIENE: INDEPENDENT
ADLS_ACUITY_SCORE: 20

## 2022-06-18 NOTE — PLAN OF CARE
Face to face shift report received from RODDY Gonzalez.       Problem: Behavioral Health Plan of Care  Goal: Patient-Specific Goal (Individualization)  Description: Pt. Will consume >50% of meals provided   Pt. Will sleep 4-6 Hours Nightly   Pt. Will attempt groups daily when able   Outcome: Ongoing, Progressing   Calm and cooperative. No scheduled medication this shift. Pt smiles inappropriately while pacing in hallways. Appears to be responding to internal stimuli. Offers little in conversation. Behavior appropriate. No reports of pain.     Problem: Thought Process Alteration  Goal: Optimal Thought Clarity  Description: Pt will demonstrate Optimal Thought Clarity before discharge     Pt. Will establish and maintain linear conversations with staff  Outcome: Ongoing, Progressing       Problem: Suicidal Behavior  Goal: Suicidal Behavior is Absent or Managed  Outcome: Ongoing, Progressing   Free from self harm or injury this shift.       Face to face end of shift report to be communicated to oncoming RN.

## 2022-06-18 NOTE — PLAN OF CARE
SHIFT NOTE: 8814-5975    Problem: Thought Process Alteration  Goal: Optimal Thought Clarity  Description: Pt will demonstrate Optimal Thought Clarity before discharge     Pt. Will establish and maintain linear conversations with staff  Outcome: Ongoing, Progressing   Patient answers all questions superficially and avoids in depth conversation.  He does appear responding to internal stimuli and is noted to be smiling and laughing to himself while walking the hallways.       Problem: Behavioral Health Plan of Care  Goal: Patient-Specific Goal (Individualization)  Description: Pt. Will consume >50% of meals provided   Pt. Will sleep 4-6 Hours Nightly   Pt. Will attempt groups daily when able       Outcome: Ongoing, Progressing     Patient has been calm, cooperative, and medication complaint this shift.  He paced the hallways much of the shift and listened to the radio.  Avoids social contact and did not attend groups.  Affect is blunted and flat.  No complaints of pain.  VS WNL. Patient slept through the night.  Regular respirations and position changes noted.   Face to face end of shift report communicated to day shift RN.     Lisa Garcia RN  6/17/2022  7:48 PM

## 2022-06-19 PROCEDURE — 250N000013 HC RX MED GY IP 250 OP 250 PS 637: Performed by: NURSE PRACTITIONER

## 2022-06-19 PROCEDURE — 124N000004

## 2022-06-19 RX ADMIN — DIVALPROEX SODIUM 1500 MG: 500 TABLET, FILM COATED, EXTENDED RELEASE ORAL at 20:20

## 2022-06-19 RX ADMIN — PALIPERIDONE 3 MG: 3 TABLET, EXTENDED RELEASE ORAL at 20:20

## 2022-06-19 RX ADMIN — Medication 50 MCG: at 20:20

## 2022-06-19 ASSESSMENT — ACTIVITIES OF DAILY LIVING (ADL)
ADLS_ACUITY_SCORE: 20
ADLS_ACUITY_SCORE: 20
LAUNDRY: UNABLE TO COMPLETE
ORAL_HYGIENE: INDEPENDENT
ADLS_ACUITY_SCORE: 20
ADLS_ACUITY_SCORE: 20
DRESS: SCRUBS (BEHAVIORAL HEALTH)
ADLS_ACUITY_SCORE: 20
HYGIENE/GROOMING: INDEPENDENT
ADLS_ACUITY_SCORE: 20
ADLS_ACUITY_SCORE: 20

## 2022-06-19 NOTE — PLAN OF CARE
SHIFT NOTE: [id number]      Problem: Thought Process Alteration  Goal: Optimal Thought Clarity  Description: Pt will demonstrate Optimal Thought Clarity before discharge     Pt. Will establish and maintain linear conversations with staff  Outcome: Ongoing, Progressing   Patient denies hallucinations but appears responding to internal stimuli.  He was seen laughing and smiling to himself while walking in the hallway almost constantly today.       Problem: Behavioral Health Plan of Care  Goal: Patient-Specific Goal (Individualization)  Description: Pt. Will consume >50% of meals provided   Pt. Will sleep 4-6 Hours Nightly   Pt. Will attempt groups daily when able       Outcome: Ongoing, Progressing   Patient has been calm, cooperative, and medication complaint this shift.  He does not attend groups and avoids social contact with peers.  He is pleasant when talking with staff and able to make needs known.  Flat and blunted affect.  No complaints of pain.  VS WNL.  Patient slept through the night with regular respirations and position changes noted. Face to face end of shift report communicated to day shift RN.     Lisa Garcia RN  6/18/2022  7:53 PM

## 2022-06-19 NOTE — PLAN OF CARE
Face to face shift report received from RODDY Gonzalez.       Problem: Behavioral Health Plan of Care  Goal: Patient-Specific Goal (Individualization)  Description: Pt. Will consume >50% of meals provided   Pt. Will sleep 4-6 Hours Nightly   Pt. Will attempt groups daily when able   Outcome: Ongoing, Not Progressing   Calm and cooperative. No scheduled medications this shift. Offers little but pleasant during conversation. Denies mental health issues, but appears to be responding to internal stimuli. Observed to be talking to self and smiling inappropriately while pacing in hallway. No reports of pain. Able to make needs known.   Requested items for shower.     Problem: Thought Process Alteration  Goal: Optimal Thought Clarity  Description: Pt will demonstrate Optimal Thought Clarity before discharge   Pt. Will establish and maintain linear conversations with staff  Outcome: Ongoing, Not Progressing       Problem: Suicidal Behavior  Goal: Suicidal Behavior is Absent or Managed  Outcome: Ongoing, Progressing   Free from self harm and injury this shift.       Face to face end of shift report to be communicated to oncoming RN.

## 2022-06-20 PROCEDURE — 250N000013 HC RX MED GY IP 250 OP 250 PS 637: Performed by: NURSE PRACTITIONER

## 2022-06-20 PROCEDURE — 99231 SBSQ HOSP IP/OBS SF/LOW 25: CPT | Performed by: NURSE PRACTITIONER

## 2022-06-20 PROCEDURE — 124N000004

## 2022-06-20 RX ADMIN — DIVALPROEX SODIUM 1500 MG: 500 TABLET, FILM COATED, EXTENDED RELEASE ORAL at 20:20

## 2022-06-20 RX ADMIN — Medication 50 MCG: at 20:21

## 2022-06-20 RX ADMIN — PALIPERIDONE 3 MG: 3 TABLET, EXTENDED RELEASE ORAL at 20:20

## 2022-06-20 ASSESSMENT — ACTIVITIES OF DAILY LIVING (ADL)
ADLS_ACUITY_SCORE: 20

## 2022-06-20 NOTE — PLAN OF CARE
Face to face end of shift report communicated to oncoming shift.     Zo Melchor RN  6/20/2022  11:03 PM    Problem: Behavioral Health Plan of Care  Goal: Patient-Specific Goal (Individualization)  Description: Pt. Will consume >50% of meals provided   Pt. Will sleep 4-6 Hours Nightly   Pt. Will attempt groups daily when able       Outcome: Ongoing, Not Progressing  Note: Patient up on unit walking halls, observed smiling and appears responding to internal stimuli at times.  Patient denies mental health symptoms.  Full range affect noted in conversation, pleasant and cooperative with nursing cares and assessment, lets needs be known.     Care continued for christoph shift.   Patient continues to pace the halls with large smile on his face.    Asked several times if patient needs anything, patient lets needs be known.      Patient eats 100% of meals.   Patient's mother calls to check on status of patient.      Patient spends more time alone in his room this shift, observed to be giggling and some self talk.    Patient participates when engaged in conversation.      Goal Outcome Evaluation:    Plan of Care Reviewed With: patient

## 2022-06-20 NOTE — PROGRESS NOTES
"[facility] PSYCHIATRY  PROGRESS NOTE     SUBJECTIVE        I found Junior pacing the halls today.  He continues to appear to respond to internal stimuli and is noted to be smiling, talking, and at times, laughing to himself. The patient denies any issues with sleep or appetite. He denies any side effects from his current medication regimen. Patient denies suicidal or homicidal ideation. Waiting to hear if patient was accepted to [facility].      In review of chart it appears that he has been tried on several different neuroleptic medications with either limited results or experienced side effects: Latuda (ineffective), Zyprexa (stopped on own due to feeling too sedated), Haldol (muscle contractions in face), Risperdal (akathisia), Seroquel (listed as allergy, \"fainted\") Abilify (akathisia).      MEDICATIONS   Scheduled Meds:    divalproex sodium extended-release  1,500 mg Oral Daily     paliperidone  234 mg Intramuscular Q30 Days     paliperidone  3 mg Oral At Bedtime     Vitamin D3  50 mcg Oral At Bedtime     PRN Meds:.acetaminophen, benztropine, haloperidol **AND** LORazepam **AND** diphenhydrAMINE, haloperidol lactate **AND** LORazepam **AND** diphenhydrAMINE, hydrOXYzine, nicotine, OLANZapine **OR** OLANZapine, traZODone     ALLERGIES   Allergies   Allergen Reactions     Seasonal Allergies      Seroquel [Quetiapine]      Fainting and slowed breathing         MENTAL STATUS EXAM   Vitals: /92   Pulse 84   Temp 98  F (36.7  C) (Temporal)   Resp 16   Wt 76 kg (167 lb 9.6 oz)   SpO2 97%   BMI 23.38 kg/m      Appearance:  awake, alert, adequately groomed and dressed in hospital scrubs  Attitude:  guarded, cooperative  Eye Contact: intermittent, tends to avoid eye contact  Mood: \"fine\", denies feeling depressed or anxious  Affect: incongruent at times, laughing and smiling to self  Speech:  clear, coherent  Psychomotor Behavior:  no evidence of tardive dyskinesia, dystonia, or tics, no abnormal or " involuntary movements noted  Thought Process:  goal oriented, difficult to assess as he offers little in conversation  Associations:  no loose associations  Thought Content:  no evidence of suicidal ideation or homicidal ideation and patient appears to be responding to internal stimuli, denies hallucinations  Insight:  limited  Judgment:  limited  Oriented to:  time, person, and place  Attention Span and Concentration:  limited  Recent and Remote Memory:  limited  Fund of Knowledge: appropriate for education  Muscle Strength and Tone: normal  Gait and Station: Normal       LABS   No results found for this or any previous visit (from the past 24 hour(s)).      IMPRESSION   Pt is currently under commitment with Patrick.     Pt was discharged from station 4A at [place] two days ago and then went to [facility].   Per Crisis assessment- The following information was received from Van and Rolanda Fernández whose relationship to the patient is parents. Information was obtained in person. Their phone number is Rolanda ([phone]), Van ([phone]) and they last had contact with patient on 22.   What happened PTA: Per mom, 22 in the AM he was at [facility]. He went for a bike ride by himself, even though he wasn't supposed to leave the facility for the first 10 days by himself. When he got back to [facility], he said he was afraid he was going to be tortured there and that one of the staff was his brother. Around 3:30 the counselor called mom and said that he left again and asked for Cam's cell number. They made contact with him and he was making non sensical statements. Mom states that it sounded like someone was with him trying to help him get back to [facility] or [facility] and then Cam's phone . He was telling dad random things and not making sense. He was saying an address, [address].      When dad got here at the [facility], he was okay to see him. Told dad, 'you and I are God.' He  thought Prince Coyle was talking to him. Asking dad, 'Do you think I'm crazy.'     Mother reports this is his 5th hospitalization in 6 months. He was willing to go [facility]. They state that Cam isn't sure about being on medications. Mother reported that Cam can be at [facility] for up to 90 days.      The patient has been hospitalized in 09/2021 and 10/2021 for psychosis. EMR noted that he had a suicide attempt by cutting his left arm in the bathtub in October. The patient has a history of commitment starting in 11/2021 and ending on 05/13/2022.  Commitment was extended as of 5/9/2022.         DIAGNOSES     1.Schizoaffective disorder, Bipolar type      TREATMENT TEAM CARE PLAN     Progress: Continued symptoms.    Continued Stay Criteria/Rationale: Continued symptoms without sufficient improvement/resolution.    Medical/Physical: See above.    Precautions: See above.     Plan: Continue inpatient care with unit support and medication management.    Rationale for change in precautions or plan: NA due to no change.    Participants: SELINA Harding CNP, Nursing, SW, OT.    The patient's care was discussed with the treatment team and chart notes were reviewed.         PLAN     Location: Unit 5  Legal Status: committed with Nguyen (Zyprexa, Abilify, Prolixin, Invega)    Safety Assessment:    Behavioral Orders   Procedures     Code 1 - Restrict to Unit     Routine Programming     As clinically indicated     Status 15     Every 15 minutes.      PTA medications held:   Lexapro 20 mg daily      PTA medications continued/changed:   Continue Invega Sustenna, maintenance dose increased from 156 -> 234 mg Q28D on 6/6/22, next injection due on 7/4/22  Continue oral Invega 3 mg at bedtime d/t ongoing psychosis and supplement until LEÓN kicks in    New medications tried and stopped:   None    New medications initiated:   Depakote 250 mg on 5/28 -> 500 mg on 5/29 ->1,000 mg on 5/30 -> 1,500 mg on 6/3 (VPA level on 6/6 of  90)  Cogentin 1 mg BID prn EPSE      Today's Changes:  No changes made    Programming: Patient will be treated in a therapeutic milieu with appropriate individual and group therapies. Education will be provided on diagnoses, medications, and treatments.     Medical diagnoses:  Per medicine. Nothing acute    Consult: None  Tests: None    Anticipated LOS: 2-3 weeks-pt is under Civil Commitment and Nguyen  Disposition: [facility]?        ATTESTATION      Patient has been seen and evaluated by me,  SELINA Harding CNP

## 2022-06-20 NOTE — PROGRESS NOTES
1:1 time with the patient.  No changes made to the discharge plan at this time.   See the AVS for follow up appointments and recommendations.     SW spoke to the patient. He shared he feels great. And he continues to walk the gould with an intense smile on his face.

## 2022-06-20 NOTE — PLAN OF CARE
SHIFT NOTE: [id number]      Problem: Thought Process Alteration  Goal: Optimal Thought Clarity  Description: Pt will demonstrate Optimal Thought Clarity before discharge     Pt. Will establish and maintain linear conversations with staff  Outcome: Ongoing, Progressing   Minimal change in patient condition from yesterday.  He is able to have a linear converation and denies hallucinations but is seen responding to internal stimuli most of the shift.  While out walking the hallway he laughs and smiles to himself and has poor eye contact.        Problem: Behavioral Health Plan of Care  Goal: Patient-Specific Goal (Individualization)  Description: Pt. Will consume >50% of meals provided   Pt. Will sleep 4-6 Hours Nightly   Pt. Will attempt groups daily when able       Outcome: Ongoing, Progressing     Patient has been calm, cooperative, and medication complaint this shift.  Does not attend groups and avoids social contact.  Flat and blunted affect.  No complaints of pain.  VS WNL. Patient slept through the night with regular respirations and position changes noted.   Face to face end of shift report communicated to night day shift RN.     Lisa Garcia RN  6/19/2022  9:08 PM

## 2022-06-21 PROCEDURE — 124N000004

## 2022-06-21 PROCEDURE — 99232 SBSQ HOSP IP/OBS MODERATE 35: CPT | Performed by: NURSE PRACTITIONER

## 2022-06-21 PROCEDURE — 250N000013 HC RX MED GY IP 250 OP 250 PS 637: Performed by: NURSE PRACTITIONER

## 2022-06-21 RX ADMIN — Medication 50 MCG: at 20:10

## 2022-06-21 RX ADMIN — DIVALPROEX SODIUM 1500 MG: 500 TABLET, FILM COATED, EXTENDED RELEASE ORAL at 20:10

## 2022-06-21 RX ADMIN — PALIPERIDONE 3 MG: 3 TABLET, EXTENDED RELEASE ORAL at 20:10

## 2022-06-21 ASSESSMENT — ACTIVITIES OF DAILY LIVING (ADL)
ADLS_ACUITY_SCORE: 40
ADLS_ACUITY_SCORE: 20
LAUNDRY: UNABLE TO COMPLETE
ADLS_ACUITY_SCORE: 40
ORAL_HYGIENE: INDEPENDENT
ADLS_ACUITY_SCORE: 20
ADLS_ACUITY_SCORE: 20
DRESS: SCRUBS (BEHAVIORAL HEALTH);INDEPENDENT
ADLS_ACUITY_SCORE: 40
ADLS_ACUITY_SCORE: 20
ADLS_ACUITY_SCORE: 40
ADLS_ACUITY_SCORE: 40
HYGIENE/GROOMING: INDEPENDENT
ADLS_ACUITY_SCORE: 20

## 2022-06-21 NOTE — PLAN OF CARE
Face to face end of shift report will be communicated to oncoming RN.     Problem: Behavioral Health Plan of Care  Goal: Patient-Specific Goal (Individualization)  Description: Pt. Will consume >50% of meals provided   Pt. Will sleep 4-6 Hours Nightly   Pt. Will attempt groups daily when able       Outcome: Ongoing, Progressing     Problem: Thought Process Alteration  Goal: Optimal Thought Clarity  Description: Pt will demonstrate Optimal Thought Clarity before discharge     Pt. Will establish and maintain linear conversations with staff  Outcome: Ongoing, Progressing     Problem: Suicidal Behavior  Goal: Suicidal Behavior is Absent or Managed  Outcome: Ongoing, Progressing   Goal Outcome Evaluation:  Face to face end of shift report obtained from RODDY Pathak. Pt observed resting in bed.   Pt appears to be sleeping in bed with eyes closed, having regular respirations, and position changes. 15 minutes and PRN safety checks completed with no noted complains. No self harm or delusional comments noted or reported so far this shift.     0600-Pt appeared to had slept all night.

## 2022-06-21 NOTE — PLAN OF CARE
"  Problem: Behavioral Health Plan of Care  Goal: Patient-Specific Goal (Individualization)  Description: Pt. Will consume >50% of meals provided   Pt. Will sleep 4-6 Hours Nightly   Pt. Will attempt groups daily when able       Note: Patient laying in bed resting longer than usual this morning, up to unit late morning. Continues walking hallways continuously, laughing to self. Patient does not socialize with any staff or peers and minimal to no eye contact when approached by writer. Responses are short \"yes or no\" only but remains polite. Appears to be responding to internal stimuli throughout shift but denies hallucinations. Denies all other assessment criteria. Declines numerous group encouragements this shift, stating \"okay\" but does not go into group room.   Showering towards end of shift. Continue to monitor at this time.      Problem: Thought Process Alteration  Goal: Optimal Thought Clarity  Description: Pt will demonstrate Optimal Thought Clarity before discharge     Pt. Will establish and maintain linear conversations with staff  Note: Continue to monitor at this time.      Problem: Suicidal Behavior  Goal: Suicidal Behavior is Absent or Managed  Note: Continue to monitor at this time.     Face to face end of shift report communicated to oncoming RN.     Tati Dyer RN  6/21/2022  7:59 AM       "

## 2022-06-21 NOTE — PROGRESS NOTES
1:1 time with the patient.  No changes made to the discharge plan at this time.   See the AVS for follow up appointments and recommendations.     The SW spoke with the patient. The patient explained he is feeling good with no issues. Sw explained that they are waiting to hear back from them to find out if they are going to accept him or not.

## 2022-06-21 NOTE — PROGRESS NOTES
"[facility] PSYCHIATRY  PROGRESS NOTE     SUBJECTIVE        I found Junior pacing the halls today.  He continues to appear to respond to internal stimuli and is noted to be smiling, talking, and at times, laughing to himself. The patient denies any issues with sleep or appetite. He denies any side effects from his current medication regimen. Patient denies suicidal or homicidal ideation. Waiting to hear if patient was accepted to [facility].      In review of chart it appears that he has been tried on several different neuroleptic medications with either limited results or experienced side effects: Latuda (ineffective), Zyprexa (stopped on own due to feeling too sedated), Haldol (muscle contractions in face), Risperdal (akathisia), Seroquel (listed as allergy, \"fainted\") Abilify (akathisia).      MEDICATIONS   Scheduled Meds:    divalproex sodium extended-release  1,500 mg Oral Daily     paliperidone  234 mg Intramuscular Q30 Days     paliperidone  3 mg Oral At Bedtime     Vitamin D3  50 mcg Oral At Bedtime     PRN Meds:.acetaminophen, benztropine, haloperidol **AND** LORazepam **AND** diphenhydrAMINE, haloperidol lactate **AND** LORazepam **AND** diphenhydrAMINE, hydrOXYzine, nicotine, OLANZapine **OR** OLANZapine, traZODone     ALLERGIES   Allergies   Allergen Reactions     Seasonal Allergies      Seroquel [Quetiapine]      Fainting and slowed breathing         MENTAL STATUS EXAM   Vitals: /54 (BP Location: Left arm)   Pulse 82   Temp 97.5  F (36.4  C) (Temporal)   Resp 14   Wt 76 kg (167 lb 9.6 oz)   SpO2 97%   BMI 23.38 kg/m      Appearance:  awake, alert, adequately groomed and dressed in hospital scrubs  Attitude:  guarded, cooperative  Eye Contact: intermittent, tends to avoid eye contact  Mood: \"fine\", denies feeling depressed or anxious  Affect: incongruent at times, laughing and smiling to self  Speech:  clear, coherent  Psychomotor Behavior:  no evidence of tardive dyskinesia, dystonia, or " tics, no abnormal or involuntary movements noted  Thought Process:  goal oriented, difficult to assess as he offers little in conversation  Associations:  no loose associations  Thought Content:  no evidence of suicidal ideation or homicidal ideation and patient appears to be responding to internal stimuli, denies hallucinations  Insight:  limited  Judgment:  limited  Oriented to:  time, person, and place  Attention Span and Concentration:  limited  Recent and Remote Memory:  limited  Fund of Knowledge: appropriate for education  Muscle Strength and Tone: normal  Gait and Station: Normal       LABS   No results found for this or any previous visit (from the past 24 hour(s)).      IMPRESSION   Pt is currently under commitment with Patrick.     Pt was discharged from station 4A at [place] two days ago and then went to [facility].   Per Crisis assessment- The following information was received from Van and Rolanda Fernández whose relationship to the patient is parents. Information was obtained in person. Their phone number is Rolanda ([phone]), Van ([phone]) and they last had contact with patient on 22.   What happened PTA: Per mom, 22 in the AM he was at [facility]. He went for a bike ride by himself, even though he wasn't supposed to leave the facility for the first 10 days by himself. When he got back to [facility], he said he was afraid he was going to be tortured there and that one of the staff was his brother. Around 3:30 the counselor called mom and said that he left again and asked for Cam's cell number. They made contact with him and he was making non sensical statements. Mom states that it sounded like someone was with him trying to help him get back to [facility] or [facility] and then Cam's phone . He was telling dad random things and not making sense. He was saying an address, [address].      When dad got here at the [facility], he was okay to see him. Told dad, 'you and  I are God.' He thought Prince Coyle was talking to him. Asking dad, 'Do you think I'm crazy.'     Mother reports this is his 5th hospitalization in 6 months. He was willing to go [facility]. They state that Cam isn't sure about being on medications. Mother reported that Cam can be at [facility] for up to 90 days.      The patient has been hospitalized in 09/2021 and 10/2021 for psychosis. EMR noted that he had a suicide attempt by cutting his left arm in the bathtub in October. The patient has a history of commitment starting in 11/2021 and ending on 05/13/2022.  Commitment was extended as of 5/9/2022.         DIAGNOSES     1.Schizoaffective disorder, Bipolar type      TREATMENT TEAM CARE PLAN     Progress: Continued symptoms.    Continued Stay Criteria/Rationale: Continued symptoms without sufficient improvement/resolution.    Medical/Physical: See above.    Precautions: See above.     Plan: Continue inpatient care with unit support and medication management.    Rationale for change in precautions or plan: NA due to no change.    Participants: SELINA Harding CNP, Nursing, SW, OT.    The patient's care was discussed with the treatment team and chart notes were reviewed.         PLAN     Location: Unit 5  Legal Status: committed with Nguyen (Zyprexa, Abilify, Prolixin, Invega)    Safety Assessment:    Behavioral Orders   Procedures     Code 1 - Restrict to Unit     Routine Programming     As clinically indicated     Status 15     Every 15 minutes.      PTA medications held:   Lexapro 20 mg daily      PTA medications continued/changed:   Continue Invega Sustenna, maintenance dose increased from 156 -> 234 mg Q28D on 6/6/22, next injection due on 7/4/22  Continue oral Invega 3 mg at bedtime d/t ongoing psychosis and supplement until LEÓN kicks in    New medications tried and stopped:   None    New medications initiated:   Depakote 250 mg on 5/28 -> 500 mg on 5/29 ->1,000 mg on 5/30 -> 1,500 mg on 6/3 (VPA level  on 6/6 of 90)  Cogentin 1 mg BID prn EPSE      Today's Changes:  No changes made    Programming: Patient will be treated in a therapeutic milieu with appropriate individual and group therapies. Education will be provided on diagnoses, medications, and treatments.     Medical diagnoses:  Per medicine. Nothing acute    Consult: None  Tests: None    Anticipated LOS: 2-3 weeks-pt is under Civil Commitment and Nguyen  Disposition: [facility]?        ATTESTATION      Patient has been seen and evaluated by me,  SELINA Harding CNP

## 2022-06-22 PROCEDURE — 250N000013 HC RX MED GY IP 250 OP 250 PS 637: Performed by: NURSE PRACTITIONER

## 2022-06-22 PROCEDURE — 124N000004

## 2022-06-22 PROCEDURE — 99232 SBSQ HOSP IP/OBS MODERATE 35: CPT | Performed by: NURSE PRACTITIONER

## 2022-06-22 RX ADMIN — DIVALPROEX SODIUM 1500 MG: 500 TABLET, FILM COATED, EXTENDED RELEASE ORAL at 20:21

## 2022-06-22 RX ADMIN — PALIPERIDONE 3 MG: 3 TABLET, EXTENDED RELEASE ORAL at 20:21

## 2022-06-22 RX ADMIN — Medication 50 MCG: at 20:21

## 2022-06-22 ASSESSMENT — ACTIVITIES OF DAILY LIVING (ADL)
ADLS_ACUITY_SCORE: 40
DRESS: SCRUBS (BEHAVIORAL HEALTH);INDEPENDENT
ADLS_ACUITY_SCORE: 40
ORAL_HYGIENE: INDEPENDENT
ADLS_ACUITY_SCORE: 40
HYGIENE/GROOMING: INDEPENDENT
LAUNDRY: UNABLE TO COMPLETE

## 2022-06-22 NOTE — PLAN OF CARE
Face to face end of shift report will be communicated to oncoming  RN.    Problem: Behavioral Health Plan of Care  Goal: Patient-Specific Goal (Individualization)  Description: Pt. Will consume >50% of meals provided   Pt. Will sleep 4-6 Hours Nightly   Pt. Will attempt groups daily when able       Outcome: Ongoing, Progressing     Problem: Thought Process Alteration  Goal: Optimal Thought Clarity  Description: Pt will demonstrate Optimal Thought Clarity before discharge     Pt. Will establish and maintain linear conversations with staff  Outcome: Ongoing, Progressing     Problem: Suicidal Behavior  Goal: Suicidal Behavior is Absent or Managed  Outcome: Ongoing, Progressing   Goal Outcome Evaluation:  Face to face end of shift report obtained from RODDY Heart. Pt observed resting in bed.   Pt appears to be sleeping in bed with eyes closed, having regular respirations, and position changes. 15 minutes and PRN safety checks completed with no noted complains. No self harm or delusional comments noted or reported so far this shift.     0600-Pt appeared to had slept all night.

## 2022-06-22 NOTE — PROGRESS NOTES
KAROLINE called and left a phone message for Carolyn Fontenot with [facility] asking if the Treatment Team there made a decision if the patient is appropriate or not.

## 2022-06-22 NOTE — PROGRESS NOTES
"[facility] PSYCHIATRY  PROGRESS NOTE     SUBJECTIVE   Flynn was in his room, lying in bed awake, resting. He states that he has been sleeping well. Denies adverse affects from his medications. He denies SI, HI or SIB. Denies A/V hallucinations or delusions. Nursing continues to report that Flynn appears to be responding to internal stimuli. He is often noted to be laughing and smiling to himself. He continues to respond in delayed, brief replies. He appears slightly flat with minimized eye contact, commonly looking at the ground. He offered no complaints at this time. Waiting to hear if patient was accepted to [facility] I[facility].      In review of chart it appears that he has been tried on several different neuroleptic medications with either limited results or experienced side effects: Latuda (ineffective), Zyprexa (stopped on own due to feeling too sedated), Haldol (muscle contractions in face), Risperdal (akathisia), Seroquel (listed as allergy, \"fainted\") Abilify (akathisia).      MEDICATIONS   Scheduled Meds:    divalproex sodium extended-release  1,500 mg Oral Daily     paliperidone  234 mg Intramuscular Q30 Days     paliperidone  3 mg Oral At Bedtime     Vitamin D3  50 mcg Oral At Bedtime     PRN Meds:.acetaminophen, benztropine, haloperidol **AND** LORazepam **AND** diphenhydrAMINE, haloperidol lactate **AND** LORazepam **AND** diphenhydrAMINE, hydrOXYzine, nicotine, OLANZapine **OR** OLANZapine, traZODone     ALLERGIES   Allergies   Allergen Reactions     Seasonal Allergies      Seroquel [Quetiapine]      Fainting and slowed breathing         MENTAL STATUS EXAM   Vitals: /69   Pulse 79   Temp 97.5  F (36.4  C) (Temporal)   Resp 18   Wt 76 kg (167 lb 9.6 oz)   SpO2 95%   BMI 23.38 kg/m      Appearance:  awake, alert, adequately groomed and dressed in hospital scrubs  Attitude: cooperative  Eye Contact: intermittent, tends to avoid eye contact  Mood: \"alright\", denies feeling depressed or anxious  Affect: " slightly flat  Speech:  clear, coherent  Psychomotor Behavior:  no evidence of tardive dyskinesia, dystonia, or tics, no abnormal or involuntary movements noted  Thought Process:  goal oriented, difficult to assess as he offers little in conversation  Associations:  no loose associations  Thought Content:  Denies SI, HI or SIB. Currently does not appear to be responding to any internal stimuli, denies hallucinations. Documentation indicates he appears to be having episodes of internal stimuli.   Insight:  limited  Judgment:  limited  Oriented to:  time, person, and place  Attention Span and Concentration:  limited  Recent and Remote Memory:  limited  Fund of Knowledge: appropriate for education  Muscle Strength and Tone: normal  Gait and Station: Not observed, in bed.        LABS   No results found for this or any previous visit (from the past 24 hour(s)).      IMPRESSION    This is a 21 year old male with a PMH of schizoaffective disorder, bipolar type who is currently under civil commitment and living at [facility]. Pt left the facility on 5.27.22 and was later [place] up [place]. He was brought to a home where someone called EMS and pt was transported to the ED where he was evaluated. It was determined that pt requires inpatient treatment and stabilization for his safety.     Pt's current diagnosis of schizoaffective disorder bipolar type is managed with neuroleptics and a selective serotonin reuptake inhibitor. Will discontinue pts selective serotonin reuptake inhibitor as this may be contributing to his behavior. Will  add a mood stabilizer, Depakote, discussed with mother who is in agreement. No changes in Invega at this time.         DIAGNOSES     1.Schizoaffective disorder, Bipolar type        PLAN     Location: Unit 5  Legal Status: committed with Nguyen (Zyprexa, Abilify, Prolixin, Invega)    Safety Assessment:    Behavioral Orders   Procedures     Code 1 - Restrict to Unit      Routine Programming     As clinically indicated     Status 15     Every 15 minutes.      PTA medications held:   Lexapro 20 mg daily      PTA medications continued/changed:   Continue Invega Sustenna, maintenance dose increased from 156 -> 234 mg Q28D on 6/6/22, next injection due on 7/4/22  Continue oral Invega 3 mg at bedtime d/t ongoing psychosis and supplement until LEÓN is more effective. May need to consider increase in oral Invega versus switch to alternative neuroleptic.    New medications tried and stopped:   None    New medications initiated:   Depakote 250 mg on 5/28 -> 500 mg on 5/29 ->1,000 mg on 5/30 -> 1,500 mg on 6/3 (VPA level on 6/6 of 90)  Cogentin 1 mg BID prn EPSE      Today's Changes:  No changes made    Programming: Patient will be treated in a therapeutic milieu with appropriate individual and group therapies. Education will be provided on diagnoses, medications, and treatments.     Medical diagnoses:  Per medicine. Nothing acute    Consult: None  Tests: None    Anticipated LOS: 2-3 weeks-pt is under Civil Commitment and Nguyen  Disposition: [facility]? vs other IRTS programming       ATTESTATION      Patient has been seen and evaluated by me,  SELINA Rose student    I, Marilu Del Toro CNP, participated in this visit. I have reviewed the above note and agree with findings.

## 2022-06-22 NOTE — PLAN OF CARE
"  Problem: Behavioral Health Plan of Care  Goal: Patient-Specific Goal (Individualization)  Description: Pt. Will consume >50% of meals provided   Pt. Will sleep 4-6 Hours Nightly   Pt. Will attempt groups daily when able       Note: Patient continues to pace unit all shift. Affect is flat/blunt and does not make any eye contact. Minimal responses during conversation attempts. Denies all assessment criteria stating, \"I'm good\". Patient is withdrawn, does not socialize with any peers and declines numerous group encouragements. States, \"I've been in places like this and they aren't fun\". Continues to appear like he is responding to internal stimuli, laughing at self throughout shift.    Showered this afternoon. Compliant with scheduled medications. Continues to be up walking hallways at end of shift. Continue to monitor at this time.      Problem: Thought Process Alteration  Goal: Optimal Thought Clarity  Description: Pt will demonstrate Optimal Thought Clarity before discharge     Pt. Will establish and maintain linear conversations with staff  Note: Continue to monitor at this time.      Problem: Suicidal Behavior  Goal: Suicidal Behavior is Absent or Managed  Note: Continue to monitor at this time.     Face to face end of shift report communicated to oncoming RODDY.     Tati Dyer RN  6/22/2022  3:45 PM       "

## 2022-06-22 NOTE — PLAN OF CARE
Problem: Behavioral Health Plan of Care  Goal: Patient-Specific Goal (Individualization)  Description: Pt. Will consume >50% of meals provided   Pt. Will sleep 4-6 Hours Nightly   Pt. Will attempt groups daily when able       Outcome: Ongoing, Progressing  Note: Report received from Tati PEREYRA.  Rounding complete. Patient is awake and walking the hallway at start of shift.   Cooperative with medications and assessment.  He answers assessment questions with one word responses. Patient denies SI, HI, hallucinations, anxiety, depression. He appears to be responding to internal stimuli as he can be observed smiling and laughing to himself in the hallway. Eye contact during assessment is intermittent.  He spent most of this shift walking the hallway by himself.  He is polite with staff.       Problem: Thought Process Alteration  Goal: Optimal Thought Clarity  Description: Pt will demonstrate Optimal Thought Clarity before discharge     Pt. Will establish and maintain linear conversations with staff  Outcome: Ongoing, Progressing  Note: Patient is able to hold linear conversation.  His offers minimal information during assessment.      Problem: Suicidal Behavior  Goal: Suicidal Behavior is Absent or Managed  Outcome: Ongoing, Progressing  Note: Patient denies SI and had no noted self harm this shift.    Goal Outcome Evaluation:

## 2022-06-23 PROCEDURE — 99232 SBSQ HOSP IP/OBS MODERATE 35: CPT | Performed by: NURSE PRACTITIONER

## 2022-06-23 PROCEDURE — 124N000004

## 2022-06-23 PROCEDURE — 250N000013 HC RX MED GY IP 250 OP 250 PS 637: Performed by: NURSE PRACTITIONER

## 2022-06-23 RX ADMIN — Medication 50 MCG: at 20:37

## 2022-06-23 RX ADMIN — DIVALPROEX SODIUM 1500 MG: 500 TABLET, FILM COATED, EXTENDED RELEASE ORAL at 20:37

## 2022-06-23 RX ADMIN — PALIPERIDONE 3 MG: 3 TABLET, EXTENDED RELEASE ORAL at 20:37

## 2022-06-23 ASSESSMENT — ACTIVITIES OF DAILY LIVING (ADL)
ADLS_ACUITY_SCORE: 40
HYGIENE/GROOMING: INDEPENDENT
ORAL_HYGIENE: INDEPENDENT
ADLS_ACUITY_SCORE: 40
LAUNDRY: UNABLE TO COMPLETE
ADLS_ACUITY_SCORE: 40
DRESS: SCRUBS (BEHAVIORAL HEALTH);INDEPENDENT
ADLS_ACUITY_SCORE: 40

## 2022-06-23 NOTE — PLAN OF CARE
Face to face shift report received from nurse. Rounding completed, pt observed.    Problem: Behavioral Health Plan of Care  Goal: Patient-Specific Goal (Individualization)  Description: Pt. Will consume >50% of meals provided   Pt. Will sleep 4-6 Hours Nightly   Pt. Will attempt groups daily when able   Outcome: Ongoing, Not Progressing     Problem: Thought Process Alteration  Goal: Optimal Thought Clarity  Description: Pt will demonstrate Optimal Thought Clarity before discharge   Pt. Will establish and maintain linear conversations with staff  Outcome: Ongoing, Not Progressing     Pt has been in bed with eyes closed and regular respirations observed all night. Will continue to monitor.  Patient slept throughout the night.    Problem: Suicidal Behavior  Goal: Suicidal Behavior is Absent or Managed  Outcome: Ongoing, Not Progressing     Face to face report will be communicated to oncoming RN.    Yury Andrade RN  6/23/2022

## 2022-06-23 NOTE — PROGRESS NOTES
"[facility] PSYCHIATRY  PROGRESS NOTE     SUBJECTIVE   Cam is up in his room when I see him today. He reports feeling \"fine\". Does spend time up on the unit walking in the halls. Continues to smile and laugh to himself. He denies any hallucinations or racing thoughts. He denies any paranoia, depression, or anxiety. He does ask about the [facility] and discharge plan. Reviewed that we are waiting to hear from them after his interview earlier in the week. He has been calm and cooperative on the unit. Denies any side effects from medications.       In review of chart it appears that he has been tried on several different neuroleptic medications with either limited results or experienced side effects: Latuda (ineffective), Zyprexa (stopped on own due to feeling too sedated), Haldol (muscle contractions in face), Risperdal (akathisia), Seroquel (listed as allergy, \"fainted\") Abilify (akathisia).      MEDICATIONS   Scheduled Meds:    divalproex sodium extended-release  1,500 mg Oral Daily     paliperidone  234 mg Intramuscular Q30 Days     paliperidone  3 mg Oral At Bedtime     Vitamin D3  50 mcg Oral At Bedtime     PRN Meds:.acetaminophen, benztropine, haloperidol **AND** LORazepam **AND** diphenhydrAMINE, haloperidol lactate **AND** LORazepam **AND** diphenhydrAMINE, hydrOXYzine, nicotine, OLANZapine **OR** OLANZapine, traZODone     ALLERGIES   Allergies   Allergen Reactions     Seasonal Allergies      Seroquel [Quetiapine]      Fainting and slowed breathing         MENTAL STATUS EXAM   Vitals: /81   Pulse 84   Temp 98  F (36.7  C) (Temporal)   Resp 16   Wt 76 kg (167 lb 9.6 oz)   SpO2 96%   BMI 23.38 kg/m      Appearance:  awake, alert, adequately groomed and dressed in hospital scrubs  Attitude: cooperative  Eye Contact: intermittent, tends to avoid eye contact  Mood: \"fine\", denies feeling depressed or anxious  Affect: varies- flat at times, other times is smiling and laughing to self  Speech:  clear, " coherent  Psychomotor Behavior:  no evidence of tardive dyskinesia, dystonia, or tics, no abnormal or involuntary movements noted  Thought Process:  goal oriented, seem linear  Associations:  no loose associations  Thought Content:  Denies SI, HI or SIB. Denies hallucinations though appears preoccupied at times  Insight:  limited  Judgment:  limited  Oriented to:  time, person, and place  Attention Span and Concentration:  limited  Recent and Remote Memory:  limited  Fund of Knowledge: appropriate for education  Muscle Strength and Tone: normal  Gait and Station: Not observed, in bed.        LABS   No results found for this or any previous visit (from the past 24 hour(s)).      IMPRESSION    This is a 21 year old male with a PMH of schizoaffective disorder, bipolar type who is currently under civil commitment and living at [facility]. Pt left the facility on 5.27.22 and was later [place]. He was brought to a home where someone called EMS and pt was transported to the ED where he was evaluated. It was determined that pt requires inpatient treatment and stabilization for his safety.     Pt's current diagnosis of schizoaffective disorder bipolar type is managed with neuroleptics and a selective serotonin reuptake inhibitor. Will discontinue pts selective serotonin reuptake inhibitor as this may be contributing to his behavior. Will  add a mood stabilizer, Depakote, discussed with mother who is in agreement. No changes in Invega at this time.         DIAGNOSES     1.Schizoaffective disorder, Bipolar type          PLAN     Location: Unit 5  Legal Status: committed with Nguyen (Zyprexa, Abilify, Prolixin, Invega)    Safety Assessment:    Behavioral Orders   Procedures     Code 1 - Restrict to Unit     Routine Programming     As clinically indicated     Status 15     Every 15 minutes.      PTA medications held:   Lexapro 20 mg daily      PTA medications continued/changed:   Continue  Invega Sustenna, maintenance dose increased from 156 -> 234 mg Q28D on 6/6/22, next injection due on 7/4/22  Continue oral Invega 3 mg at bedtime d/t ongoing psychosis and supplement until LEÓN is more effective.     New medications tried and stopped:   None    New medications initiated:   Depakote 250 mg on 5/28 -> 500 mg on 5/29 ->1,000 mg on 5/30 -> 1,500 mg on 6/3 (VPA level on 6/6 of 90)  Cogentin 1 mg BID prn EPSE      Today's Changes:  No changes made    Programming: Patient will be treated in a therapeutic milieu with appropriate individual and group therapies. Education will be provided on diagnoses, medications, and treatments.     Medical diagnoses:  Per medicine. Nothing acute    Consult: None  Tests: None    Anticipated LOS: 2-3 weeks-pt is under Civil Commitment and Nguyen  Disposition: [facility]? vs other IRTS programming       ATTESTATION      Patient has been seen and evaluated by me,  Marilu Del Toro, NP

## 2022-06-23 NOTE — PLAN OF CARE
Problem: Behavioral Health Plan of Care  Goal: Patient-Specific Goal (Individualization)  Description: Pt. Will consume >50% of meals provided   Pt. Will sleep 4-6 Hours Nightly   Pt. Will attempt groups daily when able       Outcome: Ongoing, Progressing     Problem: Thought Process Alteration  Goal: Optimal Thought Clarity  Description: Pt will demonstrate Optimal Thought Clarity before discharge     Pt. Will establish and maintain linear conversations with staff  Outcome: Ongoing, Progressing     Problem: Suicidal Behavior  Goal: Suicidal Behavior is Absent or Managed  Outcome: Ongoing, Progressing     Face to face shift report received from Yury PEREYRA. Rounding completed, pt observed.     Pt pleasant and cooperative with nursing assessment. Pt denies SI at this time and has not had any noted episodes of self harm this shift. Pt is withdrawn from peers on unit. Pt still appears to be responding to internal stimuli.    Face to face report will be communicated to oncoming RN.    Lachelle Espinoza RN  6/23/2022  1:16 PM

## 2022-06-24 PROCEDURE — 250N000013 HC RX MED GY IP 250 OP 250 PS 637: Performed by: NURSE PRACTITIONER

## 2022-06-24 PROCEDURE — 124N000004

## 2022-06-24 RX ADMIN — DIVALPROEX SODIUM 1500 MG: 500 TABLET, FILM COATED, EXTENDED RELEASE ORAL at 20:08

## 2022-06-24 RX ADMIN — Medication 50 MCG: at 20:08

## 2022-06-24 RX ADMIN — PALIPERIDONE 3 MG: 3 TABLET, EXTENDED RELEASE ORAL at 20:08

## 2022-06-24 ASSESSMENT — ACTIVITIES OF DAILY LIVING (ADL)
ORAL_HYGIENE: INDEPENDENT
DRESS: INDEPENDENT;SCRUBS (BEHAVIORAL HEALTH)
ADLS_ACUITY_SCORE: 40
LAUNDRY: UNABLE TO COMPLETE
ADLS_ACUITY_SCORE: 40
HYGIENE/GROOMING: INDEPENDENT
ADLS_ACUITY_SCORE: 40

## 2022-06-24 NOTE — PLAN OF CARE
Problem: Behavioral Health Plan of Care  Goal: Patient-Specific Goal (Individualization)  Description: Pt. Will consume >50% of meals provided   Pt. Will sleep 4-6 Hours Nightly   Pt. Will attempt groups daily when able       Note: Report received from RODDY Myers.  Rounding complete.  Pt observed sleeping with regular and unlabored respirations.      Pt has been in bed with eyes closed and regular respirations.  15 minute and PRN checks all night.  No complaints offered.  Will continue to monitor.     Face to face end of shift communicated to oncoming RN.     Sherry CARSON  June 24, 2022  6:30 AM       Problem: Thought Process Alteration  Goal: Optimal Thought Clarity  Description: Pt will demonstrate Optimal Thought Clarity before discharge     Pt. Will establish and maintain linear conversations with staff  Note: Unable to assess due to pt sleeping.         Problem: Suicidal Behavior  Goal: Suicidal Behavior is Absent or Managed  Note: Unable to assess due to pt sleeping. Pt has remained free from self-harm.      Goal Outcome Evaluation:

## 2022-06-24 NOTE — PROGRESS NOTES
NATALIE spoke to the patient's mom and updated her that NATALIE is attempting to get a hold of [employer] to have them explain what a CTM authorization is. The patent's mom update the Sw that the patient's dad is going to be calling to schedule a visitation appointment.       NATALIE received a call from the patient's dad and he will be coming to visit the patient on the unit @ 6:00 pm today.     Natalie updated nursing of the dad's visitation time for patient.

## 2022-06-24 NOTE — PLAN OF CARE
"  Problem: Behavioral Health Plan of Care  Goal: Plan of Care Review  Recent Flowsheet Documentation  Taken 6/23/2022 1730 by Chris Murillo RN  Plan of Care Reviewed With: patient  Patient Agreement with Plan of Care: agrees  Goal: Patient-Specific Goal (Individualization)  Description: Pt. Will consume >50% of meals provided   Pt. Will sleep 4-6 Hours Nightly   Pt. Will attempt groups daily when able       Outcome: Ongoing, Progressing  Note: He is up on the unit at times. He doesn't offer conversation. He spends time either walking in the hallways or resting in bed. He makes fair eye contact. He denies any depression, anxiety, suicidal thinking or hallucinations. He is noted to be smiling to himself at times while walking in the halls. He doesn't attend group. He does maintain appropriate boundaries with staff and peers.   2225: patient has been pacing for a couple of hours. Even when he is in his room he is pacing. He is quite animated, laughing and smiling to himself. He states \"nope\" when asked if he would share his thoughts. \"Its just my thoughts\".    Goal: Optimal Comfort and Wellbeing  Intervention: Provide Person-Centered Care  Recent Flowsheet Documentation  Taken 6/23/2022 1730 by Chris Murillo RN  Trust Relationship/Rapport:    care explained    questions answered    questions encouraged    reassurance provided    thoughts/feelings acknowledged  Goal: Develops/Participates in Therapeutic [place] to Support Successful Transition  Intervention: Foster Therapeutic [place]  Recent Flowsheet Documentation  Taken 6/23/2022 1730 by Chris Murillo RN  Trust Relationship/Rapport:    care explained    questions answered    questions encouraged    reassurance provided    thoughts/feelings acknowledged     Problem: Thought Process Alteration  Goal: Optimal Thought Clarity  Description: Pt will demonstrate Optimal Thought Clarity before discharge     Pt. Will establish and maintain linear conversations with " staff  Outcome: Ongoing, Progressing  Note: He continues to be preoccupied. He is noted to smile to himself as he is walking in the halls.      Problem: Suicidal Behavior  Goal: Suicidal Behavior is Absent or Managed  Outcome: Ongoing, Progressing  Note: He is not having any suicidal thoughts. He is agreeable to safe behavior on the unit.     Face to face end of shift report to be communicated to night shift RN.     Chris Murillo RN  6/23/2022  10:27 PM

## 2022-06-24 NOTE — PLAN OF CARE
Face to face report received from Sherry PEREYRA. Pt. Observed.     Problem: Behavioral Health Plan of Care  Goal: Patient-Specific Goal (Individualization)  Description: Pt. Will consume >50% of meals provided   Pt. Will sleep 4-6 Hours Nightly   Pt. Will attempt groups daily when able     Outcome: Ongoing, Progressing   Pt. Denies all criteria. HI, SI, anxiety, depression, hallucinations and pain, appears responding. Pt. Out to gould pacing, withdrawn, and laughing at inappropriate times. Pt. urged to attend unit programing. Pt. In agreement to update staff to thoughts feelings of wanting to harm self or others. Pt. cooperative with medications and nursing assessment. Pt. Eating WDL. Pt. Offers no c/o issues with bowel and bladder.     Pt. father to visit @ 1800 this evening.     Face to face end of shift report to be communicated to oncoming RN.     Ashley Fink RN  6/24/2022  10:39 AM

## 2022-06-25 PROCEDURE — 99232 SBSQ HOSP IP/OBS MODERATE 35: CPT | Performed by: NURSE PRACTITIONER

## 2022-06-25 PROCEDURE — 250N000013 HC RX MED GY IP 250 OP 250 PS 637: Performed by: NURSE PRACTITIONER

## 2022-06-25 PROCEDURE — 124N000004

## 2022-06-25 RX ADMIN — PALIPERIDONE 3 MG: 3 TABLET, EXTENDED RELEASE ORAL at 20:34

## 2022-06-25 RX ADMIN — DIVALPROEX SODIUM 1500 MG: 500 TABLET, FILM COATED, EXTENDED RELEASE ORAL at 20:34

## 2022-06-25 RX ADMIN — Medication 50 MCG: at 20:34

## 2022-06-25 ASSESSMENT — ACTIVITIES OF DAILY LIVING (ADL)
ADLS_ACUITY_SCORE: 40
ADLS_ACUITY_SCORE: 40
HYGIENE/GROOMING: INDEPENDENT;SHOWER
ADLS_ACUITY_SCORE: 40
ORAL_HYGIENE: INDEPENDENT
DRESS: INDEPENDENT;SCRUBS (BEHAVIORAL HEALTH)
LAUNDRY: UNABLE TO COMPLETE
ADLS_ACUITY_SCORE: 40
DRESS: INDEPENDENT;SCRUBS (BEHAVIORAL HEALTH)
ADLS_ACUITY_SCORE: 40
HYGIENE/GROOMING: INDEPENDENT
ADLS_ACUITY_SCORE: 40
ADLS_ACUITY_SCORE: 40

## 2022-06-25 NOTE — PROGRESS NOTES
"[facility] PSYCHIATRY  PROGRESS NOTE     SUBJECTIVE   Cam is up walking around reading a book in his room when I go to see him today. He denies having any questions or concerns. He denies any hallucinations, depression, or anxiety. He does indicate that his father had visited and that this visit went well. Has been compliant with medications, denies any side effects. Eating well at meals and sleeping through the night. Discussed that he has been accepted to [facility] though there is still some paperwork that needs to be completed before we get an admit date, which he acknowledges.      In review of chart it appears that he has been tried on several different neuroleptic medications with either limited results or experienced side effects: Latuda (ineffective), Zyprexa (stopped on own due to feeling too sedated), Haldol (muscle contractions in face), Risperdal (akathisia), Seroquel (listed as allergy, \"fainted\") Abilify (akathisia).      MEDICATIONS   Scheduled Meds:    divalproex sodium extended-release  1,500 mg Oral Daily     paliperidone  234 mg Intramuscular Q30 Days     paliperidone  3 mg Oral At Bedtime     Vitamin D3  50 mcg Oral At Bedtime     PRN Meds:.acetaminophen, benztropine, haloperidol **AND** LORazepam **AND** diphenhydrAMINE, haloperidol lactate **AND** LORazepam **AND** diphenhydrAMINE, hydrOXYzine, nicotine, OLANZapine **OR** OLANZapine, traZODone     ALLERGIES   Allergies   Allergen Reactions     Seasonal Allergies      Seroquel [Quetiapine]      Fainting and slowed breathing         MENTAL STATUS EXAM   Vitals: /59   Pulse 69   Temp 98.1  F (36.7  C) (Temporal)   Resp 16   Wt 74.1 kg (163 lb 4.8 oz)   SpO2 98%   BMI 22.78 kg/m      Appearance:  awake, alert, adequately groomed and dressed in hospital scrubs  Attitude: cooperative, guarded  Eye Contact: intermittent, tends to avoid eye contact  Mood: \"fine\", denies feeling depressed or anxious  Affect: varies- flat at times, other times " is smiling and laughing to self  Speech:  clear, coherent, slight delay  Psychomotor Behavior:  no evidence of tardive dyskinesia, dystonia, or tics, no abnormal or involuntary movements noted  Thought Process:  goal oriented, seem linear  Associations:  no loose associations  Thought Content:  Denies SI, HI or SIB. Denies hallucinations though appears preoccupied at times  Insight:  limited  Judgment:  limited  Oriented to:  time, person, and place  Attention Span and Concentration:  limited  Recent and Remote Memory:  limited  Fund of Knowledge: appropriate for education  Muscle Strength and Tone: normal  Gait and Station: normal       LABS   No results found for this or any previous visit (from the past 24 hour(s)).      IMPRESSION    This is a 21 year old male with a PMH of schizoaffective disorder, bipolar type who is currently under civil commitment and living at [facility] [place]. Pt left the facility on 5.27.22 and was later picked up while he [place] walking [place] the highway. He was brought to a home where someone called EMS and pt was transported to the ED where he was evaluated. It was determined that pt requires inpatient treatment and stabilization for his safety.     Pt's current diagnosis of schizoaffective disorder bipolar type is managed with neuroleptics and a selective serotonin reuptake inhibitor. Will discontinue pts selective serotonin reuptake inhibitor as this may be contributing to his behavior. Will  add a mood stabilizer, Depakote, discussed with mother who is in agreement. No changes in Invega at this time.         DIAGNOSES     1.Schizoaffective disorder, Bipolar type          PLAN     Location: Unit 5  Legal Status: committed with Nguyen (Zyprexa, Abilify, Prolixin, Invega)    Safety Assessment:    Behavioral Orders   Procedures     Code 1 - Restrict to Unit     Routine Programming     As clinically indicated     Status 15     Every 15 minutes.      PTA medications held:   -Lexapro 20 mg  daily      PTA medications continued/changed:   -Continue Invega Sustenna, maintenance dose increased from 156 -> 234 mg Q28D on 6/6/22, next injection due on 7/4/22  -Continue oral Invega 3 mg at bedtime d/t ongoing psychosis and supplement until LEÓN is more effective. Will likely stop after next injection.    New medications tried and stopped:   -None    New medications initiated:   -Depakote 250 mg on 5/28 -> 500 mg on 5/29 ->1,000 mg on 5/30 -> 1,500 mg on 6/3 (VPA level on 6/6 of 90)  -Cogentin 1 mg BID prn EPSE      Today's Changes:  No changes made. With patient taking LEÓN it may be weeks to months before a determination can be made regarding efficacy of Invega. Has trialed many other neuroleptics though often experiences side effects. If in 6-8 weeks patient is still not showing no further improvement, would consider addendum to Nguyen and switching to Clozapine due to several previous failed trials.    Programming: Patient will be treated in a therapeutic milieu with appropriate individual and group therapies. Education will be provided on diagnoses, medications, and treatments.     Medical diagnoses:  Per medicine. Nothing acute    Consult: None  Tests: None    Anticipated LOS: 1-2 weeks-pt is under Civil Commitment and Nguyen  Disposition: Has reportedly been accepted to [facility] program pending paperwork per SW       ATTESTATION      Patient has been seen and evaluated by me,  Marilu Del Toro, NP

## 2022-06-25 NOTE — PLAN OF CARE
"  Problem: Behavioral Health Plan of Care  Goal: Plan of Care Review  Recent Flowsheet Documentation  Taken 6/24/2022 1716 by Chris Murillo, RN  Plan of Care Reviewed With: patient  Patient Agreement with Plan of Care: agrees  Goal: Patient-Specific Goal (Individualization)  Description: Pt. Will consume >50% of meals provided   Pt. Will sleep 4-6 Hours Nightly   Pt. Will attempt groups daily when able       6/24/2022 2213 by Chris Murillo RN  Outcome: Ongoing, Progressing  Note: He is up on the unit at times. He mainly keeps to himself. He is noted to be pacing in the hallway or pacing back and forth in his room. He continues to be preoccupied and responding to internal stimuli. He is noted to be smiling and laughing to himself. When asked to share his thoughts he states \"no, I'm good\".  He denies feeling depressed or suicidal. He is agreeable to safe behavior on the unit. He visited with his father this evening. They were noted to play cards and keep the conversation simple. Patient stated the visit was \"good\". He doesn't offer any other conversation. He is taking his medications as prescribed. He denies any side effects.   6/24/2022 1902 by Chris Murillo RN  Outcome: Ongoing, Progressing  Goal: Optimal Comfort and Wellbeing  Intervention: Provide Person-Centered Care  Recent Flowsheet Documentation  Taken 6/24/2022 1716 by Chris Murillo RN  Trust Relationship/Rapport:   care explained   questions answered   questions encouraged   reassurance provided   thoughts/feelings acknowledged  Goal: Develops/Participates in Therapeutic Metaline to Support Successful Transition  Intervention: Foster Therapeutic Metaline  Recent Flowsheet Documentation  Taken 6/24/2022 1716 by Chris Murillo, RN  Trust Relationship/Rapport:   care explained   questions answered   questions encouraged   reassurance provided   thoughts/feelings acknowledged     Problem: Thought Process Alteration  Goal: Optimal Thought Clarity  Description: Pt " will demonstrate Optimal Thought Clarity before discharge     Pt. Will establish and maintain linear conversations with staff  Outcome: Ongoing, Progressing  Note: He continues to be preoccupied and responding to internal stimuli.      Problem: Suicidal Behavior  Goal: Suicidal Behavior is Absent or Managed  Outcome: Ongoing, Progressing  Note: He is not suicidal. He is agreeable to safe behavior on the unit.      Face to face end of shift report to communicated to night shift RN.     Chris Murillo RN  6/24/2022  10:19 PM

## 2022-06-25 NOTE — PLAN OF CARE
Face to face report received from Ila PEREYRA. Pt. Observed.     Problem: Behavioral Health Plan of Care  Goal: Patient-Specific Goal (Individualization)  Description: Pt. Will consume >50% of meals provided   Pt. Will sleep 4-6 Hours Nightly   Pt. Will attempt groups daily when able   Outcome: Ongoing, Progressing    Pt. Denies all criteria. HI, SI, anxiety, depression, hallucinations and pain, appears responding. Pt. laughing at inappropriate times. Pt. urged to attend unit programing, not attending this a.m. Pt. Withdrawn and isolating to room out for meals and coffee. Pt. In agreement to update staff to thoughts feelings of wanting to harm self or others. Pt. cooperative with medications and nursing assessment. Pt. Eating WDL. Pt. Offers no c/o issues with bowel and bladder.      Face to face end of shift report to be communicated to oncoming RN.     Ashley Fink RN  6/25/2022

## 2022-06-25 NOTE — PLAN OF CARE
Face to face end of shift report will be communicated to oncoming RN.    Problem: Behavioral Health Plan of Care  Goal: Patient-Specific Goal (Individualization)  Description: Pt. Will consume >50% of meals provided   Pt. Will sleep 4-6 Hours Nightly   Pt. Will attempt groups daily when able       Outcome: Ongoing, Progressing     Problem: Thought Process Alteration  Goal: Optimal Thought Clarity  Description: Pt will demonstrate Optimal Thought Clarity before discharge     Pt. Will establish and maintain linear conversations with staff  Outcome: Ongoing, Progressing     Problem: Suicidal Behavior  Goal: Suicidal Behavior is Absent or Managed  Outcome: Ongoing, Progressing   Goal Outcome Evaluation:  Face to face end of shift report obtained from RODDY Perez. Pt observed resting in bed.  Pt appears to be sleeping in bed with eyes closed, having regular respirations, and position changes. 15 minutes and PRN safety checks completed with no noted complains. No self harm or delusional comments noted or reported so far this shift.     0600-Pt appeared to had slept al night.

## 2022-06-26 LAB
ALBUMIN SERPL-MCNC: 3.8 G/DL (ref 3.4–5)
ALP SERPL-CCNC: 61 U/L (ref 40–150)
ALT SERPL W P-5'-P-CCNC: 16 U/L (ref 0–70)
AST SERPL W P-5'-P-CCNC: 16 U/L (ref 0–45)
BILIRUB DIRECT SERPL-MCNC: 0.1 MG/DL (ref 0–0.2)
BILIRUB SERPL-MCNC: 0.2 MG/DL (ref 0.2–1.3)
PROT SERPL-MCNC: 7.2 G/DL (ref 6.8–8.8)
VALPROATE SERPL-MCNC: 100 MG/L

## 2022-06-26 PROCEDURE — 250N000013 HC RX MED GY IP 250 OP 250 PS 637: Performed by: NURSE PRACTITIONER

## 2022-06-26 PROCEDURE — 80076 HEPATIC FUNCTION PANEL: CPT | Performed by: NURSE PRACTITIONER

## 2022-06-26 PROCEDURE — 124N000004

## 2022-06-26 PROCEDURE — 80164 ASSAY DIPROPYLACETIC ACD TOT: CPT | Performed by: NURSE PRACTITIONER

## 2022-06-26 PROCEDURE — 36415 COLL VENOUS BLD VENIPUNCTURE: CPT | Performed by: NURSE PRACTITIONER

## 2022-06-26 RX ADMIN — PALIPERIDONE 3 MG: 3 TABLET, EXTENDED RELEASE ORAL at 21:37

## 2022-06-26 RX ADMIN — DIVALPROEX SODIUM 1500 MG: 500 TABLET, FILM COATED, EXTENDED RELEASE ORAL at 21:37

## 2022-06-26 RX ADMIN — Medication 50 MCG: at 21:37

## 2022-06-26 ASSESSMENT — ACTIVITIES OF DAILY LIVING (ADL)
ADLS_ACUITY_SCORE: 40
DRESS: INDEPENDENT;SCRUBS (BEHAVIORAL HEALTH)
ADLS_ACUITY_SCORE: 40
HYGIENE/GROOMING: INDEPENDENT;SHOWER
DRESS: INDEPENDENT;SCRUBS (BEHAVIORAL HEALTH)
ADLS_ACUITY_SCORE: 40
ORAL_HYGIENE: INDEPENDENT
LAUNDRY: UNABLE TO COMPLETE
ADLS_ACUITY_SCORE: 40
HYGIENE/GROOMING: INDEPENDENT
ADLS_ACUITY_SCORE: 40

## 2022-06-26 NOTE — PLAN OF CARE
Face to face end of shift report will be communicated to oncgus RN.     Problem: Behavioral Health Plan of Care  Goal: Patient-Specific Goal (Individualization)  Description: Pt. Will consume >50% of meals provided   Pt. Will sleep 4-6 Hours Nightly   Pt. Will attempt groups daily when able       Outcome: Ongoing, Progressing     Problem: Thought Process Alteration  Goal: Optimal Thought Clarity  Description: Pt will demonstrate Optimal Thought Clarity before discharge     Pt. Will establish and maintain linear conversations with staff  Outcome: Ongoing, Progressing     Problem: Suicidal Behavior  Goal: Suicidal Behavior is Absent or Managed  Outcome: Ongoing, Progressing   Goal Outcome Evaluation:  Face to face end of shift report obtained from RODDY Amin. Pt observed resting in bed.  Pt appears to be sleeping in bed with eyes closed, having regular respirations, and position changes. 15 minutes and PRN safety checks completed with no noted complains. No self harm or delusional comments noted or reported so far this shift.    0600-Pt appeared to had slept all night.

## 2022-06-26 NOTE — PLAN OF CARE
Problem: Behavioral Health Plan of Care  Goal: Patient-Specific Goal (Individualization)  Description: Pt. Will consume >50% of meals provided   Pt. Will sleep 4-6 Hours Nightly   Pt. Will attempt groups daily when able   Outcome: Ongoing, Progressing     Goal Outcome Evaluation:  Pt is quiet and cooperative, pleasant in conversation. Denied SI, HI and hallucinations, anxiety and depression manageable at this time. Taking medication as ordered and denied need of PRN meds. Does not attend groups, mostly keeps to himself. Dad visited this evening, he and pt played cribbage and interaction was appropriate. Pt discussed that he and his father have always been close. No physical complaints, pt spent time pacing in halls.     Problem: Thought Process Alteration  Goal: Optimal Thought Clarity  Description: Pt will demonstrate Optimal Thought Clarity before discharge   Pt. Will establish and maintain linear conversations with staff  Outcome: Ongoing, Progressing  Conversation clear this evening.     Problem: Suicidal Behavior  Goal: Suicidal Behavior is Absent or Managed  Outcome: Ongoing, Progressing    2330 - Face to face end of shift report to be communicated to oncoming shift RN.     Eloisa Salazar RN  6/25/2022  9:50 PM

## 2022-06-26 NOTE — PLAN OF CARE
Face to face report received from Ila PEREYRA. Pt. Observed.     Problem: Behavioral Health Plan of Care  Goal: Patient-Specific Goal (Individualization)  Description: Pt. Will consume >50% of meals provided   Pt. Will sleep 4-6 Hours Nightly   Pt. Will attempt groups daily when able   Outcome: Ongoing, Progressing     Pt. Denies all criteria. HI, SI, anxiety, depression, hallucinations and pain. Pt. Withdrawn out to lounge and halls ambulating. Pt. staring off and not making eye contact when answering assessment questions. Pt. urged to attend unit programing. Pt. In agreement to update staff to thoughts feelings of wanting to harm self or others. Pt. cooperative nursing assessment. Pt. Eating WDL. Pt. Offers no c/o issues with bowel and bladder.      Face to face end of shift report communicated to oncoming RN.     Ashley Fink RN  6/26/2022

## 2022-06-27 PROCEDURE — 250N000013 HC RX MED GY IP 250 OP 250 PS 637: Performed by: NURSE PRACTITIONER

## 2022-06-27 PROCEDURE — 124N000004

## 2022-06-27 PROCEDURE — 99232 SBSQ HOSP IP/OBS MODERATE 35: CPT | Performed by: NURSE PRACTITIONER

## 2022-06-27 RX ADMIN — PALIPERIDONE 3 MG: 3 TABLET, EXTENDED RELEASE ORAL at 21:35

## 2022-06-27 RX ADMIN — DIVALPROEX SODIUM 1500 MG: 500 TABLET, FILM COATED, EXTENDED RELEASE ORAL at 21:35

## 2022-06-27 RX ADMIN — Medication 50 MCG: at 21:35

## 2022-06-27 ASSESSMENT — ACTIVITIES OF DAILY LIVING (ADL)
ORAL_HYGIENE: INDEPENDENT
ADLS_ACUITY_SCORE: 40
ADLS_ACUITY_SCORE: 40
ORAL_HYGIENE: INDEPENDENT
ADLS_ACUITY_SCORE: 40
ADLS_ACUITY_SCORE: 40
DRESS: SCRUBS (BEHAVIORAL HEALTH);INDEPENDENT
ADLS_ACUITY_SCORE: 40
ADLS_ACUITY_SCORE: 40
LAUNDRY: UNABLE TO COMPLETE
LAUNDRY: UNABLE TO COMPLETE
DRESS: SCRUBS (BEHAVIORAL HEALTH);INDEPENDENT
ADLS_ACUITY_SCORE: 40
ADLS_ACUITY_SCORE: 40
HYGIENE/GROOMING: INDEPENDENT
ADLS_ACUITY_SCORE: 40
HYGIENE/GROOMING: INDEPENDENT;SHOWER
ADLS_ACUITY_SCORE: 40

## 2022-06-27 NOTE — PLAN OF CARE
Problem: Behavioral Health Plan of Care  Goal: Patient-Specific Goal (Individualization)  Description: Pt. Will consume >50% of meals provided   Pt. Will sleep 4-6 Hours Nightly   Pt. Will attempt groups daily when able   Outcome: Ongoing, Progressing     Goal Outcome Evaluation:    Pt out on the unit and slowly pacing halls this evening. Does appear to be distracted, staring blankly at times. In assessment, pt denied all mental health criteria, with writer noting delay in responses as if pt is slow in processing. Denied physical complaint and reported sleeping well, offered earplugs and sound machine since he has a roommate today. Lab in to draw pt at 8 pm. Not attending groups on unit.     Problem: Thought Process Alteration  Goal: Optimal Thought Clarity  Description: Pt will demonstrate Optimal Thought Clarity before discharge     Pt. Will establish and maintain linear conversations with staff  Outcome: Ongoing, Progressing    Problem: Suicidal Behavior  Goal: Suicidal Behavior is Absent or Managed  Outcome: Ongoing, Progressing    2330 - Face to face end of shift report to be communicated to oncoming shift RN.     Eloisa Salazar RN  6/26/2022  8:20 PM

## 2022-06-27 NOTE — PLAN OF CARE
Face to face end of shift report will be communicated to oncgus RN.     Problem: Behavioral Health Plan of Care  Goal: Patient-Specific Goal (Individualization)  Description: Pt. Will consume >50% of meals provided   Pt. Will sleep 4-6 Hours Nightly   Pt. Will attempt groups daily when able       Outcome: Ongoing, Progressing     Problem: Thought Process Alteration  Goal: Optimal Thought Clarity  Description: Pt will demonstrate Optimal Thought Clarity before discharge     Pt. Will establish and maintain linear conversations with staff  Outcome: Ongoing, Progressing     Problem: Suicidal Behavior  Goal: Suicidal Behavior is Absent or Managed  Outcome: Ongoing, Progressing   Goal Outcome Evaluation:  Face to face end of shift report obtained from RODDY Amin. Pt observed resting in bed.  Pt appears to be sleeping in bed with eyes closed, having regular respirations, and position changes. 15 minutes and PRN safety checks completed with no noted complains. No self harm or delusional comments noted or reported so far this shift.    0600-Pt appeared to had slept 6 hours. Sleep may have been interrupted due to unit noise.

## 2022-06-27 NOTE — PLAN OF CARE
Problem: Suicidal Behavior  Goal: Suicidal Behavior is Absent or Managed  Outcome: Ongoing, Progressing    Pt denies SI     Problem: Thought Process Alteration  Goal: Optimal Thought Clarity  Description: Pt will demonstrate Optimal Thought Clarity before discharge     Pt. Will establish and maintain linear conversations with staff  Outcome: Ongoing, Progressing    Pt was able to have a superficial logical conversation.  He denies hallucinations and delusions     Problem: Behavioral Health Plan of Care  Goal: Patient-Specific Goal (Individualization)  Description: Pt. Will consume >50% of meals provided   Pt. Will sleep 4-6 Hours Nightly   Pt. Will attempt groups daily when able     Outcome: Ongoing, Progressing   Goal Outcome Evaluation:    0730 Face to face rounding complete.  Pt introduced to nursing for the shift.    Pt was withdrawn to his room in bed appearing to sleep most of the shift except meals.  He talked to me after lunch and said that he is doing fine.  He denies having hallucinations as well as delusions. His responses to assessment questions is very limited.  He told me that he is sleeping and in bed so much because he is bored.  I asked him what he liked to do for hobbies and he told me video games and working out.  I asked him if her has went to exercise group or OT.  He told me no.  Pt's affect is very flat with a poverty of thought. He showered but still appears disheveled and unkempt.    1500 Face to face end of shift report communicated to Evening Shift RN's along with Pt's fall risk.     Hank Donnelly RN  6/27/2022

## 2022-06-27 NOTE — PROGRESS NOTES
"[facility] PSYCHIATRY  PROGRESS NOTE     SUBJECTIVE   Cam was walking the gould when we greeted him. He walked to his room and sat on the bed. He is aware that he is on the waiting list for [facility] [place]. He denied any side effects from his Invega or Depakote. He denies anxiety, depression SI, HI or SIB. He states he is not experiencing A/V hallucinations. When we saw him in the hallway, it did not appear that he was responding to internal stimuli. Nursing documentation has not indicated that he has been laughing or whispering to himself over the last 2 days. He does appear distracted and a little distant. He offered no complaints at this time.    In review of chart it appears that he has been tried on several different neuroleptic medications with either limited results or experienced side effects: Latuda (ineffective), Zyprexa (stopped on own due to feeling too sedated), Haldol (muscle contractions in face), Risperdal (akathisia), Seroquel (listed as allergy, \"fainted\") Abilify (akathisia).      MEDICATIONS   Scheduled Meds:    divalproex sodium extended-release  1,500 mg Oral Daily     paliperidone  234 mg Intramuscular Q30 Days     paliperidone  3 mg Oral At Bedtime     Vitamin D3  50 mcg Oral At Bedtime     PRN Meds:.acetaminophen, benztropine, haloperidol **AND** LORazepam **AND** diphenhydrAMINE, haloperidol lactate **AND** LORazepam **AND** diphenhydrAMINE, hydrOXYzine, nicotine, OLANZapine **OR** OLANZapine, traZODone     ALLERGIES   Allergies   Allergen Reactions     Seasonal Allergies      Seroquel [Quetiapine]      Fainting and slowed breathing         MENTAL STATUS EXAM   Vitals: /53   Pulse 83   Temp 98  F (36.7  C) (Temporal)   Resp 14   Wt 74.1 kg (163 lb 4.8 oz)   SpO2 98%   BMI 22.78 kg/m      Appearance:  awake, alert, adequately groomed and dressed in hospital scrubs  Attitude: cooperative, guarded  Eye Contact: limited, stared at the floor. Briefly made eye contact  Mood: \"good\", " denies feeling depressed or anxious  Affect: flat, restricted range  Speech:  clear, coherent, slight delay  Psychomotor Behavior:  no evidence of tardive dyskinesia, dystonia, or tics, no abnormal or involuntary movements noted  Thought Process:  goal oriented, seem linear  Associations:  no loose associations  Thought Content:  Denies SI, HI or SIB. Denies hallucinations though appears preoccupied at times. Does not appear to be responding to internal stimuli  Insight:  limited  Judgment:  limited  Oriented to:  time, person, and place  Attention Span and Concentration:  limited  Recent and Remote Memory:  limited  Fund of Knowledge: appropriate for education  Muscle Strength and Tone: normal  Gait and Station: normal       LABS   Recent Results (from the past 24 hour(s))   Valproic acid    Collection Time: 06/26/22  8:06 PM   Result Value Ref Range    Valproic acid 100   mg/L   Hepatic panel    Collection Time: 06/26/22  8:06 PM   Result Value Ref Range    Bilirubin Total 0.2 0.2 - 1.3 mg/dL    Bilirubin Direct 0.1 0.0 - 0.2 mg/dL    Protein Total 7.2 6.8 - 8.8 g/dL    Albumin 3.8 3.4 - 5.0 g/dL    Alkaline Phosphatase 61 40 - 150 U/L    AST 16 0 - 45 U/L    ALT 16 0 - 70 U/L         IMPRESSION    This is a 21 year old male with a PMH of schizoaffective disorder, bipolar type who is currently under civil commitment and living at [facility]. Pt left the facility on 5.27.22 and was later [place] up [place]. He was brought to a home where someone called EMS and pt was transported to the ED where he was evaluated. It was determined that pt requires inpatient treatment and stabilization for his safety.     Pt's current diagnosis of schizoaffective disorder bipolar type is managed with neuroleptics and a selective serotonin reuptake inhibitor. Will discontinue pts selective serotonin reuptake inhibitor as this may be contributing to his behavior. Will  add a mood stabilizer, Depakote,  discussed with mother who is in agreement. No changes in Invega at this time.         DIAGNOSES     1.Schizoaffective disorder, Bipolar type          PLAN     Location: Unit 5  Legal Status: committed with Nguyen (Zyprexa, Abilify, Prolixin, Invega)    Safety Assessment:    Behavioral Orders   Procedures     Code 1 - Restrict to Unit     Routine Programming     As clinically indicated     Status 15     Every 15 minutes.      PTA medications held:   -Lexapro 20 mg daily      PTA medications continued/changed:   -Continue Invega Sustenna, maintenance dose increased from 156 -> 234 mg Q28D on 6/6/22, next injection due on 7/4/22  -Continue oral Invega 3 mg at bedtime d/t ongoing psychosis and supplement until LEÓN is more effective. Will likely stop after next injection.    New medications tried and stopped:   -None    New medications initiated:   -Depakote 250 mg on 5/28 -> 500 mg on 5/29 ->1,000 mg on 5/30 -> 1,500 mg on 6/3 (VPA level on 6/6 of 90)  -Cogentin 1 mg BID prn EPSE      Today's Changes:  No changes made. With patient taking LEÓN it may be weeks to months before a determination can be made regarding efficacy of Invega. Has trialed many other neuroleptics though often experiences side effects. If in 6-8 weeks patient is still not showing no further improvement, would consider addendum to Nguyen and switching to Clozapine due to several previous failed trials.    Programming: Patient will be treated in a therapeutic milieu with appropriate individual and group therapies. Education will be provided on diagnoses, medications, and treatments.     Medical diagnoses:  Per medicine. Nothing acute    Consult: None  Tests: None    Anticipated LOS: 1-2 weeks-pt is under Civil Commitment and Nguyen  Disposition: Has reportedly been accepted to [facility] program pending paperwork per        TREATMENT TEAM CARE PLAN     Progress: Symptoms improving. Remains preoccupied.     Continued Stay Criteria/Rationale: Ongoing  treatment and safe discharge planning. Needs structured environment for stability and medication compliance.    Medical/Physical: See above.    Precautions: See above.     Plan: Continue inpatient care with unit support and medication management. [facility] when bed available.    Rationale for change in precautions or plan: NA due to no change.    Participants: Marilu Del Toro, NP, Nursing, SW, OT.    The patient's care was discussed with the treatment team and chart notes were reviewed.       ATTESTATION      Patient has been seen and evaluated by me,  SELINA Rose student    I, Marilu Del Toro CNP, participated in this visit. I have reviewed the above note and agree with findings.

## 2022-06-28 PROCEDURE — 99233 SBSQ HOSP IP/OBS HIGH 50: CPT | Performed by: NURSE PRACTITIONER

## 2022-06-28 PROCEDURE — 124N000004

## 2022-06-28 PROCEDURE — 250N000013 HC RX MED GY IP 250 OP 250 PS 637: Performed by: NURSE PRACTITIONER

## 2022-06-28 RX ADMIN — Medication 50 MCG: at 21:48

## 2022-06-28 RX ADMIN — DIVALPROEX SODIUM 1250 MG: 500 TABLET, FILM COATED, EXTENDED RELEASE ORAL at 21:48

## 2022-06-28 RX ADMIN — PALIPERIDONE 3 MG: 3 TABLET, EXTENDED RELEASE ORAL at 21:48

## 2022-06-28 ASSESSMENT — ACTIVITIES OF DAILY LIVING (ADL)
ADLS_ACUITY_SCORE: 40
LAUNDRY: UNABLE TO COMPLETE
ORAL_HYGIENE: INDEPENDENT
ADLS_ACUITY_SCORE: 40
DRESS: SCRUBS (BEHAVIORAL HEALTH);INDEPENDENT
ADLS_ACUITY_SCORE: 40
HYGIENE/GROOMING: INDEPENDENT
ADLS_ACUITY_SCORE: 40
ADLS_ACUITY_SCORE: 40

## 2022-06-28 NOTE — PLAN OF CARE
Problem: Behavioral Health Plan of Care  Goal: Patient-Specific Goal (Individualization)  Description: Pt. Will consume >50% of meals provided   Pt. Will sleep 4-6 Hours Nightly   Pt. Will attempt groups daily when able       Outcome: Ongoing, Progressing  Note: Patient has been withdrawn most of this shift.  He denies SI, HI, hallucinations, anxiety, depression, pain.  Patient walks the hallways smiling to self.  He appears preoccupied.  He is cooperative with medications and assessment.  He did not attend groups this shift.      Problem: Thought Process Alteration  Goal: Optimal Thought Clarity  Description: Pt will demonstrate Optimal Thought Clarity before discharge     Pt. Will establish and maintain linear conversations with staff  Outcome: Ongoing, Progressing  Note: Patient is able to hold reality based conversation.      Problem: Suicidal Behavior  Goal: Suicidal Behavior is Absent or Managed  Note: Patient denies SI and had no noted self harm this shift.    Goal Outcome Evaluation:

## 2022-06-28 NOTE — PLAN OF CARE
Face to face shift report received from nurse. Rounding completed, pt observed.    Problem: Behavioral Health Plan of Care  Goal: Patient-Specific Goal (Individualization)  Description: Pt. Will consume >50% of meals provided   Pt. Will sleep 4-6 Hours Nightly   Pt. Will attempt groups daily when able   Outcome: Ongoing, Progressing     Pt has been in bed with eyes closed and regular respirations observed all night. Will continue to monitor. Patient slept through the night.     Face to face report will be communicated to oncoming RN.    Yury Andrade RN  6/28/2022

## 2022-06-28 NOTE — PROGRESS NOTES
"[facility] PSYCHIATRY  PROGRESS NOTE     SUBJECTIVE   Cam was in his room reading when we greeted him. He is aware that he is on the wait list for [facility] in [place], [place]. He offered no somatic complaints. He denied SI, HI or HIB. He also denies delusions, paranoia, racing thoughts or A/V hallucinations. We inquired about how he felt his medications were working, which he responded \"good\". We then asked what made him think that they were working and he responded \"I feel stable. I asked him to explain what that meant to him and he replied that he isn't having racing thoughts. He does appear delayed and flat. His eye contact is minimal and nursing confirms this has been his presentation lately. He has been minimally social and does not attend group since he has arrived to the unit. He has still been observed smiling and laughing to himself, though it seems less. We asked if he perceived his thoughts possibly being too slow, which he replied \"yeah, maybe\". We offered to lower his Depakote to see if this helped improve the delay in his thoughts and he agreed.      In review of chart it appears that he has been tried on several different neuroleptic medications with either limited results or experienced side effects: Latuda (ineffective), Zyprexa (stopped on own due to feeling too sedated), Haldol (muscle contractions in face), Risperdal (akathisia), Seroquel (listed as allergy, \"fainted\") Abilify (akathisia).      MEDICATIONS   Scheduled Meds:    divalproex sodium extended-release  1,500 mg Oral Daily     paliperidone  234 mg Intramuscular Q30 Days     paliperidone  3 mg Oral At Bedtime     Vitamin D3  50 mcg Oral At Bedtime     PRN Meds:.acetaminophen, benztropine, haloperidol **AND** LORazepam **AND** diphenhydrAMINE, haloperidol lactate **AND** LORazepam **AND** diphenhydrAMINE, hydrOXYzine, nicotine, OLANZapine **OR** OLANZapine, traZODone     ALLERGIES   Allergies   Allergen Reactions     Seasonal Allergies      " "Seroquel [Quetiapine]      Fainting and slowed breathing         MENTAL STATUS EXAM   Vitals: /68 (BP Location: Right arm)   Pulse 84   Temp 97.6  F (36.4  C) (Temporal)   Resp 16   Wt 74.1 kg (163 lb 4.8 oz)   SpO2 97%   BMI 22.78 kg/m      Appearance:  awake, alert, adequately groomed and dressed in hospital scrubs  Attitude: cooperative, guarded  Eye Contact: limited, stared at the floor. Briefly made eye contact  Mood: \"good\", denies feeling depressed or anxious  Affect: flat, restricted range  Speech:  clear, coherent, slight delay  Psychomotor Behavior:  no evidence of tardive dyskinesia, dystonia, or tics, no abnormal or involuntary movements noted  Thought Process:  goal oriented, seem linear  Associations:  no loose associations  Thought Content:  Denies SI, HI or SIB. Denies hallucinations though appears preoccupied at times. Does not appear to be responding to internal stimuli  Insight:  limited  Judgment:  limited  Oriented to:  time, person, and place  Attention Span and Concentration:  limited  Recent and Remote Memory:  limited  Fund of Knowledge: appropriate for education  Muscle Strength and Tone: normal  Gait and Station: normal       LABS   No results found for this or any previous visit (from the past 24 hour(s)).      IMPRESSION    This is a 21 year old male with a PMH of schizoaffective disorder, bipolar type who is currently under civil commitment and living at [facility]. Pt left the facility on 5.27.22 and was later picked up while he [place] highway. He was brought to a home where someone called EMS and pt was transported to the ED where he was evaluated. It was determined that pt requires inpatient treatment and stabilization for his safety.     Pt's current diagnosis of schizoaffective disorder bipolar type is managed with neuroleptics and a selective serotonin reuptake inhibitor. Will discontinue pts selective serotonin reuptake inhibitor as this may be " contributing to his behavior. Will  add a mood stabilizer, Depakote, discussed with mother who is in agreement. No changes in Invega at this time.         DIAGNOSES     1.Schizoaffective disorder, Bipolar type          PLAN     Location: Unit 5  Legal Status: committed with Nguyen (Zyprexa, Abilify, Prolixin, Invega)    Safety Assessment:    Behavioral Orders   Procedures     Code 1 - Restrict to Unit     Routine Programming     As clinically indicated     Status 15     Every 15 minutes.      PTA medications held:   -Lexapro 20 mg daily      PTA medications continued/changed:   -Continue Invega Sustenna, maintenance dose increased from 156 -> 234 mg Q28D on 6/6/22, next injection due on 7/4/22  -Continue oral Invega 3 mg at bedtime d/t ongoing psychosis and supplement until LEÓN is more effective. Will likely stop after next injection.    New medications tried and stopped:   -None    New medications initiated:   -Depakote 250 mg on 5/28 -> 500 mg on 5/29 ->1,000 mg on 5/30 -> 1,500 mg on 6/3 (VPA level on 6/6 of 90)  -Cogentin 1 mg BID prn EPSE      Today's Changes:  Pt reports his thoughts may be too slowed. Offered to decrease Depakote, which Cam agreed to trying. Will decrease Depakote to 1250 mg PO at bedtime. Nursing to monitor for any changes in mood and behavior or any side effects 6/26/22 , will redraw VPA on 7/3/22 prior to that evening dose . With patient taking LEÓN it may be weeks to months before a determination can be made regarding efficacy of Invega. Has trialed many other neuroleptics though often experiences side effects. If in 6-8 weeks patient is still not showing no further improvement, would consider addendum to Nguyen and switching to Clozapine due to several previous failed trials.    Programming: Patient will be treated in a therapeutic milieu with appropriate individual and group therapies. Education will be provided on diagnoses, medications, and treatments.     Medical diagnoses:   Per medicine. Nothing acute    Consult: None  Tests: None    Anticipated LOS: 1-2 weeks-pt is under Civil Commitment and Nguyen  Disposition: Has reportedly been accepted to [facility] program pending paperwork per SW       ATTESTATION      Patient has been seen and evaluated by me,  SELINA Rose student    I, Marilu Del Toro CNP, participated in this visit. I have reviewed the above note and agree with findings.

## 2022-06-28 NOTE — PLAN OF CARE
Problem: Thought Process Alteration  Goal: Optimal Thought Clarity  Description: Pt will demonstrate Optimal Thought Clarity before discharge     Pt. Will establish and maintain linear conversations with staff  Outcome: Ongoing, Progressing    Pt denies hallucinations but appears preoccupied     Problem: Behavioral Health Plan of Care  Goal: Patient-Specific Goal (Individualization)  Description: Pt. Will consume >50% of meals provided   Pt. Will sleep 4-6 Hours Nightly   Pt. Will attempt groups daily when able     Outcome: Ongoing, Progressing   Goal Outcome Evaluation:    Plan of Care Reviewed With: patient     0730 Pt rounding complete.  Pt introduced to nursing for the shift.    Pt slept in until lunch. He was up walking the halls for exercise for about two hours this afternoon. His affect was very flat and he is withdrawn to himself mostly.  Pt did smile and laugh to himself when another pt was on the phone crying and sobbing loudly.  Pt was strongly encouraged to try going to groups but did not attend.    1500 Face to face end of shift report communicated to evening shift RN's along with Pt's fall risk.     Hank Donnelly RN  6/28/2022

## 2022-06-29 PROCEDURE — 99232 SBSQ HOSP IP/OBS MODERATE 35: CPT | Performed by: NURSE PRACTITIONER

## 2022-06-29 PROCEDURE — 124N000004

## 2022-06-29 PROCEDURE — 250N000013 HC RX MED GY IP 250 OP 250 PS 637: Performed by: NURSE PRACTITIONER

## 2022-06-29 RX ADMIN — Medication 50 MCG: at 21:14

## 2022-06-29 RX ADMIN — PALIPERIDONE 3 MG: 3 TABLET, EXTENDED RELEASE ORAL at 21:14

## 2022-06-29 RX ADMIN — DIVALPROEX SODIUM 1250 MG: 500 TABLET, FILM COATED, EXTENDED RELEASE ORAL at 21:13

## 2022-06-29 ASSESSMENT — ACTIVITIES OF DAILY LIVING (ADL)
HYGIENE/GROOMING: INDEPENDENT;SHOWER
ADLS_ACUITY_SCORE: 40
DRESS: INDEPENDENT;SCRUBS (BEHAVIORAL HEALTH)
ADLS_ACUITY_SCORE: 40
HYGIENE/GROOMING: INDEPENDENT
ADLS_ACUITY_SCORE: 40
ORAL_HYGIENE: INDEPENDENT
ADLS_ACUITY_SCORE: 40
LAUNDRY: UNABLE TO COMPLETE
ADLS_ACUITY_SCORE: 40
ADLS_ACUITY_SCORE: 40
DRESS: SCRUBS (BEHAVIORAL HEALTH);INDEPENDENT
ADLS_ACUITY_SCORE: 40

## 2022-06-29 NOTE — PLAN OF CARE
Problem: Behavioral Health Plan of Care  Goal: Patient-Specific Goal (Individualization)  Description: Pt. Will consume >50% of meals provided   Pt. Will sleep 4-6 Hours Nightly   Pt. Will attempt groups daily when able       Outcome: Ongoing, Not Progressing  Note: Report received from Hank PEREYRA.  Rounding complete.  Patient observed walking the hallways at start of shift.   Patient appears to be responding to internal stimuli thought he denies hallucinations.   He laughs, smiles, and mumbles to himself while walking the hallways.  He is polite during interactions.  Cooperative with medications and assessment. Cooperative with decreased HS scheduled depakote at 1,250 mg.  He does not attend groups this shift.       Problem: Thought Process Alteration  Goal: Optimal Thought Clarity  Description: Pt will demonstrate Optimal Thought Clarity before discharge     Pt. Will establish and maintain linear conversations with staff  Outcome: Ongoing, Not Progressing     Problem: Suicidal Behavior  Goal: Suicidal Behavior is Absent or Managed  Outcome: Ongoing, Progressing  Note: Patient had no noted self harm this shift.  He denies SI.    Goal Outcome Evaluation:

## 2022-06-29 NOTE — PROGRESS NOTES
"[facility] PSYCHIATRY  PROGRESS NOTE     SUBJECTIVE     I found Cam in the lounge eating lunch. Patient denies suicidal or homicidal ideation and any hallucinations or delusions, though is noted to be laughing, giggling, or otherwise responding to internal stimuli intermittently. The patient denies any issues with sleep or appetite. He does pace the halls throughout the day. He does social appropriately with select peers, but does not attend group. He is aware I will be rechecking a VPA and Ammonia level on 7/1 since the previous provider decreased his dose. He has not perceived any change in his thoughts or mood since the dose decrease. Patient doesn't offer any additional complaints or concerns. Patient is waiting for a bed to open up at [facility].      In review of chart it appears that he has been tried on several different neuroleptic medications with either limited results or experienced side effects: Latuda (ineffective), Zyprexa (stopped on own due to feeling too sedated), Haldol (muscle contractions in face), Risperdal (akathisia), Seroquel (listed as allergy, \"fainted\") Abilify (akathisia).        MEDICATIONS   Scheduled Meds:    divalproex sodium extended-release  1,250 mg Oral Daily     paliperidone  234 mg Intramuscular Q30 Days     paliperidone  3 mg Oral At Bedtime     Vitamin D3  50 mcg Oral At Bedtime     PRN Meds:.acetaminophen, benztropine, haloperidol **AND** LORazepam **AND** diphenhydrAMINE, haloperidol lactate **AND** LORazepam **AND** diphenhydrAMINE, hydrOXYzine, nicotine, OLANZapine **OR** OLANZapine, traZODone     ALLERGIES   Allergies   Allergen Reactions     Seasonal Allergies      Seroquel [Quetiapine]      Fainting and slowed breathing         MENTAL STATUS EXAM   Vitals: /73 (BP Location: Left arm)   Pulse 76   Temp 97.8  F (36.6  C) (Temporal)   Resp 16   Wt 74.1 kg (163 lb 4.8 oz)   SpO2 98%   BMI 22.78 kg/m      Appearance:  awake, alert, adequately groomed " "and dressed in hospital scrubs  Attitude: cooperative, guarded  Eye Contact: fair today  Mood: \"good\", denies feeling depressed or anxious  Affect: flat, restricted range  Speech:  clear, coherent no delay noted today  Psychomotor Behavior:  no evidence of tardive dyskinesia, dystonia, or tics, no abnormal or involuntary movements noted  Thought Process:  goal oriented, seem linear  Associations:  no loose associations  Thought Content:  Denies SI, HI or SIB. Denies hallucinations though appears preoccupied at times. Does appear to be responding to internal stimuli at times  Insight:  limited  Judgment:  limited  Oriented to:  time, person, and place  Attention Span and Concentration:  limited  Recent and Remote Memory:  limited  Fund of Knowledge: appropriate for education  Muscle Strength and Tone: normal  Gait and Station: normal       LABS   No results found for this or any previous visit (from the past 24 hour(s)).      IMPRESSION     This is a 21 year old male with a PMH of schizoaffective disorder, bipolar type who is currently under civil commitment and living at [facility]. Pt left the facility on 5.27.22 and was later picked up [place] highway. He was brought to a home where someone called EMS and pt was transported to the ED where he was evaluated. It was determined that pt requires inpatient treatment and stabilization for his safety.     Pt's current diagnosis of schizoaffective disorder bipolar type is managed with neuroleptics and a selective serotonin reuptake inhibitor. Will discontinue pts selective serotonin reuptake inhibitor as this may be contributing to his behavior. Will  add a mood stabilizer, Depakote, discussed with mother who is in agreement. No changes in Invega at this time.         DIAGNOSES     1.Schizoaffective disorder, Bipolar type          PLAN     Location: Unit 5  Legal Status: committed with Nguyen (Zyprexa, Abilify, Prolixin, Invega)    Safety Assessment:  "   Behavioral Orders   Procedures     Code 1 - Restrict to Unit     Routine Programming     As clinically indicated     Status 15     Every 15 minutes.      PTA medications held:   -Lexapro 20 mg daily      PTA medications continued/changed:   -Continue Invega Sustenna, maintenance dose increased from 156 -> 234 mg Q28D on 6/6/22, next injection due on 7/4/22  -Continue oral Invega 3 mg at bedtime d/t ongoing psychosis and supplement until LEÓN is more effective. Will likely stop after next injection.    New medications tried and stopped:   -None    New medications initiated:   -Depakote 250 mg on 5/28 -> 500 mg on 5/29 ->1,000 mg on 5/30 -> 1,500 mg on 6/3 (VPA level on 6/6 of 90) -> 1,250 mg on 6/28 (concerns for delayed thoughts/speech)  -Cogentin 1 mg BID prn EPSE      Today's Changes: No changes made. With patient taking LEÓN it may be weeks to months before a determination can be made regarding efficacy of Invega. Has trialed many other neuroleptics though often experiences side effects. If in 6-8 weeks patient is still not showing no further improvement, would consider addendum to Nguyen and switching to Clozapine due to several previous failed trials.    Programming: Patient will be treated in a therapeutic milieu with appropriate individual and group therapies. Education will be provided on diagnoses, medications, and treatments.     Medical diagnoses:  Per medicine. Nothing acute    Consult: None  Tests: None    Anticipated LOS: 1-2 weeks-pt is under Civil Commitment and Nguyen  Disposition: Has reportedly been accepted to [facility] program pending paperwork per SW       ATTESTATION      Patient has been seen and evaluated by me,  SELINA Harding CNP

## 2022-06-29 NOTE — PLAN OF CARE
Problem: Thought Process Alteration  Goal: Optimal Thought Clarity  Description: Pt will demonstrate Optimal Thought Clarity before discharge     Pt. Will establish and maintain linear conversations with staff  Outcome: Ongoing, Progressing     Problem: Behavioral Health Plan of Care  Goal: Patient-Specific Goal (Individualization)  Description: Pt. Will consume >50% of meals provided   Pt. Will sleep 4-6 Hours Nightly   Pt. Will attempt groups daily when able       Outcome: Ongoing, Progressing   Goal Outcome Evaluation:    Plan of Care Reviewed With: patient     0730 Face to face rounding complete.  Pt introduced to nursing for the shift.    Pt has been up more this shift walking the hallway smiling and laughing to himself.  He told me that he is feeling fine today and declined to be hearing voices but clearly seems to be responding to internal stimulation. He was encouraged to attend group but did not.    1530 Face to face end of shift report communicated to Evening shift RN's along with Pt's fall risk..     Hank Donnelly RN  6/29/2022  2:09 PM

## 2022-06-30 PROCEDURE — 250N000013 HC RX MED GY IP 250 OP 250 PS 637: Performed by: NURSE PRACTITIONER

## 2022-06-30 PROCEDURE — 99231 SBSQ HOSP IP/OBS SF/LOW 25: CPT | Performed by: NURSE PRACTITIONER

## 2022-06-30 PROCEDURE — 124N000004

## 2022-06-30 RX ADMIN — Medication 50 MCG: at 20:09

## 2022-06-30 RX ADMIN — PALIPERIDONE 3 MG: 3 TABLET, EXTENDED RELEASE ORAL at 20:09

## 2022-06-30 RX ADMIN — DIVALPROEX SODIUM 1250 MG: 500 TABLET, FILM COATED, EXTENDED RELEASE ORAL at 20:09

## 2022-06-30 ASSESSMENT — ACTIVITIES OF DAILY LIVING (ADL)
LAUNDRY: UNABLE TO COMPLETE
ADLS_ACUITY_SCORE: 40
HYGIENE/GROOMING: INDEPENDENT
ADLS_ACUITY_SCORE: 40
ORAL_HYGIENE: INDEPENDENT
LAUNDRY: UNABLE TO COMPLETE
ADLS_ACUITY_SCORE: 40
ORAL_HYGIENE: INDEPENDENT
ADLS_ACUITY_SCORE: 40
DRESS: SCRUBS (BEHAVIORAL HEALTH)
ADLS_ACUITY_SCORE: 40
HYGIENE/GROOMING: INDEPENDENT
DRESS: INDEPENDENT;SCRUBS (BEHAVIORAL HEALTH)
ADLS_ACUITY_SCORE: 40

## 2022-06-30 NOTE — PLAN OF CARE
Face to face shift report received from nurse. Rounding completed, pt observed.    Problem: Behavioral Health Plan of Care  Goal: Patient-Specific Goal (Individualization)  Description: Pt. Will consume >50% of meals provided   Pt. Will sleep 4-6 Hours Nightly   Pt. Will attempt groups daily when able   Outcome: Ongoing, Not Progressing   Pt has been in bed with eyes closed and regular respirations observed all night. Will continue to monitor. Patient slept throughout the night.    Face to face report will be communicated to oncoming RN.    Yury Andrade RN  6/30/2022

## 2022-06-30 NOTE — PLAN OF CARE
Problem: Thought Process Alteration  Goal: Optimal Thought Clarity  Description: Pt will demonstrate Optimal Thought Clarity before discharge     Pt. Will establish and maintain linear conversations with staff  Outcome: Ongoing, Progressing    Pt denies having hallucinations though appears to be responding to internal stimulation     Problem: Behavioral Health Plan of Care  Goal: Patient-Specific Goal (Individualization)  Description: Pt. Will consume >50% of meals provided   Pt. Will sleep 4-6 Hours Nightly   Pt. Will attempt groups daily when able     Outcome: Ongoing, Progressing  Flowsheets (Taken 6/30/2022 0940)  Patient Vulnerabilities: history of unsuccessful treatment   Goal Outcome Evaluation:    Plan of Care Reviewed With: patient     0730 Face to face rounding complete.  Pt introduced to nursing for the shift.    Pt ate breakfast and slept until lunch today. He spent the afternoon walking the gould smiling and laughing to himself. He did spend some time working on a puzzle today.  He does not interact with peers.  His responses to assessment questions are very limited and end up yes/no responses. He has very limited emotional response during interactions or has incongruent emotional responses.     1500 Face to face end of shift report communicated to evening Shift RN along with Pt's fall risk.      Hank Donnelly, RODDY  6/30/2022

## 2022-06-30 NOTE — PROGRESS NOTES
"[facility] PSYCHIATRY  PROGRESS NOTE     SUBJECTIVE     I found Cam in the lounge eating lunch. Patient denies suicidal or homicidal ideation and any hallucinations or delusions, though is noted to be laughing, giggling, or otherwise responding to internal stimuli intermittently. The patient denies any issues with sleep or appetite. He does pace the halls throughout the day. He does social appropriately with select peers, but does not attend group. Patient doesn't offer any additional complaints or concerns. Patient is waiting for a bed to open up at [facility].      In review of chart it appears that he has been tried on several different neuroleptic medications with either limited results or experienced side effects: Latuda (ineffective), Zyprexa (stopped on own due to feeling too sedated), Haldol (muscle contractions in face), Risperdal (akathisia), Seroquel (listed as allergy, \"fainted\") Abilify (akathisia).        MEDICATIONS   Scheduled Meds:    divalproex sodium extended-release  1,250 mg Oral Daily     [START ON 7/4/2022] paliperidone  234 mg Intramuscular Q28 Days     paliperidone  3 mg Oral At Bedtime     Vitamin D3  50 mcg Oral At Bedtime     PRN Meds:.acetaminophen, benztropine, haloperidol **AND** LORazepam **AND** diphenhydrAMINE, haloperidol lactate **AND** LORazepam **AND** diphenhydrAMINE, hydrOXYzine, nicotine, OLANZapine **OR** OLANZapine, traZODone     ALLERGIES   Allergies   Allergen Reactions     Seasonal Allergies      Seroquel [Quetiapine]      Fainting and slowed breathing         MENTAL STATUS EXAM   Vitals: /69 (BP Location: Left arm)   Pulse 88   Temp 98  F (36.7  C) (Temporal)   Resp 16   Wt 74.1 kg (163 lb 4.8 oz)   SpO2 98%   BMI 22.78 kg/m      Appearance:  awake, alert, adequately groomed and dressed in hospital scrubs  Attitude: cooperative, guarded  Eye Contact: fair today  Mood: \"good\", denies feeling depressed or anxious  Affect: flat, restricted " range  Speech:  clear, coherent no delay noted today  Psychomotor Behavior:  no evidence of tardive dyskinesia, dystonia, or tics, no abnormal or involuntary movements noted  Thought Process:  goal oriented, seem linear  Associations:  no loose associations  Thought Content:  Denies SI, HI or SIB. Denies hallucinations though appears preoccupied at times. Does appear to be responding to internal stimuli at times  Insight:  limited  Judgment:  limited  Oriented to:  time, person, and place  Attention Span and Concentration:  limited  Recent and Remote Memory:  limited  Fund of Knowledge: appropriate for education  Muscle Strength and Tone: normal  Gait and Station: normal       LABS   No results found for this or any previous visit (from the past 24 hour(s)).      IMPRESSION     This is a 21 year old male with a PMH of schizoaffective disorder, bipolar type who is currently under civil commitment and living at [facility]. Pt left the facility on 5.27.22 and was later picked up [place] highway. He was brought to a home where someone called EMS and pt was transported to the ED where he was evaluated. It was determined that pt requires inpatient treatment and stabilization for his safety.     Pt's current diagnosis of schizoaffective disorder bipolar type is managed with neuroleptics and a selective serotonin reuptake inhibitor. Will discontinue pts selective serotonin reuptake inhibitor as this may be contributing to his behavior. Will  add a mood stabilizer, Depakote, discussed with mother who is in agreement. No changes in Invega at this time.         DIAGNOSES     1.Schizoaffective disorder, Bipolar type          PLAN     Location: Unit 5  Legal Status: committed with Nguyen (Zyprexa, Abilify, Prolixin, Invega)    Safety Assessment:    Behavioral Orders   Procedures     Code 1 - Restrict to Unit     Routine Programming     As clinically indicated     Status 15     Every 15 minutes.      PTA  medications held:   -Lexapro 20 mg daily      PTA medications continued/changed:   -Continue Invega Sustenna, maintenance dose increased from 156 -> 234 mg Q28D on 6/6/22, next injection due on 7/4/22  -Continue oral Invega 3 mg at bedtime d/t ongoing psychosis and supplement until LEÓN is more effective. Will likely stop after next injection.    New medications tried and stopped:   -None    New medications initiated:   -Depakote 250 mg on 5/28 -> 500 mg on 5/29 ->1,000 mg on 5/30 -> 1,500 mg on 6/3 (VPA level on 6/6 of 90) -> 1,250 mg on 6/28 (concerns for delayed thoughts/speech)  -Cogentin 1 mg BID prn EPSE      Today's Changes: No changes made. With patient taking LEÓN it may be weeks to months before a determination can be made regarding efficacy of Invega. Has trialed many other neuroleptics though often experiences side effects. If in 6-8 weeks patient is still not showing no further improvement, would consider addendum to Nguyen and switching to Clozapine due to several previous failed trials.    Programming: Patient will be treated in a therapeutic milieu with appropriate individual and group therapies. Education will be provided on diagnoses, medications, and treatments.     Medical diagnoses:  Per medicine. Nothing acute    Consult: None  Tests: None    Anticipated LOS: 1-2 weeks-pt is under Civil Commitment and Nguyen  Disposition: Has reportedly been accepted to [facility] program pending paperwork per        ATTESTATION      Patient has been seen and evaluated by me,  SELINA Harding CNP

## 2022-06-30 NOTE — PLAN OF CARE
"Problem: Behavioral Health Plan of Care  Goal: Patient-Specific Goal (Individualization)  Description: Pt. Will consume >50% of meals provided   Pt. Will sleep 4-6 Hours Nightly   Pt. Will attempt groups daily when able   Outcome: Ongoing, Progressing     Goal Outcome Evaluation:  Pt remains withdrawn, paced gould much of the evening. Does not attend group, although is encouraged by many staff. Denied all mental health criteria, denied hallucinations and racing thoughts. Pt continues to appear as if responding to internal stimuli, grinning or laughing while walking alone. This writer asked pt \"What causes you to laugh when you're walking in the gould?\" Pt responded with \"Ahhhh,\" had a long pause and quietly said \" I don't want to talk about it.\" Denied physical complaint, remains brief in conversation.    Problem: Thought Process Alteration  Goal: Optimal Thought Clarity  Description: Pt will demonstrate Optimal Thought Clarity before discharge     Pt. Will establish and maintain linear conversations with staff  Outcome: Ongoing, Progressing    Problem: Suicidal Behavior  Goal: Suicidal Behavior is Absent or Managed  Outcome: Ongoing, Progressing    2330 - Face to face end of shift report to be communicated to oncoming shift RN.     Eloisa Salazar RN  6/29/2022  10:44 PM              "

## 2022-07-01 LAB
AMMONIA PLAS-SCNC: 29 UMOL/L (ref 10–50)
VALPROATE SERPL-MCNC: 81 MG/L

## 2022-07-01 PROCEDURE — 99232 SBSQ HOSP IP/OBS MODERATE 35: CPT | Performed by: NURSE PRACTITIONER

## 2022-07-01 PROCEDURE — 36415 COLL VENOUS BLD VENIPUNCTURE: CPT | Performed by: NURSE PRACTITIONER

## 2022-07-01 PROCEDURE — 250N000013 HC RX MED GY IP 250 OP 250 PS 637: Performed by: NURSE PRACTITIONER

## 2022-07-01 PROCEDURE — 80164 ASSAY DIPROPYLACETIC ACD TOT: CPT | Performed by: NURSE PRACTITIONER

## 2022-07-01 PROCEDURE — 82140 ASSAY OF AMMONIA: CPT | Performed by: NURSE PRACTITIONER

## 2022-07-01 PROCEDURE — 124N000004

## 2022-07-01 RX ADMIN — Medication 50 MCG: at 21:03

## 2022-07-01 RX ADMIN — DIVALPROEX SODIUM 1250 MG: 500 TABLET, FILM COATED, EXTENDED RELEASE ORAL at 21:03

## 2022-07-01 RX ADMIN — PALIPERIDONE 3 MG: 3 TABLET, EXTENDED RELEASE ORAL at 21:04

## 2022-07-01 ASSESSMENT — ACTIVITIES OF DAILY LIVING (ADL)
ADLS_ACUITY_SCORE: 40
DRESS: SCRUBS (BEHAVIORAL HEALTH);INDEPENDENT
ADLS_ACUITY_SCORE: 40
ADLS_ACUITY_SCORE: 40
LAUNDRY: UNABLE TO COMPLETE
ADLS_ACUITY_SCORE: 40
ADLS_ACUITY_SCORE: 40
HYGIENE/GROOMING: INDEPENDENT
ADLS_ACUITY_SCORE: 40
ADLS_ACUITY_SCORE: 40
HYGIENE/GROOMING: INDEPENDENT
ORAL_HYGIENE: INDEPENDENT

## 2022-07-01 NOTE — PROGRESS NOTES
KAROLINE submitted IRTS referrals to:    [facility]    [facility]    [facility]    [facility] I[facility]    [facility] IRTS    [facility]    [facility]    [place] [facility]    [facility]    [place] [facility]    [facility] I[facility]S

## 2022-07-01 NOTE — PLAN OF CARE
Problem: Behavioral Health Plan of Care  Goal: Patient-Specific Goal (Individualization)  Description: Pt. Will consume >50% of meals provided   Pt. Will sleep 4-6 Hours Nightly   Pt. Will attempt groups daily when able   Outcome: Ongoing, Progressing     Problem: Thought Process Alteration  Goal: Optimal Thought Clarity  Description: Pt will demonstrate Optimal Thought Clarity before discharge   Pt. Will establish and maintain linear conversations with staff  Outcome: Ongoing, Progressing     Problem: Suicidal Behavior  Goal: Suicidal Behavior is Absent or Managed  Outcome: Ongoing, Progressing     Face to face shift report received from RODDY Gonzalez. Rounding completed, pt observed laying in bed, appeared to be sleepinh.    Patient appeared to be sleeping for approximately 7 hours since 2330.    Patient had no reported or observed self harm this shift.      Face to face report will be communicated to oncoming RN.    Vivi Carter RN  7/1/2022  6:29 AM

## 2022-07-01 NOTE — PLAN OF CARE
Problem: Thought Process Alteration  Goal: Optimal Thought Clarity  Description: Pt will demonstrate Optimal Thought Clarity before discharge     Pt. Will establish and maintain linear conversations with staff  Outcome: Ongoing, Progressing   Patient gives limited answers to assessment questions but is able to make needs known to staff.  He denies hallucinations but appears responding to internal stimuli.  He laughs and smiles to himself while walking the hallways all shift.       Problem: Behavioral Health Plan of Care  Goal: Patient-Specific Goal (Individualization)  Description: Pt. Will consume >50% of meals provided   Pt. Will sleep 4-6 Hours Nightly   Pt. Will attempt groups daily when able       Outcome: Ongoing, Progressing     Patient has been calm, cooperative, and medication complaint.  He spent all day walking the hallways or sitting in the lounge area.  Did not attend groups and keeps to himself.  No complaints of pain.  VS WNL.  Face to face end of shift report communicated to night shift RN.     Lisa Garcia RN  6/30/2022  9:05 PM

## 2022-07-01 NOTE — PLAN OF CARE
Problem: Thought Process Alteration  Goal: Optimal Thought Clarity  Description: Pt will demonstrate Optimal Thought Clarity before discharge     Pt. Will establish and maintain linear conversations with staff  Outcome: Ongoing, Progressing    Pt is able to have superficial conversations that are organized     Goal Outcome Evaluation:  Problem: Suicidal Behavior  Goal: Suicidal Behavior is Absent or Managed  Outcome: Ongoing, Progressing    Pt denies SI     Problem: Behavioral Health Plan of Care  Goal: Patient-Specific Goal (Individualization)  Description: Pt. Will consume >50% of meals provided   Pt. Will sleep 4-6 Hours Nightly   Pt. Will attempt groups daily when able     Outcome: Ongoing, Progressing     Goal Outcome Evaluation:    0730 Face to face rounding complete.  Pt introduced to nursing for there shift.    Pt was up pacing often today giggling and laughing often to himself.  Pt worked on a puzzle some today and spent time walking the halls.  Pt's responses are a bit more fluid today but are still are limited in content. His mother called and is planning on visiting on Saturday and Sunday.  PT told me that he showered last evening.        1500 Face to face end of shift report communicated to Evening RN's along with Pt's fall risk..      Hank Donnelly RN  7/1/2022

## 2022-07-01 NOTE — PLAN OF CARE
Problem: Behavioral Health Plan of Care  Goal: Patient-Specific Goal (Individualization)  Description: Pt. Will consume >50% of meals provided   Pt. Will sleep 4-6 Hours Nightly   Pt. Will attempt groups daily when able       Outcome: Ongoing, Progressing     Problem: Thought Process Alteration  Goal: Optimal Thought Clarity  Description: Pt will demonstrate Optimal Thought Clarity before discharge     Pt. Will establish and maintain linear conversations with staff  Outcome: Ongoing, Progressing     Problem: Suicidal Behavior  Goal: Suicidal Behavior is Absent or Managed  Outcome: Ongoing, Progressing   Goal Outcome Evaluation:        Pt pleasant and cooperative. Asked writer for shower supplies - more conversational. Continues to appear like he's responding although he denies this. Paces the hallway most of the shift. Remains withdrawn from socializing with peers.    Face to face end of shift report communicated to RN.     Lucia Calix RN  7/1/2022  4:40 PM

## 2022-07-01 NOTE — PROGRESS NOTES
"[facility] PSYCHIATRY  PROGRESS NOTE     SUBJECTIVE     I found Cam bedresting today, wide awake, staring at the wall. Patient denies suicidal or homicidal ideation and any hallucinations or delusions, though is noted to be laughing, giggling, or otherwise responding to internal stimuli intermittently. The patient denies any issues with sleep or appetite. He does pace the halls throughout the day. He does social appropriately with select peers, but does not attend group. Patient doesn't offer any additional complaints or concerns. Patient is waiting for a bed to open up at [facility].      In review of chart it appears that he has been tried on several different neuroleptic medications with either limited results or experienced side effects: Latuda (ineffective), Zyprexa (stopped on own due to feeling too sedated), Haldol (muscle contractions in face), Risperdal (akathisia), Seroquel (listed as allergy, \"fainted\") Abilify (akathisia).        MEDICATIONS   Scheduled Meds:    divalproex sodium extended-release  1,250 mg Oral Daily     [START ON 7/4/2022] paliperidone  234 mg Intramuscular Q28 Days     paliperidone  3 mg Oral At Bedtime     Vitamin D3  50 mcg Oral At Bedtime     PRN Meds:.acetaminophen, benztropine, haloperidol **AND** LORazepam **AND** diphenhydrAMINE, haloperidol lactate **AND** LORazepam **AND** diphenhydrAMINE, hydrOXYzine, nicotine, OLANZapine **OR** OLANZapine, traZODone     ALLERGIES   Allergies   Allergen Reactions     Seasonal Allergies      Seroquel [Quetiapine]      Fainting and slowed breathing         MENTAL STATUS EXAM   Vitals: /57 (BP Location: Left arm)   Pulse 83   Temp 97  F (36.1  C) (Temporal)   Resp 16   Wt 74.1 kg (163 lb 4.8 oz)   SpO2 97%   BMI 22.78 kg/m      Appearance:  awake, alert, adequately groomed and dressed in hospital scrubs  Attitude: cooperative, but guarded  Eye Contact: fair today  Mood: \"good\", denies feeling depressed or anxious  Affect: " flat, restricted range  Speech:  clear, coherent no delay noted today  Psychomotor Behavior:  no evidence of tardive dyskinesia, dystonia, or tics, no abnormal or involuntary movements noted  Thought Process:  goal oriented, seem linear  Associations:  no loose associations  Thought Content:  Denies SI, HI or SIB. Denies hallucinations though appears preoccupied/responding to internal stimuli at times  Insight:  limited  Judgment:  limited  Oriented to:  time, person, and place  Attention Span and Concentration:  limited  Recent and Remote Memory:  limited  Fund of Knowledge: appropriate for education  Muscle Strength and Tone: normal  Gait and Station: normal       LABS   No results found for this or any previous visit (from the past 24 hour(s)).      IMPRESSION     This is a 21 year old male with a PMH of schizoaffective disorder, bipolar type who is currently under civil commitment and living at [facility]. Pt left the facility on 5.27.22 and was later picked up [place] the highway. He was brought to a home where someone called EMS and pt was transported to the ED where he was evaluated. It was determined that pt requires inpatient treatment and stabilization for his safety.     Pt's current diagnosis of schizoaffective disorder bipolar type is managed with neuroleptics and a selective serotonin reuptake inhibitor. Will discontinue pts selective serotonin reuptake inhibitor as this may be contributing to his behavior. Will  add a mood stabilizer, Depakote, discussed with mother who is in agreement. No changes in Invega at this time.         DIAGNOSES     1.Schizoaffective disorder, Bipolar type          PLAN     Location: Unit 5  Legal Status: committed with Nguyen (Zyprexa, Abilify, Prolixin, Invega)    Safety Assessment:    Behavioral Orders   Procedures     Code 1 - Restrict to Unit     Routine Programming     As clinically indicated     Status 15     Every 15 minutes.      PTA medications  held:   -Lexapro 20 mg daily      PTA medications continued/changed:   -Continue Invega Sustenna, maintenance dose increased from 156 -> 234 mg Q28D on 6/6/22, next injection due on 7/4/22  -Continue oral Invega 3 mg at bedtime d/t ongoing psychosis and supplement until LEÓN is more effective. Will likely stop after next injection.    New medications tried and stopped:   -None    New medications initiated:   -Depakote 250 mg on 5/28 -> 500 mg on 5/29 ->1,000 mg on 5/30 -> 1,500 mg on 6/3 (VPA level on 6/6 of 90) -> 1,250 mg on 6/28 (concerns for delayed thoughts/speech)  -Cogentin 1 mg BID prn EPSE      Today's Changes: No changes made. With patient taking LEÓN it may be weeks to months before a determination can be made regarding efficacy of Invega. Has trialed many other neuroleptics though often experiences side effects. If in 6-8 weeks patient is still not showing no further improvement, would consider addendum to Nguyen and switching to Clozapine due to several previous failed trials.    Programming: Patient will be treated in a therapeutic milieu with appropriate individual and group therapies. Education will be provided on diagnoses, medications, and treatments.     Medical diagnoses:  Per medicine. Nothing acute    Consult: None  Tests: None    Anticipated LOS: 1-2 weeks-pt is under Civil Commitment and Nguyen  Disposition: Has reportedly been accepted to [facility] program pending paperwork per SW       ATTESTATION      Patient has been seen and evaluated by me,  SELINA Harding CNP

## 2022-07-02 PROCEDURE — 124N000004

## 2022-07-02 PROCEDURE — 250N000013 HC RX MED GY IP 250 OP 250 PS 637: Performed by: NURSE PRACTITIONER

## 2022-07-02 RX ADMIN — Medication 50 MCG: at 20:51

## 2022-07-02 RX ADMIN — DIVALPROEX SODIUM 1250 MG: 500 TABLET, FILM COATED, EXTENDED RELEASE ORAL at 20:51

## 2022-07-02 RX ADMIN — PALIPERIDONE 3 MG: 3 TABLET, EXTENDED RELEASE ORAL at 20:51

## 2022-07-02 ASSESSMENT — ACTIVITIES OF DAILY LIVING (ADL)
ADLS_ACUITY_SCORE: 40
HYGIENE/GROOMING: INDEPENDENT
ADLS_ACUITY_SCORE: 40
ADLS_ACUITY_SCORE: 40
LAUNDRY: UNABLE TO COMPLETE
DRESS: SCRUBS (BEHAVIORAL HEALTH);INDEPENDENT
ADLS_ACUITY_SCORE: 40
ORAL_HYGIENE: INDEPENDENT
ADLS_ACUITY_SCORE: 40
HYGIENE/GROOMING: INDEPENDENT
ADLS_ACUITY_SCORE: 40

## 2022-07-02 NOTE — PLAN OF CARE
Problem: Suicidal Behavior  Goal: Suicidal Behavior is Absent or Managed  Outcome: Ongoing, Progressing    Pt denies SI     Problem: Thought Process Alteration  Goal: Optimal Thought Clarity  Description: Pt will demonstrate Optimal Thought Clarity before discharge     Pt. Will establish and maintain linear conversations with staff  Outcome: Ongoing, Progressing    Pt appears to be responding but denies hearing voices     Problem: Behavioral Health Plan of Care  Goal: Patient-Specific Goal (Individualization)  Description: Pt. Will consume >50% of meals provided   Pt. Will sleep 4-6 Hours Nightly   Pt. Will attempt groups daily when able     Outcome: Ongoing, Progressing  Flowsheet's (Taken 7/2/2022 1357)  Patient Vulnerabilities:    limited social skills    lacks insight into illness  Patient Personal Strengths: family/social support   Goal Outcome Evaluation:    Plan of Care Reviewed With: patient     0730 Face to face rounding complete.  Pt introduce to nursing for the shift.     Pt was up more this shift walking after breakfast and working on a puzzle.  Pt did do a significant deal of walking after lunch while appearing to be responding to internal stimuli.  Pt told me that he is feeling pretty good today and is looking forward to seeing his mother this evening.    1500 Face to face end of shift report communicated to Evening Shift RN's along with Pt's fall risk.     Hank Donnelly RN  7/2/2022  2:01 PM

## 2022-07-02 NOTE — PLAN OF CARE
Problem: Behavioral Health Plan of Care  Goal: Patient-Specific Goal (Individualization)  Description: Pt. Will consume >50% of meals provided   Pt. Will sleep 4-6 Hours Nightly   Pt. Will attempt groups daily when able   Outcome: Ongoing, Progressing     Problem: Thought Process Alteration  Goal: Optimal Thought Clarity  Description: Pt will demonstrate Optimal Thought Clarity before discharge   Pt. Will establish and maintain linear conversations with staff  Outcome: Ongoing, Progressing     Problem: Suicidal Behavior  Goal: Suicidal Behavior is Absent or Managed  Outcome: Ongoing, Progressing     Face to face shift report received from RODDY Myers. Rounding completed, pt observed laying in bed awake.    0010 patient offered PRN for sleep, he declined.     Patient appeared to be sleeping for approximately 3 hours since 0315.    Patient had no reported or observed self harm this shift.      Face to face report will be communicated to oncoming RN.    Vivi Carter RN  7/2/2022  6:22 AM

## 2022-07-02 NOTE — PLAN OF CARE
Problem: Behavioral Health Plan of Care  Goal: Patient-Specific Goal (Individualization)  Description: Pt. Will consume >50% of meals provided   Pt. Will sleep 4-6 Hours Nightly   Pt. Will attempt groups daily when able       Outcome: Ongoing, Progressing     Problem: Thought Process Alteration  Goal: Optimal Thought Clarity  Description: Pt will demonstrate Optimal Thought Clarity before discharge     Pt. Will establish and maintain linear conversations with staff  Outcome: Ongoing, Progressing     Problem: Suicidal Behavior  Goal: Suicidal Behavior is Absent or Managed  Outcome: Ongoing, Progressing   Goal Outcome Evaluation:        Pt continues to walk the halls and withdraws from peers. Noted to smile to himself but denies auditory hallucinations. Pleasant and polite. Answers questions appropriately. Better eye contact during conversation. Looking forward to mom's visit tonight    Face to face end of shift report communicated to RODDY Calix RN  7/2/2022  5:48 PM

## 2022-07-03 PROCEDURE — 124N000004

## 2022-07-03 PROCEDURE — 250N000013 HC RX MED GY IP 250 OP 250 PS 637: Performed by: NURSE PRACTITIONER

## 2022-07-03 RX ADMIN — Medication 50 MCG: at 20:23

## 2022-07-03 RX ADMIN — PALIPERIDONE 3 MG: 3 TABLET, EXTENDED RELEASE ORAL at 20:23

## 2022-07-03 RX ADMIN — DIVALPROEX SODIUM 1250 MG: 500 TABLET, FILM COATED, EXTENDED RELEASE ORAL at 20:23

## 2022-07-03 ASSESSMENT — ACTIVITIES OF DAILY LIVING (ADL)
ADLS_ACUITY_SCORE: 40
LAUNDRY: UNABLE TO COMPLETE
ADLS_ACUITY_SCORE: 40
ADLS_ACUITY_SCORE: 40
ORAL_HYGIENE: INDEPENDENT
ADLS_ACUITY_SCORE: 40
DRESS: SCRUBS (BEHAVIORAL HEALTH);INDEPENDENT
HYGIENE/GROOMING: INDEPENDENT

## 2022-07-03 NOTE — PLAN OF CARE
Problem: Behavioral Health Plan of Care  Goal: Patient-Specific Goal (Individualization)  Description: Pt. Will consume >50% of meals provided   Pt. Will sleep 4-6 Hours Nightly   Pt. Will attempt groups daily when able       Outcome: Ongoing, Progressing     Problem: Thought Process Alteration  Goal: Optimal Thought Clarity  Description: Pt will demonstrate Optimal Thought Clarity before discharge     Pt. Will establish and maintain linear conversations with staff  Outcome: Ongoing, Progressing     Problem: Suicidal Behavior  Goal: Suicidal Behavior is Absent or Managed  Outcome: Ongoing, Progressing     Face to face shift report received from Lucia PEREYRA. Rounding completed, pt observed.     Pt appeared to sleep most of the night.    Writer continued cares of pt for the day shift. Pt provides minimal conversation with staff. Pt denies hallucinations but continues to walk hallways and appears to be responding to internal stimuli as he can be seen laughing at times. Pt denies SI at this time and has not had any noted episodes of self harm this shift.     Face to face report will be communicated to oncoming RN.    Lachelle Espinoza RN  7/3/2022  11:55 AM

## 2022-07-03 NOTE — PLAN OF CARE
SHIFT SUMMARY:  Smirking, laughing to self while walking the halls. Denies pain, auditory hallucinations, depression, anxiety and suicidal ideation.      Problem: Behavioral Health Plan of Care  Goal: Patient-Specific Goal (Individualization)  Description: Pt. Will consume >50% of meals provided   Pt. Will sleep 4-6 Hours Nightly   Pt. Will attempt groups daily when able       Outcome: Ongoing, Progressing  Goal: Absence of New-Onset Illness or Injury  Outcome: Ongoing, Progressing     Problem: Thought Process Alteration  Goal: Optimal Thought Clarity  Description: Pt will demonstrate Optimal Thought Clarity before discharge     Pt. Will establish and maintain linear conversations with staff  Outcome: Ongoing, Progressing     Problem: Suicidal Behavior  Goal: Suicidal Behavior is Absent or Managed  Outcome: Ongoing, Progressing   Goal Outcome Evaluation:

## 2022-07-04 PROCEDURE — 124N000004

## 2022-07-04 PROCEDURE — 250N000013 HC RX MED GY IP 250 OP 250 PS 637: Performed by: NURSE PRACTITIONER

## 2022-07-04 PROCEDURE — 250N000011 HC RX IP 250 OP 636: Performed by: NURSE PRACTITIONER

## 2022-07-04 PROCEDURE — 99232 SBSQ HOSP IP/OBS MODERATE 35: CPT | Performed by: NURSE PRACTITIONER

## 2022-07-04 RX ORDER — LORAZEPAM 0.5 MG/1
0.5 TABLET ORAL 2 TIMES DAILY
Status: DISCONTINUED | OUTPATIENT
Start: 2022-07-04 | End: 2022-07-14 | Stop reason: HOSPADM

## 2022-07-04 RX ADMIN — Medication 50 MCG: at 22:14

## 2022-07-04 RX ADMIN — PALIPERIDONE PALMITATE 234 MG: 234 INJECTION INTRAMUSCULAR at 14:20

## 2022-07-04 RX ADMIN — LORAZEPAM 0.5 MG: 0.5 TABLET ORAL at 13:28

## 2022-07-04 RX ADMIN — DIVALPROEX SODIUM 1250 MG: 500 TABLET, FILM COATED, EXTENDED RELEASE ORAL at 22:14

## 2022-07-04 RX ADMIN — LORAZEPAM 0.5 MG: 0.5 TABLET ORAL at 22:14

## 2022-07-04 ASSESSMENT — ACTIVITIES OF DAILY LIVING (ADL)
ADLS_ACUITY_SCORE: 40
HYGIENE/GROOMING: INDEPENDENT
HYGIENE/GROOMING: SHOWER;INDEPENDENT
ADLS_ACUITY_SCORE: 40
ADLS_ACUITY_SCORE: 40
DRESS: INDEPENDENT;SCRUBS (BEHAVIORAL HEALTH)
LAUNDRY: UNABLE TO COMPLETE
ADLS_ACUITY_SCORE: 40
ADLS_ACUITY_SCORE: 40
DRESS: SCRUBS (BEHAVIORAL HEALTH);INDEPENDENT
ADLS_ACUITY_SCORE: 40
ORAL_HYGIENE: INDEPENDENT
ADLS_ACUITY_SCORE: 40

## 2022-07-04 NOTE — PLAN OF CARE
Problem: Behavioral Health Plan of Care  Goal: Patient-Specific Goal (Individualization)  Description: Pt. Will consume >50% of meals provided   Pt. Will sleep 4-6 Hours Nightly   Pt. Will attempt groups daily when able       Outcome: Ongoing, Progressing     Problem: Thought Process Alteration  Goal: Optimal Thought Clarity  Description: Pt will demonstrate Optimal Thought Clarity before discharge     Pt. Will establish and maintain linear conversations with staff  Outcome: Ongoing, Progressing     Problem: Suicidal Behavior  Goal: Suicidal Behavior is Absent or Managed  Outcome: Ongoing, Progressing     Face to face shift report received from Manoj PEREYRA. Rounding completed, pt observed.     Pt appeared to sleep a total of 3.5 hours on and off this shift. Pt did not have any noted episodes of self harm this shift.    Face to face report will be communicated to oncoming RN.    Lachelle Espinoza RN  7/4/2022  6:02 AM

## 2022-07-04 NOTE — PROGRESS NOTES
"[facility] PSYCHIATRY  PROGRESS NOTE     SUBJECTIVE     I found Cam pacing the halls today. He does not appear to be responding to internal stimuli today. He is not seen laughing, giggling, or talking to himself or others, which has been his baseline up until recently. We discussed the addition of Ativan 0.5 mg BID to help treat underlying anxiety and he reports his racing thoughts did decrease after taking it and endorses feeling \"more relaxed\" as well. He tells me the visit with his mother went well yesterday, too. Patient denies suicidal or homicidal ideation and any hallucinations or delusions. The patient denies any issues with sleep or appetite. He does socialize appropriately with select peers, but does not attend group. Patient is waiting for a bed to open up at [facility]. Discussed case with Marilu Del Toro CNP who sees him when I'm not here and we decided to stop the oral Prolixin. He received his second injection of Invega Sustenna 234 mg today, so we will see how he does without the supplemental dose.      In review of chart it appears that he has been tried on several different neuroleptic medications with either limited results or experienced side effects: Latuda (ineffective), Zyprexa (stopped on own due to feeling too sedated), Haldol (muscle contractions in face), Risperdal (akathisia), Seroquel (listed as allergy, \"fainted\") Abilify (akathisia).        MEDICATIONS   Scheduled Meds:    divalproex sodium extended-release  1,250 mg Oral Daily     LORazepam  0.5 mg Oral BID     paliperidone  234 mg Intramuscular Q28 Days     paliperidone  3 mg Oral At Bedtime     Vitamin D3  50 mcg Oral At Bedtime     PRN Meds:.acetaminophen, benztropine, hydrOXYzine, nicotine, OLANZapine **OR** OLANZapine, traZODone     ALLERGIES   Allergies   Allergen Reactions     Seasonal Allergies      Seroquel [Quetiapine]      Fainting and slowed breathing         MENTAL STATUS EXAM   Vitals: /69 (BP Location: " "Left arm)   Pulse 93   Temp 97.5  F (36.4  C) (Temporal)   Resp 16   Wt 74.9 kg (165 lb 3.2 oz)   SpO2 99%   BMI 23.04 kg/m      Appearance:  awake, alert, adequately groomed and dressed in hospital scrubs  Attitude: cooperative, but guarded  Eye Contact: good  Mood: \"good\", denies feeling depressed or anxious  Affect: blunted, restricted range  Speech:  clear, coherent spontaneous  Psychomotor Behavior:  no evidence of tardive dyskinesia, dystonia, or tics, no abnormal or involuntary movements noted  Thought Process:  goal oriented, seem linear  Associations:  no loose associations  Thought Content:  Denies SI, HI or SIB. Denies hallucinations. Does not appear to be responding to internal stimuli today!  Insight:  limited  Judgment:  limited  Oriented to:  time, person, and place  Attention Span and Concentration:  limited  Recent and Remote Memory:  limited  Fund of Knowledge: appropriate for education  Muscle Strength and Tone: normal  Gait and Station: normal       LABS   No results found for this or any previous visit (from the past 24 hour(s)).      IMPRESSION     This is a 21 year old male with a PMH of schizoaffective disorder, bipolar type who is currently under civil commitment and living at [facility]. Pt left the facility on 5.27.22 and was later [place] up [place]. He was brought to a home where someone called EMS and pt was transported to the ED where he was evaluated. It was determined that pt requires inpatient treatment and stabilization for his safety.     Pt's current diagnosis of schizoaffective disorder bipolar type is managed with neuroleptics and a selective serotonin reuptake inhibitor. Will discontinue pts selective serotonin reuptake inhibitor as this may be contributing to his behavior. Will  add a mood stabilizer, Depakote, discussed with mother who is in agreement. No changes in Invega at this time.         DIAGNOSES     1.Schizoaffective disorder, " Bipolar type          PLAN     Location: Unit 5  Legal Status: committed with Nguyen (Zyprexa, Abilify, Prolixin, Invega)    Safety Assessment:    Behavioral Orders   Procedures     Code 1 - Restrict to Unit     Routine Programming     As clinically indicated     Status 15     Every 15 minutes.      PTA medications held:   -Lexapro 20 mg daily      PTA medications continued/changed:   -Continue Invega Sustenna, maintenance dose increased from 156 -> 234 mg Q28D on 6/6/22, next injection given today 7/4  -Stop oral Invega 3 mg today 7/4.     New medications tried and stopped:   -None    New medications initiated:   -Depakote 250 mg on 5/28 -> 500 mg on 5/29 ->1,000 mg on 5/30 -> 1,500 mg on 6/3 (VPA level on 6/6 of 90) -> 1,250 mg on 6/28 (concerns for delayed thoughts/speech)  -Cogentin 1 mg BID prn EPSE  -Ativan 0.5 mg BID for anxiety and racing thoughts      Today's Changes: Ativan added. Oral Prolixin stopped d/t getting second 234 mg dose of LEÓN. With patient taking LEÓN it may be weeks to months before a determination can be made regarding efficacy of Invega. Has trialed many other neuroleptics though often experiences side effects. If in 6-8 weeks patient is still not showing no further improvement, would consider addendum to Nguyen and switching to Clozapine due to several previous failed trials.    Programming: Patient will be treated in a therapeutic milieu with appropriate individual and group therapies. Education will be provided on diagnoses, medications, and treatments.     Medical diagnoses:  Per medicine. Nothing acute    Consult: None  Tests: None    Anticipated LOS: 1-2 weeks-pt is under Civil Commitment and Nguyen  Disposition: Has reportedly been accepted to [facility] program pending paperwork per          TREATMENT TEAM CARE PLAN      Progress: Symptoms improving.     Continued Stay Criteria/Rationale: Ongoing treatment and safe discharge planning. Needs structured environment for stability and  medication compliance.     Medical/Physical: See above.     Precautions: See above.      Plan: Continue inpatient care with unit support and medication management. [facility] when bed available.     Rationale for change in precautions or plan: NA due to no change.     Participants: Kinjal MARKS, CNP, Nursing     The patient's care was discussed with the treatment team and chart notes were reviewed.          ATTESTATION      Patient has been seen and evaluated by me,  SELINA Harding CNP

## 2022-07-04 NOTE — PLAN OF CARE
"  Problem: Behavioral Health Plan of Care  Goal: Patient-Specific Goal (Individualization)  Description: Pt. Will consume >50% of meals provided   Pt. Will sleep 4-6 Hours Nightly   Pt. Will attempt groups daily when able       Outcome: Ongoing, Progressing  Note: Patient is awake for breakfast and returned to bed.  He is up for the day after lunch.  He requested shower supplies and agrees to take Invega Sustenna injection after his shower.  He was cooperative with this. Patient started BID scheduled lorazepam.  He initially did not want to take the Ativan stating his sleep was \"fine\".  \"Oh wait. If it's for anxiety then I'll take it.\"  He did take the Ativan.  Patient walked the hallways this afternoon and did not appear to be responding to internal stimuli.   1420:  Patient cooperative with Invega Sustenna 234 mg.     Problem: Thought Process Alteration  Goal: Optimal Thought Clarity  Description: Pt will demonstrate Optimal Thought Clarity before discharge     Pt. Will establish and maintain linear conversations with staff  Outcome: Ongoing, Progressing  Note: Patient is able to hold linear conversation.     Problem: Suicidal Behavior  Goal: Suicidal Behavior is Absent or Managed  Outcome: Ongoing, Progressing  Note: Patient denies SI and had no noted self harm this shift.   Goal Outcome Evaluation:                      "

## 2022-07-05 PROCEDURE — 99232 SBSQ HOSP IP/OBS MODERATE 35: CPT | Performed by: NURSE PRACTITIONER

## 2022-07-05 PROCEDURE — 250N000013 HC RX MED GY IP 250 OP 250 PS 637: Performed by: NURSE PRACTITIONER

## 2022-07-05 PROCEDURE — 124N000004

## 2022-07-05 RX ADMIN — LORAZEPAM 0.5 MG: 0.5 TABLET ORAL at 20:48

## 2022-07-05 RX ADMIN — LORAZEPAM 0.5 MG: 0.5 TABLET ORAL at 08:34

## 2022-07-05 RX ADMIN — Medication 50 MCG: at 20:48

## 2022-07-05 RX ADMIN — DIVALPROEX SODIUM 1250 MG: 500 TABLET, FILM COATED, EXTENDED RELEASE ORAL at 20:48

## 2022-07-05 ASSESSMENT — ACTIVITIES OF DAILY LIVING (ADL)
ADLS_ACUITY_SCORE: 40
ADLS_ACUITY_SCORE: 40
DRESS: SCRUBS (BEHAVIORAL HEALTH);INDEPENDENT
HYGIENE/GROOMING: INDEPENDENT
ORAL_HYGIENE: INDEPENDENT
ADLS_ACUITY_SCORE: 40
LAUNDRY: UNABLE TO COMPLETE
ADLS_ACUITY_SCORE: 40

## 2022-07-05 NOTE — PLAN OF CARE
"Problem: Behavioral Health Plan of Care  Goal: Patient-Specific Goal (Individualization)  Description: Pt. Will consume >50% of meals provided   Pt. Will sleep 4-6 Hours Nightly   Pt. Will attempt groups daily when able   Outcome: Ongoing, Progressing     Goal Outcome Evaluation:  Pt walked in [place] from start of evening shift until after 10 pm. Pt was noted to be grinning, giggling and smiling 100% of the evening as he was walking and distracted, as if responding to internal dialog. When assessed at HS, pt denied all mental health issues. Pt answered that he does not have voices or visual disturbances, but is just thinking of things from the past. When questioned further, pt stated it is just \"memories\" that make him laugh. Does take medication as ordered, denied need of physical complaint or PRN needed on shift.     Problem: Thought Process Alteration  Goal: Optimal Thought Clarity  Description: Pt will demonstrate Optimal Thought Clarity before discharge     Pt. Will establish and maintain linear conversations with staff  Outcome: Ongoing, Progressing    Problem: Suicidal Behavior  Goal: Suicidal Behavior is Absent or Managed  Outcome: Ongoing, Progressing    2300 - Face to face end of shift report to be communicated to oncoming shift RN.     Eloisa Salazar RN  7/4/2022  11:27 PM                 "

## 2022-07-05 NOTE — PLAN OF CARE
Problem: Behavioral Health Plan of Care  Goal: Patient-Specific Goal (Individualization)  Description: Pt. Will consume >50% of meals provided   Pt. Will sleep 4-6 Hours Nightly   Pt. Will attempt groups daily when able       Outcome: Ongoing, Progressing   Goal Outcome Evaluation:      Face to face shift report received from RN. Rounding completed, pt observed.Client rested in room room for 3.25 hours with eyes closed and respirations noted. Client was free of falls this shift. No signs of distress.Face to face report will be communicated to oncoming RN.    Leonidas Vasquez RN  7/5/2022  6:27 AM

## 2022-07-05 NOTE — PROGRESS NOTES
"[facility] PSYCHIATRY  PROGRESS NOTE     SUBJECTIVE     I found Cam pacing the halls today. He is seen laughing, smiling and giggling to himself again today. When asked if he is experiencing perceptual disturbances, he states he is just \"remembering memories.\" He continues to report relief from the Ativan stating \"It makes me calmer, for sure!\" Patient denies suicidal or homicidal ideation and any hallucinations or delusions. The patient denies any issues with sleep or appetite, though it appears he didn't sleep well last night. He does socialize appropriately with select peers, but does not attend group. Patient is waiting for a bed to open up at [facility] and KAROLINE Mistry has several other IRTS referrals out as well.      In review of chart it appears that he has been tried on several different neuroleptic medications with either limited results or experienced side effects: Latuda (ineffective), Zyprexa (stopped on own due to feeling too sedated), Haldol (muscle contractions in face), Risperdal (akathisia), Seroquel (listed as allergy, \"fainted\") Abilify (akathisia).        MEDICATIONS   Scheduled Meds:    divalproex sodium extended-release  1,250 mg Oral Daily     LORazepam  0.5 mg Oral BID     paliperidone  234 mg Intramuscular Q28 Days     Vitamin D3  50 mcg Oral At Bedtime     PRN Meds:.acetaminophen, benztropine, hydrOXYzine, nicotine, OLANZapine **OR** OLANZapine, traZODone     ALLERGIES   Allergies   Allergen Reactions     Seasonal Allergies      Seroquel [Quetiapine]      Fainting and slowed breathing         MENTAL STATUS EXAM   Vitals: /72   Pulse 89   Temp 98.8  F (37.1  C) (Tympanic)   Resp 14   Wt 74.9 kg (165 lb 3.2 oz)   SpO2 97%   BMI 23.04 kg/m      Appearance:  awake, alert, adequately groomed and dressed in hospital scrubs  Attitude: cooperative, but guarded  Eye Contact: good  Mood: \"still good\"  Affect: blunted, restricted range  Speech:  clear, coherent " spontaneous  Psychomotor Behavior:  no evidence of tardive dyskinesia, dystonia, or tics, no abnormal or involuntary movements noted. Paces the halls often  Thought Process:  goal oriented, seem linear  Associations:  no loose associations  Thought Content:  Denies SI, HI or SIB. Denies hallucinations. Does appear to be responding to internal stimuli today  Insight:  limited  Judgment:  limited  Oriented to:  time, person, and place  Attention Span and Concentration:  limited  Recent and Remote Memory:  limited  Fund of Knowledge: appropriate for education  Muscle Strength and Tone: normal  Gait and Station: normal       LABS   No results found for this or any previous visit (from the past 24 hour(s)).      IMPRESSION     This is a 21 year old male with a PMH of schizoaffective disorder, bipolar type who is currently under civil commitment and living at [facility]. Pt left the facility on 5.27.22 and was later [place] up [place]. He was brought to a home where someone called EMS and pt was transported to the ED where he was evaluated. It was determined that pt requires inpatient treatment and stabilization for his safety.     Pt's current diagnosis of schizoaffective disorder bipolar type is managed with neuroleptics and a selective serotonin reuptake inhibitor. Will discontinue pts selective serotonin reuptake inhibitor as this may be contributing to his behavior. Will  add a mood stabilizer, Depakote, discussed with mother who is in agreement. No changes in Invega at this time.         DIAGNOSES     1.Schizoaffective disorder, Bipolar type          PLAN     Location: Unit 5  Legal Status: committed with Nguyen (Zyprexa, Abilify, Prolixin, Invega)    Safety Assessment:    Behavioral Orders   Procedures     Code 1 - Restrict to Unit     Routine Programming     As clinically indicated     Status 15     Every 15 minutes.      PTA medications held:   -Lexapro 20 mg daily      PTA medications  continued/changed:   -Continue Invega Sustenna, maintenance dose increased from 156 -> 234 mg Q28D on 6/6/22, last injection given 7/4  -Stop oral Invega 3 mg 7/4     New medications tried and stopped:   -None    New medications initiated:   -Depakote 250 mg on 5/28 -> 500 mg on 5/29 ->1,000 mg on 5/30 -> 1,500 mg on 6/3 (VPA level on 6/6 of 90) -> 1,250 mg on 6/28 (concerns for delayed thoughts/speech)  -Cogentin 1 mg BID prn EPSE  -Ativan 0.5 mg BID for anxiety and racing thoughts started on 7/4      Today's Changes: no changes made today. With patient taking LEÓN it may be weeks to months before a determination can be made regarding efficacy of Invega. Has trialed many other neuroleptics though often experiences side effects. If in 6-8 weeks patient is still not showing no further improvement, would consider addendum to Nguyen and switching to Clozapine due to several previous failed trials.    Programming: Patient will be treated in a therapeutic milieu with appropriate individual and group therapies. Education will be provided on diagnoses, medications, and treatments.     Medical diagnoses:  Per medicine. Nothing acute    Consult: None  Tests: None    Anticipated LOS: 1-2 weeks-pt is under Civil Commitment and Nguyen  [facility]         ATTESTATION      Patient has been seen and evaluated by me,  SELINA Harding CNP

## 2022-07-05 NOTE — PLAN OF CARE
Problem: Behavioral Health Plan of Care  Goal: Patient-Specific Goal (Individualization)  Description: Pt. Will consume >50% of meals provided   Pt. Will sleep 4-6 Hours Nightly   Pt. Will attempt groups daily when able       Outcome: Ongoing, Progressing  Note: Patient is cooperative with medications and assessment.  Patient denies SI, HI, depression, pain. He denies hallucinations though he appears to be responding to internal stimuli. He is observed smiling, mumbling, and laughing to self while walking the hallway.  He reports feeling more calm and less racing thoughts after starting scheduled lorazepam yesterday.       Problem: Thought Process Alteration  Goal: Optimal Thought Clarity  Description: Pt will demonstrate Optimal Thought Clarity before discharge     Pt. Will establish and maintain linear conversations with staff  Outcome: Ongoing, Progressing  Note: Patient is able to hold linear reality based conversation.      Problem: Suicidal Behavior  Goal: Suicidal Behavior is Absent or Managed  Outcome: Ongoing, Progressing  Note: Patient denies SI and had no noted self harm this shift.    Goal Outcome Evaluation:

## 2022-07-05 NOTE — PROGRESS NOTES
1:1 time with the patient.  No changes made to the discharge plan at this time.   See the AVS for follow up appointments and recommendations.     KAROLINE spoke to the patient and updated him that SW submitted more IRTS referrals.     Tony called and stated they added the patient to their waiting list.

## 2022-07-06 PROCEDURE — 250N000013 HC RX MED GY IP 250 OP 250 PS 637: Performed by: NURSE PRACTITIONER

## 2022-07-06 PROCEDURE — 124N000004

## 2022-07-06 RX ADMIN — LORAZEPAM 0.5 MG: 0.5 TABLET ORAL at 21:10

## 2022-07-06 RX ADMIN — LORAZEPAM 0.5 MG: 0.5 TABLET ORAL at 08:12

## 2022-07-06 RX ADMIN — Medication 50 MCG: at 21:10

## 2022-07-06 RX ADMIN — DIVALPROEX SODIUM 1250 MG: 500 TABLET, FILM COATED, EXTENDED RELEASE ORAL at 21:10

## 2022-07-06 ASSESSMENT — ACTIVITIES OF DAILY LIVING (ADL)
ADLS_ACUITY_SCORE: 40
DRESS: SCRUBS (BEHAVIORAL HEALTH)
ADLS_ACUITY_SCORE: 40
HYGIENE/GROOMING: INDEPENDENT
ADLS_ACUITY_SCORE: 40
HYGIENE/GROOMING: INDEPENDENT;SHOWER

## 2022-07-06 NOTE — PROGRESS NOTES
"[facility] PSYCHIATRY  PROGRESS NOTE     SUBJECTIVE   Cam was returning to his room with something to drink when I met him. He was noted to be smiling/laughing to himself. He denied A/V hallucinations at this time. He is not experiencing delusions, SI, HI or SIB. He denies side effects from his medications. He did tell us that he doesn't need to worry as much about racing thoughts with the addition of Ativan. He states the Ativan is slowing his thoughts down. He continues with brief answers to our questions and looks mainly at the floor in conversation. He denies any issues with sleep and his appetite is good.  He does socialize appropriately with select peers, but does not attend group. Patient is waiting for a bed to open up at [facility] and KAROLINE Mistry has several other IRTS referrals out as well.      In review of chart it appears that he has been tried on several different neuroleptic medications with either limited results or experienced side effects: Latuda (ineffective), Zyprexa (stopped on own due to feeling too sedated), Haldol (muscle contractions in face), Risperdal (akathisia), Seroquel (listed as allergy, \"fainted\") Abilify (akathisia).        MEDICATIONS   Scheduled Meds:    divalproex sodium extended-release  1,250 mg Oral Daily     LORazepam  0.5 mg Oral BID     paliperidone  234 mg Intramuscular Q28 Days     Vitamin D3  50 mcg Oral At Bedtime     PRN Meds:.acetaminophen, benztropine, hydrOXYzine, nicotine, OLANZapine **OR** OLANZapine, traZODone     ALLERGIES   Allergies   Allergen Reactions     Seasonal Allergies      Seroquel [Quetiapine]      Fainting and slowed breathing         MENTAL STATUS EXAM   Vitals: /68   Pulse 94   Temp 97.9  F (36.6  C) (Temporal)   Resp 16   Wt 74.9 kg (165 lb 3.2 oz)   SpO2 97%   BMI 23.04 kg/m      Appearance:  awake, alert, adequately groomed and dressed in hospital scrubs  Attitude: cooperative, but guarded  Eye Contact: fair, intermittent. " "Spends most of conversation looking at the ground.   Mood: \"I'm lgood\"  Affect: blunted, restricted range  Speech:  clear, coherent spontaneous  Psychomotor Behavior:  no evidence of tardive dyskinesia, dystonia, or tics, no abnormal or involuntary movements noted. Paces the halls often.   Thought Process:  goal oriented, seem linear  Associations:  no loose associations  Thought Content:  Denies SI, HI or SIB. Denies hallucinations. Does appear to be responding to internal stimuli today-smiling/laughing to self.  Insight:  limited  Judgment:  limited  Oriented to:  time, person, and place  Attention Span and Concentration:  limited  Recent and Remote Memory:  limited  Fund of Knowledge: appropriate for education  Muscle Strength and Tone: normal  Gait and Station: normal       LABS   No results found for this or any previous visit (from the past 24 hour(s)).      IMPRESSION     This is a 21 year old male with a PMH of schizoaffective disorder, bipolar type who is currently under civil commitment and living at [facility]. Pt left the facility on 5.27.22 and was later [place] up [place] He was brought to a home where someone called EMS and pt was transported to the ED where he was evaluated. It was determined that pt requires inpatient treatment and stabilization for his safety.     Pt's current diagnosis of schizoaffective disorder bipolar type is managed with neuroleptics and a selective serotonin reuptake inhibitor. Will discontinue pts selective serotonin reuptake inhibitor as this may be contributing to his behavior. Will  add a mood stabilizer, Depakote, discussed with mother who is in agreement. No changes in Invega at this time.         DIAGNOSES     1.Schizoaffective disorder, Bipolar type          PLAN     Location: Unit 5  Legal Status: committed with Nguyen (Zyprexa, Abilify, Prolixin, Invega)    Safety Assessment:    Behavioral Orders   Procedures     Code 1 - Restrict to Unit "     Routine Programming     As clinically indicated     Status 15     Every 15 minutes.      PTA medications held:   -Lexapro 20 mg daily      PTA medications continued/changed:   -Continue Invega Sustenna, maintenance dose increased from 156 -> 234 mg Q28D on 6/6/22, last injection given 7/4  -Stop oral Invega 3 mg 7/4     New medications tried and stopped:   -None    New medications initiated:   -Depakote 250 mg on 5/28 -> 500 mg on 5/29 ->1,000 mg on 5/30 -> 1,500 mg on 6/3 (VPA level on 6/6 of 90) -> 1,250 mg on 6/28 (concerns for delayed thoughts/speech)  -Cogentin 1 mg BID prn EPSE  -Ativan 0.5 mg BID for anxiety and racing thoughts started on 7/4      Today's Changes: no changes made today. With patient taking LEÓN it may be weeks to months before a determination can be made regarding efficacy of Invega. Has trialed many other neuroleptics though often experiences side effects. If in 6-8 weeks patient is still not showing no further improvement, would consider addendum to Nguyen and switching to Clozapine due to several previous failed trials.    Programming: Patient will be treated in a therapeutic milieu with appropriate individual and group therapies. Education will be provided on diagnoses, medications, and treatments.     Medical diagnoses:  Per medicine. Nothing acute    Consult: None  Tests: None    Anticipated LOS: 1-2 weeks-pt is under Civil Commitment and Nguyen  Disposition: On [facility]list [facility]         ATTESTATION       Bill MARKS student  I, Marilu Del Toro CNP, was present and participated in the above visit. I have reviewed the above note and agree with findings.

## 2022-07-06 NOTE — PLAN OF CARE
Problem: Behavioral Health Plan of Care  Goal: Patient-Specific Goal (Individualization)  Description: Pt. Will consume >50% of meals provided   Pt. Will sleep 4-6 Hours Nightly   Pt. Will attempt groups daily when able       Outcome: Ongoing, Progressing   Goal Outcome Evaluation:      Face to face shift report received from RN. Rounding completed, pt observed.Client rested in room with eyes closed and respirations noted for 7 hours. Client had no falls this shift. No signs of distress. Face to face report will be communicated to oncoming RN.    Leonidas Vasquez RN  7/6/2022  6:10 AM

## 2022-07-06 NOTE — PLAN OF CARE
Problem: Behavioral Health Plan of Care  Goal: Patient-Specific Goal (Individualization)  Description: Pt. Will consume >50% of meals provided   Pt. Will sleep 4-6 Hours Nightly   Pt. Will attempt groups daily when able   Outcome: Ongoing, Progressing     Goal Outcome Evaluation:  Pt condition unchanged from last evening, presents as grinning and giggling throughout the shift. Paced halls and denied physical complaint. Pt continues to give short responses to assessment questions, denied SI, HI and hallucinations. Does report feeling calmer with scheduled medications at this time, no PRN meds given. Eats 100% of meals.    Problem: Thought Process Alteration  Goal: Optimal Thought Clarity  Description: Pt will demonstrate Optimal Thought Clarity before discharge     Pt. Will establish and maintain linear conversations with staff  Outcome: Ongoing, Progressing    Problem: Suicidal Behavior  Goal: Suicidal Behavior is Absent or Managed  Outcome: Ongoing, Progressing    2330 - Face to face end of shift report to be communicated to oncoming shift RN.     Eloisa Salazar RN  7/5/2022  10:57 PM

## 2022-07-06 NOTE — PLAN OF CARE
Face to face shift report received from RODDY Myers.       Problem: Behavioral Health Plan of Care  Goal: Patient-Specific Goal (Individualization)  Description: Pt. Will consume >50% of meals provided   Pt. Will sleep 4-6 Hours Nightly   Pt. Will attempt groups daily when able   Outcome: Ongoing, Not Progressing   Calm and cooperative. Denies mental health issues, but appears to be responding to internal stimuli. Pt laughing inappropriately and smiling while pacing in hallways. Pleasant during conversation, but offers little. No reports of pain.      Problem: Thought Process Alteration  Goal: Optimal Thought Clarity  Description: Pt will demonstrate Optimal Thought Clarity before discharge   Pt. Will establish and maintain linear conversations with staff  Outcome: Ongoing, Not Progressing       Problem: Suicidal Behavior  Goal: Suicidal Behavior is Absent or Managed  Outcome: Ongoing, Progressing   Free from self harm or injury this shift.    Face to face end of shift report to be communicated to oncoming RN.

## 2022-07-06 NOTE — PLAN OF CARE
Problem: Behavioral Health Plan of Care  Goal: Patient-Specific Goal (Individualization)  Description: Pt. Will consume >50% of meals provided   Pt. Will sleep 4-6 Hours Nightly   Pt. Will attempt groups daily when able       Outcome: Ongoing, Progressing     Problem: Thought Process Alteration  Goal: Optimal Thought Clarity  Description: Pt will demonstrate Optimal Thought Clarity before discharge     Pt. Will establish and maintain linear conversations with staff  Outcome: Ongoing, Progressing     Problem: Suicidal Behavior  Goal: Suicidal Behavior is Absent or Managed  Outcome: Ongoing, Progressing   Goal Outcome Evaluation:      Pt calm and cooperative. Medication compliant. No changes in behavior or thoughts. Appears to be internlly responding d/t pt walking up and down the hallway smiling. No complaints    Face to face end of shift report communicated to RODDY Calix RN  7/6/2022  8:32 AM

## 2022-07-07 PROCEDURE — 250N000013 HC RX MED GY IP 250 OP 250 PS 637: Performed by: NURSE PRACTITIONER

## 2022-07-07 PROCEDURE — 124N000004

## 2022-07-07 RX ADMIN — Medication 50 MCG: at 20:12

## 2022-07-07 RX ADMIN — LORAZEPAM 0.5 MG: 0.5 TABLET ORAL at 08:36

## 2022-07-07 RX ADMIN — LORAZEPAM 0.5 MG: 0.5 TABLET ORAL at 20:12

## 2022-07-07 RX ADMIN — DIVALPROEX SODIUM 1250 MG: 500 TABLET, FILM COATED, EXTENDED RELEASE ORAL at 20:12

## 2022-07-07 ASSESSMENT — ACTIVITIES OF DAILY LIVING (ADL)
ADLS_ACUITY_SCORE: 40
HYGIENE/GROOMING: INDEPENDENT
ORAL_HYGIENE: INDEPENDENT
ADLS_ACUITY_SCORE: 40
ADLS_ACUITY_SCORE: 40
DRESS: SCRUBS (BEHAVIORAL HEALTH)
ADLS_ACUITY_SCORE: 40
ADLS_ACUITY_SCORE: 40
LAUNDRY: UNABLE TO COMPLETE
ADLS_ACUITY_SCORE: 40
HYGIENE/GROOMING: INDEPENDENT
ADLS_ACUITY_SCORE: 40

## 2022-07-07 NOTE — PLAN OF CARE
Problem: Behavioral Health Plan of Care  Goal: Patient-Specific Goal (Individualization)  Description: Pt. Will consume >50% of meals provided   Pt. Will sleep 4-6 Hours Nightly   Pt. Will attempt groups daily when able       Outcome: Ongoing, Progressing   Goal Outcome Evaluation:        Face to face shift report received from RN. Rounding completed, pt observClient rested in room for 7 hours with eyes closed and respirations noted. No signs of distress and client was free of falls. Face to face report will be communicated to oncoming RN.    Leonidas Vasquez RN  7/7/2022  6:52 AM        ed.

## 2022-07-07 NOTE — PROGRESS NOTES
"[facility] PSYCHIATRY  PROGRESS NOTE     SUBJECTIVE   Cam was lying in bed resting. He reports he is doing \"good\" and is aware of his disposition to [facility] in [place]. He denied A/V hallucinations, delusions, paranoia, SI, HI or SIB. He denied experiencing and side effects from the medications. He states the Ativan has helped with his racing thoughts and states that he is not experiencing this symptom at this time. He denies any issues with sleep and his appetite is good.  He does socialize appropriately with select peers, but does not attend group. Patient is waiting for a bed to open up at [facility] and KAROLINE Mistry has several other I[facility]S referrals out as well.      In review of chart it appears that he has been tried on several different neuroleptic medications with either limited results or experienced side effects: Latuda (ineffective), Zyprexa (stopped on own due to feeling too sedated), Haldol (muscle contractions in face), Risperdal (akathisia), Seroquel (listed as allergy, \"fainted\") Abilify (akathisia).        MEDICATIONS   Scheduled Meds:    divalproex sodium extended-release  1,250 mg Oral Daily     LORazepam  0.5 mg Oral BID     paliperidone  234 mg Intramuscular Q28 Days     Vitamin D3  50 mcg Oral At Bedtime     PRN Meds:.acetaminophen, benztropine, hydrOXYzine, nicotine, OLANZapine **OR** OLANZapine, traZODone     ALLERGIES   Allergies   Allergen Reactions     Seasonal Allergies      Seroquel [Quetiapine]      Fainting and slowed breathing         MENTAL STATUS EXAM   Vitals: /52   Pulse 102   Temp 98.2  F (36.8  C) (Temporal)   Resp 18   Wt 74.9 kg (165 lb 3.2 oz)   SpO2 97%   BMI 23.04 kg/m      Appearance:  awake, alert, adequately groomed and dressed in hospital scrubs  Attitude: cooperative, but guarded  Eye Contact: fair, intermittent. More than previous conversations  Mood: \"good\"  Affect: blunted, restricted range  Speech:  clear, coherent spontaneous  Psychomotor Behavior:  no " evidence of tardive dyskinesia, dystonia, or tics, no abnormal or involuntary movements noted. Paces the halls often.   Thought Process:  goal oriented, seem linear  Associations:  no loose associations  Thought Content:  Denies SI, HI or SIB. Denies hallucinations. Does appear to be responding to internal stimuli today-smiling/laughing to self.  Insight:  limited  Judgment:  limited  Oriented to:  time, person, and place  Attention Span and Concentration:  limited  Recent and Remote Memory:  limited  Fund of Knowledge: appropriate for education  Muscle Strength and Tone: normal  Gait and Station: normal       LABS   No results found for this or any previous visit (from the past 24 hour(s)).      IMPRESSION     This is a 21 year old male with a PMH of schizoaffective disorder, bipolar type who is currently under civil commitment and living at [facility]. Pt left the facility on 5.27.22 and was later picked up [place]. He was brought to a home where someone called EMS and pt was transported to the ED where he was evaluated. It was determined that pt requires inpatient treatment and stabilization for his safety.     Pt's current diagnosis of schizoaffective disorder bipolar type is managed with neuroleptics and a selective serotonin reuptake inhibitor. Will discontinue pts selective serotonin reuptake inhibitor as this may be contributing to his behavior. Will  add a mood stabilizer, Depakote, discussed with mother who is in agreement. No changes in Invega at this time.         DIAGNOSES     1.Schizoaffective disorder, Bipolar type          PLAN     Location: Unit 5  Legal Status: committed with Nguyen (Zyprexa, Abilify, Prolixin, Invega)    Safety Assessment:    Behavioral Orders   Procedures     Code 1 - Restrict to Unit     Routine Programming     As clinically indicated     Status 15     Every 15 minutes.      PTA medications held:   -Lexapro 20 mg daily      PTA medications  continued/changed:   -Continue Invega Sustenna, maintenance dose increased from 156 -> 234 mg Q28D on 6/6/22, last injection given 7/4  -Stop oral Invega 3 mg 7/4     New medications tried and stopped:   -None    New medications initiated:   -Depakote 250 mg on 5/28 -> 500 mg on 5/29 ->1,000 mg on 5/30 -> 1,500 mg on 6/3 (VPA level on 6/6 of 90) -> 1,250 mg on 6/28 (concerns for delayed thoughts/speech)  -Cogentin 1 mg BID prn EPSE  -Ativan 0.5 mg BID for anxiety and racing thoughts started on 7/4      Today's Changes: no changes made today. With patient taking LEÓN it may be weeks to months before a determination can be made regarding efficacy of Invega. Has trialed many other neuroleptics though often experiences side effects. If in 6-8 weeks patient is still not showing no further improvement, would consider addendum to Nguyen and switching to Clozapine due to several previous failed trials.    Programming: Patient will be treated in a therapeutic milieu with appropriate individual and group therapies. Education will be provided on diagnoses, medications, and treatments.     Medical diagnoses:  Per medicine. Nothing acute    Consult: None  Tests: None    Anticipated LOS: 1-2 weeks-pt is under Civil Commitment and Nguyen  Disposition: On waitlist for [facility]         ATTESTATION        Bill MARKS student  I, Marilu Del Toro CNP, was present and participated in the above visit. I have reviewed the above note and agree with findings.

## 2022-07-07 NOTE — PROGRESS NOTES
KAROLINE received an email from Aline with [facility]. She stated they will call and screen the patient in a couple of days. She also asked the SW to remind the patient he will be expected to attend groups while he is there.     KAROLINE spoke to the patient and explained that he will be required to attend groups while he is at an I[facility]S. The patient assured he will attend groups at an IRTS. KAROLINE also updated the patient and nursing that [facility] will call and screen the patient in the next couple of days.

## 2022-07-07 NOTE — PLAN OF CARE
Problem: Behavioral Health Plan of Care  Goal: Patient-Specific Goal (Individualization)  Description: Pt. Will consume >50% of meals provided   Pt. Will sleep 4-6 Hours Nightly   Pt. Will attempt groups daily when able     Patient is withdrawn and restless, he paces the unit. He denies SI, HI, anxiety, depression, hallucinations, and pain. Eye contact and speech are minimal. He does appear responding to internal stimuli, he smiles and laughs to himself. States he is good, that he has no complaints or requests. Has been appropriate with staff and peers.   Face to face end of shift report communicated to oncoming RN.     Galilea Hill RN  7/7/2022  10:31 PM    Outcome: Ongoing, Progressing     Problem: Thought Process Alteration  Goal: Optimal Thought Clarity  Description: Pt will demonstrate Optimal Thought Clarity before discharge     Pt. Will establish and maintain linear conversations with staff    Patient is able to make his needs known.   Outcome: Ongoing, Progressing     Problem: Suicidal Behavior  Goal: Suicidal Behavior is Absent or Managed    Patient denies SI, has remained free from self harm/injury.   Outcome: Ongoing, Progressing   Goal Outcome Evaluation:    Plan of Care Reviewed With: patient

## 2022-07-07 NOTE — PLAN OF CARE
Problem: Behavioral Health Plan of Care  Goal: Patient-Specific Goal (Individualization)  Description: Pt. Will consume >50% of meals provided   Pt. Will sleep 4-6 Hours Nightly   Pt. Will attempt groups daily when able       Outcome: Ongoing, Progressing     Problem: Thought Process Alteration  Goal: Optimal Thought Clarity  Description: Pt will demonstrate Optimal Thought Clarity before discharge     Pt. Will establish and maintain linear conversations with staff  Outcome: Ongoing, Not Progressing     Problem: Suicidal Behavior  Goal: Suicidal Behavior is Absent or Managed  Outcome: Ongoing, Progressing   Goal Outcome Evaluation:      Pt continues to walk the halls and smile to himself. Does not attend group nor socialize with peers. Minimal conversation made when assessed . Intermittent eye contact with writer . Calm and cooperative. Medicated compliant.     Face to face end of shift report communicated to RODDY Calix RN  7/7/2022  10:00 AM

## 2022-07-08 PROCEDURE — 250N000013 HC RX MED GY IP 250 OP 250 PS 637: Performed by: NURSE PRACTITIONER

## 2022-07-08 PROCEDURE — 124N000004

## 2022-07-08 RX ADMIN — Medication 50 MCG: at 20:29

## 2022-07-08 RX ADMIN — DIVALPROEX SODIUM 1250 MG: 500 TABLET, FILM COATED, EXTENDED RELEASE ORAL at 20:29

## 2022-07-08 RX ADMIN — LORAZEPAM 0.5 MG: 0.5 TABLET ORAL at 09:03

## 2022-07-08 RX ADMIN — LORAZEPAM 0.5 MG: 0.5 TABLET ORAL at 20:29

## 2022-07-08 ASSESSMENT — ACTIVITIES OF DAILY LIVING (ADL)
ORAL_HYGIENE: INDEPENDENT
ADLS_ACUITY_SCORE: 40
HYGIENE/GROOMING: INDEPENDENT
LAUNDRY: UNABLE TO COMPLETE
ADLS_ACUITY_SCORE: 40
HYGIENE/GROOMING: INDEPENDENT
ADLS_ACUITY_SCORE: 40
ADLS_ACUITY_SCORE: 40
DRESS: SCRUBS (BEHAVIORAL HEALTH)

## 2022-07-08 NOTE — PROGRESS NOTES
K[facility]OLINE [facility]. She is going to send over intake paper work for the patient and they stated he could admit there next Thursday 7/14/2020 if they can get hold of the [facility] .       KAROLINE called the patient's Greig  Gray and she stated she would call [facility]. Gray would like a meeting @ 10:00 am on 7/13/2022 to review the PD/ After Care plan for the patient via phone call.     KAROLINE faxed updated notes to [facility].     KAROLINE spoke to the patient's mom and and answered all her questions on [facility] placement and [facility]. KAROLINE further explained the Treatment Team is hopeful the patient is able to be admitted to the [facility] on Thursday.

## 2022-07-08 NOTE — PLAN OF CARE
Problem: Behavioral Health Plan of Care  Goal: Patient-Specific Goal (Individualization)  Description: Pt. Will consume >50% of meals provided   Pt. Will sleep 4-6 Hours Nightly   Pt. Will attempt groups daily when able       Outcome: Ongoing, Progressing     Problem: Thought Process Alteration  Goal: Optimal Thought Clarity  Description: Pt will demonstrate Optimal Thought Clarity before discharge     Pt. Will establish and maintain linear conversations with staff  Outcome: Ongoing, Progressing     Problem: Suicidal Behavior  Goal: Suicidal Behavior is Absent or Managed  Outcome: Ongoing, Progressing   Goal Outcome Evaluation:      Pt has been tired and spending more time in bed sleeping. Appears more tired the past few days. When pt is up- he is eating meals or walking the halls. Remains socially withdrawn. Cooperative and polite with writer. Medication compliant..    Face to face end of shift report communicated to RODDY Calix RN  7/8/2022  11:11 AM

## 2022-07-08 NOTE — PLAN OF CARE
Problem: Behavioral Health Plan of Care  Goal: Patient-Specific Goal (Individualization)  Description: Pt. Will consume >50% of meals provided   Pt. Will sleep 4-6 Hours Nightly   Pt. Will attempt groups daily when able       Outcome: Ongoing, Progressing   Goal Outcome Evaluation:      Face to face shift report received from RN. Rounding completed, pt observed.Client rested in room for 7 hours with eyes closed and respirations noted. No signs of distress and client had no falls this shift.Face to face report will be communicated to oncoming RN.    Leonidas Vasquez RN  7/8/2022  6:21 AM

## 2022-07-08 NOTE — PLAN OF CARE
Problem: Behavioral Health Plan of Care  Goal: Patient-Specific Goal (Individualization)  Description: Pt. Will consume >50% of meals provided   Pt. Will sleep 4-6 Hours Nightly   Pt. Will attempt groups daily when able     Patient has been out on the open unit, restless and pacing. Affect is flat, mood is calm. He denies SI, HI, anxiety, depression, hallucinations, and pain. States he is good, he is withdrawn, does not engage in further conversation. He smiles and laughs to himself. He did spend a bit of time in the lounge after snack watching tv and conversing with a peer. Has been appropriate with staff and peers.   Face to face end of shift report communicated to oncoming RN.     Galilea Hill RN  7/8/2022  10:27 PM    Outcome: Ongoing, Progressing     Problem: Thought Process Alteration  Goal: Optimal Thought Clarity  Description: Pt will demonstrate Optimal Thought Clarity before discharge     Pt. Will establish and maintain linear conversations with staff    Patient is able to make his needs known.  Outcome: Ongoing, Progressing     Problem: Suicidal Behavior  Goal: Suicidal Behavior is Absent or Managed    Patient denies SI, has remained free from self harm/injury.   Outcome: Ongoing, Progressing   Goal Outcome Evaluation:    Plan of Care Reviewed With: patient

## 2022-07-08 NOTE — PROGRESS NOTES
Backup plans in the works if LORI Becerril falls through:       [facility] is going to provide  with a admit date when the patient can transfer to their facility.      [employer] [employer] are going to call and interview the patient Wednesday 7/13/2022 @ 10:00 am.           The patient is also on the waiting lists for the following IRTS:      [facility]     [facility]     [facility]     [facility]     [facility]     [facility]     [facility]     [facility]     [facility]

## 2022-07-08 NOTE — PROGRESS NOTES
"[facility] PSYCHIATRY  PROGRESS NOTE     SUBJECTIVE   Cam was lying in bed resting. He reports he is doing \"good\" and is aware of his disposition to [facility] in [place]. He denied A/V hallucinations, delusions, paranoia, SI, HI or SIB. He denied experiencing and side effects from the medications He denies any issues with sleep and his appetite is good.  He does socialize appropriately with select peers, but does not attend group. Patient is waiting for a bed to open up at [facility] and KAROLINE Mistry has several other IRTS referrals out as well.      In review of chart it appears that he has been tried on several different neuroleptic medications with either limited results or experienced side effects: Latuda (ineffective), Zyprexa (stopped on own due to feeling too sedated), Haldol (muscle contractions in face), Risperdal (akathisia), Seroquel (listed as allergy, \"fainted\") Abilify (akathisia).        MEDICATIONS   Scheduled Meds:    divalproex sodium extended-release  1,250 mg Oral Daily     LORazepam  0.5 mg Oral BID     paliperidone  234 mg Intramuscular Q28 Days     Vitamin D3  50 mcg Oral At Bedtime     PRN Meds:.acetaminophen, benztropine, hydrOXYzine, nicotine, OLANZapine **OR** OLANZapine, traZODone     ALLERGIES   Allergies   Allergen Reactions     Seasonal Allergies      Seroquel [Quetiapine]      Fainting and slowed breathing         MENTAL STATUS EXAM   Vitals: /63 (BP Location: Left arm)   Pulse 86   Temp 98.1  F (36.7  C) (Temporal)   Resp 18   Wt 74.9 kg (165 lb 3.2 oz)   SpO2 97%   BMI 23.04 kg/m      Appearance:  awake, alert, adequately groomed and dressed in hospital scrubs  Attitude: cooperative,   Eye Contact: limited, intermittent. After initial eye contact he looked down.   Mood: \"good\"  Affect: blunted, restricted range  Speech:  clear, coherent spontaneous  Psychomotor Behavior:  no evidence of tardive dyskinesia, dystonia, or tics, no abnormal or involuntary movements noted. " Paces the halls often.   Thought Process:  goal oriented, seem linear  Associations:  no loose associations  Thought Content:  Denies SI, HI or SIB. Denies hallucinations. Does appear to be responding to internal stimuli today-smiling/laughing to self.  Insight:  limited  Judgment:  limited  Oriented to:  time, person, and place  Attention Span and Concentration:  limited  Recent and Remote Memory:  limited  Fund of Knowledge: appropriate for education  Muscle Strength and Tone: normal  Gait and Station: normal       LABS   No results found for this or any previous visit (from the past 24 hour(s)).      IMPRESSION     This is a 21 year old male with a PMH of schizoaffective disorder, bipolar type who is currently under civil commitment and living at [facility] Pt left the facility on 5.27.22 and was later picked up [place]. He was brought to a home where someone called EMS and pt was transported to the ED where he was evaluated. It was determined that pt requires inpatient treatment and stabilization for his safety.     Pt's current diagnosis of schizoaffective disorder bipolar type is managed with neuroleptics and a selective serotonin reuptake inhibitor. Will discontinue pts selective serotonin reuptake inhibitor as this may be contributing to his behavior. Will  add a mood stabilizer, Depakote, discussed with mother who is in agreement. No changes in Invega at this time.         DIAGNOSES     1.Schizoaffective disorder, Bipolar type          PLAN     Location: Unit 5  Legal Status: committed with Nguyen (Zyprexa, Abilify, Prolixin, Invega)    Safety Assessment:    Behavioral Orders   Procedures     Code 1 - Restrict to Unit     Routine Programming     As clinically indicated     Status 15     Every 15 minutes.      PTA medications held:   -Lexapro 20 mg daily      PTA medications continued/changed:   -Continue Invega Sustenna, maintenance dose increased from 156 -> 234 mg Q28D on  6/6/22, last injection given 7/4  -Stop oral Invega 3 mg 7/4     New medications tried and stopped:   -None    New medications initiated:   -Depakote 250 mg on 5/28 -> 500 mg on 5/29 ->1,000 mg on 5/30 -> 1,500 mg on 6/3 (VPA level on 6/6 of 90) -> 1,250 mg on 6/28 (concerns for delayed thoughts/speech)  -Cogentin 1 mg BID prn EPSE  -Ativan 0.5 mg BID for anxiety and racing thoughts started on 7/4      Today's Changes: no changes made today. With patient taking LEÓN it may be weeks to months before a determination can be made regarding efficacy of Invega. Has trialed many other neuroleptics though often experiences side effects. If in 6-8 weeks patient is still not showing no further improvement, would consider addendum to Nguyen and switching to Clozapine due to several previous failed trials.    Programming: Patient will be treated in a therapeutic milieu with appropriate individual and group therapies. Education will be provided on diagnoses, medications, and treatments.     Medical diagnoses:  Per medicine. Nothing acute    Consult: None  Tests: None    Anticipated LOS: 1-2 weeks-pt is under Civil Commitment and Nguyen  Disposition: On waitlist for [facility]         ATTESTATION        Bill MARKS student  I, Marilu Del Toro CNP, was present and participated in the above visit. I have reviewed the above note and agree with findings.

## 2022-07-09 PROCEDURE — 250N000013 HC RX MED GY IP 250 OP 250 PS 637: Performed by: NURSE PRACTITIONER

## 2022-07-09 PROCEDURE — 124N000004

## 2022-07-09 RX ADMIN — Medication 50 MCG: at 20:20

## 2022-07-09 RX ADMIN — LORAZEPAM 0.5 MG: 0.5 TABLET ORAL at 08:55

## 2022-07-09 RX ADMIN — LORAZEPAM 0.5 MG: 0.5 TABLET ORAL at 20:20

## 2022-07-09 RX ADMIN — DIVALPROEX SODIUM 1250 MG: 500 TABLET, FILM COATED, EXTENDED RELEASE ORAL at 20:19

## 2022-07-09 ASSESSMENT — ACTIVITIES OF DAILY LIVING (ADL)
ADLS_ACUITY_SCORE: 40
ADLS_ACUITY_SCORE: 40
LAUNDRY: UNABLE TO COMPLETE
ADLS_ACUITY_SCORE: 40
DRESS: SCRUBS (BEHAVIORAL HEALTH)
ADLS_ACUITY_SCORE: 40
HYGIENE/GROOMING: INDEPENDENT
ORAL_HYGIENE: INDEPENDENT
ADLS_ACUITY_SCORE: 40

## 2022-07-09 NOTE — PLAN OF CARE
Face to face end of shift report received from Monika CARSON RN. Rounding completed and patient observed in the lounge at breakfast. No requests at this time.     Goal Outcome Evaluation: Patient has been calm and cooperative throughout the shift. He denied all criteria. He denied pain. Patient paced in the halls and was observed laughing to self at times. He slept in the morning and didn't attend groups. He is disheveled looking.     Face to face end of shift report communicated to oncgus RN.    Problem: Behavioral Health Plan of Care  Goal: Patient-Specific Goal (Individualization)  Description: Pt. Will consume >50% of meals provided   Pt. Will sleep 4-6 Hours Nightly   Pt. Will attempt groups daily when able       Outcome: Ongoing, Progressing     Problem: Thought Process Alteration  Goal: Optimal Thought Clarity  Description: Pt will demonstrate Optimal Thought Clarity before discharge     Pt. Will establish and maintain linear conversations with staff  Outcome: Ongoing, Progressing

## 2022-07-09 NOTE — PLAN OF CARE
Problem: Behavioral Health Plan of Care  Goal: Patient-Specific Goal (Individualization)  Description: Pt. Will consume >50% of meals provided   Pt. Will sleep 4-6 Hours Nightly   Pt. Will attempt groups daily when able   Outcome: Ongoing, Progressing  Note: Pt observed in the lounge at the start of the shift. He appeared to be sleeping 6 hours. Regular respirations and position changes noted throughout the night.    Face to face end of shift report communicated to oncoming RN.      Problem: Thought Process Alteration  Goal: Optimal Thought Clarity  Description: Pt will demonstrate Optimal Thought Clarity before discharge   Pt. Will establish and maintain linear conversations with staff  Note: DAYNA - pt sleeping.      Problem: Suicidal Behavior  Goal: Suicidal Behavior is Absent or Managed  Outcome: Ongoing, Progressing  Note: Pt remained free from self harm.

## 2022-07-09 NOTE — PLAN OF CARE
Problem: Behavioral Health Plan of Care  Goal: Patient-Specific Goal (Individualization)  Description: Pt. Will consume >50% of meals provided   Pt. Will sleep 4-6 Hours Nightly   Pt. Will attempt groups daily when able     Patient paces the unit. He smiles and laughs to himself. He is cooperative with nursing assessment. He denies SI, HI, anxiety, depression, hallucinations, and pain. He applied antibiotic ointment to the abrasion on his left forearm.   Face to face end of shift report communicated to oncoming RN.     Galilea Hill RN  7/9/2022  10:07 PM    Outcome: Ongoing, Progressing     Problem: Thought Process Alteration  Goal: Optimal Thought Clarity  Description: Pt will demonstrate Optimal Thought Clarity before discharge     Pt. Will establish and maintain linear conversations with staff    Patient is able to make his needs known.   Outcome: Ongoing, Progressing     Problem: Suicidal Behavior  Goal: Suicidal Behavior is Absent or Managed    Patient denies SI, has remained free from self harm/injury.   Outcome: Ongoing, Progressing   Goal Outcome Evaluation:    Plan of Care Reviewed With: patient

## 2022-07-10 PROCEDURE — 250N000013 HC RX MED GY IP 250 OP 250 PS 637: Performed by: NURSE PRACTITIONER

## 2022-07-10 PROCEDURE — 124N000004

## 2022-07-10 RX ADMIN — LORAZEPAM 0.5 MG: 0.5 TABLET ORAL at 20:07

## 2022-07-10 RX ADMIN — Medication 50 MCG: at 20:07

## 2022-07-10 RX ADMIN — DIVALPROEX SODIUM 1250 MG: 500 TABLET, FILM COATED, EXTENDED RELEASE ORAL at 20:07

## 2022-07-10 RX ADMIN — LORAZEPAM 0.5 MG: 0.5 TABLET ORAL at 08:11

## 2022-07-10 ASSESSMENT — ACTIVITIES OF DAILY LIVING (ADL)
ADLS_ACUITY_SCORE: 40
DRESS: SCRUBS (BEHAVIORAL HEALTH)
ADLS_ACUITY_SCORE: 40
ORAL_HYGIENE: INDEPENDENT
ADLS_ACUITY_SCORE: 40
LAUNDRY: UNABLE TO COMPLETE
ADLS_ACUITY_SCORE: 40
HYGIENE/GROOMING: INDEPENDENT
ADLS_ACUITY_SCORE: 40
ADLS_ACUITY_SCORE: 40

## 2022-07-10 NOTE — PLAN OF CARE
Problem: Behavioral Health Plan of Care  Goal: Patient-Specific Goal (Individualization)  Description: Pt. Will consume >50% of meals provided   Pt. Will sleep 4-6 Hours Nightly   Pt. Will attempt groups daily when able     Patient paces the unit, laughing and smiling to himself. Affect is flat, mood is calm. He denies SI, HI, anxiety, depression, hallucinations, and pain. He has no complaints or requests.  Face to face end of shift report communicated to oncoming RN.     Galilea Hill RN  7/10/2022  10:50 PM    Outcome: Ongoing, Progressing     Problem: Thought Process Alteration  Goal: Optimal Thought Clarity  Description: Pt will demonstrate Optimal Thought Clarity before discharge     Pt. Will establish and maintain linear conversations with staff    Patient is able to make his needs known.  Outcome: Ongoing, Progressing     Problem: Suicidal Behavior  Goal: Suicidal Behavior is Absent or Managed    Patient has remained free from self harm/injury.   Outcome: Ongoing, Progressing   Goal Outcome Evaluation:    Plan of Care Reviewed With: patient

## 2022-07-10 NOTE — PLAN OF CARE
Problem: Behavioral Health Plan of Care  Goal: Patient-Specific Goal (Individualization)  Description: Pt. Will consume >50% of meals provided   Pt. Will sleep 4-6 Hours Nightly   Pt. Will attempt groups daily when able   Outcome: Ongoing, Progressing  Note: Pt appeared to be sleeping 7 hours. Regular respirations and position changes noted throughout the night.    Face to face end of shift report communicated to oncoming RN.      Problem: Thought Process Alteration  Goal: Optimal Thought Clarity  Description: Pt will demonstrate Optimal Thought Clarity before discharge   Pt. Will establish and maintain linear conversations with staff  Note: DAYNA - pt sleeping.      Problem: Suicidal Behavior  Goal: Suicidal Behavior is Absent or Managed  Outcome: Ongoing, Progressing  Note: Pt remained free from self harm.

## 2022-07-10 NOTE — PROGRESS NOTES
"[facility] PSYCHIATRY  PROGRESS NOTE     SUBJECTIVE   Cam was sitting in the common room watching TV, smiling. We approached him and he remained smiling in conversation. He states that he has \"no complaints, doing pretty good\". He denied racing thoughts and expained \"the medications are working and I'm not having racing thoughts\". Denied SI, HI , or SIB. He denied A/V hallucinations, though after our conversation he was noted to be laughing and smiling to himself while pacing the gould. I did stop and ask what he was experiencing that was making him smile/laugh and he responded \"Nothing. I'm just happy\".   Patient is waiting for a bed to open up at [facility] and KAROLINE Mistry has several other IRTS referrals out as well.      In review of chart it appears that he has been tried on several different neuroleptic medications with either limited results or experienced side effects: Latuda (ineffective), Zyprexa (stopped on own due to feeling too sedated), Haldol (muscle contractions in face), Risperdal (akathisia), Seroquel (listed as allergy, \"fainted\") Abilify (akathisia).        MEDICATIONS   Scheduled Meds:    divalproex sodium extended-release  1,250 mg Oral Daily     LORazepam  0.5 mg Oral BID     paliperidone  234 mg Intramuscular Q28 Days     Vitamin D3  50 mcg Oral At Bedtime     PRN Meds:.acetaminophen, benztropine, hydrOXYzine, nicotine, OLANZapine **OR** OLANZapine, traZODone     ALLERGIES   Allergies   Allergen Reactions     Seasonal Allergies      Seroquel [Quetiapine]      Fainting and slowed breathing         MENTAL STATUS EXAM   Vitals: /66   Pulse 88   Temp 98.9  F (37.2  C) (Temporal)   Resp 16   Wt 74.6 kg (164 lb 6.4 oz)   SpO2 98%   BMI 22.93 kg/m      Appearance:  Awake, alert. Dressed in hospital scrubs. Hair uncombed.   Attitude: cooperative, more expressive verbally than his typical 1-word answers.   Eye Contact: fair, increased more than lately.    Mood: \"pretty " "good\"  Affect:odd, smiles/laughs to self and while watching TV  Speech:  clear, coherent spontaneous  Psychomotor Behavior:  no evidence of tardive dyskinesia, dystonia, or tics, no abnormal or involuntary movements noted. Paces the halls often.   Thought Process:  goal oriented, seem linear.   Associations:  no loose associations  Thought Content:  Denies SI, HI or SIB. Denies hallucinations. Does appear to be responding to internal stimuli today-smiling/laughing to self. Denies paranoia.  Insight:  limited  Judgment:  limited  Oriented to:  time, person, and place  Attention Span and Concentration:  limited  Recent and Remote Memory:  limited  Fund of Knowledge: appropriate for education  Muscle Strength and Tone: normal  Gait and Station: normal       LABS   No results found for this or any previous visit (from the past 24 hour(s)).      IMPRESSION     This is a 21 year old male with a PMH of schizoaffective disorder, bipolar type who is currently under civil commitment and living at [facility]. Pt left the facility on 5.27.22 and was later [place] up [place]. He was brought to a home where someone called EMS and pt was transported to the ED where he was evaluated. It was determined that pt requires inpatient treatment and stabilization for his safety.     Pt's current diagnosis of schizoaffective disorder bipolar type is managed with neuroleptics and a selective serotonin reuptake inhibitor. Will discontinue pts selective serotonin reuptake inhibitor as this may be contributing to his behavior. Will  add a mood stabilizer, Depakote, discussed with mother who is in agreement. No changes in Invega at this time.         DIAGNOSES     1.Schizoaffective disorder, Bipolar type          PLAN     Location: Unit 5  Legal Status: committed with Nguyen (Zyprexa, Abilify, Prolixin, Invega)    Safety Assessment:    Behavioral Orders   Procedures     Code 1 - Restrict to Unit     Routine Programming "     As clinically indicated     Status 15     Every 15 minutes.      PTA medications held:   -Lexapro 20 mg daily      PTA medications continued/changed:   -Continue Invega Sustenna, maintenance dose increased from 156 -> 234 mg Q28D on 6/6/22, last injection given 7/4  -Stop oral Invega 3 mg 7/4     New medications tried and stopped:   -None    New medications initiated:   -Depakote 250 mg on 5/28 -> 500 mg on 5/29 ->1,000 mg on 5/30 -> 1,500 mg on 6/3 (VPA level on 6/6 of 90) -> 1,250 mg on 6/28 (concerns for delayed thoughts/speech)  -Cogentin 1 mg BID prn EPSE  -Ativan 0.5 mg BID for anxiety and racing thoughts started on 7/4      Today's Changes: no changes made today. With patient taking LEÓN it may be weeks to months before a determination can be made regarding efficacy of Invega. Has trialed many other neuroleptics though often experiences side effects. If in 6-8 weeks patient is still not showing no further improvement, would consider addendum to Nguyen and switching to Clozapine due to several previous failed trials.    Programming: Patient will be treated in a therapeutic milieu with appropriate individual and group therapies. Education will be provided on diagnoses, medications, and treatments.     Medical diagnoses:  Per medicine. Nothing acute    Consult: None  Tests: None    Anticipated LOS: 1-2 weeks-pt is under Civil Commitment and Nguyen  Dis[facility]: [facility]- tentative admit date of 7/14         ATTESTATION        Bill MARKS student  I, Marilu Del Toro CNP, was present and participated in the above visit. I have reviewed the above note and agree with findings.

## 2022-07-10 NOTE — PLAN OF CARE
Face to face end of shift report received from Monika CARSON RN. Rounding completed and patient observed in the lounge for breakfast. No requests at this time.      Goal Outcome Evaluation: Patient reported very little anxiety. He denied depression, HI/SI and hallucinations. He denied pain other than chronic pain which he said he didn't need any PRNs for it. Patient has been calm and cooperative. He maintains appropriate boundaries with staff and peers. He attended groups but napped alittle in the morning. He said he'd been up all night because a peers was loud and slamming doors. He explained he has PTSD and the loud noises set him off.      Face to face end of shift report communicated to oncoming RN    Goal Outcome Evaluation:       Problem: Behavioral Health Plan of Care  Goal: Patient-Specific Goal (Individualization)  Description: Pt. Will consume >50% of meals provided   Pt. Will sleep 4-6 Hours Nightly   Pt. Will attempt groups daily when able       7/10/2022 1502 by Venancio Powers RN  Outcome: Ongoing, Progressing     Problem: Thought Process Alteration  Goal: Optimal Thought Clarity  Description: Pt will demonstrate Optimal Thought Clarity before discharge     Pt. Will establish and maintain linear conversations with staff  7/10/2022 1502 by Venancio Powers RN  Outcome: Ongoing, Progressing

## 2022-07-11 PROCEDURE — 250N000013 HC RX MED GY IP 250 OP 250 PS 637: Performed by: NURSE PRACTITIONER

## 2022-07-11 PROCEDURE — 124N000004

## 2022-07-11 RX ORDER — DIVALPROEX SODIUM 250 MG/1
1250 TABLET, EXTENDED RELEASE ORAL AT BEDTIME
Qty: 150 TABLET | Refills: 0 | Status: SHIPPED | OUTPATIENT
Start: 2022-07-11 | End: 2023-06-16

## 2022-07-11 RX ORDER — LORAZEPAM 0.5 MG/1
0.5 TABLET ORAL 2 TIMES DAILY
Qty: 60 TABLET | Refills: 0 | Status: SHIPPED | OUTPATIENT
Start: 2022-07-11 | End: 2023-06-16

## 2022-07-11 RX ADMIN — Medication 50 MCG: at 20:24

## 2022-07-11 RX ADMIN — LORAZEPAM 0.5 MG: 0.5 TABLET ORAL at 08:37

## 2022-07-11 RX ADMIN — DIVALPROEX SODIUM 1250 MG: 500 TABLET, FILM COATED, EXTENDED RELEASE ORAL at 20:24

## 2022-07-11 RX ADMIN — LORAZEPAM 0.5 MG: 0.5 TABLET ORAL at 20:24

## 2022-07-11 ASSESSMENT — ACTIVITIES OF DAILY LIVING (ADL)
ORAL_HYGIENE: INDEPENDENT
ADLS_ACUITY_SCORE: 40
DRESS: SCRUBS (BEHAVIORAL HEALTH)
ORAL_HYGIENE: INDEPENDENT
ADLS_ACUITY_SCORE: 40
LAUNDRY: UNABLE TO COMPLETE
ADLS_ACUITY_SCORE: 40
LAUNDRY: UNABLE TO COMPLETE
ADLS_ACUITY_SCORE: 40
ADLS_ACUITY_SCORE: 40
DRESS: INDEPENDENT;SCRUBS (BEHAVIORAL HEALTH)
ADLS_ACUITY_SCORE: 40
ADLS_ACUITY_SCORE: 40
HYGIENE/GROOMING: INDEPENDENT
HYGIENE/GROOMING: INDEPENDENT

## 2022-07-11 NOTE — PLAN OF CARE
Problem: Thought Process Alteration  Goal: Optimal Thought Clarity  Description: Pt will demonstrate Optimal Thought Clarity before discharge     Pt. Will establish and maintain linear conversations with staff  Outcome: Ongoing, Progressing    Pt is able to have logical conversation      Problem: Behavioral Health Plan of Care  Goal: Patient-Specific Goal (Individualization)  Description: Pt. Will consume >50% of meals provided   Pt. Will sleep 4-6 Hours Nightly   Pt. Will attempt groups daily when able   Outcome: Ongoing, Progressing  Flowsheets (Taken 7/11/2022 1823)  Patient Vulnerabilities: history of unsuccessful treatment    1530 Face to face rounding complete.  Pt introduced to nursing for the shift.      Pt has been up and active this shift walking the halls.  He Told me that he is happy to be going to [place] Thursday.  He explained that he likes that it is closer to the [facility] so that his Parents can visit more often.  He said that he will live with his parents when he leaves [place] and get a part time job.  He did not appear to be laughing to himself as much.  Pt denied hearing voices and appears less preoccupied.    2300 Face to face end of shift report communicated to Night shift RN's along with Pt's fall risk.     Hank Donnelly RN  7/11/2022

## 2022-07-11 NOTE — PLAN OF CARE
"  Problem: Behavioral Health Plan of Care  Goal: Patient-Specific Goal (Individualization)  Description: Pt. Will consume >50% of meals provided   Pt. Will sleep 4-6 Hours Nightly   Pt. Will attempt groups daily when able       Outcome: Ongoing, Progressing  Note: Patient in room resting in bed of own room at 0837. Patient accepts taking medication this morning. Patient denies pain at this time. Patient reports sleeping well through the night. No report of feeling anxious or depressed. Patient denies SI or HI. Patient declines going to group stating, \"I don't go to group, I haven't since I got here.\" Patient back to sleep in bed after administering scheduled AM medications. Patient has been walking the hallways since waking at 1030. Patient denies hallucinations, but does smile/laugh to self while walking in hallway. Patient consumed 50% of meals this shift. No further complaints at this time. Will continue support patient needs.     Problem: Thought Process Alteration  Goal: Optimal Thought Clarity  Description: Pt will demonstrate Optimal Thought Clarity before discharge     Pt. Will establish and maintain linear conversations with staff  Outcome: Ongoing, Progressing     Problem: Suicidal Behavior  Goal: Suicidal Behavior is Absent or Managed  Outcome: Ongoing, Progressing     Problem: Behavioral Health Plan of Care  Goal: Adheres to Safety Considerations for Self and Others  Intervention: Develop and Maintain Individualized Safety Plan  Recent Flowsheet Documentation  Taken 7/11/2022 1100 by Lupe Zamorano  Safety Measures:    safety rounds completed    environmental rounds completed    safety plan reviewed   Goal Outcome Evaluation:    Plan of Care Reviewed With: patient                 "

## 2022-07-11 NOTE — PROGRESS NOTES
SW received an intake packet for [facility] for the Provider to complete. SW provided the provider with [facility].

## 2022-07-11 NOTE — PROGRESS NOTES
"[facility] PSYCHIATRY  PROGRESS NOTE     SUBJECTIVE   Cam was lying in bed when we met. He continues to offer no complaints, no side effects from medications, no racing thoughts. He denies SI, HI or SIB. He denies A/V hallucinations, paranoia or delusions. He is aware that his discharge to [place], [place] is this Thursday. He continues to be observed smiling or laughing to himself by staff.      In review of chart it appears that he has been tried on several different neuroleptic medications with either limited results or experienced side effects: Latuda (ineffective), Zyprexa (stopped on own due to feeling too sedated), Haldol (muscle contractions in face), Risperdal (akathisia), Seroquel (listed as allergy, \"fainted\") Abilify (akathisia).        MEDICATIONS   Scheduled Meds:    divalproex sodium extended-release  1,250 mg Oral Daily     LORazepam  0.5 mg Oral BID     paliperidone  234 mg Intramuscular Q28 Days     Vitamin D3  50 mcg Oral At Bedtime     PRN Meds:.acetaminophen, benztropine, hydrOXYzine, nicotine, OLANZapine **OR** OLANZapine, traZODone     ALLERGIES   Allergies   Allergen Reactions     Seasonal Allergies      Seroquel [Quetiapine]      Fainting and slowed breathing         MENTAL STATUS EXAM   Vitals: /66   Pulse 84   Temp 97.8  F (36.6  C) (Temporal)   Resp 18   Wt 74.6 kg (164 lb 6.4 oz)   SpO2 96%   BMI 22.93 kg/m      Appearance:  Awake, alert. Dressed in hospital scrubs. Hair uncombed.   Attitude: cooperative,   Eye Contact: fair  Mood: \"alright\"  Affect restricted  Speech:  clear, coherent spontaneous  Psychomotor Behavior:  no evidence of tardive dyskinesia, dystonia, or tics, no abnormal or involuntary movements noted. Paces the halls often.   Thought Process:  goal oriented, seem linear.   Associations:  no loose associations  Thought Content:  Denies SI, HI or SIB. Denies hallucinations. Does appear to be responding to internal stimuli today-smiling/laughing to self. " Denies paranoia.  Insight:  limited  Judgment:  limited  Oriented to:  time, person, and place  Attention Span and Concentration:  limited  Recent and Remote Memory:  limited  Fund of Knowledge: appropriate for education  Muscle Strength and Tone: normal  Gait and Station: normal       LABS   No results found for this or any previous visit (from the past 24 hour(s)).      IMPRESSION     This is a 21 year old male with a PMH of schizoaffective disorder, bipolar type who is currently under civil commitment and living at [facility]. Pt left the facility on 5.27.22 and was later picked up while he [place] walking [place] the highway. He was brought to a home where someone called EMS and pt was transported to the ED where he was evaluated. It was determined that pt requires inpatient treatment and stabilization for his safety.     Pt's current diagnosis of schizoaffective disorder bipolar type is managed with neuroleptics and a selective serotonin reuptake inhibitor. Will discontinue pts selective serotonin reuptake inhibitor as this may be contributing to his behavior. Will  add a mood stabilizer, Depakote, discussed with mother who is in agreement. No changes in Invega at this time.      With patient taking LEÓN it may be weeks to months before a determination can be made regarding efficacy of Invega. Has trialed many other neuroleptics though often experiences side effects. If in 6-8 weeks patient is still not showing no further improvement, would consider addendum to Nguyen and switching to Clozapine due to several previous failed trials.       DIAGNOSES     1.Schizoaffective disorder, Bipolar type          PLAN     Location: Unit 5  Legal Status: committed with Nguyen (Zyprexa, Abilify, Prolixin, Invega)    Safety Assessment:    Behavioral Orders   Procedures     Code 1 - Restrict to Unit     Routine Programming     As clinically indicated     Status 15     Every 15 minutes.      PTA medications held:   -Lexapro 20 mg  daily      PTA medications continued/changed:   -Continue Invega Sustenna, maintenance dose increased from 156 -> 234 mg Q28D on 6/6/22, last injection given 7/4  -Stop oral Invega 3 mg 7/4     New medications tried and stopped:   -None    New medications initiated:   -Depakote 250 mg on 5/28 -> 500 mg on 5/29 ->1,000 mg on 5/30 -> 1,500 mg on 6/3 (VPA level on 6/6 of 90) -> 1,250 mg on 6/28 (concerns for delayed thoughts/speech)  -Cogentin 1 mg BID prn EPSE  -Ativan 0.5 mg BID for anxiety and racing thoughts started on 7/4      Today's Changes: no changes made today.     Programming: Patient will be treated in a therapeutic milieu with appropriate individual and group therapies. Education will be provided on diagnoses, medications, and treatments.     Medical diagnoses:  Per medicine. Nothing acute    Consult: None  Tests: None    Anticipated LOS: 3 more days  Disposition [facility]- admit date of 7/14 at 10AM  Patient will discharge on Thursday 7/14 at 6 AM to [facility]- 30 days of medication sent to [facility] in [place] per facility request. Remote PD to be completed on 7/13.         ATTESTATION        Bill MARKS student  I, Marilu Del Toro CNP, was present and participated in the above visit. I have reviewed the above note and agree with findings.

## 2022-07-11 NOTE — PLAN OF CARE
Problem: Behavioral Health Plan of Care  Goal: Patient-Specific Goal (Individualization)  Description: Pt. Will consume >50% of meals provided   Pt. Will sleep 4-6 Hours Nightly   Pt. Will attempt groups daily when able       Outcome: Ongoing, Progressing   Goal Outcome Evaluation:      Face to face shift report received from RN. Rounding completed, pt observed. Client rested in room for 7 hours with eyes closed and respirations noted. No signs of distress. Face to face report will be communicated to oncoming RN.    Leonidas Vasquez RN  7/11/2022  6:19 AM

## 2022-07-11 NOTE — PROGRESS NOTES
KAROLINE scheduled transport for patient 7/14/2022 @ 6:00 am to [facility] via [facility].     [facility] Phone number [phone].     KAROLINE updated nursing of the patient's transport time of 7/14/2022 @ 6:00 am to [facility]

## 2022-07-12 PROCEDURE — 99232 SBSQ HOSP IP/OBS MODERATE 35: CPT | Performed by: NURSE PRACTITIONER

## 2022-07-12 PROCEDURE — 250N000013 HC RX MED GY IP 250 OP 250 PS 637: Performed by: NURSE PRACTITIONER

## 2022-07-12 PROCEDURE — 124N000004

## 2022-07-12 RX ADMIN — DIVALPROEX SODIUM 1250 MG: 500 TABLET, FILM COATED, EXTENDED RELEASE ORAL at 21:17

## 2022-07-12 RX ADMIN — LORAZEPAM 0.5 MG: 0.5 TABLET ORAL at 21:17

## 2022-07-12 RX ADMIN — LORAZEPAM 0.5 MG: 0.5 TABLET ORAL at 08:24

## 2022-07-12 RX ADMIN — Medication 50 MCG: at 21:17

## 2022-07-12 ASSESSMENT — ACTIVITIES OF DAILY LIVING (ADL)
ADLS_ACUITY_SCORE: 40
HYGIENE/GROOMING: INDEPENDENT
ADLS_ACUITY_SCORE: 41
ADLS_ACUITY_SCORE: 41
ADLS_ACUITY_SCORE: 40
HYGIENE/GROOMING: INDEPENDENT
ADLS_ACUITY_SCORE: 41
LAUNDRY: UNABLE TO COMPLETE
DRESS: INDEPENDENT;SCRUBS (BEHAVIORAL HEALTH)
ORAL_HYGIENE: INDEPENDENT
ADLS_ACUITY_SCORE: 40
DRESS: SCRUBS (BEHAVIORAL HEALTH);INDEPENDENT
ADLS_ACUITY_SCORE: 41
ORAL_HYGIENE: INDEPENDENT
LAUNDRY: UNABLE TO COMPLETE
ADLS_ACUITY_SCORE: 41
ADLS_ACUITY_SCORE: 40
ADLS_ACUITY_SCORE: 40

## 2022-07-12 NOTE — PROGRESS NOTES
Natalie faxed the [facility] paper work to MeriTaleem @ [phone].       NATALIE submitted all completed  intake paper work back to LORI Gomes.

## 2022-07-12 NOTE — PLAN OF CARE
Problem: Behavioral Health Plan of Care  Goal: Patient-Specific Goal (Individualization)  Description: Pt. Will consume >50% of meals provided   Pt. Will sleep 4-6 Hours Nightly   Pt. Will attempt groups daily when able       Outcome: Ongoing, Progressing   Goal Outcome Evaluation:      Face to face shift report received from RN. Rounding completed, pt observed.Client rested in room for 6 hours with eyes closed and respirations noted. No signs of distress.Face to face report will be communicated to oncoming RN.    Leonidas Vasquez RN  7/12/2022  6:24 AM

## 2022-07-12 NOTE — PLAN OF CARE
Problem: Suicidal Behavior  Goal: Suicidal Behavior is Absent or Managed  Outcome: Ongoing, Progressing    Pt denies SI     Problem: Thought Process Alteration  Goal: Optimal Thought Clarity  Description: Pt will demonstrate Optimal Thought Clarity before discharge     Pt. Will establish and maintain linear conversations with staff  Outcome: Ongoing, Progressing    Pt is logical and organized     Problem: Behavioral Health Plan of Care  Goal: Patient-Specific Goal (Individualization)  Description: Pt. Will consume >50% of meals provided   Pt. Will sleep 4-6 Hours Nightly   Pt. Will attempt groups daily when able     Outcome: Ongoing, Progressing  Flowsheets (Taken 7/12/2022 1655)  Patient Vulnerabilities: limited social skills  Patient Personal Strengths:    family/social support    positive attitude    realistic evaluation of current/future capabilities   Goal Outcome Evaluation:    Plan of Care Reviewed With: patient     1530 Face to face rounding complete.  Pt introduced to nursing for the shift.    Pt was calm and active walking all shift. He is hopeful about leaving and excited for new things.    2330 Face to face end of shift report communicated to Night shift RN's along with Pt's fall risk.    Hank Donnelly RN  7/12/2022

## 2022-07-12 NOTE — PLAN OF CARE
Problem: Behavioral Health Plan of Care  Goal: Patient-Specific Goal (Individualization)  Description: Pt. Will consume >50% of meals provided   Pt. Will sleep 4-6 Hours Nightly   Pt. Will attempt groups daily when able       Outcome: Ongoing, Progressing  Note: Patient up to dining room/lounge this AM at 0800 for breakfast meal. Patient consumed 50-75% of meal. Patient back to bed of own room after breakfast meal. Patient denies any pain. No SI or HI. Patient reports sleeping through the night without difficulty. Patient encouraged to participate in groups, but refuses. Patient currently in bed of own room resting with no further complaints at this time. Will continue to support patient needs.     1200: Patient reports to lounge/dining area for noon meal and consumes 50-75% of meals. Patient has been up walking the hallways since after noon meal. Continues to deny hallucinations, however, shows smiling/laughing while in hallway.     Face to Face report to be given to oncoming shift RN at 1500.     Problem: Thought Process Alteration  Goal: Optimal Thought Clarity  Description: Pt will demonstrate Optimal Thought Clarity before discharge     Pt. Will establish and maintain linear conversations with staff  Outcome: Ongoing, Progressing     Problem: Suicidal Behavior  Goal: Suicidal Behavior is Absent or Managed  Outcome: Ongoing, Progressing     Problem: Behavioral Health Plan of Care  Goal: Plan of Care Review  Recent Flowsheet Documentation  Taken 7/12/2022 0900 by Lupe Zamorano  Plan of Care Reviewed With: patient  Patient Agreement with Plan of Care: agrees   Goal Outcome Evaluation:    Plan of Care Reviewed With: patient

## 2022-07-12 NOTE — PLAN OF CARE
Spoke with Leah regarding patient's next LA Invega Sustenna being due August 1st. She stated she would call the unit if there were any other questions.     Vivi Carter, RN   [id number] 07/12/2022

## 2022-07-12 NOTE — PROGRESS NOTES
[facility]    Medical Services Progress Note    Date of Service (when I saw the patient): 07/12/2022    Assessment & Plan      Active Problems:    Psychotic disorder with hallucinations (H)      Code Status: Full Code.    Vivien Tsia CNP        -Data reviewed today: I reviewed all new labs and imaging results over the last 24 hours.     Physical Exam   Temp: 96.9  F (36.1  C) Temp src: Temporal BP: 114/55 Pulse: 86   Resp: 14 SpO2: 96 % O2 Device: None (Room air)    Vitals:    06/25/22 0800 07/02/22 0800 07/09/22 0800   Weight: 74.1 kg (163 lb 4.8 oz) 74.9 kg (165 lb 3.2 oz) 74.6 kg (164 lb 6.4 oz)     Vital Signs with Ranges  Temp:  [96.9  F (36.1  C)-97.4  F (36.3  C)] 96.9  F (36.1  C)  Pulse:  [86-92] 86  Resp:  [14-18] 14  BP: (114-129)/(55-83) 114/55  SpO2:  [95 %-96 %] 96 %  No intake/output data recorded.    Constitutional: awake, alert, cooperative, no apparent distress, and appears stated age, vitals stable  Eyes: Lids and lashes normal, pupils equal, round and reactive to light, extra ocular muscles intact, sclera clear, conjunctiva normal  ENT: Normocephalic, without obvious abnormality, atraumatic, external ears without lesions, oral pharynx with moist mucous membranes, no erythema or exudates  Hematologic / Lymphatic: no cervical lymphadenopathy  Respiratory: No increased work of breathing, good air exchange, clear to auscultation bilaterally, no crackles or wheezing  Cardiovascular: Normal apical impulse, regular rate and rhythm, normal S1 and S2, no S3 or S4, and no murmur noted  GI:  normal bowel sounds, soft, non-distended, non-tender, no masses palpated, no hepatosplenomegally  Genitounirinary: deferred  Skin: normal skin color, texture, turgor and no redness, warmth, or swelling  Musculoskeletal: There is no redness, warmth, or swelling of the joints.  Full range of motion noted.    Neurologic: Awake, alert, oriented to name, place and time.    Medications     divalproex sodium  extended-release  1,250 mg Oral Daily     LORazepam  0.5 mg Oral BID     paliperidone  234 mg Intramuscular Q28 Days     Vitamin D3  50 mcg Oral At Bedtime       Data   No lab results found in last 7 days.    No results found for this or any previous visit (from the past 24 hour(s)).

## 2022-07-12 NOTE — PROGRESS NOTES
"[facility] PSYCHIATRY  PROGRESS NOTE     SUBJECTIVE   Cam is up walking in the [place]ould when I see him today. He interacts a bit more in conversation than he has in the past though answers to questions are relatively brief. He is looking forward to discharging on Thursday to [facility] in [place]. He denies any side effects from medications. Medications sent to [facility] in [place] yesterday.      In review of chart it appears that he has been tried on several different neuroleptic medications with either limited results or experienced side effects: Latuda (ineffective), Zyprexa (stopped on own due to feeling too sedated), Haldol (muscle contractions in face), Risperdal (akathisia), Seroquel (listed as allergy, \"fainted\") Abilify (akathisia).        MEDICATIONS   Scheduled Meds:    divalproex sodium extended-release  1,250 mg Oral Daily     LORazepam  0.5 mg Oral BID     paliperidone  234 mg Intramuscular Q28 Days     Vitamin D3  50 mcg Oral At Bedtime     PRN Meds:.acetaminophen, benztropine, hydrOXYzine, nicotine, OLANZapine **OR** OLANZapine, traZODone     ALLERGIES   Allergies   Allergen Reactions     Seasonal Allergies      Seroquel [Quetiapine]      Fainting and slowed breathing         MENTAL STATUS EXAM   Vitals: /55 (BP Location: Right arm)   Pulse 86   Temp 96.9  F (36.1  C) (Temporal)   Resp 14   Wt 74.6 kg (164 lb 6.4 oz)   SpO2 96%   BMI 22.93 kg/m      Appearance:  Awake, alert. Dressed in hospital scrubs. Casually groomed.  Attitude: cooperative, pleasant  Eye Contact: fair  Mood: \"alright\"  Affect: restricted  Speech:  clear, coherent, more spontaneous  Psychomotor Behavior:  no evidence of tardive dyskinesia, dystonia, or tics, no abnormal or involuntary movements noted. Paces the halls often.   Thought Process:  goal oriented, seem linear.   Associations:  no loose associations  Thought Content:  Denies SI, HI or SIB. Denies hallucinations. Does appear to be responding to internal " stimuli today-smiling/laughing to self. Denies paranoia.  Insight:  limited  Judgment:  limited  Oriented to:  time, person, and place  Attention Span and Concentration:  limited  Recent and Remote Memory:  limited  Fund of Knowledge: appropriate for education  Muscle Strength and Tone: normal  Gait and Station: normal       LABS   No results found for this or any previous visit (from the past 24 hour(s)).      IMPRESSION     This is a 21 year old male with a PMH of schizoaffective disorder, bipolar type who is currently under civil commitment and living at [facility]. Pt left the facility on 5.27.22 and was later picked up while [place]. He was brought to a home where someone called EMS and pt was transported to the ED where he was evaluated. It was determined that pt requires inpatient treatment and stabilization for his safety.     Pt's current diagnosis of schizoaffective disorder bipolar type is managed with neuroleptics and a selective serotonin reuptake inhibitor. Will discontinue pts selective serotonin reuptake inhibitor as this may be contributing to his behavior. Will  add a mood stabilizer, Depakote, discussed with mother who is in agreement. No changes in Invega at this time.      With patient taking LEÓN it may be weeks to months before a determination can be made regarding efficacy of Invega. Has trialed many other neuroleptics though often experiences side effects. If in 6-8 weeks patient is still not showing no further improvement, would consider addendum to Nguyen and switching to Clozapine due to several previous failed trials.       DIAGNOSES     1.Schizoaffective disorder, Bipolar type          PLAN     Location: Unit 5  Legal Status: committed with Nguyen (Zyprexa, Abilify, Prolixin, Invega)    Safety Assessment:    Behavioral Orders   Procedures     Code 1 - Restrict to Unit     Routine Programming     As clinically indicated     Status 15     Every 15 minutes.      PTA  medications held:   -Lexapro 20 mg daily      PTA medications continued/changed:   -Continue Invega Sustenna, maintenance dose increased from 156 -> 234 mg Q28D on 6/6/22, last injection given 7/4  -Stop oral Invega 3 mg 7/4     New medications tried and stopped:   -None    New medications initiated:   -Depakote 250 mg on 5/28 -> 500 mg on 5/29 ->1,000 mg on 5/30 -> 1,500 mg on 6/3 (VPA level on 6/6 of 90) -> 1,250 mg on 6/28 (concerns for delayed thoughts/speech)  -Cogentin 1 mg BID prn EPSE  -Ativan 0.5 mg BID for anxiety and racing thoughts started on 7/4      Today's Changes:   No changes made today  Discharge Thursday    Programming: Patient will be treated in a therapeutic milieu with appropriate individual and group therapies. Education will be provided on diagnoses, medications, and treatments.     Medical diagnoses:  Per medicine. Nothing acute    Consult: None  Tests: None    Anticipated LOS: 2 more days  Disposition [facility]- admit date of 7/14 at 10AM  Patient will discharge on Thursday 7/14 at 6 AM to [facility]- 30 days of medication sent to [facility] in [place] per facility request. Remote PD to be completed on 7/13.         ATTESTATION      Marilu Del Toro, CNP        normal... Well appearing, awake, alert, oriented to person, place, time/situation and in no apparent distress.  Appears mildly sleepy. Able to follow commands.

## 2022-07-13 VITALS
DIASTOLIC BLOOD PRESSURE: 68 MMHG | BODY MASS INDEX: 22.93 KG/M2 | HEART RATE: 70 BPM | SYSTOLIC BLOOD PRESSURE: 112 MMHG | RESPIRATION RATE: 12 BRPM | TEMPERATURE: 97.8 F | OXYGEN SATURATION: 98 % | WEIGHT: 164.4 LBS

## 2022-07-13 PROCEDURE — 250N000013 HC RX MED GY IP 250 OP 250 PS 637: Performed by: NURSE PRACTITIONER

## 2022-07-13 PROCEDURE — 99239 HOSP IP/OBS DSCHRG MGMT >30: CPT | Performed by: STUDENT IN AN ORGANIZED HEALTH CARE EDUCATION/TRAINING PROGRAM

## 2022-07-13 PROCEDURE — 124N000004

## 2022-07-13 RX ADMIN — Medication 50 MCG: at 20:55

## 2022-07-13 RX ADMIN — DIVALPROEX SODIUM 1250 MG: 500 TABLET, FILM COATED, EXTENDED RELEASE ORAL at 20:55

## 2022-07-13 RX ADMIN — LORAZEPAM 0.5 MG: 0.5 TABLET ORAL at 08:22

## 2022-07-13 RX ADMIN — LORAZEPAM 0.5 MG: 0.5 TABLET ORAL at 20:55

## 2022-07-13 ASSESSMENT — ACTIVITIES OF DAILY LIVING (ADL)
DRESS: SCRUBS (BEHAVIORAL HEALTH);INDEPENDENT
HYGIENE/GROOMING: INDEPENDENT
LAUNDRY: UNABLE TO COMPLETE
LAUNDRY: UNABLE TO COMPLETE
ADLS_ACUITY_SCORE: 41
ADLS_ACUITY_SCORE: 41
ORAL_HYGIENE: INDEPENDENT
ORAL_HYGIENE: INDEPENDENT
ADLS_ACUITY_SCORE: 41
DRESS: SCRUBS (BEHAVIORAL HEALTH);INDEPENDENT
ADLS_ACUITY_SCORE: 41
HYGIENE/GROOMING: INDEPENDENT
ADLS_ACUITY_SCORE: 41

## 2022-07-13 NOTE — PLAN OF CARE
"  Problem: Behavioral Health Plan of Care  Goal: Patient-Specific Goal (Individualization)  Description: Pt. Will consume >50% of meals provided   Pt. Will sleep 4-6 Hours Nightly   Pt. Will attempt groups daily when able       Outcome: Ongoing, Progressing  Note: Patient up at dining room/lounge for breakfast this shift. Patient denies pain at this time. No SI or HI. Patient indicates to this writer, \"looking forward to it\" when asked about feeling with tomorrow's anticipated discharge. Patient denies depression or anxiety at this time. Patient interactions are brief, but appropriate with responses. Patient back to bed after breakfast meal. No further complaints at this time. Will continue to support patient needs.     Face to Face report given to oncoming RN at 3-11.     Problem: Behavioral Health Plan of Care  Goal: Plan of Care Review  Recent Flowsheet Documentation  Taken 7/13/2022 0800 by Lupe Zamorano  Plan of Care Reviewed With: patient  Patient Agreement with Plan of Care: agrees     Problem: Behavioral Health Plan of Care  Goal: Adheres to Safety Considerations for Self and Others  Intervention: Develop and Maintain Individualized Safety Plan  Recent Flowsheet Documentation  Taken 7/13/2022 0800 by Lupe Zamorano  Safety Measures:    environmental rounds completed    safety rounds completed    self-directed behavior promoted     Problem: Thought Process Alteration  Goal: Optimal Thought Clarity  Description: Pt will demonstrate Optimal Thought Clarity before discharge     Pt. Will establish and maintain linear conversations with staff  Outcome: Ongoing, Progressing     Problem: Suicidal Behavior  Goal: Suicidal Behavior is Absent or Managed  Outcome: Ongoing, Progressing   Goal Outcome Evaluation:    Plan of Care Reviewed With: patient                 "

## 2022-07-13 NOTE — PROGRESS NOTES
1:1 time with the patient.  No changes made to the discharge plan at this time.   See the AVS for follow up appointments and recommendations.     SW called and left a message for the patient's  Gray updating her that the SW will be in court and would not be able to call her @ 10:00 am. Karoline suggested that she call the patient anytime today. Karoline also explained that because the patient is discharging to a [facility] a PD is not required.     KAROLINE called [facility] and confirmed with them that they have a  and will pick the patient up tomorrow 7/14/2022 @ 6:00 am.

## 2022-07-13 NOTE — PLAN OF CARE
Problem: Behavioral Health Plan of Care  Goal: Patient-Specific Goal (Individualization)  Description: Pt. Will consume >50% of meals provided   Pt. Will sleep 4-6 Hours Nightly   Pt. Will attempt groups daily when able       Outcome: Ongoing, Progressing   Goal Outcome Evaluation:      Face to face report will be communicated to oncoming RN.Client rested in room for 7 hours with eyes closed and respirations noted. No signs of distress noted.Face to face report will be communicated to oncoming RN.    Leonidas Vasquez RN  7/13/2022  6:50 AM

## 2022-07-14 ASSESSMENT — ACTIVITIES OF DAILY LIVING (ADL)
ADLS_ACUITY_SCORE: 41

## 2022-07-14 NOTE — PROGRESS NOTES
Pt is discharging at the recommendation of the treatment team. Pt is discharging to [facility] transported by [facility] . Pt denies having any thoughts of hurting themself or anyone else. Pt denies anxiety or depression. Pt has follow up with [facility] staff,  Debbie for medication management and therapy,  Gray, and primary care . Discharge instructions, including; demographic sheet, psychiatric evaluation, discharge summary, and AVS were faxed to these next level of care providers.

## 2022-07-14 NOTE — DISCHARGE SUMMARY
[facility] PSYCHIATRY  DISCHARGE SUMMARY     DISCHARGE DATA     Junior Fernández MRN# [id number]   Age: 21 year old YOB: 2000     Date of Admission: 5/28/2022  Date of Discharge: 7/14/2022  Discharge Provider: Ross Kelly DO       REASON FOR ADMISSION     This is a 21 year old male with a PMH of schizoaffective disorder, bipolar type who is currently under civil commitment and living at [facility]. Pt left the facility on 5.27.22 and was later picked up while he was walking on the highway. He was brought to a home where someone called EMS and pt was transported to the ED where he was evaluated. It was determined that pt requires inpatient treatment and stabilization for his safety.     Pt's current diagnosis of schizoaffective disorder bipolar type is managed with neuroleptics and a selective serotonin reuptake inhibitor. Will discontinue pts selective serotonin reuptake inhibitor as this may be contributing to his behavior. Will  add a mood stabilizer, Depakote, discussed with mother who is in agreement. No changes in Invega at this time.          DISCHARGE DIAGNOSES     1. Schizoaffective disorder, bipolar type       CONSULTS     None       HOSPITAL COURSE   Psychiatric Course:    Legal status: Orders Placed This Encounter      Legal status Patient is Committed    Nguyen: Zyprexa, Abilify, Prolixin, Invega    Patient was admitted to unit 5 due to the aforementioned presentation. The patient was placed under 15 minute checks to ensure patient safety. The patient participated in unit programming and groups as able. The patient did spend time in the MHICU due to agitation with him successfully being weaned out.    The following changes to the patient's psychiatric medications were made:    PTA medications held:   -Lexapro 20 mg daily in case contributing to behaviors and mood episode      PTA medications changed:   -Invega Sustenna, maintenance dose increased from 156 -> 234 mg Q28D on  6/6/22, last injection given 7/4  -Stopped oral Invega 3 mg 7/4 due to improvement with LEÓN     New medications initiated:   -Depakote 1,250 mg at bedtime (as high as 1,500, but reduced to 1,250 due to concerns for delayed thoughts/speech). VPA level of 81 prior to discharge  -Ativan 0.5 mg BID for anxiety and racing thoughts  -Cogentin 1 mg BID prn EPSE    With these changes and supports the patient noticed improvement in their symptoms and felt sufficiently ready for discharge. The patient's psychosis improved some with his mood being euthymic by the end of hospitalization. As a result, Junior Fernández was discharged. At the time of discharge, Junior Fernández was determined to not be a danger to self or others. At the current time of discharge, the patient does not meet criteria for involuntary hospitalization. On the day of discharge, the patient reports that they do not have suicidal or homicidal ideation. Steps taken to minimize risk include: assessing patient s behavior and thought process daily during hospital stay, discharging patient with adequate plan for follow up for mental and physical health and discussing safety plan of returning to the hospital should the patient ever have thoughts of harming themselves or others. Therefore, based on all available evidence including the factors cited above, the patient does not appear to be at imminent risk for self-harm, and is appropriate for outpatient level of care. However, if patient uses substances or is medication non-adherent, their risk of decompensation, psychosis, chacorta, and SI will be elevated. This was discussed with the patient.    Medical Course:    The patient was medically cleared for admission to inpatient psychiatry. No medical issues. The patient was medically stable at the time of discharge.        DISCHARGE MEDICATIONS     Current Discharge Medication List      START taking these medications    Details   divalproex sodium extended-release  "(DEPAKOTE ER) 250 MG 24 hr tablet Take 5 tablets (1,250 mg) by mouth At Bedtime  Qty: 150 tablet, Refills: 0    Associated Diagnoses: Schizoaffective disorder, bipolar type (H)      LORazepam (ATIVAN) 0.5 MG tablet Take 1 tablet (0.5 mg) by mouth 2 times daily  Qty: 60 tablet, Refills: 0    Associated Diagnoses: Anxiety         CONTINUE these medications which have CHANGED    Details   cholecalciferol 50 MCG (2000 UT) tablet Take 1 tablet (50 mcg) by mouth daily  Qty: 30 tablet, Refills: 0    Associated Diagnoses: Vitamin D deficiency      paliperidone (INVEGA SUSTENNA) 234 MG/1.5ML SEPIDEH Inject 1.5 mLs (234 mg) into the muscle every 28 days  Qty: 1.5 mL, Refills: 1    Comments: Last injection received 7/4/22  Associated Diagnoses: Schizoaffective disorder, bipolar type (H)         STOP taking these medications       escitalopram (LEXAPRO) 20 MG tablet Comments:   Reason for Stopping:         Melatonin 10 MG TABS tablet Comments:   Reason for Stopping:         paliperidone ER (INVEGA) 3 MG 24 hr tablet Comments:   Reason for Stopping:                MENTAL STATUS EXAM   Vitals: /68   Pulse 70   Temp 97.8  F (36.6  C) (Temporal)   Resp 12   Wt 74.6 kg (164 lb 6.4 oz)   SpO2 98%   BMI 22.93 kg/m      Appearance: Alert, oriented, dressed in hospital scrubs, appears stated age   Attitude: Cooperative   Eye Contact: Good  Mood: \"Pretty good\"  Affect: Full range of affect  Speech: Normal rate and rhythm   Psychomotor Behavior: No tremor, rigidity, or psychomotor abnormality   Thought Process: Logical, goal directed   Associations: No loose associations   Thought Content: Denies SI or plan. No SIB. Denies A/V hallucinations. No evidence of delusional thought. Responds to internal stimuli at times  Insight: Limited  Judgment: Limited  Oriented to: Person, place, and time  Attention Span and Concentration: Limited  Recent and Remote Memory: Intact  Language: English with appropriate syntax and vocabulary  Fund of " Knowledge: Average  Muscle Strength and Tone: Grossly normal  Gait and Station: Grossly normal       DISCHARGE PLAN     1.  Education given regarding diagnostic and treatment options with risks, benefits and alternatives with adequate verbalization of understanding.  2.  Discharge to [facility]. Upon detailed review of risk factors, patient amenable for release.   3.  Continue aforementioned medications and associated medication changes with follow-up by outpatient provider.  4.  Crisis management planning in place.    5.  Nursing and  to review further discharge recommendations.   6.  Patient is being discharged with the following appointments:    Health Care Follow-up:      [facility] Case management    [place] - [phone]  Alternate  Brianna Kenyon - [phone]  Head  [place] - [phone]     [place] [address]  [phone]  Fax: [phone]  [email or web address], phone: [phone].     Medication Management:   Appointment: 7/28/2022 @ 8:20 am with [facility]  [address] [facility], [address]  [place] [address]  [phone]     Therapy:   [facility]  [address], [address]  [place] [address]  [phone]     [facility]  Appointment: 7/25/2022 @ 10:45 am with Dr. Nikolas Keller for after hospital follow up.   ([phone])  Fax: [phone]       DISCHARGE SERVICES PROVIDED     40 minutes spent on discharge services, including:  Final examination of patient.  Review and discussion of hospital stay.  Instructions for continued outpatient care/goals.  Preparation of discharge records.  Preparation of medications refills and new prescriptions.  Preparation of applicable referral forms.        ATTESTATION     Dr. Ross Kelly  Psychiatrist        LABS THIS ADMISSION     Results for orders placed or  performed during the hospital encounter of 05/28/22   Valproic acid     Status: Normal   Result Value Ref Range    Valproic acid 47   mg/L   Asymptomatic COVID-19 Virus (Coronavirus) by PCR Nasopharyngeal     Status: Normal    Specimen: Nasopharyngeal; Swab   Result Value Ref Range    SARS CoV2 PCR Negative Negative    Narrative    Testing was performed using the Xpert Xpress SARS-CoV-2 Assay on the   [employer] Gene-Xpert Instrument Systems. Additional information about   this Emergency Use Authorization (EUA) assay can be found via the Lab   Guide. This test should be ordered for the detection of SARS-CoV-2 in   individuals who meet SARS-CoV-2 clinical and/or epidemiological   criteria. Test performance is unknown in asymptomatic patients. This   test is for in vitro diagnostic use under the FDA EUA for   laboratories certified under CLIA to perform high complexity testing.   This test has not been FDA cleared or approved. A negative result   does not rule out the presence of PCR inhibitors in the specimen or   target RNA in concentration below the limit of detection for the   assay. The possibility of a false negative should be considered if   the patient's recent exposure or clinical presentation suggests   COVID-19. This test was validated by [facility]. This laboratory is certified under the Clinical Laboratory Improve  ment Amendments (CLIA) as qualified to perform high complexity testing.   Valproic acid     Status: Normal   Result Value Ref Range    Valproic acid 90   mg/L   Asymptomatic COVID-19 Virus (Coronavirus) by PCR Nasopharyngeal     Status: Normal    Specimen: Nasopharyngeal; Swab   Result Value Ref Range    SARS CoV2 PCR Negative Negative    Narrative    Testing was performed using the Xpert Xpress SARS-CoV-2 Assay on the   [employer] GeneGIVT[employer] Instrument Systems. Additional information about   this Emergency Use Authorization (EUA) assay can be found via the Lab    Guide. This test should be ordered for the detection of SARS-CoV-2 in   individuals who meet SARS-CoV-2 clinical and/or epidemiological   criteria. Test performance is unknown in asymptomatic patients. This   test is for in vitro diagnostic use under the FDA EUA for   laboratories certified under CLIA to perform high complexity testing.   This test has not been FDA cleared or approved. A negative result   does not rule out the presence of PCR inhibitors in the specimen or   target RNA in concentration below the limit of detection for the   assay. The possibility of a false negative should be considered if   the patient's recent exposure or clinical presentation suggests   COVID-19. This test was validated by [facility]. This laboratory is certified under the Clinical Laboratory Improve  ment Amendments (CLIA) as qualified to perform high complexity testing.   Valproic acid     Status: Normal   Result Value Ref Range    Valproic acid 100   mg/L   Hepatic panel     Status: Normal   Result Value Ref Range    Bilirubin Total 0.2 0.2 - 1.3 mg/dL    Bilirubin Direct 0.1 0.0 - 0.2 mg/dL    Protein Total 7.2 6.8 - 8.8 g/dL    Albumin 3.8 3.4 - 5.0 g/dL    Alkaline Phosphatase 61 40 - 150 U/L    AST 16 0 - 45 U/L    ALT 16 0 - 70 U/L   Valproic acid     Status: Normal   Result Value Ref Range    Valproic acid 81   mg/L   Ammonia     Status: Normal   Result Value Ref Range    Ammonia 29 10 - 50 umol/L

## 2022-07-14 NOTE — PLAN OF CARE
Problem: Thought Process Alteration  Goal: Optimal Thought Clarity  Description: Pt will demonstrate Optimal Thought Clarity before discharge   Pt. Will establish and maintain linear conversations with staff  Outcome: Ongoing, Progressing    Pt had logical conversation this evening though is discussion is limited     Problem: Behavioral Health Plan of Care  Goal: Patient-Specific Goal (Individualization)  Description: Pt. Will consume >50% of meals provided   Pt. Will sleep 4-6 Hours Nightly   Pt. Will attempt groups daily when able       Outcome: Ongoing, Progressing  Flowsheets (Taken 7/13/2022 9348)  Patient Vulnerabilities:    substance abuse/addiction    limited social skills    history of unsuccessful treatment    poor impulse control   Goal Outcome Evaluation:    Plan of Care Reviewed With: patient     1530 Face to face rounding complete.  Pt introduced to nursing for the shift    Pt was up and active walking a great deal tonight but not restless. He spent some time looking out the window in the rocking chair. He denies hallucinations and says he feels ready to go in the morning.  Pt did smile and laugh to himself often this evening.      2300 Face to face end of shift report communicated to night shift RN's along with Pt's fall risk.     Hank Donnelly RN  7/13/2022  11:59 PM

## 2022-07-14 NOTE — PLAN OF CARE
Face to face shift report received from nurse. Rounding completed, pt observed.    Problem: Behavioral Health Plan of Care  Goal: Plan of Care Review  Outcome: Adequate for Care Transition     Problem: Behavioral Health Plan of Care  Goal: Patient-Specific Goal (Individualization)  Description: Pt. Will consume >50% of meals provided   Pt. Will sleep 4-6 Hours Nightly   Pt. Will attempt groups daily when able       Outcome: Adequate for Care Transition     Problem: Behavioral Health Plan of Care  Goal: Adheres to Safety Considerations for Self and Others  Outcome: Adequate for Care Transition     Problem: Behavioral Health Plan of Care  Goal: Absence of New-Onset Illness or Injury  Outcome: Adequate for Care Transition     Problem: Behavioral Health Plan of Care  Goal: Optimal Comfort and Wellbeing  Outcome: Adequate for Care Transition     Problem: Behavioral Health Plan of Care  Goal: Optimized Coping Skills in Response to Life Stressors  Outcome: Adequate for Care Transition     Problem: Behavioral Health Plan of Care  Goal: Develops/Participates in Therapeutic [place] to Support Successful Transition  Outcome: Adequate for Care Transition     Problem: Behavioral Health Plan of Care  Goal: Team Discussion  Outcome: Adequate for Care Transition     Problem: Thought Process Alteration  Goal: Optimal Thought Clarity  Description: Pt will demonstrate Optimal Thought Clarity before discharge     Pt. Will establish and maintain linear conversations with staff  Outcome: Adequate for Care Transition     Problem: Suicidal Behavior  Goal: Suicidal Behavior is Absent or Managed  Outcome: Adequate for Care Transition     Discharge Note    Patient Discharged to short-term care facility on 7/14/2022 5:42 AM via [employer] accompanied by .     Patient informed of discharge instructions in AVS. patient verbalizes understanding and denies having any questions pertaining to AVS. Patient stable at time of discharge. Patient denies  SI, HI, and thoughts of self harm at time of discharge. All personal belongings returned to patient. Discharge prescriptions sent to [facility] via electronic communication. Psych evaluation, history and physical, AVS, and discharge summary [facility]     Yury Andrade RN  7/14/2022  5:44 AM

## 2022-07-25 LAB
HEPATITIS C ANTIBODY (EXTERNAL): NONREACTIVE
HIV1+2 AB SERPL QL IA: NONREACTIVE

## 2022-07-27 NOTE — PLAN OF CARE
"Patient isolates to room and does not participate in meal times w/o prompts to do so, not interested in filling out menus  or interacting with peers or staff. Remains polite with flat, sad, blunted  affect, denies anxiety but pulse and facial feature  show opposite of his words, also appears to be mildly paranoid regarding medications and environment, denies. When encouraged to rate depression after much consideration states \"its getting better 6/10, no  SI/HI and AH/VH and contracts for safety  Problem: Depression  Goal: Improved Mood  Outcome: Ongoing, Progressing  Intervention: Monitor and Manage Depressive Symptoms  Recent Flowsheet Documentation  Taken 4/2/2022 1100 by Steven Duarte, RN  Supportive Measures: active listening utilized     Problem: Depression  Goal: Improved Mood  Intervention: Monitor and Manage Depressive Symptoms  Recent Flowsheet Documentation  Taken 4/2/2022 1100 by Steven Duarte, RN  Supportive Measures: active listening utilized   Goal Outcome Evaluation:    Plan of Care Reviewed With: patient                 " Internal Medicine Internal Medicine Internal Medicine Internal Medicine Internal Medicine Pulmonology Internal Medicine

## 2022-08-19 ENCOUNTER — PATIENT OUTREACH (OUTPATIENT)
Dept: PSYCHIATRY | Facility: CLINIC | Age: 22
End: 2022-08-19

## 2022-08-19 NOTE — PROGRESS NOTES
NAVIGATE Outreach  A Part of the [facility] First Episode of Psychosis Program     Patient Name: Junior Fernández  /Age:  2000 (21 year old)    Patient has been on the wait list for Navigate services, to treat first episode psychosis.     Attempted to reach patient by phone to inquire if he is still interested in Navigate services and if so, if Navigate provider availability matches the patients availability and preferences. If so, next step would be to schedule an enrollment appointment with writer.     FRANCOISE with request for call back.     Carolyn Flores, [facility]  NAVIGATE Director & Family Clinician

## 2022-08-29 NOTE — PROGRESS NOTES
NAVIGATE Outreach  A Part of the [facility] First Episode of Psychosis Program     Patient Name: Junior Fernández  /Age:  2000 (21 year old)    Acknowledged MyChart from 22 has not been read. No return call received from voicemail left 22. Again tried to reach patient by phone to see if they are interested in Navigate services. [facility] with request for call back by .     Carolyn Flores, [facility]  NAVIGATE Director & Family Clinician

## 2022-09-11 ENCOUNTER — HEALTH MAINTENANCE LETTER (OUTPATIENT)
Age: 22
End: 2022-09-11

## 2022-09-29 NOTE — PROGRESS NOTES
KAROLINE called and spoke to Carolyn with [facility]. She clarified that she meant CMM not CMT. CMM was an authorization for the patient in regards to the [facility] placement. Carolyn explained that the patient is now on their waiting list.     KAROLINE updated the patient that the patient is officially on the [facility] waiting list. KAROLINE further discussed with the patient that it would be in his best interest to attend some of the groups as this would look good when doing IRTS referrals.    MANNIE/CV

## 2022-12-05 NOTE — PLAN OF CARE
"Shift Summary  Legal status: Voluntary  Mental  Visible in milieu. Isolative and withdrawn to self. Calm and cooperative. Mood seems happy and affect seems normal.  Pt started well early morning by coming to dinning room for breakfast ,but at lunch time acted weird and wanted to stay in room. Upon assessment to see why pt wants to stay in room, pt said \" they told me to stay in room.\" ,but not clear who told pt to stay in room. Possibly responding to internal stimuli. Then pt said  \"I thought I was supposed to stay in my room for lunch.\" This writer requested more details but pt declined to elaborate. Pt said he is not going to eat outside and wants lunch tray in room. Lunch tray given to take in his room. Denies suicidal and homicidal ideations, self injury behavior, racing thought as well as auditory and visual hallucinations. Insight and judgement is improving. Did not participate in any group activity.   Prn: none  Physical  Pt alert and oriented x 3. Denies pain. Declined checking vital signs. Slept 6.75 hours last night. Good medication compliance is noted. Seems tolerating medications well. No side effect reported by pt or noted by this writer. Appetite adequate. Ate both breakfast and lunch. No problem with bowel and bladder per pt.   Prn: none  Continue to monitor pt's status Q 15 minutes and stabilize the patient's symptoms with the use of medications and therapeutic programming.                             " Mohs Rapid Report Verbiage: The area of clinically evident tumor was marked with skin marking ink and appropriately hatched.  The initial incision was made following the Mohs approach through the skin.  The specimen was taken to the lab, divided into the necessary number of pieces, chromacoded and processed according to the Mohs protocol.  This was repeated in successive stages until a tumor free defect was achieved.

## 2022-12-11 NOTE — PROGRESS NOTES
Hospitalist Progress Note    NAME: Stanford Dubin   :  8/15/1933   MRN:  [id number]       Assessment / Plan:  3503 imaging reviewed no acute findings or fracture . Sbp 180s-190s will increase clonidine to 0.2 mg   Give hydralazine now     Fall - witnessed   - injury to lip with swelling , open laceration   - check facial , R wrist , arm Xr this morning   - denies dizziness     Dysarthria, right-sided weakness rule out CVA  - symptoms resolved pt back at baseline   - MRI brain showed no acute infarction   CTA head / Neck  neg for no large vessel occulusion , no significant flow limiting stenosis   - CT head w/o contrast no acute intracranial abnormality  - appreciate Neurology input recc OP follow up in 4 weeks   --Consult PT OT.    -Continue aspirin   - DC lipitor LDL 28.2    Abnormal CT of neck  - MRI neck showed Congenitally narrow canal with extensive multilevel degenerative change. Severe narrowing of the canal at C1-2, moderate severe narrowing of the canal at C2-3, severe narrowing of the canal at C3-4, C4-5 and C5-6 with multifocal areas of T2 signal abnormality within the cord. No abnormal enhancement  Severe cervical spinal disease with possibility of compression  - pt seen by Neurosurgery no acute urgent intervention  recc treating conservatively in a 79year old . Hypertension  sbp 197 at 0300   - metoprolol initially increased then Sbp was down to 105 ( 12/10)   - will decrease back to 12,5 hold clonidine tonight   -    Dyslipidemia  Gouty arthritis  Coronary disease  History of TIA  Lumbar spinal stenosis  Neuropathy  -Continue  aspirin, Zetia        Code Status: Full code  Surrogate Decision Maker:      25.0 - 29.9 Overweight / Body mass index is 27.67 kg/m².     Estimated discharge date:   Barriers: Blood pressure management    Code status: Full  Prophylaxis: Lovenox  Recommended Disposition: Home w/Family     Subjective:     Chief Complaint / Reason for Physician Visit  \" Prior Authorization **INITIATED**    Medication: Paliperidone ER 3mg tabs  Insurance Company: [employer] - Phone [phone] Fax [phone]  Pharmacy Filling the Rx: [place], [place] - [address]  Filling Pharmacy Phone: [phone]  Filling Pharmacy Fax: [phone]  Start Date: 5/24/2022  Reference #: CoverMyMeds Key: [id number]  Comments:  Discharge pharmacy sent patient with supply while auth is pending. Will retroactively bill once determination received.      Jess Collins CPhT  [place] [facility]charge Pharm[facility] Liaison  Pronouns: She/Her/Hers    [facility]  [address]  [address], [place] [address]   [email or web address]  [email or web address]   Phone: [phone]  Pager: [phone]  Fax: [phone]   Patient seen in bathroom with staff noted lacertion to R lip , states she  might had been still sleep when she got out of bed \". Discussed with RN events overnight. Review of Systems:  Symptom Y/N Comments  Symptom Y/N Comments   Fever/Chills N   Chest Pain N    Poor Appetite    Edema     Cough    Abdominal Pain     Sputum    Joint Pain     SOB/RAMSEY N   Pruritis/Rash     Nausea/vomit    Tolerating PT/OT     Diarrhea    Tolerating Diet     Constipation N   Other       Could NOT obtain due to:      Objective:     VITALS:   Last 24hrs VS reviewed since prior progress note. Most recent are:  Patient Vitals for the past 24 hrs:   Temp Pulse Resp BP SpO2   12/11/22 0306 97.9 °F (36.6 °C) 78 18 (!) 197/67 100 %   12/10/22 2158 98.4 °F (36.9 °C) 67 20 (!) 150/59 100 %   12/10/22 1558 98.6 °F (37 °C) 67 16 (!) 149/56 97 %   12/10/22 1210 98.1 °F (36.7 °C) 66 18 (!) 105/41 100 %   12/10/22 0817 98.4 °F (36.9 °C) 78 16 (!) 154/55 98 %       Intake/Output Summary (Last 24 hours) at 12/11/2022 0732  Last data filed at 12/11/2022 0357  Gross per 24 hour   Intake --   Output 850 ml   Net -850 ml        I had a face to face encounter and independently examined this patient on 12/11/2022, as outlined below:  PHYSICAL EXAM:  General: WD, WN. Alert, cooperative, no acute distress  R arm weakness  EENT:  EOMI. Anicteric sclerae. MMM lip with laceration   Resp:  CTA bilaterally, no wheezing or rales. No accessory muscle use  CV:  Regular  rhythm,  No edema  GI:  Soft, Non distended, Non tender. +Bowel sounds  Neurologic:  Alert and oriented X 3, normal speech,   Psych:   . Not anxious nor agitated  Skin:  No rashes.   No jaundice    Reviewed most current lab test results and cultures  YES  Reviewed most current radiology test results   YES  Review and summation of old records today    NO  Reviewed patient's current orders and MAR    YES  PMH/SH reviewed - no change compared to H&P  ________________________________________________________________________  Care Plan discussed with:    Comments   Patient x    Family  x    RN x    Care Manager     Consultant                        Multidiciplinary team rounds were held today with , nursing, pharmacist and clinical coordinator. Patient's plan of care was discussed; medications were reviewed and discharge planning was addressed. ________________________________________________________________________  Total NON critical care TIME:  30   Minutes    Total CRITICAL CARE TIME Spent:   Minutes non procedure based      Comments   >50% of visit spent in counseling and coordination of care     ________________________________________________________________________  Rosey Deng. Mynor Mckeon NP     Procedures: see electronic medical records for all procedures/Xrays and details which were not copied into this note but were reviewed prior to creation of Plan. LABS:  I reviewed today's most current labs and imaging studies. Pertinent labs include:  Recent Labs     12/10/22  1350 12/09/22  1322   WBC 6.2 7.4   HGB 9.4* 8.8*   HCT 29.2* 26.9*    295     Recent Labs     12/10/22  1350 12/09/22  1322    138   K 3.7 3.5    102   CO2 32 30   * 114*   BUN 12 16   CREA 1.30* 1.33*   CA 8.3* 8.4*   ALB  --  2.7*   TBILI  --  0.3   ALT  --  17   INR  --  1.1       Signed: Anel Mckeon NP

## 2023-06-03 ENCOUNTER — HEALTH MAINTENANCE LETTER (OUTPATIENT)
Age: 23
End: 2023-06-03
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
## 2023-06-16 ENCOUNTER — OFFICE VISIT (OUTPATIENT)
Dept: FAMILY MEDICINE | Facility: CLINIC | Age: 23
End: 2023-06-16
Payer: COMMERCIAL

## 2023-06-16 VITALS
TEMPERATURE: 98.3 F | BODY MASS INDEX: 21.21 KG/M2 | OXYGEN SATURATION: 97 % | DIASTOLIC BLOOD PRESSURE: 74 MMHG | WEIGHT: 152.04 LBS | HEART RATE: 77 BPM | SYSTOLIC BLOOD PRESSURE: 119 MMHG | RESPIRATION RATE: 16 BRPM

## 2023-06-16 DIAGNOSIS — F25.0 SCHIZOAFFECTIVE DISORDER, BIPOLAR TYPE (H): Primary | Chronic | ICD-10-CM

## 2023-06-16 DIAGNOSIS — F17.200 TOBACCO USE DISORDER: Chronic | ICD-10-CM

## 2023-06-16 DIAGNOSIS — Z87.898 HISTORY OF SUBSTANCE USE DISORDER: Chronic | ICD-10-CM

## 2023-06-16 DIAGNOSIS — E53.8 FOLATE DEFICIENCY: ICD-10-CM

## 2023-06-16 DIAGNOSIS — R80.9 URINE PROTEIN INCREASED: ICD-10-CM

## 2023-06-16 DIAGNOSIS — F20.9 SCHIZOPHRENIA, UNSPECIFIED TYPE (H): Chronic | ICD-10-CM

## 2023-06-16 PROBLEM — R00.0 SINUS TACHYCARDIA: Status: RESOLVED | Noted: 2022-03-26 | Resolved: 2023-06-16

## 2023-06-16 PROBLEM — F29 PSYCHOSIS, UNSPECIFIED PSYCHOSIS TYPE (H): Chronic | Status: RESOLVED | Noted: 2021-09-03 | Resolved: 2023-06-16

## 2023-06-16 PROBLEM — R20.0 LEFT ARM NUMBNESS: Chronic | Status: RESOLVED | Noted: 2021-12-09 | Resolved: 2023-06-16

## 2023-06-16 PROBLEM — F25.9: Status: RESOLVED | Noted: 2022-05-10 | Resolved: 2023-06-16

## 2023-06-16 PROBLEM — F29: Status: RESOLVED | Noted: 2022-05-28 | Resolved: 2023-06-16

## 2023-06-16 RX ORDER — LURASIDONE HYDROCHLORIDE 40 MG/1
40 TABLET, FILM COATED ORAL
COMMUNITY

## 2023-06-16 RX ORDER — FLUPHENAZINE HYDROCHLORIDE 10 MG/1
30 TABLET ORAL EVERY EVENING
COMMUNITY

## 2023-06-16 RX ORDER — LEVOMEFOLATE CALCIUM 7.5 MG
7.5 TABLET ORAL DAILY
Qty: 90 TABLET | Refills: 3 | Status: SHIPPED | OUTPATIENT
Start: 2023-06-16 | End: 2023-06-22

## 2023-06-16 RX ORDER — LURASIDONE HYDROCHLORIDE 120 MG/1
120 TABLET, FILM COATED ORAL
COMMUNITY

## 2023-06-16 RX ORDER — LAMOTRIGINE 200 MG/1
200 TABLET ORAL EVERY EVENING
COMMUNITY

## 2023-06-16 ASSESSMENT — ANXIETY QUESTIONNAIRES
7. FEELING AFRAID AS IF SOMETHING AWFUL MIGHT HAPPEN: NOT AT ALL
GAD7 TOTAL SCORE: 0
GAD7 TOTAL SCORE: 0
3. WORRYING TOO MUCH ABOUT DIFFERENT THINGS: NOT AT ALL
7. FEELING AFRAID AS IF SOMETHING AWFUL MIGHT HAPPEN: NOT AT ALL
1. FEELING NERVOUS, ANXIOUS, OR ON EDGE: NOT AT ALL
2. NOT BEING ABLE TO STOP OR CONTROL WORRYING: NOT AT ALL
GAD7 TOTAL SCORE: 0
IF YOU CHECKED OFF ANY PROBLEMS ON THIS QUESTIONNAIRE, HOW DIFFICULT HAVE THESE PROBLEMS MADE IT FOR YOU TO DO YOUR WORK, TAKE CARE OF THINGS AT HOME, OR GET ALONG WITH OTHER PEOPLE: NOT DIFFICULT AT ALL
IF YOU CHECKED OFF ANY PROBLEMS ON THIS QUESTIONNAIRE, HOW DIFFICULT HAVE THESE PROBLEMS MADE IT FOR YOU TO DO YOUR WORK, TAKE CARE OF THINGS AT HOME, OR GET ALONG WITH OTHER PEOPLE: NOT DIFFICULT AT ALL
6. BECOMING EASILY ANNOYED OR IRRITABLE: NOT AT ALL
5. BEING SO RESTLESS THAT IT IS HARD TO SIT STILL: NOT AT ALL
3. WORRYING TOO MUCH ABOUT DIFFERENT THINGS: NOT AT ALL
6. BECOMING EASILY ANNOYED OR IRRITABLE: NOT AT ALL
5. BEING SO RESTLESS THAT IT IS HARD TO SIT STILL: NOT AT ALL
2. NOT BEING ABLE TO STOP OR CONTROL WORRYING: NOT AT ALL
1. FEELING NERVOUS, ANXIOUS, OR ON EDGE: NOT AT ALL

## 2023-06-16 ASSESSMENT — PATIENT HEALTH QUESTIONNAIRE - PHQ9
5. POOR APPETITE OR OVEREATING: NOT AT ALL
5. POOR APPETITE OR OVEREATING: NOT AT ALL
SUM OF ALL RESPONSES TO PHQ QUESTIONS 1-9: 0

## 2023-06-16 ASSESSMENT — PAIN SCALES - GENERAL: PAINLEVEL: NO PAIN (0)

## 2023-06-16 NOTE — LETTER
"June 16, 2023    RE: Junior Fernández  [address] [place] [address]        Dear Mrs. Fernández,    I was sorry to hear that Cam's insurance company denied the appeal for the Deplin supplement. My recommendation is that we try a generic over the counter L-methylfolate supplement (7.5mg to 15mg once a day) and then in 3 months recheck his folate level and a \"homocysteine\" level to confirm that it is working as expected.    If these tests normalize, I would continue the supplement indefinitely.    I have placed orders for the labs and [facility] will schedule a \"lab visit\" for them in 3 months.    Thank you,          Darius Tamayo MD  9:33 AM, June 16, 2023    "

## 2023-06-16 NOTE — NURSING NOTE
"22 year old  Chief Complaint   Patient presents with     Physical     Patient is not fasting.     Medication Request     Requesting Deplin 7.5 mg.        Blood pressure 119/74, pulse 77, temperature 98.3  F (36.8  C), resp. rate 16, weight 69 kg (152 lb 0.6 oz), SpO2 97 %. Body mass index is 21.21 kg/m .  Patient Active Problem List   Diagnosis     History of substance use disorder     Psychosis, unspecified psychosis type (H)     Schizoaffective disorder, bipolar type (H)     Tobacco use disorder     Left arm numbness     Schizophrenia (H)     Anxiety     Sinus tachycardia     Other insomnia     Acute exacerbation of subchronic schizoaffective schizophrenia (H)     Psychotic disorder with hallucinations (H)       Wt Readings from Last 2 Encounters:   06/16/23 69 kg (152 lb 0.6 oz)   05/27/22 73.8 kg (162 lb 11.2 oz)     BP Readings from Last 3 Encounters:   06/16/23 119/74   05/27/22 117/68   05/26/22 131/88         Current Outpatient Medications   Medication     fluPHENAZine (PROLIXIN) 10 MG tablet     lamoTRIgine (LAMICTAL) 200 MG tablet     lurasidone (LATUDA) 120 MG TABS tablet     lurasidone (LATUDA) 40 MG TABS tablet     nicotine polacrilex (NICORETTE) 4 MG gum     cholecalciferol 50 MCG (2000 UT) tablet     divalproex sodium extended-release (DEPAKOTE ER) 250 MG 24 hr tablet     LORazepam (ATIVAN) 0.5 MG tablet     paliperidone (INVEGA SUSTENNA) 234 MG/1.5ML SEPIDEH     No current facility-administered medications for this visit.       Social History     Tobacco Use     Smoking status: Never     Smokeless tobacco: Never   Substance Use Topics     Alcohol use: Not Currently     Comment: 3-4 days ago     Drug use: Not Currently     Types: Marijuana, \"Crack\" cocaine     Comment: Used marijuanna 3 days ago, cocaine in feburary, vyvanse 3 days ago        Health Maintenance Due   Topic Date Due     ADVANCE CARE PLANNING  Never done     HIV SCREENING  Never done     HEPATITIS C SCREENING  Never done     YEARLY " PREVENTIVE VISIT  11/20/2018     COVID-19 Vaccine (4 - Moderna series) 09/19/2022       No results found for: RAHEEM Heath CMA, CMA  June 16, 2023 8:25 AM

## 2023-06-16 NOTE — PATIENT INSTRUCTIONS
"1) I would recommend you get one more COVID-19 booster. We don't stock them here. [facility], or [facility] pharmacy to schedule a vaccine appointment for it    2) I am sorry that your insurance company denied the appeal for the Deplin.  My recommendation is that we try a generic over the counter L-methylfolate supplement (7.5mg to 15mg once a day) and then in 3 months recheck your folate level and a \"homocysteine\" level.  If these tests normalize, I would continue the supplement indefinitely.    I have placed orders for the labs and you can schedule a \"lab visit\" for them in 3 months.  Please come fasting and don't take your folate supplement that morning (or the night before if you take at night)      "

## 2023-06-22 ENCOUNTER — MYC MEDICAL ADVICE (OUTPATIENT)
Dept: FAMILY MEDICINE | Facility: CLINIC | Age: 23
End: 2023-06-22

## 2023-06-22 DIAGNOSIS — E53.8 FOLATE DEFICIENCY: ICD-10-CM

## 2023-06-23 ENCOUNTER — TELEPHONE (OUTPATIENT)
Dept: FAMILY MEDICINE | Facility: CLINIC | Age: 23
End: 2023-06-23

## 2023-06-23 DIAGNOSIS — E53.8 FOLATE DEFICIENCY: ICD-10-CM

## 2023-06-23 NOTE — PROGRESS NOTES
Sending as electronic Rx to [facility], will fax physical copy to pt's care home. Will also initiate PA.    Dexter GUEVARA, RN  06/23/23 9:08 AM

## 2023-06-23 NOTE — TELEPHONE ENCOUNTER
Prior Authorization Retail Medication Request    Medication/Dose: L-methylfolate Calcium 15 MG TABS  ICD code (if different than what is on RX):    Previously Tried and Failed:    Rationale:      Insurance Name:    Insurance ID:        Pharmacy Information (if different than what is on RX)  Name:    Phone:     Dexter GUEVARA RN  06/23/23 9:17 AM

## 2023-06-27 ENCOUNTER — TELEPHONE (OUTPATIENT)
Dept: FAMILY MEDICINE | Facility: CLINIC | Age: 23
End: 2023-06-27

## 2023-06-27 DIAGNOSIS — E53.8 FOLATE DEFICIENCY: ICD-10-CM

## 2023-06-27 NOTE — TELEPHONE ENCOUNTER
Zo ( @ [phone]) called because she needs a new prescription for L- methylfolate 15 mg tabs for Cam. He was previously prescribed  L- methylfolate Calcium 15 mg tabs.   Cam's mother ordered L-methylfolate without Calcium (from [employer]), so Zo is asking if Dr. Tamayo will send a new prescription for it, but without the Calcium, so the prescription matches the bottle.    ISMAEL Conway, RN  06/27/23, 1:46 PM

## 2023-06-30 NOTE — TELEPHONE ENCOUNTER
PA Initiation    Medication: L-METHYLFOLATE CALCIUM 15 MG PO TABS  Insurance Company: [employer] - Phone [phone] Fax [phone]  Pharmacy Filling the Rx: [email or web address] [place] - [place], [place] - [address]  Filling Pharmacy Phone: [phone]  Filling Pharmacy Fax: [phone]  Start Date: 6/28/2023

## 2023-06-30 NOTE — TELEPHONE ENCOUNTER
PRIOR AUTHORIZATION DENIED    Medication: L-METHYLFOLATE CALCIUM 15 MG PO TABS    Insurance Company: [employer] - Phone [phone] Fax [phone]    Denial Date: 6/30/2023    Denial Rational: Excluded    Appeal Information:

## 2023-07-26 NOTE — PROGRESS NOTES
"PSYCHIATRY  PROGRESS NOTE     DATE OF SERVICE   4/5/2022         CHIEF COMPLAINT   \"Everyhing is good\"       SUBJECTIVE   Nursing reports :  Patient approached with AM Escitalopram; pt stated he did not want to take the medication because he is not depressed. Writer encouraged compliance and educated patient about the possible side effects of abruptly stopping an anti-depressant. Patient continued to refuse.     reports: Assessment/Intervention/Current Symptoms and Care Coordination I have reviewed patient with the  earlier today during IDT and the plan remains for patient to remain in the hospital for symptoms stabilization and medication management. Patient will tentatively discharge home after receiving the second Invega shot next week Monday.         OBJECTIVE   Chart reviewed and patient assessed. Patient was seen and evaluated in his room by himself while lying in bed resting. Patient presents as calm, pleasant and less guarded during this evaluation. Patient reports being tired after taking the Invega shot yesterday, wondering if med can be switched back to its oral form. Patient denies other side effect. Writer informed patient that both oral and injection forms are the same medication with the same ingredients as the only difference between the two is the length of time they last in the body. Writer told patient it is not uncommon for some people to experience tiredness after taking the injection for the first time as they will continue to tolerate the side effect as they continue to take the injection on a monthly basis. Patient denies suicidal and homicidal ideations. He also denies both auditory and visual hallucinations. He was observed smiling with himself apparently responding to internal stimuli, though denied being internally stimulated. Of note, the patient primary nurse informed writer this morning that patient refused to take his Lexapro despite encouragement as he " reportedly stated he is not depressed. Writer educated patient about the reason for ordering Lexapro of which includes targeting anxiety, depression and intrusive thoughts. Patient stated he is willing to take the medication if it helps with the intrusive thoughts. Per MAR review, he later took the scheduled AM lexapro. Patient also attended OT group this morning per chart review.        MEDICATIONS   Medications:  Scheduled Meds:    escitalopram  20 mg Oral Daily     [START ON 4/11/2022] paliperidone  156 mg Intramuscular Once     [START ON 5/9/2022] paliperidone  156 mg Intramuscular q28 days     Continuous Infusions:  PRN Meds:.acetaminophen, alum & mag hydroxide-simethicone, hydrALAZINE, hydrOXYzine, OLANZapine **OR** OLANZapine, senna-docusate, traZODone    Medication adherence issues: MS Med Adherence Y/N: No  Medication side effects: MEDICATION SIDE EFFECTS: no side effects reported  Benefit: Yes / No: Yes       ROS   A comprehensive review of systems was negative.       MENTAL STATUS EXAM   Vitals: BP (!) 151/86 (BP Location: Right arm, Patient Position: Sitting)   Pulse 101   Temp 98.7  F (37.1  C) (Oral)   Resp 18   SpO2 96%     Appearance:  Casually groomed  Mood:  {Mood: Depressed   Affect: blunted  was congruent to speech  Suicidal Ideation: PRESENT / ABSENT: absent   Homicidal Ideation: PRESENT / ABSENT: absent   Thought process: linear,no loose association noted   Thought content: denies suicidal and homicidal ideation, no delusion though endorses paranoid ideation.   Fund of Knowledge: Average  Attention/Concentration: Fair  Language ability:  Intact  Memory:  Immediate recall intact, Short-term memory intact and Long-term memory intact  Insight:  limited.  Judgement: limited  Orientation: Yes, x4  Psychomotor Behavior: denies tardive dyskinesia, dystonia or tics  Muscle Strength and Tone: MuscleStrength: Normal  Gait and Station: Normal       LABS   personally reviewed.     No results found for:  PHENYTOIN, PHENOBARB, VALPROATE, CBMZ       DIAGNOSIS   Principal Problem:    Schizoaffective disorder, bipolar type (H)    Active Problem List:  Patient Active Problem List   Diagnosis     History of substance use disorder     Psychosis, unspecified psychosis type (H)     Schizoaffective disorder, bipolar type (H)     Tobacco use disorder     Left arm numbness     Schizophrenia (H)     Anxiety     Sinus tachycardia     Other insomnia          PLAN   1. Ongoing education given regarding diagnostic and treatment options with risks, benefits and alternatives and adequate verbalization of understanding.  2. Medications: Invega 6 mg daily 3/28 discontinued 3/31/22,invega 9 mg daily starting 4/1. Discontinued 4/5 after receiving the first                          invega shot.                            Invega 234 mg shot was administered this day 4/5/22                            Lexapro 15 mg daily starting 3/29, titrated to 20 mg starting 4/5/22  3. Hospitalist consult: Hospitalist to see patient as needed  4. Legal: Committed and Nguyen  5.  to follow regarding collecting and reviewing collateral information, referrals and disposition planning      Risk Assessment: [facility] RISK ASSESSMENT: Patient able to contract for safety and Patient on precautions    Coordination of Care:   Treatment Plan reviewed and physician signed, Care discussed with Care/Treatment Team Members, Chart reviewed and Patient seen      Re-Certification I certify that the inpatient psychiatric facility services furnished since the previous certification were, and continue to be, medically necessary for, either, treatment which could reasonably be expected to improve the patient s condition or diagnostic study and that the hospital records indicate that the services furnished were, either, intensive treatment services, admission and related services necessary for diagnostic study, or equivalent services.     I certify that the  patient continues to need, on a daily basis, active treatment furnished directly by or requiring the supervision of inpatient psychiatric facility personnel.   I estimate about 10 days days of hospitalization is necessary for proper treatment of the patient. My plans for post-hospital care for this patient are  home with family     SELINA Gates CNP    -     4/5/2022     -     12:12 PM    Total time  25 minutes with > 50%spent on coordination of cares and psycho-education.    This note was created with help of Dragon dictation system. Grammatical / typing errors are not intentional.    SELINA Gates CNP          No

## 2023-09-16 NOTE — PROGRESS NOTES
ASSESSMENT AND PLAN:     (F25.0) Schizoaffective disorder, bipolar type (H)  (primary encounter diagnosis)  Comment: Chronic, stable  Plan: Following with [facility] for psychiatry and therapy    (E53.8) Folate deficiency  Comment: Chronic, stable, update level today. Last supplement taken last night.   Plan: Folate          (Z13.220) Screening cholesterol level  Comment: Due to atypical antipsychotic use.   Plan: Lipid panel reflex to direct LDL Non-fasting          (Z13.1) Screening for diabetes mellitus  Comment: Due to atypical antipsychotic use.   Plan: Hemoglobin A1c        Okay to follow up 1 year for annual preventive visit  Declined flu shot today  Discussed tobacco use - not ready to quit. I encouraged him to quit for his health. May have to quit in winter when living with parents. I let Cam know he can just message me for NRT at that time if needed/desired.     MD ABEL [facility]  09/18/2023, 11:40 AM      SUBJECTIVE:   Flynn is a 22 year old male who presents to clinic today for a return visit.    # Schizoaffective Disorder  - reports life has been good recently  - still living in group home  - playing a lot of [employer] online  - visiting parents more, planning to move with them soon but doesn't have a date    - has a new therapist, Richard Isabel with [facility]  - requirement for ending commitment  - Cam doesn't feel they have a lot to talk about    - denies any medication side effects, specifics GI symptoms, weight gain, anorgasmia, cognitive slowing / lyndsay fog, emotional flattening  - we discussed his previous concerns about being on medication but Cam reports he is comfortable continuing his current regimen  - doesn't feel that adding the L-methylfolate supplement improved any symptoms    Care Team  Therapist is with [facility] - Richard Isabel  Psychiatrist is with [facility] - had been Lupe Fuentes but had to meet with a new one, can't remember name    Mental  Health Case Management with [facility]: Richter ([phone])  ROHINI CM: Usha with [facility] ([phone])     Current Medication  Fluphenazine 30mg bedtime  Lamictal 200mg bedtime  Lurasidone 120mg at lunch and bedtime    Wt Readings from Last 10 Encounters:   09/18/23 69.9 kg (154 lb)   06/16/23 69 kg (152 lb 0.6 oz)   07/09/22 74.6 kg (164 lb 6.4 oz)   05/27/22 73.8 kg (162 lb 11.2 oz)   05/26/22 76.5 kg (168 lb 9.6 oz)   12/09/21 82.1 kg (181 lb)   09/16/21 73.1 kg (161 lb 1.6 oz)   07/17/20 71.6 kg (157 lb 12 oz)   06/18/20 70.8 kg (156 lb)   05/05/20 72.6 kg (160 lb)         Patient Active Problem List   Diagnosis     History of substance use disorder     Schizoaffective disorder, bipolar type (H)     Tobacco use disorder     Schizophrenia (H)     Anxiety     Other insomnia     Current Outpatient Medications   Medication     fluPHENAZine (PROLIXIN) 10 MG tablet     L-methylfolate Calcium 15 MG TABS     lamoTRIgine (LAMICTAL) 200 MG tablet     lurasidone (LATUDA) 120 MG TABS tablet     lurasidone (LATUDA) 40 MG TABS tablet     nicotine polacrilex (NICORETTE) 4 MG gum     No current facility-administered medications for this visit.       I have reviewed the patient's relevant past medical history.     OBJECTIVE:   /77 (BP Location: Left arm, Patient Position: Sitting, Cuff Size: Adult Regular)   Pulse 86   Temp 98.2  F (36.8  C) (Temporal)   Resp 16   Wt 69.9 kg (154 lb)   SpO2 100%   BMI 21.48 kg/m      Constitutional: well-appearing, appears stated age  Eyes: conjunctivae without erythema, sclera anicteric.   Skin: no rashes, lesions, or wounds  Psych: affect is full and appropriate, speech is fluent and non-pressured

## 2023-09-18 ENCOUNTER — OFFICE VISIT (OUTPATIENT)
Dept: FAMILY MEDICINE | Facility: CLINIC | Age: 23
End: 2023-09-18
Payer: COMMERCIAL

## 2023-09-18 VITALS
OXYGEN SATURATION: 100 % | WEIGHT: 154 LBS | HEART RATE: 86 BPM | SYSTOLIC BLOOD PRESSURE: 122 MMHG | RESPIRATION RATE: 16 BRPM | DIASTOLIC BLOOD PRESSURE: 77 MMHG | TEMPERATURE: 98.2 F | BODY MASS INDEX: 21.48 KG/M2

## 2023-09-18 DIAGNOSIS — Z13.220 SCREENING CHOLESTEROL LEVEL: ICD-10-CM

## 2023-09-18 DIAGNOSIS — Z13.1 SCREENING FOR DIABETES MELLITUS: ICD-10-CM

## 2023-09-18 DIAGNOSIS — E53.8 FOLATE DEFICIENCY: ICD-10-CM

## 2023-09-18 DIAGNOSIS — F25.0 SCHIZOAFFECTIVE DISORDER, BIPOLAR TYPE (H): Primary | Chronic | ICD-10-CM

## 2023-09-18 DIAGNOSIS — R80.9 URINE PROTEIN INCREASED: ICD-10-CM

## 2023-09-18 LAB
CHOLEST SERPL-MCNC: 182 MG/DL
CREAT UR-MCNC: 20.9 MG/DL
FOLATE SERPL-MCNC: >40 NG/ML (ref 4.6–34.8)
HBA1C MFR BLD: 5.2 % (ref 0–5.6)
HDLC SERPL-MCNC: 66 MG/DL
LDLC SERPL CALC-MCNC: 95 MG/DL
MICROALBUMIN UR-MCNC: <12 MG/L
MICROALBUMIN/CREAT UR: NORMAL MG/G{CREAT}
NONHDLC SERPL-MCNC: 116 MG/DL
TRIGL SERPL-MCNC: 104 MG/DL

## 2023-09-18 ASSESSMENT — ANXIETY QUESTIONNAIRES
7. FEELING AFRAID AS IF SOMETHING AWFUL MIGHT HAPPEN: NOT AT ALL
6. BECOMING EASILY ANNOYED OR IRRITABLE: NOT AT ALL
5. BEING SO RESTLESS THAT IT IS HARD TO SIT STILL: NOT AT ALL
1. FEELING NERVOUS, ANXIOUS, OR ON EDGE: NOT AT ALL
GAD7 TOTAL SCORE: 0
3. WORRYING TOO MUCH ABOUT DIFFERENT THINGS: NOT AT ALL
GAD7 TOTAL SCORE: 0
IF YOU CHECKED OFF ANY PROBLEMS ON THIS QUESTIONNAIRE, HOW DIFFICULT HAVE THESE PROBLEMS MADE IT FOR YOU TO DO YOUR WORK, TAKE CARE OF THINGS AT HOME, OR GET ALONG WITH OTHER PEOPLE: NOT DIFFICULT AT ALL
2. NOT BEING ABLE TO STOP OR CONTROL WORRYING: NOT AT ALL

## 2023-09-18 ASSESSMENT — PATIENT HEALTH QUESTIONNAIRE - PHQ9
SUM OF ALL RESPONSES TO PHQ QUESTIONS 1-9: 0
5. POOR APPETITE OR OVEREATING: NOT AT ALL

## 2023-09-18 NOTE — NURSING NOTE
"22 year old  Chief Complaint   Patient presents with    Follow Up       There were no vitals taken for this visit. There is no height or weight on file to calculate BMI.  Patient Active Problem List   Diagnosis    History of substance use disorder    Schizoaffective disorder, bipolar type (H)    Tobacco use disorder    Schizophrenia (H)    Anxiety    Other insomnia       Wt Readings from Last 2 Encounters:   06/16/23 69 kg (152 lb 0.6 oz)   05/27/22 73.8 kg (162 lb 11.2 oz)     BP Readings from Last 3 Encounters:   06/16/23 119/74   05/27/22 117/68   05/26/22 131/88         Current Outpatient Medications   Medication    fluPHENAZine (PROLIXIN) 10 MG tablet    L-methylfolate Calcium 15 MG TABS    lamoTRIgine (LAMICTAL) 200 MG tablet    lurasidone (LATUDA) 120 MG TABS tablet    lurasidone (LATUDA) 40 MG TABS tablet    nicotine polacrilex (NICORETTE) 4 MG gum     No current facility-administered medications for this visit.       Social History     Tobacco Use    Smoking status: Every Day     Types: Cigarettes    Smokeless tobacco: Never   Substance Use Topics    Alcohol use: Not Currently     Comment: 3-4 days ago    Drug use: Not Currently     Types: Marijuana, \"Crack\" cocaine     Comment: Used marijuanna 3 days ago, cocaine in feburary, vyvanse 3 days ago        Health Maintenance Due   Topic Date Due    ADVANCE CARE PLANNING  Never done    HIV SCREENING  Never done    HEPATITIS C SCREENING  Never done    YEARLY PREVENTIVE VISIT  11/20/2018    COVID-19 Vaccine (4 - Moderna series) 09/19/2022    INFLUENZA VACCINE (1) 09/01/2023       No results found for: PAP      September 18, 2023 11:01 AM    "

## 2023-10-13 ENCOUNTER — TELEPHONE (OUTPATIENT)
Dept: FAMILY MEDICINE | Facility: CLINIC | Age: 23
End: 2023-10-13

## 2023-10-13 DIAGNOSIS — G44.209 TENSION HEADACHE: Primary | ICD-10-CM

## 2023-10-13 RX ORDER — IBUPROFEN 200 MG
200-400 TABLET ORAL EVERY 6 HOURS PRN
Start: 2023-10-13 | End: 2023-12-20

## 2023-10-13 NOTE — TELEPHONE ENCOUNTER
Requesting a standing PRN order for ibuprofen 200 mg for headaches    Please fax to [phone]    Lesley

## 2023-12-20 ENCOUNTER — APPOINTMENT (OUTPATIENT)
Dept: CT IMAGING | Facility: CLINIC | Age: 23
End: 2023-12-20
Attending: SOCIAL WORKER
Payer: COMMERCIAL

## 2023-12-20 ENCOUNTER — HOSPITAL ENCOUNTER (OUTPATIENT)
Facility: CLINIC | Age: 23
Setting detail: OBSERVATION
Discharge: HOME | End: 2023-12-22
Attending: SOCIAL WORKER | Admitting: SOCIAL WORKER
Payer: COMMERCIAL

## 2023-12-20 ENCOUNTER — APPOINTMENT (OUTPATIENT)
Dept: GENERAL RADIOLOGY | Facility: CLINIC | Age: 23
End: 2023-12-20
Attending: SOCIAL WORKER
Payer: COMMERCIAL

## 2023-12-20 ENCOUNTER — TELEPHONE (OUTPATIENT)
Dept: BEHAVIORAL HEALTH | Facility: CLINIC | Age: 23
End: 2023-12-20

## 2023-12-20 DIAGNOSIS — F17.200 NICOTINE DEPENDENCE WITH CURRENT USE: ICD-10-CM
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
## 2023-12-20 DIAGNOSIS — F25.0 SCHIZOAFFECTIVE DISORDER, BIPOLAR TYPE (H): ICD-10-CM

## 2023-12-20 DIAGNOSIS — T14.91XA SUICIDE ATTEMPT (H): ICD-10-CM

## 2023-12-20 LAB
AMPHETAMINES UR QL SCN: NORMAL
ANION GAP SERPL CALCULATED.3IONS-SCNC: 11 MMOL/L (ref 7–15)
ATRIAL RATE - MUSE: 96 BPM
BARBITURATES UR QL SCN: NORMAL
BASOPHILS # BLD AUTO: 0.1 10E3/UL (ref 0–0.2)
BASOPHILS NFR BLD AUTO: 1 %
BENZODIAZ UR QL SCN: NORMAL
BUN SERPL-MCNC: 14.2 MG/DL (ref 6–20)
BZE UR QL SCN: NORMAL
CALCIUM SERPL-MCNC: 9.8 MG/DL (ref 8.6–10)
CANNABINOIDS UR QL SCN: NORMAL
CHLORIDE SERPL-SCNC: 105 MMOL/L (ref 98–107)
CREAT SERPL-MCNC: 0.95 MG/DL (ref 0.67–1.17)
DEPRECATED HCO3 PLAS-SCNC: 24 MMOL/L (ref 22–29)
DIASTOLIC BLOOD PRESSURE - MUSE: NORMAL MMHG
EGFRCR SERPLBLD CKD-EPI 2021: >90 ML/MIN/1.73M2
EOSINOPHIL # BLD AUTO: 0.1 10E3/UL (ref 0–0.7)
EOSINOPHIL NFR BLD AUTO: 1 %
ERYTHROCYTE [DISTWIDTH] IN BLOOD BY AUTOMATED COUNT: 11.8 % (ref 10–15)
FENTANYL UR QL: NORMAL
GLUCOSE SERPL-MCNC: 109 MG/DL (ref 70–99)
HCT VFR BLD AUTO: 44.7 % (ref 40–53)
HGB BLD-MCNC: 15.5 G/DL (ref 13.3–17.7)
IMM GRANULOCYTES # BLD: 0 10E3/UL
IMM GRANULOCYTES NFR BLD: 0 %
INTERPRETATION ECG - MUSE: NORMAL
LYMPHOCYTES # BLD AUTO: 1.8 10E3/UL (ref 0.8–5.3)
LYMPHOCYTES NFR BLD AUTO: 16 %
MCH RBC QN AUTO: 31.1 PG (ref 26.5–33)
MCHC RBC AUTO-ENTMCNC: 34.7 G/DL (ref 31.5–36.5)
MCV RBC AUTO: 90 FL (ref 78–100)
MONOCYTES # BLD AUTO: 0.6 10E3/UL (ref 0–1.3)
MONOCYTES NFR BLD AUTO: 5 %
NEUTROPHILS # BLD AUTO: 8.6 10E3/UL (ref 1.6–8.3)
NEUTROPHILS NFR BLD AUTO: 77 %
NRBC # BLD AUTO: 0 10E3/UL
NRBC BLD AUTO-RTO: 0 /100
OPIATES UR QL SCN: NORMAL
P AXIS - MUSE: 56 DEGREES
PCP QUAL URINE (ROCHE): NORMAL
PLATELET # BLD AUTO: 269 10E3/UL (ref 150–450)
POTASSIUM SERPL-SCNC: 4.4 MMOL/L (ref 3.4–5.3)
PR INTERVAL - MUSE: 134 MS
QRS DURATION - MUSE: 90 MS
QT - MUSE: 318 MS
QTC - MUSE: 401 MS
R AXIS - MUSE: 96 DEGREES
RBC # BLD AUTO: 4.98 10E6/UL (ref 4.4–5.9)
SODIUM SERPL-SCNC: 140 MMOL/L (ref 135–145)
SYSTOLIC BLOOD PRESSURE - MUSE: NORMAL MMHG
T AXIS - MUSE: 65 DEGREES
VENTRICULAR RATE- MUSE: 96 BPM
WBC # BLD AUTO: 11.1 10E3/UL (ref 4–11)

## 2023-12-20 PROCEDURE — 80307 DRUG TEST PRSMV CHEM ANLYZR: CPT | Performed by: SOCIAL WORKER

## 2023-12-20 PROCEDURE — 90715 TDAP VACCINE 7 YRS/> IM: CPT | Performed by: SOCIAL WORKER

## 2023-12-20 PROCEDURE — 250N000013 HC RX MED GY IP 250 OP 250 PS 637: Performed by: NURSE PRACTITIONER

## 2023-12-20 PROCEDURE — 90471 IMMUNIZATION ADMIN: CPT | Performed by: SOCIAL WORKER

## 2023-12-20 PROCEDURE — 250N000011 HC RX IP 250 OP 636: Performed by: SOCIAL WORKER

## 2023-12-20 PROCEDURE — 36415 COLL VENOUS BLD VENIPUNCTURE: CPT | Performed by: SOCIAL WORKER

## 2023-12-20 PROCEDURE — 250N000013 HC RX MED GY IP 250 OP 250 PS 637: Performed by: SOCIAL WORKER

## 2023-12-20 PROCEDURE — 80048 BASIC METABOLIC PNL TOTAL CA: CPT | Performed by: SOCIAL WORKER

## 2023-12-20 PROCEDURE — 70450 CT HEAD/BRAIN W/O DYE: CPT

## 2023-12-20 PROCEDURE — 93005 ELECTROCARDIOGRAM TRACING: CPT

## 2023-12-20 PROCEDURE — G0378 HOSPITAL OBSERVATION PER HR: HCPCS

## 2023-12-20 PROCEDURE — 99254 IP/OBS CNSLTJ NEW/EST MOD 60: CPT | Performed by: NURSE PRACTITIONER

## 2023-12-20 PROCEDURE — 71046 X-RAY EXAM CHEST 2 VIEWS: CPT

## 2023-12-20 PROCEDURE — 85025 COMPLETE CBC W/AUTO DIFF WBC: CPT | Performed by: SOCIAL WORKER

## 2023-12-20 PROCEDURE — 99285 EMERGENCY DEPT VISIT HI MDM: CPT | Mod: 25

## 2023-12-20 RX ORDER — LEVOMEFOLATE CALCIUM 15 MG
7.5 TABLET ORAL DAILY
COMMUNITY
End: 2023-12-22

## 2023-12-20 RX ORDER — LURASIDONE HYDROCHLORIDE 80 MG/1
160 TABLET, FILM COATED ORAL DAILY
Status: DISCONTINUED | OUTPATIENT
Start: 2023-12-20 | End: 2023-12-22 | Stop reason: HOSPADM

## 2023-12-20 RX ORDER — LAMOTRIGINE 100 MG/1
200 TABLET ORAL AT BEDTIME
Status: DISCONTINUED | OUTPATIENT
Start: 2023-12-20 | End: 2023-12-22 | Stop reason: HOSPADM

## 2023-12-20 RX ORDER — FLUPHENAZINE HYDROCHLORIDE 10 MG/1
30 TABLET ORAL AT BEDTIME
Status: DISCONTINUED | OUTPATIENT
Start: 2023-12-20 | End: 2023-12-22 | Stop reason: HOSPADM

## 2023-12-20 RX ORDER — ACETAMINOPHEN 500 MG
500 TABLET ORAL
Status: COMPLETED | OUTPATIENT
Start: 2023-12-20 | End: 2023-12-21

## 2023-12-20 RX ORDER — LEVOMEFOLATE CALCIUM 7.5 MG TABLET
15 TABLET AT BEDTIME
Status: DISCONTINUED | OUTPATIENT
Start: 2023-12-20 | End: 2023-12-22 | Stop reason: HOSPADM

## 2023-12-20 RX ADMIN — NICOTINE POLACRILEX 2 MG: 2 GUM, CHEWING ORAL at 18:14

## 2023-12-20 RX ADMIN — NICOTINE POLACRILEX 2 MG: 2 GUM, CHEWING ORAL at 20:33

## 2023-12-20 RX ADMIN — LAMOTRIGINE 200 MG: 100 TABLET ORAL at 21:54

## 2023-12-20 RX ADMIN — NICOTINE POLACRILEX 2 MG: 2 GUM, CHEWING ORAL at 19:20

## 2023-12-20 RX ADMIN — CLOSTRIDIUM TETANI TOXOID ANTIGEN (FORMALDEHYDE INACTIVATED), CORYNEBACTERIUM DIPHTHERIAE TOXOID ANTIGEN (FORMALDEHYDE INACTIVATED), BORDETELLA PERTUSSIS TOXOID ANTIGEN (GLUTARALDEHYDE INACTIVATED), BORDETELLA PERTUSSIS FILAMENTOUS HEMAGGLUTININ ANTIGEN (FORMALDEHYDE INACTIVATED), BORDETELLA PERTUSSIS PERTACTIN ANTIGEN, AND BORDETELLA PERTUSSIS FIMBRIAE 2/3 ANTIGEN 0.5 ML: 5; 2; 2.5; 5; 3; 5 INJECTION, SUSPENSION INTRAMUSCULAR at 13:41

## 2023-12-20 RX ADMIN — LEVOMEFOLATE CALCIUM 7.5 MG TABLET 15 MG: TABLET at 21:55

## 2023-12-20 RX ADMIN — NICOTINE POLACRILEX 2 MG: 2 GUM, CHEWING ORAL at 16:49

## 2023-12-20 RX ADMIN — NICOTINE POLACRILEX 2 MG: 2 GUM, CHEWING ORAL at 12:55

## 2023-12-20 RX ADMIN — NICOTINE POLACRILEX 2 MG: 2 GUM, CHEWING ORAL at 14:38

## 2023-12-20 RX ADMIN — NICOTINE POLACRILEX 2 MG: 2 GUM, CHEWING ORAL at 15:46

## 2023-12-20 RX ADMIN — LURASIDONE HYDROCHLORIDE 160 MG: 80 TABLET, COATED ORAL at 20:33

## 2023-12-20 RX ADMIN — FLUPHENAZINE HYDROCHLORIDE 30 MG: 10 TABLET, FILM COATED ORAL at 21:55

## 2023-12-20 ASSESSMENT — ACTIVITIES OF DAILY LIVING (ADL)
ADLS_ACUITY_SCORE: 37
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
## 2023-12-20 NOTE — ED NOTES
Zoranath explained to pt and father and they have agreed for pt to go to [facility]. Pt sts he is fine with giving his health information to parents

## 2023-12-20 NOTE — ED NOTES
IP MH Referral Acuity Rating Score (RARS)    LMHP complete at referral to IP MH, with DEC; and, daily while awaiting IP MH placement. Call score to PPS.  CRITERIA SCORING   New 72 HH and Involuntary for IP MH (not adolescent) 0/1   Boarding over 24 hours 0/1   Vulnerable adult at least 55+ with multiple co morbidities; or, Patient age 11 or under 0/1   Suicide ideation without relief of precipitating factors 1/1   Current plan for suicide 0/1   Current plan for homicide 0/1   Imminent risk or actual attempt to seriously harm another without relief of factors precipitating the attempt 0/1   Severe dysfunction in daily living (ex: complete neglect for self care, extreme disruption in vegetative function, extreme deterioration in social interactions) 1/1   Recent (last 2 weeks) or current physical aggression in the ED 0/1   Restraints or seclusion episode in ED 0/1   Verbal aggression, agitation, yelling, etc., while in the ED 0/1   Active psychosis with psychomotor agitation or catatonia 0/1   Need for constant or near constant redirection (from leaving, from others, etc).  0/1   Intrusive or disruptive behaviors 0/1   TOTAL Acuity Total Score: 2
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
## 2023-12-20 NOTE — ED NOTES
Bed: ED16  Expected date:   Expected time:   Means of arrival:   Comments:  [facility] [place] - 416 - 23 M suicidal eta 1139

## 2023-12-20 NOTE — ED NOTES
[facility]  ED to [facility] Checklist:      Goal for [facility]: Suicidality    Current Behavior: Combative and Flat Affect    Safety Concerns: Suicidal, with a plan to    Legal Hold Status: [facility]    Medically Cleared by ED provider: Yes    Patient Therapeutically Searched: Therapeutic search by ED staff (strings, belts, shoes, pockets, electronics, etc.)    Belongings: In room locker    Independent Ambulation at Baseline: Yes/No: Yes    Participates in Care/Conversation: Yes/No: Yes    Patient Informed about [facility]: Yes/No: Yes    DEC: Ordered and completed    Patient Ready to be Transferred to [facility]? Yes/No: Yes

## 2023-12-21 ENCOUNTER — TELEPHONE (OUTPATIENT)
Dept: BEHAVIORAL HEALTH | Facility: CLINIC | Age: 23
End: 2023-12-21
Payer: COMMERCIAL

## 2023-12-21 LAB — SARS-COV-2 RNA RESP QL NAA+PROBE: NEGATIVE

## 2023-12-21 PROCEDURE — 99232 SBSQ HOSP IP/OBS MODERATE 35: CPT | Performed by: NURSE PRACTITIONER

## 2023-12-21 PROCEDURE — G0378 HOSPITAL OBSERVATION PER HR: HCPCS

## 2023-12-21 PROCEDURE — 87635 SARS-COV-2 COVID-19 AMP PRB: CPT | Performed by: PSYCHIATRY & NEUROLOGY

## 2023-12-21 PROCEDURE — 250N000013 HC RX MED GY IP 250 OP 250 PS 637: Performed by: SOCIAL WORKER

## 2023-12-21 PROCEDURE — 250N000013 HC RX MED GY IP 250 OP 250 PS 637: Performed by: NURSE PRACTITIONER

## 2023-12-21 RX ADMIN — NICOTINE POLACRILEX 2 MG: 2 GUM, CHEWING ORAL at 19:29

## 2023-12-21 RX ADMIN — NICOTINE POLACRILEX 2 MG: 2 GUM, CHEWING ORAL at 21:39

## 2023-12-21 RX ADMIN — ACETAMINOPHEN 500 MG: 500 TABLET ORAL at 07:34

## 2023-12-21 RX ADMIN — NICOTINE POLACRILEX 2 MG: 2 GUM, CHEWING ORAL at 16:13

## 2023-12-21 RX ADMIN — NICOTINE POLACRILEX 2 MG: 2 GUM, CHEWING ORAL at 18:18

## 2023-12-21 RX ADMIN — NICOTINE POLACRILEX 2 MG: 2 GUM, CHEWING ORAL at 17:17

## 2023-12-21 RX ADMIN — NICOTINE POLACRILEX 2 MG: 2 GUM, CHEWING ORAL at 09:37

## 2023-12-21 RX ADMIN — NICOTINE POLACRILEX 2 MG: 2 GUM, CHEWING ORAL at 08:36

## 2023-12-21 RX ADMIN — LURASIDONE HYDROCHLORIDE 160 MG: 80 TABLET, COATED ORAL at 11:51

## 2023-12-21 RX ADMIN — LEVOMEFOLATE CALCIUM 7.5 MG TABLET 15 MG: TABLET at 22:40

## 2023-12-21 RX ADMIN — NICOTINE POLACRILEX 2 MG: 2 GUM, CHEWING ORAL at 10:46

## 2023-12-21 RX ADMIN — NICOTINE POLACRILEX 2 MG: 2 GUM, CHEWING ORAL at 20:34

## 2023-12-21 RX ADMIN — NICOTINE POLACRILEX 2 MG: 2 GUM, CHEWING ORAL at 05:33

## 2023-12-21 RX ADMIN — NICOTINE POLACRILEX 2 MG: 2 GUM, CHEWING ORAL at 14:05

## 2023-12-21 RX ADMIN — NICOTINE POLACRILEX 2 MG: 2 GUM, CHEWING ORAL at 07:34

## 2023-12-21 RX ADMIN — NICOTINE POLACRILEX 2 MG: 2 GUM, CHEWING ORAL at 12:59

## 2023-12-21 RX ADMIN — LAMOTRIGINE 200 MG: 100 TABLET ORAL at 21:35

## 2023-12-21 RX ADMIN — NICOTINE POLACRILEX 2 MG: 2 GUM, CHEWING ORAL at 15:11

## 2023-12-21 RX ADMIN — FLUPHENAZINE HYDROCHLORIDE 30 MG: 10 TABLET, FILM COATED ORAL at 21:34

## 2023-12-21 RX ADMIN — NICOTINE POLACRILEX 2 MG: 2 GUM, CHEWING ORAL at 22:41

## 2023-12-21 RX ADMIN — NICOTINE POLACRILEX 2 MG: 2 GUM, CHEWING ORAL at 11:52

## 2023-12-21 ASSESSMENT — COLUMBIA-SUICIDE SEVERITY RATING SCALE - C-SSRS
6. HAVE YOU EVER DONE ANYTHING, STARTED TO DO ANYTHING, OR PREPARED TO DO ANYTHING TO END YOUR LIFE?: NO
ATTEMPT SINCE LAST CONTACT: NO
TOTAL  NUMBER OF ABORTED OR SELF INTERRUPTED ATTEMPTS SINCE LAST CONTACT: NO
MOST LETHAL DATE: 66828
TOTAL  NUMBER OF INTERRUPTED ATTEMPTS SINCE LAST CONTACT: NO
SUICIDE, SINCE LAST CONTACT: NO
1. SINCE LAST CONTACT, HAVE YOU WISHED YOU WERE DEAD OR WISHED YOU COULD GO TO SLEEP AND NOT WAKE UP?: NO
2. HAVE YOU ACTUALLY HAD ANY THOUGHTS OF KILLING YOURSELF?: NO
LETHALITY/MEDICAL DAMAGE CODE MOST LETHAL ACTUAL ATTEMPT: MODERATE PHYSICAL DAMAGE, MEDICAL ATTENTION NEEDED

## 2023-12-21 ASSESSMENT — ACTIVITIES OF DAILY LIVING (ADL)
ADLS_ACUITY_SCORE: 37
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
## 2023-12-21 NOTE — PROGRESS NOTES
[facility] Progress Note    Client Name: Junior Fernández  Date: December 20, 2023  Service Type:  Group Therapy  Facilitator:LORENA Bergman, [facility]          Response:  Patient did not participate in group.      SEA Hawk

## 2023-12-21 NOTE — ED NOTES
Pt appears to have slept/rested the majority of the noc shift in sensory room B, up briefly this morning for nicotine gum and back to bed. Respirations even and unlabored during 15 minute safety checks, no acute events noted.

## 2023-12-21 NOTE — TELEPHONE ENCOUNTER
R: [place]  Access Inpatient Bed Call Log 12/21/23 @ 1:00am   Intake has called facilities that have not updated their bed status within the last 12 hours.??     *[facility]:  [place] -- [facility]: @ cap per website.  [place] -- [facility]:  @ cap per website.  [place] -- [facility]: @ cap per website.  [place] -- [facility]: @ cap per website. Low acuity only.  [place] -- [facility]: @ cap per website.  [facility]: @ cap per website.  [facility]/ beds - POSTING 2 BEDS. Ages 18-25, Voluntary only, NO aggression/physical/sexual assault, violence hx or drug abuse. Negative Covid.  [place] -- [facility]: @ cap per website.  [place] -- [facility]: @ cap per website.  [place] -- [facility]: @ cap per website. Not reviewing until 10AM.     Pt remains on waitlist pending appropriate placement availability

## 2023-12-21 NOTE — TELEPHONE ENCOUNTER
R: [place]  Access Inpatient Bed Call Log 12/20/23 @11:30 pm:   Intake can search *[facility] for placement:    No available beds within the [facility] system.    [place] -- [facility]:  @ cap per website.  [place] -- [facility]: @cap per website  [place] -- [facility]: @ cap per website.  [place] -- [facility]: @cap per website.  [facility] -- [facility]: @ cap per website.  [place] [place] -- [facility]/ beds - 3 beds for Young Adults aged 18-26.  [place] -- [facility]: @ cap per website.  [place] -- [facility]: @ cap per website.  [place] -- [facility]:  0 beds    Pt remains on waitlist pending appropriate placement availability.

## 2023-12-21 NOTE — PROGRESS NOTES
Pt visible on unit watching tv and resting in the morning and pacing unit during the afternoon. Pt utilizing nicotine gum every hour. Denies HI, SI, and hallucinations. Pt rarely approached staff with an exception of asking for nicotine gum and on one occasion where he inquired about the score of last nights timerwnatanael noe. Pt is cooperative with a flat affect.

## 2023-12-21 NOTE — TELEPHONE ENCOUNTER
4:05 PM PPS receive call from [facility] that patient was declined d/t needing ICU level of care. WL updated.

## 2023-12-21 NOTE — CARE PLAN
Triage & Transition Services, Extended Care     Client Name: Junior Fernández    Date: December 21, 2023    Patient was seen    Mode of Assessment:      Service Type: (P) refused to attend group session  Session Start Time:  (P) 1010    Session End Time: (P) 1040  Session Length: (P) 30  Site Location: [facility] EMERGENCY DEPT                             [id number]  Total Number ofAttendees: (P) 4  Topic:   (P) emotions/expression, coping skills/lifestyle management   Response: (P) other (see comments) (pt refused to attend group)     Reanna Jennings [facility]   Licensed Mental Health Professional (LMHP), Extended Care  [phone]
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
## 2023-12-21 NOTE — PROGRESS NOTES
"Pt VSS. Pt is flat, indifferent and cooperative. Writer asked patient how he was feeling and he said \"I guess being here is better than being inpatient, so I'm fine\". Pt denies any current suicidal ideations or intent to writer this morning.   "

## 2023-12-22 ENCOUNTER — TELEPHONE (OUTPATIENT)
Dept: BEHAVIORAL HEALTH | Facility: CLINIC | Age: 23
End: 2023-12-22
Payer: COMMERCIAL

## 2023-12-22 VITALS
DIASTOLIC BLOOD PRESSURE: 82 MMHG | RESPIRATION RATE: 16 BRPM | BODY MASS INDEX: 20.23 KG/M2 | WEIGHT: 144.5 LBS | SYSTOLIC BLOOD PRESSURE: 131 MMHG | HEIGHT: 71 IN | TEMPERATURE: 97.5 F | HEART RATE: 95 BPM | OXYGEN SATURATION: 95 %

## 2023-12-22 PROCEDURE — 250N000013 HC RX MED GY IP 250 OP 250 PS 637: Performed by: NURSE PRACTITIONER

## 2023-12-22 PROCEDURE — G0378 HOSPITAL OBSERVATION PER HR: HCPCS

## 2023-12-22 PROCEDURE — 250N000013 HC RX MED GY IP 250 OP 250 PS 637: Performed by: SOCIAL WORKER

## 2023-12-22 PROCEDURE — 99239 HOSP IP/OBS DSCHRG MGMT >30: CPT | Performed by: NURSE PRACTITIONER

## 2023-12-22 RX ORDER — LEVOMEFOLATE CALCIUM 15 MG
15 TABLET ORAL DAILY
Qty: 30 TABLET | Refills: 0 | Status: SHIPPED | OUTPATIENT
Start: 2023-12-22

## 2023-12-22 RX ADMIN — NICOTINE POLACRILEX 2 MG: 2 GUM, CHEWING ORAL at 06:38

## 2023-12-22 RX ADMIN — NICOTINE POLACRILEX 2 MG: 2 GUM, CHEWING ORAL at 08:51

## 2023-12-22 RX ADMIN — LURASIDONE HYDROCHLORIDE 160 MG: 80 TABLET, COATED ORAL at 11:24

## 2023-12-22 RX ADMIN — NICOTINE POLACRILEX 2 MG: 2 GUM, CHEWING ORAL at 11:24

## 2023-12-22 RX ADMIN — NICOTINE POLACRILEX 2 MG: 2 GUM, CHEWING ORAL at 07:46

## 2023-12-22 RX ADMIN — NICOTINE POLACRILEX 2 MG: 2 GUM, CHEWING ORAL at 14:16

## 2023-12-22 RX ADMIN — NICOTINE POLACRILEX 2 MG: 2 GUM, CHEWING ORAL at 13:06

## 2023-12-22 RX ADMIN — NICOTINE POLACRILEX 2 MG: 2 GUM, CHEWING ORAL at 09:59

## 2023-12-22 ASSESSMENT — ACTIVITIES OF DAILY LIVING (ADL)
ADLS_ACUITY_SCORE: 37

## 2023-12-22 ASSESSMENT — COLUMBIA-SUICIDE SEVERITY RATING SCALE - C-SSRS
2. HAVE YOU ACTUALLY HAD ANY THOUGHTS OF KILLING YOURSELF?: NO
TOTAL  NUMBER OF ABORTED OR SELF INTERRUPTED ATTEMPTS SINCE LAST CONTACT: NO
TOTAL  NUMBER OF INTERRUPTED ATTEMPTS SINCE LAST CONTACT: NO
1. SINCE LAST CONTACT, HAVE YOU WISHED YOU WERE DEAD OR WISHED YOU COULD GO TO SLEEP AND NOT WAKE UP?: NO
6. HAVE YOU EVER DONE ANYTHING, STARTED TO DO ANYTHING, OR PREPARED TO DO ANYTHING TO END YOUR LIFE?: NO
ATTEMPT SINCE LAST CONTACT: NO
SUICIDE, SINCE LAST CONTACT: NO
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
## 2023-12-22 NOTE — DISCHARGE INSTRUCTIONS
Type: Teletherapy  Date: Saturday, 12/30/2023  Time: 3:00 pm - 4:00 pm  Provider: Ar Domínguez  Location: [facility], [address], [address], [place] [address]  Phone: [phone]  Patient Instructions:  Please watch your email for instructions.  If you do not hear from them by early next week, please call the clinic to confirm appointment and log in information.   I also put a note in the referral to explore add-on family therapy as well.         Additional Information  Today you were seen by a licensed mental health professional through Triage and Transition services, [facility] ([facility])  for a crisis assessment in the Emergency Department at [facility].  It is recommended that you follow up with your established providers (psychiatrist, mental health therapist, and/or primary care doctor - as relevant) as soon as possible. Coordinators from [facility] will be calling you in the next 24-48 hours to ensure that you have the resources you need.  You can also contact [facility] coordinators directly at [phone]. You may have been scheduled for or offered an appointment with a mental health provider. [facility] maintains an extensive network of licensed behavioral health providers to connect patients with the services they need.  We do not charge providers a fee to participate in our referral network.  We match patients with providers based on a patient's specific needs, insurance coverage, and location.  Our first effort will be to refer you to a provider within your care system, and will utilize providers outside your care system as needed.     Recommend [facility] Family to Family Program for family members: [email or web address]
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
## 2023-12-22 NOTE — TELEPHONE ENCOUNTER
[place]: [place]  Access Inpatient Bed Call Log 12/22/23 @ 1:00am   Intake has called facilities that have not updated their bed status within the last 12 hours.??     *METRO:  [place] -- [facility]: @ cap per website.  [place] -- [facility]:  @ cap per website.  [place] -- [facility]: @ cap per website.  [place] -- [facility]: @ cap per website. Low acuity only.  [place] -- [facility]: @ cap per website.  [facility] -- [facility]: @ cap per website.  [facility]/ beds - POSTING 4 BEDS. Ages 18-25, Voluntary only, NO aggression/physical/sexual assault, violence hx or drug abuse. Negative Covid.  [place] -- [facility]: @ cap per website.  [place] -- [facility]: @ cap per website.  [place] -- [facility]: @ cap per website. Not reviewing until 10AM.     Pt remains on waitlist pending appropriate placement availability

## 2023-12-22 NOTE — ED NOTES
Patient agreeable to discharge plan. Discharge instructions reviewed with patient including follow-up care plan. Medications: sent to patient's pharmacy. Reviewed safety plan and outpatient resources. Denies SI and HI. All belongings that were brought into the hospital have been returned to patient. Escorted off the unit at 1555 accompanied by [facility] staff. Discharged to home via mother.

## 2023-12-22 NOTE — PROGRESS NOTES
"Pt VSS. Pt visible on the unit pacing and watching TV. Writer asked patient if he was restless, pt said, \"no, I'm just walking to give me something to do\". Pt denies SI, HI, and hallucinations. Pt has flat affect, is cooperative, and frequently asks for nicotine gum.   "

## 2023-12-22 NOTE — CARE PLAN
Triage & Transition Services, Extended Care     Client Name: Junior Fernández    Date: December 22, 2023    Patient was seen    Mode of Assessment:      Service Type: (P) refused to attend group session  Session Start Time:  (P) 1100    Session End Time: (P) 1130  Session Length: (P) 30  Site Location: [facility] EMERGENCY DEPT                             EMP05  Total Number ofAttendees: (P) 4  Topic:   (P) coping skills/lifestyle management, problem-solving   Response: (P) other (see comments) (pt declined to participate)     KARLO Novak   Licensed Mental Health Professional (LMHP), Extended Care  [phone]

## 2023-12-22 NOTE — ED NOTES
Pt has zach pacing for the majority of the shift. Patient however is currently resting in his recliner with his eyes closed, respirations are even. Patient is in progress to be discharge and no more going IP. Pharmacy has already been informed about not dispensing his night time medication Deplin.
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
## 2023-12-22 NOTE — TELEPHONE ENCOUNTER
R: [place]  Access Inpatient Bed Call Log 12/22/23 9:00 AM    Intake has called facilities that have not updated the bed status within the last 12 hours.                             [facility] is at capacity           [facility] is posting 0 beds. [phone]  [facility] is posting 0 beds. Negative covid required. Per call @8:53am, can call back after 10am                [facility] is posting 0 beds. Neg covid. No high school or [facility]. [phone]  [facility] is posting 0 beds. [phone]  [facility] is posting 0 beds. [phone]  [facility] is posting 2 beds for Young Adults age 18-25. Negative covid. [phone] Per call @8:42am, a couple beds available.  Pt declined earlier due to needing ICU level of care   [facility] is posting 0 beds          [facility] is posting 0 beds [phone]    Pt remains on the work list pending appropriate bed availability.

## 2023-12-22 NOTE — ED NOTES
Pt appears to have slept/rested during the noc shift in his assigned recliner, respirations even and unlabored during 15 minutes safety checks.

## 2023-12-22 NOTE — PROGRESS NOTES
Patient has been visible on the unit and  and mostly kept to himself. He was observed in the milieu, pacing for the majority of the evening. Patient denied suicidal ideation. He also denied any visual or auditory  hallucinations. Patient has been medication compliant this shift. No observed medication side effects. Patient denied any physical pains. Appetite is fair. Patient was compliant with vitals, which are stable. Nursing will continue to monitor and support.
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
## 2023-12-22 NOTE — TELEPHONE ENCOUNTER
R: [place]  Access Inpatient Bed Call Log 12/21/23 @10:30 pm:   Intake can search *MET[facility] for placement:  No available beds within the [facility] system.  [place] -- [facility]:  @ cap per website.  [place] -- [facility]: @cap per website  [place] -- [facility]: @ cap per website.  [place] -- [facility]: @cap per website.  [facility] -- [facility]: @ cap per website.  [place] Valdez -- [facility]/ beds - 5 beds for Young Adults aged 18-26.  Pt declined due to needing ICU level of care.   [place] -- [facility]: @ cap per website.  [place] -- [facility]: @ cap per website.  [place] -- [facility]:  0 beds    Pt remains on waitlist pending appropriate placement availability.
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
## 2024-04-14 ENCOUNTER — HOSPITAL ENCOUNTER (EMERGENCY)
Facility: CLINIC | Age: 24
Discharge: HOME | End: 2024-04-16
Attending: EMERGENCY MEDICINE | Admitting: EMERGENCY MEDICINE
Payer: COMMERCIAL

## 2024-04-14 DIAGNOSIS — Z86.59 HISTORY OF SCHIZOPHRENIA: ICD-10-CM

## 2024-04-14 DIAGNOSIS — Z91.148 NONCOMPLIANCE WITH MEDICATION REGIMEN: ICD-10-CM

## 2024-04-14 DIAGNOSIS — Z72.89 SELF-INJURIOUS BEHAVIOR: ICD-10-CM

## 2024-04-14 DIAGNOSIS — R45.850 HOMICIDAL IDEATION: ICD-10-CM

## 2024-04-14 LAB
HOLD SPECIMEN: NORMAL
HOLD SPECIMEN: NORMAL

## 2024-04-14 PROCEDURE — 36415 COLL VENOUS BLD VENIPUNCTURE: CPT | Performed by: EMERGENCY MEDICINE

## 2024-04-14 PROCEDURE — 99285 EMERGENCY DEPT VISIT HI MDM: CPT

## 2024-04-14 PROCEDURE — 250N000013 HC RX MED GY IP 250 OP 250 PS 637: Performed by: EMERGENCY MEDICINE

## 2024-04-14 PROCEDURE — 80175 DRUG SCREEN QUAN LAMOTRIGINE: CPT | Performed by: EMERGENCY MEDICINE

## 2024-04-14 RX ORDER — OLANZAPINE 10 MG/1
10 TABLET, ORALLY DISINTEGRATING ORAL 2 TIMES DAILY PRN
Status: DISCONTINUED | OUTPATIENT
Start: 2024-04-14 | End: 2024-04-16 | Stop reason: HOSPADM

## 2024-04-14 RX ORDER — HYDROXYZINE HYDROCHLORIDE 25 MG/1
25 TABLET, FILM COATED ORAL
Status: DISCONTINUED | OUTPATIENT
Start: 2024-04-14 | End: 2024-04-16 | Stop reason: HOSPADM

## 2024-04-14 RX ORDER — LORAZEPAM 0.5 MG/1
0.5 TABLET ORAL ONCE
Status: COMPLETED | OUTPATIENT
Start: 2024-04-14 | End: 2024-04-14

## 2024-04-14 RX ADMIN — LORAZEPAM 0.5 MG: 0.5 TABLET ORAL at 21:17

## 2024-04-14 ASSESSMENT — ACTIVITIES OF DAILY LIVING (ADL)
ADLS_ACUITY_SCORE: 37
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
## 2024-04-15 VITALS
RESPIRATION RATE: 18 BRPM | SYSTOLIC BLOOD PRESSURE: 117 MMHG | OXYGEN SATURATION: 99 % | DIASTOLIC BLOOD PRESSURE: 86 MMHG | TEMPERATURE: 98.2 F | HEART RATE: 79 BPM

## 2024-04-15 PROCEDURE — 250N000013 HC RX MED GY IP 250 OP 250 PS 637: Performed by: EMERGENCY MEDICINE

## 2024-04-15 PROCEDURE — 250N000013 HC RX MED GY IP 250 OP 250 PS 637: Performed by: NURSE PRACTITIONER

## 2024-04-15 PROCEDURE — 250N000013 HC RX MED GY IP 250 OP 250 PS 637: Performed by: PSYCHIATRY & NEUROLOGY

## 2024-04-15 PROCEDURE — 99223 1ST HOSP IP/OBS HIGH 75: CPT | Performed by: PSYCHIATRY & NEUROLOGY

## 2024-04-15 RX ORDER — LURASIDONE HYDROCHLORIDE 20 MG/1
40 TABLET, FILM COATED ORAL DAILY
Status: DISCONTINUED | OUTPATIENT
Start: 2024-04-15 | End: 2024-04-15

## 2024-04-15 RX ORDER — LEVOMEFOLATE CALCIUM 7.5 MG TABLET
15 TABLET DAILY
Status: DISCONTINUED | OUTPATIENT
Start: 2024-04-15 | End: 2024-04-16 | Stop reason: HOSPADM

## 2024-04-15 RX ORDER — DIAZEPAM 5 MG
5-10 TABLET ORAL 2 TIMES DAILY
Status: DISCONTINUED | OUTPATIENT
Start: 2024-04-15 | End: 2024-04-16 | Stop reason: HOSPADM

## 2024-04-15 RX ORDER — LAMOTRIGINE 100 MG/1
200 TABLET ORAL EVERY EVENING
Status: DISCONTINUED | OUTPATIENT
Start: 2024-04-15 | End: 2024-04-16 | Stop reason: HOSPADM

## 2024-04-15 RX ORDER — LURASIDONE HYDROCHLORIDE 120 MG/1
120 TABLET, FILM COATED ORAL DAILY
Status: DISCONTINUED | OUTPATIENT
Start: 2024-04-15 | End: 2024-04-15

## 2024-04-15 RX ORDER — LURASIDONE HYDROCHLORIDE 80 MG/1
160 TABLET, FILM COATED ORAL DAILY
Status: DISCONTINUED | OUTPATIENT
Start: 2024-04-15 | End: 2024-04-16 | Stop reason: HOSPADM

## 2024-04-15 RX ORDER — FLUOXETINE 10 MG/1
10 CAPSULE ORAL DAILY
COMMUNITY

## 2024-04-15 RX ORDER — BENZTROPINE MESYLATE 1 MG/1
1 TABLET ORAL DAILY PRN
COMMUNITY

## 2024-04-15 RX ORDER — FLUPHENAZINE HYDROCHLORIDE 5 MG/1
30 TABLET ORAL EVERY EVENING
Status: DISCONTINUED | OUTPATIENT
Start: 2024-04-15 | End: 2024-04-16 | Stop reason: HOSPADM

## 2024-04-15 RX ORDER — FLUOXETINE 10 MG/1
10 CAPSULE ORAL DAILY
Status: DISCONTINUED | OUTPATIENT
Start: 2024-04-15 | End: 2024-04-16 | Stop reason: HOSPADM

## 2024-04-15 RX ORDER — DIAZEPAM 5 MG
5-10 TABLET ORAL 2 TIMES DAILY
Status: DISCONTINUED | OUTPATIENT
Start: 2024-04-15 | End: 2024-04-15

## 2024-04-15 RX ADMIN — FLUOXETINE 10 MG: 10 CAPSULE ORAL at 16:32

## 2024-04-15 RX ADMIN — NICOTINE POLACRILEX 4 MG: 2 GUM, CHEWING BUCCAL at 23:57

## 2024-04-15 RX ADMIN — NICOTINE POLACRILEX 4 MG: 2 GUM, CHEWING BUCCAL at 14:35

## 2024-04-15 RX ADMIN — LEVOMEFOLATE CALCIUM 7.5 MG TABLET 15 MG: TABLET at 19:47

## 2024-04-15 RX ADMIN — LURASIDONE HYDROCHLORIDE 160 MG: 80 TABLET, COATED ORAL at 16:32

## 2024-04-15 RX ADMIN — LAMOTRIGINE 200 MG: 100 TABLET ORAL at 19:42

## 2024-04-15 RX ADMIN — FLUPHENAZINE HYDROCHLORIDE 30 MG: 5 TABLET, FILM COATED ORAL at 19:42

## 2024-04-15 RX ADMIN — DIAZEPAM 5 MG: 5 TABLET ORAL at 17:03

## 2024-04-15 RX ADMIN — NICOTINE POLACRILEX 4 MG: 2 GUM, CHEWING BUCCAL at 17:03

## 2024-04-15 RX ADMIN — NICOTINE POLACRILEX 2 MG: 2 GUM, CHEWING BUCCAL at 10:36

## 2024-04-15 RX ADMIN — NICOTINE POLACRILEX 2 MG: 2 GUM, CHEWING BUCCAL at 00:49

## 2024-04-15 RX ADMIN — NICOTINE POLACRILEX 2 MG: 2 GUM, CHEWING BUCCAL at 12:14

## 2024-04-15 RX ADMIN — NICOTINE POLACRILEX 4 MG: 2 GUM, CHEWING BUCCAL at 19:43

## 2024-04-15 ASSESSMENT — ACTIVITIES OF DAILY LIVING (ADL)
ADLS_ACUITY_SCORE: 37
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
## 2024-04-15 NOTE — DISCHARGE INSTRUCTIONS
Date: Thursday, 4/18/2024  Time: 6:00 pm - 7:00 pm  Provider: Perfecto CARRILLO  LICSW  Location: [facility], [facility], [address], [address], [place] [address]  Phone: [phone]  Type: Teletherapy    Patient Instructions:  For Telehealth we will need your paperwork before you are seen. Option to fill it out online but we'll need your email address. Email/fax/mail accepted.        Mental health recommendations    Please follow-up with your outpatient team about your visit today.    2.  One of our care coordinators will reach out to you after your discharge.  If you have any questions about additional resources please call our coordinators at # [phone]    3.  Please use aftercare plan and already established coping skills and safety planning as needed.     4.  Please call 911 and/or return to the emergency department if her symptoms worsen or your safety becomes compromised.       Aftercare Plan      If I am feeling unsafe or I am in a crisis, I will:   Contact my established care providers   Call the National Suicide Prevention Lifeline: 795.211.1707   Go to the nearest emergency room   Call 911   Your [facility] has a mental health crisis team you can call 24/7: [facility] Adult, [phone]    Things I am able to do on my own to cope or help me feel better:   -Practice square breathing when I begin to feel anxious - in breath through the nose for the count   of 4 and the first line on the square. Out breath through the mouth for the count of 4 for the second line   of the square. Repeat to complete the square. Repeat the square as many times as needed.    Things that I am able to do with others to cope or help me feel better: -Use community resources, including hotline numbers, [facility] crisis and support meetings    Things I can use or do for distraction: -Distraction skills of: going for walks, watching TV, spending time outside, calling a friend or family  "member  -Download a meditation kezia and spend 15-20 minutes per day mediating/relaxing. Some apps to   download include: Calm, Headspace and Insight Timer. All 3 of these apps have free version    Changes I can make to support my mental health and wellness:   -Attend scheduled mental health therapy and psychiatric appointments and follow all recommendations  -Maintain a daily schedule/routine  -Practice deep breathing skills  -Abstain from all mood altering chemicals not currently prescribed to me     People in my life that I can ask for help: [employer] [place] [employer] ([facility])  437.390.5980 or 1.888.DIONY.HELPS    Other things that are important when I m in crisis: -Commit to 30 minutes of self care daily - this can be as simple as taking a shower, going for a walk, cooking a meal, read, writing, etc        Crisis Lines  Crisis Text Line  Text 941428  You will be connected with a trained live crisis counselor to provide support.    Por espanol, texto  RIZWANA a 192225 o texto a 442-AYUDAME en WhatsApp    National Hope Line  1.800.SUICIDE [8852730]      Community Resources  Fast Tracker  Linking people to mental health and substance use disorder resources  fasttrackermn.org     [place] [facility] Warm Line  Peer to peer support  Monday thru Saturday, 12 pm to 10 pm  042.018.4385 or 1.150.412.6579  Text \"Support\" to 00708    [employer] [place] [employer] ([facility])  642.089.2045 or 1.888.DIONY.HELPS        Mental Health Apps  My3  https://my3app.org/    VirtualHopeBox  https://[employer].org/apps/virtual-hope-box/      Additional Information  Today you were seen by a licensed mental health professional through Triage and Transition services, [facility] ([facility])  for a crisis assessment in the Emergency Department at [facility].  It is recommended that you follow up with your established providers (psychiatrist, mental health therapist, and/or primary care doctor - as " relevant) as soon as possible. Coordinators from [facility] will be calling you in the next 24-48 hours to ensure that you have the resources you need.  You can also contact [facility] coordinators directly at [phone]. You may have been scheduled for or offered an appointment with a mental health provider. [facility] maintains an extensive network of licensed behavioral health providers to connect patients with the services they need.  We do not charge providers a fee to participate in our referral network.  We match patients with providers based on a patient's specific needs, insurance coverage, and location.  Our first effort will be to refer you to a provider within your care system, and will utilize providers outside your care system as needed.
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
## 2024-04-15 NOTE — ED PROVIDER NOTES
PTA meds ordered.    Prieto Durbin MD  [employer]  11:00 AM 04/15/24        Prieto Durbin MD  04/15/24 1100

## 2024-04-15 NOTE — ED NOTES
[facility]  ED to EMPATH Checklist:      Goal for EMPATH: Depression management    Current Behavior: Calm and Cooperative    Safety Concerns: Suicidal, no plan    Legal Hold Status: [facility]    Medically Cleared by ED provider: Yes    Patient Therapeutically Searched: Therapeutic search by ED staff (strings, belts, shoes, pockets, electronics, etc.)    Belongings: In room locker    Independent Ambulation at Baseline: Yes/No: Yes    Participates in Care/Conversation: Yes/No: Yes    Patient Informed about EMPATH: Yes/No: Yes    DEC: Ordered and pending    Patient Ready to be Transferred to EMPATH? Yes/No: Yes

## 2024-04-15 NOTE — PROGRESS NOTES
Patient is pleasant and cooperative .  He is more willing to answer questions, and his mood is calm.
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
## 2024-04-15 NOTE — ED NOTES
Patient used restroom independently. Given cold meal and snack @ patient's request. Declined any other intervention.

## 2024-04-15 NOTE — PHARMACY-ADMISSION MEDICATION HISTORY
Pharmacist Admission Medication History    Admission medication history is complete. The information provided in this note is only as accurate as the sources available at the time of the update.    Information Source(s): Family member and CareEverywhere/SureScripts via phone    Changes made to PTA medication list:  Added: fluoxetine, benztropine   Deleted: None  Changed: None    Allergies reviewed with patient and updates made in EHR: unable to assess    Medication History Completed By: Polly Wiggins [facility] 4/15/2024 10:57 AM    PTA Med List   Medication Sig Last Dose    benztropine (COGENTIN) 1 MG tablet Take 1 mg by mouth daily as needed for extrapyramidal symptoms (EPS)     FLUoxetine (PROZAC) 10 MG capsule Take 10 mg by mouth daily @1200 4/14/2024 at 1200    fluPHENAZine (PROLIXIN) 10 MG tablet Take 30 mg by mouth every evening 4/14/2024 at 2000    lamoTRIgine (LAMICTAL) 200 MG tablet Take 200 mg by mouth every evening 4/14/2024 at 2000    lurasidone (LATUDA) 120 MG TABS tablet Take 120 mg by mouth daily with food Take with 40 mg tablet for 160 mg dose. Takes @1200. 4/14/2024 at 1200    lurasidone (LATUDA) 40 MG TABS tablet Take 40 mg by mouth daily with food Take with 40 mg tablet for 160 mg dose. Takes @1200. 4/14/2024 at 1200    methylfolate (DEPLIN) 15 MG TABS tablet Take 1 tablet (15 mg) by mouth daily 2000 4/14/2024 at 2000    nicotine polacrilex (NICORETTE) 4 MG gum Place 4 mg inside cheek every hour as needed for smoking cessation

## 2024-04-15 NOTE — ED PROVIDER NOTES
Signout taken from Dr. Durbin.  Patient has been boarding in the ER and has a history of schizoaffective disorder.  He is awaiting an inpatient bed.  I was later called by the on-call psychiatrist who saw the patient, Dr. Amezcua.  She indicated that the patient is staying voluntarily at this point and therefore she discontinued to the SACHIN.  However she indicated that if he does change his mind and decides he wants to leave, that we should place him on a 72-hour hold.  She also ordered Valium for the patient.     Jules Landers MD  04/15/24 0314
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
## 2024-04-15 NOTE — PROGRESS NOTES
"Slightly tense this morning, upset that he was sent here from the empath unit \" I was not doing anything, they sent me here for no reason, patient using explicit words, he denied jumping in front of someone else's bumper, it is a lie \" that leighton actually was following me, then he referred about his parents as  \"cocks\".He denies any suicidal ideation.  "
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
## 2024-04-15 NOTE — ED NOTES
I was contacted by the EMPATH team who reported that this patient was semi cooperative, had poor boundaries, was irritable, and was doing punching motions with his hands. These behaviors were not appropriate for the unit and there was a high suspicion that they could escalate. The patient was transported back to the ED for monitoring and possible pharmacological management.     Plan to retry EMPATH in the morning if the patient's condition has improved.
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
## 2024-04-16 LAB — LAMOTRIGINE SERPL-MCNC: <0.9 UG/ML

## 2024-04-16 PROCEDURE — 250N000013 HC RX MED GY IP 250 OP 250 PS 637: Performed by: EMERGENCY MEDICINE

## 2024-04-16 PROCEDURE — 250N000013 HC RX MED GY IP 250 OP 250 PS 637: Performed by: PSYCHIATRY & NEUROLOGY

## 2024-04-16 PROCEDURE — 99232 SBSQ HOSP IP/OBS MODERATE 35: CPT | Performed by: PSYCHIATRY & NEUROLOGY

## 2024-04-16 RX ADMIN — LURASIDONE HYDROCHLORIDE 160 MG: 80 TABLET, COATED ORAL at 13:27

## 2024-04-16 RX ADMIN — FLUOXETINE 10 MG: 10 CAPSULE ORAL at 13:27

## 2024-04-16 RX ADMIN — DIAZEPAM 5 MG: 5 TABLET ORAL at 11:26

## 2024-04-16 ASSESSMENT — COLUMBIA-SUICIDE SEVERITY RATING SCALE - C-SSRS
2. HAVE YOU ACTUALLY HAD ANY THOUGHTS OF KILLING YOURSELF?: NO
1. SINCE LAST CONTACT, HAVE YOU WISHED YOU WERE DEAD OR WISHED YOU COULD GO TO SLEEP AND NOT WAKE UP?: NO
6. HAVE YOU EVER DONE ANYTHING, STARTED TO DO ANYTHING, OR PREPARED TO DO ANYTHING TO END YOUR LIFE?: NO
TOTAL  NUMBER OF ABORTED OR SELF INTERRUPTED ATTEMPTS SINCE LAST CONTACT: NO
ATTEMPT SINCE LAST CONTACT: NO
SUICIDE, SINCE LAST CONTACT: NO
TOTAL  NUMBER OF INTERRUPTED ATTEMPTS SINCE LAST CONTACT: NO

## 2024-04-16 ASSESSMENT — ACTIVITIES OF DAILY LIVING (ADL)
ADLS_ACUITY_SCORE: 37
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
## 2024-04-16 NOTE — ED PROVIDER NOTES
Signed out at change of shift.  In brief is boarding with psychiatry consults.  Had been on SACHIN.  Psychiatrist today stated he is currently voluntary but if that changes would qualify for 72 hour hold.    I went to recheck him around 8:10pm and introduce myself.  Currently sleeping.  I did not wake him up.    Signed out to Dr. Griffith at 2:55am.  No acute issues on my shift.         Sanjana Hurd MD  04/16/24 8555
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
## 2024-04-16 NOTE — ED PROVIDER NOTES
I assumed care of the patient at 7 AM.    I reviewed the patient's recent course, discussed with DEC , and discussed with psychiatrist Dr. Amezcua.  Patient was started on diazepam yesterday for acute seizure associated with his antipsychotic regimen.  He was reassessed by Dr. Amezcua today and the patient is significantly improved, which she attributes to his akathisia being under much better control.  She will plan to discharge the patient on a course of diazepam and plan for outpatient follow-up.  She does not feel he requires inpatient admission at this time.  I discussed with the patient and he is comfortable with this plan.  He has no suicidal ideation or intention to self-harm.  DEC  was also comfortable with this plan and does not feel that he requires inpatient management.  The patient will be discharged.       Perfecto Crump MD  04/16/24 [id number]
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
## 2024-07-13 ENCOUNTER — HEALTH MAINTENANCE LETTER (OUTPATIENT)
Age: 24
End: 2024-07-13

## 2024-07-24 ENCOUNTER — HOSPITAL ENCOUNTER (OUTPATIENT)
Facility: CLINIC | Age: 24
Setting detail: OBSERVATION
Discharge: HOME | End: 2024-07-25
Attending: EMERGENCY MEDICINE | Admitting: EMERGENCY MEDICINE
Payer: COMMERCIAL

## 2024-07-24 ENCOUNTER — MEDICAL CORRESPONDENCE (OUTPATIENT)
Dept: HEALTH INFORMATION MANAGEMENT | Facility: CLINIC | Age: 24
End: 2024-07-24
Payer: COMMERCIAL

## 2024-07-24 DIAGNOSIS — F25.0 SCHIZOAFFECTIVE DISORDER, BIPOLAR TYPE (H): Chronic | ICD-10-CM

## 2024-07-24 DIAGNOSIS — F41.9 ANXIETY: ICD-10-CM

## 2024-07-24 PROCEDURE — 250N000013 HC RX MED GY IP 250 OP 250 PS 637: Performed by: EMERGENCY MEDICINE

## 2024-07-24 PROCEDURE — 99285 EMERGENCY DEPT VISIT HI MDM: CPT

## 2024-07-24 RX ORDER — ACETAMINOPHEN 325 MG/1
650 TABLET ORAL EVERY 4 HOURS PRN
Status: DISCONTINUED | OUTPATIENT
Start: 2024-07-24 | End: 2024-07-25 | Stop reason: HOSPADM

## 2024-07-24 RX ORDER — ONDANSETRON 4 MG/1
4 TABLET, ORALLY DISINTEGRATING ORAL EVERY 6 HOURS PRN
Status: DISCONTINUED | OUTPATIENT
Start: 2024-07-24 | End: 2024-07-25 | Stop reason: HOSPADM

## 2024-07-24 RX ORDER — FLUOXETINE 10 MG/1
10 CAPSULE ORAL DAILY
Status: DISCONTINUED | OUTPATIENT
Start: 2024-07-25 | End: 2024-07-25 | Stop reason: HOSPADM

## 2024-07-24 RX ORDER — OLANZAPINE 10 MG/2ML
10 INJECTION, POWDER, FOR SOLUTION INTRAMUSCULAR 2 TIMES DAILY PRN
Status: DISCONTINUED | OUTPATIENT
Start: 2024-07-24 | End: 2024-07-25 | Stop reason: HOSPADM

## 2024-07-24 RX ORDER — FLUPHENAZINE HYDROCHLORIDE 5 MG/1
30 TABLET ORAL EVERY EVENING
Status: DISCONTINUED | OUTPATIENT
Start: 2024-07-25 | End: 2024-07-25 | Stop reason: HOSPADM

## 2024-07-24 RX ORDER — LAMOTRIGINE 100 MG/1
200 TABLET ORAL EVERY EVENING
Status: DISCONTINUED | OUTPATIENT
Start: 2024-07-25 | End: 2024-07-25 | Stop reason: HOSPADM

## 2024-07-24 RX ORDER — HYDROXYZINE HYDROCHLORIDE 50 MG/1
50 TABLET, FILM COATED ORAL EVERY 6 HOURS PRN
Status: DISCONTINUED | OUTPATIENT
Start: 2024-07-24 | End: 2024-07-25 | Stop reason: HOSPADM

## 2024-07-24 RX ORDER — LAMOTRIGINE 100 MG/1
200 TABLET ORAL EVERY EVENING
Status: DISCONTINUED | OUTPATIENT
Start: 2024-07-25 | End: 2024-07-24

## 2024-07-24 RX ORDER — IBUPROFEN 600 MG/1
600 TABLET, FILM COATED ORAL EVERY 6 HOURS PRN
Status: DISCONTINUED | OUTPATIENT
Start: 2024-07-24 | End: 2024-07-25 | Stop reason: HOSPADM

## 2024-07-24 RX ORDER — TRAZODONE HYDROCHLORIDE 50 MG/1
50 TABLET, FILM COATED ORAL
Status: DISCONTINUED | OUTPATIENT
Start: 2024-07-24 | End: 2024-07-25 | Stop reason: HOSPADM

## 2024-07-24 RX ORDER — ONDANSETRON 4 MG/1
4 TABLET, ORALLY DISINTEGRATING ORAL EVERY 6 HOURS PRN
Status: DISCONTINUED | OUTPATIENT
Start: 2024-07-24 | End: 2024-07-24

## 2024-07-24 RX ORDER — OLANZAPINE 10 MG/1
10 TABLET, ORALLY DISINTEGRATING ORAL 2 TIMES DAILY PRN
Status: DISCONTINUED | OUTPATIENT
Start: 2024-07-24 | End: 2024-07-25 | Stop reason: HOSPADM

## 2024-07-24 RX ORDER — BENZTROPINE MESYLATE 1 MG/1
1 TABLET ORAL DAILY PRN
Status: DISCONTINUED | OUTPATIENT
Start: 2024-07-24 | End: 2024-07-25 | Stop reason: HOSPADM

## 2024-07-24 RX ORDER — LOPERAMIDE HCL 2 MG
2 CAPSULE ORAL 4 TIMES DAILY PRN
Status: DISCONTINUED | OUTPATIENT
Start: 2024-07-24 | End: 2024-07-25 | Stop reason: HOSPADM

## 2024-07-24 RX ORDER — LEVOMEFOLATE CALCIUM 15 MG
15 TABLET ORAL DAILY
Status: DISCONTINUED | OUTPATIENT
Start: 2024-07-25 | End: 2024-07-25 | Stop reason: HOSPADM

## 2024-07-24 RX ADMIN — NICOTINE POLACRILEX 2 MG: 2 GUM, CHEWING ORAL at 21:47

## 2024-07-24 RX ADMIN — NICOTINE POLACRILEX 2 MG: 2 GUM, CHEWING ORAL at 23:15

## 2024-07-24 ASSESSMENT — COLUMBIA-SUICIDE SEVERITY RATING SCALE - C-SSRS
1. IN THE PAST MONTH, HAVE YOU WISHED YOU WERE DEAD OR WISHED YOU COULD GO TO SLEEP AND NOT WAKE UP?: NO
2. HAVE YOU ACTUALLY HAD ANY THOUGHTS OF KILLING YOURSELF IN THE PAST MONTH?: NO
6. HAVE YOU EVER DONE ANYTHING, STARTED TO DO ANYTHING, OR PREPARED TO DO ANYTHING TO END YOUR LIFE?: NO

## 2024-07-24 ASSESSMENT — ACTIVITIES OF DAILY LIVING (ADL)
ADLS_ACUITY_SCORE: 37

## 2024-07-25 VITALS
OXYGEN SATURATION: 98 % | HEART RATE: 80 BPM | TEMPERATURE: 97.9 F | BODY MASS INDEX: 19.7 KG/M2 | RESPIRATION RATE: 16 BRPM | WEIGHT: 140.7 LBS | SYSTOLIC BLOOD PRESSURE: 131 MMHG | DIASTOLIC BLOOD PRESSURE: 82 MMHG | HEIGHT: 71 IN

## 2024-07-25 PROCEDURE — 99235 HOSP IP/OBS SAME DATE MOD 70: CPT | Performed by: PSYCHIATRY & NEUROLOGY

## 2024-07-25 PROCEDURE — 250N000013 HC RX MED GY IP 250 OP 250 PS 637: Performed by: PSYCHIATRY & NEUROLOGY

## 2024-07-25 PROCEDURE — G0378 HOSPITAL OBSERVATION PER HR: HCPCS

## 2024-07-25 RX ADMIN — NICOTINE POLACRILEX 4 MG: 4 GUM, CHEWING BUCCAL at 07:52

## 2024-07-25 RX ADMIN — NICOTINE POLACRILEX 4 MG: 4 GUM, CHEWING BUCCAL at 00:10

## 2024-07-25 RX ADMIN — NICOTINE POLACRILEX 4 MG: 4 GUM, CHEWING BUCCAL at 08:54

## 2024-07-25 RX ADMIN — FLUPHENAZINE HYDROCHLORIDE 30 MG: 5 TABLET, FILM COATED ORAL at 01:15

## 2024-07-25 RX ADMIN — FLUOXETINE 10 MG: 10 CAPSULE ORAL at 08:15

## 2024-07-25 RX ADMIN — NICOTINE POLACRILEX 4 MG: 4 GUM, CHEWING BUCCAL at 09:59

## 2024-07-25 ASSESSMENT — ACTIVITIES OF DAILY LIVING (ADL)
ADLS_ACUITY_SCORE: 37

## 2024-07-25 NOTE — PROGRESS NOTES
Patient agreeable to discharge plan. Discharge instructions reviewed with patient including follow-up care plan. Medications: reviewed. Reviewed safety plan and outpatient resources. Denies SI and HI. All belongings that were brought into the hospital have been returned to patient. Escorted off the unit at 1100 accompanied by [facility] staff. Discharged to home via cab.

## 2024-07-25 NOTE — ED NOTES
Bed: ED17  Expected date: 7/24/24  Expected time: 7:25 PM  Means of arrival: Ambulance  Comments:  HEMS 424 23m behavioral problem; hold
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
## 2024-07-25 NOTE — PROGRESS NOTES
[facility] Note:    Pt was seen by attending psychiatric provider for initial psychiatry consult. Pt was cleared for discharge after meeting with provider.     Pt has previously completed an aftercare/safety plan with the [facility] who completed his initial crisis assessment.    Pt signed CRISTINA for father and coordinated discharge planning with RN.
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
## 2024-07-25 NOTE — PROGRESS NOTES
Pt requested nicotine gum this shift; PRN nicorette gum given per order. Has been sleeping in recliner with no distress noted; breathing even and unlabored.
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
## 2024-07-25 NOTE — ED PROVIDER NOTES
[facility] Unit - Psychiatry  Combined Observation Note and Discharge Summary  [facility] Emergency Department  Observation Initiation Date: Jul 24, 2024    Junior Fernández MRN: [id number]   Age: 23 year old YOB: 2000     History     Chief Complaint   Patient presents with    Psychiatric Evaluation     HPI  Junior Fernández is a 23 year old male with a past history notable for schizoaffective disorder who presented to the emergency department via EMS for mental health evaluation.  He had reported heightened anxiety due to paranoid concerns that his mother may be intending on harming him.  He had voiced concern for various behaviors which he presumed to be threatening.  He was determined to be medically stable then transferred to the [facility] unit for psychiatric assessment.  He has now approaching 15 hours in the emergency department.  Overnight, there were no acute issues.  On examination, the patient was resting in his recliner and watching television.  He was polite on approach and accompanied me to the interview room.  He is content with his medications and is not seeking any particular medication change.  He reports maintaining regular adherence to his medications and finds them to be helpful at managing symptoms of his mental health condition.  He reviews with me that recently, he interprets behaviors exhibited by his mother to be threatening.  Some of these behaviors involve chopping onions loudly, leaving a  on the table, and intensely staring at him.  He notes ongoing interpersonal conflicts with his mother however does feel more safe and comfortable returning home today.  He explains that he has been working with his ACT team on considering group home placement and now feels ready to pursue that option.  He denied suicidal and homicidal thoughts.  He denied command hallucinations.  He interprets readiness to discharge home today.      Past Medical History  Past Medical History:    Diagnosis Date    Anisometropia     Anxiety     Depression     Elevated blood pressure reading without diagnosis of hypertension     Fracture 2003    Left clavicle    Fracture 2005    Left Monteggia    History of substance abuse (H) 2016    THC, ETOH, stimulants    History of suicide attempt     10/2021 laceration of left arm    Multiple nevi 10/14/2015    Other insomnia     Pneumonia 2003    RUL    RAD (reactive airway disease)     outgrown    SARS (severe acute respiratory syndrome)     Sinus tachycardia      Past Surgical History:   Procedure Laterality Date    CIRCUMCISION,OTHER,<28 D/O      FRACTURE SURGERY  2005    Left Monteggia    HC TOOTH EXTRACTION W/FORCEP      REPAIR ARTERY BRACHIAL Left 10/2021     FLUoxetine (PROZAC) 10 MG capsule  fluPHENAZine (PROLIXIN) 10 MG tablet  lamoTRIgine (LAMICTAL) 200 MG tablet  lurasidone (LATUDA) 120 MG TABS tablet  lurasidone (LATUDA) 40 MG TABS tablet  methylfolate (DEPLIN) 15 MG TABS tablet  nicotine polacrilex (NICORETTE) 4 MG gum  benztropine (COGENTIN) 1 MG tablet      Allergies   Allergen Reactions    Seasonal Allergies     Seroquel [Quetiapine]      Fainting and slowed breathing      Family History  Family History   Problem Relation Age of Onset    Anxiety Disorder Maternal Grandmother     Depression Maternal Grandmother     Hypertension Maternal Grandmother     Cerebrovascular Disease Maternal Grandmother     Other - See Comments Mother         on Celexa    Hyperthyroidism Father         Rx'd with methimazole. Father's side with mild allergy/asthma issues    Hyperlipidemia Father     Anxiety Disorder Brother         Stuttering and tics    Lymphoma Maternal Grandfather          from Lymphoma    Other - See Comments Paternal Grandmother         vaso-vagal syncope    Other - See Comments Paternal Grandfather          from kidney/liver CA; CAD and had pacemaker    Heart Disease Paternal Grandfather 65    Arrhythmia No family hx  "of      Social History   Social History     Tobacco Use    Smoking status: Every Day     Current packs/day: 0.50     Types: Cigarettes    Smokeless tobacco: Never   Substance Use Topics    Alcohol use: Not Currently     Comment: 3-4 days ago    Drug use: Not Currently     Types: Marijuana, \"Crack\" cocaine     Comment: Used marijuanna 3 days ago, cocaine in feburary, vyvanse 3 days ago           Review of Systems  A medically appropriate review of systems was performed with pertinent positives and negatives noted in the HPI, and all other systems negative.    Physical Examination   BP: 134/81  Pulse: 108  Temp: 98.8  F (37.1  C)  Resp: 16  Height: 180.3 cm (5' 11\")  Weight: 63.8 kg (140 lb 11.2 oz)  SpO2: 96 %    Physical Exam  General: Appears stated age.   Neuro: Alert and fully oriented. Extremities appear to demonstrate normal strength on visual inspection.   Integumentary/Skin: no rash visualized, normal color    Psychiatric Examination   Appearance: awake, alert  Attitude:  cooperative  Eye Contact:  fair  Mood:  anxious and better  Affect:  appropriate and in normal range  Speech:  clear, coherent  Psychomotor Behavior:  no evidence of tardive dyskinesia, dystonia, or tics  Thought Process:  logical and linear  Associations:  no loose associations  Thought Content:  no evidence of suicidal ideation or homicidal ideation and mild paranoia and probable ideas of reference  Insight:  fair  Judgement:  fair  Oriented to:  time, person, and place  Attention Span and Concentration:  fair  Recent and Remote Memory:  fair  Language: able to name/identify objects without impairment  Fund of Knowledge: intact with awareness of current and past events    ED Course     ED Course as of 07/25/24 1016 Wed Jul 24, 2024 1946 I obtained history and examined the patient as noted above.    2209 I spoke with [facility] regarding the patient's condition.        Labs Ordered and Resulted from Time of ED Arrival to Time of ED " Departure - No data to display    Assessments & Plan (with Medical Decision Making)   Patient presenting with heightened anxiety which may be stemming from residual paranoia and ideas of reference.  Today, symptom intensity has subsided and he feels more comfortable returning home.  He explains that there has been some mention of pursuing group home placement with assistance from his ACT team and he now feels ready to pursue that option.  Otherwise, he is not seeking any medication changes today. Nursing notes reviewed noting no acute issues.     I have reviewed the assessment completed by the [facility].     During the observation period, the patient did not require medications for agitation, and did not require restraints/seclusion for patient and/or provider safety.    The patient was found to have a psychiatric condition that would benefit from an observation stay in the emergency department for further psychiatric stabilization and/or coordination of a safe disposition. The observation plan includes serial assessments of psychiatric condition, potential administration of medications if indicated, further disposition pending the patient's psychiatric course during the monitoring period.     Preliminary diagnosis:    ICD-10-CM    1. Anxiety  F41.9       2. Schizoaffective disorder, bipolar type (H)  F25.0            Treatment Plan:  -His prior to admission medications were resumed including Prolixin 30 mg nightly for antipsychotic treatment.  If residual symptoms persist, consider utilizing the long-acting injectable for more consistent coverage versus adding a daytime oral dose.  -Continue lamotrigine 200 mg every evening for mood stabilization  -Continue fluoxetine 10 mg daily for antianxiety treatment  -Resume psychosocial supports and medication management through his ACT team  -Discharge home today.    After the period in observation care, the patient's circumstances and mental state were safe for outpatient  management. After counseling on the diagnosis, work-up, and treatment plan, the patient was discharged. Close follow-up with a psychiatrist and/or therapist was recommended and community psychiatric resources were provided. Patient is to return to the ED if any urgent or potentially life-threatening concerns.      At the time of discharge, the patient's acute suicide risk was determined to be low due to the following factors: Reduction in the intensity of mood/anxiety symptoms that preceded the admission, denial of suicidal thoughts, denies feeling helpless or helpless, not currently under the influence of alcohol or illicit substances, denies experiencing command hallucinations, no immediate access to firearms. The patient's acute risk could be higher if noncompliant with their treatment plan, medications, follow-up appointments or using illicit substances or alcohol. Protective factors include: social supports, children, stable housing    --  Lizandro Crenshaw MD   [facility] EMERGENCY DEPT  [facility] Unit        Lizandro Crenshaw MD  07/25/24 0224

## 2024-07-25 NOTE — ED NOTES
[facility]  ED to EMPATH Checklist:      Goal for EMPATH: Suicidality    Current Behavior: Calm    Safety Concerns: At risk for injuring self or others based on mother report to ems    Legal Hold Status: [facility]    Medically Cleared by ED provider: Yes    Patient Therapeutically Searched: Security wanded    Belongings: Remain with patient    Independent Ambulation at Baseline: Yes/No: Yes    Participates in Care/Conversation: Yes/No: Yes    Patient Informed about EMPATH: Yes/No: Yes    DEC: Ordered and completed    Patient Ready to be Transferred to EMPATH? Yes/No: Yes

## 2024-07-26 ENCOUNTER — TELEPHONE (OUTPATIENT)
Dept: EMERGENCY MEDICINE | Facility: CLINIC | Age: 24
End: 2024-07-26
Payer: COMMERCIAL

## 2024-07-26 NOTE — TELEPHONE ENCOUNTER
Called and left voicemail for patient regarding follow up. Left [facility] number if they would like additional assistance. No further follow-up is needed.

## 2024-09-11 ENCOUNTER — HOSPITAL ENCOUNTER (EMERGENCY)
Facility: CLINIC | Age: 24
Discharge: LEFT BEFORE DISCHARGE | End: 2024-09-12
Admitting: EMERGENCY MEDICINE
Payer: COMMERCIAL

## 2024-09-11 VITALS
SYSTOLIC BLOOD PRESSURE: 145 MMHG | HEART RATE: 105 BPM | TEMPERATURE: 98 F | DIASTOLIC BLOOD PRESSURE: 85 MMHG | RESPIRATION RATE: 15 BRPM | OXYGEN SATURATION: 99 %

## 2024-09-11 PROCEDURE — 99281 EMR DPT VST MAYX REQ PHY/QHP: CPT

## 2024-09-11 ASSESSMENT — ACTIVITIES OF DAILY LIVING (ADL)
ADLS_ACUITY_SCORE: 37

## 2024-09-12 ASSESSMENT — ACTIVITIES OF DAILY LIVING (ADL): ADLS_ACUITY_SCORE: 37
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
## 2024-10-30 ENCOUNTER — HOSPITAL ENCOUNTER (OUTPATIENT)
Facility: CLINIC | Age: 24
Setting detail: OBSERVATION
Discharge: HOME | End: 2024-11-04
Attending: EMERGENCY MEDICINE | Admitting: EMERGENCY MEDICINE
Payer: COMMERCIAL

## 2024-10-30 DIAGNOSIS — F25.0 SCHIZOAFFECTIVE DISORDER, BIPOLAR TYPE (H): ICD-10-CM

## 2024-10-30 DIAGNOSIS — F31.9 BIPOLAR 1 DISORDER (H): ICD-10-CM

## 2024-10-30 DIAGNOSIS — E55.9 VITAMIN D DEFICIENCY: Primary | ICD-10-CM

## 2024-10-30 LAB
AMPHETAMINES UR QL SCN: NORMAL
BARBITURATES UR QL SCN: NORMAL
BENZODIAZ UR QL SCN: NORMAL
BZE UR QL SCN: NORMAL
CANNABINOIDS UR QL SCN: NORMAL
FENTANYL UR QL: NORMAL
OPIATES UR QL SCN: NORMAL
PCP QUAL URINE (ROCHE): NORMAL

## 2024-10-30 PROCEDURE — 99223 1ST HOSP IP/OBS HIGH 75: CPT | Performed by: EMERGENCY MEDICINE

## 2024-10-30 PROCEDURE — 99285 EMERGENCY DEPT VISIT HI MDM: CPT | Performed by: EMERGENCY MEDICINE

## 2024-10-30 PROCEDURE — 80307 DRUG TEST PRSMV CHEM ANLYZR: CPT | Performed by: EMERGENCY MEDICINE

## 2024-10-30 PROCEDURE — G0378 HOSPITAL OBSERVATION PER HR: HCPCS

## 2024-10-30 PROCEDURE — 250N000013 HC RX MED GY IP 250 OP 250 PS 637: Performed by: EMERGENCY MEDICINE

## 2024-10-30 RX ORDER — LEVOMEFOLATE CALCIUM 15 MG
15 TABLET ORAL DAILY
Status: DISCONTINUED | OUTPATIENT
Start: 2024-10-31 | End: 2024-11-04 | Stop reason: HOSPADM

## 2024-10-30 RX ORDER — FLUOXETINE 10 MG/1
10 CAPSULE ORAL DAILY
Status: DISCONTINUED | OUTPATIENT
Start: 2024-10-31 | End: 2024-11-03 | Stop reason: ALTCHOICE

## 2024-10-30 RX ORDER — BENZTROPINE MESYLATE 1 MG/1
1 TABLET ORAL DAILY PRN
Status: DISCONTINUED | OUTPATIENT
Start: 2024-10-30 | End: 2024-11-04 | Stop reason: HOSPADM

## 2024-10-30 RX ORDER — LURASIDONE HYDROCHLORIDE 80 MG/1
160 TABLET, FILM COATED ORAL DAILY
Status: DISCONTINUED | OUTPATIENT
Start: 2024-10-31 | End: 2024-11-04 | Stop reason: HOSPADM

## 2024-10-30 RX ORDER — FLUPHENAZINE HYDROCHLORIDE 10 MG/1
30 TABLET ORAL EVERY EVENING
Status: DISCONTINUED | OUTPATIENT
Start: 2024-10-31 | End: 2024-11-03 | Stop reason: ALTCHOICE

## 2024-10-30 RX ORDER — LAMOTRIGINE 200 MG/1
200 TABLET ORAL EVERY EVENING
Status: DISCONTINUED | OUTPATIENT
Start: 2024-10-30 | End: 2024-11-03

## 2024-10-30 RX ADMIN — LAMOTRIGINE 200 MG: 200 TABLET ORAL at 21:32

## 2024-10-30 RX ADMIN — NICOTINE POLACRILEX 2 MG: 2 GUM, CHEWING ORAL at 20:14

## 2024-10-30 RX ADMIN — NICOTINE POLACRILEX 2 MG: 2 GUM, CHEWING ORAL at 23:44

## 2024-10-30 RX ADMIN — NICOTINE POLACRILEX 2 MG: 2 GUM, CHEWING ORAL at 21:49

## 2024-10-30 RX ADMIN — NICOTINE POLACRILEX 2 MG: 2 GUM, CHEWING ORAL at 19:20

## 2024-10-30 RX ADMIN — NICOTINE POLACRILEX 2 MG: 2 GUM, CHEWING ORAL at 22:43

## 2024-10-30 RX ADMIN — NICOTINE POLACRILEX 2 MG: 2 GUM, CHEWING ORAL at 20:51

## 2024-10-30 ASSESSMENT — ACTIVITIES OF DAILY LIVING (ADL)
ADLS_ACUITY_SCORE: 0

## 2024-10-30 NOTE — ED NOTES
Bed: ED17  Expected date:   Expected time:   Means of arrival:   Comments:  Yellow HEMS 433 24 M State of psychosis on hold currently calm was aggressive eta 15
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
## 2024-10-31 PROCEDURE — G0378 HOSPITAL OBSERVATION PER HR: HCPCS

## 2024-10-31 PROCEDURE — 99284 EMERGENCY DEPT VISIT MOD MDM: CPT | Performed by: CLINICAL NURSE SPECIALIST

## 2024-10-31 PROCEDURE — 99232 SBSQ HOSP IP/OBS MODERATE 35: CPT | Performed by: EMERGENCY MEDICINE

## 2024-10-31 PROCEDURE — 250N000013 HC RX MED GY IP 250 OP 250 PS 637: Performed by: EMERGENCY MEDICINE

## 2024-10-31 RX ORDER — OLANZAPINE 10 MG/1
10 TABLET, ORALLY DISINTEGRATING ORAL
Status: DISCONTINUED | OUTPATIENT
Start: 2024-10-31 | End: 2024-11-04 | Stop reason: HOSPADM

## 2024-10-31 RX ADMIN — Medication 15 MG: at 19:36

## 2024-10-31 RX ADMIN — NICOTINE POLACRILEX 2 MG: 2 GUM, CHEWING ORAL at 22:54

## 2024-10-31 RX ADMIN — LAMOTRIGINE 200 MG: 200 TABLET ORAL at 19:36

## 2024-10-31 RX ADMIN — NICOTINE POLACRILEX 2 MG: 2 GUM, CHEWING ORAL at 12:43

## 2024-10-31 RX ADMIN — NICOTINE POLACRILEX 2 MG: 2 GUM, CHEWING ORAL at 09:51

## 2024-10-31 RX ADMIN — NICOTINE POLACRILEX 2 MG: 2 GUM, CHEWING ORAL at 20:47

## 2024-10-31 RX ADMIN — NICOTINE POLACRILEX 2 MG: 2 GUM, CHEWING ORAL at 21:52

## 2024-10-31 RX ADMIN — NICOTINE POLACRILEX 2 MG: 2 GUM, CHEWING ORAL at 18:25

## 2024-10-31 RX ADMIN — NICOTINE POLACRILEX 2 MG: 2 GUM, CHEWING ORAL at 08:51

## 2024-10-31 RX ADMIN — FLUPHENAZINE HYDROCHLORIDE 30 MG: 10 TABLET, FILM COATED ORAL at 19:36

## 2024-10-31 RX ADMIN — NICOTINE POLACRILEX 2 MG: 2 GUM, CHEWING ORAL at 19:36

## 2024-10-31 RX ADMIN — LURASIDONE HYDROCHLORIDE 160 MG: 80 TABLET, FILM COATED ORAL at 11:47

## 2024-10-31 RX ADMIN — NICOTINE POLACRILEX 2 MG: 2 GUM, CHEWING ORAL at 01:05

## 2024-10-31 RX ADMIN — NICOTINE POLACRILEX 2 MG: 2 GUM, CHEWING ORAL at 11:17

## 2024-10-31 RX ADMIN — FLUOXETINE HYDROCHLORIDE 10 MG: 10 CAPSULE ORAL at 11:47

## 2024-10-31 RX ADMIN — NICOTINE POLACRILEX 2 MG: 2 GUM, CHEWING ORAL at 16:24

## 2024-10-31 RX ADMIN — NICOTINE POLACRILEX 2 MG: 2 GUM, CHEWING ORAL at 14:44

## 2024-10-31 ASSESSMENT — COLUMBIA-SUICIDE SEVERITY RATING SCALE - C-SSRS
MOST LETHAL DATE: 66828
2. HAVE YOU ACTUALLY HAD ANY THOUGHTS OF KILLING YOURSELF?: NO
6. HAVE YOU EVER DONE ANYTHING, STARTED TO DO ANYTHING, OR PREPARED TO DO ANYTHING TO END YOUR LIFE?: NO
LETHALITY/MEDICAL DAMAGE CODE MOST LETHAL ACTUAL ATTEMPT: MODERATE PHYSICAL DAMAGE, MEDICAL ATTENTION NEEDED
TOTAL  NUMBER OF INTERRUPTED ATTEMPTS SINCE LAST CONTACT: NO
ATTEMPT SINCE LAST CONTACT: NO
SUICIDE, SINCE LAST CONTACT: NO
TOTAL  NUMBER OF ABORTED OR SELF INTERRUPTED ATTEMPTS SINCE LAST CONTACT: NO
1. SINCE LAST CONTACT, HAVE YOU WISHED YOU WERE DEAD OR WISHED YOU COULD GO TO SLEEP AND NOT WAKE UP?: NO

## 2024-10-31 NOTE — ED NOTES
Patient is currently calm and cooperative in his room. When asking triage questions, patient states that he does not know why he is here.
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
## 2024-10-31 NOTE — ED PROVIDER NOTES
Emergency Department I-PASS Sign-out      Illness Severity: Stable    Patient Summary:  24 year old male with pertinent PMH of schizoaffective disorder-bipolar type who presented with aggressive behavior, paranoia, and possible hallucinations (all denied by the patient).    ED Course/treatment plan: Seen by DEC-observation until discussion with patient's  (patient on commitment).    Clinical Impression:  (F25.0) Schizoaffective disorder, bipolar type (H)      Edited by: Braydon Potter MD at 10/31/2024 0028    Action List:  Tests to Follow-up:  None    Medications Reconciled/Ordered:  Yes    ED Mental Health Boarding Order Set Used for Diet/PRNs/Other:  Yes    DEC, Extended Care, Psych Consult Orders:  Extended Care ordered. Psychiatry consult not indicated.    Situational Awareness & Contingency Plannin Hour Hold Status:  Voluntary, would hold if wanting to leave.  Active Orders  Legal  Health Officer Authority to Detain (SACHIN)  Frequency: Effective Now  Start Date/Time: 10/30/24 1901   Number of Occurrences: Until Specified  Order Comments:  Patient arrived on a health or  authority to detain.    Disposition:  Obs in the ED      Edited by: Braydon Potter MD at 10/31/2024 0029    Synthesis & Events after sign-out:  Ongoing observation, no acute events my shift        Dexter Frietas MD   Emergency Medicine     Dexter Freitas MD  10/31/24 [id number]

## 2024-10-31 NOTE — MEDICATION SCRIBE - ADMISSION MEDICATION HISTORY
Medication Scribe Admission Medication History    Admission medication history is complete. The information provided in this note is only as accurate as the sources available at the time of the update.    Information Source(s): Patient via in-person    Pertinent Information: Pt reported taking medication on PTA medication list as directed, stated he is not taking Benztropine 1 mg tabs and Fluoxetine 10 mg caps, however, medications were ordered for pt by Dr. Tariq Bryson on 10/30/24 at 2050, therefore, I did not delete or remove from PTA list.     Changes made to PTA medication list:  Added: None  Deleted: None  Changed: None    Allergies reviewed with patient and updates made in EHR: yes    Medication History Completed By: Zo Roth 10/31/2024 1:36 PM    PTA Med List   Medication Sig Last Dose/Taking    fluPHENAZine (PROLIXIN) 10 MG tablet Take 30 mg by mouth every evening 10/29/2024 Bedtime    lamoTRIgine (LAMICTAL) 200 MG tablet Take 200 mg by mouth every evening 10/29/2024 Bedtime    lurasidone (LATUDA) 120 MG TABS tablet Take 120 mg by mouth daily with food Take with 40 mg tablet for 160 mg dose. Takes @1200. 10/30/2024 Noon    lurasidone (LATUDA) 40 MG TABS tablet Take 40 mg by mouth daily with food Take with 40 mg tablet for 160 mg dose. Takes @1200. 10/30/2024 Noon    methylfolate (DEPLIN) 15 MG TABS tablet Take 1 tablet (15 mg) by mouth daily 2000 10/30/2024 Noon    nicotine polacrilex (NICORETTE) 4 MG gum Place 4 mg inside cheek every hour as needed for smoking cessation Past Week
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
## 2024-10-31 NOTE — PLAN OF CARE
Junior Fernández  October 30, 2024  Plan of Care Hand-off Note     Patient Care Path: observation    Plan for Care:   It is the recommendation of this provider that pt remains on a 12 hh under observation until collateral can be collected from ACT team  (CM) by a mental health provider. Pt's retelling of events prior to arrival appears to differ with his mom's account. Pt's mom reported that CM was looking into a crisis residence or IRTS facility to place pt at and CM can provide additional insight to inform next steps (i.e., discharge or [facility]); writer left a voicemail. Pt is currently denying SI, HI, NSSIB, and hallucinations and it is currently uncertain if a higher level of care with IPMH would provide any more benefit than a lower level of care (e.g., crisis residence, IRTS) for current sx and presentation. Pt is on commitment, appears to be his own decision maker, and reported by pt and mom to be compliant with Rx. Pt's responses to interview questions did not suggest the presence of delusions, however, pt also declined to provide a complete account of events prior to hospital visit for a thorough assessment of sx.    Identified Goals and Safety Issues: Obtain collateral from ACT team  for additional insight into pt's current sx and to confirm whether or not placement is being arranged for a crisis residence or IRTS to determine a recommendation for discharge or IP[facility].    Overview:  Sanjana ([facility] team ): [phone]. Rolanda Pradeep (mom): [phone]; Van Fernández (dad): [phone].    Legal Status: Legal Status at Admission: Commitment    Psychiatry Consult: n/a     Updated attending and RN regarding plan of care.      Gonzalo Pacheco, Psychotherapist Trainee
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
## 2024-10-31 NOTE — ED NOTES
"Patient states he spoke with CM about his being appropriate for discharge and when informed that CM did not corroborate speaking with patient, he stated he spoke with \"Bibiana\" who is \"higher up\" than his  and advocated that hospital should discharge him in order to allow them to reinstate his provisional discharge. Unable to corroborate at this time.     When writer sought more clarification, patient stated he also read online on a law site named \"[employer]\" that keeping him in the hospital violates his right to the least restrictive intervention, therefore he is entitled to a discharge home. He also stated he spoke to his parents, specifically mom, on the phone, and mom agreed to come and pick him up.       "
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
## 2024-11-01 PROCEDURE — G0378 HOSPITAL OBSERVATION PER HR: HCPCS

## 2024-11-01 PROCEDURE — 99231 SBSQ HOSP IP/OBS SF/LOW 25: CPT | Performed by: EMERGENCY MEDICINE

## 2024-11-01 PROCEDURE — 250N000013 HC RX MED GY IP 250 OP 250 PS 637: Performed by: EMERGENCY MEDICINE

## 2024-11-01 PROCEDURE — 99285 EMERGENCY DEPT VISIT HI MDM: CPT | Performed by: NURSE PRACTITIONER

## 2024-11-01 RX ORDER — NICOTINE 21 MG/24HR
1 PATCH, TRANSDERMAL 24 HOURS TRANSDERMAL ONCE
Status: COMPLETED | OUTPATIENT
Start: 2024-11-01 | End: 2024-11-02

## 2024-11-01 RX ADMIN — LAMOTRIGINE 200 MG: 200 TABLET ORAL at 20:49

## 2024-11-01 RX ADMIN — Medication 15 MG: at 20:49

## 2024-11-01 RX ADMIN — NICOTINE POLACRILEX 2 MG: 2 GUM, CHEWING ORAL at 19:01

## 2024-11-01 RX ADMIN — NICOTINE POLACRILEX 2 MG: 2 GUM, CHEWING ORAL at 10:45

## 2024-11-01 RX ADMIN — FLUPHENAZINE HYDROCHLORIDE 30 MG: 10 TABLET, FILM COATED ORAL at 20:48

## 2024-11-01 RX ADMIN — NICOTINE POLACRILEX 2 MG: 2 GUM, CHEWING ORAL at 16:58

## 2024-11-01 RX ADMIN — NICOTINE POLACRILEX 2 MG: 2 GUM, CHEWING ORAL at 20:15

## 2024-11-01 RX ADMIN — NICOTINE POLACRILEX 2 MG: 2 GUM, CHEWING ORAL at 18:08

## 2024-11-01 RX ADMIN — NICOTINE POLACRILEX 2 MG: 2 GUM, CHEWING ORAL at 13:11

## 2024-11-01 RX ADMIN — NICOTINE POLACRILEX 2 MG: 2 GUM, CHEWING ORAL at 15:34

## 2024-11-01 RX ADMIN — LURASIDONE HYDROCHLORIDE 160 MG: 80 TABLET, FILM COATED ORAL at 11:12

## 2024-11-01 RX ADMIN — NICOTINE POLACRILEX 2 MG: 2 GUM, CHEWING ORAL at 14:20

## 2024-11-01 RX ADMIN — NICOTINE POLACRILEX 2 MG: 2 GUM, CHEWING ORAL at 09:29

## 2024-11-01 ASSESSMENT — COLUMBIA-SUICIDE SEVERITY RATING SCALE - C-SSRS
2. HAVE YOU ACTUALLY HAD ANY THOUGHTS OF KILLING YOURSELF?: NO
ATTEMPT SINCE LAST CONTACT: NO
SUICIDE, SINCE LAST CONTACT: NO
TOTAL  NUMBER OF ABORTED OR SELF INTERRUPTED ATTEMPTS SINCE LAST CONTACT: NO
MOST LETHAL DATE: 66828
1. SINCE LAST CONTACT, HAVE YOU WISHED YOU WERE DEAD OR WISHED YOU COULD GO TO SLEEP AND NOT WAKE UP?: NO
TOTAL  NUMBER OF INTERRUPTED ATTEMPTS SINCE LAST CONTACT: NO
6. HAVE YOU EVER DONE ANYTHING, STARTED TO DO ANYTHING, OR PREPARED TO DO ANYTHING TO END YOUR LIFE?: NO
LETHALITY/MEDICAL DAMAGE CODE MOST LETHAL ACTUAL ATTEMPT: MODERATE PHYSICAL DAMAGE, MEDICAL ATTENTION NEEDED
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
## 2024-11-01 NOTE — CONSULTS
Consulted to run a test claim for Vraylar & Caplyta.    Patient has pharmacy benefits through [employer]. Per insurance, the following are covered and preferred under the patient's plans:     Vraylar caps - $0     The following are not covered:  Caplyta caps    Please feel free to contact me with any other test claims, prior authorizations, or insurance questions regarding outpatient medications.     Thanks!      Jess Collins [facility]  Discharge Pharmacy Liaison  [facility]  Pronouns: She/Her/Hers    Securely message with Vocera, Epic Secure Chat, or Gezlong  Phone: [phone]  Fax: [phone]  [email or web address]

## 2024-11-01 NOTE — ED NOTES
Patient has expressed that his ACT team is recommending a medication switch to Clozapine. However, he reports a history of severe allergic reactions to this medication, including dizziness and difficulty getting up from a lying position. The patient prefers to try Vraylar or something else instead.     Patient also has given permission to discuss his health and care plan with his mother. She called and expressed her concerns regarding the medication switch and emphasized the importance of monitoring how the new medication works for him before discharge.     This information is communicated with ED MD and was told psychiatry will follow up with the concern.

## 2024-11-01 NOTE — ED PROVIDER NOTES
Emergency Department I-PASS Sign-out      Illness Severity: Stable    Patient Summary:  24 year old male with pertinent PMH of schizoaffective disorder-bipolar type who presented with aggressive behavior, paranoia, and possible hallucinations (all denied by the patient).    ED Course/treatment plan: Seen by DEC-observation until discussion with patient's  (patient on commitment).    Clinical Impression:  (F25.0) Schizoaffective disorder, bipolar type (H)      Edited by: Braydon Potter MD at 10/31/2024 0028    Action List:  Tests to Follow-up:  None    Medications Reconciled/Ordered:  Yes    ED Mental Health Boarding Order Set Used for Diet/PRNs/Other:  Yes    DEC, Extended Care, Psych Consult Orders:  Extended Care ordered. Psychiatry consult not indicated.    Situational Awareness & Contingency Plannin Hour Hold Status:  Voluntary, would hold if wanting to leave.  Active Orders  Legal  Health Officer Authority to Detain (SACHIN)  Frequency: Effective Now  Start Date/Time: 10/30/24 1901   Number of Occurrences: Until Specified  Order Comments:  Patient arrived on a health or  authority to detain.    Disposition:  Obs in the ED    Boarding subsequent shift/day updates:  May be discharged? DEC working with ACT team for dispo    Edited by: Dexter Freitas MD at 10/31/2024 2100    Synthesis & Events after sign-out:  No acute events my shift        Dexter Freitas MD   Emergency Medicine     Dexter Freitas MD  [phone]

## 2024-11-02 PROCEDURE — 250N000013 HC RX MED GY IP 250 OP 250 PS 637: Performed by: EMERGENCY MEDICINE

## 2024-11-02 PROCEDURE — G0378 HOSPITAL OBSERVATION PER HR: HCPCS

## 2024-11-02 PROCEDURE — 99231 SBSQ HOSP IP/OBS SF/LOW 25: CPT | Performed by: FAMILY MEDICINE

## 2024-11-02 RX ADMIN — NICOTINE POLACRILEX 2 MG: 2 GUM, CHEWING ORAL at 20:02

## 2024-11-02 RX ADMIN — NICOTINE POLACRILEX 2 MG: 2 GUM, CHEWING ORAL at 01:55

## 2024-11-02 RX ADMIN — NICOTINE POLACRILEX 2 MG: 2 GUM, CHEWING ORAL at 18:03

## 2024-11-02 RX ADMIN — NICOTINE POLACRILEX 2 MG: 2 GUM, CHEWING ORAL at 15:55

## 2024-11-02 RX ADMIN — NICOTINE POLACRILEX 2 MG: 2 GUM, CHEWING ORAL at 21:34

## 2024-11-02 RX ADMIN — NICOTINE POLACRILEX 2 MG: 2 GUM, CHEWING ORAL at 17:05

## 2024-11-02 RX ADMIN — NICOTINE POLACRILEX 2 MG: 2 GUM, CHEWING ORAL at 22:21

## 2024-11-02 RX ADMIN — NICOTINE POLACRILEX 2 MG: 2 GUM, CHEWING ORAL at 11:58

## 2024-11-02 RX ADMIN — LAMOTRIGINE 200 MG: 200 TABLET ORAL at 20:05

## 2024-11-02 RX ADMIN — Medication 15 MG: at 20:05

## 2024-11-02 RX ADMIN — NICOTINE POLACRILEX 2 MG: 2 GUM, CHEWING ORAL at 13:14

## 2024-11-02 RX ADMIN — NICOTINE POLACRILEX 2 MG: 2 GUM, CHEWING ORAL at 08:31

## 2024-11-02 RX ADMIN — NICOTINE POLACRILEX 2 MG: 2 GUM, CHEWING ORAL at 10:43

## 2024-11-02 RX ADMIN — LURASIDONE HYDROCHLORIDE 160 MG: 80 TABLET, FILM COATED ORAL at 13:13

## 2024-11-02 RX ADMIN — NICOTINE POLACRILEX 2 MG: 2 GUM, CHEWING ORAL at 19:12

## 2024-11-02 RX ADMIN — FLUPHENAZINE HYDROCHLORIDE 30 MG: 10 TABLET, FILM COATED ORAL at 20:05

## 2024-11-02 NOTE — ED PROVIDER NOTES
" ED Observation Progress Note  [facility]  Note Date: 11/2/2024    Junior Fernández MRN: [id number]   Age: 24 year old YOB: 2000     Interval History   About the same today.  Vitals signs stable.  Tolerating medications and treatment plan without significant side effects or problems.  Eating and voiding well.  No new concerns today.  Patient has a history of schizoaffective disorder, he is on a commitment with a provisional discharge which has been revoked by the courts.  Seen by the team here who did not feel he met admission criteria, however the outpatient team and the courts disagree.  He is now in the ED over 60 hours due to this disagreement.    Physical Exam   /80   Pulse 69   Temp 98.4  F (36.9  C) (Oral)   Resp 17   Ht 1.803 m (5' 11\")   Wt 59 kg (130 lb)   SpO2 97%   BMI 18.13 kg/m    Physical Exam  General: . Appears stated age.   HENT: MMM, no oropharyngeal lesions  Eyes: PERRL, normal sclerae   Cardio: Regular rate, extremities well perfused  Resp: Normal work of breathing, normal respiratory rate  Neuro: alert and fully oriented. CN II-XII grossly intact. Grossly normal strength and sensation in all extremities.   MSK: no deformities. Grossly normal ROM.  Integumentary/Skin: no rash visualized, normal color  Psych: Calm and cooperative, no signs of psychosis  Results       Assessments & Plan (with Medical Decision Making)   Junior Fernández is a 24 year old male admitted to ED Observation status with schizoaffective disorder, commitment with provisional discharge which has been revoked due to ACT team assessment that he has psychosis at a level that is dangerous and would not make him appropriate to be in the community.  The psychiatric consult service and [facility] service here have deemed him appropriate for discharge and do not feel he meets inpatient criteria, however they cannot appeal the decision to the courts until Monday, thus he is being " kept in the adult emergency room for what will likely be over 100 hours..     On this date, the patient did not require medications for agitation, and did not require restraints/seclusion for patient and/or provider safety.     Notable events and plan updates today: No new updates    The patient's condition is such that further monitoring for psychiatric stabilization and/or coordination of a safe disposition is still indicated. The observation plan includes serial assessments of psychiatric condition, potential administration of medications if indicated, further disposition pending the patient's psychiatric course during the monitoring period.     --  Anthony Smiley MD  [facility] EMERGENCY DEPARTMENT  11/2/2024        Anthony Smiley MD  11/02/24 0956

## 2024-11-03 LAB
ALBUMIN SERPL BCG-MCNC: 4.7 G/DL (ref 3.5–5.2)
ALP SERPL-CCNC: 53 U/L (ref 40–150)
ALT SERPL W P-5'-P-CCNC: 13 U/L (ref 0–70)
ANION GAP SERPL CALCULATED.3IONS-SCNC: 8 MMOL/L (ref 7–15)
AST SERPL W P-5'-P-CCNC: 15 U/L (ref 0–45)
BASOPHILS # BLD AUTO: 0.1 10E3/UL (ref 0–0.2)
BASOPHILS NFR BLD AUTO: 1 %
BILIRUB SERPL-MCNC: <0.2 MG/DL
BUN SERPL-MCNC: 13.9 MG/DL (ref 6–20)
CALCIUM SERPL-MCNC: 9.2 MG/DL (ref 8.8–10.4)
CHLORIDE SERPL-SCNC: 104 MMOL/L (ref 98–107)
CREAT SERPL-MCNC: 1.29 MG/DL (ref 0.67–1.17)
EGFRCR SERPLBLD CKD-EPI 2021: 79 ML/MIN/1.73M2
EOSINOPHIL # BLD AUTO: 0.2 10E3/UL (ref 0–0.7)
EOSINOPHIL NFR BLD AUTO: 4 %
ERYTHROCYTE [DISTWIDTH] IN BLOOD BY AUTOMATED COUNT: 11.4 % (ref 10–15)
GLUCOSE SERPL-MCNC: 82 MG/DL (ref 70–99)
HCO3 SERPL-SCNC: 28 MMOL/L (ref 22–29)
HCT VFR BLD AUTO: 41.9 % (ref 40–53)
HGB BLD-MCNC: 14.4 G/DL (ref 13.3–17.7)
IMM GRANULOCYTES # BLD: 0 10E3/UL
IMM GRANULOCYTES NFR BLD: 0 %
LYMPHOCYTES # BLD AUTO: 2.3 10E3/UL (ref 0.8–5.3)
LYMPHOCYTES NFR BLD AUTO: 48 %
MCH RBC QN AUTO: 31 PG (ref 26.5–33)
MCHC RBC AUTO-ENTMCNC: 34.4 G/DL (ref 31.5–36.5)
MCV RBC AUTO: 90 FL (ref 78–100)
MONOCYTES # BLD AUTO: 0.4 10E3/UL (ref 0–1.3)
MONOCYTES NFR BLD AUTO: 9 %
NEUTROPHILS # BLD AUTO: 1.8 10E3/UL (ref 1.6–8.3)
NEUTROPHILS NFR BLD AUTO: 38 %
NRBC # BLD AUTO: 0 10E3/UL
NRBC BLD AUTO-RTO: 0 /100
PLATELET # BLD AUTO: 222 10E3/UL (ref 150–450)
POTASSIUM SERPL-SCNC: 4.3 MMOL/L (ref 3.4–5.3)
PROT SERPL-MCNC: 6.8 G/DL (ref 6.4–8.3)
RBC # BLD AUTO: 4.65 10E6/UL (ref 4.4–5.9)
SODIUM SERPL-SCNC: 140 MMOL/L (ref 135–145)
TSH SERPL DL<=0.005 MIU/L-ACNC: 1.86 UIU/ML (ref 0.3–4.2)
VIT D+METAB SERPL-MCNC: 30 NG/ML (ref 20–50)
WBC # BLD AUTO: 4.8 10E3/UL (ref 4–11)

## 2024-11-03 PROCEDURE — 250N000013 HC RX MED GY IP 250 OP 250 PS 637: Performed by: EMERGENCY MEDICINE

## 2024-11-03 PROCEDURE — 85025 COMPLETE CBC W/AUTO DIFF WBC: CPT | Performed by: CLINICAL NURSE SPECIALIST

## 2024-11-03 PROCEDURE — G0378 HOSPITAL OBSERVATION PER HR: HCPCS

## 2024-11-03 PROCEDURE — 250N000013 HC RX MED GY IP 250 OP 250 PS 637: Performed by: CLINICAL NURSE SPECIALIST

## 2024-11-03 PROCEDURE — 84443 ASSAY THYROID STIM HORMONE: CPT | Performed by: CLINICAL NURSE SPECIALIST

## 2024-11-03 PROCEDURE — 82306 VITAMIN D 25 HYDROXY: CPT | Performed by: CLINICAL NURSE SPECIALIST

## 2024-11-03 PROCEDURE — 36415 COLL VENOUS BLD VENIPUNCTURE: CPT | Performed by: CLINICAL NURSE SPECIALIST

## 2024-11-03 PROCEDURE — 99284 EMERGENCY DEPT VISIT MOD MDM: CPT | Performed by: CLINICAL NURSE SPECIALIST

## 2024-11-03 PROCEDURE — 84460 ALANINE AMINO (ALT) (SGPT): CPT | Performed by: CLINICAL NURSE SPECIALIST

## 2024-11-03 RX ORDER — LITHIUM CARBONATE 300 MG/1
300 TABLET, FILM COATED, EXTENDED RELEASE ORAL AT BEDTIME
Status: DISCONTINUED | OUTPATIENT
Start: 2024-11-03 | End: 2024-11-04 | Stop reason: HOSPADM

## 2024-11-03 RX ORDER — LAMOTRIGINE 100 MG/1
100 TABLET ORAL EVERY EVENING
Status: DISCONTINUED | OUTPATIENT
Start: 2024-11-03 | End: 2024-11-04 | Stop reason: HOSPADM

## 2024-11-03 RX ADMIN — NICOTINE POLACRILEX 2 MG: 2 GUM, CHEWING ORAL at 11:46

## 2024-11-03 RX ADMIN — NICOTINE POLACRILEX 2 MG: 2 GUM, CHEWING ORAL at 10:46

## 2024-11-03 RX ADMIN — NICOTINE POLACRILEX 2 MG: 2 GUM, CHEWING ORAL at 16:10

## 2024-11-03 RX ADMIN — LAMOTRIGINE 100 MG: 100 TABLET ORAL at 19:39

## 2024-11-03 RX ADMIN — LURASIDONE HYDROCHLORIDE 160 MG: 80 TABLET, FILM COATED ORAL at 12:44

## 2024-11-03 RX ADMIN — NICOTINE POLACRILEX 2 MG: 2 GUM, CHEWING ORAL at 14:37

## 2024-11-03 RX ADMIN — NICOTINE POLACRILEX 2 MG: 2 GUM, CHEWING ORAL at 22:29

## 2024-11-03 RX ADMIN — NICOTINE POLACRILEX 2 MG: 2 GUM, CHEWING ORAL at 19:29

## 2024-11-03 RX ADMIN — NICOTINE POLACRILEX 2 MG: 2 GUM, CHEWING ORAL at 23:46

## 2024-11-03 RX ADMIN — NICOTINE POLACRILEX 2 MG: 2 GUM, CHEWING ORAL at 20:29

## 2024-11-03 RX ADMIN — NICOTINE POLACRILEX 2 MG: 2 GUM, CHEWING ORAL at 08:43

## 2024-11-03 RX ADMIN — NICOTINE POLACRILEX 2 MG: 2 GUM, CHEWING ORAL at 21:31

## 2024-11-03 RX ADMIN — NICOTINE POLACRILEX 2 MG: 2 GUM, CHEWING ORAL at 09:44

## 2024-11-03 RX ADMIN — NICOTINE POLACRILEX 2 MG: 2 GUM, CHEWING ORAL at 18:31

## 2024-11-03 RX ADMIN — NICOTINE POLACRILEX 2 MG: 2 GUM, CHEWING ORAL at 17:25

## 2024-11-03 RX ADMIN — NICOTINE POLACRILEX 2 MG: 2 GUM, CHEWING ORAL at 06:23

## 2024-11-03 RX ADMIN — LITHIUM CARBONATE 300 MG: 300 TABLET, EXTENDED RELEASE ORAL at 19:38

## 2024-11-03 RX ADMIN — NICOTINE POLACRILEX 2 MG: 2 GUM, CHEWING ORAL at 12:43

## 2024-11-03 RX ADMIN — Medication 15 MG: at 19:38
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
## 2024-11-03 NOTE — PROGRESS NOTES
Followed up with Junior regarding his diagnosis and medications. Explained that with the collateral information received from his father, it is more likely that he has bipolar disorder and has episodic chacorta that lasts up to 5-7 days. His father mentioned that these occur predictably every few weeks, and in the weeks in between, he would not have any noticeable symptoms. He agreed that in between these episodes, he does not experience hallucinations and delusions. Since he does not have psychosis symptoms in between episodes, and it is not even clear that he has them during his episodes now that he has  not used any substances in 3 years, it is not likely that he has schizoaffective disorder.     Discussed that the treatment for bipolar disorder is a mood stabilizer, with the addition of a second generation antipsychotic during acute manic episodes. Acknowledged that he has mentioned having tried Depakote in the past and gaining weight on it, but Depakote or lithium are the two best mood stabilizers for bipolar chacorta. Of note, he was on Depakote and Zyprexa at the same time, and I told him that between the two medications, Zyprexa is the most likely cause of the weight gain.     He is willing to try lithium, and we discussed that he will need periodic blood draws to make sure that he is staying within the therapeutic level. He should also not use NSAIDs since they will increase his lithium level. Lithium affects the kidneys and the thyroid gland, so ongoing monitoring of these organs are important.     Plan:     Start lithium  mg Q HS and optimize as tolerated and indicated  Decrease lamotrigine from 200 to 100 mg daily x 1 week, then 50 mg daily x 1 week and then stop (per pharmacy consultation and NoiseFreeedex)  Discontinue fluoxetine 10 mg as this may actually be activating manic episodes  Discontinue fluphenazine 30 mg due to unclear benefits in the treatment of bipolar disorder and to reduce  polypharmacy  Obtain baseline labs: CBC, CMP, TSH       Nara Park, APRN, CNP  Emergency Psychiatry

## 2024-11-04 VITALS
RESPIRATION RATE: 16 BRPM | WEIGHT: 130 LBS | BODY MASS INDEX: 18.2 KG/M2 | HEART RATE: 80 BPM | TEMPERATURE: 98.1 F | DIASTOLIC BLOOD PRESSURE: 72 MMHG | OXYGEN SATURATION: 99 % | HEIGHT: 71 IN | SYSTOLIC BLOOD PRESSURE: 115 MMHG

## 2024-11-04 PROCEDURE — 250N000013 HC RX MED GY IP 250 OP 250 PS 637: Performed by: EMERGENCY MEDICINE

## 2024-11-04 PROCEDURE — G0378 HOSPITAL OBSERVATION PER HR: HCPCS

## 2024-11-04 PROCEDURE — 99238 HOSP IP/OBS DSCHRG MGMT 30/<: CPT | Performed by: FAMILY MEDICINE

## 2024-11-04 PROCEDURE — 99207 PR NO CHARGE LOS: CPT | Performed by: CLINICAL NURSE SPECIALIST

## 2024-11-04 PROCEDURE — 250N000013 HC RX MED GY IP 250 OP 250 PS 637: Performed by: CLINICAL NURSE SPECIALIST

## 2024-11-04 PROCEDURE — 250N000013 HC RX MED GY IP 250 OP 250 PS 637: Performed by: FAMILY MEDICINE

## 2024-11-04 RX ORDER — LAMOTRIGINE 100 MG/1
TABLET ORAL
Qty: 10 TABLET | Refills: 0 | Status: SHIPPED | OUTPATIENT
Start: 2024-11-04 | End: 2024-11-17

## 2024-11-04 RX ORDER — FAMOTIDINE 20 MG
25 TABLET ORAL DAILY
Qty: 30 CAPSULE | Refills: 0 | Status: SHIPPED | OUTPATIENT
Start: 2024-11-04

## 2024-11-04 RX ORDER — VITAMIN B COMPLEX
25 TABLET ORAL DAILY
Status: DISCONTINUED | OUTPATIENT
Start: 2024-11-04 | End: 2024-11-04 | Stop reason: HOSPADM

## 2024-11-04 RX ORDER — POLYETHYLENE GLYCOL 3350 17 G
2 POWDER IN PACKET (EA) ORAL
Status: DISCONTINUED | OUTPATIENT
Start: 2024-11-04 | End: 2024-11-04 | Stop reason: HOSPADM

## 2024-11-04 RX ORDER — LITHIUM CARBONATE 300 MG/1
300 TABLET, FILM COATED, EXTENDED RELEASE ORAL AT BEDTIME
Qty: 30 TABLET | Refills: 0 | Status: SHIPPED | OUTPATIENT
Start: 2024-11-04

## 2024-11-04 RX ADMIN — NICOTINE POLACRILEX 2 MG: 2 GUM, CHEWING ORAL at 09:02

## 2024-11-04 RX ADMIN — NICOTINE POLACRILEX 2 MG: 2 LOZENGE ORAL at 12:13

## 2024-11-04 RX ADMIN — NICOTINE POLACRILEX 2 MG: 2 LOZENGE ORAL at 14:43

## 2024-11-04 RX ADMIN — Medication 25 MCG: at 10:31

## 2024-11-04 RX ADMIN — LURASIDONE HYDROCHLORIDE 160 MG: 80 TABLET, FILM COATED ORAL at 12:35

## 2024-11-04 RX ADMIN — NICOTINE POLACRILEX 2 MG: 2 GUM, CHEWING ORAL at 10:31

## 2024-11-04 RX ADMIN — NICOTINE POLACRILEX 2 MG: 2 GUM, CHEWING ORAL at 07:41

## 2024-11-04 RX ADMIN — NICOTINE POLACRILEX 2 MG: 2 GUM, CHEWING ORAL at 11:45

## 2024-11-04 RX ADMIN — NICOTINE POLACRILEX 2 MG: 2 LOZENGE ORAL at 15:31

## 2024-11-04 RX ADMIN — NICOTINE POLACRILEX 2 MG: 2 LOZENGE ORAL at 13:13

## 2024-11-04 ASSESSMENT — ACTIVITIES OF DAILY LIVING (ADL)
ADLS_ACUITY_SCORE: 0

## 2024-11-04 NOTE — DISCHARGE INSTRUCTIONS
To be discharged home with Mother.  Discharge Recommendations  Via EMMETT Lopez on 11/4/2024:  Please take all medications as recommended below and review this list with your ongoing psychiatry.   It is recommended that you receive full psychological testing as soon as possible that also takes into account some collateral information to help better support a more accurate diagnosis moving forward. As our psychiatry providers spoke with you about and you are thinking, a diagnosis with bipolar with psychotic features may be more appropriate and it would be good to understand if there is a personality disorder or other diagnoses that can assist providers and yourself for finding the best supports for you moving forward so that you can build toward the life you want.  As soon as you've completed psychological testing, please find an individual therapist/psychologist who has expertise in that or those diagnosis(es) to help support in you to better understand the ways you can identify and manage chacorta, psychosis, or other issues, so that you can better reduce their intensity and their long-term impact on your life or in preventing from reaching your goals.    Please consider family therapy to improve the relationship with your mother, so better boundaries can be formed, and you can continue to work toward your stated goal of working with your father. It will be important for you to maintain good relationships with both parents if that continues to be your life/career path.   It is highly recommended that regular care conferences are set up with ACT Team (based on frequency recommended by ACT team) with ACT team, and one or both of your parents, and yourself, to all be on the same page.     Plan: Via SELINA Park   Start lithium  mg Q HS and optimize as tolerated and indicated  2.   Decrease lamotrigine from 200 to 100 mg daily x 1 week, then 50 mg daily x 1 week and then stop (per pharmacy  consultation and Micromedex)  3.   Discontinue fluoxetine 10 mg as this may actually be activating manic episodes  4.   Discontinue fluphenazine 30 mg due to unclear benefits in the treatment of bipolar disorder and to reduce polypharmacy  5.   Continue Latuda 160 mg daily - it may be possible to start decreasing the dose in a few weeks  6.   Continue taking Vitamin D3 25 mcg daily to optimize level  7.   Monitor kidney function closely - GFR was 79 and creatinine was 1.29. Lithium may need to be discontinued if GFR < 50

## 2024-11-04 NOTE — PROGRESS NOTES
Reviewed patient's lab results:  - Vitamin D is 30 - recommended to supplement with 25 mcg daily  - CBC is WNL  - TSH is WNL (1.86)  - CMP is significant for GFR = 79 and creatinine = 1.29. Recommended to monitor closely since he is now taking lithium. Lithium may need to be discontinued if GFR < 50.    Rx sent to [facility] in [place] for 30 day supply of lithium and 13 day supply of Lamictal for the taper (6 more days on 100 mg daily and then 50 mg daily for 7 days and then stop).    Nara Park, SELINA, CNP  Emergency Psychiatry  11/4/2024

## 2024-11-04 NOTE — CONSULTS
Psychology consult closed; psychiatry consult was completed for this pt regarding medication adjustments 11/03 at 1620. If further assistance is needed from psychiatry please enter a new consult.

## 2024-11-04 NOTE — ED PROVIDER NOTES
"ED Observation Discharge Summary  [facility]  Discharge Date: 11/4/2024    Junior Fernández MRN: [id number]   Age: 24 year old YOB: 2000     Brief HPI & Initial ED Course     Chief Complaint   Patient presents with    Aggressive Behavior     Psychosis, aggression     HPI  Junior Fernández is a 24 year old male with PMH notable for schizoaffective disorder who presented to the ED with altered mental status with provisional discharge revoked by his ACT team due to psychosis.  Initial history was limited due to altered mental status. The patient arrived with altered mental status with psychosis and an exam that was without findings suggestive of trauma.     Upon being evaluated in the emergency department, the patient was found to have a condition that would benefit from ongoing monitoring. Observation care was initiated with plan for close clinical monitoring of the patient and his mental status for clearing of intoxicating substance, and broadening of the work-up if not clearing appropriately or if other indications develop.     See ED Observation H&P for further details on the patient's presenting history and initial evaluation.     Physical Exam   BP: 136/89  Pulse: 120  Temp: 98.4  F (36.9  C)  Resp: 16  Height: 180.3 cm (5' 11\")  Weight: 59 kg (130 lb)  SpO2: 97 %    Physical Exam  General: no acute distress. Alert.   HENT: MMM. Atraumatic head.   Eyes: PERRL, normal sclerae   Neck: non-tender, supple  Cardio: Regular rate. Regular rhythm.   Resp: Normal work of breathing, normal respiratory rate  Abdomen:  no guarding  Neuro: alert, fully oriented. Steady gait. CN II-XII grossly intact. Grossly normal strength and sensation.   Integumentary/Skin: no rash visualized, normal color  Patient appropriate without delusional thoughts, SI or HI.    Results      Procedures                    Labs Ordered and Resulted from Time of ED Arrival to Time of ED Departure "   COMPREHENSIVE METABOLIC PANEL - Abnormal       Result Value    Sodium 140      Potassium 4.3      Carbon Dioxide (CO2) 28      Anion Gap 8      Urea Nitrogen 13.9      Creatinine 1.29 (*)     GFR Estimate 79      Calcium 9.2      Chloride 104      Glucose 82      Alkaline Phosphatase 53      AST 15      ALT 13      Protein Total 6.8      Albumin 4.7      Bilirubin Total <0.2     URINE DRUG SCREEN PANEL - Normal    Amphetamines Urine Screen Negative      Barbituates Urine Screen Negative      Benzodiazepine Urine Screen Negative      Cannabinoids Urine Screen Negative      Cocaine Urine Screen Negative      Fentanyl Qual Urine Screen Negative      Opiates Urine Screen Negative      PCP Urine Screen Negative     TSH WITH FREE T4 REFLEX - Normal    TSH 1.86     VITAMIN D DEFICIENCY SCREENING - Normal    Vitamin D, Total (25-Hydroxy) 30     CBC WITH PLATELETS AND DIFFERENTIAL    WBC Count 4.8      RBC Count 4.65      Hemoglobin 14.4      Hematocrit 41.9      MCV 90      MCH 31.0      MCHC 34.4      RDW 11.4      Platelet Count 222      % Neutrophils 38      % Lymphocytes 48      % Monocytes 9      % Eosinophils 4      % Basophils 1      % Immature Granulocytes 0      NRBCs per 100 WBC 0      Absolute Neutrophils 1.8      Absolute Lymphocytes 2.3      Absolute Monocytes 0.4      Absolute Eosinophils 0.2      Absolute Basophils 0.1      Absolute Immature Granulocytes 0.0      Absolute NRBCs 0.0              Observation Course   The patient was admitted to observation status with plan for close clinical monitoring of the patient and his mental status for clearing of intoxicating substance, and broadening of the work-up if not clearing appropriately or if other indications develop.     Serial assessments of the patient's mental status were performed. Nursing notes were reviewed. During the observation period, the patient did not require medications for agitation, and did not require restraints/seclusion for patient and/or  provider safety.  Patient initiated on lithium, latuda and zyprexa with improvement though ED course.       With monitoring, patient's mental status cleared. Patient then clinically sober with steady gait and tolerating PO. Normalization of mental status with sobering makes other causes of altered mental status very unlikely.     After counseling on the diagnosis, work-up, and treatment plan, the patient was discharged. Recommended safe cessation of EtOH/drugs and provided information on community treatment resources. Patient to follow-up with primary care in the coming days for recheck and further cessation counseling. Patient to return to the ED if any urgent or potentially life-threatening concerns.  Patient managed also by extended care here in the ER along with psychiatry consultation with medication recommendations that were then e-prescribed to appropriate pharmacy for patient to  patient was discharged with mother after collaborating with patient's ACT team mental health worker to sign provisional discharge.    Discharge Diagnoses:   Final diagnoses:   Schizoaffective disorder, bipolar type (H)       --  Jim Joseph MD  [facility] EMERGENCY DEPARTMENT  11/4/2024      This note was created at least in part by the use of dragon voice dictation system. Inadvertent typographical errors may still exist.  Jim Joseph MD.  Patient evaluated in the emergency department during the COVID-19 pandemic period. Careful attention to patients safety was addressed throughout the evaluation. Evaluation and treatment management was initiated with disposition made efficiently and appropriate as possible to minimize any risk of potential exposure to patient during this evaluation.               Jim Joseph MD  11/04/24 [id number]

## 2024-11-05 ENCOUNTER — PATIENT OUTREACH (OUTPATIENT)
Dept: CARE COORDINATION | Facility: CLINIC | Age: 24
End: 2024-11-05
Payer: COMMERCIAL

## 2024-11-05 ENCOUNTER — TELEPHONE (OUTPATIENT)
Dept: BEHAVIORAL HEALTH | Facility: CLINIC | Age: 24
End: 2024-11-05
Payer: COMMERCIAL

## 2024-11-05 NOTE — PROGRESS NOTES
[facility]: Transitions of Care Outreach  Chief Complaint   Patient presents with    Clinic Care Coordination - Post Hospital       Most Recent Admission Date: 10/30/2024   Most Recent Admission Diagnosis:      Most Recent Discharge Date: 11/4/2024   Most Recent Discharge Diagnosis: Schizoaffective disorder, bipolar type (H) - F25.0  Vitamin D deficiency - E55.9  Bipolar 1 disorder (H) - F31.9     Transitions of Care Assessment    Discharge Assessment  How are you doing now that you are home?: I am doing good.  How are your symptoms? (Red Flag symptoms escalate to triage hotline per guidelines): Improved  Do you know how to contact your clinic care team if you have future questions or changes to your health status? : Yes  Does the patient have their discharge instructions? : Yes  Does the patient have questions regarding their discharge instructions? : No  Were you started on any new medications or were there changes to any of your previous medications? : Yes  Does the patient have all of their medications?: Yes  Do you have questions regarding any of your medications? : No                  Follow up Plan     Discharge Follow-Up  Discharge follow up appointment scheduled in alignment with recommended follow up timeframe or Transitions of Risk Category? (Low = within 30 days; Moderate= within 14 days; High= within 7 days): No  Patient's follow up appointment not scheduled: Patient declined scheduling support. Education on the importance of transitions of care follow up. Provided scheduling phone number.    No future appointments.    Outpatient Plan as outlined on AVS reviewed with patient.    For any urgent concerns, please contact our 24 hour nurse triage line: [phone] ([phone])       TACHO Olvera  [phone]  [facility]

## 2024-11-05 NOTE — PROGRESS NOTES
Received a call from patient's Mom, Rolanda Fernández, at 9:30 PM. She has questions regarding Cam's medications, and in particular, whether he is supposed to continue taking Prolixin. Discussed the changes made - discontinued Prolixin due to questionable benefit, as well as Prozac as it may be contributing to chacorta. Mom reports that he has actually not been taking Prozac. Lamictal is being tapered off, and lithium introduced and should be optimized if he is tolerating it. Discussed that this can be taken once a day, at bedtime, which per Javier is better for the kidneys and also makes it easier to get an accurate level. The level should ideally be checked 5 days after any dose changes, and blood should be drawn 12 hours after he takes the medicine.  Discussed the need for Latuda to be taken with at least 350 calories for it to be absorbed fully. It appears that he may not always take it with food as his appetite and eating habits are variable. Encouraged Mom to talk with him about this and to help him identify a plan for consistently taking Latuda with food. He may not need to be on such a high dose. Asked her to bring him back if he gets worse, and also to get an appointment with his ACT team psychiatrist so he can be seen by next week at the latest.    SELINA Syed, CNP  Emergency Psychiatry  11/4/24

## 2024-11-05 NOTE — TELEPHONE ENCOUNTER
Triage and Transition Services- Patient Follow Up Call  Service Line Making Phone Call: Extended Care    Who did Writer Talk to: Patient    Details of Call: left message to return my call on answering machine    Phuong Urbano 11/5/2024 9:32 AM

## 2024-12-20 ENCOUNTER — HOSPITAL ENCOUNTER (OUTPATIENT)
Facility: CLINIC | Age: 24
Setting detail: OBSERVATION
Discharge: OTHER INSTITUTION | End: 2024-12-22
Attending: EMERGENCY MEDICINE | Admitting: EMERGENCY MEDICINE
Payer: COMMERCIAL

## 2024-12-20 DIAGNOSIS — F22 PARANOIA (H): ICD-10-CM

## 2024-12-20 DIAGNOSIS — F25.0 SCHIZOAFFECTIVE DISORDER, BIPOLAR TYPE (H): ICD-10-CM

## 2024-12-20 PROCEDURE — 250N000013 HC RX MED GY IP 250 OP 250 PS 637: Performed by: NURSE PRACTITIONER

## 2024-12-20 PROCEDURE — G0378 HOSPITAL OBSERVATION PER HR: HCPCS

## 2024-12-20 PROCEDURE — 99285 EMERGENCY DEPT VISIT HI MDM: CPT

## 2024-12-20 PROCEDURE — 99223 1ST HOSP IP/OBS HIGH 75: CPT | Mod: 95 | Performed by: NURSE PRACTITIONER

## 2024-12-20 RX ORDER — ACETAMINOPHEN 325 MG/1
650 TABLET ORAL EVERY 4 HOURS PRN
Status: DISCONTINUED | OUTPATIENT
Start: 2024-12-20 | End: 2024-12-22 | Stop reason: HOSPADM

## 2024-12-20 RX ORDER — LORAZEPAM 1 MG/1
2 TABLET ORAL EVERY 8 HOURS PRN
Status: DISCONTINUED | OUTPATIENT
Start: 2024-12-20 | End: 2024-12-22 | Stop reason: HOSPADM

## 2024-12-20 RX ORDER — ONDANSETRON 4 MG/1
4 TABLET, ORALLY DISINTEGRATING ORAL EVERY 6 HOURS PRN
Status: DISCONTINUED | OUTPATIENT
Start: 2024-12-20 | End: 2024-12-22 | Stop reason: HOSPADM

## 2024-12-20 RX ORDER — LURASIDONE HYDROCHLORIDE 40 MG/1
40 TABLET, FILM COATED ORAL
Status: DISCONTINUED | OUTPATIENT
Start: 2024-12-21 | End: 2024-12-22 | Stop reason: HOSPADM

## 2024-12-20 RX ORDER — OLANZAPINE 10 MG/2ML
10 INJECTION, POWDER, FOR SOLUTION INTRAMUSCULAR 2 TIMES DAILY PRN
Status: DISCONTINUED | OUTPATIENT
Start: 2024-12-20 | End: 2024-12-22 | Stop reason: HOSPADM

## 2024-12-20 RX ORDER — HALOPERIDOL 5 MG/ML
5 INJECTION INTRAMUSCULAR EVERY 8 HOURS PRN
Status: DISCONTINUED | OUTPATIENT
Start: 2024-12-20 | End: 2024-12-22 | Stop reason: HOSPADM

## 2024-12-20 RX ORDER — TRAZODONE HYDROCHLORIDE 50 MG/1
50 TABLET, FILM COATED ORAL
Status: DISCONTINUED | OUTPATIENT
Start: 2024-12-20 | End: 2024-12-22 | Stop reason: HOSPADM

## 2024-12-20 RX ORDER — LAMOTRIGINE 100 MG/1
200 TABLET ORAL EVERY EVENING
Status: DISCONTINUED | OUTPATIENT
Start: 2024-12-21 | End: 2024-12-22 | Stop reason: HOSPADM

## 2024-12-20 RX ORDER — HALOPERIDOL 5 MG/1
5 TABLET ORAL EVERY 8 HOURS PRN
Status: DISCONTINUED | OUTPATIENT
Start: 2024-12-20 | End: 2024-12-22 | Stop reason: HOSPADM

## 2024-12-20 RX ORDER — DIPHENHYDRAMINE HCL 25 MG
50 CAPSULE ORAL EVERY 8 HOURS PRN
Status: DISCONTINUED | OUTPATIENT
Start: 2024-12-20 | End: 2024-12-22 | Stop reason: HOSPADM

## 2024-12-20 RX ORDER — OLANZAPINE 10 MG/1
10 TABLET, ORALLY DISINTEGRATING ORAL 2 TIMES DAILY PRN
Status: DISCONTINUED | OUTPATIENT
Start: 2024-12-20 | End: 2024-12-22 | Stop reason: HOSPADM

## 2024-12-20 RX ORDER — DIPHENHYDRAMINE HYDROCHLORIDE 50 MG/ML
50 INJECTION INTRAMUSCULAR; INTRAVENOUS EVERY 8 HOURS PRN
Status: DISCONTINUED | OUTPATIENT
Start: 2024-12-20 | End: 2024-12-22 | Stop reason: HOSPADM

## 2024-12-20 RX ORDER — OLANZAPINE 10 MG/1
10 TABLET, ORALLY DISINTEGRATING ORAL ONCE
Status: DISCONTINUED | OUTPATIENT
Start: 2024-12-20 | End: 2024-12-22 | Stop reason: HOSPADM

## 2024-12-20 RX ORDER — IBUPROFEN 600 MG/1
600 TABLET, FILM COATED ORAL EVERY 6 HOURS PRN
Status: DISCONTINUED | OUTPATIENT
Start: 2024-12-20 | End: 2024-12-20

## 2024-12-20 RX ORDER — LORAZEPAM 2 MG/ML
2 INJECTION INTRAMUSCULAR EVERY 8 HOURS PRN
Status: DISCONTINUED | OUTPATIENT
Start: 2024-12-20 | End: 2024-12-22 | Stop reason: HOSPADM

## 2024-12-20 RX ORDER — HYDROXYZINE HYDROCHLORIDE 50 MG/1
50 TABLET, FILM COATED ORAL EVERY 6 HOURS PRN
Status: DISCONTINUED | OUTPATIENT
Start: 2024-12-20 | End: 2024-12-22 | Stop reason: HOSPADM

## 2024-12-20 RX ORDER — LITHIUM CARBONATE 300 MG/1
300 TABLET, FILM COATED, EXTENDED RELEASE ORAL AT BEDTIME
Status: DISCONTINUED | OUTPATIENT
Start: 2024-12-21 | End: 2024-12-22 | Stop reason: HOSPADM

## 2024-12-20 RX ADMIN — NICOTINE POLACRILEX 2 MG: 2 GUM, CHEWING BUCCAL at 19:54

## 2024-12-20 RX ADMIN — NICOTINE POLACRILEX 2 MG: 2 GUM, CHEWING BUCCAL at 18:19

## 2024-12-20 RX ADMIN — NICOTINE POLACRILEX 2 MG: 2 GUM, CHEWING BUCCAL at 21:55

## 2024-12-20 ASSESSMENT — COLUMBIA-SUICIDE SEVERITY RATING SCALE - C-SSRS
6. HAVE YOU EVER DONE ANYTHING, STARTED TO DO ANYTHING, OR PREPARED TO DO ANYTHING TO END YOUR LIFE?: NO
1. IN THE PAST MONTH, HAVE YOU WISHED YOU WERE DEAD OR WISHED YOU COULD GO TO SLEEP AND NOT WAKE UP?: NO
2. HAVE YOU ACTUALLY HAD ANY THOUGHTS OF KILLING YOURSELF IN THE PAST MONTH?: NO

## 2024-12-20 ASSESSMENT — ACTIVITIES OF DAILY LIVING (ADL)
ADLS_ACUITY_SCORE: 52

## 2024-12-20 NOTE — ED TRIAGE NOTES
"Pt BIBA on a transport hold; EMS report his parents called EMS because pt has been acting \"bizarre\"  pt has a hx of bipolar         "
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
## 2024-12-20 NOTE — ED NOTES
Bed: [facility]  Expected date:   Expected time:   Means of arrival:   Comments:  417  24 M  crisis eval/calm and coop  1608

## 2024-12-21 ENCOUNTER — TELEPHONE (OUTPATIENT)
Dept: BEHAVIORAL HEALTH | Facility: CLINIC | Age: 24
End: 2024-12-21

## 2024-12-21 LAB
ALBUMIN SERPL BCG-MCNC: 4.3 G/DL (ref 3.5–5.2)
ALP SERPL-CCNC: 46 U/L (ref 40–150)
ALT SERPL W P-5'-P-CCNC: 16 U/L (ref 0–70)
AMPHETAMINES UR QL SCN: NORMAL
ANION GAP SERPL CALCULATED.3IONS-SCNC: 9 MMOL/L (ref 7–15)
AST SERPL W P-5'-P-CCNC: 20 U/L (ref 0–45)
BARBITURATES UR QL SCN: NORMAL
BENZODIAZ UR QL SCN: NORMAL
BILIRUB SERPL-MCNC: 0.2 MG/DL
BUN SERPL-MCNC: 14 MG/DL (ref 6–20)
BZE UR QL SCN: NORMAL
CALCIUM SERPL-MCNC: 8.9 MG/DL (ref 8.8–10.4)
CANNABINOIDS UR QL SCN: NORMAL
CHLORIDE SERPL-SCNC: 105 MMOL/L (ref 98–107)
CREAT SERPL-MCNC: 1.02 MG/DL (ref 0.67–1.17)
EGFRCR SERPLBLD CKD-EPI 2021: >90 ML/MIN/1.73M2
ERYTHROCYTE [DISTWIDTH] IN BLOOD BY AUTOMATED COUNT: 12.2 % (ref 10–15)
FENTANYL UR QL: NORMAL
GLUCOSE SERPL-MCNC: 100 MG/DL (ref 70–99)
HCO3 SERPL-SCNC: 27 MMOL/L (ref 22–29)
HCT VFR BLD AUTO: 41.6 % (ref 40–53)
HGB BLD-MCNC: 14.1 G/DL (ref 13.3–17.7)
MCH RBC QN AUTO: 30.7 PG (ref 26.5–33)
MCHC RBC AUTO-ENTMCNC: 33.9 G/DL (ref 31.5–36.5)
MCV RBC AUTO: 91 FL (ref 78–100)
OPIATES UR QL SCN: NORMAL
PCP QUAL URINE (ROCHE): NORMAL
PLATELET # BLD AUTO: 272 10E3/UL (ref 150–450)
POTASSIUM SERPL-SCNC: 4 MMOL/L (ref 3.4–5.3)
PROT SERPL-MCNC: 6.3 G/DL (ref 6.4–8.3)
RBC # BLD AUTO: 4.59 10E6/UL (ref 4.4–5.9)
SARS-COV-2 RNA RESP QL NAA+PROBE: NEGATIVE
SODIUM SERPL-SCNC: 141 MMOL/L (ref 135–145)
WBC # BLD AUTO: 6.5 10E3/UL (ref 4–11)

## 2024-12-21 PROCEDURE — 80053 COMPREHEN METABOLIC PANEL: CPT | Performed by: PSYCHIATRY & NEUROLOGY

## 2024-12-21 PROCEDURE — 80307 DRUG TEST PRSMV CHEM ANLYZR: CPT | Performed by: PSYCHIATRY & NEUROLOGY

## 2024-12-21 PROCEDURE — 250N000013 HC RX MED GY IP 250 OP 250 PS 637: Performed by: NURSE PRACTITIONER

## 2024-12-21 PROCEDURE — 82565 ASSAY OF CREATININE: CPT | Performed by: PSYCHIATRY & NEUROLOGY

## 2024-12-21 PROCEDURE — 99233 SBSQ HOSP IP/OBS HIGH 50: CPT | Mod: 95 | Performed by: NURSE PRACTITIONER

## 2024-12-21 PROCEDURE — 85027 COMPLETE CBC AUTOMATED: CPT | Performed by: PSYCHIATRY & NEUROLOGY

## 2024-12-21 PROCEDURE — 36415 COLL VENOUS BLD VENIPUNCTURE: CPT | Performed by: PSYCHIATRY & NEUROLOGY

## 2024-12-21 PROCEDURE — 82947 ASSAY GLUCOSE BLOOD QUANT: CPT | Performed by: PSYCHIATRY & NEUROLOGY

## 2024-12-21 PROCEDURE — G0378 HOSPITAL OBSERVATION PER HR: HCPCS

## 2024-12-21 PROCEDURE — 250N000013 HC RX MED GY IP 250 OP 250 PS 637

## 2024-12-21 PROCEDURE — 87635 SARS-COV-2 COVID-19 AMP PRB: CPT | Performed by: NURSE PRACTITIONER

## 2024-12-21 RX ORDER — FLUPHENAZINE HYDROCHLORIDE 5 MG/1
30 TABLET ORAL EVERY EVENING
Status: DISCONTINUED | OUTPATIENT
Start: 2024-12-21 | End: 2024-12-22 | Stop reason: HOSPADM

## 2024-12-21 RX ORDER — BENZTROPINE MESYLATE 1 MG/ML
1 INJECTION, SOLUTION INTRAMUSCULAR; INTRAVENOUS DAILY
Status: DISCONTINUED | OUTPATIENT
Start: 2024-12-21 | End: 2024-12-21

## 2024-12-21 RX ORDER — BENZTROPINE MESYLATE 1 MG/1
1 TABLET ORAL DAILY
Status: DISCONTINUED | OUTPATIENT
Start: 2024-12-21 | End: 2024-12-22 | Stop reason: HOSPADM

## 2024-12-21 RX ORDER — LEVOMEFOLATE CALCIUM 7.5 MG TABLET
15 TABLET AT BEDTIME
Status: DISCONTINUED | OUTPATIENT
Start: 2024-12-21 | End: 2024-12-22 | Stop reason: HOSPADM

## 2024-12-21 RX ADMIN — NICOTINE POLACRILEX 4 MG: 4 GUM, CHEWING BUCCAL at 21:11

## 2024-12-21 RX ADMIN — NICOTINE POLACRILEX 4 MG: 4 GUM, CHEWING BUCCAL at 02:41

## 2024-12-21 RX ADMIN — LURASIDONE HYDROCHLORIDE 120 MG: 80 TABLET, COATED ORAL at 12:12

## 2024-12-21 RX ADMIN — NICOTINE POLACRILEX 4 MG: 4 GUM, CHEWING BUCCAL at 14:21

## 2024-12-21 RX ADMIN — NICOTINE POLACRILEX 4 MG: 4 GUM, CHEWING BUCCAL at 01:39

## 2024-12-21 RX ADMIN — FLUPHENAZINE HYDROCHLORIDE 30 MG: 5 TABLET, FILM COATED ORAL at 22:23

## 2024-12-21 RX ADMIN — LAMOTRIGINE 200 MG: 100 TABLET ORAL at 19:51

## 2024-12-21 RX ADMIN — NICOTINE POLACRILEX 4 MG: 4 GUM, CHEWING BUCCAL at 15:53

## 2024-12-21 RX ADMIN — NICOTINE POLACRILEX 4 MG: 4 GUM, CHEWING BUCCAL at 12:14

## 2024-12-21 RX ADMIN — NICOTINE POLACRILEX 4 MG: 4 GUM, CHEWING BUCCAL at 22:13

## 2024-12-21 RX ADMIN — NICOTINE POLACRILEX 4 MG: 4 GUM, CHEWING BUCCAL at 19:51

## 2024-12-21 RX ADMIN — NICOTINE POLACRILEX 4 MG: 4 GUM, CHEWING BUCCAL at 18:50

## 2024-12-21 RX ADMIN — NICOTINE POLACRILEX 4 MG: 4 GUM, CHEWING BUCCAL at 09:29

## 2024-12-21 RX ADMIN — LITHIUM CARBONATE 300 MG: 300 TABLET, EXTENDED RELEASE ORAL at 21:30

## 2024-12-21 RX ADMIN — LURASIDONE HYDROCHLORIDE 40 MG: 40 TABLET, COATED ORAL at 12:12

## 2024-12-21 RX ADMIN — NICOTINE POLACRILEX 4 MG: 4 GUM, CHEWING BUCCAL at 10:33

## 2024-12-21 RX ADMIN — NICOTINE POLACRILEX 4 MG: 4 GUM, CHEWING BUCCAL at 16:53

## 2024-12-21 RX ADMIN — BENZTROPINE MESYLATE 1 MG: 1 TABLET ORAL at 12:12

## 2024-12-21 RX ADMIN — NICOTINE POLACRILEX 4 MG: 4 GUM, CHEWING BUCCAL at 23:17

## 2024-12-21 RX ADMIN — NICOTINE POLACRILEX 4 MG: 4 GUM, CHEWING BUCCAL at 00:34

## 2024-12-21 ASSESSMENT — ACTIVITIES OF DAILY LIVING (ADL)
ADLS_ACUITY_SCORE: 52

## 2024-12-21 NOTE — ED NOTES
IP MH Referral Acuity Rating Score (RARS)    LMHP complete at referral to IP MH, with [facility]; and, daily while awaiting IP MH placement. Call score to PPS.  CRITERIA SCORING   New 72 HH and Involuntary for IP MH (not adolescent) 1/1   Boarding over 24 hours 0/1   Vulnerable adult at least 55+ with multiple co morbidities; or, Patient age 11 or under 0/1   Suicide ideation without relief of precipitating factors 0/1   Current plan for suicide 0/1   Current plan for homicide 0/1   Imminent risk or actual attempt to seriously harm another without relief of factors precipitating the attempt 1/1   Severe dysfunction in daily living (ex: complete neglect for self care, extreme disruption in vegetative function, extreme deterioration in social interactions) 1/1   Recent (last 2 weeks) or current physical aggression in the ED 0/1   Restraints or seclusion episode in ED 0/1   Verbal aggression, agitation, yelling, etc., while in the ED 0/1   Active psychosis with psychomotor agitation or catatonia 1/1   Need for constant or near constant redirection (from leaving, from others, etc).  0/1   Intrusive or disruptive behaviors 0/1   TOTAL Acuity Total Score: 4

## 2024-12-21 NOTE — ED NOTES
Mother notified unit that she feels patient needs to be taking Deplin Folate ( L-Methylfolate) 15 mg due to folic acid deficiency. Reports that he was taking this medication prior to admission. Will give information to provider.

## 2024-12-21 NOTE — ED NOTES
"Pt. very guarded and defensive in interactions with writer this AM. \" I don't want to talk about it\" is his response to most questions. Very tense body posture and irritable tone.  \" I don't want to be on a 72 hour hold \" -would not elaborate. Denies SI. Denies hallucinations. Asking for nicotine gum frequently.   "
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
## 2024-12-21 NOTE — ED NOTES
Copy of Emergency Hospitalization Hold and rights document provided to Pt. Pt acknowledged understanding and received the paperwork.

## 2024-12-21 NOTE — ED NOTES
Pt requested PRN nicotine gum a few times this shift. Writer was able to obtain urine specimen for UA; results back. Other lab results back as well. Pt pleasant and cooperative; currently sleeping in recliner with no distress noted; breathing even and unlabored.
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
## 2024-12-21 NOTE — ED NOTES
Provider placed Pt on a 72 hour hold. Pt came out of the meeting with the provider agitated with a tense gait. Pt presents as tense and was observed pacing by the recliner. Security called. Pt declined any PRN medications. He was redirectable. Pt reports he is not angry and states he is actually happy that there is a conclusion as he states this sort of thing has been happening for a long time. Pt denies any thoughts to hurt anyone. He grabbed water and requested and received PRN nicotine gum. Pt was polite when making the request. Pt observe to be sitting on the recliner watching tv, appears calm in mood with his feet in upright position and a blanket covering his feet.

## 2024-12-21 NOTE — TELEPHONE ENCOUNTER
S:  Pt is on a 72 HH.    R: [facility] Access Inpatient Bed Call Log 12/21/24 8:00 AM   Intake has called facilities that have not updated the bed status within the last 12 hours.                                  [facility] is at capacity.            [facility] is posting 0 beds. [phone]    [facility] is posting 0 beds. Negative covid required.   [facility] is posting 0 beds. Neg covid. No high school/Yusra-psych. Per call at 8:01am to Chantelle no high acuity but possibly SD beds to call with specific Pt.   [facility] is posting 0 beds. [phone]   [facility] is posting 0 beds. [phone]    [facility] is posting 4 beds. Negative covid.   **  PC can't take anyone with recent aggression.  [facility] is posting 0 bed [phone].       [facility] is posting 8 beds. LOW acuity. Ages 12+. Neg covid- [phone]   [facility] has 2 beds posted. No aggression. Negative Covid. Low acuity.   [facility] ([place] is posting 1 beds. Low acuity only. Neg covid.  [phone]    [facility] is posting 3 beds. Low acuity. No current aggression.     [facility] is posting 0 beds. Negative covid. [phone] Per call at 8:04am to [facility] is full and can take 1 low acuity adult.   [facility] ([place] is posting 0 beds available. Negative covid.  [phone].       [facility] is posting 1 bed. Low acuity. 72 HH hold preferred. Yusra unit. Negative covid required. [phone]. Per call at 8:07am to [place] beds available for review.   [facility] is posting 1 beds. Low acuity only. Neg covid.  [phone]    [facility] in [place] is posting 6 beds.  Negative covid required.   Vol only, No history of aggression, violence, or assault. No sexual offenders. No 72 HH holds. [phone]        [facility] is posting 4 beds. Negative covid required.  (Must have  the cognitive ability to do programming. No aggressive or violent behavior or recent HX in the last 2 yrs. MH must be primary.) Always low acuity. [phone] Per call at 8:09am to [facility] beds are available.   [facility] has 0 bed posted. Negative covid required.  Low acuity only. Violence and aggression capped.  [phone]    [facility] is posting 2 beds. Low acuity, Negative covid required. [phone]. Per call at 8:10am to [facility] 1 to 2 low acuity beds available and pts can be added to waitlist.   [facility] posting 8 beds. Negative covid required.  [phone] Per call at 8:20am to [facility] 2 SDU on 5N and 5 on 5S.   [facility] [place] is posting 4 beds. Negative covid. LOW acuity. (No lines, drains, or tubes, oxygen, CPAP, IV, etc.) Must Have a Ride Home. [phone]    [facility] is posting 5 beds. Negative covid. (No. lines, drains, or tubes, oxygen, CPAP, IV, etc.) [phone]    .       Pt remains on the work list pending appropriate bed availability.      4:26 PM  P.C. states pt is too acute.  [facility] had declined earlier.

## 2024-12-21 NOTE — TELEPHONE ENCOUNTER
01:49 - Called [place] CRN to request review  02:20 - [place] CRN Galilea reports that pt needs MHICU bed d/t acuity, which none are available currently    R:  [place]  Access Inpatient Bed Call Log 12/20/24 @ 11:46PM  Intake has called facilities that have not updated their bed status within the last 12 hours.    [facility]IDE     [facility]: No appropriate bed available   [facility]: @ cap per website. Per [place] @ 11:50PM, they are capped  [facility]: @ cap per website  [facility]: @ cap per website. Per David @ 11:50PM, low acuity only (no aggression, psychosis, chacorta, HI).   [facility]: @ cap per website   [facility]: @ cap per website  [facility]: Posting 4 beds (Young Adult unit: No recent aggressions, violence or sexual assault. Neg covid required). Per La @ 11:52PM, 2 YA beds available. Called again @ 1:20AM and La reports they are only able to review very low acuity referrals at this time. Based on screening questions, pt not appropriate d/t paranoia and HI but could review at a later time  [facility]: @ cap per website   [facility]: @ cap per website  [facility]: Posting 8 beds. Mixed unit/Low acuity only  [facility]: Posting 1 bed. Low acuity. No aggression. Per Carolyn @ 11:51PM, they are capped until 8:30AM  [facility]: @ cap per website   [facility]: Posting 3 beds. Low acuity only. No aggression. Per Carolyn @ 11:51PM, they are capped until 8:30AM  [facility]: Does not review after 10PM.     [facility]: Posting beds in [place] and [place]. Per Anita @ 11:59PM, low acuity beds in [place] and [place]; [place] is capped  [facility]: Posting 6 beds (No hx of aggression. No sexual offenders. Voluntary patients only). Meets exclusionary d/t 72 HH  [facility]: Posting 4 beds (Always low acuity only. Must have the cognitive ability to do programming. No aggressive or violent behavior or recent HX in the last 2 yrs. MH must  be primary).   [facility]: @ cap per website; Low acuity only, Violence/aggression capped.   [facility]: Posting 2 beds (Low acuity, Neg Covid).   [facility]: Posting 8 beds. Per Marine, [place]/low acuity beds only.   [facility]: Posting 4 beds (No hx of aggression/assault. No lines, drains or tubes. Does not provide detox or CD treatment. Must have confirmed ride home upon discharge). Per previous calls made by this writer, they do not take referrals overnight. Call back after 8AM  [facility]: Posting 32 beds. Facility is in [place]. Per Intake policy, patients must be voluntary for placement in [place]. Pt is on a 72 HH  [facility]: Posting 5 beds (Mixed unit/Low acuity). Per Ginny @ 12:01AM, no high acuity beds; general unit beds available      Pt remains on work list until appropriate placement is available
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
## 2024-12-21 NOTE — PROGRESS NOTES
RN approached patient for vital signs. Patient agreeable. VS assessed, WNL. Patient requested more nicotine gum. Denies need for anything else at this time. Continues to be guarded in conversation.

## 2024-12-21 NOTE — PROGRESS NOTES
Patient approached nursing station and requested nicotine gum. RN provided patient with nicotine gum. Denies need for anything else at this time. Patient returned to recliner chair in milieu and laid down. Presents with flat affect, and guarded during interactions.

## 2024-12-22 ENCOUNTER — TELEPHONE (OUTPATIENT)
Dept: BEHAVIORAL HEALTH | Facility: CLINIC | Age: 24
End: 2024-12-22
Payer: COMMERCIAL

## 2024-12-22 ENCOUNTER — HOSPITAL ENCOUNTER (INPATIENT)
Facility: CLINIC | Age: 24
End: 2024-12-22
Attending: PSYCHIATRY & NEUROLOGY | Admitting: PSYCHIATRY & NEUROLOGY
Payer: COMMERCIAL

## 2024-12-22 VITALS
WEIGHT: 138.4 LBS | DIASTOLIC BLOOD PRESSURE: 86 MMHG | TEMPERATURE: 98 F | HEART RATE: 87 BPM | OXYGEN SATURATION: 99 % | RESPIRATION RATE: 16 BRPM | SYSTOLIC BLOOD PRESSURE: 119 MMHG | BODY MASS INDEX: 19.38 KG/M2 | HEIGHT: 71 IN

## 2024-12-22 DIAGNOSIS — F29 PSYCHOSIS, ATYPICAL (H): Primary | ICD-10-CM

## 2024-12-22 DIAGNOSIS — F17.210 CIGARETTE NICOTINE DEPENDENCE WITHOUT COMPLICATION: ICD-10-CM

## 2024-12-22 DIAGNOSIS — F39 MOOD DISORDER: ICD-10-CM

## 2024-12-22 DIAGNOSIS — K59.00 CONSTIPATION, UNSPECIFIED CONSTIPATION TYPE: ICD-10-CM

## 2024-12-22 PROCEDURE — 250N000013 HC RX MED GY IP 250 OP 250 PS 637

## 2024-12-22 PROCEDURE — 250N000013 HC RX MED GY IP 250 OP 250 PS 637: Performed by: NURSE PRACTITIONER

## 2024-12-22 PROCEDURE — G0378 HOSPITAL OBSERVATION PER HR: HCPCS

## 2024-12-22 PROCEDURE — 250N000013 HC RX MED GY IP 250 OP 250 PS 637: Performed by: PSYCHIATRY & NEUROLOGY

## 2024-12-22 PROCEDURE — 128N000002 HC R&B CD/MH ADOLESCENT

## 2024-12-22 RX ORDER — LURASIDONE HYDROCHLORIDE 40 MG/1
120 TABLET, FILM COATED ORAL DAILY
Status: DISCONTINUED | OUTPATIENT
Start: 2024-12-23 | End: 2024-12-22 | Stop reason: DRUGHIGH

## 2024-12-22 RX ORDER — LURASIDONE HYDROCHLORIDE 40 MG/1
40 TABLET, FILM COATED ORAL DAILY
Status: DISCONTINUED | OUTPATIENT
Start: 2024-12-23 | End: 2024-12-22 | Stop reason: DRUGHIGH

## 2024-12-22 RX ORDER — LITHIUM CARBONATE 300 MG/1
300 TABLET, FILM COATED, EXTENDED RELEASE ORAL AT BEDTIME
Status: DISCONTINUED | OUTPATIENT
Start: 2024-12-22 | End: 2024-12-23

## 2024-12-22 RX ORDER — FLUPHENAZINE HYDROCHLORIDE 10 MG/1
30 TABLET ORAL EVERY EVENING
Status: DISCONTINUED | OUTPATIENT
Start: 2024-12-22 | End: 2024-12-24

## 2024-12-22 RX ORDER — OLANZAPINE 10 MG/1
10 TABLET ORAL 3 TIMES DAILY PRN
Status: DISCONTINUED | OUTPATIENT
Start: 2024-12-22 | End: 2025-01-09 | Stop reason: HOSPADM

## 2024-12-22 RX ORDER — LEVOMEFOLATE CALCIUM 15 MG
15 TABLET ORAL DAILY
Status: ON HOLD | COMMUNITY

## 2024-12-22 RX ORDER — LAMOTRIGINE 200 MG/1
200 TABLET ORAL EVERY EVENING
Status: DISCONTINUED | OUTPATIENT
Start: 2024-12-22 | End: 2024-12-26

## 2024-12-22 RX ORDER — ACETAMINOPHEN 325 MG/1
650 TABLET ORAL EVERY 4 HOURS PRN
Status: DISCONTINUED | OUTPATIENT
Start: 2024-12-22 | End: 2025-01-09 | Stop reason: HOSPADM

## 2024-12-22 RX ORDER — HYDROXYZINE HYDROCHLORIDE 25 MG/1
25 TABLET, FILM COATED ORAL EVERY 4 HOURS PRN
Status: DISCONTINUED | OUTPATIENT
Start: 2024-12-22 | End: 2025-01-09 | Stop reason: HOSPADM

## 2024-12-22 RX ORDER — POLYETHYLENE GLYCOL 3350 17 G/17G
17 POWDER, FOR SOLUTION ORAL DAILY PRN
Status: DISCONTINUED | OUTPATIENT
Start: 2024-12-22 | End: 2025-01-09 | Stop reason: HOSPADM

## 2024-12-22 RX ORDER — MAGNESIUM HYDROXIDE/ALUMINUM HYDROXICE/SIMETHICONE 120; 1200; 1200 MG/30ML; MG/30ML; MG/30ML
30 SUSPENSION ORAL EVERY 4 HOURS PRN
Status: DISCONTINUED | OUTPATIENT
Start: 2024-12-22 | End: 2025-01-09 | Stop reason: HOSPADM

## 2024-12-22 RX ORDER — LURASIDONE HYDROCHLORIDE 40 MG/1
160 TABLET, FILM COATED ORAL
Status: DISCONTINUED | OUTPATIENT
Start: 2024-12-23 | End: 2025-01-02

## 2024-12-22 RX ORDER — OLANZAPINE 10 MG/2ML
10 INJECTION, POWDER, FOR SOLUTION INTRAMUSCULAR 3 TIMES DAILY PRN
Status: DISCONTINUED | OUTPATIENT
Start: 2024-12-22 | End: 2025-01-09 | Stop reason: HOSPADM

## 2024-12-22 RX ADMIN — LAMOTRIGINE 200 MG: 200 TABLET ORAL at 20:37

## 2024-12-22 RX ADMIN — NICOTINE POLACRILEX 4 MG: 4 GUM, CHEWING BUCCAL at 19:38

## 2024-12-22 RX ADMIN — NICOTINE POLACRILEX 4 MG: 4 GUM, CHEWING BUCCAL at 13:06

## 2024-12-22 RX ADMIN — NICOTINE POLACRILEX 4 MG: 4 GUM, CHEWING BUCCAL at 03:00

## 2024-12-22 RX ADMIN — NICOTINE POLACRILEX 4 MG: 4 GUM, CHEWING BUCCAL at 11:09

## 2024-12-22 RX ADMIN — FLUPHENAZINE HYDROCHLORIDE 30 MG: 10 TABLET, FILM COATED ORAL at 20:36

## 2024-12-22 RX ADMIN — NICOTINE POLACRILEX 4 MG: 4 GUM, CHEWING BUCCAL at 18:36

## 2024-12-22 RX ADMIN — LURASIDONE HYDROCHLORIDE 120 MG: 80 TABLET, COATED ORAL at 11:50

## 2024-12-22 RX ADMIN — BENZTROPINE MESYLATE 1 MG: 1 TABLET ORAL at 10:14

## 2024-12-22 RX ADMIN — LITHIUM CARBONATE 300 MG: 300 TABLET, EXTENDED RELEASE ORAL at 20:37

## 2024-12-22 RX ADMIN — NICOTINE POLACRILEX 4 MG: 4 GUM, CHEWING BUCCAL at 15:18

## 2024-12-22 RX ADMIN — NICOTINE POLACRILEX 4 MG: 4 GUM, CHEWING BUCCAL at 01:44

## 2024-12-22 RX ADMIN — NICOTINE POLACRILEX 4 MG: 4 GUM, CHEWING BUCCAL at 20:37

## 2024-12-22 RX ADMIN — LURASIDONE HYDROCHLORIDE 40 MG: 40 TABLET, COATED ORAL at 11:51

## 2024-12-22 RX ADMIN — NICOTINE POLACRILEX 4 MG: 4 GUM, CHEWING BUCCAL at 00:33

## 2024-12-22 RX ADMIN — NICOTINE POLACRILEX 4 MG: 4 GUM, CHEWING BUCCAL at 16:19

## 2024-12-22 RX ADMIN — NICOTINE POLACRILEX 4 MG: 4 GUM, CHEWING BUCCAL at 12:07

## 2024-12-22 RX ADMIN — NICOTINE POLACRILEX 4 MG: 4 GUM, CHEWING BUCCAL at 14:12

## 2024-12-22 RX ADMIN — NICOTINE POLACRILEX 4 MG: 4 GUM, CHEWING BUCCAL at 10:14

## 2024-12-22 ASSESSMENT — ACTIVITIES OF DAILY LIVING (ADL)
ADLS_ACUITY_SCORE: 26
ADLS_ACUITY_SCORE: 52
ADLS_ACUITY_SCORE: 26
ADLS_ACUITY_SCORE: 26
ADLS_ACUITY_SCORE: 52
ADLS_ACUITY_SCORE: 26

## 2024-12-22 NOTE — PROGRESS NOTES
Patient spent most of the shift watching TV and occasionally pacing the unit. Tense at times, but cooperative. Guarded during interactions. Patient made multiple requests for nicotine gum throughout shift, polite with each request. Med compliant. Waiting for inpatient placement.

## 2024-12-22 NOTE — PROGRESS NOTES
Triage & Transition Services, Extended Care     Client Name: Junior Fernández    Date: December 21, 2024    Patient was seen    Mode of Assessment:      Service Type: declined to attend group session  Session Start Time:  1940    Session End Time: 1950  Session Length: 10  Site Location: [facility] EMERGENCY DEPT                             [id number]  Total Number ofAttendees: 0  Topic:       Response:       ATTILA Arrieta   Licensed Mental Health Professional (LMHP), Extended Care  [phone]

## 2024-12-22 NOTE — PROGRESS NOTES
Report given to transport team prior to transport. RN informed station 6A staff that patient is on the way.
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
## 2024-12-22 NOTE — TELEPHONE ENCOUNTER
00:58 - Called [facility] [place] and spoke to Phuong, who reports that based on screening questions they do not have an appropriate bed at this time d/t pt initially endorsing HI towards family and has a hx of aggression. Phuong recommends Intake call back later to see if an appropriate bed is available    R:  [place]  Access Inpatient Bed Call Log 12/21/24 @ 11:30PM  Intake has called facilities that have not updated their bed status within the last 12 hours.    [facility]     [facility]: No appropriate bed available   [facility]: @ cap per website  [facility]: @ cap per website  [facility]: @ cap per website. Per Juvencio @ 11:42PM, low acuity beds only (No aggression, psychosis, chacorta, HI).   [facility]: @ cap per website   [facility]: @ cap per website  [facility]: Posting 3 beds (Young Adult unit ages 18-35: No recent aggressions, violence or sexual assault. Neg covid required). Called @ 11:48PM - no answer/left VM requesting CB re: bed availability   [facility]: @ cap per website   [facility]: @ cap per website  [facility]: Posting 8 beds. Mixed unit/Low acuity only  [facility]: Posting 2 beds. Low acuity. No aggression. Per Juan @ 11:52PM, they are at full capacity   [facility]: @ cap per website   [facility]: Posting 2 beds. Low acuity only. No aggression. Per Juan @ 11:52PM, they are at full capacity  [facility]: Does not review after 10PM.     [facility]: Posting beds in [place] and [place]. Per Aga @ 11:53PM, [place] is full; [place] has 1 low acuity male bed; [place] has low acuity beds  [facility]: Posting 6 beds (No hx of aggression. No sexual offenders. Voluntary patients only). Meets exclusionary d/t 72 [facility]: Posting 5 beds (Always low acuity only. Must have the cognitive ability to do programming. No aggressive or violent behavior or recent HX in the last 2 yrs. MH must be primary).   [facility]: @  cap per website; Low acuity only, Violence/aggression capped.    [facility]: Posting 2 beds (Low acuity, Neg Covid). Per Nikki @ 11:55PM, they are full  [facility]: Posting 8 beds. Per Deng @ 11:56PM, [place]/low acuity beds only. Reviewed 12/21 - needs MHICU bed  [facility]: Posting 5 beds (No hx of aggression/assault. No lines, drains or tubes. Does not provide detox or CD treatment. Must have confirmed ride home upon discharge). Per previous calls made by this writer, they do not take referrals overnight. Call back after 8AM  [facility]: Posting 32 beds. Facility is in [place]. Per Intake policy, patients must be voluntary for placement in [place]. Pt is on a 72 HH   [facility]: @ cap per website (Mixed unit/Low acuity)     Pt remains on work list until appropriate placement is available

## 2024-12-22 NOTE — TELEPHONE ENCOUNTER
Pt not appropriate for beds available.     R: [facility] Access Inpatient Bed Call Log  12/21/2024 3:23 PM  Intake has called facilities that have not updated their bed status within the last 12 hours.    *METRO:  [place] -- [facility]: @ Capacity.  [facility]: @ Capacity. Reporting no reviews overnight.  [place] -- [facility]: @ Capacity. Low acuity  [place] -- [facility]: @ Capacity. Low acuity only.   - 3:26 PM Per Princess, they can review for low acuity.   [place] -- [facility]: @ Capacity.  [facility]: @ Capacity.  [facility]/ beds: Posting 4 beds. Ages 18-35, Voluntary only, NO aggression/physical/sexual assault, violence hx or drug abuse, or psychosis. Negative Covid   [place] -- [facility]: @ Capacity.  [place] -- [facility]: @ Capacity.  [place] -- [facility]: @ Capacity. Do not review overnight.    *STATEWIDE (by distance):  [facility]: POSTING 8 BEDS. Mixed unit. Ages 12 and up/Low acuity only.   [facility] - Posting 2 beds. Low acuity, No aggression.  [facility] - @ Capacity.  [facility] - @ Posting 3 beds. Low acuity only. No current aggression.  [facility] - Posting 1 bed. Negative Covid. Lower acuity only.   [facility] - Capacity. Low acuity only. Prefer med-adjustment placements.   [facility] - Posting 1 beds. No aggression. - Only Low Acuity reviews.    [place] - [facility]: Posting 1 beds. No aggressive behaviors. Do not review overnight.  [place] -- [facility]: POSTING 6 BEDS.  No hx of aggression. No sexual offenders. Voluntary patients only.  [place] -- [facility]: Posting 4 beds. Low acuity only. Must be able to do programming. No aggression/violent behavior in 2 years. No CD treatment.  [place] [facility]: @ [facility]. Negative Covid test. Must be low acuity ONLY.  [place] -- [facility]: Posting 2  beds. Low acuity. Negative Covid.   [place] -- [facility]: POSTING 8 BEDS. (Low acuity only at this time)  [facility]: Posting 5 beds. No hx of aggression/assault. No lines, drains or tubes. Does not provide detox or CD treatment. Require a confirmed ride upon discharge.  [place] -- [facility]: POSTING 5 BEDS. Negative COVID. No medical devices.     **[place] -- [facility]: POSTING 32 BEDS. Out-of-State Facility. CANNOT TAKE 72HR HOLDS    Pt remains on waitlist pending appropriate placement availability.

## 2024-12-22 NOTE — TELEPHONE ENCOUNTER
S:  Admit to 6A   / Ashish Lanier accepts    72 HH    ( also under commitment)      1:20 PM  6A  CRN informed  1:29 PM  EDGAR PEREYRA, Marichuy, informed      2:41 PM  Indicia done

## 2024-12-22 NOTE — PROGRESS NOTES
Patient irritable, awake, guarded, refusing PRNs for sleep and anxiety. Stated not depressed or anxious. Frequently asking for nicotine gum.

## 2024-12-22 NOTE — ED NOTES
Placement has been secured for patient at [facility] station 6AE under the care of Dr. Ramirez. 6AE will call for reports sometime after 3:30. Patient notified. Mother notified.

## 2024-12-22 NOTE — ED NOTES
"Isolative and pre-occupied. Tense and anxious during conversations with writer. Reports feeling conflicted about living at home with his parents because at times he feels they have thoughts of harming him but doesn't want to pursue a group home because he fears being kicked out then ending up homeless. Reports to writer he wants his parents to know that he \"will defend \" himself if they attempt to hurt him. Denies he would harm them unless provoked. Does not feel he is paranoid. Does not feel he has any mental health problems. Does admit he has been suicidal in the past but denies any current SI. VSS. Eating meals.      "

## 2024-12-22 NOTE — PROGRESS NOTES
RN approached patient to collect covid swab. Patient agreeable. Sample collected and sent to lab. Patient asked when he would be going inpatient. RN informed patient that the search for an inpatient bed is in process, but a bed has not been found yet for him. Patient verbalized understanding.

## 2024-12-23 PROCEDURE — 250N000013 HC RX MED GY IP 250 OP 250 PS 637: Performed by: REGISTERED NURSE

## 2024-12-23 PROCEDURE — 250N000013 HC RX MED GY IP 250 OP 250 PS 637: Performed by: PSYCHIATRY & NEUROLOGY

## 2024-12-23 PROCEDURE — 99223 1ST HOSP IP/OBS HIGH 75: CPT | Mod: AI | Performed by: REGISTERED NURSE

## 2024-12-23 PROCEDURE — 128N000002 HC R&B CD/MH ADOLESCENT

## 2024-12-23 RX ORDER — LITHIUM CARBONATE 300 MG/1
600 TABLET, FILM COATED, EXTENDED RELEASE ORAL AT BEDTIME
Status: DISCONTINUED | OUTPATIENT
Start: 2024-12-23 | End: 2024-12-26

## 2024-12-23 RX ADMIN — NICOTINE POLACRILEX 4 MG: 4 GUM, CHEWING BUCCAL at 20:52

## 2024-12-23 RX ADMIN — LITHIUM CARBONATE 600 MG: 300 TABLET, EXTENDED RELEASE ORAL at 19:13

## 2024-12-23 RX ADMIN — NICOTINE POLACRILEX 4 MG: 4 GUM, CHEWING BUCCAL at 10:08

## 2024-12-23 RX ADMIN — NICOTINE POLACRILEX 4 MG: 4 GUM, CHEWING BUCCAL at 17:39

## 2024-12-23 RX ADMIN — NICOTINE POLACRILEX 4 MG: 4 GUM, CHEWING BUCCAL at 19:51

## 2024-12-23 RX ADMIN — NICOTINE POLACRILEX 4 MG: 4 GUM, CHEWING BUCCAL at 18:44

## 2024-12-23 RX ADMIN — LAMOTRIGINE 200 MG: 200 TABLET ORAL at 19:13

## 2024-12-23 RX ADMIN — FLUPHENAZINE HYDROCHLORIDE 30 MG: 10 TABLET, FILM COATED ORAL at 19:13

## 2024-12-23 RX ADMIN — LURASIDONE HYDROCHLORIDE 160 MG: 40 TABLET, FILM COATED ORAL at 12:07

## 2024-12-23 RX ADMIN — NICOTINE POLACRILEX 4 MG: 4 GUM, CHEWING BUCCAL at 14:30

## 2024-12-23 RX ADMIN — NICOTINE POLACRILEX 4 MG: 4 GUM, CHEWING BUCCAL at 16:37

## 2024-12-23 RX ADMIN — NICOTINE POLACRILEX 4 MG: 4 GUM, CHEWING BUCCAL at 13:18

## 2024-12-23 RX ADMIN — NICOTINE POLACRILEX 4 MG: 4 GUM, CHEWING BUCCAL at 15:36

## 2024-12-23 RX ADMIN — NICOTINE POLACRILEX 4 MG: 4 GUM, CHEWING BUCCAL at 11:48

## 2024-12-23 RX ADMIN — NICOTINE POLACRILEX 4 MG: 4 GUM, CHEWING BUCCAL at 06:01

## 2024-12-23 RX ADMIN — NICOTINE POLACRILEX 4 MG: 4 GUM, CHEWING BUCCAL at 08:37

## 2024-12-23 RX ADMIN — NICOTINE POLACRILEX 4 MG: 4 GUM, CHEWING BUCCAL at 07:03

## 2024-12-23 RX ADMIN — NICOTINE POLACRILEX 4 MG: 4 GUM, CHEWING BUCCAL at 21:55

## 2024-12-23 ASSESSMENT — ACTIVITIES OF DAILY LIVING (ADL)
ADLS_ACUITY_SCORE: 26

## 2024-12-23 NOTE — PLAN OF CARE
Problem: Skin Injury Risk Increased  Goal: Skin Health and Integrity  Outcome: Progressing  Intervention: Optimize Skin Protection  Recent Flowsheet Documentation  Taken 12/23/2024 0916 by Amador Hatch RN  Activity Management: up ad peggy  Head of Bed (HOB) Positioning: HOB flat   Goal Outcome Evaluation:     Plan of Care Reviewed With: (P) patient       Patient paced the hallway, ate 100% breakfast, paced the hallway, met with provider, and paced the hallway. He took scheduled Latuda around noon. He quietly paced the hallway through the afternoon. He was withdrawn to self as he paced the gould
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
## 2024-12-23 NOTE — PLAN OF CARE
Team Note Due:      Assessment/Intervention/Current Symtoms and Care Coordination:  Chart review and met with team, discussed pt progress, symptomology, and response to treatment.  Discussed the discharge plan and any potential impediments to discharge.    1:16 PM  Covering CTC L/M for ACT  Polina seeking a return call to gather information and to discuss plan for revoking PD.     1:34 PM Called [facility] court to obtain copy of court order.     Discharge Plan or Goal:  TBD     Barriers to Discharge:  72 HH     Referral Status:  none     Legal Status:  72 Hour Hold on PD, pending revocation?   County: [place]  File Number: [id number]  Start and expiration date of commitment: 02/06/2024-02/06/2025    Contacts (include CRISTINA status):  ACT Team (Re-Entry)  Medication Management/Psychiatry:  Name/Clinic: Dr. Cash         /ACT Team:  Name/Clinic: [facility] Team   Number: [phone]      Other contact information (family, friends, SO) and CRISTINA status:   Van, Father [phone]  Rolanda, Mother [phone]      Upcoming Meetings and Dates/Important Information and next steps:  Connect with ACT  to determine plan for revoking PD.
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
## 2024-12-23 NOTE — PHARMACY-ADMISSION MEDICATION HISTORY
Admission Medication History Completed by Pharmacy    See [facility] Admission Navigator for allergy information, preferred outpatient pharmacy, prior to admission medications and immunization status.     Medication history sources:  Patient; Surescripts; MAR for last doses     Pertinent changes made to PTA medication list:  Added:   - Methylfolate 15 mg tablets (per patient and fill hx)   Deleted: N/A  Changed: N/A    Additional medication history information:   - Patient reports adherence to medications and denies taking any additional Rx/OTC medications other than the ones listed below.    Prior to Admission medications    Medication Sig Last Dose Taking? Auth Provider Long Term End Date   fluPHENAZine (PROLIXIN) 10 MG tablet Take 30 mg by mouth every evening 12/21/2024 Yes Reported, Patient Yes    lamoTRIgine (LAMICTAL) 200 MG tablet Take 200 mg by mouth every evening 12/21/2024 Yes Reported, Patient Yes    lithium ER (LITHOBID) 300 MG CR tablet Take 1 tablet (300 mg) by mouth at bedtime. 12/21/2024 Yes Nara Park, APRN CNP Yes    lurasidone (LATUDA) 120 MG TABS tablet Take 120 mg by mouth daily with food Take with 40 mg tablet for 160 mg dose. Takes @1200. 12/22/2024 Yes Reported, Patient     lurasidone (LATUDA) 40 MG TABS tablet Take 40 mg by mouth daily with food Take with 40 mg tablet for 160 mg dose. Takes @1200. 12/22/2024 Yes Reported, Patient     methylfolate (DEPLIN) 15 MG TABS tablet Take 15 mg by mouth daily. Past Week Yes Unknown, Entered By History     nicotine polacrilex (NICORETTE) 4 MG gum Place 4 mg inside cheek every hour as needed for smoking cessation Past Week Yes Reported, Patient            Date completed: 12/22/24    Medication history completed by:   Janette Doe, PharmD  *[id number]

## 2024-12-23 NOTE — PLAN OF CARE
Problem: Sleep Disturbance  Goal: Adequate Sleep/Rest  Outcome: Progressing  Patient slept for 6 hours during the night. Respirations noted even and unlabored. No indication of pain or discomfort. Safety precautions implemented per protocol. No occurrences. Patient woke up early this morning, requested and was offered prn Nicotine gum 4 mg. He was seen pacing the hallway quietly but in a very fast pace. Writer checked in with patient but he denied anxiety stating that he walks otherwise he gets bored. Patient also enquired at what time he will be seeing the provider, and was told sometime this morning. Writer told patient to check in with nursing if he needs anything. He was agreeable. No outburst behavior or safety concerns noted at this time. Will continue to monitor and offer support.

## 2024-12-23 NOTE — DISCHARGE INSTRUCTIONS
Behavioral Discharge Planning and Instructions    Summary: You were admitted on 12/22/2024  due to {Mental Health 1:650655}.  You were treated by SELINA Lawler CNP and discharged on *** from Young Adult to {[facility] D/C Locations:014477}    Main Diagnosis: ***    [facility]  [facility]    Health Care Follow-up:   Appointment Date/Time: ***   Psychiatrist/Primary Care Giver: ***   Address: ***   Phone Number: ***    Appointment Date/Time: ***   Therapist: ***   Address: ***   Phone Number: ***    Appointment Date/Time: ***   Support Group: ***   Address: ***   Phone Number: ***    Appointment Date/Time: ***   Other: ***   Address: ***   Phone Number: ***    If no appointments scheduled, explain ***      Information will be faxed to your outpatient providers to ensure a healthy continuity of care for you.     Attend all scheduled appointments with your outpatient providers. Call at least 24 hours in advance if you need to reschedule an appointment to ensure continued access to your outpatient providers.     Major Treatments, Procedures and Findings:  You were provided with: {Major Treatments:315865}    Symptoms to Report: {symptoms:326815}    Early warning signs can include: {BH Warning Signs:336864}    Safety and Wellness:  {Age Group Safety Wellness:406493}    Resources:   Mental Health Crisis Resources  Throughout [place]: call **CRISIS (**475105)  Crisis Text Line: is available for free, 24/7 by texting [place] to 163244  Suicide Awareness Voices of Education (SAVE) (www.save.org): 797-425-YENM (9064)  The National Suicide Prevention Lifeline is now: 988 Suicide and Crisis Lifeline. Call 988 anytime.  National Ava on Mental Illness (www.[place].charla.org): 869.483.1240 or 649-617-3881.  Unrq8omla: text the word LIFE to 37084 for immediate support and crisis intervention  Mental Health Consumer/Survivor Network of [place] (www.mhcsn.net): 995.641.9746 or 871-070-1034  Mental Health Association of [place]  (www.mentalhealth.org): 985.475.9912 or 056-576-7666  Peer Support Connection [place] Warmline ([facility]) 1-406.916.7798 Available from 5pm - 9am (7 days a week/365 days a year)  {OP Beh CRISIS PHONE NUMBERS:306320}  {Click [facility] to add resources related to addiction or EHRBLANK to skip:401522}    General Medication Instructions:   See your medication sheet(s) for instructions.   Take all medicines as directed.  Make no changes unless your doctor suggests them.   Go to all your doctor visits.  Be sure to have all your required lab tests. This way, your medicines can be refilled on time.  Do not use any drugs not prescribed by your doctor.  Avoid alcohol.    Advance Directives:   Scanned document on file with [employer]? No scanned doc  Is document scanned? Pt states no documents  Honoring Choices Your Rights Handout: Minor - N/A  Was more information offered? Pt declined    The Treatment team has appreciated the opportunity to work with you. If you have any questions or concerns about your recent admission, you can contact the unit which can receive your call 24 hours a day, 7 days a week. They will be able to get in touch with a Provider if needed. The unit number is [phone] .     psychotherapists and a psychiatrist and/or psychologist.       Group-based programming 3 groups per day; 4 days per week   50 minutes per group   10-minute break between each group   Facilitated by a member of the treatment team      Group Psychotherapy is provided daily, allowing time to check-in, process concerns and share feedback/support. All other groups include a topic and provide opportunities for education, skill building, discussion, and support.       Example   Group   Topics Topics may include:        Behavior Activation, Cognitive Restructuring: Cognitive Distortions, Communication Skills/ Areas for Self-Improvement. Coping Skills, Discharge Planning, Dimensions of Wellness, Emotions, Forgiveness, Functional Nutrition, Grief, Life Transitions, Leisure Exploration, Life Skills, Listening for Meaning, Medication Assessment, Mental Health Social Support, Mindfulness, Mood Tracking/Triggers, Motivation and Procrastination, Relationship, Relaxation Techniques, Self-Awareness, Self-Confidence, Self-Care, Self-Compassion, Self-Esteem and Self-compassion, Shame and Guilt, Sleep hygiene, SMART Goals, Stages to [place] with Stress, Stigma, Strategies to Improve Motivation, Symptom Awareness, Time Management, Trauma Triggers, Values, Wellness       Psychiatrist and/or Psychologist Patients will see the program psychiatrist, Nurse Practitioner and/or psychologist for follow-up every 30 days or more frequent, as needed.     The program provider will partner with you and your community providers for medication management, as needed.    Psychiatry services will be billed separately.    For insurance purposes, please coordinate with team to prevent scheduling to see the program psychiatrist on the same day you see your outpatient psychiatrist.    NOTE: Either provider will continue to assess your progress and coordinate with the treatment team to determine when you may be ready for completion.  This is a program  requirement.          Individual Therapy Not provided in this program.   Physical   Health   Screen Patients meet with a program nurse within the first week to complete a brief physical health screen. This is a program requirement.    FMLA   or   Short-term Disability We encourage you to request completion of paperwork from your long-term providers.    If this is not an option, please notify the program nurse as soon as possible.   We will do our best to help you coordinate completion of paperwork.    Medical Record requests: please contact Release of Information at [phone] and allow up to 14 days for records once the authorization for release is received.       Treatment   and   Discharge Planning Patients meet individually with team members for treatment planning.    We will help you develop goals and identify strengths and/or barriers.    We will discuss your program participation, progress, and discharge planning.    We are available to assist with referrals and service coordination; please let us know how best we can support your specific needs.    You will receive copies of your treatment plan and discharge summary via [employer].          An Important Note from your Treatment Team...   Welcome! We are happy to be partnering with you on your recovery journey. Our experienced clinicians have developed programming based on current research and evidence-based practice to provide you with high quality mental health treatment.   Attendance and Program Participation:    You will participate in a variety of groups each day. Our goal is to provide you with a rich and varied therapeutic healing environment knowing patients have unique experiences and preferred ways of sharing, learning, processing, and engaging in the recovery process.  You are expected to attend all groups on time.  If you are going to be late or absent, please let a team member know the reason. You can also leave that same information at the number  listed above.  In the event of an unreported absence, we will reach out to you. If we are unable to reach you, know that we may call your emergency contact.   We always attempt to establish contact with your emergency contact prior to initiating a wellness check.   While it is important that you continue to attend appointments with your individual therapist, psychiatrist, and other medical providers, you are encouraged to schedule these appointments outside of group hours whenever possible.  If your attendance becomes a concern, your treatment team will have a discussion with you and may start an Attendance Agreement. Following your Attendance Agreement is required to prevent early discharge from the program.   To get the most out of the program and to support your wellbeing we require that you do not use any chemicals, tobacco or vape products on screen or during program hours. The team is available to assist you if this is something you struggle with.   Please be mindful that you are part of a group; therefore, we ask that you be respectful of other group members' needs and do not use derogatory, offensive, or discriminatory language.   You will be removed from group if suspected of being under the influence of substances or if using language that negatively impacts the group.   Your treatment team will address any concerns with you and offer recommendations.  A Behavior Agreement may be started. Following your Behavior Agreement is required to prevent early discharge from the program.    Communication with other group members outside of group is discouraged. This can affect your treatment and the way the group functions.   If you choose to share contact information with group peers AFTER you are discharged from the program, this decision is completely independent of any program coordination or support.   While in the program, participants may not engage in any sexual or intimate relationships with each other  outside of group. This may result in immediate discharge of both participants from the program.   What is said in group, stays in group. (All personal or identifying information shared in group should be kept confidential and not shared with anyone).  NOTE: Audio or Visual recordings are not allowed and may result in immediate discharge from the program.  Trauma:  Many of our patients have experienced trauma. You are not alone. This can be challenging for patients to manage.  All our team members have been trained in Trauma Informed Care and will provide you with the education, skills training, and support that you need to stabilize your symptoms.   Specific details and descriptions of abuse, assault, violence, neglect, etc., are best processed in individual therapy as to avoid triggering other group members.  Discussing how traumatic experiences have impacted beliefs about self/others/world and practicing skills to cope with symptoms is very appropriate for group therapy.     We look forward to working together to support your mental health!    We love feedback from our patients about our program   Please take a few moments to respond to surveys sent out by [employer].  This will help us continue to make improvements and keep the things that are important to you!    Resources:    CRISIS BEDS    [facility]  [facility]  [address] [place] [address]/[facility]  (p) [phone]     [facility]  [facility]  [address] [place] [address]/[facility]  (p) [phone]     [facility]  [address] [place] [id number]  (p) [phone]     [facility]  [address] [place] [address]  Main Phone: [phone]  Crisis Phone: [phone]     Crisis residences are self-referred programs (meaning you can call them on your own and request to go there). The program integrates mental health,  medical, and substance use care in a residential, 24-hour, supervised setting for up to 10 days.  Individuals can leave as they please. These services help individuals who are experiencing a mental health crisis or emergency.        Information will be faxed to your outpatient providers to ensure a healthy continuity of care for you.     Attend all scheduled appointments with your outpatient providers. Call at least 24 hours in advance if you need to reschedule an appointment to ensure continued access to your outpatient providers.     Major Treatments, Procedures and Findings:  You were provided with: a psychiatric assessment, assessed for medical stability, medication evaluation and/or management, group therapy, individual therapy, and milieu management    Symptoms to Report: feeling more aggressive, increased confusion, losing more sleep, mood getting worse, or thoughts of suicide    Early warning signs can include: increased depression or anxiety sleep disturbances increased thoughts or behaviors of suicide or self-harm  increased unusual thinking, such as paranoia or hearing voices    Safety and Wellness:  Take all medicines as directed.  Make no changes unless your doctor suggests them.      Follow treatment recommendations.  Refrain from alcohol and non-prescribed drugs.  Ask your support system to help you reduce your access to items that could harm yourself or others. If there is a concern for safety, call 911.    Resources:   Mental Health Crisis Resources  Throughout [place]: call **CRISIS (**192390)  Crisis Text Line: is available for free, 24/7 by texting [place] to 870879  Suicide Awareness Voices of Education (SAVE) (www.save.org): 288-012-SXBC (7204)  The National Suicide Prevention Lifeline is now: 988 Suicide and Crisis Lifeline. Call 988 anytime.  National [place] on Mental Illness (www.[place].charla.org): 864.616.4156 or 720-358-4728.  Tkxu4cqtt: text the word LIFE to 10754 for immediate support and  crisis intervention  Mental Health Consumer/Survivor Network of [place] (www.mhcsn.net): 807-574-1332 or 687-115-8408  Mental Health Association of [place] (www.mentalhealth.org): 633.392.8147 or 213-026-5860  Peer Support Connection [place] Warmline ([facility]) 1-413.207.8949 Available from 5pm - 9am (7 days a week/365 days a year)  Call or Text [address] [place] [address]-952-891-7171      General Medication Instructions:   See your medication sheet(s) for instructions.   Take all medicines as directed.  Make no changes unless your doctor suggests them.   Go to all your doctor visits.  Be sure to have all your required lab tests. This way, your medicines can be refilled on time.  Do not use any drugs not prescribed by your doctor.  Avoid alcohol.    Advance Directives:   Scanned document on file with [employer]? No scanned doc  Is document scanned? Pt states no documents  Honoring Choices Your Rights Handout: Minor - N/A  Was more information offered? Pt declined    The Treatment team has appreciated the opportunity to work with you. If you have any questions or concerns about your recent admission, you can contact the unit which can receive your call 24 hours a day, 7 days a week. They will be able to get in touch with a Provider if needed. The unit number is [phone] .

## 2024-12-23 NOTE — PLAN OF CARE
"  Problem: Psychotic Symptoms  Goal: Psychotic Symptoms  Description: Signs and symptoms of listed problems will be absent or manageable.  Outcome: Progressing   Goal Outcome Evaluation:    Plan of Care Reviewed With: patient             Reported mood was \"good\". Denied any anxiety, depression, SI,SIB,HI, AVH. Spent most of the evening pacing the gould. Kept to self.   Denied pain or any physical concerns  Ate 100% of dinner  Compliant with taking scheduled medication    PRN: Requesting nicotine gum every hour  Vitals: Temp: 98  F (36.7  C) Temp src: Oral BP: 119/78 Pulse: 101   Resp: 16 SpO2: 100 % O2 Device: None (Room air)        Addendum 2200    Pt's home medication L-Methylfolate sent to pharmacy  "

## 2024-12-23 NOTE — PLAN OF CARE
12/22/24 3367   Patient Belongings   Did you bring any home meds/supplements to the hospital?  Yes   Disposition of meds  Sent to security/pharmacy per site process   Patient Belongings locker   Patient Belongings Put in Hospital Secure Location (Security or Locker, etc.) cell phone/electronics;clothing;shoes   Belongings Search Yes   Clothing Search Yes   Second Staff Gian   Comment This staff did not participate in patient search.     Goal Outcome Evaluation:    Sent to Pharmacy 12/22/24:  Bottle of L-Methylfolate    In Locker 12/22/24:     White Tennis Shoes  Hair Product  Grey Sweatpants  2 Black Sweaters  Red Coat  [place] Cigarettes  Grey T-shirt  Cell Phone    ..A               Admission:  I am responsible for any personal items that are not sent to the safe or pharmacy.  [facility] is not responsible for loss, theft or damage of any property in my possession.    Signature:  _________________________________ Date: _______  Time: _____                                              Staff Signature:  ____________________________ Date: ________  Time: _____      2nd Staff person, if patient is unable/unwilling to sign:    Signature: ________________________________ Date: ________  Time: _____     Discharge:  [facility] has returned all of my personal belongings:    Signature: _________________________________ Date: ________  Time: _____                                          Staff Signature:  ____________________________ Date: ________  Time: _____

## 2024-12-23 NOTE — PLAN OF CARE
BEH IP Unit Acuity Rating Score (UARS)  Patient is given one point for every criteria they meet.    CRITERIA SCORING   On a 72 hour hold, court hold, committed, stay of commitment, or revocation. 1   Patient LOS on BEH unit exceeds 20 days. 0  LOS: 1   Patient under guardianship, 55+, otherwise medically complex, or under age 11. 0   Suicide ideation without relief of precipitating factors. 1   Current plan for suicide. 0   Current plan for homicide. 0   Imminent risk or actual attempt to seriously harm another without relief of factors precipitating the attempt. 0   Severe dysfunction in daily living (ex: complete neglect for self care, extreme disruption in vegetative function, extreme deterioration in social interactions). 1   Recent (last 7 days) or current physical aggression in the ED or on unit. 0   Restraints or seclusion episode in past 72 hours. 0   Recent (last 7 days) or current verbal aggression, agitation, yelling, etc., while in the ED or unit. 0   Active psychosis. 0   Need for constant or near constant redirection (from leaving, from others, etc).  0   Intrusive or disruptive behaviors. 0   Patient requires 3 or more hours of individualized nursing care per 8-hour shift (i.e. for ADLs, meds, therapeutic interventions). 0   TOTAL 3

## 2024-12-24 PROCEDURE — 250N000013 HC RX MED GY IP 250 OP 250 PS 637: Performed by: REGISTERED NURSE

## 2024-12-24 PROCEDURE — 250N000013 HC RX MED GY IP 250 OP 250 PS 637: Performed by: PSYCHIATRY & NEUROLOGY

## 2024-12-24 PROCEDURE — 99232 SBSQ HOSP IP/OBS MODERATE 35: CPT | Performed by: REGISTERED NURSE

## 2024-12-24 PROCEDURE — 128N000002 HC R&B CD/MH ADOLESCENT

## 2024-12-24 RX ORDER — FLUPHENAZINE HYDROCHLORIDE 10 MG/1
10 TABLET ORAL EVERY EVENING
Status: DISCONTINUED | OUTPATIENT
Start: 2024-12-24 | End: 2025-01-09 | Stop reason: HOSPADM

## 2024-12-24 RX ORDER — LEVOMEFOLATE CALCIUM 15 MG
15 TABLET ORAL DAILY
Status: DISCONTINUED | OUTPATIENT
Start: 2024-12-24 | End: 2025-01-09 | Stop reason: HOSPADM

## 2024-12-24 RX ADMIN — LURASIDONE HYDROCHLORIDE 160 MG: 40 TABLET, FILM COATED ORAL at 12:07

## 2024-12-24 RX ADMIN — NICOTINE POLACRILEX 4 MG: 4 GUM, CHEWING BUCCAL at 13:25

## 2024-12-24 RX ADMIN — NICOTINE POLACRILEX 4 MG: 4 GUM, CHEWING BUCCAL at 16:25

## 2024-12-24 RX ADMIN — NICOTINE POLACRILEX 4 MG: 4 GUM, CHEWING BUCCAL at 08:38

## 2024-12-24 RX ADMIN — NICOTINE POLACRILEX 4 MG: 4 GUM, CHEWING BUCCAL at 12:14

## 2024-12-24 RX ADMIN — NICOTINE POLACRILEX 4 MG: 4 GUM, CHEWING BUCCAL at 07:38

## 2024-12-24 RX ADMIN — NICOTINE POLACRILEX 4 MG: 4 GUM, CHEWING BUCCAL at 11:13

## 2024-12-24 RX ADMIN — LAMOTRIGINE 200 MG: 200 TABLET ORAL at 19:54

## 2024-12-24 RX ADMIN — NICOTINE POLACRILEX 4 MG: 4 GUM, CHEWING BUCCAL at 17:16

## 2024-12-24 RX ADMIN — LITHIUM CARBONATE 600 MG: 300 TABLET, EXTENDED RELEASE ORAL at 19:54

## 2024-12-24 RX ADMIN — NICOTINE POLACRILEX 4 MG: 4 GUM, CHEWING BUCCAL at 20:29

## 2024-12-24 RX ADMIN — NICOTINE POLACRILEX 4 MG: 4 GUM, CHEWING BUCCAL at 19:24

## 2024-12-24 RX ADMIN — NICOTINE POLACRILEX 4 MG: 4 GUM, CHEWING BUCCAL at 10:08

## 2024-12-24 RX ADMIN — NICOTINE POLACRILEX 4 MG: 4 GUM, CHEWING BUCCAL at 18:25

## 2024-12-24 RX ADMIN — Medication 15 MG: at 20:00

## 2024-12-24 RX ADMIN — FLUPHENAZINE HYDROCHLORIDE 10 MG: 10 TABLET, FILM COATED ORAL at 19:53

## 2024-12-24 ASSESSMENT — ACTIVITIES OF DAILY LIVING (ADL)
ADLS_ACUITY_SCORE: 26

## 2024-12-24 NOTE — PLAN OF CARE
Problem: Psychotic Symptoms  Goal: Social and Therapeutic (Psychotic Symptoms)  Description: Signs and symptoms of listed problems will be absent or manageable.  Outcome: Not Progressing  Flowsheets (Taken 12/24/2024 1754)  Psychotic Symptoms Assessed: all  Psychotic Symptoms Present:   insight   thought process   Goal Outcome Evaluation:  /77 (BP Location: Right arm, Patient Position: Sitting)   Pulse 106   Temp 97.9  F (36.6  C)   Resp 16   Wt 61.6 kg (135 lb 12.8 oz)   SpO2 96%   BMI 18.94 kg/m          Pt was out in the milieu isolative to himself pacing in the hallway. He had a fair appetite and good fluid intake and ate about 80% of his dinner. He was cleaned and well groomed. He had flat blunted affect and was guarded and didn't want to engage in a conversation. Pt was calm, cooperative and medication compliant.

## 2024-12-24 NOTE — PLAN OF CARE
"  Problem: Psychotic Symptoms  Goal: Psychotic Symptoms  Description: Signs and symptoms of listed problems will be absent or manageable.  Outcome: Not Progressing   Goal Outcome Evaluation:    Patient up at start of shift.  Flat affect.  Continuous pacing.  Frequent requests for nicotine gum.  (Declined offer to order nicotine patch).  Marginal eye contact.    Reports feeling \"fine\".  Blames his parents for his admission this time.  Reports past suicide attempts but currently  denies SI/SIB or thoughts to hurt others (including his parents).                Also denies hallucinations, depression , anxiety.     Asked if he was taking medication prior to admission here--hesitated and \"fumbled\" with his answer as if deciding the right answer.    Reports he is eating \"ok\".  Patient appears very thin.  Will continue to monitor..    Offers no concerns regarding sleep.    1215:  Ate 75% lunch. Took latuda.    Did not go to group, no socialization noted.   "

## 2024-12-24 NOTE — PLAN OF CARE
Problem: Sleep Disturbance  Goal: Adequate Sleep/Rest  Outcome: Progressing   Goal Outcome Evaluation:     12/24/24 0600   Sleep/Rest   Sleep/Rest/Relaxation no problem identified;appears asleep   Sleep Hygiene Promotion awakenings minimized;noise level reduced   Night Time # Hours 7 hours     Pt slept 7 hours. No safety or behavioral concerns overnight. No prns given.

## 2024-12-24 NOTE — PROGRESS NOTES
"[facility],  Psychiatric Progress Note        Interim History:   The patient's care was discussed with the treatment team and chart notes were reviewed.     Today during the interview with the treatment team staff report patient has been isolative and walking in halls. He denies mental health symptoms to staff and does not participate in groups. Staff reported he slept  7 hours overnight.    Today upon assessment patient reports he is wanting to go home. He denies all incidences at home with parents. He reports he does not remember hitting his mother \"I didn't hit her\".  He denies depression or anxiety. He denies hallucinations. He is very paranoid about his parents and the large garden bin outside. He reports they have threatened him and he is fearful about this \"metal tub that is 6 feet long, long enough to fit my body in\". He reports his parents \"will say anything to keep me in the hospital\". He lacks insight and reports he wants to go home to see his dog and smoke cigarettes on porch. When asked if he feels safe to go home he states he does. His story is confusing and he does not appear to be accurate historian. He denies current thoughts to harm himself or others. He reports appetite and sleep stable.     Call to mother: she reports they are afraid of patient and that he gets angry when they try to help him or direct his mental health. They report he has been dismissive of help and act team and he does not engage. Police have been called numerous times and even police are afraid pt will be aggressive or hit them which would escalate situation.     Provider called ACT team to get in touch with psychiatrist left . [phone] Dr. Prieto Cash    The 10 point Review of Systems is negative other than noted in the HPI         DIagnoses:     Schizoaffective disorder bipolar type   Generalized anxiety disorder  Substance use (h)    Clinically Significant Risk Factors    "                               # Financial/Environmental Concerns:                Plan:   Legal: Full commitment with [facility]     Medications:  Prolixin 10 mg tablet every evening  Continue Lamictal 200 mg every evening  Lithium 600 mg at bedtime   Continue Latuda 160 mg daily with lunch   Prns: hydroxyzine for anxiety, melatonin for sleep,    Routine lab reviewed. Unremarkable. UTOX negative.     Attestation:  Patient has been seen and evaluated by Ashish knight APRN CNP  The patient was counseled on  nature of illness and treatment plan/options  Care was coordinated with  unit RN  Total amount of time: >35 minutes  Coordination of care/counseling: 10 minutes      Disposition  Reason for ongoing hospitalization: Pt is paranoid, he has histroy of aggression at home and is at risk for safety and other safety.  Discharge date: TBD  Discharge place: IRTS (preferred by family) or home with IOP/PHP         Medications:     Current Facility-Administered Medications   Medication Dose Route Frequency Provider Last Rate Last Admin    acetaminophen (TYLENOL) tablet 650 mg  650 mg Oral Q4H PRN Fabiano Pepper MD        alum & mag hydroxide-simethicone (MAALOX) suspension 30 mL  30 mL Oral Q4H PRN Fabiano Pepper MD        fluPHENAZine (PROLIXIN) tablet 10 mg  10 mg Oral QPM Ashish Meek APRN CNP        hydrOXYzine HCl (ATARAX) tablet 25 mg  25 mg Oral Q4H PRN Fabiano Pepper MD        lamoTRIgine (LaMICtal) tablet 200 mg  200 mg Oral QPM Fabiano Pepper MD   200 mg at 12/23/24 1913    lithium ER (LITHOBID) CR tablet 600 mg  600 mg Oral At Bedtime Ashish Meek APRN CNP   600 mg at 12/23/24 1913    lurasidone (LATUDA) tablet 160 mg  160 mg Oral Daily with lunch Nabeel Glover MD   160 mg at 12/23/24 1207    melatonin tablet 3 mg  3 mg Oral At Bedtime PRN Fabiano Pepper MD        methylfolate (DEPLIN) tablet 15 mg  15 mg Oral Daily Ashish Meek APRN CNP        nicotine polacrilex  "(NICORETTE) gum 4 mg  4 mg Buccal Q1H PRN Fabiano Pepper MD   4 mg at 12/24/24 1008    OLANZapine (zyPREXA) tablet 10 mg  10 mg Oral TID PRN Fabiano Pepper MD        Or    OLANZapine (zyPREXA) injection 10 mg  10 mg Intramuscular TID PRN Fabiano Pepper MD        polyethylene glycol (MIRALAX) Packet 17 g  17 g Oral Daily PRN Fabiano Pepper MD                 Allergies:     Allergies   Allergen Reactions    Clozapine      Syncope per Pt.     Seasonal Allergies     Seroquel [Quetiapine]      Fainting and slowed breathing             Psychiatric Examination:   /78 (BP Location: Left arm, Patient Position: Sitting, Cuff Size: Adult Regular)   Pulse 101   Temp 98  F (36.7  C) (Oral)   Resp 16   Wt 61.6 kg (135 lb 12.8 oz)   SpO2 100%   BMI 18.94 kg/m    Weight is 135 lbs 12.8 oz  Body mass index is 18.94 kg/m .    Appearance:  awake, alert, adequately groomed, and dressed in hospital scrubs  Attitude:  cooperative and guarded  Eye Contact:  fair  Mood:  sad   Affect:  intensity is blunted  Speech:  clear, coherent  Psychomotor Behavior:  no evidence of tardive dyskinesia, dystonia, or tics and fidgeting  Thought Process:  illogical and paranoid  Associations:  no loose associations  Thought Content:  no evidence of suicidal ideation or homicidal ideation, no auditory hallucinations present, and no visual hallucinations present  Insight:  fair  Judgment:  fair  Oriented to:  time, person, and place  Attention Span and Concentration:  intact  Recent and Remote Memory:  fair           Labs:   No results found for: \"NTBNPI\", \"NTBNP\"  Lab Results   Component Value Date    WBC 6.5 12/21/2024    HGB 14.1 12/21/2024    HCT 41.6 12/21/2024    MCV 91 12/21/2024     12/21/2024     Lab Results   Component Value Date    GFRESTIMATED >90 12/21/2024    GFRESTBLACK >90 08/16/2019     Lab Results   Component Value Date     (H) 12/21/2024     Lab Results   Component Value Date    HGB 14.1 " 12/21/2024     Lab Results   Component Value Date    AST 20 12/21/2024    ALT 16 12/21/2024    ALKPHOS 46 12/21/2024    BILITOTAL 0.2 12/21/2024    ALY 29 07/01/2022     Lab Results   Component Value Date    TSH 1.86 11/03/2024     Lab Results   Component Value Date    WBC 6.5 12/21/2024

## 2024-12-24 NOTE — PLAN OF CARE
BEH IP Unit Acuity Rating Score (UARS)  Patient is given one point for every criteria they meet.    CRITERIA SCORING   On a 72 hour hold, court hold, committed, stay of commitment, or revocation. 1    Patient LOS on BEH unit exceeds 20 days. 0  LOS: 2   Patient under guardianship, 55+, otherwise medically complex, or under age 11. 0   Suicide ideation without relief of precipitating factors. 0   Current plan for suicide. 0   Current plan for homicide. 0   Imminent risk or actual attempt to seriously harm another without relief of factors precipitating the attempt. 0   Severe dysfunction in daily living (ex: complete neglect for self care, extreme disruption in vegetative function, extreme deterioration in social interactions). 1   Recent (last 7 days) or current physical aggression in the ED or on unit. 0   Restraints or seclusion episode in past 72 hours. 0   Recent (last 7 days) or current verbal aggression, agitation, yelling, etc., while in the ED or unit. 1-Last 12/22   Active psychosis. 1   Need for constant or near constant redirection (from leaving, from others, etc).  0   Intrusive or disruptive behaviors. 0   Patient requires 3 or more hours of individualized nursing care per 8-hour shift (i.e. for ADLs, meds, therapeutic interventions). 0   TOTAL 4

## 2024-12-24 NOTE — PLAN OF CARE
"Team Note Due:  Monday    Assessment/Intervention/Current Symptoms and Care Coordination  Chart review and met with team, discussed pt progress, symptomology, and response to treatment.  Discussed the discharge plan and any potential impediments to discharge.    Current Symptoms include the following: Psychosis and paranoia    [facility]. [facility] was informed that an intent to revoke was filed, but the report and there was not a signed PD filed from previous hospitalization.     [facility] review chart to see if there was a signed PD in EPIC. [facility] was unable to find one, so Sanjana RAMOS, from ACT Team was called. A VM was left for a call back.    Due to [facility] not [facility] signed [facility] and there not being a signed PD from previous hospitalization, original revocation of PD in 11/24 remains active. Pt will need to sign new PD upon discharge.     [facility] received a call back from [facility] who stated they had a copy of the signed PD from 11/4. [facility] was sent a copy of it. [facility] was also informed that supervisor submitted documentation for PD to be revoke. Act Team feels that pt would benefit from IRTS as he is \"targeting parents\" and that he is a \"ticking time bomb\". Pt becomes paranoid about knives and has called the police to have them finger print them. He reports that he feels someone is going to torture his parents, but that he would like to be the one to do it \"as he is strong than his father\".      [facility] and inquired about revocation paperwork. [facility] was informed that report has not been filed and there is no PD. [facility] was informed that PD would need to be e-filed.    [facility] spoke with ACT  and was informed that intent to revoke and report that courts are requesting were e-filed together. [facility] was sent copies of reports and intent to revoke.     If immediate assistance is need on 12/26 call " [phone] and press 0    Discharge Plan or Goal  Pending stabilization & development of a safe discharge plan.    Dispo Plan: IRTS vs Group home  Discharge Date/ time: TBD  Transportation: TBD  AVS Completed: Yes [] No[x]  Provisional Discharge: Yes[x] No[]    Barriers to Discharge   Patient requires further psychiatric stabilization due to current symptomology    Referral Status  Still assessing     Legal Status  Fully Committed with [facility]     [facility]: [place]   File Number: [id number]  Start and expiration date of commitment: 02/06/2024 to 02/06/2025    Contacts:  [phone] Dr. Prieto Araujo, Father [phone]  Rolanda, Mother [phone]   Name/Clinic: [facility]   Number: [phone]   Email: [email or web address]        Upcoming Meetings and Dates/Important Information and next steps:  CTC will follow up about court hold  CTC to have pt sign PD prior to discharge.

## 2024-12-25 PROCEDURE — 250N000013 HC RX MED GY IP 250 OP 250 PS 637: Performed by: REGISTERED NURSE

## 2024-12-25 PROCEDURE — 128N000002 HC R&B CD/MH ADOLESCENT

## 2024-12-25 PROCEDURE — 250N000013 HC RX MED GY IP 250 OP 250 PS 637: Performed by: PSYCHIATRY & NEUROLOGY

## 2024-12-25 RX ADMIN — NICOTINE POLACRILEX 4 MG: 4 GUM, CHEWING BUCCAL at 17:34

## 2024-12-25 RX ADMIN — NICOTINE POLACRILEX 4 MG: 4 GUM, CHEWING BUCCAL at 18:38

## 2024-12-25 RX ADMIN — Medication 15 MG: at 20:38

## 2024-12-25 RX ADMIN — LAMOTRIGINE 200 MG: 200 TABLET ORAL at 20:37

## 2024-12-25 RX ADMIN — NICOTINE POLACRILEX 4 MG: 4 GUM, CHEWING BUCCAL at 09:15

## 2024-12-25 RX ADMIN — NICOTINE POLACRILEX 4 MG: 4 GUM, CHEWING BUCCAL at 12:02

## 2024-12-25 RX ADMIN — LURASIDONE HYDROCHLORIDE 160 MG: 40 TABLET, FILM COATED ORAL at 12:00

## 2024-12-25 RX ADMIN — LITHIUM CARBONATE 600 MG: 300 TABLET, EXTENDED RELEASE ORAL at 20:37

## 2024-12-25 RX ADMIN — NICOTINE POLACRILEX 4 MG: 4 GUM, CHEWING BUCCAL at 19:38

## 2024-12-25 RX ADMIN — NICOTINE POLACRILEX 4 MG: 4 GUM, CHEWING BUCCAL at 08:14

## 2024-12-25 RX ADMIN — NICOTINE POLACRILEX 4 MG: 4 GUM, CHEWING BUCCAL at 13:18

## 2024-12-25 RX ADMIN — NICOTINE POLACRILEX 4 MG: 4 GUM, CHEWING BUCCAL at 07:07

## 2024-12-25 RX ADMIN — NICOTINE POLACRILEX 4 MG: 4 GUM, CHEWING BUCCAL at 21:48

## 2024-12-25 RX ADMIN — NICOTINE POLACRILEX 4 MG: 4 GUM, CHEWING BUCCAL at 16:36

## 2024-12-25 RX ADMIN — NICOTINE POLACRILEX 4 MG: 4 GUM, CHEWING BUCCAL at 15:27

## 2024-12-25 RX ADMIN — NICOTINE POLACRILEX 4 MG: 4 GUM, CHEWING BUCCAL at 10:29

## 2024-12-25 RX ADMIN — NICOTINE POLACRILEX 4 MG: 4 GUM, CHEWING BUCCAL at 20:38

## 2024-12-25 RX ADMIN — FLUPHENAZINE HYDROCHLORIDE 10 MG: 10 TABLET, FILM COATED ORAL at 20:37

## 2024-12-25 ASSESSMENT — ACTIVITIES OF DAILY LIVING (ADL)
ADLS_ACUITY_SCORE: 26

## 2024-12-25 NOTE — PLAN OF CARE
Problem: Sleep Disturbance  Goal: Adequate Sleep/Rest  Outcome: Progressing   Goal Outcome Evaluation:    Patient slept  for 7 hours during the night. Respirations even and unlabored. No indication of pain or discomfort. Safety precautions in progress with no occurrences. No behavior or safety concerns at this time. Will continue monitoring.

## 2024-12-25 NOTE — PLAN OF CARE
Problem: Adult Behavioral Health Plan of Care  Goal: Plan of Care Review  Outcome: Not Progressing   Goal Outcome Evaluation:    Patient up and visible since start of shift.  Affect remains flat.  Pacing.  No socialization.    One word answers to questions--declined to elaborate.  Presents as guarded.  Denies SI/SIB or thoughts to hurt others  Denies depression, anxiety, hallucinations.    1200: ate 75% lunch.  Took Latuda

## 2024-12-26 VITALS
WEIGHT: 135.8 LBS | SYSTOLIC BLOOD PRESSURE: 117 MMHG | OXYGEN SATURATION: 99 % | HEART RATE: 86 BPM | RESPIRATION RATE: 16 BRPM | BODY MASS INDEX: 18.94 KG/M2 | TEMPERATURE: 97.6 F | DIASTOLIC BLOOD PRESSURE: 76 MMHG

## 2024-12-26 PROCEDURE — 99232 SBSQ HOSP IP/OBS MODERATE 35: CPT | Performed by: REGISTERED NURSE

## 2024-12-26 PROCEDURE — 250N000013 HC RX MED GY IP 250 OP 250 PS 637: Performed by: REGISTERED NURSE

## 2024-12-26 PROCEDURE — 250N000013 HC RX MED GY IP 250 OP 250 PS 637: Performed by: PSYCHIATRY & NEUROLOGY

## 2024-12-26 PROCEDURE — 128N000002 HC R&B CD/MH ADOLESCENT

## 2024-12-26 RX ORDER — LAMOTRIGINE 100 MG/1
100 TABLET ORAL EVERY EVENING
Status: COMPLETED | OUTPATIENT
Start: 2024-12-26 | End: 2024-12-28

## 2024-12-26 RX ORDER — DIVALPROEX SODIUM 250 MG/1
250 TABLET, FILM COATED, EXTENDED RELEASE ORAL AT BEDTIME
Status: DISCONTINUED | OUTPATIENT
Start: 2024-12-26 | End: 2024-12-30

## 2024-12-26 RX ORDER — LAMOTRIGINE 25 MG/1
50 TABLET ORAL DAILY
Status: DISCONTINUED | OUTPATIENT
Start: 2024-12-29 | End: 2024-12-31

## 2024-12-26 RX ADMIN — NICOTINE POLACRILEX 4 MG: 4 GUM, CHEWING BUCCAL at 11:18

## 2024-12-26 RX ADMIN — NICOTINE POLACRILEX 4 MG: 4 GUM, CHEWING BUCCAL at 16:38

## 2024-12-26 RX ADMIN — LURASIDONE HYDROCHLORIDE 160 MG: 40 TABLET, FILM COATED ORAL at 12:15

## 2024-12-26 RX ADMIN — NICOTINE POLACRILEX 4 MG: 4 GUM, CHEWING BUCCAL at 10:16

## 2024-12-26 RX ADMIN — NICOTINE POLACRILEX 4 MG: 4 GUM, CHEWING BUCCAL at 09:03

## 2024-12-26 RX ADMIN — NICOTINE POLACRILEX 4 MG: 4 GUM, CHEWING BUCCAL at 18:59

## 2024-12-26 RX ADMIN — FLUPHENAZINE HYDROCHLORIDE 10 MG: 10 TABLET, FILM COATED ORAL at 19:01

## 2024-12-26 RX ADMIN — NICOTINE POLACRILEX 4 MG: 4 GUM, CHEWING BUCCAL at 06:41

## 2024-12-26 RX ADMIN — NICOTINE POLACRILEX 4 MG: 4 GUM, CHEWING BUCCAL at 12:15

## 2024-12-26 RX ADMIN — Medication 15 MG: at 19:01

## 2024-12-26 RX ADMIN — LAMOTRIGINE 100 MG: 100 TABLET ORAL at 19:01

## 2024-12-26 RX ADMIN — NICOTINE POLACRILEX 4 MG: 4 GUM, CHEWING BUCCAL at 17:45

## 2024-12-26 RX ADMIN — NICOTINE POLACRILEX 4 MG: 4 GUM, CHEWING BUCCAL at 15:32

## 2024-12-26 RX ADMIN — NICOTINE POLACRILEX 4 MG: 4 GUM, CHEWING BUCCAL at 07:53

## 2024-12-26 RX ADMIN — DIVALPROEX SODIUM 250 MG: 250 TABLET, FILM COATED, EXTENDED RELEASE ORAL at 19:01

## 2024-12-26 ASSESSMENT — ACTIVITIES OF DAILY LIVING (ADL)
ADLS_ACUITY_SCORE: 26
ORAL_HYGIENE: INDEPENDENT
ADLS_ACUITY_SCORE: 26
HYGIENE/GROOMING: INDEPENDENT
ADLS_ACUITY_SCORE: 26
DRESS: SCRUBS (BEHAVIORAL HEALTH)
ADLS_ACUITY_SCORE: 26

## 2024-12-26 NOTE — PLAN OF CARE
Team Note Due:  Monday    Assessment/Intervention/Current Symptoms and Care Coordination  Chart review and met with team, discussed pt progress, symptomology, and response to treatment.  Discussed the discharge plan and any potential impediments to discharge.    Called [facility] to follow up on receiving pending court order.  Spoke with Alyssa Collins, who states  it's in process, my supervisor, Anastasia, is working on it.  When the order is signed, we will fax it to you.  If you do not receive a fax by 1600, then give us a call.     At 1555 called [facility] [address] and spoke with Alyssa Collins, during our phone conversation, she checked with her supervisor on the status of the court order and found that she had made an error and the courts need a copy of the signed Provisional Discharge dated 11/4. This CTC immediately emailed Alyssa Collins a copy of the Provisional Discharge dated 11/4.  Alyssa states the court will fax a signed copy of the court order tomorrow morning.  This CTC forwarded the email sent to the courts to the unit's primary CTC to maintain good communication through this process.    Received voicemail from pt's Care Coordinator, Priya, at [facility] [phone], requesting any updates and discharge plan.      Discharge Plan or Goal  Pending stabilization & development of a safe discharge plan.    Dispo Plan: IRTS vs Group home  Discharge Date/ time: TBD  Transportation: TBD  AVS Completed: Yes [] No[x]  Provisional Discharge: Yes[x] No[]    Barriers to Discharge   Patient requires further psychiatric stabilization due to current symptomology    Referral Status  Still assessing     Legal Status  Fully Committed with [facility]: [place]   File Number: [id number]  Start and expiration date of commitment: 02/06/2024 to 02/06/2025    Contacts:  [phone] Dr. Prieto Araujo, Father [phone]  Rolanda, Mother [phone]   Name/Clinic:  [facility]   Number: [phone]   Email: [email or web address]        Upcoming Meetings and Dates/Important Information and next steps:  CTC will follow up about court hold  CTC to have pt sign PD prior to discharge.   CTC to follow up with [facility] Coord[facility] [phone] with any updates and discharge plan

## 2024-12-26 NOTE — PLAN OF CARE
BEH IP Unit Acuity Rating Score (UARS)  Patient is given one point for every criteria they meet.    CRITERIA SCORING   On a 72 hour hold, court hold, committed, stay of commitment, or revocation. 1    Patient LOS on BEH unit exceeds 20 days. 0  LOS: 4   Patient under guardianship, 55+, otherwise medically complex, or under age 11. 0   Suicide ideation without relief of precipitating factors. 0   Current plan for suicide. 0   Current plan for homicide. 0   Imminent risk or actual attempt to seriously harm another without relief of factors precipitating the attempt. 0   Severe dysfunction in daily living (ex: complete neglect for self care, extreme disruption in vegetative function, extreme deterioration in social interactions). 1   Recent (last 7 days) or current physical aggression in the ED or on unit. 0   Restraints or seclusion episode in past 72 hours. 0   Recent (last 7 days) or current verbal aggression, agitation, yelling, etc., while in the ED or unit. 1-Last 12/22   Active psychosis. 1   Need for constant or near constant redirection (from leaving, from others, etc).  0   Intrusive or disruptive behaviors. 0   Patient requires 3 or more hours of individualized nursing care per 8-hour shift (i.e. for ADLs, meds, therapeutic interventions). 0   TOTAL 4

## 2024-12-26 NOTE — PLAN OF CARE
Problem: Psychotic Symptoms  Goal: Social and Therapeutic (Psychotic Symptoms)  Description: Pt will remain safe on the unit as evidenced by pt identify and utilize 3 coping skills, attend 50% of programming and timely inform staff if not feel safe and/or have difficulty managing emotions or disturbing thoughts   Outcome: Progressing   Goal Outcome Evaluation:     Mental Health:  Pt presents as blunt/flat affect, groomed appearance, good eye contact, pleasant, denies any depression, anxiety, no SI/SIB/HI thoughts or hallucinations; pt responses are brief, pt does not elaborate so somewhat guarded and likely paranoid in thought; pt observed pacing hallway, using nicotine gum every 1-2 hours, angelina compliant-no observed or reported side effects.    Medical:  Pt denies any physical pain; appetite adequate and sleeps almost 6 hours last night.    Vitals:   BP (!) 144/79 (BP Location: Right arm, Patient Position: Sitting, Cuff Size: Adult Regular)   Pulse 84   Temp 97.7  F (36.5  C) (Tympanic)   Resp 16   Wt 61.6 kg (135 lb 12.8 oz)   SpO2 98%   BMI 18.94 kg/m         PRNs Given:  Nicotine gum    Continue to assess and monitor closely, assault precautions for safety.

## 2024-12-26 NOTE — PLAN OF CARE
Problem: Sleep Disturbance  Goal: Adequate Sleep/Rest  Outcome: Progressing   Goal Outcome Evaluation:   Pt slept for 5.5 hrs uninterrupted. Breathing was notably unlabored and even. No complaints of pain or discomfort reported. 15 minutes intentional safety rounds completed. No behavior or safety concerns noted or reported.

## 2024-12-26 NOTE — PLAN OF CARE
Problem: Behavioral Disturbance  Goal: Behavioral Disturbance  Description: Signs and symptoms of listed problems will be absent or manageable by discharge or transition of care.  Outcome: Not Progressing  Flowsheets (Taken 12/25/2024 2156)  Behavioral Disturbance Assessed: all  Behavioral Disturbance Present:   insight   thought process   Goal Outcome Evaluation:  /74   Pulse 99   Temp 96.9  F (36.1  C) (Temporal)   Resp 16   Wt 61.6 kg (135 lb 12.8 oz)   SpO2 99%   BMI 18.94 kg/m        Pt was out in the milieu isolative pacing in the hallway. He was guarded and short. He was cleaned and well groomed. Denied having pain discomfort and medication side effects. Appetite was good ate about 90% of his dinner. Pt requested and received nicotine gum q1hr. He was calm cooperative and medication compliant. He had a flat blunted affect. Denied all MHS.

## 2024-12-26 NOTE — PROGRESS NOTES
"[facility],  Psychiatric Progress Note        Interim History:   The patient's care was discussed with the treatment team and chart notes were reviewed.     Today during the interview with the treatment team staff report patient has been isolative and walking in halls. He denies mental health symptoms to staff and does not participate in groups. Staff reported he slept  7 hours overnight.    Today upon assessment patient reports his mood is good. His story contradicts parents story and he is very suspicious of them and paranoid. He reports they intimidate him and push him until he \"boils over\". He reports he wants to go back to school in [place] for his own space and that his parents should be willing to pay for this since his father spent so much money on his office and other things. He does not appear to be accurate historian> He smiles intermittently but when asked what he is smiling abut he does not elaborate.  He will dismiss and deny the event of him hitting his mother on the back. He reports he tries to \"reject impulses before it boils over\".     He reports he does not have current side effects to medications. Provider discussed Depakote and benefits and side effects. He is willing to try this medication again. He reported he gained weigh on it in the past. Provider encourage benefit to Depakote as being him feeling more calm and less agitated by parents and circumstances. He was agreeable to this and reported wanting to feel less on edge and not wanting to \"boil over\". He thinks his parents like him in hospital and want to control him. He was reporting he wanted to go home but is also willing to go to group home.     He denies current thoughts to harm himself or others. He lacks insight to his suicide attempts in 2023 he reports if a person wants to hurt themselves they should be able to.     He reports appetite and sleep stable.     Call to mother on 12/24: she " reports they are afraid of patient and that he gets angry when they try to help him or direct his mental health. They report he has been dismissive of help and act team and he does not engage. Police have been called numerous times and even police are afraid pt will be aggressive or hit them which would escalate situation.     Provider called ACT team to get in touch with psychiatrist left . [phone] Dr. Prieto Cash on 12/24/24    The 10 point Review of Systems is negative other than noted in the HPI         DIagnoses:     Schizoaffective disorder bipolar type   Generalized anxiety disorder  Substance use (h)    Clinically Significant Risk Factors                                  # Financial/Environmental Concerns:                Plan:   Legal: Full commitment with Nguyen     Medications:  Prolixin 10 mg tablet every evening  Decrease Lamictal 100 mg every evening for 3 doses and 50 mg on Sunday  Discontinue lithium  Initiate Depakote 250 ER at bedtime for aggression and mood stabilization  Continue Latuda 160 mg daily with lunch   Prns: hydroxyzine for anxiety, melatonin for sleep,    Routine lab reviewed. Unremarkable. UTOX negative.     Attestation:  Patient has been seen and evaluated by me,  SELINA Toussaint CNP  The patient was counseled on  nature of illness and treatment plan/options  Care was coordinated with  unit RN  Total amount of time: >35 minutes  Coordination of care/counseling: 10 minutes      Disposition  Reason for ongoing hospitalization: Pt is paranoid, he has histroy of aggression at home and is at risk for safety and other safety.  Discharge date: TBD  Discharge place: IRTS (preferred by family) or home with [facility]/PHP, group home         Medications:     Current Facility-Administered Medications   Medication Dose Route Frequency Provider Last Rate Last Admin    acetaminophen (TYLENOL) tablet 650 mg  650 mg Oral Q4H PRN Fabiano Pepper MD        alum & mag hydroxide-simethicone  (MAALOX) suspension 30 mL  30 mL Oral Q4H PRN Fabiano Pepper MD        divalproex sodium extended-release (DEPAKOTE ER) 24 hr tablet 250 mg  250 mg Oral At Bedtime Ashish Meek APRN CNP        fluPHENAZine (PROLIXIN) tablet 10 mg  10 mg Oral QPM Ashish Meek APRN CNP   10 mg at 12/25/24 2037    hydrOXYzine HCl (ATARAX) tablet 25 mg  25 mg Oral Q4H PRN Fabiano Pepper MD        [START ON 12/27/2024] influenza trivalent vaccine for ages 6 months to 49 years (PF) (FLUZONE) injection 0.5 mL  0.5 mL Intramuscular Prior to discharge Ashish Meek APRN CNP        lamoTRIgine (LaMICtal) tablet 100 mg  100 mg Oral QPM Ashish Meek APRN CNP        [START ON 12/29/2024] lamoTRIgine (LaMICtal) tablet 50 mg  50 mg Oral Daily Ashish Meek APRN CNP        lurasidone (LATUDA) tablet 160 mg  160 mg Oral Daily with lunch Nabeel Glover MD   160 mg at 12/25/24 1200    melatonin tablet 3 mg  3 mg Oral At Bedtime PRN Fabiano Pepper MD        methylfolate (DEPLIN) tablet 15 mg  15 mg Oral Daily Ashish Meek APRN CNP   15 mg at 12/25/24 2038    nicotine polacrilex (NICORETTE) gum 4 mg  4 mg Buccal Q1H PRN Fabiano Pepper MD   4 mg at 12/26/24 1016    OLANZapine (zyPREXA) tablet 10 mg  10 mg Oral TID PRN Fabiano Pepper MD        Or    OLANZapine (zyPREXA) injection 10 mg  10 mg Intramuscular TID PRN Fabiano Pepper MD        polyethylene glycol (MIRALAX) Packet 17 g  17 g Oral Daily PRN Fabiano Pepper MD                 Allergies:     Allergies   Allergen Reactions    Clozapine      Syncope per Pt.     Seasonal Allergies     Seroquel [Quetiapine]      Fainting and slowed breathing             Psychiatric Examination:   /74   Pulse 99   Temp 96.9  F (36.1  C) (Temporal)   Resp 16   Wt 61.6 kg (135 lb 12.8 oz)   SpO2 99%   BMI 18.94 kg/m    Weight is 135 lbs 12.8 oz  Body mass index is 18.94 kg/m .    Appearance:  awake, alert, adequately groomed, and dressed in  "hospital scrubs  Attitude:  cooperative and less guarded but apprehensive about sharing information  Eye Contact:  fair  Mood:  appears sad reports mood is good  Affect:  intensity is blunted  Speech:  clear, coherent  Psychomotor Behavior:  no evidence of tardive dyskinesia, dystonia, or tics and fidgeting  Thought Process:  illogical and paranoid  Associations:  no loose associations  Thought Content:  no evidence of suicidal ideation or homicidal ideation, no auditory hallucinations present, and no visual hallucinations present  Insight:  fair  Judgment:  fair  Oriented to:  time, person, and place  Attention Span and Concentration:  intact  Recent and Remote Memory:  fair           Labs:   No results found for: \"NTBNPI\", \"NTBNP\"  Lab Results   Component Value Date    WBC 6.5 12/21/2024    HGB 14.1 12/21/2024    HCT 41.6 12/21/2024    MCV 91 12/21/2024     12/21/2024     Lab Results   Component Value Date    GFRESTIMATED >90 12/21/2024    GFRESTBLACK >90 08/16/2019     Lab Results   Component Value Date     (H) 12/21/2024     Lab Results   Component Value Date    HGB 14.1 12/21/2024     Lab Results   Component Value Date    AST 20 12/21/2024    ALT 16 12/21/2024    ALKPHOS 46 12/21/2024    BILITOTAL 0.2 12/21/2024    ALY 29 07/01/2022     Lab Results   Component Value Date    TSH 1.86 11/03/2024     Lab Results   Component Value Date    WBC 6.5 12/21/2024     "

## 2024-12-26 NOTE — PLAN OF CARE
Problem: Behavioral Disturbance  Goal: Behavioral Disturbance  Description: Pt will remain safe on the unit as evidenced by pt identify and utilize 3 coping skills, attend 50% of programming and timely inform staff if not feel safe and/or have difficulty managing emotions or disturbing thoughts   Outcome: Not Progressing   Goal Outcome Evaluation:    Plan of Care Reviewed With: patient      Pt presents as calm, withdrawn. Pt spends the evening pacing the hallway. He tends to keep to himself and does not socialize. Pt reported feeling fine. He denies all mental health symptoms. Pt also denied pain/related medical concerns. No other safety or behavioral concerns this shift.     Pt given prn nicotine gum throughout the shift. Pt took all other scheduled evening medications with no issues.     [facility] called to talk to pt about commitment.

## 2024-12-27 PROCEDURE — 250N000013 HC RX MED GY IP 250 OP 250 PS 637: Performed by: PSYCHIATRY & NEUROLOGY

## 2024-12-27 PROCEDURE — 99232 SBSQ HOSP IP/OBS MODERATE 35: CPT | Performed by: NURSE PRACTITIONER

## 2024-12-27 PROCEDURE — 250N000011 HC RX IP 250 OP 636: Performed by: REGISTERED NURSE

## 2024-12-27 PROCEDURE — 250N000013 HC RX MED GY IP 250 OP 250 PS 637: Performed by: REGISTERED NURSE

## 2024-12-27 PROCEDURE — G0008 ADMIN INFLUENZA VIRUS VAC: HCPCS | Performed by: REGISTERED NURSE

## 2024-12-27 PROCEDURE — 128N000002 HC R&B CD/MH ADOLESCENT

## 2024-12-27 PROCEDURE — 90656 IIV3 VACC NO PRSV 0.5 ML IM: CPT | Performed by: REGISTERED NURSE

## 2024-12-27 RX ADMIN — NICOTINE POLACRILEX 4 MG: 4 GUM, CHEWING BUCCAL at 22:18

## 2024-12-27 RX ADMIN — NICOTINE POLACRILEX 4 MG: 4 GUM, CHEWING BUCCAL at 15:14

## 2024-12-27 RX ADMIN — INFLUENZA A VIRUS A/VICTORIA/4897/2022 IVR-238 (H1N1) ANTIGEN (FORMALDEHYDE INACTIVATED), INFLUENZA A VIRUS A/CALIFORNIA/122/2022 SAN-022 (H3N2) ANTIGEN (FORMALDEHYDE INACTIVATED), AND INFLUENZA B VIRUS B/MICHIGAN/01/2021 ANTIGEN (FORMALDEHYDE INACTIVATED) 0.5 ML: 15; 15; 15 INJECTION, SUSPENSION INTRAMUSCULAR at 10:18

## 2024-12-27 RX ADMIN — FLUPHENAZINE HYDROCHLORIDE 10 MG: 10 TABLET, FILM COATED ORAL at 19:29

## 2024-12-27 RX ADMIN — NICOTINE POLACRILEX 4 MG: 4 GUM, CHEWING BUCCAL at 07:59

## 2024-12-27 RX ADMIN — LURASIDONE HYDROCHLORIDE 160 MG: 40 TABLET, FILM COATED ORAL at 11:55

## 2024-12-27 RX ADMIN — DIVALPROEX SODIUM 250 MG: 250 TABLET, FILM COATED, EXTENDED RELEASE ORAL at 19:29

## 2024-12-27 RX ADMIN — NICOTINE POLACRILEX 4 MG: 4 GUM, CHEWING BUCCAL at 18:21

## 2024-12-27 RX ADMIN — NICOTINE POLACRILEX 4 MG: 4 GUM, CHEWING BUCCAL at 12:05

## 2024-12-27 RX ADMIN — NICOTINE POLACRILEX 4 MG: 4 GUM, CHEWING BUCCAL at 17:24

## 2024-12-27 RX ADMIN — NICOTINE POLACRILEX 4 MG: 4 GUM, CHEWING BUCCAL at 19:28

## 2024-12-27 RX ADMIN — Medication 15 MG: at 19:31

## 2024-12-27 RX ADMIN — NICOTINE POLACRILEX 4 MG: 4 GUM, CHEWING BUCCAL at 10:07

## 2024-12-27 RX ADMIN — NICOTINE POLACRILEX 4 MG: 4 GUM, CHEWING BUCCAL at 09:03

## 2024-12-27 RX ADMIN — NICOTINE POLACRILEX 4 MG: 4 GUM, CHEWING BUCCAL at 16:19

## 2024-12-27 RX ADMIN — LAMOTRIGINE 100 MG: 100 TABLET ORAL at 19:29

## 2024-12-27 RX ADMIN — NICOTINE POLACRILEX 4 MG: 4 GUM, CHEWING BUCCAL at 21:12

## 2024-12-27 ASSESSMENT — ACTIVITIES OF DAILY LIVING (ADL)
ADLS_ACUITY_SCORE: 26
HYGIENE/GROOMING: INDEPENDENT
ADLS_ACUITY_SCORE: 26
ADLS_ACUITY_SCORE: 26
DRESS: SCRUBS (BEHAVIORAL HEALTH)
ADLS_ACUITY_SCORE: 26
ORAL_HYGIENE: INDEPENDENT
ADLS_ACUITY_SCORE: 26

## 2024-12-27 NOTE — PLAN OF CARE
Team Note Due:  Monday    Assessment/Intervention/Current Symptoms and Care Coordination  Chart review and met with team, discussed pt progress, symptomology, and response to treatment.  Discussed the discharge plan and any potential impediments to discharge.    Current Symptoms include the following: Psychosis and paranoia    CTC worked with [facility] to get a copy of pt's PD revocation. A copy was put in pt's chart and provider was updated.    CTC met with pt and provided copy of PD revocation. Pt was calm and did not have any questions when CTC inquired.     CTC received VM from pt's Psychiatrist on ACT Team requesting a call back to discuss medications.     CTC completed acuity rating.     Discharge Plan or Goal  Pending stabilization & development of a safe discharge plan.    Dispo Plan: IRTS vs Group home  Discharge Date/ time: TBD  Transportation: TBD  AVS Completed: Yes [] No[x]  Provisional Discharge: Yes[x] No[]    Barriers to Discharge   Patient requires further psychiatric stabilization due to current symptomology    Referral Status  Still assessing     Legal Status  Fully Committed with [facility]: [place]   File Number: [id number]  Start and expiration date of commitment: 02/06/2024 to 02/06/2025    Contacts:  [phone] Dr. Prieto Araujo, Father [phone]  Rolanda, Mother [phone]   Name/Clinic: [facility]   Number: [phone]   Email: [email or web address]        Upcoming Meetings and Dates/Important Information and next steps:  CTC to have pt sign PD prior to discharge.  CTC to inform primary pysch provider on 12/30 of call from Act Team Psychiatrist

## 2024-12-27 NOTE — PLAN OF CARE
Problem: Behavioral Disturbance  Goal: Behavioral Disturbance  Description: Pt will remain safe on the unit as evidenced by pt identify and utilize 3 coping skills, attend 50% of programming and timely inform staff if not feel safe and/or have difficulty managing emotions or disturbing thoughts   Outcome: Progressing   Goal Outcome Evaluation:      Patient pacing hallway since beginning of shift. He requested nicotine gums, ate 100% of breakfast, and continued pacing the hallway. Withdrawn to self pt denied anxiety, depression, SI, HI, and hallucinations. He spent time in his room, ate lunch and spent the afternoon in his  room. Pt quiet, isolative.

## 2024-12-27 NOTE — PLAN OF CARE
Problem: Sleep Disturbance  Goal: Adequate Sleep/Rest  Outcome: Progressing   Goal Outcome Evaluation:      Patient slept for 6.75 hours last night. Breathing noted even and unlabored. No indication of pain or discomfort. Safety precautions in progress with no occurrence. No behavior or safety concerns at this time. Will continue to monitor.

## 2024-12-27 NOTE — PLAN OF CARE
BEH IP Unit Acuity Rating Score (UARS)  Patient is given one point for every criteria they meet.    CRITERIA SCORING   On a 72 hour hold, court hold, committed, stay of commitment, or revocation. 1    Patient LOS on BEH unit exceeds 20 days. 0  LOS: 5   Patient under guardianship, 55+, otherwise medically complex, or under age 11. 0   Suicide ideation without relief of precipitating factors. 0   Current plan for suicide. 0   Current plan for homicide. 0   Imminent risk or actual attempt to seriously harm another without relief of factors precipitating the attempt. 1   Severe dysfunction in daily living (ex: complete neglect for self care, extreme disruption in vegetative function, extreme deterioration in social interactions). 1   Recent (last 7 days) or current physical aggression in the ED or on unit. 0   Restraints or seclusion episode in past 72 hours. 0   Recent (last 7 days) or current verbal aggression, agitation, yelling, etc., while in the ED or unit. 1-Last 12/22   Active psychosis. 1   Need for constant or near constant redirection (from leaving, from others, etc).  0   Intrusive or disruptive behaviors. 0   Patient requires 3 or more hours of individualized nursing care per 8-hour shift (i.e. for ADLs, meds, therapeutic interventions). 0   TOTAL 5

## 2024-12-27 NOTE — PROGRESS NOTES
"[facility], [place]   Psychiatric Progress Note        Interim History:   Team meeting report: The patient's care was discussed with the treatment team during the daily team meeting and/or staff's chart notes were reviewed.  Staff report patient has been calm, pleasant, cooperative.  He is seen pacing on the unit.  He does not socialize with others.  His mood is \"fine\".  Denies everything else.  Affect is bland.  Did not attend groups.  Appears to be eating well.  Med compliant.  No behavioral issues.  Slept 7 hours.  Per , the patient's parents are afraid of him and does not want to take him back.  He will likely discharge to IRTS.     Met with patient.  He is oriented to self, place, and date but not situation.  States he is here because he yelled at his parents.  States his parents are provoking him to become angry.  Does not know where they do it.  The patient denies auditory visual hallucinations, paranoia, delusions.  Reports sleeping well.  Appetite is intact.  Denies feeling depressed and anxious.  No suicidal thoughts or self-harm.  No homicidal ideation.  Discussed disposition.  The patient is aware that he will likely discharge to IRTS facility.  He is okay with that.  The patient is not asking to discharge.         Medications:     Current Facility-Administered Medications   Medication Dose Route Frequency Provider Last Rate Last Admin    divalproex sodium extended-release (DEPAKOTE ER) 24 hr tablet 250 mg  250 mg Oral At Bedtime Ashish Meek APRN CNP   250 mg at 12/26/24 1901    fluPHENAZine (PROLIXIN) tablet 10 mg  10 mg Oral QPM Ashish Meek APRN CNP   10 mg at 12/26/24 1901    lamoTRIgine (LaMICtal) tablet 100 mg  100 mg Oral QPM Ashish Meek APRN CNP   100 mg at 12/26/24 1901    [START ON 12/29/2024] lamoTRIgine (LaMICtal) tablet 50 mg  50 mg Oral Daily Ashish Meek APRN CNP        lurasidone (LATUDA) tablet 160 mg  160 mg Oral Daily with " lunch Nabeel Glover MD   160 mg at 12/27/24 1155    methylfolate (DEPLIN) tablet 15 mg  15 mg Oral Daily Ashish Meek APRN CNP   15 mg at 12/26/24 1901            Allergies:     Allergies   Allergen Reactions    Clozapine      Syncope per Pt.     Seasonal Allergies     Seroquel [Quetiapine]      Fainting and slowed breathing             Labs:     Recent Results (from the past 4 weeks)   Comprehensive metabolic panel    Collection Time: 12/21/24  2:19 AM   Result Value Ref Range    Sodium 141 135 - 145 mmol/L    Potassium 4.0 3.4 - 5.3 mmol/L    Carbon Dioxide (CO2) 27 22 - 29 mmol/L    Anion Gap 9 7 - 15 mmol/L    Urea Nitrogen 14.0 6.0 - 20.0 mg/dL    Creatinine 1.02 0.67 - 1.17 mg/dL    GFR Estimate >90 >60 mL/min/1.73m2    Calcium 8.9 8.8 - 10.4 mg/dL    Chloride 105 98 - 107 mmol/L    Glucose 100 (H) 70 - 99 mg/dL    Alkaline Phosphatase 46 40 - 150 U/L    AST 20 0 - 45 U/L    ALT 16 0 - 70 U/L    Protein Total 6.3 (L) 6.4 - 8.3 g/dL    Albumin 4.3 3.5 - 5.2 g/dL    Bilirubin Total 0.2 <=1.2 mg/dL   CBC with platelets    Collection Time: 12/21/24  2:19 AM   Result Value Ref Range    WBC Count 6.5 4.0 - 11.0 10e3/uL    RBC Count 4.59 4.40 - 5.90 10e6/uL    Hemoglobin 14.1 13.3 - 17.7 g/dL    Hematocrit 41.6 40.0 - 53.0 %    MCV 91 78 - 100 fL    MCH 30.7 26.5 - 33.0 pg    MCHC 33.9 31.5 - 36.5 g/dL    RDW 12.2 10.0 - 15.0 %    Platelet Count 272 150 - 450 10e3/uL   Urine Drug Screen Panel    Collection Time: 12/21/24  3:21 AM   Result Value Ref Range    Amphetamines Urine Screen Negative Screen Negative    Barbituates Urine Screen Negative Screen Negative    Benzodiazepine Urine Screen Negative Screen Negative    Cannabinoids Urine Screen Negative Screen Negative    Cocaine Urine Screen Negative Screen Negative    Fentanyl Qual Urine Screen Negative Screen Negative    Opiates Urine Screen Negative Screen Negative    PCP Urine Screen Negative Screen Negative   COVID-19 Virus (Coronavirus) by PCR Nose     Collection Time: 12/21/24  7:24 PM    Specimen: Nose; Swab   Result Value Ref Range    SARS CoV2 PCR Negative Negative            Precautions:     Behavioral Orders   Procedures    Assault precautions    Code 1 - Restrict to Unit    Routine Programming     As clinically indicated    Status 15     Every 15 minutes.    Suicide precautions: Suicide Risk: LOW; Clinical rationale to override score: Other; Other: Hx of suicide attempts     Patients on Suicide Precautions should have a Combination Diet ordered that includes a Diet selection(s) AND a Behavioral Tray selection for Safe Tray - with utensils, or Safe Tray - NO utensils       Order Specific Question:   Suicide Risk     Answer:   LOW     Order Specific Question:   Clinical rationale to override score:     Answer:   Other     Order Specific Question:   Other:     Answer:   Hx of suicide attempts            Psychiatric Examination:   Temp: 98  F (36.7  C) Temp src: Temporal BP: 119/79 Pulse: 77   Resp: 16 SpO2: 99 % O2 Device: None (Room air)    Weight is 135 lbs 12.8 oz  Body mass index is 18.94 kg/m .    Appearance: awake, alert  Attitude:  cooperative and evasive  Eye Contact:  fair  Mood:  good  Affect:  intensity is blunted and intensity is flat  Speech:  clear, coherent  Psychomotor Behavior:  no evidence of tardive dyskinesia, dystonia, or tics  Throught Process:  linear and illogical  Associations:  no loose associations  Thought Content:  no evidence of suicidal ideation or homicidal ideation and paranoia and delusions are present.   Insight:  limited  Judgement:  limited  Oriented to:  time, person, and place  Attention Span and Concentration:  fair  Recent and Remote Memory:  fair         Precautions:     Behavioral Orders   Procedures    Assault precautions    Code 1 - Restrict to Unit    Routine Programming     As clinically indicated    Status 15     Every 15 minutes.    Suicide precautions: Suicide Risk: LOW; Clinical rationale to override score:  Other; Other: Hx of suicide attempts     Patients on Suicide Precautions should have a Combination Diet ordered that includes a Diet selection(s) AND a Behavioral Tray selection for Safe Tray - with utensils, or Safe Tray - NO utensils       Order Specific Question:   Suicide Risk     Answer:   LOW     Order Specific Question:   Clinical rationale to override score:     Answer:   Other     Order Specific Question:   Other:     Answer:   Hx of suicide attempts          DIagnoses:   Schizoaffective disorder bipolar type   Generalized anxiety disorder         Plan:   Medications:    --The patient is tapering off of the Lamictal.  Last dose is going to be on Sunday.  --Start Depakote ER, 250 mg at bedtime.  --Fluphenazine, 10 mg every evening  --Latuda, 160 mg with lunch  --Methylphenidate, 50 mg every morning  --Additional medications are available as needed.    Medical:  --Internal medicine to follow up for medical problems   --Lab work was reviewed.  Abnormal results noted.  U tox was negative.    Other:  --[place] precautions  --Care was coordinated with the treatment team.   --The patient was consulted on nature of illness and treatment options.      Disposition Plan   Reason for ongoing admission: is unable to care for self due to severe psychosis or chacorta  Discharge location: IR facility  Discharge Medications: not ordered  Follow-up Appointments: not scheduled  Legal Status: The patient is under commitment and Nguyen.  Nguyen medications include Vraylar, Latuda, Geodon, Prolixin.  Provisional discharge will be revoked by the ACT team.  Discharge will be granted once symptoms improved.    Radha MARKS, CNP    This note was created with the help of Dragon dictation system. All grammatical/typing errors or context distortion are unintentional and inherent to software.

## 2024-12-28 PROCEDURE — 250N000013 HC RX MED GY IP 250 OP 250 PS 637: Performed by: REGISTERED NURSE

## 2024-12-28 PROCEDURE — 250N000013 HC RX MED GY IP 250 OP 250 PS 637: Performed by: PSYCHIATRY & NEUROLOGY

## 2024-12-28 PROCEDURE — 128N000002 HC R&B CD/MH ADOLESCENT

## 2024-12-28 RX ADMIN — NICOTINE POLACRILEX 4 MG: 4 GUM, CHEWING BUCCAL at 16:56

## 2024-12-28 RX ADMIN — NICOTINE POLACRILEX 4 MG: 4 GUM, CHEWING BUCCAL at 09:18

## 2024-12-28 RX ADMIN — Medication 15 MG: at 19:10

## 2024-12-28 RX ADMIN — NICOTINE POLACRILEX 4 MG: 4 GUM, CHEWING BUCCAL at 15:50

## 2024-12-28 RX ADMIN — LURASIDONE HYDROCHLORIDE 160 MG: 40 TABLET, FILM COATED ORAL at 12:23

## 2024-12-28 RX ADMIN — NICOTINE POLACRILEX 4 MG: 4 GUM, CHEWING BUCCAL at 14:50

## 2024-12-28 RX ADMIN — FLUPHENAZINE HYDROCHLORIDE 10 MG: 10 TABLET, FILM COATED ORAL at 19:10

## 2024-12-28 RX ADMIN — NICOTINE POLACRILEX 4 MG: 4 GUM, CHEWING BUCCAL at 18:01

## 2024-12-28 RX ADMIN — NICOTINE POLACRILEX 4 MG: 4 GUM, CHEWING BUCCAL at 19:10

## 2024-12-28 RX ADMIN — NICOTINE POLACRILEX 4 MG: 4 GUM, CHEWING BUCCAL at 11:23

## 2024-12-28 RX ADMIN — NICOTINE POLACRILEX 4 MG: 4 GUM, CHEWING BUCCAL at 20:17

## 2024-12-28 RX ADMIN — NICOTINE POLACRILEX 4 MG: 4 GUM, CHEWING BUCCAL at 08:04

## 2024-12-28 RX ADMIN — NICOTINE POLACRILEX 4 MG: 4 GUM, CHEWING BUCCAL at 10:18

## 2024-12-28 RX ADMIN — DIVALPROEX SODIUM 250 MG: 250 TABLET, FILM COATED, EXTENDED RELEASE ORAL at 19:10

## 2024-12-28 RX ADMIN — NICOTINE POLACRILEX 4 MG: 4 GUM, CHEWING BUCCAL at 06:50

## 2024-12-28 RX ADMIN — LAMOTRIGINE 100 MG: 100 TABLET ORAL at 19:10

## 2024-12-28 ASSESSMENT — ACTIVITIES OF DAILY LIVING (ADL)
ADLS_ACUITY_SCORE: 26
HYGIENE/GROOMING: INDEPENDENT
ADLS_ACUITY_SCORE: 26
DRESS: SCRUBS (BEHAVIORAL HEALTH)
ADLS_ACUITY_SCORE: 26
ADLS_ACUITY_SCORE: 26
ORAL_HYGIENE: INDEPENDENT
ADLS_ACUITY_SCORE: 26
HYGIENE/GROOMING: INDEPENDENT
ADLS_ACUITY_SCORE: 26

## 2024-12-28 NOTE — PLAN OF CARE
Problem: Behavioral Disturbance  Goal: Behavioral Disturbance  Description: Pt will remain safe on the unit as evidenced by pt identify and utilize 3 coping skills, attend 50% of programming and timely inform staff if not feel safe and/or have difficulty managing emotions or disturbing thoughts   Outcome: Progressing   Goal Outcome Evaluation:     Plan of Care Reviewed With: patient       Patient pacing hallway since beginning of shift. He requested nicotine gums, ate 100% of breakfast, and continued pacing the hallway. Withdrawn to self pt denied anxiety, depression, SI, HI, and hallucinations. He spent time in his room, ate lunch and slept during the afternoon in his  room. Pt quiet, isolative.

## 2024-12-28 NOTE — PLAN OF CARE
Problem: Psychotic Symptoms  Goal: Psychotic Symptoms  Description: Signs and symptoms of listed problems will be absent or manageable.  Outcome: Progressing   Goal Outcome Evaluation:    Plan of Care Reviewed With: patient             Pt Presented with flat affect. Spent the evening pacing the gould. Denied any anxiety, depression, SI,SIB,HI, AVH. Patient also denied pain or any physical concerns.   Ate dinner and snacks with good appetite  Compliant with taking scheduled medications this evening    Vitals: Temp: 97.8  F (36.6  C) Temp src: Oral BP: 123/83 Pulse: 77   Resp: 16 SpO2: 100 % O2 Device: None (Room air)      PRN: Requesting nicotine gum on the hour otherwise no other PRNs given.

## 2024-12-29 PROCEDURE — 128N000002 HC R&B CD/MH ADOLESCENT

## 2024-12-29 PROCEDURE — 250N000013 HC RX MED GY IP 250 OP 250 PS 637: Performed by: REGISTERED NURSE

## 2024-12-29 PROCEDURE — 250N000013 HC RX MED GY IP 250 OP 250 PS 637: Performed by: PSYCHIATRY & NEUROLOGY

## 2024-12-29 RX ADMIN — NICOTINE POLACRILEX 4 MG: 4 GUM, CHEWING BUCCAL at 17:16

## 2024-12-29 RX ADMIN — NICOTINE POLACRILEX 4 MG: 4 GUM, CHEWING BUCCAL at 08:29

## 2024-12-29 RX ADMIN — Medication 15 MG: at 19:01

## 2024-12-29 RX ADMIN — FLUPHENAZINE HYDROCHLORIDE 10 MG: 10 TABLET, FILM COATED ORAL at 19:01

## 2024-12-29 RX ADMIN — NICOTINE POLACRILEX 4 MG: 4 GUM, CHEWING BUCCAL at 20:18

## 2024-12-29 RX ADMIN — DIVALPROEX SODIUM 250 MG: 250 TABLET, FILM COATED, EXTENDED RELEASE ORAL at 19:01

## 2024-12-29 RX ADMIN — NICOTINE POLACRILEX 4 MG: 4 GUM, CHEWING BUCCAL at 21:44

## 2024-12-29 RX ADMIN — LURASIDONE HYDROCHLORIDE 160 MG: 40 TABLET, FILM COATED ORAL at 11:50

## 2024-12-29 RX ADMIN — NICOTINE POLACRILEX 4 MG: 4 GUM, CHEWING BUCCAL at 07:29

## 2024-12-29 RX ADMIN — NICOTINE POLACRILEX 4 MG: 4 GUM, CHEWING BUCCAL at 15:08

## 2024-12-29 RX ADMIN — NICOTINE POLACRILEX 4 MG: 4 GUM, CHEWING BUCCAL at 09:59

## 2024-12-29 RX ADMIN — NICOTINE POLACRILEX 4 MG: 4 GUM, CHEWING BUCCAL at 11:50

## 2024-12-29 RX ADMIN — NICOTINE POLACRILEX 4 MG: 4 GUM, CHEWING BUCCAL at 16:02

## 2024-12-29 RX ADMIN — LAMOTRIGINE 50 MG: 25 TABLET ORAL at 07:29

## 2024-12-29 RX ADMIN — NICOTINE POLACRILEX 4 MG: 4 GUM, CHEWING BUCCAL at 19:01

## 2024-12-29 ASSESSMENT — ACTIVITIES OF DAILY LIVING (ADL)
ADLS_ACUITY_SCORE: 26
DRESS: SCRUBS (BEHAVIORAL HEALTH)
ADLS_ACUITY_SCORE: 26
HYGIENE/GROOMING: INDEPENDENT
ADLS_ACUITY_SCORE: 26
ORAL_HYGIENE: INDEPENDENT
ADLS_ACUITY_SCORE: 26

## 2024-12-29 NOTE — PLAN OF CARE
Problem: Adult Behavioral Health Plan of Care  Goal: Adheres to Safety Considerations for Self and Others  Outcome: Progressing  Intervention: Develop and Maintain Individualized Safety Plan  Recent Flowsheet Documentation  Taken 12/29/2024 0805 by Amador Hatch RN  Safety Measures: safety rounds completed   Goal Outcome Evaluation:     Plan of Care Reviewed With: patient       Patient med compliant, paced hallway. He requested nicotine gums, ate 100% of breakfast, and continued pacing the hallway. Withdrawn to self pt denied anxiety, depression, SI, HI, and hallucinations. He paced the gould during the morning and slept in the afternoon.

## 2024-12-29 NOTE — PLAN OF CARE
Problem: Psychotic Symptoms  Goal: Psychotic Symptoms  Description: Signs and symptoms of listed problems will be absent or manageable.  Outcome: Progressing   Goal Outcome Evaluation:    Plan of Care Reviewed With: patient             Patient presented with flat affect. Denied any anxiety, depression, SI,SIB,HI, AVH. Patient also denied pain or any Physical concerns. Spent the evening pacing the gould. Kept to self.  Ate dinner and snacks with good appetite.  Compliant with taking scheduled medications at HS.   No safety concerns this shift. Will continue to monitor and provide support and redirection a needed.    PRN: pt requesting nicotine gum on the hour

## 2024-12-29 NOTE — PLAN OF CARE
Problem: Sleep Disturbance  Goal: Adequate Sleep/Rest  Outcome: Progressing   Goal Outcome Evaluation:    Plan of Care Reviewed With: patient        Pt slept 7 hrs. Safety Rounds completed every 15 minutes throughout the night. Visible respirations noted with no signes of distress. No safety or behavior concerns noted this shift. Will continue to monitor and provide support as needed.

## 2024-12-30 PROCEDURE — 128N000002 HC R&B CD/MH ADOLESCENT

## 2024-12-30 PROCEDURE — 250N000013 HC RX MED GY IP 250 OP 250 PS 637: Performed by: REGISTERED NURSE

## 2024-12-30 PROCEDURE — 250N000013 HC RX MED GY IP 250 OP 250 PS 637: Performed by: PSYCHIATRY & NEUROLOGY

## 2024-12-30 PROCEDURE — 99232 SBSQ HOSP IP/OBS MODERATE 35: CPT | Performed by: REGISTERED NURSE

## 2024-12-30 PROCEDURE — H2032 ACTIVITY THERAPY, PER 15 MIN: HCPCS

## 2024-12-30 RX ORDER — DIVALPROEX SODIUM 500 MG/1
500 TABLET, FILM COATED, EXTENDED RELEASE ORAL AT BEDTIME
Status: DISCONTINUED | OUTPATIENT
Start: 2024-12-30 | End: 2024-12-31

## 2024-12-30 RX ADMIN — NICOTINE POLACRILEX 4 MG: 4 GUM, CHEWING BUCCAL at 08:06

## 2024-12-30 RX ADMIN — NICOTINE POLACRILEX 4 MG: 4 GUM, CHEWING BUCCAL at 18:28

## 2024-12-30 RX ADMIN — NICOTINE POLACRILEX 4 MG: 4 GUM, CHEWING BUCCAL at 12:40

## 2024-12-30 RX ADMIN — LAMOTRIGINE 50 MG: 25 TABLET ORAL at 08:06

## 2024-12-30 RX ADMIN — Medication 15 MG: at 19:47

## 2024-12-30 RX ADMIN — NICOTINE POLACRILEX 4 MG: 4 GUM, CHEWING BUCCAL at 20:48

## 2024-12-30 RX ADMIN — DIVALPROEX SODIUM 500 MG: 500 TABLET, FILM COATED, EXTENDED RELEASE ORAL at 19:45

## 2024-12-30 RX ADMIN — NICOTINE POLACRILEX 4 MG: 4 GUM, CHEWING BUCCAL at 17:21

## 2024-12-30 RX ADMIN — NICOTINE POLACRILEX 4 MG: 4 GUM, CHEWING BUCCAL at 11:34

## 2024-12-30 RX ADMIN — NICOTINE POLACRILEX 4 MG: 4 GUM, CHEWING BUCCAL at 19:29

## 2024-12-30 RX ADMIN — NICOTINE POLACRILEX 4 MG: 4 GUM, CHEWING BUCCAL at 09:15

## 2024-12-30 RX ADMIN — NICOTINE POLACRILEX 4 MG: 4 GUM, CHEWING BUCCAL at 16:06

## 2024-12-30 RX ADMIN — FLUPHENAZINE HYDROCHLORIDE 10 MG: 10 TABLET, FILM COATED ORAL at 19:45

## 2024-12-30 RX ADMIN — LURASIDONE HYDROCHLORIDE 160 MG: 40 TABLET, FILM COATED ORAL at 11:34

## 2024-12-30 ASSESSMENT — ACTIVITIES OF DAILY LIVING (ADL)
ADLS_ACUITY_SCORE: 26
ORAL_HYGIENE: INDEPENDENT
ADLS_ACUITY_SCORE: 26
HYGIENE/GROOMING: INDEPENDENT
ADLS_ACUITY_SCORE: 26
ORAL_HYGIENE: INDEPENDENT
ADLS_ACUITY_SCORE: 26
DRESS: SCRUBS (BEHAVIORAL HEALTH)
ADLS_ACUITY_SCORE: 26
ADLS_ACUITY_SCORE: 26
HYGIENE/GROOMING: INDEPENDENT
ADLS_ACUITY_SCORE: 26
DRESS: SCRUBS (BEHAVIORAL HEALTH)

## 2024-12-30 NOTE — PROGRESS NOTES
12/30/24 1258   Individualization/Patient Specific Goals   Patient Vulnerabilities history of unsuccessful treatment;substance abuse/addiction;poor impulse control   Interprofessional Rounds   Summary There was discussion about discharge plan.   Participants advanced practice nurse;nursing;CTC   Behavioral Team Discussion   Participants Ashish MARKS; Zac Mcclain RN; Carolyn Barrios CTC   Progress Slight improvement   Anticipated length of stay 10-20 days   Continued Stay Criteria/Rationale Symptom stabilization, medication management, care coordination   Medical/Physical See H&P   Precautions See below   Plan Stablize on medications and discharge to IRTS   Rationale for change in precautions or plan Pt would benefit from IRTS programming to transition back to community   Safety Plan Will be completed prior to discharge   Anticipated Discharge Disposition IRTS     PRECAUTIONS AND SAFETY    Behavioral Orders   Procedures    Assault precautions    Code 1 - Restrict to Unit    Routine Programming     As clinically indicated    Status 15     Every 15 minutes.    Suicide precautions: Suicide Risk: LOW; Clinical rationale to override score: Other; Other: Hx of suicide attempts     Patients on Suicide Precautions should have a Combination Diet ordered that includes a Diet selection(s) AND a Behavioral Tray selection for Safe Tray - with utensils, or Safe Tray - NO utensils       Order Specific Question:   Suicide Risk     Answer:   LOW     Order Specific Question:   Clinical rationale to override score:     Answer:   Other     Order Specific Question:   Other:     Answer:   Hx of suicide attempts       Safety  Safety WDL: WDL  Patient Location: hallway  Observed Behavior: pacing  Observed Behavior (Comment): calm  Safety Measures: safety rounds completed  Diversional Activity: structured exercise  Suicidality: Status 15  Assault: status 15

## 2024-12-30 NOTE — PROGRESS NOTES
[facility],  Psychiatric Progress Note        Interim History:   The patient's care was discussed with the treatment team and chart notes were reviewed.     Today during the interview with the treatment team staff report patient has been walking in halls. He has not been attending groups. He keeps to himself and spent time in his room over the weekend. He received no prns. Staff reported he slept 6.25 hours overnight last night and 7 the night before.    Today upon assessment patient reports he is hopeful to return home with programming such as IOP or PHP. He reports he will not get anything from groups or programming. He reports no concerns with his mental health and denies depression or anxiety. He states he thinks his parents want him to have routine structure and that's why they want him to go to additional programming. He denies thoughts to harm himself or others.  He remains paranoid about his parents intentions but is less fixated on this today. He is still stabilizing on medications and is not safe for discharge. Plan for IRTS program next week. Patient encouraged to engage in groups to be considered for IRTS placement.     He reports he does not have current side effects to medications. Provider discussed increase in Depakote dose and benefits and side effects.     He reports appetite and sleep stable.     The 10 point Review of Systems is negative other than noted in the HPI         DIagnoses:     Schizoaffective disorder bipolar type   Generalized anxiety disorder  Substance use (h)    Clinically Significant Risk Factors                                  # Financial/Environmental Concerns:                Plan:   Legal: Full commitment with [facility]     Medications:  Prolixin 10 mg tablet every evening  Increase Depakote 500 ER at bedtime for aggression and mood stabilization  Continue Latuda 160 mg daily with lunch   Prns: hydroxyzine for anxiety, melatonin for  sleep,    Routine lab reviewed. Unremarkable. UTOX negative.     Attestation:  Patient has been seen and evaluated by Ashish knight APRN CNP  The patient was counseled on  nature of illness and treatment plan/options  Care was coordinated with  unit RN  Total amount of time: >35 minutes  Coordination of care/counseling: 10 minutes      Disposition  Reason for ongoing hospitalization: Pt is paranoid, he has histroy of aggression at home and is at risk for safety and other safety.  Discharge date: TBD  Discharge place: IRTS (preferred by family) or home with [facility], group home         Medications:     Current Facility-Administered Medications   Medication Dose Route Frequency Provider Last Rate Last Admin    acetaminophen (TYLENOL) tablet 650 mg  650 mg Oral Q4H PRN Fabiano Pepper MD        alum & mag hydroxide-simethicone (MAALOX) suspension 30 mL  30 mL Oral Q4H PRN Fabiano Pepper MD        divalproex sodium extended-release (DEPAKOTE ER) 24 hr tablet 500 mg  500 mg Oral At Bedtime Ashish Meek APRN CNP        fluPHENAZine (PROLIXIN) tablet 10 mg  10 mg Oral QPM Ashish Meek APRN CNP   10 mg at 12/29/24 1901    hydrOXYzine HCl (ATARAX) tablet 25 mg  25 mg Oral Q4H PRN Fabiano Pepper MD        lamoTRIgine (LaMICtal) tablet 50 mg  50 mg Oral Daily Ashish Meek APRN CNP   50 mg at 12/30/24 0806    lurasidone (LATUDA) tablet 160 mg  160 mg Oral Daily with lunch Nabeel Glover MD   160 mg at 12/30/24 1134    melatonin tablet 3 mg  3 mg Oral At Bedtime PRN Fabiano Pepper MD        methylfolate (DEPLIN) tablet 15 mg  15 mg Oral Daily Ashish Meek APRN CNP   15 mg at 12/29/24 1901    nicotine polacrilex (NICORETTE) gum 4 mg  4 mg Buccal Q1H PRN Fabiano Pepper MD   4 mg at 12/30/24 1240    OLANZapine (zyPREXA) tablet 10 mg  10 mg Oral TID PRN Fabiano Pepper MD        Or    OLANZapine (zyPREXA) injection 10 mg  10 mg Intramuscular TID PRN Fabiano Pepper  "MD        polyethylene glycol (MIRALAX) Packet 17 g  17 g Oral Daily PRN Fabiano Pepper MD                 Allergies:     Allergies   Allergen Reactions    Clozapine      Syncope per Pt.     Seasonal Allergies     Seroquel [Quetiapine]      Fainting and slowed breathing             Psychiatric Examination:   /81 (Patient Position: Sitting)   Pulse 84   Temp 97  F (36.1  C) (Temporal)   Resp 16   Wt 64.6 kg (142 lb 8 oz)   SpO2 100%   BMI 19.87 kg/m    Weight is 142 lbs 8 oz  Body mass index is 19.87 kg/m .    Appearance:  awake, alert, adequately groomed, and dressed in hospital scrubs  Attitude:  cooperative and somewhat guarded  Eye Contact:  fair  Mood:  appears sad reports mood is good  Affect:  intensity is blunted  Speech:  clear, coherent  Psychomotor Behavior:  no evidence of tardive dyskinesia, dystonia, or tics and fidgeting  Thought Process:  illogical and paranoid  Associations:  no loose associations  Thought Content:  no evidence of suicidal ideation or homicidal ideation, no auditory hallucinations present, and no visual hallucinations present  Insight:  fair  Judgment:  fair  Oriented to:  time, person, and place  Attention Span and Concentration:  intact  Recent and Remote Memory:  fair           Labs:   No results found for: \"NTBNPI\", \"NTBNP\"  Lab Results   Component Value Date    WBC 6.5 12/21/2024    HGB 14.1 12/21/2024    HCT 41.6 12/21/2024    MCV 91 12/21/2024     12/21/2024     Lab Results   Component Value Date    GFRESTIMATED >90 12/21/2024    GFRESTBLACK >90 08/16/2019     Lab Results   Component Value Date     (H) 12/21/2024     Lab Results   Component Value Date    HGB 14.1 12/21/2024     Lab Results   Component Value Date    AST 20 12/21/2024    ALT 16 12/21/2024    ALKPHOS 46 12/21/2024    BILITOTAL 0.2 12/21/2024    ALY 29 07/01/2022     Lab Results   Component Value Date    TSH 1.86 11/03/2024     Lab Results   Component Value Date    WBC 6.5 " 12/21/2024

## 2024-12-30 NOTE — PLAN OF CARE
Problem: Sleep Disturbance  Goal: Adequate Sleep/Rest  Outcome: Progressing   Goal Outcome Evaluation:       Pt appeared to have slept for 6.25 hours.  Respirations are even and unlabored.  No medical/safety/behavioral concerns this shift.  No prn administered.  Pt remain on assault and suicide precautions.

## 2024-12-30 NOTE — PLAN OF CARE
Problem: Psychotic Symptoms  Goal: Psychotic Symptoms  Description: Signs and symptoms of listed problems will be absent or manageable.  Outcome: Progressing   Goal Outcome Evaluation:            Patient spent the evening pacing the gould. Kept to self. Denied any anxiety, depression, SI,SIB,HI, AVH. Patient also denied pain or any physical concerns.  Ate dinner and snacks with good appetite.  Compliant with taking scheduled medications  No behavior or safety concerns.    PRN: Requesting nicotine gum every hour.  Vital: Temp: 97  F (36.1  C) Temp src: Temporal BP: 124/81 Pulse: 84   Resp: 16 SpO2: 100 % O2 Device: None (Room air)

## 2024-12-30 NOTE — PLAN OF CARE
Problem: Adult Behavioral Health Plan of Care  Goal: Optimized Coping Skills in Response to Life Stressors  Outcome: Progressing     Problem: Adult Behavioral Health Plan of Care  Goal: Adheres to Safety Considerations for Self and Others  Intervention: Develop and Maintain Individualized Safety Plan  Recent Flowsheet Documentation  Taken 12/30/2024 1200 by Jamarcus Mcclain RN  Safety Measures: safety rounds completed   Goal Outcome Evaluation:         Patient had flat affect. Mood was calm. Visible in the milieu but isolative to self. Encouraged to participate in group activities. Denied pain, anxiety, depression, covid 19 symptoms, SI/HI/SIB/AH/VH, and contracted for safety.  Was given Nicotine gum 4 mg x 4 times and scheduled medications. Had good appetite. Will continue to monitor and assist patient.
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
## 2024-12-30 NOTE — PLAN OF CARE
Team Note Due:  Monday    Assessment/Intervention/Current Symptoms and Care Coordination  Chart review and met with team, discussed pt progress, symptomology, and response to treatment.  Discussed the discharge plan and any potential impediments to discharge.    There was discussion about pt's discharge plan. It was confirmed that pt will be discharging to IRTS. Referrals will be made once pt attends groups.   CTC completed acuity rating.     Discharge Plan or Goal  Pending stabilization & development of a safe discharge plan.    Dispo Plan: IRTS   Discharge Date/ time: TBD  Transportation: TBD  AVS Completed: Yes [] No[x]  Provisional Discharge: Yes[x] No[]    Barriers to Discharge   Patient requires further psychiatric stabilization due to current symptomology    IRTS referrals to made after pt attends groups.     Referral Status  IRTS    Legal Status  Fully Committed with [facility]: [place]   File Number: [id number]  Start and expiration date of commitment: 02/06/2024 to 02/06/2025    Contacts:  [phone] Dr. Prieto Araujo, Father [phone]  Rolanda, Mother [phone]   Name/Clinic: [facility]   Number: [phone]   Email: [email or web address]        Upcoming Meetings and Dates/Important Information and next steps:  CTC to have pt sign PD prior to discharge.  CTC to inform primary pysch provider on 12/31 of call from Act Team Psychiatrist

## 2024-12-31 PROCEDURE — 250N000013 HC RX MED GY IP 250 OP 250 PS 637: Performed by: PSYCHIATRY & NEUROLOGY

## 2024-12-31 PROCEDURE — 99233 SBSQ HOSP IP/OBS HIGH 50: CPT | Performed by: REGISTERED NURSE

## 2024-12-31 PROCEDURE — H2032 ACTIVITY THERAPY, PER 15 MIN: HCPCS

## 2024-12-31 PROCEDURE — 128N000002 HC R&B CD/MH ADOLESCENT

## 2024-12-31 PROCEDURE — 250N000013 HC RX MED GY IP 250 OP 250 PS 637: Performed by: REGISTERED NURSE

## 2024-12-31 RX ADMIN — NICOTINE POLACRILEX 4 MG: 4 GUM, CHEWING BUCCAL at 10:19

## 2024-12-31 RX ADMIN — NICOTINE POLACRILEX 4 MG: 4 GUM, CHEWING BUCCAL at 19:20

## 2024-12-31 RX ADMIN — NICOTINE POLACRILEX 4 MG: 4 GUM, CHEWING BUCCAL at 07:54

## 2024-12-31 RX ADMIN — FLUPHENAZINE HYDROCHLORIDE 10 MG: 10 TABLET, FILM COATED ORAL at 19:16

## 2024-12-31 RX ADMIN — NICOTINE POLACRILEX 4 MG: 4 GUM, CHEWING BUCCAL at 09:03

## 2024-12-31 RX ADMIN — Medication 15 MG: at 19:17

## 2024-12-31 RX ADMIN — LURASIDONE HYDROCHLORIDE 160 MG: 40 TABLET, FILM COATED ORAL at 12:00

## 2024-12-31 RX ADMIN — NICOTINE POLACRILEX 4 MG: 4 GUM, CHEWING BUCCAL at 11:39

## 2024-12-31 RX ADMIN — NICOTINE POLACRILEX 4 MG: 4 GUM, CHEWING BUCCAL at 16:40

## 2024-12-31 RX ADMIN — NICOTINE POLACRILEX 4 MG: 4 GUM, CHEWING BUCCAL at 15:37

## 2024-12-31 RX ADMIN — LAMOTRIGINE 50 MG: 25 TABLET ORAL at 07:54

## 2024-12-31 RX ADMIN — NICOTINE POLACRILEX 4 MG: 4 GUM, CHEWING BUCCAL at 18:05

## 2024-12-31 RX ADMIN — DIVALPROEX SODIUM 750 MG: 500 TABLET, FILM COATED, EXTENDED RELEASE ORAL at 19:16

## 2024-12-31 ASSESSMENT — ACTIVITIES OF DAILY LIVING (ADL)
ADLS_ACUITY_SCORE: 26
DRESS: SCRUBS (BEHAVIORAL HEALTH)
ADLS_ACUITY_SCORE: 26
HYGIENE/GROOMING: INDEPENDENT
ADLS_ACUITY_SCORE: 26
ADLS_ACUITY_SCORE: 26
ORAL_HYGIENE: INDEPENDENT
ADLS_ACUITY_SCORE: 26

## 2024-12-31 NOTE — PLAN OF CARE
Problem: Sleep Disturbance  Goal: Adequate Sleep/Rest  Outcome: Progressing   Goal Outcome Evaluation:    Patient slept for 6.25 hours last night. Breathing noted even and unlabored. No indication of pain or discomfort. Safety precautions in progress with no occurrences. No behavior or safety concerns at this time. Will continue to monitor and provide support.
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
## 2024-12-31 NOTE — PLAN OF CARE
Team Note Due:  Monday    Assessment/Intervention/Current Symptoms and Care Coordination  Chart review and met with team, discussed pt progress, symptomology, and response to treatment.  Discussed the discharge plan and any potential impediments to discharge.    [facility] informed psych provider of call from Dr. Cash and was given his number to call.     After psych provider conversation with Dr. Cash, [facility] was informed that Lopez needs to be amended to remove vVrylar  and add Clozapine. Pt will also be returning home. Dr. Cash believes IRTS will only make pt more irritable.     [facility] worked with psych provider to send lopez amendment letter to [facility].     CTC completed acuity rating.     Discharge Plan or Goal  Pending stabilization & development of a safe discharge plan.    Dispo Plan: Home with ACT Team  Discharge Date/ time: TBD  Transportation: TBD  AVS Completed: Yes [] No[x]  Provisional Discharge: Yes[x] No[]    Barriers to Discharge   Patient requires further psychiatric stabilization due to current symptomology         Referral Status      Legal Status  Fully Committed with [facility]: [place]   File Number: [id number]  Start and expiration date of commitment: 02/06/2024 to 02/06/2025    Contacts:  [phone] Dr. Prieto Cash   Van, Father [phone]  Rolanda, Mother [phone]   Name/Clinic: [facility]   Number: [phone]   Email: [email or web address]        Upcoming Meetings and Dates/Important Information and next steps:  CTC to have pt sign PD prior to discharge.  [facility] to follow up with [facility] about Lopez Amendment

## 2024-12-31 NOTE — PLAN OF CARE
Problem: Psychotic Symptoms  Goal: Psychotic Symptoms  Description: Signs and symptoms of listed problems will be absent or manageable.  Outcome: Progressing     Problem: Behavioral Disturbance  Goal: Behavioral Disturbance  Description: Pt will remain safe on the unit as evidenced by pt identify and utilize 3 coping skills, attend 50% of programming and timely inform staff if not feel safe and/or have difficulty managing emotions or disturbing thoughts   Outcome: Progressing     Problem: Skin Injury Risk Increased  Goal: Skin Health and Integrity  Outcome: Progressing  Intervention: Plan: Nurse Driven Intervention: Moisture Management  Recent Flowsheet Documentation  Taken 12/30/2024 2000 by Donna Butler RN  Moisture Interventions: Encourage regular toileting  Bathing/Skin Care: shower  Taken 12/30/2024 1600 by oDnna Butler RN  Moisture Interventions: Encourage regular toileting  Intervention: Optimize Skin Protection  Recent Flowsheet Documentation  Taken 12/30/2024 1600 by Donna Butler RN  Head of Bed (HOB) Positioning: HOB flat   Goal Outcome Evaluation:  Pt kept to self and mostly withdrawn socially in the milieu.  Pt was observed pacing in the hallway. Pt was alert, pleasant and cooperative with staff. VS stable. Affect is flat. Pt reports good appetite, ate about 100% of dinner with snacks. Pt checked in as doing better, rated anxiety at 3  and depression at about 1 with 10 being worst. Pt rated overall mood at calm and good.  Pt denies SI/SIB/HI. Denies visual and auditory hallucinations. Pt was medication compliant, denied pain, medication side effects and medical concerns. Pt requested and received PRN nicotine gum 4mg x4. No behavioral or safety concerns noted. No behavioral or safety concerns noted.

## 2024-12-31 NOTE — PROGRESS NOTES
[facility],  Psychiatric Progress Note        Interim History:   The patient's care was discussed with the treatment team and chart notes were reviewed.     Today during the interview with the treatment team staff report patient has been walking in halls. He has been attending groups. He isolates not talking to peers or staff and walks in halls. He received no prns. Staff reported he slept 6.25 hours overnight. He was medication complaint.     Today upon assessment patient reports he has never had hallucinations or paranoia. He is very concerned about restarting clozapine as he thinks it made him faint and feel passed out. He reports his mood is good and he denies depression. He denies anxiety. He minimizes all symptoms and denies all delusional thoughts related to his parents at home. He thinks they try to aggravate him. He is asking about discharge planning. Provider told him they would speak to parents and Dr. Cash. Patient stated concern that Dr. Cash would want to initiate clozapine and Cam was very resistant to this as he has prior side effect of feeling faint on this medication.     He reports he does not have current side effects to medications. Provider discussed increase in Depakote dose and benefits and side effects.     He reports appetite and sleep stable.     Provider called Dr. Cash and he reported he has persistent treatment resistant persecutory delusions of his parents trying to kill him or agitate him. He has tried fluphenazine and latuda combination and was not effective.  Provider reported he would benefit from a re-trial of clozapine as he has not had symptom reduction with 2 other antipsychotics trails.     The 10 point Review of Systems is negative other than noted in the HPI         DIagnoses:     Schizoaffective disorder bipolar type   Generalized anxiety disorder  Substance use (h)    Clinically Significant Risk Factors                                   # Financial/Environmental Concerns:                Plan:   Legal: Full commitment     Medications:  Prolixin 10 mg tablet every evening  Increase Depakote 750 ER at bedtime for aggression and mood stabilization  Continue Latuda 160 mg daily with lunch   Prns: hydroxyzine for anxiety, melatonin for sleep,    Routine lab reviewed. Unremarkable. UTOX negative.     Attestation:  Patient has been seen and evaluated by Ashish knight APRN CNP  The patient was counseled on  nature of illness and treatment plan/options  Care was coordinated with  unit RN  Total amount of time: >35 minutes  Coordination of care/counseling: 10 minutes      Disposition  Reason for ongoing hospitalization: Pt is paranoid, he has histroy of aggression at home and is at risk for safety and other safety.  Discharge date: TBD  Discharge place: home with IOP/[facility]         Medications:     Current Facility-Administered Medications   Medication Dose Route Frequency Provider Last Rate Last Admin    acetaminophen (TYLENOL) tablet 650 mg  650 mg Oral Q4H PRN Fabiano Pepper MD        alum & mag hydroxide-simethicone (MAALOX) suspension 30 mL  30 mL Oral Q4H PRN Fabiano Pepper MD        divalproex sodium extended-release (DEPAKOTE ER) 24 hr tablet 750 mg  750 mg Oral At Bedtime Ashish Meek APRN CNP        fluPHENAZine (PROLIXIN) tablet 10 mg  10 mg Oral QPM Ashish Meek APRN CNP   10 mg at 12/30/24 1945    hydrOXYzine HCl (ATARAX) tablet 25 mg  25 mg Oral Q4H PRN Fabiano Pepper MD        lurasidone (LATUDA) tablet 160 mg  160 mg Oral Daily with lunch Nabeel Glover MD   160 mg at 12/31/24 1200    melatonin tablet 3 mg  3 mg Oral At Bedtime PRN Fabiano Pepper MD        methylfolate (DEPLIN) tablet 15 mg  15 mg Oral Daily Ashish Meek APRN CNP   15 mg at 12/30/24 1947    nicotine polacrilex (NICORETTE) gum 4 mg  4 mg Buccal Q1H PRN Fabiano Pepper MD   4 mg at 12/31/24 1139    OLANZapine  "(zyPREXA) tablet 10 mg  10 mg Oral TID PRN Fabiano Pepper MD        Or    OLANZapine (zyPREXA) injection 10 mg  10 mg Intramuscular TID PRN Fabiano Pepper MD        polyethylene glycol (MIRALAX) Packet 17 g  17 g Oral Daily PRN Fabiano Pepper MD                 Allergies:     Allergies   Allergen Reactions    Clozapine      Syncope per Pt.     Seasonal Allergies     Seroquel [Quetiapine]      Fainting and slowed breathing             Psychiatric Examination:   /78 (BP Location: Left arm)   Pulse 95   Temp 97.8  F (36.6  C) (Temporal)   Resp 16   Wt 64.6 kg (142 lb 8 oz)   SpO2 100%   BMI 19.87 kg/m    Weight is 142 lbs 8 oz  Body mass index is 19.87 kg/m .    Appearance:  awake, alert, adequately groomed, and dressed in hospital scrubs  Attitude:  cooperative and somewhat guarded  Eye Contact:  fair  Mood:  appears sad reports mood is good  Affect:  intensity is blunted  Speech:  clear, coherent  Psychomotor Behavior:  no evidence of tardive dyskinesia, dystonia, or tics and fidgeting  Thought Process:  illogical and paranoid  Associations:  no loose associations  Thought Content:  no evidence of suicidal ideation or homicidal ideation, no auditory hallucinations present, and no visual hallucinations present  Insight:  fair  Judgment:  fair  Oriented to:  time, person, and place  Attention Span and Concentration:  intact  Recent and Remote Memory:  fair           Labs:   No results found for: \"NTBNPI\", \"NTBNP\"  Lab Results   Component Value Date    WBC 6.5 12/21/2024    HGB 14.1 12/21/2024    HCT 41.6 12/21/2024    MCV 91 12/21/2024     12/21/2024     Lab Results   Component Value Date    GFRESTIMATED >90 12/21/2024    GFRESTBLACK >90 08/16/2019     Lab Results   Component Value Date     (H) 12/21/2024     Lab Results   Component Value Date    HGB 14.1 12/21/2024     Lab Results   Component Value Date    AST 20 12/21/2024    ALT 16 12/21/2024    ALKPHOS 46 12/21/2024    " BILITOTAL 0.2 12/21/2024    ALY 29 07/01/2022     Lab Results   Component Value Date    TSH 1.86 11/03/2024     Lab Results   Component Value Date    WBC 6.5 12/21/2024

## 2024-12-31 NOTE — PLAN OF CARE
Problem: Behavioral Disturbance  Goal: Behavioral Disturbance  Description: Pt will remain safe on the unit   Outcome: Progressing   Goal Outcome Evaluation:     Plan of Care Reviewed With: patient       Patient med compliant, paced hallway. He requested nicotine gums often, ate 100% of breakfast, and continued pacing the hallway. Withdrawn to self pt denied anxiety, depression, SI, HI, and hallucinations. He attended morning OT. He ate lunch, paced the gould, and slept much of the afternoon.

## 2025-01-01 PROCEDURE — 250N000013 HC RX MED GY IP 250 OP 250 PS 637: Performed by: PSYCHIATRY & NEUROLOGY

## 2025-01-01 PROCEDURE — 250N000013 HC RX MED GY IP 250 OP 250 PS 637: Performed by: REGISTERED NURSE

## 2025-01-01 PROCEDURE — 90853 GROUP PSYCHOTHERAPY: CPT

## 2025-01-01 PROCEDURE — 128N000002 HC R&B CD/MH ADOLESCENT

## 2025-01-01 RX ADMIN — NICOTINE POLACRILEX 4 MG: 4 GUM, CHEWING BUCCAL at 10:38

## 2025-01-01 RX ADMIN — FLUPHENAZINE HYDROCHLORIDE 10 MG: 10 TABLET, FILM COATED ORAL at 19:41

## 2025-01-01 RX ADMIN — NICOTINE POLACRILEX 4 MG: 4 GUM, CHEWING BUCCAL at 07:28

## 2025-01-01 RX ADMIN — NICOTINE POLACRILEX 4 MG: 4 GUM, CHEWING BUCCAL at 12:17

## 2025-01-01 RX ADMIN — NICOTINE POLACRILEX 4 MG: 4 GUM, CHEWING BUCCAL at 09:32

## 2025-01-01 RX ADMIN — NICOTINE POLACRILEX 4 MG: 4 GUM, CHEWING BUCCAL at 08:31

## 2025-01-01 RX ADMIN — NICOTINE POLACRILEX 4 MG: 4 GUM, CHEWING BUCCAL at 18:20

## 2025-01-01 RX ADMIN — NICOTINE POLACRILEX 4 MG: 4 GUM, CHEWING BUCCAL at 16:07

## 2025-01-01 RX ADMIN — Medication 15 MG: at 19:41

## 2025-01-01 RX ADMIN — LURASIDONE HYDROCHLORIDE 160 MG: 40 TABLET, FILM COATED ORAL at 11:59

## 2025-01-01 RX ADMIN — DIVALPROEX SODIUM 750 MG: 500 TABLET, FILM COATED, EXTENDED RELEASE ORAL at 19:41

## 2025-01-01 RX ADMIN — NICOTINE POLACRILEX 4 MG: 4 GUM, CHEWING BUCCAL at 17:13

## 2025-01-01 RX ADMIN — NICOTINE POLACRILEX 4 MG: 4 GUM, CHEWING BUCCAL at 14:58

## 2025-01-01 ASSESSMENT — ACTIVITIES OF DAILY LIVING (ADL)
ADLS_ACUITY_SCORE: 26
HYGIENE/GROOMING: INDEPENDENT
DRESS: SCRUBS (BEHAVIORAL HEALTH)
ADLS_ACUITY_SCORE: 26
ORAL_HYGIENE: INDEPENDENT
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
## 2025-01-01 NOTE — PLAN OF CARE
Goal Outcome Evaluation:    Problem: Psychotic Symptoms  Goal: Psychotic Symptoms  Description: Signs and symptoms of listed problems will be absent or manageable.  Outcome: Progressing     Pt was up early, active and social in the milieu.  Pt was alert and oriented x 4, pleasant and cooperative with staff. VS stable. Affect is flat. Pt reports okay appetite, ate about 100% of dinner with adequate fluid intake     Pt checked in as doing okay, rated anxiety at 2  and depression at 2  with 10 being worst. Pt rated overall mood is calm.  Pt denies SI/SIB/HI. Denies visual and auditory hallucinations.     Pt requested and received PRN nicotine lozenge. Pt was medication compliant, denied pain, medication side effects and medical concerns.    /77 (BP Location: Right arm, Patient Position: Sitting, Cuff Size: Adult Regular)   Pulse 83   Temp 98.3  F (36.8  C) (Temporal)   Resp 18   Wt 64.6 kg (142 lb 8 oz)   SpO2 98%   BMI 19.87 kg/m

## 2025-01-01 NOTE — PLAN OF CARE
Problem: Behavioral Disturbance  Goal: Behavioral Disturbance  Description: Pt will remain safe on the unit Outcome: Progressing   Goal Outcome Evaluation:     Plan of Care Reviewed With: patient       Patient paced hallway. He requested nicotine gums often, ate 100% of breakfast, and continued pacing the hallway. Withdrawn to self pt denied anxiety, depression, SI, HI, and hallucinations. He paced throughout the morning. He ate lunch, paced the hallway, then slept much of the afternoon.

## 2025-01-01 NOTE — PLAN OF CARE
Goal Outcome Evaluation:    Problem: Psychotic Symptoms  Goal: Psychotic Symptoms  Description: Signs and symptoms of listed problems will be absent or manageable.  Outcome: Progressing     Pt was active and social in the milieu.  Pt was alert and oriented x 4, pleasant and cooperative with staff. VS stable. Affect is flat and blunted. Pt reports okay appetite, ate about 100% of dinner with adequate.     Pt checked in as doing okay, rated anxiety at 2 and depression at 2 with 10 being worst. Pt rated overall mood is calm.  Pt denies SI/SIB/HI. Denies visual and auditory hallucinations.     Pt requested and received PRN nicotine lozenge. Pt was medication compliant, denied pain, medication side effects and medical concerns.    /82 (BP Location: Right arm, Patient Position: Sitting)   Pulse 86   Temp 97.1  F (36.2  C) (Temporal)   Resp 18   Wt 64.6 kg (142 lb 8 oz)   SpO2 98%   BMI 19.87 kg/m

## 2025-01-01 NOTE — PLAN OF CARE
Problem: Sleep Disturbance  Goal: Adequate Sleep/Rest  Outcome: Progressing   Goal Outcome Evaluation:    Patient slept for 6.25 hours. No indication of pain or discomfort. Breathing even and unlabored. Patient woke up early this morning requested and was offered prn gabapentin for anxiety 7 out of 10. Patient was seen a few times in the hallway but remains calm. Safety checks done per protocol . No occurrences. No behavior or safety concerns at this time.   Will continue to monitor.

## 2025-01-02 LAB
ATRIAL RATE - MUSE: 78 BPM
BASOPHILS # BLD AUTO: 0.1 10E3/UL (ref 0–0.2)
BASOPHILS NFR BLD AUTO: 1 %
CRP SERPL-MCNC: <3 MG/L
DIASTOLIC BLOOD PRESSURE - MUSE: NORMAL MMHG
EOSINOPHIL # BLD AUTO: 0.2 10E3/UL (ref 0–0.7)
EOSINOPHIL NFR BLD AUTO: 2 %
ERYTHROCYTE [DISTWIDTH] IN BLOOD BY AUTOMATED COUNT: 11.8 % (ref 10–15)
HCT VFR BLD AUTO: 44.2 % (ref 40–53)
HGB BLD-MCNC: 14.8 G/DL (ref 13.3–17.7)
IMM GRANULOCYTES # BLD: 0 10E3/UL
IMM GRANULOCYTES NFR BLD: 0 %
INTERPRETATION ECG - MUSE: NORMAL
LYMPHOCYTES # BLD AUTO: 1.2 10E3/UL (ref 0.8–5.3)
LYMPHOCYTES NFR BLD AUTO: 14 %
MCH RBC QN AUTO: 30.8 PG (ref 26.5–33)
MCHC RBC AUTO-ENTMCNC: 33.5 G/DL (ref 31.5–36.5)
MCV RBC AUTO: 92 FL (ref 78–100)
MONOCYTES # BLD AUTO: 0.5 10E3/UL (ref 0–1.3)
MONOCYTES NFR BLD AUTO: 6 %
NEUTROPHILS # BLD AUTO: 6.3 10E3/UL (ref 1.6–8.3)
NEUTROPHILS NFR BLD AUTO: 76 %
NRBC # BLD AUTO: 0 10E3/UL
NRBC BLD AUTO-RTO: 0 /100
P AXIS - MUSE: 26 DEGREES
PLATELET # BLD AUTO: 282 10E3/UL (ref 150–450)
PR INTERVAL - MUSE: 128 MS
QRS DURATION - MUSE: 90 MS
QT - MUSE: 342 MS
QTC - MUSE: 389 MS
R AXIS - MUSE: 94 DEGREES
RBC # BLD AUTO: 4.8 10E6/UL (ref 4.4–5.9)
SYSTOLIC BLOOD PRESSURE - MUSE: NORMAL MMHG
T AXIS - MUSE: 64 DEGREES
TROPONIN T SERPL HS-MCNC: 9 NG/L
VENTRICULAR RATE- MUSE: 78 BPM
WBC # BLD AUTO: 8.3 10E3/UL (ref 4–11)

## 2025-01-02 PROCEDURE — 128N000002 HC R&B CD/MH ADOLESCENT

## 2025-01-02 PROCEDURE — 84484 ASSAY OF TROPONIN QUANT: CPT | Performed by: REGISTERED NURSE

## 2025-01-02 PROCEDURE — 93005 ELECTROCARDIOGRAM TRACING: CPT

## 2025-01-02 PROCEDURE — 85004 AUTOMATED DIFF WBC COUNT: CPT | Performed by: REGISTERED NURSE

## 2025-01-02 PROCEDURE — 250N000013 HC RX MED GY IP 250 OP 250 PS 637: Performed by: REGISTERED NURSE

## 2025-01-02 PROCEDURE — 99232 SBSQ HOSP IP/OBS MODERATE 35: CPT | Performed by: REGISTERED NURSE

## 2025-01-02 PROCEDURE — 86140 C-REACTIVE PROTEIN: CPT | Performed by: REGISTERED NURSE

## 2025-01-02 PROCEDURE — 36415 COLL VENOUS BLD VENIPUNCTURE: CPT | Performed by: REGISTERED NURSE

## 2025-01-02 PROCEDURE — 250N000013 HC RX MED GY IP 250 OP 250 PS 637: Performed by: PSYCHIATRY & NEUROLOGY

## 2025-01-02 RX ORDER — SENNOSIDES 8.6 MG
1 TABLET ORAL 2 TIMES DAILY
Status: DISCONTINUED | OUTPATIENT
Start: 2025-01-03 | End: 2025-01-09 | Stop reason: HOSPADM

## 2025-01-02 RX ORDER — CLOZAPINE 25 MG/1
12.5 TABLET ORAL AT BEDTIME
Status: COMPLETED | OUTPATIENT
Start: 2025-01-02 | End: 2025-01-02

## 2025-01-02 RX ORDER — LURASIDONE HYDROCHLORIDE 40 MG/1
80 TABLET, FILM COATED ORAL
Status: DISCONTINUED | OUTPATIENT
Start: 2025-01-02 | End: 2025-01-06

## 2025-01-02 RX ORDER — CLOZAPINE 25 MG/1
25 TABLET ORAL AT BEDTIME
Status: DISCONTINUED | OUTPATIENT
Start: 2025-01-03 | End: 2025-01-02

## 2025-01-02 RX ORDER — CLOZAPINE 25 MG/1
25 TABLET ORAL AT BEDTIME
Status: DISCONTINUED | OUTPATIENT
Start: 2025-01-03 | End: 2025-01-03

## 2025-01-02 RX ORDER — CLOZAPINE 50 MG/1
50 TABLET ORAL AT BEDTIME
Status: DISCONTINUED | OUTPATIENT
Start: 2025-01-05 | End: 2025-01-02

## 2025-01-02 RX ADMIN — Medication 12.5 MG: at 19:55

## 2025-01-02 RX ADMIN — Medication 15 MG: at 19:55

## 2025-01-02 RX ADMIN — NICOTINE POLACRILEX 4 MG: 4 GUM, CHEWING BUCCAL at 14:48

## 2025-01-02 RX ADMIN — NICOTINE POLACRILEX 4 MG: 4 GUM, CHEWING BUCCAL at 11:44

## 2025-01-02 RX ADMIN — DIVALPROEX SODIUM 750 MG: 500 TABLET, FILM COATED, EXTENDED RELEASE ORAL at 19:54

## 2025-01-02 RX ADMIN — FLUPHENAZINE HYDROCHLORIDE 10 MG: 10 TABLET, FILM COATED ORAL at 19:54

## 2025-01-02 RX ADMIN — NICOTINE POLACRILEX 4 MG: 4 GUM, CHEWING BUCCAL at 15:50

## 2025-01-02 RX ADMIN — NICOTINE POLACRILEX 4 MG: 4 GUM, CHEWING BUCCAL at 09:34

## 2025-01-02 RX ADMIN — NICOTINE POLACRILEX 4 MG: 4 GUM, CHEWING BUCCAL at 07:22

## 2025-01-02 RX ADMIN — NICOTINE POLACRILEX 4 MG: 4 GUM, CHEWING BUCCAL at 10:40

## 2025-01-02 RX ADMIN — LURASIDONE HYDROCHLORIDE 80 MG: 40 TABLET, FILM COATED ORAL at 12:46

## 2025-01-02 RX ADMIN — NICOTINE POLACRILEX 4 MG: 4 GUM, CHEWING BUCCAL at 12:46

## 2025-01-02 RX ADMIN — NICOTINE POLACRILEX 4 MG: 4 GUM, CHEWING BUCCAL at 08:33

## 2025-01-02 ASSESSMENT — ACTIVITIES OF DAILY LIVING (ADL)
ADLS_ACUITY_SCORE: 26
DRESS: SCRUBS (BEHAVIORAL HEALTH)
ADLS_ACUITY_SCORE: 26
HYGIENE/GROOMING: INDEPENDENT
ORAL_HYGIENE: INDEPENDENT
ADLS_ACUITY_SCORE: 26

## 2025-01-02 NOTE — PLAN OF CARE
Problem: Psychotic Symptoms  Goal: Social and Therapeutic (Psychotic Symptoms)  Description: Pt will remain safe on the unit as evidenced by pt identify and utilize 3 coping skills, attend 50% of programming and timely inform staff if not feel safe and/or have difficulty managing emotions or disturbing thoughts   Outcome: Not Progressing   Goal Outcome Evaluation:     Plan of Care Reviewed With: patient       Patient paced hallway. He requested nicotine gums often, ate 100% of breakfast, and continued pacing the hallway. Withdrawn to self; pt denied anxiety, depression, SI, HI, and hallucinations. He had a lab blood draw and an EKG this morning. Otherwise, he paced throughout the morning. He ate lunch, paced the hallway, then slept most of the afternoon.

## 2025-01-02 NOTE — PLAN OF CARE
BEH IP Unit Acuity Rating Score (UARS)  Patient is given one point for every criteria they meet.    CRITERIA SCORING   On a 72 hour hold, court hold, committed, stay of commitment, or revocation. 1    Patient LOS on BEH unit exceeds 20 days. 0  LOS: 11   Patient under guardianship, 55+, otherwise medically complex, or under age 11. 0   Suicide ideation without relief of precipitating factors. 0   Current plan for suicide. 0   Current plan for homicide. 0   Imminent risk or actual attempt to seriously harm another without relief of factors precipitating the attempt. 0   Severe dysfunction in daily living (ex: complete neglect for self care, extreme disruption in vegetative function, extreme deterioration in social interactions). 1   Recent (last 7 days) or current physical aggression in the ED or on unit. 0   Restraints or seclusion episode in past 72 hours. 0   Recent (last 7 days) or current verbal aggression, agitation, yelling, etc., while in the ED or unit. 0   Active psychosis. 1   Need for constant or near constant redirection (from leaving, from others, etc).  0   Intrusive or disruptive behaviors. 0   Patient requires 3 or more hours of individualized nursing care per 8-hour shift (i.e. for ADLs, meds, therapeutic interventions). 0   TOTAL 3

## 2025-01-02 NOTE — PLAN OF CARE
Team Note Due:  Monday    Assessment/Intervention/Current Symptoms and Care Coordination  Chart review and met with team, discussed pt progress, symptomology, and response to treatment.  Discussed the discharge plan and any potential impediments to discharge.    CTC was informed by psych provider that a [facility] will not be submitted due to pt agreeing to start clozapine.     CTC updated  from [facility] about providers decision.     CTC completed acuity rating.     Discharge Plan or Goal  Pending stabilization & development of a safe discharge plan.    Dispo Plan: Home with ACT Team  Discharge Date/ time: TBD  Transportation: TBD  AVS Completed: Yes [] No[x]  Provisional Discharge: Yes[x] No[]    Barriers to Discharge   Patient requires further psychiatric stabilization due to current symptomology         Referral Status      Legal Status  Fully Committed with [facility]     County: [place]   File Number: [id number]  Start and expiration date of commitment: 02/06/2024 to 02/06/2025    Contacts:  [phone] Dr. Prieto Araujo, Father [phone]  Rolanda, Mother [phone]   Name/Clinic: [facility]   Number: [phone]   Email: [email or web address]        Upcoming Meetings and Dates/Important Information and next steps:  CTC to have pt sign PD prior to discharge.

## 2025-01-02 NOTE — PROGRESS NOTES
"[facility],  Psychiatric Progress Note        Interim History:   The patient's care was discussed with the treatment team and chart notes were reviewed.     Today during the interview with the treatment team staff report patient has been walking in halls. He has been attending groups. He isolates not talking to peers or staff and walks in halls. He received no prns. Staff reported he slept 6.25 hours overnight. He has been medication complaint.     Today upon assessment patient reports his mood is improved with him being able to go home with programming instead of IRTS. Provider told him they would speak to parents and Dr. Cash. Patient and provider discussed initiating clozapine and plan of care for discharge according to parents and outpatient providers input. Patient agreeable to starting clozapine slowly. He reports he feels more calm since starting Depakote. He reports some increased hunger but has been walking in halls. Provider discussed side effects and possible benefits of clozapine. Provider informed patient of Depakote lab to be drawn Sunday. Pateint denies current thoughts of suicide or homicidal ideations to any people and he reports he feels safe on the unit. He reports he still thinks his parents are trying to agitate him when they have a \"bad day\". He has not mentioned the garden tub or that his parents are trying or planning to kill him today. He is focused on returning home and when he will be able to.     Provider called parents: mother states she talked to him on the phone and he was more pleasant than usual and she was uncertain if it was authentic or an act. They plan to visit him this weekend. They were informed of plan to taper latuda and discontinue over time and restart clozapine slowly.     He reports appetite and sleep stable.     Provider called Dr. Cash (12/31) and he reported he has persistent treatment resistant persecutory delusions " of his parents trying to kill him or agitate him. He has tried fluphenazine and latuda combination and was not effective.  Provider reported he would benefit from a re-trial of clozapine as he has not had symptom reduction with 2 other antipsychotics trails.     The 10 point Review of Systems is negative other than noted in the HPI         DIagnoses:     Schizoaffective disorder bipolar type   Generalized anxiety disorder  Substance use (historical)    Clinically Significant Risk Factors                                  # Financial/Environmental Concerns:                Plan:   Legal: Full commitment     Medications:  Initiate clozapine 12.5 mg daily for 1 day and increase slowly  Prolixin 10 mg tablet every evening  Depakote 750 ER at bedtime for aggression and mood stabilization  Decrease Latuda to 80 mg daily with lunch and taper and discontinue over next week  Senna 1 tab BID   Prns: hydroxyzine for anxiety, melatonin for sleep,    Routine lab reviewed. Unremarkable. UTOX negative.     CBC with diff, EKG, troponin, CRP inflammation ordered and unremarkable for clozapine initiation.    Attestation:  Patient has been seen and evaluated by me,  SELINA Toussaint CNP  The patient was counseled on  nature of illness and treatment plan/options  Care was coordinated with  unit RN  Total amount of time: >35 minutes  Coordination of care/counseling: 10 minutes      Disposition  Reason for ongoing hospitalization: Pt is paranoid, he has history of aggression at home and is at risk for safety and other safety.  Discharge date: TBD  Discharge place: home with IOP/PHP         Medications:     Current Facility-Administered Medications   Medication Dose Route Frequency Provider Last Rate Last Admin    acetaminophen (TYLENOL) tablet 650 mg  650 mg Oral Q4H PRN Fabiano Pepper MD        alum & mag hydroxide-simethicone (MAALOX) suspension 30 mL  30 mL Oral Q4H PRN Fabiano Pepper MD        cloZAPine (CLOZARIL)  half-tab 12.5 mg  12.5 mg Oral At Bedtime Asihsh Meek APRN CNP        [START ON 1/3/2025] cloZAPine (CLOZARIL) tablet 25 mg  25 mg Oral At Bedtime Ashish Meek APRN CNP        [START ON 1/5/2025] cloZAPine (CLOZARIL) tablet 50 mg  50 mg Oral At Bedtime Ashish Meek APRN CNP        divalproex sodium extended-release (DEPAKOTE ER) 24 hr tablet 750 mg  750 mg Oral At Bedtime Ashish Meek APRN CNP   750 mg at 01/01/25 1941    fluPHENAZine (PROLIXIN) tablet 10 mg  10 mg Oral QPM Ashish Meek APRN CNP   10 mg at 01/01/25 1941    hydrOXYzine HCl (ATARAX) tablet 25 mg  25 mg Oral Q4H PRN Fabiano Pepper MD        lurasidone (LATUDA) tablet 80 mg  80 mg Oral Daily with lunch Ashish Meek APRN CNP        melatonin tablet 3 mg  3 mg Oral At Bedtime PRN Fabiano Pepper MD        methylfolate (DEPLIN) tablet 15 mg  15 mg Oral Daily Ashish Meek APRN CNP   15 mg at 01/01/25 1941    nicotine polacrilex (NICORETTE) gum 4 mg  4 mg Buccal Q1H PRN Fabiano Pepper MD   4 mg at 01/02/25 1040    OLANZapine (zyPREXA) tablet 10 mg  10 mg Oral TID PRN Fabiano Pepper MD        Or    OLANZapine (zyPREXA) injection 10 mg  10 mg Intramuscular TID PRN Fabiano Pepper MD        polyethylene glycol (MIRALAX) Packet 17 g  17 g Oral Daily PRN Fabiano Pepper MD        [START ON 1/3/2025] sennosides (SENOKOT) tablet 1 tablet  1 tablet Oral BID Ashish Meek APRN CNP                 Allergies:     Allergies   Allergen Reactions    Clozapine      Syncope per Pt.     Seasonal Allergies     Seroquel [Quetiapine]      Fainting and slowed breathing             Psychiatric Examination:   /84   Pulse 98   Temp 97  F (36.1  C) (Temporal)   Resp 18   Wt 65 kg (143 lb 3.2 oz)   SpO2 99%   BMI 19.97 kg/m    Weight is 143 lbs 3.2 oz  Body mass index is 19.97 kg/m .    Appearance:  awake, alert, adequately groomed, and dressed in hospital scrubs  Attitude:  cooperative and somewhat  "guarded  Eye Contact:  fair  Mood:   better  Affect:  restricted range  Speech:  clear, coherent  Psychomotor Behavior:  no evidence of tardive dyskinesia, dystonia, or tics and fidgeting  Thought Process:  illogical and paranoid  Associations:  no loose associations  Thought Content:  no evidence of suicidal ideation or homicidal ideation, no auditory hallucinations present, and no visual hallucinations present. Paranoia remians  Insight:  fair  Judgment:  fair  Oriented to:  time, person, and place  Attention Span and Concentration:  intact  Recent and Remote Memory:  fair           Labs:   No results found for: \"NTBNPI\", \"NTBNP\"  Lab Results   Component Value Date    WBC 6.5 12/21/2024    HGB 14.1 12/21/2024    HCT 41.6 12/21/2024    MCV 91 12/21/2024     12/21/2024     Lab Results   Component Value Date    GFRESTIMATED >90 12/21/2024    GFRESTBLACK >90 08/16/2019     Lab Results   Component Value Date     (H) 12/21/2024     Lab Results   Component Value Date    HGB 14.1 12/21/2024     Lab Results   Component Value Date    AST 20 12/21/2024    ALT 16 12/21/2024    ALKPHOS 46 12/21/2024    BILITOTAL 0.2 12/21/2024    ALY 29 07/01/2022     Lab Results   Component Value Date    TSH 1.86 11/03/2024     Lab Results   Component Value Date    WBC 6.5 12/21/2024     "

## 2025-01-02 NOTE — PLAN OF CARE
The patient was asleep for 7hrs. The patient did not require any behavioral or medical concerns and remain on 15min checks. RN will continue to monitor.

## 2025-01-03 PROCEDURE — 250N000013 HC RX MED GY IP 250 OP 250 PS 637: Performed by: REGISTERED NURSE

## 2025-01-03 PROCEDURE — 250N000013 HC RX MED GY IP 250 OP 250 PS 637: Performed by: CLINICAL NURSE SPECIALIST

## 2025-01-03 PROCEDURE — 128N000002 HC R&B CD/MH ADOLESCENT

## 2025-01-03 PROCEDURE — 99232 SBSQ HOSP IP/OBS MODERATE 35: CPT | Performed by: CLINICAL NURSE SPECIALIST

## 2025-01-03 PROCEDURE — 250N000013 HC RX MED GY IP 250 OP 250 PS 637: Performed by: PSYCHIATRY & NEUROLOGY

## 2025-01-03 RX ORDER — CLOZAPINE 25 MG/1
12.5 TABLET ORAL AT BEDTIME
Status: DISCONTINUED | OUTPATIENT
Start: 2025-01-03 | End: 2025-01-06

## 2025-01-03 RX ADMIN — NICOTINE POLACRILEX 4 MG: 4 GUM, CHEWING BUCCAL at 18:37

## 2025-01-03 RX ADMIN — LURASIDONE HYDROCHLORIDE 80 MG: 40 TABLET, FILM COATED ORAL at 12:47

## 2025-01-03 RX ADMIN — Medication 15 MG: at 20:40

## 2025-01-03 RX ADMIN — NICOTINE POLACRILEX 4 MG: 4 GUM, CHEWING BUCCAL at 12:47

## 2025-01-03 RX ADMIN — NICOTINE POLACRILEX 4 MG: 4 GUM, CHEWING BUCCAL at 07:33

## 2025-01-03 RX ADMIN — DIVALPROEX SODIUM 750 MG: 500 TABLET, FILM COATED, EXTENDED RELEASE ORAL at 20:39

## 2025-01-03 RX ADMIN — NICOTINE POLACRILEX 4 MG: 4 GUM, CHEWING BUCCAL at 16:29

## 2025-01-03 RX ADMIN — SENNOSIDES 1 TABLET: 8.6 TABLET, FILM COATED ORAL at 07:33

## 2025-01-03 RX ADMIN — FLUPHENAZINE HYDROCHLORIDE 10 MG: 10 TABLET, FILM COATED ORAL at 20:39

## 2025-01-03 RX ADMIN — SENNOSIDES 1 TABLET: 8.6 TABLET, FILM COATED ORAL at 20:39

## 2025-01-03 RX ADMIN — Medication 12.5 MG: at 20:39

## 2025-01-03 RX ADMIN — NICOTINE POLACRILEX 4 MG: 4 GUM, CHEWING BUCCAL at 08:43

## 2025-01-03 RX ADMIN — NICOTINE POLACRILEX 4 MG: 4 GUM, CHEWING BUCCAL at 17:31

## 2025-01-03 ASSESSMENT — ACTIVITIES OF DAILY LIVING (ADL)
ADLS_ACUITY_SCORE: 26
HYGIENE/GROOMING: INDEPENDENT
ADLS_ACUITY_SCORE: 26
DRESS: SCRUBS (BEHAVIORAL HEALTH)
ADLS_ACUITY_SCORE: 26
ORAL_HYGIENE: INDEPENDENT
ADLS_ACUITY_SCORE: 26

## 2025-01-03 NOTE — PLAN OF CARE
Team Note Due:  Monday    Assessment/Intervention/Current Symptoms and Care Coordination  Chart review and met with team, discussed pt progress, symptomology, and response to treatment.  Discussed the discharge plan and any potential impediments to discharge.    Pt was started on a low dose of clozapine.      CTC completed acuity rating.     Discharge Plan or Goal  Pending stabilization & development of a safe discharge plan.    Dispo Plan: Home with ACT Team  Discharge Date/ time: TBD  Transportation: TBD  AVS Completed: Yes [] No[x]  Provisional Discharge: Yes[x] No[]    Barriers to Discharge   Patient requires further psychiatric stabilization due to current symptomology         Referral Status      Legal Status  Fully Committed with [facility]: [place]   File Number: [id number]  Start and expiration date of commitment: 02/06/2024 to 02/06/2025    Contacts:  [phone] Dr. Prieto Araujo, Father [phone]  Rolanda, Mother [phone]   Name/Clinic: [facility]   Number: [phone]   Email: [email or web address]        Upcoming Meetings and Dates/Important Information and next steps:  CTC to have pt sign PD prior to discharge.

## 2025-01-03 NOTE — PLAN OF CARE
Problem: Behavioral Disturbance  Goal: Behavioral Disturbance  Description: Pt will remain safe on the unit as evidenced by pt identify and utilize 3 coping skills, attend 50% of programming and timely inform staff if not feel safe and/or have difficulty managing emotions or disturbing thoughts   Outcome: Not Progressing  Flowsheets (Taken 1/2/2025 2015)  Behavioral Disturbance Assessed: all  Behavioral Disturbance Present:   poor thought process   poor insight   Goal Outcome Evaluation:  /78 (BP Location: Left arm, Cuff Size: Adult Regular)   Pulse 101   Temp 97.7  F (36.5  C) (Temporal)   Resp 18   Wt 65 kg (143 lb 3.2 oz)   SpO2 100%   BMI 19.97 kg/m          Pt was out in the hallway pacing before dinner. He was calm, cooperative and medication compliant. Good appetite and fluid intake ate about 100% of his dinner. Pt denied having pain discomfort and medication side effects. After dinner pt went to bed. He denied all MHS. Pt started taking Clozapine and give him his first dose of 12mg at bed time.

## 2025-01-03 NOTE — PLAN OF CARE
"  Problem: Psychotic Symptoms  Goal: Social and Therapeutic (Psychotic Symptoms)  Description: Pt will remain safe on the unit as evidenced by pt identify and utilize 3 coping skills, attend 50% of programming and timely inform staff if not feel safe and/or have difficulty managing emotions or disturbing thoughts   Outcome: In Progress   Goal Outcome Evaluation:     Mental Health:  Pt presents with blunt/flat affect, requests nicotine gum frequently and paces gould, socially withdrawn, guarded, denies any depression, anxiety, SI/SIB thoughts or hallucinations; pt appears preoccupied yet does not share or discuss persecutory delusions with writer, appears paranoid and responds with vague, brief responses.  Pt begin Clozapine last evening; no observed side effects- pt reports \"feeling faint\" after taking first dose last night-pt encouraged to eat a snack and drink fluids in evening; no aggression to self or others, cooperative with staff, limited insight/judgement into mental health symptoms.    Medical:  Abdominal assessment-pt educated on importance of tracking his bowel movements, to anticipate nurses asking more questions, may inspect abdomen and to timely inform staff if have pain, discomfort or straining.  Pt declines any abdominal pain; appetite good and sleeps 7 hours last night     Vitals:   /70 (BP Location: Left arm, Patient Position: Sitting, Cuff Size: Adult Regular)   Pulse 94   Temp 99  F (37.2  C) (Oral)   Resp 18   Wt 65 kg (143 lb 3.2 oz)   SpO2 99%   BMI 19.97 kg/m         PRNs Given:  Nicotine gum 8, 9 am and 1300    Continue to assess and monitor closely, suicide and assault precautions for safety.                      "

## 2025-01-03 NOTE — PROGRESS NOTES
"[facility], [place]   Psychiatric Progress Note        Interim History:   The patient's care was discussed with the treatment team during the daily team meeting and/or staff's chart notes were reviewed.  Staff report patient is compliant with taking medications.     Psychiatric symptoms and interventions:     Patient reports he is dizzy. Patient said he has a history of low blood pressure when on clozapine. Reviewed BP's which dropped to 108/70 on 1/3. Continued clozapine at 12.5 mg until Monday to give patient more time to adjust to medication. Patient said, \"No one believes me but my blood pressure drops.\". Patient said he is eating and drinking normally.     Patient presented with a blunted affect. He reports feeling tired. Patient denies AH. He denied any paranoid thinking. He said his parents will visit over the weekend and he is looking forward to the visit. Patient denies suicidal/homicidal thinking.     Provider reviewed medications with patient. Patient is aware that clozapine will continue through the weekend at 12.5 mg. No other changes made.     Sleep and appetite are adequate   No behavior concerns.          Medications:     Current Facility-Administered Medications   Medication Dose Route Frequency Provider Last Rate Last Admin    cloZAPine (CLOZARIL) tablet 25 mg  25 mg Oral At Bedtime Ashish Meek APRN CNP        divalproex sodium extended-release (DEPAKOTE ER) 24 hr tablet 750 mg  750 mg Oral At Bedtime Ashish Meek APRN CNP   750 mg at 01/02/25 1954    fluPHENAZine (PROLIXIN) tablet 10 mg  10 mg Oral QPM Ashish Meek APRN CNP   10 mg at 01/02/25 1954    lurasidone (LATUDA) tablet 80 mg  80 mg Oral Daily with lunch Ashish Meek APRN CNP   80 mg at 01/02/25 1246    methylfolate (DEPLIN) tablet 15 mg  15 mg Oral Daily Ashish Meek APRN CNP   15 mg at 01/02/25 1955    sennosides (SENOKOT) tablet 1 tablet  1 tablet Oral BID Ashish Meek APRN CNP   1 " tablet at 01/03/25 0733          Allergies:     Allergies   Allergen Reactions    Clozapine      Syncope per Pt.     Seasonal Allergies     Seroquel [Quetiapine]      Fainting and slowed breathing           Labs:     Recent Results (from the past 24 hours)   EKG 12-lead, complete    Collection Time: 01/02/25 11:19 AM   Result Value Ref Range    Systolic Blood Pressure  mmHg    Diastolic Blood Pressure  mmHg    Ventricular Rate 78 BPM    Atrial Rate 78 BPM    VT Interval 128 ms    QRS Duration 90 ms     ms    QTc 389 ms    P Axis 26 degrees    R AXIS 94 degrees    T Axis 64 degrees    Interpretation ECG       Sinus rhythm  Rightward axis  Borderline ECG  When compared with ECG of 20-Dec-2023 15:36,  No significant change was found            Psychiatric Examination:     /70 (BP Location: Left arm, Patient Position: Sitting, Cuff Size: Adult Regular)   Pulse 94   Temp 99  F (37.2  C) (Oral)   Resp 18   Wt 65 kg (143 lb 3.2 oz)   SpO2 99%   BMI 19.97 kg/m    Weight is 143 lbs 3.2 oz  Body mass index is 19.97 kg/m .  Orthostatic Vitals       None              Appearance:  awake, alert, adequately groomed, and dressed in hospital scrubs  Attitude:  cooperative and somewhat guarded  Eye Contact:  fair  Mood:   better  Affect:  restricted range  Speech:  clear, coherent  Psychomotor Behavior:  no evidence of tardive dyskinesia, dystonia, or tics and fidgeting  Thought Process:  illogical and paranoid  Associations:  no loose associations  Thought Content:  no evidence of suicidal ideation or homicidal ideation, no auditory hallucinations present, and no visual hallucinations present. Paranoia remians  Insight:  fair  Judgment:  fair  Oriented to:  time, person, and place  Attention Span and Concentration:  intact  Recent and Remote Memory:  fair       Clinical Global Impressions  First:     Most recent:            Precautions:     Behavioral Orders   Procedures    Assault precautions    Code 1 - Restrict  to Unit    Routine Programming     As clinically indicated    Status 15     Every 15 minutes.    Suicide precautions: Suicide Risk: LOW; Clinical rationale to override score: Other; Other: Hx of suicide attempts     Patients on Suicide Precautions should have a Combination Diet ordered that includes a Diet selection(s) AND a Behavioral Tray selection for Safe Tray - with utensils, or Safe Tray - NO utensils       Order Specific Question:   Suicide Risk     Answer:   LOW     Order Specific Question:   Clinical rationale to override score:     Answer:   Other     Order Specific Question:   Other:     Answer:   Hx of suicide attempts          DIagnoses:   Schizoaffective disorder bipolar type   Generalized anxiety disorder  Substance use (historical)        Clinically Significant Risk Factors         Plan:   Legal: MI commitment  with [facility] through [facility].        Medication management:  Initiate clozapine 12.5 mg daily for 1 day and increase slowly  Prolixin 10 mg tablet every evening  Depakote 750 ER at bedtime for aggression and mood stabilization, VPA pending.  Decrease Latuda to 80 mg daily with lunch and taper and discontinue over next week  Senna 1 tab BID   Prns: hydroxyzine for anxiety, melatonin for sleep    Medical: CBC with diff, EKG, troponin, CRP inflammation ordered and unremarkable for clozapine initiation.     Behavioral/psychology/social:   Encouraged patient to attend therapeutic hospital programming as tolerated.   Precautions: assault    Disposition:   Reason for continued hospitalization: Patient is undergoing medications adjustments and is not safe for discharge.   Provider consulted with CTC regarding discharge planning.   Discharge medications: nor ordered   Discharge plan: Home with ACT team

## 2025-01-03 NOTE — PROGRESS NOTES
Problem: Sleep Disturbance  Goal: Adequate Sleep/Rest  Outcome: Progressing   Goal Outcome Evaluation:        Pt appeared to have slept for 7 hours.  Respirations are even and unlabored.  No medical/safety/behavioral concerns this shift.  No prn administered.  Pt remain on assault and suicide precautions.

## 2025-01-04 PROCEDURE — 250N000013 HC RX MED GY IP 250 OP 250 PS 637: Performed by: PSYCHIATRY & NEUROLOGY

## 2025-01-04 PROCEDURE — 250N000013 HC RX MED GY IP 250 OP 250 PS 637: Performed by: CLINICAL NURSE SPECIALIST

## 2025-01-04 PROCEDURE — 250N000013 HC RX MED GY IP 250 OP 250 PS 637: Performed by: REGISTERED NURSE

## 2025-01-04 PROCEDURE — 128N000002 HC R&B CD/MH ADOLESCENT

## 2025-01-04 RX ADMIN — FLUPHENAZINE HYDROCHLORIDE 10 MG: 10 TABLET, FILM COATED ORAL at 19:39

## 2025-01-04 RX ADMIN — NICOTINE POLACRILEX 4 MG: 4 GUM, CHEWING BUCCAL at 14:40

## 2025-01-04 RX ADMIN — NICOTINE POLACRILEX 4 MG: 4 GUM, CHEWING BUCCAL at 09:12

## 2025-01-04 RX ADMIN — Medication 15 MG: at 19:40

## 2025-01-04 RX ADMIN — SENNOSIDES 1 TABLET: 8.6 TABLET, FILM COATED ORAL at 19:39

## 2025-01-04 RX ADMIN — NICOTINE POLACRILEX 4 MG: 4 GUM, CHEWING BUCCAL at 08:02

## 2025-01-04 RX ADMIN — NICOTINE POLACRILEX 4 MG: 4 GUM, CHEWING BUCCAL at 17:48

## 2025-01-04 RX ADMIN — LURASIDONE HYDROCHLORIDE 80 MG: 40 TABLET, FILM COATED ORAL at 11:29

## 2025-01-04 RX ADMIN — Medication 12.5 MG: at 19:39

## 2025-01-04 RX ADMIN — NICOTINE POLACRILEX 4 MG: 4 GUM, CHEWING BUCCAL at 10:33

## 2025-01-04 RX ADMIN — NICOTINE POLACRILEX 4 MG: 4 GUM, CHEWING BUCCAL at 11:29

## 2025-01-04 RX ADMIN — NICOTINE POLACRILEX 4 MG: 4 GUM, CHEWING BUCCAL at 13:27

## 2025-01-04 RX ADMIN — NICOTINE POLACRILEX 4 MG: 4 GUM, CHEWING BUCCAL at 19:43

## 2025-01-04 RX ADMIN — NICOTINE POLACRILEX 4 MG: 4 GUM, CHEWING BUCCAL at 18:44

## 2025-01-04 RX ADMIN — NICOTINE POLACRILEX 4 MG: 4 GUM, CHEWING BUCCAL at 15:40

## 2025-01-04 RX ADMIN — SENNOSIDES 1 TABLET: 8.6 TABLET, FILM COATED ORAL at 08:02

## 2025-01-04 RX ADMIN — NICOTINE POLACRILEX 4 MG: 4 GUM, CHEWING BUCCAL at 06:55

## 2025-01-04 RX ADMIN — DIVALPROEX SODIUM 750 MG: 500 TABLET, FILM COATED, EXTENDED RELEASE ORAL at 19:39

## 2025-01-04 RX ADMIN — NICOTINE POLACRILEX 4 MG: 4 GUM, CHEWING BUCCAL at 16:40

## 2025-01-04 NOTE — PLAN OF CARE
Problem: Sleep Disturbance  Goal: Adequate Sleep/Rest  Outcome: Progressing   Goal Outcome Evaluation:    Patient slept for 6.25 hours last night. Patient showed no signs of respiratory distress or discomfort. No indication of pain. Breathing noted even and unlabored.  afety checks done per protocol. No occurrences noted. No behavior or safety concerns at  this time. Will continue to monitor and provide support.

## 2025-01-04 NOTE — PLAN OF CARE
"  Problem: Psychotic Symptoms  Goal: Social and Therapeutic (Psychotic Symptoms)  Description: Pt will remain safe on the unit as evidenced by pt identify and utilize 3 coping skills, attend 50% of programming and timely inform staff if not feel safe and/or have difficulty managing emotions or disturbing thoughts   Outcome: Not Progressing  Flowsheets (Taken 1/4/2025 1602)  Psychotic Symptoms Assessed: all  Psychotic Symptoms Present:   poor insight   poor thought process   Goal Outcome Evaluation:  /88 (BP Location: Left arm, Patient Position: Sitting)   Pulse 96   Temp 98.3  F (36.8  C) (Oral)   Resp 18   Wt 65 kg (143 lb 3.2 oz)   SpO2 100%   BMI 19.97 kg/m        Pt was up in the hallway pacing isolative to  himself. Pt was c/o feeling like he was about to faint after taking his first  dose of Clozaril 12mg 2 days ago and yesterday. Pt said he has a HS of the same symptoms after taking Clozaril in the past. Pt also was c/o cold like symptoms and fever but his temp was 98.3 oral denied having any body aches \" I know it is the side effect of the Clozaril\" per provider note we are going to continue to monitor his orthostatic B/P before and after taking the medication .  His orthostatic B/P before giving him his bedtime medication was 129/81 p 99 110/78 p 117 encouraged him to stop pacing  and to drink more water.and changed  his room to 606 so he can be close to the nurse station and applied seizure precaution mat . Last BM was today per pt. His ortho B/P 120/73 sitting and 117/79 standing after 60mn taking his bedtime medication. Pt denied having dizziness, lightheaded and fainting symptoms during this shift.                    "

## 2025-01-04 NOTE — PLAN OF CARE
"  Problem: Sleep Disturbance  Goal: Adequate Sleep/Rest  Outcome: Progressing   Goal Outcome Evaluation:    Patient stated: \"I feel fine today\". Denied suicidal thoughts or wishing to be dead. Denied depression and anxiety. Denied HI, hallucinations, or delusions.     Behavior was controlled, patient followed directions and communicated needs well. Spent the evening out on the unit, pacing the gould.     Mood was calm. Affect was blunted. Memory is intact. patient kept self clean and well groomed.     Goal for the shift: to participate in groups.   Concerns: none verbalized this shift. No questions for staff.    Patient denied pain and all other physical issues.     Took medications as prescribed. Denied side effects, none assessed by staff at this time.     PRNs: None   "

## 2025-01-04 NOTE — PLAN OF CARE
Problem: Psychotic Symptoms  Goal: Psychotic Symptoms  Description: Signs and symptoms of listed problems will be absent or manageable.  Outcome: Progressing     Problem: Skin Injury Risk Increased  Goal: Skin Health and Integrity  Intervention: Plan: Nurse Driven Intervention: Moisture Management  Recent Flowsheet Documentation  Taken 1/4/2025 0900 by Jamarcus Mcclain RN  Moisture Interventions: Encourage regular toileting   Goal Outcome Evaluation:         Patient had flat affect. Mood was calm. Was mostly isolative. Paced most often on the hallway. Denied pain, anxiety, depression, covid 19 symptoms, SI/HI/SIB/AH/VH, and contracted for safety. Was given Nicotine gum 4 mg x 5 times and scheduled medications. Had good appetite. Will continue to monitor patient.

## 2025-01-05 LAB — VALPROATE SERPL-MCNC: 43.5 UG/ML

## 2025-01-05 PROCEDURE — 80164 ASSAY DIPROPYLACETIC ACD TOT: CPT | Performed by: REGISTERED NURSE

## 2025-01-05 PROCEDURE — 250N000013 HC RX MED GY IP 250 OP 250 PS 637: Performed by: CLINICAL NURSE SPECIALIST

## 2025-01-05 PROCEDURE — 250N000013 HC RX MED GY IP 250 OP 250 PS 637: Performed by: REGISTERED NURSE

## 2025-01-05 PROCEDURE — 250N000013 HC RX MED GY IP 250 OP 250 PS 637: Performed by: PSYCHIATRY & NEUROLOGY

## 2025-01-05 PROCEDURE — 36415 COLL VENOUS BLD VENIPUNCTURE: CPT | Performed by: REGISTERED NURSE

## 2025-01-05 PROCEDURE — 128N000002 HC R&B CD/MH ADOLESCENT

## 2025-01-05 RX ADMIN — FLUPHENAZINE HYDROCHLORIDE 10 MG: 10 TABLET, FILM COATED ORAL at 19:11

## 2025-01-05 RX ADMIN — SENNOSIDES 1 TABLET: 8.6 TABLET, FILM COATED ORAL at 08:32

## 2025-01-05 RX ADMIN — NICOTINE POLACRILEX 4 MG: 4 GUM, CHEWING BUCCAL at 10:51

## 2025-01-05 RX ADMIN — NICOTINE POLACRILEX 4 MG: 4 GUM, CHEWING BUCCAL at 17:48

## 2025-01-05 RX ADMIN — DIVALPROEX SODIUM 750 MG: 500 TABLET, FILM COATED, EXTENDED RELEASE ORAL at 19:11

## 2025-01-05 RX ADMIN — NICOTINE POLACRILEX 4 MG: 4 GUM, CHEWING BUCCAL at 15:39

## 2025-01-05 RX ADMIN — LURASIDONE HYDROCHLORIDE 80 MG: 40 TABLET, FILM COATED ORAL at 12:06

## 2025-01-05 RX ADMIN — NICOTINE POLACRILEX 4 MG: 4 GUM, CHEWING BUCCAL at 07:30

## 2025-01-05 RX ADMIN — NICOTINE POLACRILEX 4 MG: 4 GUM, CHEWING BUCCAL at 08:32

## 2025-01-05 RX ADMIN — NICOTINE POLACRILEX 4 MG: 4 GUM, CHEWING BUCCAL at 14:32

## 2025-01-05 RX ADMIN — NICOTINE POLACRILEX 4 MG: 4 GUM, CHEWING BUCCAL at 20:26

## 2025-01-05 RX ADMIN — Medication 15 MG: at 19:12

## 2025-01-05 RX ADMIN — NICOTINE POLACRILEX 4 MG: 4 GUM, CHEWING BUCCAL at 13:25

## 2025-01-05 RX ADMIN — NICOTINE POLACRILEX 4 MG: 4 GUM, CHEWING BUCCAL at 19:15

## 2025-01-05 RX ADMIN — Medication 12.5 MG: at 19:11

## 2025-01-05 RX ADMIN — NICOTINE POLACRILEX 4 MG: 4 GUM, CHEWING BUCCAL at 16:39

## 2025-01-05 RX ADMIN — NICOTINE POLACRILEX 4 MG: 4 GUM, CHEWING BUCCAL at 12:06

## 2025-01-05 RX ADMIN — NICOTINE POLACRILEX 4 MG: 4 GUM, CHEWING BUCCAL at 09:40

## 2025-01-05 NOTE — PLAN OF CARE
Problem: Sleep Disturbance  Goal: Adequate Sleep/Rest  Outcome: Progressing   Goal Outcome Evaluation:    Patient slept 7 hours last night. Breathing observed even and unlabored. No respiratory distress noted.  No indication of pain or discomfort.  Safety precautions implemented with no occurrences.  No behavior or safety concerns at this time. Will continue to monitor and provide support.

## 2025-01-06 PROCEDURE — 250N000013 HC RX MED GY IP 250 OP 250 PS 637: Performed by: REGISTERED NURSE

## 2025-01-06 PROCEDURE — 128N000002 HC R&B CD/MH ADOLESCENT

## 2025-01-06 PROCEDURE — 99232 SBSQ HOSP IP/OBS MODERATE 35: CPT | Performed by: REGISTERED NURSE

## 2025-01-06 PROCEDURE — 250N000013 HC RX MED GY IP 250 OP 250 PS 637: Performed by: PSYCHIATRY & NEUROLOGY

## 2025-01-06 RX ORDER — DIVALPROEX SODIUM 500 MG/1
1000 TABLET, FILM COATED, EXTENDED RELEASE ORAL AT BEDTIME
Status: DISCONTINUED | OUTPATIENT
Start: 2025-01-06 | End: 2025-01-09 | Stop reason: HOSPADM

## 2025-01-06 RX ORDER — CLOZAPINE 25 MG/1
25 TABLET ORAL AT BEDTIME
Status: DISCONTINUED | OUTPATIENT
Start: 2025-01-06 | End: 2025-01-07

## 2025-01-06 RX ORDER — LURASIDONE HYDROCHLORIDE 40 MG/1
40 TABLET, FILM COATED ORAL
Status: DISCONTINUED | OUTPATIENT
Start: 2025-01-06 | End: 2025-01-07

## 2025-01-06 RX ADMIN — DIVALPROEX SODIUM 1000 MG: 500 TABLET, FILM COATED, EXTENDED RELEASE ORAL at 19:14

## 2025-01-06 RX ADMIN — NICOTINE POLACRILEX 4 MG: 4 GUM, CHEWING BUCCAL at 15:37

## 2025-01-06 RX ADMIN — FLUPHENAZINE HYDROCHLORIDE 10 MG: 10 TABLET, FILM COATED ORAL at 19:14

## 2025-01-06 RX ADMIN — NICOTINE POLACRILEX 4 MG: 4 GUM, CHEWING BUCCAL at 07:33

## 2025-01-06 RX ADMIN — NICOTINE POLACRILEX 4 MG: 4 GUM, CHEWING BUCCAL at 16:34

## 2025-01-06 RX ADMIN — NICOTINE POLACRILEX 4 MG: 4 GUM, CHEWING BUCCAL at 13:11

## 2025-01-06 RX ADMIN — NICOTINE POLACRILEX 4 MG: 4 GUM, CHEWING BUCCAL at 09:51

## 2025-01-06 RX ADMIN — NICOTINE POLACRILEX 4 MG: 4 GUM, CHEWING BUCCAL at 08:50

## 2025-01-06 RX ADMIN — NICOTINE POLACRILEX 4 MG: 4 GUM, CHEWING BUCCAL at 19:50

## 2025-01-06 RX ADMIN — CLOZAPINE 25 MG: 25 TABLET ORAL at 19:14

## 2025-01-06 RX ADMIN — NICOTINE POLACRILEX 4 MG: 4 GUM, CHEWING BUCCAL at 17:39

## 2025-01-06 RX ADMIN — NICOTINE POLACRILEX 4 MG: 4 GUM, CHEWING BUCCAL at 10:57

## 2025-01-06 RX ADMIN — LURASIDONE HYDROCHLORIDE 40 MG: 40 TABLET, FILM COATED ORAL at 12:02

## 2025-01-06 RX ADMIN — NICOTINE POLACRILEX 4 MG: 4 GUM, CHEWING BUCCAL at 14:13

## 2025-01-06 RX ADMIN — NICOTINE POLACRILEX 4 MG: 4 GUM, CHEWING BUCCAL at 18:48

## 2025-01-06 RX ADMIN — NICOTINE POLACRILEX 4 MG: 4 GUM, CHEWING BUCCAL at 12:03

## 2025-01-06 RX ADMIN — Medication 15 MG: at 19:13

## 2025-01-06 ASSESSMENT — ACTIVITIES OF DAILY LIVING (ADL)
ADLS_ACUITY_SCORE: 26
ORAL_HYGIENE: INDEPENDENT
ADLS_ACUITY_SCORE: 26
DRESS: SCRUBS (BEHAVIORAL HEALTH)
ADLS_ACUITY_SCORE: 26
HYGIENE/GROOMING: INDEPENDENT
ADLS_ACUITY_SCORE: 26

## 2025-01-06 NOTE — PLAN OF CARE
Problem: Behavioral Disturbance  Goal: Behavioral Disturbance  Description: Pt will remain safe on the unit as evidenced by pt identify and utilize 3 coping skills, attend 50% of programming and timely inform staff if not feel safe and/or have difficulty managing emotions or disturbing thoughts   Outcome: Progressing   Goal Outcome Evaluation:    Patient woke up this morning at 07:30 am and requested for Nicotine gum. Ate 100% of breakfast with adequate fluid intake. Patient presents with a flat affect. Denies anxiety, depression, AH/AV, SI/HI/SIB and pain. Patient refused scheduled Senna. Denies any bowel issues. Last BM was 1/5/25. Patient is cooperative and paces the hallway frequently. Patient requests for Nicotine gum at the top of the hour. No behavior or safety concerns at this time. Will continue monitoring.    14:10    Patient  continues to pace the hallway with frequent request of Nicotine gum every hour x 5 as of now. Decline to attend groups. Behavior is well controlled. Ate 100% of lunch. No pain or discomfort at this time. Will continue to monitor.

## 2025-01-06 NOTE — PROGRESS NOTES
01/06/25 1117   Individualization/Patient Specific Goals   Patient Vulnerabilities history of unsuccessful treatment;substance abuse/addiction;poor impulse control   Interprofessional Rounds   Summary There was discussion about medications and discharge timeline   Participants advanced practice nurse;nursing;CTC   Behavioral Team Discussion   Participants Ashish MARKS; Zac Garza RN; Carolyn Barrios CTC   Progress Slight improvement   Anticipated length of stay 3 to 5 days   Continued Stay Criteria/Rationale Symptom stabilization, medication management, care coordination   Medical/Physical See H&P   Precautions See below   Plan Stablize on medications and discharge home with ACT Team   Rationale for change in precautions or plan Pt's ACT Team feel that it would be best for pt to discharge home with on clozorial   Safety Plan Completed prior to discharge   Anticipated Discharge Disposition home with family     PRECAUTIONS AND SAFETY    Behavioral Orders   Procedures    Assault precautions    Code 1 - Restrict to Unit    Routine Programming     As clinically indicated    Status 15     Every 15 minutes.    Suicide precautions: Suicide Risk: LOW; Clinical rationale to override score: Other; Other: Hx of suicide attempts     Patients on Suicide Precautions should have a Combination Diet ordered that includes a Diet selection(s) AND a Behavioral Tray selection for Safe Tray - with utensils, or Safe Tray - NO utensils       Order Specific Question:   Suicide Risk     Answer:   LOW     Order Specific Question:   Clinical rationale to override score:     Answer:   Other     Order Specific Question:   Other:     Answer:   Hx of suicide attempts       Safety  Safety WDL: WDL  Patient Location: patient room, own  Observed Behavior: calm  Observed Behavior (Comment): quiet/napping  Safety Measures: safety rounds completed  Diversional Activity: structured exercise  Suicidality: Status 15  Assault: status 15

## 2025-01-06 NOTE — PLAN OF CARE
"  Problem: Psychotic Symptoms  Goal: Psychotic Symptoms  Description: Signs and symptoms of listed problems will be absent or manageable.  Outcome: Not Progressing  Flowsheets (Taken 1/5/2025 2055)  Psychotic Symptoms Assessed: all  Psychotic Symptoms Present:   poor  insight   poor thought process   Goal Outcome Evaluation:  /74 (BP Location: Right arm)   Pulse 105   Temp 98  F (36.7  C) (Temporal)   Resp 18   Wt 64.8 kg (142 lb 14.4 oz)   SpO2 96%   BMI 19.93 kg/m        Pt was visible in the milieu pacing in the hallway isolative to himself. He was calm, cooperative and medication compliant except senna. Denied having pain discomfort and medication side effects. Pt was c/o feeling lightheaded and dizzy during ambulation x 2 per pt orthostatic B/P was 120/72 sitting and 115/73 standing before giving bedtime medication. Pt said \" I would like to increase the clozapine dose, I want to get discharged soon\"  encouraged him to talk to his provider. Last BM was today per pt. He had flat blunted affect and was guarded. Denied all MHS.                    "

## 2025-01-06 NOTE — PROGRESS NOTES
"[facility],  Psychiatric Progress Note        Interim History:   The patient's care was discussed with the treatment team and chart notes were reviewed.     Today during the interview with the treatment team staff report patient has been walking in halls. Behavior has been appropriate. He received no prns over the weekend. Staff reported he slept 7 hours overnight. He has been medication complaint.     Today upon assessment patient reports his mood has been \"good\". He is looking forward to discharging. He states he did feel side effect of being dizzy over the weekend with clozapine dose but he would like to increase dose today to help move closer to discharge date. He reports he has been thinking about going home and denies other hallucination or paranoia. He reports his parents are going to remove garden bed and not leave knives out so he will feel safer at home. He reports he had some agitation when his father and mother visited over the weekend. He reports they have differing opinions of the events that happen at home. He still has some delusions about his parents. He denies thoughts to harm himself or others. He states he does not have anxiety. He is not internally preoccupied. Provider discussed CBC on Thursday and will call parents tomorrow about discharge panning and current progress. He has had regular bowel movements.     He reports appetite and sleep stable.     Provider called Dr. Cash (12/31) and he reported he has persistent treatment resistant persecutory delusions of his parents trying to kill him or agitate him. He has tried fluphenazine and latuda combination and was not effective.  Provider reported he would benefit from a re-trial of clozapine as he has not had symptom reduction with 2 other antipsychotics trails.     The 10 point Review of Systems is negative other than noted in the HPI         DIagnoses:     Schizoaffective disorder bipolar type "   Generalized anxiety disorder  Substance use (historical)    Clinically Significant Risk Factors                                  # Financial/Environmental Concerns:                Plan:   Legal: Full commitment     Medications:  Increase clozapine 25 mg daily as increase slowly due to side effects of syncope and dizziness per history  Prolixin 10 mg tablet every evening  Increase Depakote 1000 ER at bedtime for aggression and mood stabilization  Decrease Latuda to 80 mg daily with lunch and taper and discontinue over next week  Senna 1 tab BID   Prns: hydroxyzine for anxiety, melatonin for sleep,    Routine labs and clozapine labs Unremarkable. UTOX negative.     Attestation:  Patient has been seen and evaluated by Ashish knight APRN CNP  The patient was counseled on  nature of illness and treatment plan/options  Care was coordinated with  unit RN  Total amount of time: >35 minutes  Coordination of care/counseling: 10 minutes      Disposition  Reason for ongoing hospitalization: Pt is paranoid, he has history of aggression at home and is at risk for safety and other safety.  Discharge date: TBD  Discharge place: home with [facility]         Medications:     Current Facility-Administered Medications   Medication Dose Route Frequency Provider Last Rate Last Admin    acetaminophen (TYLENOL) tablet 650 mg  650 mg Oral Q4H PRN Fabiano Pepper MD        alum & mag hydroxide-simethicone (MAALOX) suspension 30 mL  30 mL Oral Q4H PRN Fabiano Pepper MD        cloZAPine (CLOZARIL) tablet 25 mg  25 mg Oral At Bedtime Ashish Meek APRN CNP        divalproex sodium extended-release (DEPAKOTE ER) 24 hr tablet 1,000 mg  1,000 mg Oral At Bedtime Ashish Meek APRN CNP        fluPHENAZine (PROLIXIN) tablet 10 mg  10 mg Oral QPM Ashish Meek APRN CNP   10 mg at 01/05/25 1911    hydrOXYzine HCl (ATARAX) tablet 25 mg  25 mg Oral Q4H PRN Fabiano Pepper MD        lurasidone (LATUDA) tablet 40 mg  40 mg  Oral Daily with lunch Ashish Meek APRN CNP   40 mg at 01/06/25 1202    melatonin tablet 3 mg  3 mg Oral At Bedtime PRN Fabiano Pepper MD        methylfolate (DEPLIN) tablet 15 mg  15 mg Oral Daily Ashish Meek APRN CNP   15 mg at 01/05/25 1912    nicotine polacrilex (NICORETTE) gum 4 mg  4 mg Buccal Q1H PRN Fabiano Pepper MD   4 mg at 01/06/25 1311    OLANZapine (zyPREXA) tablet 10 mg  10 mg Oral TID PRN Fabiano Pepper MD        Or    OLANZapine (zyPREXA) injection 10 mg  10 mg Intramuscular TID PRN Fabiano Pepper MD        polyethylene glycol (MIRALAX) Packet 17 g  17 g Oral Daily PRN Fabiano Pepper MD        sennosides (SENOKOT) tablet 1 tablet  1 tablet Oral BID Ashish Meek APRN CNP   1 tablet at 01/05/25 0832             Allergies:     Allergies   Allergen Reactions    Clozapine      Syncope per Pt.     Seasonal Allergies     Seroquel [Quetiapine]      Fainting and slowed breathing             Psychiatric Examination:   /83 (BP Location: Left arm, Patient Position: Sitting, Cuff Size: Adult Regular)   Pulse 91   Temp 97.9  F (36.6  C) (Temporal)   Resp 18   Wt 64.8 kg (142 lb 14.4 oz)   SpO2 98%   BMI 19.93 kg/m    Weight is 142 lbs 14.4 oz  Body mass index is 19.93 kg/m .    Appearance:  awake, alert, adequately groomed, and dressed in hospital scrubs  Attitude:  cooperative and somewhat guarded  Eye Contact:  fair  Mood:   better  Affect:  restricted range, smiles spontaneously  Speech:  clear, coherent  Psychomotor Behavior:  no evidence of tardive dyskinesia, dystonia, or tics and fidgeting  Thought Process:  linear and goal oriented. Some paranoia about parents  Associations:  no loose associations  Thought Content:  no evidence of suicidal ideation or homicidal ideation, no auditory hallucinations present, and no visual hallucinations present. Paranoia remains   Insight:  fair  Judgment:  fair  Oriented to:  time, person, and place  Attention Span and  "Concentration:  intact  Recent and Remote Memory:  fair           Labs:   No results found for: \"NTBNPI\", \"NTBNP\"  Lab Results   Component Value Date    WBC 8.3 01/02/2025    HGB 14.8 01/02/2025    HCT 44.2 01/02/2025    MCV 92 01/02/2025     01/02/2025     Lab Results   Component Value Date    GFRESTIMATED >90 12/21/2024    GFRESTBLACK >90 08/16/2019     Lab Results   Component Value Date     (H) 12/21/2024     Lab Results   Component Value Date    HGB 14.8 01/02/2025     Lab Results   Component Value Date    AST 20 12/21/2024    ALT 16 12/21/2024    ALKPHOS 46 12/21/2024    BILITOTAL 0.2 12/21/2024    ALY 29 07/01/2022     Lab Results   Component Value Date    TSH 1.86 11/03/2024     Lab Results   Component Value Date    WBC 8.3 01/02/2025     "

## 2025-01-06 NOTE — PLAN OF CARE
BEH IP Unit Acuity Rating Score (UARS)  Patient is given one point for every criteria they meet.    CRITERIA SCORING   On a 72 hour hold, court hold, committed, stay of commitment, or revocation. 1    Patient LOS on BEH unit exceeds 20 days. 0  LOS: 15   Patient under guardianship, 55+, otherwise medically complex, or under age 11. 0   Suicide ideation without relief of precipitating factors. 0   Current plan for suicide. 0   Current plan for homicide. 0   Imminent risk or actual attempt to seriously harm another without relief of factors precipitating the attempt. 0   Severe dysfunction in daily living (ex: complete neglect for self care, extreme disruption in vegetative function, extreme deterioration in social interactions). 1   Recent (last 7 days) or current physical aggression in the ED or on unit. 0   Restraints or seclusion episode in past 72 hours. 0   Recent (last 7 days) or current verbal aggression, agitation, yelling, etc., while in the ED or unit. 0   Active psychosis. 1   Need for constant or near constant redirection (from leaving, from others, etc).  0   Intrusive or disruptive behaviors. 0   Patient requires 3 or more hours of individualized nursing care per 8-hour shift (i.e. for ADLs, meds, therapeutic interventions). 0   TOTAL 3

## 2025-01-06 NOTE — PLAN OF CARE
Problem: Sleep Disturbance  Goal: Adequate Sleep/Rest  Outcome: Progressing   Goal Outcome Evaluation:    Patient slept for 7 hours during the night. Breathing even and unlabored. Patient did not verbalize any pain or discomfort. Safety checks completed per protocol. No occurrences noted. No behavior or safety concerns noted at this time. Staff continues to monitor.

## 2025-01-07 PROCEDURE — 250N000013 HC RX MED GY IP 250 OP 250 PS 637: Performed by: PSYCHIATRY & NEUROLOGY

## 2025-01-07 PROCEDURE — 250N000013 HC RX MED GY IP 250 OP 250 PS 637: Performed by: REGISTERED NURSE

## 2025-01-07 PROCEDURE — 99232 SBSQ HOSP IP/OBS MODERATE 35: CPT | Performed by: REGISTERED NURSE

## 2025-01-07 PROCEDURE — 128N000002 HC R&B CD/MH ADOLESCENT

## 2025-01-07 RX ORDER — LURASIDONE HYDROCHLORIDE 20 MG/1
20 TABLET, FILM COATED ORAL
Status: DISCONTINUED | OUTPATIENT
Start: 2025-01-07 | End: 2025-01-08

## 2025-01-07 RX ADMIN — NICOTINE POLACRILEX 4 MG: 4 GUM, CHEWING BUCCAL at 18:15

## 2025-01-07 RX ADMIN — NICOTINE POLACRILEX 4 MG: 4 GUM, CHEWING BUCCAL at 19:46

## 2025-01-07 RX ADMIN — NICOTINE POLACRILEX 4 MG: 4 GUM, CHEWING BUCCAL at 08:29

## 2025-01-07 RX ADMIN — NICOTINE POLACRILEX 4 MG: 4 GUM, CHEWING BUCCAL at 17:04

## 2025-01-07 RX ADMIN — NICOTINE POLACRILEX 4 MG: 4 GUM, CHEWING BUCCAL at 10:01

## 2025-01-07 RX ADMIN — DIVALPROEX SODIUM 1000 MG: 500 TABLET, FILM COATED, EXTENDED RELEASE ORAL at 19:02

## 2025-01-07 RX ADMIN — NICOTINE POLACRILEX 4 MG: 4 GUM, CHEWING BUCCAL at 12:17

## 2025-01-07 RX ADMIN — NICOTINE POLACRILEX 4 MG: 4 GUM, CHEWING BUCCAL at 11:09

## 2025-01-07 RX ADMIN — FLUPHENAZINE HYDROCHLORIDE 10 MG: 10 TABLET, FILM COATED ORAL at 19:02

## 2025-01-07 RX ADMIN — LURASIDONE HYDROCHLORIDE 20 MG: 20 TABLET, FILM COATED ORAL at 12:17

## 2025-01-07 RX ADMIN — Medication 15 MG: at 19:03

## 2025-01-07 RX ADMIN — NICOTINE POLACRILEX 4 MG: 4 GUM, CHEWING BUCCAL at 07:11

## 2025-01-07 RX ADMIN — Medication 37.5 MG: at 19:02

## 2025-01-07 ASSESSMENT — ACTIVITIES OF DAILY LIVING (ADL)
ADLS_ACUITY_SCORE: 26

## 2025-01-07 NOTE — PLAN OF CARE
"  Problem: Psychotic Symptoms  Goal: Social and Therapeutic (Psychotic Symptoms)  Description: Pt will remain safe on the unit as evidenced by pt identify and utilize 3 coping skills, attend 50% of programming and timely inform staff if not feel safe and/or have difficulty managing emotions or disturbing thoughts   Outcome: Not Progressing  Flowsheets (Taken 1/6/2025 2064)  Psychotic Symptoms Assessed: all  Psychotic Symptoms Present:   poor insight   poor thought process   Goal Outcome Evaluation:  /80 (BP Location: Left arm, Patient Position: Sitting, Cuff Size: Adult Regular)   Pulse 100   Temp 97.7  F (36.5  C) (Temporal)   Resp 18   Wt 64.8 kg (142 lb 14.4 oz)   SpO2 99%   BMI 19.93 kg/m        Pt was out in the milieu pacing by himself. He was calm, cooperative and medication compliant. Pt had good appetite and fluid intake. He had flat blunted affect and was guarded. His mom called for update. Pt was bright insight and future oriented \" I will move out from my parents' house and continue my education\" pt started his 25mg of clozapine this evening and his ortho B/P was 108/80 sitting 115/76 standing                 "

## 2025-01-07 NOTE — PLAN OF CARE
Team Note Due:  Monday    Assessment/Intervention/Current Symptoms and Care Coordination  Chart review and met with team, discussed pt progress, symptomology, and response to treatment.  Discussed the discharge plan and any potential impediments to discharge.    Provider put in request for DA. There was discussion about PHP vs IOP for pt.       CTC completed acuity rating.     Discharge Plan or Goal  Pending stabilization & development of a safe discharge plan.    Dispo Plan: Home with ACT Team  Discharge Date/ time: TBD  Transportation: TBD  AVS Completed: Yes [] No[x]  Provisional Discharge: Yes[x] No[]    Barriers to Discharge   Patient requires further psychiatric stabilization due to current symptomology         Referral Status      Legal Status  Fully Committed with [facility][1]: [place]   File Number: [id number]  Start and expiration date of commitment: 02/06/2024 to 02/06/2025    Contacts:  [phone] Dr. Prieto Araujo, Father [phone]  Rolanda, Mother [phone]   Name/Clinic: [facility]   Number: [phone]   Email: [email or web address]        Upcoming Meetings and Dates/Important Information and next steps:  CTC to have pt sign PD prior to discharge.

## 2025-01-07 NOTE — PLAN OF CARE
"  Problem: Psychotic Symptoms  Goal: Psychotic Symptoms  Description: Signs and symptoms of listed problems will be absent or manageable.  Outcome: Not Progressing   Goal Outcome Evaluation:       Pt continues to think that his \"parents are the reason\" he remains here.  He is otherwise pleasant and redirectable thru the day.  He paces and declines to talk at this time.  He is eating and ADL:'s are ok and vitals are WDL.  Will continue to assess.                 " 47.5
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
## 2025-01-07 NOTE — PLAN OF CARE
Goal Outcome Evaluation:  Problem: Sleep Disturbance  Goal: Adequate Sleep/Rest  1/7/2025 0614 by Gogo Taveras, RN  Outcome: Progressing  1/7/2025 0613 by Ggoo Taveras, RN  Outcome: Progressing    Patient slept for 6.75 hours last night. Breathing noted even and unlabored. No pain verbalized. Safety precautions implemented with no occurrences. No behaviors or safety issues noted. Staff continues to monitor.

## 2025-01-07 NOTE — PLAN OF CARE
BEH IP Unit Acuity Rating Score (UARS)  Patient is given one point for every criteria they meet.    CRITERIA SCORING   On a 72 hour hold, court hold, committed, stay of commitment, or revocation. 1    Patient LOS on BEH unit exceeds 20 days. 0  LOS: 16   Patient under guardianship, 55+, otherwise medically complex, or under age 11. 0   Suicide ideation without relief of precipitating factors. 0   Current plan for suicide. 0   Current plan for homicide. 0   Imminent risk or actual attempt to seriously harm another without relief of factors precipitating the attempt. 0   Severe dysfunction in daily living (ex: complete neglect for self care, extreme disruption in vegetative function, extreme deterioration in social interactions). 1   Recent (last 7 days) or current physical aggression in the ED or on unit. 0   Restraints or seclusion episode in past 72 hours. 0   Recent (last 7 days) or current verbal aggression, agitation, yelling, etc., while in the ED or unit. 0   Active psychosis. 1   Need for constant or near constant redirection (from leaving, from others, etc).  0   Intrusive or disruptive behaviors. 0   Patient requires 3 or more hours of individualized nursing care per 8-hour shift (i.e. for ADLs, meds, therapeutic interventions). 0   TOTAL 3

## 2025-01-08 ENCOUNTER — TELEPHONE (OUTPATIENT)
Dept: BEHAVIORAL HEALTH | Facility: CLINIC | Age: 25
End: 2025-01-08
Payer: COMMERCIAL

## 2025-01-08 ENCOUNTER — MEDICAL CORRESPONDENCE (OUTPATIENT)
Dept: HEALTH INFORMATION MANAGEMENT | Facility: CLINIC | Age: 25
End: 2025-01-08
Payer: COMMERCIAL

## 2025-01-08 PROCEDURE — 250N000013 HC RX MED GY IP 250 OP 250 PS 637: Performed by: REGISTERED NURSE

## 2025-01-08 PROCEDURE — 99232 SBSQ HOSP IP/OBS MODERATE 35: CPT | Performed by: REGISTERED NURSE

## 2025-01-08 PROCEDURE — 250N000013 HC RX MED GY IP 250 OP 250 PS 637: Performed by: PSYCHIATRY & NEUROLOGY

## 2025-01-08 PROCEDURE — 128N000002 HC R&B CD/MH ADOLESCENT

## 2025-01-08 RX ORDER — DIVALPROEX SODIUM 500 MG/1
1000 TABLET, FILM COATED, EXTENDED RELEASE ORAL AT BEDTIME
Qty: 60 TABLET | Refills: 0 | Status: ON HOLD | OUTPATIENT
Start: 2025-01-08

## 2025-01-08 RX ORDER — CLOZAPINE 50 MG/1
50 TABLET ORAL AT BEDTIME
Qty: 7 TABLET | Refills: 0 | Status: SHIPPED | OUTPATIENT
Start: 2025-01-08 | End: 2025-01-16

## 2025-01-08 RX ORDER — FLUPHENAZINE HYDROCHLORIDE 10 MG/1
10 TABLET ORAL EVERY EVENING
Qty: 30 TABLET | Refills: 0 | Status: ON HOLD | OUTPATIENT
Start: 2025-01-08

## 2025-01-08 RX ORDER — CLOZAPINE 50 MG/1
50 TABLET ORAL AT BEDTIME
Status: DISCONTINUED | OUTPATIENT
Start: 2025-01-08 | End: 2025-01-09 | Stop reason: HOSPADM

## 2025-01-08 RX ORDER — SENNOSIDES 8.6 MG
1 TABLET ORAL 2 TIMES DAILY
Qty: 60 TABLET | Refills: 0 | Status: ON HOLD | OUTPATIENT
Start: 2025-01-08

## 2025-01-08 RX ADMIN — NICOTINE POLACRILEX 4 MG: 4 GUM, CHEWING BUCCAL at 14:24

## 2025-01-08 RX ADMIN — NICOTINE POLACRILEX 4 MG: 4 GUM, CHEWING BUCCAL at 10:45

## 2025-01-08 RX ADMIN — Medication 15 MG: at 19:05

## 2025-01-08 RX ADMIN — NICOTINE POLACRILEX 4 MG: 4 GUM, CHEWING BUCCAL at 16:38

## 2025-01-08 RX ADMIN — NICOTINE POLACRILEX 4 MG: 4 GUM, CHEWING BUCCAL at 15:28

## 2025-01-08 RX ADMIN — CLOZAPINE 50 MG: 50 TABLET ORAL at 19:05

## 2025-01-08 RX ADMIN — FLUPHENAZINE HYDROCHLORIDE 10 MG: 10 TABLET, FILM COATED ORAL at 19:05

## 2025-01-08 RX ADMIN — NICOTINE POLACRILEX 4 MG: 4 GUM, CHEWING BUCCAL at 17:51

## 2025-01-08 RX ADMIN — NICOTINE POLACRILEX 4 MG: 4 GUM, CHEWING BUCCAL at 19:05

## 2025-01-08 RX ADMIN — DIVALPROEX SODIUM 1000 MG: 500 TABLET, FILM COATED, EXTENDED RELEASE ORAL at 19:05

## 2025-01-08 RX ADMIN — NICOTINE POLACRILEX 4 MG: 4 GUM, CHEWING BUCCAL at 09:28

## 2025-01-08 RX ADMIN — NICOTINE POLACRILEX 4 MG: 4 GUM, CHEWING BUCCAL at 13:22

## 2025-01-08 RX ADMIN — NICOTINE POLACRILEX 4 MG: 4 GUM, CHEWING BUCCAL at 12:19

## 2025-01-08 RX ADMIN — NICOTINE POLACRILEX 4 MG: 4 GUM, CHEWING BUCCAL at 07:24

## 2025-01-08 ASSESSMENT — ACTIVITIES OF DAILY LIVING (ADL)
ADLS_ACUITY_SCORE: 26

## 2025-01-08 ASSESSMENT — ANXIETY QUESTIONNAIRES
IF YOU CHECKED OFF ANY PROBLEMS ON THIS QUESTIONNAIRE, HOW DIFFICULT HAVE THESE PROBLEMS MADE IT FOR YOU TO DO YOUR WORK, TAKE CARE OF THINGS AT HOME, OR GET ALONG WITH OTHER PEOPLE: NOT DIFFICULT AT ALL
4. TROUBLE RELAXING: SEVERAL DAYS
1. FEELING NERVOUS, ANXIOUS, OR ON EDGE: NOT AT ALL
2. NOT BEING ABLE TO STOP OR CONTROL WORRYING: NOT AT ALL
5. BEING SO RESTLESS THAT IT IS HARD TO SIT STILL: NOT AT ALL
GAD7 TOTAL SCORE: 2
3. WORRYING TOO MUCH ABOUT DIFFERENT THINGS: NOT AT ALL
7. FEELING AFRAID AS IF SOMETHING AWFUL MIGHT HAPPEN: NOT AT ALL
6. BECOMING EASILY ANNOYED OR IRRITABLE: SEVERAL DAYS
GAD7 TOTAL SCORE: 2

## 2025-01-08 ASSESSMENT — PATIENT HEALTH QUESTIONNAIRE - PHQ9: SUM OF ALL RESPONSES TO PHQ QUESTIONS 1-9: 0

## 2025-01-08 NOTE — PLAN OF CARE
Ok for establishing appt.    Please schedule 30 min appt.  No more than 4 new pts per day, 2 in AM and 2 in PM.     Patient was asleep for 7hrs during the shift. The patient did not have any behavioral or medical concerns and remain on 15min checks. RN will continue to monitor.
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
## 2025-01-08 NOTE — PROGRESS NOTES
Pt has not attended scheduled occupational therapy sessions starting 1/06/25. Encourage attendance and participation.

## 2025-01-08 NOTE — CONSULTS
DA IP Consult Completed.  Recommendations: Referral to Referral to IOP/DT Program for Young Adults at [facility]

## 2025-01-08 NOTE — PROGRESS NOTES
LOCUS Worksheet     Name: Junior Fernández MRN: [id number]    : 2000      Gender:  male    PMI:  NA   Provider Name: [facility]   Provider NPI:  [id number]    Actual level of Care Provided:  Assessment and Referral     Service(s) receiving or referred to:  Referral to Referral to IOP/DT Program for Young Adults/Psychosis    Reason for Variance: Anxiety. Schizoaffective Disorder      Rating completed by: Sammy Eli PhD, LPCC/LADC      I. Risk of Harm:   2      Low Risk of Harm    II. Functional Status:   2      Mild Impairment    III. Co-Morbidity:   2      Minor Co-Morbidity    IV - A. Recovery Environment - Level of Stress:   3      Moderately Stress Environment    IV - B. Recovery Environment - Level of Support:   3      Limited Support in Environment    V. Treatment and Recovery History:   3      Moderate to Equivocal Response to Treatment and Recovery Management    VI. Engagement and Recovery Project:   2      Positive Engagement and Recovery       17 Composite Score    Level of Care Recommendation:   17 to 19       High Intensity Community Based Services

## 2025-01-08 NOTE — PLAN OF CARE
BEH IP Unit Acuity Rating Score (UARS)  Patient is given one point for every criteria they meet.    CRITERIA SCORING   On a 72 hour hold, court hold, committed, stay of commitment, or revocation. 1    Patient LOS on BEH unit exceeds 20 days. 0  LOS: 17   Patient under guardianship, 55+, otherwise medically complex, or under age 11. 0   Suicide ideation without relief of precipitating factors. 0   Current plan for suicide. 0   Current plan for homicide. 0   Imminent risk or actual attempt to seriously harm another without relief of factors precipitating the attempt. 0   Severe dysfunction in daily living (ex: complete neglect for self care, extreme disruption in vegetative function, extreme deterioration in social interactions). 1   Recent (last 7 days) or current physical aggression in the ED or on unit. 0   Restraints or seclusion episode in past 72 hours. 0   Recent (last 7 days) or current verbal aggression, agitation, yelling, etc., while in the ED or unit. 0   Active psychosis. 1   Need for constant or near constant redirection (from leaving, from others, etc).  0   Intrusive or disruptive behaviors. 0   Patient requires 3 or more hours of individualized nursing care per 8-hour shift (i.e. for ADLs, meds, therapeutic interventions). 0   TOTAL 3

## 2025-01-08 NOTE — PLAN OF CARE
Problem: Behavioral Disturbance  Goal: Behavioral Disturbance  Description: Pt will remain safe on the unit as evidenced by pt identify and utilize 3 coping skills, attend 50% of programming and timely inform staff if not feel safe and/or have difficulty managing emotions or disturbing thoughts   Outcome: Progressing   Goal Outcome Evaluation:       Pt has presented as pleasant thru the day and he is excited to possibly discharge on Thursday. He denies any mental health issues.  Vitals WDL  Eat/drinking well.  He paces the gould most of the day  He is quiet so we will continue to assess.

## 2025-01-08 NOTE — PLAN OF CARE
Problem: Psychotic Symptoms  Goal: Psychotic Symptoms  Description: Signs and symptoms of listed problems will be absent or manageable.  Outcome: Progressing     Problem: Behavioral Disturbance  Goal: Behavioral Disturbance  Description: Pt will remain safe on the unit as evidenced by pt identify and utilize 3 coping skills, attend 50% of programming and timely inform staff if not feel safe and/or have difficulty managing emotions or disturbing thoughts   Outcome: Progressing   Goal Outcome Evaluation:    Pt was a sleep at the star of the shift. He continues to pace the hallways with limited social interactions with peer and staff in the milieu. Pt was  pleasant and cooperative with staff. VS stable. Affect is flat. Pt reports okay appetite, ate about 100% of dinner and snacks    Pt checked in as doing good, and feeling rested. Pt currently denied pain, anxiety, depression, HI, SIB, visual and auditory hallucinations, and contracted for safety on the unit. Pt rated overall mood at calm.  Pt's goal for today was to eat dinner on time and be more present on the unit. Pt was medication compliant, denied pain, medication side effects and medical concerns. Pt requested and received PRN nicotine gum x3. No behavioral or safety concerns noted.

## 2025-01-08 NOTE — TELEPHONE ENCOUNTER
Summary of Patient Care Communication Handoff to Patient Navigator Coordinator    PATIENT'S NAME: Junior Fernández  MRN:   [id number]  :   2000    DATE OF SERVICE: 25    Referral Needed: Yes    Is the patient coming from an inpatient unit? Yes   What station is the patient on? 6A    Unit Phone Number [phone]    Name of CTC to Contact with Follow up: Carolyn    Is this referral a high priority (high priority is patient discharging within the next 24 hours and needs placement) no    What program is this referral for? Adult MH Referral: Referral to IOP/DT Program for Young Adults.

## 2025-01-08 NOTE — PROGRESS NOTES
[facility],  Psychiatric Progress Note        Interim History:   The patient's care was discussed with the treatment team and chart notes were reviewed.     Today during the interview with the treatment team staff report patient has been walking in halls. No behavior issues reported by staff. He has not received any prns in last 24 hours. Staff reported he slept 7 hours overnight. He has been medication complaint. He has not been attending groups.     Today upon assessment patient reports he feels safe to discharge tomorrow. Provider and patient dicussed safe planning for when he gets agitated at home. He continues to reports delusional thoughts about parents aggravating him at times. He reports a safe plan he intends to discuss with parents when these event occur. He denies thoughts or plan in mind to harm them or himself at this time. He denies psychosis symptoms and has been appropriate on the unit to staff and peers. He is guarded and lacks insight but has been cooperative and medication complaint. He completed DA assessment for IOP today. He denies dizziness or other side effects to medications. He denies other concerns today.      He reports appetite and sleep stable.     Provider called Dr. Cash he states he is willing to have patient continue titration outpatient of clozapine. Provider discussed care plan and patient reports of less dizziness on current slow titration. Provider called [facility] and set up monitoring and will update REMs tomorrow with recent lab values before discharge.     The 10 point Review of Systems is negative other than noted in the HPI         DIagnoses:     Schizoaffective disorder bipolar type   Generalized anxiety disorder  Substance use (historical)    Clinically Significant Risk Factors                                  # Financial/Environmental Concerns:                Plan:   Legal: Full commitment     Medications:  Increase clozapine  50 mg daily.   Prolixin 10 mg tablet every evening  Depakote 1000 ER at bedtime for aggression and mood stabilization  Discontinue Latuda  Senna 1 tab BID   Prns: hydroxyzine for anxiety, melatonin for sleep    Routine labs and clozapine initiation labs unremarkable. UTOX negative.   Labs in am 11/9 before discharge: Depakote and CBC.     DA consult completed for IOP today.     Attestation:  Patient has been seen and evaluated by Ashish kngiht APRN CNP  The patient was counseled on  nature of illness and treatment plan/options  Care was coordinated with  unit RN  Total amount of time: >35 minutes  Coordination of care/counseling: 10 minutes      Disposition  Discharge place: home with IOP/PHP Thursday 245pm  Discharge medications: ordered         Medications:     Current Facility-Administered Medications   Medication Dose Route Frequency Provider Last Rate Last Admin    acetaminophen (TYLENOL) tablet 650 mg  650 mg Oral Q4H PRN Fabiano Pepper MD        alum & mag hydroxide-simethicone (MAALOX) suspension 30 mL  30 mL Oral Q4H PRN Fabiano Pepper MD        cloZAPine (CLOZARIL) tablet 50 mg  50 mg Oral At Bedtime Ashish Meek APRN CNP        divalproex sodium extended-release (DEPAKOTE ER) 24 hr tablet 1,000 mg  1,000 mg Oral At Bedtime Ashish Meek APRN CNP   1,000 mg at 01/07/25 1902    fluPHENAZine (PROLIXIN) tablet 10 mg  10 mg Oral QPM Ashish Meek APRN CNP   10 mg at 01/07/25 1902    hydrOXYzine HCl (ATARAX) tablet 25 mg  25 mg Oral Q4H PRN Fabiano Pepper MD        melatonin tablet 3 mg  3 mg Oral At Bedtime PRN Fabiano Pepper MD        methylfolate (DEPLIN) tablet 15 mg  15 mg Oral Daily Ashish Meek APRN CNP   15 mg at 01/07/25 1903    nicotine polacrilex (NICORETTE) gum 4 mg  4 mg Buccal Q1H PRN Fabiano Pepper MD   4 mg at 01/08/25 1045    OLANZapine (zyPREXA) tablet 10 mg  10 mg Oral TID PRN Fabiano Pepper MD        Or    OLANZapine (zyPREXA)  "injection 10 mg  10 mg Intramuscular TID PRN Fabiano Pepper MD        polyethylene glycol (MIRALAX) Packet 17 g  17 g Oral Daily PRN Fabiano Pepper MD        sennosides (SENOKOT) tablet 1 tablet  1 tablet Oral BID Ashish Meek, APRDARYL CNP   1 tablet at 01/05/25 0832             Allergies:     Allergies   Allergen Reactions    Clozapine      Syncope per Pt.     Seasonal Allergies     Seroquel [Quetiapine]      Fainting and slowed breathing             Psychiatric Examination:   /78 (BP Location: Right arm, Patient Position: Sitting, Cuff Size: Adult Regular)   Pulse 79   Temp 97.1  F (36.2  C) (Temporal)   Resp 16   Wt 64.8 kg (142 lb 14.4 oz)   SpO2 98%   BMI 19.93 kg/m    Weight is 142 lbs 14.4 oz  Body mass index is 19.93 kg/m .    Appearance:  awake, alert, adequately groomed, and dressed in hospital scrubs  Attitude:  cooperative and somewhat guarded  Eye Contact:  fair  Mood:   better  Affect:  restricted range  Speech:  clear, coherent  Psychomotor Behavior:  no evidence of tardive dyskinesia, dystonia, or tics and fidgeting  Thought Process:  linear and goal oriented. Some paranoia about parents remains but he has safety plan in mind to help him stay in regulated or calm.   Associations:  no loose associations  Thought Content:  no evidence of suicidal ideation or homicidal ideation, no auditory hallucinations present, and no visual hallucinations present. Paranoia and delusions about parents.  Insight:  fair  Judgment:  fair  Oriented to:  time, person, and place  Attention Span and Concentration:  intact  Recent and Remote Memory:  fair           Labs:   No results found for: \"NTBNPI\", \"NTBNP\"  Lab Results   Component Value Date    WBC 8.3 01/02/2025    HGB 14.8 01/02/2025    HCT 44.2 01/02/2025    MCV 92 01/02/2025     01/02/2025     Lab Results   Component Value Date    GFRESTIMATED >90 12/21/2024    GFRESTBLACK >90 08/16/2019     Lab Results   Component Value Date    GLC " 100 (H) 12/21/2024     Lab Results   Component Value Date    HGB 14.8 01/02/2025     Lab Results   Component Value Date    AST 20 12/21/2024    ALT 16 12/21/2024    ALKPHOS 46 12/21/2024    BILITOTAL 0.2 12/21/2024    ALY 29 07/01/2022     Lab Results   Component Value Date    TSH 1.86 11/03/2024     Lab Results   Component Value Date    WBC 8.3 01/02/2025

## 2025-01-08 NOTE — PLAN OF CARE
Team Note Due:  Monday    Assessment/Intervention/Current Symptoms and Care Coordination  Chart review and met with team, discussed pt progress, symptomology, and response to treatment.  Discussed the discharge plan and any potential impediments to discharge.    CTC worked with Assessment  Center for pt to engage in DA.    CTC worked with [facility] Navigator to get pt on waitlist for young adult IOP. Navigator stated that someone from [facility] will reach out   CTC completed acuity rating.     CTC sent email to ACT Team CM providing update on discharge.     Discharge Plan or Goal  Pending stabilization & development of a safe discharge plan.    Dispo Plan: Home with ACT Team  Discharge Date/ time: 1/9 at 2:35pm  Transportation: parents to   AVS Completed: Yes [x] No[]  Provisional Discharge: Yes[x] No[]    Barriers to Discharge   Patient requires further psychiatric stabilization due to current symptomology         Referral Status   YA IOP: Waitlist    Legal Status  Fully Committed with [facility]     [facility]: [place]   File Number: [id number]  Start and expiration date of commitment: 02/06/2024 to 02/06/2025    Contacts:  [phone] Dr. Prieto Araujo, Father [phone]  Rolanda, Mother [phone]   Name/Clinic: [facility]   Number: [phone]   Email: [email or web address]        Upcoming Meetings and Dates/Important Information and next steps:  CTC to have pt sign PD prior to discharge.

## 2025-01-08 NOTE — TELEPHONE ENCOUNTER
**Patient Navigator Follow Up**    1/8/2025;    Patient is currently IP on Station 6A  Unit Phone Number [phone]   CTC: Carolyn Barrios      Referral for IOP-ADT;  Young Adult Track        *I am working with [facility] on referral  Patient is discharging tomorrow, 1/9.    [facility] wants patient added to the waitlist.    I added patient to the waitlist and program will follow up with him directly.    I emailed CTC program letter to attach to patient's AVS upon discharge.  I sent patient a copy through his [employer] as well.        Thank you,    Anne THOMAS  Patient Navigator

## 2025-01-09 VITALS
OXYGEN SATURATION: 100 % | RESPIRATION RATE: 16 BRPM | SYSTOLIC BLOOD PRESSURE: 129 MMHG | BODY MASS INDEX: 19.93 KG/M2 | DIASTOLIC BLOOD PRESSURE: 80 MMHG | TEMPERATURE: 97.8 F | HEART RATE: 97 BPM | WEIGHT: 142.9 LBS

## 2025-01-09 LAB
BASOPHILS # BLD AUTO: 0.1 10E3/UL (ref 0–0.2)
BASOPHILS NFR BLD AUTO: 1 %
EOSINOPHIL # BLD AUTO: 0.3 10E3/UL (ref 0–0.7)
EOSINOPHIL NFR BLD AUTO: 4 %
ERYTHROCYTE [DISTWIDTH] IN BLOOD BY AUTOMATED COUNT: 11.8 % (ref 10–15)
HCT VFR BLD AUTO: 46.5 % (ref 40–53)
HGB BLD-MCNC: 16.1 G/DL (ref 13.3–17.7)
IMM GRANULOCYTES # BLD: 0 10E3/UL
IMM GRANULOCYTES NFR BLD: 0 %
LYMPHOCYTES # BLD AUTO: 2.8 10E3/UL (ref 0.8–5.3)
LYMPHOCYTES NFR BLD AUTO: 50 %
MCH RBC QN AUTO: 31.6 PG (ref 26.5–33)
MCHC RBC AUTO-ENTMCNC: 34.6 G/DL (ref 31.5–36.5)
MCV RBC AUTO: 91 FL (ref 78–100)
MONOCYTES # BLD AUTO: 0.4 10E3/UL (ref 0–1.3)
MONOCYTES NFR BLD AUTO: 8 %
NEUTROPHILS # BLD AUTO: 2.1 10E3/UL (ref 1.6–8.3)
NEUTROPHILS NFR BLD AUTO: 37 %
NRBC # BLD AUTO: 0 10E3/UL
NRBC BLD AUTO-RTO: 0 /100
PLATELET # BLD AUTO: 289 10E3/UL (ref 150–450)
RBC # BLD AUTO: 5.1 10E6/UL (ref 4.4–5.9)
VALPROATE SERPL-MCNC: 52.6 UG/ML
WBC # BLD AUTO: 5.7 10E3/UL (ref 4–11)

## 2025-01-09 PROCEDURE — 99239 HOSP IP/OBS DSCHRG MGMT >30: CPT | Performed by: REGISTERED NURSE

## 2025-01-09 PROCEDURE — 250N000013 HC RX MED GY IP 250 OP 250 PS 637: Performed by: PSYCHIATRY & NEUROLOGY

## 2025-01-09 PROCEDURE — 36415 COLL VENOUS BLD VENIPUNCTURE: CPT | Performed by: PSYCHIATRY & NEUROLOGY

## 2025-01-09 PROCEDURE — 85014 HEMATOCRIT: CPT | Performed by: PSYCHIATRY & NEUROLOGY

## 2025-01-09 PROCEDURE — 80164 ASSAY DIPROPYLACETIC ACD TOT: CPT | Performed by: REGISTERED NURSE

## 2025-01-09 PROCEDURE — 85004 AUTOMATED DIFF WBC COUNT: CPT | Performed by: PSYCHIATRY & NEUROLOGY

## 2025-01-09 RX ADMIN — NICOTINE POLACRILEX 4 MG: 4 GUM, CHEWING BUCCAL at 14:32

## 2025-01-09 RX ADMIN — NICOTINE POLACRILEX 4 MG: 4 GUM, CHEWING BUCCAL at 07:56

## 2025-01-09 RX ADMIN — NICOTINE POLACRILEX 4 MG: 4 GUM, CHEWING BUCCAL at 11:31

## 2025-01-09 RX ADMIN — NICOTINE POLACRILEX 4 MG: 4 GUM, CHEWING BUCCAL at 10:23

## 2025-01-09 RX ADMIN — NICOTINE POLACRILEX 4 MG: 4 GUM, CHEWING BUCCAL at 09:20

## 2025-01-09 ASSESSMENT — ACTIVITIES OF DAILY LIVING (ADL)
ADLS_ACUITY_SCORE: 26
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
## 2025-01-09 NOTE — PLAN OF CARE
Problem: Sleep Disturbance  Goal: Adequate Sleep/Rest  Outcome: Progressing   Goal Outcome Evaluation:    Patient slept for 7 hours during the night. Breathing observed even and unlabored. No indication of pain or discomfort. Safety checks completed per protocol. No behavior or safety concerns noted at this time. Staff continues to monitor.

## 2025-01-09 NOTE — PLAN OF CARE
"Problem: Adult Behavioral Health Plan of Care  Goal: Patient-Specific Goal (Individualization)  Description: You can add care plan individualizations to a care plan. Examples of Individualization might be:  \"Parent requests to be called daily at 9am for status\", \"I have a hard time hearing out of my right ear\", or \"Do not touch me to wake me up as it startles  me\".  Outcome: Progressing     Problem: Psychotic Symptoms  Goal: Psychotic Symptoms  Description: Signs and symptoms of listed problems will be absent or manageable.  Outcome: Progressing     Problem: Behavioral Disturbance  Goal: Behavioral Disturbance  Description: Pt will remain safe on the unit as evidenced by pt identify and utilize 3 coping skills, attend 50% of programming and timely inform staff if not feel safe and/or have difficulty managing emotions or disturbing thoughts   Outcome: Progressing   Goal Outcome Evaluation:    Pt was visible active as he paced the hallway in the milieu. Pt was avoided social contact with peers. Pt shared how he was aware of his discharge order and looks forwards to leaving. Pt was alert and pleasant and cooperative with staff. VS stable. Affect was bright upon approach. Pt reports okay appetite, ate about 100% of dinner. Pt checked in as doing okay, pt denied pain, anxiety, depression, HI, SIB, visual and auditory hallucinations, and contracted for safety on the unit. Pt rated overall mood at calm.  Pt's goal for today was to walk and take meds on time. Pt was medication compliant, denied pain, medication side effects and medical concerns. Pt requested and received PRN nicotine gum 4mg x4 this shift. Pt took his evening medication and went to bed early.     "

## 2025-01-09 NOTE — PLAN OF CARE
BEH IP Unit Acuity Rating Score (UARS)  Patient is given one point for every criteria they meet.    CRITERIA SCORING   On a 72 hour hold, court hold, committed, stay of commitment, or revocation. 1    Patient LOS on BEH unit exceeds 20 days. 0  LOS: 18   Patient under guardianship, 55+, otherwise medically complex, or under age 11. 0   Suicide ideation without relief of precipitating factors. 0   Current plan for suicide. 0   Current plan for homicide. 0   Imminent risk or actual attempt to seriously harm another without relief of factors precipitating the attempt. 0   Severe dysfunction in daily living (ex: complete neglect for self care, extreme disruption in vegetative function, extreme deterioration in social interactions). 1   Recent (last 7 days) or current physical aggression in the ED or on unit. 0   Restraints or seclusion episode in past 72 hours. 0   Recent (last 7 days) or current verbal aggression, agitation, yelling, etc., while in the ED or unit. 0   Active psychosis. 1   Need for constant or near constant redirection (from leaving, from others, etc).  0   Intrusive or disruptive behaviors. 0   Patient requires 3 or more hours of individualized nursing care per 8-hour shift (i.e. for ADLs, meds, therapeutic interventions). 0   TOTAL 3

## 2025-01-09 NOTE — PLAN OF CARE
Team Note Due:  Monday    Assessment/Intervention/Current Symptoms and Care Coordination  Chart review and met with team, discussed pt progress, symptomology, and response to treatment.  Discussed the discharge plan and any potential impediments to discharge.    CTC met with pt and went over PD.     CTC sent copy of AVS, MAR, and signed PD to ACT Team    CTC sent COS and PD to [facility].     CTC compelted acuity ratings.   Discharge Plan or Goal    Dispo Plan: Home with ACT Team  Discharge Date/ time: 1/9 at 2:35pm  Transportation: parents to   AVS Completed: Yes [x] No[]  Provisional Discharge: Yes[x] No[]    Barriers to Discharge   None         Referral Status  [facility] IOP: Waitlist    Legal Status  Fully Committed with [facility]: [place]   File Number: [id number]  Start and expiration date of commitment: 02/06/2024 to 02/06/2025    Contacts:  [phone] Dr. Prieto Araujo, Father [phone]  Rolanda, Mother [phone]   Name/Clinic: [facility] ACT Team   Number: [phone]   Email: [email or web address]        Upcoming Meetings and Dates/Important Information and next steps:

## 2025-01-09 NOTE — PLAN OF CARE
Goal Outcome Evaluation:         DISCHARGE:  This RN and pt have reviewed all meds and aftercare plan. All belongings returned. Pt denies SI , anxiety or depression at this time. Pt bright and smiling upon DC.  Parents (mom) picked up pt.

## 2025-01-09 NOTE — PHARMACY
Pharmacy Clozapine Initial Note    Patient's Name: Junior Fernández  Patient's : 2000    Recent ANC Value(s) for last 7 days:  Recent labs: (last 7 days)     25  0733   ANEUTAUTO 2.1       Is the patient enrolled in the cloZAPine REMS program? Yes  Ordering prescriber: Ashish Meek CNP  Is this provider certified in the cloZAPine REMS program? Yes  A REMS Dispense Authorization was obtained from the clozapine REMS program? Yes  Is the ANC from within the last 7 days within recommended limits? Yes  Does the patient have any signs or symptoms of infection, including fever? No    Plan:  Initiated clozapine therapy on 25 at 12.5 mg PO at bedtime and now titrated to 37.5 mg QHS.  A WBC with differential will be ordered at least weekly, next due 2025. ANC values will be entered into the REMS program.    Savi Barajas, PharmD, BCPP  Behavioral Health Pharmacist  [facility])  Available on [employer]

## 2025-01-13 ENCOUNTER — PATIENT OUTREACH (OUTPATIENT)
Dept: CARE COORDINATION | Facility: CLINIC | Age: 25
End: 2025-01-13
Payer: COMMERCIAL

## 2025-01-13 NOTE — PROGRESS NOTES
[facility]: Transitions of Care Outreach  Chief Complaint   Patient presents with    Clinic Care Coordination - Post Hospital       Most Recent Admission Date: 12/22/2024   Most Recent Admission Diagnosis:      Most Recent Discharge Date: 1/9/2025   Most Recent Discharge Diagnosis: Psychosis, atypical (H) - F29  Constipation, unspecified constipation type - K59.00  Mood disorder - F39  Cigarette nicotine dependence without complication - F17.210     Transitions of Care Assessment    Discharge Assessment  How are you doing now that you are home?: Patient was brief, he reports he is doing well. He gave limited information.  How are your symptoms? (Red Flag symptoms escalate to triage hotline per guidelines): Improved  Do you know how to contact your clinic care team if you have future questions or changes to your health status? : Yes  Does the patient have their discharge instructions? : Yes  Does the patient have questions regarding their discharge instructions? : No  Were you started on any new medications or were there changes to any of your previous medications? : Yes  Does the patient have all of their medications?: Yes  Do you have questions regarding any of your medications? : No  Do you have all of your needed medical supplies or equipment (DME)?  (i.e. oxygen tank, CPAP, cane, etc.): Yes                  Follow up Plan      Health Care Follow-up:  : [facility] ACT Team  Number: [phone]  Email: [email or web address]    No future appointments.    Outpatient Plan as outlined on AVS reviewed with patient.    For any urgent concerns, please contact our 24 hour nurse triage line: [phone] ([phone])       MALDONADO Munoz (she/her/hers)  Social Work Clinic Care Coordinator   [facility]  [email or web address]  [phone]

## 2025-01-15 ENCOUNTER — HOSPITAL ENCOUNTER (OUTPATIENT)
Dept: BEHAVIORAL HEALTH | Facility: CLINIC | Age: 25
End: 2025-01-15
Attending: PSYCHIATRY & NEUROLOGY
Payer: COMMERCIAL

## 2025-01-15 ENCOUNTER — TELEPHONE (OUTPATIENT)
Dept: BEHAVIORAL HEALTH | Facility: CLINIC | Age: 25
End: 2025-01-15
Payer: COMMERCIAL

## 2025-01-15 PROBLEM — F41.1 GENERALIZED ANXIETY DISORDER: Status: ACTIVE | Noted: 2025-01-15

## 2025-01-15 PROCEDURE — 90853 GROUP PSYCHOTHERAPY: CPT | Performed by: COUNSELOR

## 2025-01-15 PROCEDURE — 90853 GROUP PSYCHOTHERAPY: CPT

## 2025-01-15 ASSESSMENT — PATIENT HEALTH QUESTIONNAIRE - PHQ9: SUM OF ALL RESPONSES TO PHQ QUESTIONS 1-9: 3

## 2025-01-15 NOTE — GROUP NOTE
Psychoeducation Group Note    PATIENT'S NAME: Junior Fernández  MRN:   [id number]  :   2000  ACCT. NUMBER: [id number]  DATE OF SERVICE: 1/15/25  START TIME: 12:00 PM  END TIME: 12:50 PM  FACILITATOR: Michael Apple LPC; Anastasia Brunner OTR  TOPIC: MH Life Skills Group: Communication and Social Skills Development  [facility] Mental Health Outpatient Programs  TRACK: IOP/DT-1 YOUNG ADULT  NUMBER OF PARTICIPANTS: 8    Summary of Group and Topics Discussed:  Communication and Social Skills Development:   Social Participation:   Facilitated discussion on the importance of social participation in their daily lives and identified areas that they are currently participating in socially. Patients discussed the benefits of social participation on their mental wellbeing and social values. Patients were provided with an opportunity to engage in a social leisure activity. Patients identified and discussed with peers and staff regarding their experience of practicing social skills (being assertive, setting boundaries, accepting positive feedback) and ways to stay connected with others. Validation, support, and feedback was provided throughout the group process.      Patient Session Goals and Objectives:   Identified strengths and opportunities for growth in the area of social participation.   Improved awareness of important aspects of social participation for mental wellbeing.   Engage with other peers for experiential learning of social leisure skills.   Practiced and reflected on how to generalize social participation skills in their everyday life.    Patient Participation / Response:  Moderately participated, sharing some personal reflections / insights and adequately adequately received / provided feedback with other participants.    Verbalized understanding of content, Patient would benefit from additional opportunities to practice the content to be able to generalize it to their everyday life  with increased intentionality, consistency, and efficacy in support of their psychiatric recovery, and Patient worked towards initial treatment plan goals     Treatment Plan:  Patient has a current master individualized treatment plan.  See Epic treatment plan for more information.    Michael Apple, LPC
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
## 2025-01-15 NOTE — TELEPHONE ENCOUNTER
----- Message from May Hatfield sent at 1/14/2025  4:34 PM CST -----  Regarding: New IOP/DT 1 Start 01/15/2025  Scheduling Request    Patient Name: Junior Fernández  Location of programming: [facility]  Start Date: January / 15 / 2025  Group: IOP/DT 1 YA [[id number]] on Monday, Tuesday, Wednesday, and Thursday at 10:00 AM to 1:00 PM  Attending Provider (MD): Dr. Rod  12 hours a week for 9 weeks  Number of visits to be scheduled: 36  Visit Type: In-person or Treatment - 870    Additional notes: N/A

## 2025-01-16 ENCOUNTER — HOSPITAL ENCOUNTER (OUTPATIENT)
Dept: BEHAVIORAL HEALTH | Facility: CLINIC | Age: 25
End: 2025-01-16
Attending: PSYCHIATRY & NEUROLOGY
Payer: COMMERCIAL

## 2025-01-16 ENCOUNTER — TRANSFERRED RECORDS (OUTPATIENT)
Dept: HEALTH INFORMATION MANAGEMENT | Facility: CLINIC | Age: 25
End: 2025-01-16
Payer: COMMERCIAL

## 2025-01-16 PROCEDURE — 90853 GROUP PSYCHOTHERAPY: CPT
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
## 2025-01-16 NOTE — GROUP NOTE
Psychotherapy Group Note    PATIENT'S NAME: Junior Fernández  MRN:   [id number]  :   2000  ACCT. NUMBER: [id number]  DATE OF SERVICE: 1/15/25  START TIME: 11:00 AM  END TIME: 11:50 AM  FACILITATOR: Allen Pearce LMFT  TOPIC: MH EBP Group: Specialty [facility] Adult Mental Health Outpatient Programs  TRACK: IOPDT1    NUMBER OF PARTICIPANTS: 8    Summary of Group / Topics Discussed:  Specialty Topics: Life Transitions: The topic of life transitions was presented in order to help patients to better understand the challenges presented by life transitions, and how to best navigate them. Exploring the phases of transition and how one works through them was discussed. Patients were provided with information regarding community resources.     Patient Session Goals / Objectives:  Discussed the timing and nature of major life transitions  Explored how life transitions may impact mental health and functioning  Discussed coping strategies to manage symptoms and help with transitioning  Discussed and planned a successful transition      Patient Participation / Response:  Fully participated with the group by sharing personal reflections / insights and openly received / provided feedback with other participants.    Demonstrated understanding of topics discussed through group discussion and participation, Identified / Expressed readiness to act on skill suggestions discussed in topic, Verbalized understanding of ways to proactively manage illness, and Practiced skills in session    Treatment Plan:  Patient has an initial individualized treatment plan that was created as part of their diagnostic assessment / admission process.  A master individualized treatment plan is in the process of being developed with the patient and multi-disciplinary care team.    ATTILA Bergman

## 2025-01-16 NOTE — GROUP NOTE
Psychotherapy Group Note    PATIENT'S NAME: Junior Fernández  MRN:   [id number]  :   2000  ACCT. NUMBER: [id number]  DATE OF SERVICE: 25  START TIME: 11:00 AM  END TIME: 11:50 AM  FACILITATOR: Michael Apple LPC  TOPIC:  EBP Group: Emotions Management  [facility] Mental Health Outpatient Programs  TRACK: IOP/DT-1 YOUNG ADULT  NUMBER OF PARTICIPANTS: 6    Summary of Group / Topics Discussed:  Emotions Management: Opposite to Emotion: Patients discussed past and present struggles with knowing how to make changes in their lives due to difficult emotional experiences.  Explored desires to experience and feel less anger, sadness, guilt, and fear.  Reviewed the therapeutic skill of opposite action and patients explored opportunities to use their behaviors as a tool to reduce an emotion that they want to change.     Patient Session Goals / Objectives:  Review DBT concepts and focus on patient s experiences of distress and difficult emotional experiences.  Learn how to do the opposite of what an emotion makes us want to do in an effort to decrease an unwanted emotional experience.  Demonstrate understanding of the skill of opposite action by sharing experiences where the technique could be useful in past / present situations.      Patient Participation / Response:  Minimally participated, only when prompted / asked.    Demonstrated understanding of topics discussed through group discussion and participation, Expressed understanding of the relevance / importance of emotions management skills at distressing times in life, Self-aware of experiences with difficult emotions, and strategies to employ to manage them, Demonstrated knowledge of when to consider applying a variety of emotions management skills in daily life, Demonstrated understanding and practice strategies to manage difficult emotions and move towards healing, Identified barriers to applying emotions management strategies,  Identified strategies to overcome barriers to use of emotions management skills, Identified emotions management strategies that have helped maintain / improve symptoms in the past, Practiced 2-3 new skills in session, and Identified / Expressed personal readiness to practice new emotions management skills    Treatment Plan:  Patient has a current master individualized treatment plan.  See Epic treatment plan for more information.    Michael Apple, LPC

## 2025-01-17 ENCOUNTER — HOSPITAL ENCOUNTER (INPATIENT)
Facility: CLINIC | Age: 25
End: 2025-01-17
Attending: FAMILY MEDICINE | Admitting: PSYCHIATRY & NEUROLOGY
Payer: COMMERCIAL

## 2025-01-17 ENCOUNTER — MEDICAL CORRESPONDENCE (OUTPATIENT)
Dept: HEALTH INFORMATION MANAGEMENT | Facility: CLINIC | Age: 25
End: 2025-01-17

## 2025-01-17 DIAGNOSIS — R46.89 AGGRESSIVE BEHAVIOR: ICD-10-CM

## 2025-01-17 DIAGNOSIS — F22 PARANOIA (H): ICD-10-CM

## 2025-01-17 DIAGNOSIS — F29 PSYCHOSIS, ATYPICAL (H): ICD-10-CM

## 2025-01-17 DIAGNOSIS — F25.0 SCHIZOAFFECTIVE DISORDER, BIPOLAR TYPE (H): Chronic | ICD-10-CM

## 2025-01-17 PROBLEM — R45.850 HOMICIDAL IDEATION: Status: ACTIVE | Noted: 2025-01-17

## 2025-01-17 LAB
ALBUMIN SERPL BCG-MCNC: 4.1 G/DL (ref 3.5–5.2)
ALP SERPL-CCNC: 54 U/L (ref 40–150)
ALT SERPL W P-5'-P-CCNC: 15 U/L (ref 0–70)
AMPHETAMINES UR QL SCN: NORMAL
ANION GAP SERPL CALCULATED.3IONS-SCNC: 10 MMOL/L (ref 7–15)
AST SERPL W P-5'-P-CCNC: 26 U/L (ref 0–45)
BARBITURATES UR QL SCN: NORMAL
BASOPHILS # BLD AUTO: 0.1 10E3/UL (ref 0–0.2)
BASOPHILS NFR BLD AUTO: 1 %
BENZODIAZ UR QL SCN: NORMAL
BILIRUB SERPL-MCNC: <0.2 MG/DL
BUN SERPL-MCNC: 12.7 MG/DL (ref 6–20)
BZE UR QL SCN: NORMAL
CALCIUM SERPL-MCNC: 8.9 MG/DL (ref 8.8–10.4)
CANNABINOIDS UR QL SCN: NORMAL
CHLORIDE SERPL-SCNC: 108 MMOL/L (ref 98–107)
CREAT SERPL-MCNC: 1.04 MG/DL (ref 0.67–1.17)
EGFRCR SERPLBLD CKD-EPI 2021: >90 ML/MIN/1.73M2
EOSINOPHIL # BLD AUTO: 0.2 10E3/UL (ref 0–0.7)
EOSINOPHIL NFR BLD AUTO: 2 %
ERYTHROCYTE [DISTWIDTH] IN BLOOD BY AUTOMATED COUNT: 12.3 % (ref 10–15)
FENTANYL UR QL: NORMAL
GLUCOSE SERPL-MCNC: 86 MG/DL (ref 70–99)
HCO3 SERPL-SCNC: 25 MMOL/L (ref 22–29)
HCT VFR BLD AUTO: 41.1 % (ref 40–53)
HGB BLD-MCNC: 13.9 G/DL (ref 13.3–17.7)
IMM GRANULOCYTES # BLD: 0 10E3/UL
IMM GRANULOCYTES NFR BLD: 0 %
LYMPHOCYTES # BLD AUTO: 2.2 10E3/UL (ref 0.8–5.3)
LYMPHOCYTES NFR BLD AUTO: 28 %
MCH RBC QN AUTO: 30.6 PG (ref 26.5–33)
MCHC RBC AUTO-ENTMCNC: 33.8 G/DL (ref 31.5–36.5)
MCV RBC AUTO: 91 FL (ref 78–100)
MONOCYTES # BLD AUTO: 0.7 10E3/UL (ref 0–1.3)
MONOCYTES NFR BLD AUTO: 8 %
NEUTROPHILS # BLD AUTO: 5 10E3/UL (ref 1.6–8.3)
NEUTROPHILS NFR BLD AUTO: 61 %
NRBC # BLD AUTO: 0 10E3/UL
NRBC BLD AUTO-RTO: 0 /100
OPIATES UR QL SCN: NORMAL
PCP QUAL URINE (ROCHE): NORMAL
PLATELET # BLD AUTO: 263 10E3/UL (ref 150–450)
POTASSIUM SERPL-SCNC: 4.8 MMOL/L (ref 3.4–5.3)
PROT SERPL-MCNC: 6.3 G/DL (ref 6.4–8.3)
RBC # BLD AUTO: 4.54 10E6/UL (ref 4.4–5.9)
SODIUM SERPL-SCNC: 143 MMOL/L (ref 135–145)
VALPROATE SERPL-MCNC: 47.2 UG/ML
WBC # BLD AUTO: 8.2 10E3/UL (ref 4–11)

## 2025-01-17 PROCEDURE — 80307 DRUG TEST PRSMV CHEM ANLYZR: CPT | Performed by: FAMILY MEDICINE

## 2025-01-17 PROCEDURE — 96372 THER/PROPH/DIAG INJ SC/IM: CPT | Performed by: FAMILY MEDICINE

## 2025-01-17 PROCEDURE — 80053 COMPREHEN METABOLIC PANEL: CPT | Performed by: FAMILY MEDICINE

## 2025-01-17 PROCEDURE — 85025 COMPLETE CBC W/AUTO DIFF WBC: CPT | Performed by: FAMILY MEDICINE

## 2025-01-17 PROCEDURE — 80164 ASSAY DIPROPYLACETIC ACD TOT: CPT | Performed by: FAMILY MEDICINE

## 2025-01-17 PROCEDURE — 80342 ANTIPSYCHOTICS NOS 1-3: CPT | Performed by: FAMILY MEDICINE

## 2025-01-17 PROCEDURE — 36415 COLL VENOUS BLD VENIPUNCTURE: CPT | Performed by: FAMILY MEDICINE

## 2025-01-17 PROCEDURE — 99285 EMERGENCY DEPT VISIT HI MDM: CPT | Performed by: FAMILY MEDICINE

## 2025-01-17 PROCEDURE — 250N000011 HC RX IP 250 OP 636: Performed by: FAMILY MEDICINE

## 2025-01-17 RX ORDER — DIPHENHYDRAMINE HYDROCHLORIDE 50 MG/ML
25 INJECTION INTRAMUSCULAR; INTRAVENOUS ONCE
Status: COMPLETED | OUTPATIENT
Start: 2025-01-17 | End: 2025-01-17

## 2025-01-17 RX ORDER — HALOPERIDOL 5 MG/ML
5 INJECTION INTRAMUSCULAR ONCE
Status: COMPLETED | OUTPATIENT
Start: 2025-01-17 | End: 2025-01-17

## 2025-01-17 RX ORDER — LORAZEPAM 2 MG/ML
2 INJECTION INTRAMUSCULAR ONCE
Status: COMPLETED | OUTPATIENT
Start: 2025-01-17 | End: 2025-01-17

## 2025-01-17 RX ADMIN — HALOPERIDOL LACTATE 5 MG: 5 INJECTION, SOLUTION INTRAMUSCULAR at 17:37

## 2025-01-17 RX ADMIN — DIPHENHYDRAMINE HYDROCHLORIDE 25 MG: 50 INJECTION, SOLUTION INTRAMUSCULAR; INTRAVENOUS at 17:37

## 2025-01-17 RX ADMIN — LORAZEPAM 2 MG: 2 INJECTION INTRAMUSCULAR; INTRAVENOUS at 17:38

## 2025-01-17 ASSESSMENT — ACTIVITIES OF DAILY LIVING (ADL)
ADLS_ACUITY_SCORE: 52

## 2025-01-17 NOTE — ED NOTES
Bed: ED16A  Expected date:   Expected time:   Means of arrival:   Comments:  Hems [age] yr M agitation and HI.
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
## 2025-01-17 NOTE — ED PROVIDER NOTES
ED Provider Note  [facility]      History     Chief Complaint   Patient presents with    Mental Health Problem     HPI  Junior Fernández is a 24 year old male with a past medical history of substance use disorder, suicide attempt, schizoaffective disorder, FABI who presents to the emergency department via EMS in a disorganized state.  Initially patient was cooperative in my interview then became erratically aggressive and threatening to staff.  Patient was put in physical hold and given Benadryl Haldol and Ativan IM.  I believe at this time patient is unstable and will require inpatient stabilization.     Past Medical History  Past Medical History:   Diagnosis Date    Anisometropia     Anxiety     Depression     Elevated blood pressure reading without diagnosis of hypertension     Fracture 07/01/2003    Left clavicle    Fracture 04/01/2005    Left Monteggia    History of substance abuse (H) 11/23/2016    THC, ETOH, stimulants    History of suicide attempt     10/2021 laceration of left arm    Multiple nevi 10/14/2015    Other insomnia     Pneumonia 03/01/2003    RUL    RAD (reactive airway disease)     outgrown    SARS (severe acute respiratory syndrome)     Sinus tachycardia      Past Surgical History:   Procedure Laterality Date    CIRCUMCISION,OTHER,<28 D/O      FRACTURE SURGERY  04/01/2005    Left Monteggia    HC TOOTH EXTRACTION W/FORCEP      REPAIR ARTERY BRACHIAL Left 10/2021     cloZAPine (CLOZARIL) 50 MG tablet  divalproex sodium extended-release (DEPAKOTE ER) 500 MG 24 hr tablet  fluPHENAZine (PROLIXIN) 10 MG tablet  methylfolate (DEPLIN) 15 MG TABS tablet  nicotine polacrilex (NICORETTE) 4 MG gum  sennosides (SENOKOT) 8.6 MG tablet      Allergies   Allergen Reactions    Clozapine      Syncope per Pt.     Seasonal Allergies     Seroquel [Quetiapine]      Fainting and slowed breathing      Family History  Family History   Problem Relation Age of Onset    Anxiety Disorder  "Maternal Grandmother     Depression Maternal Grandmother     Hypertension Maternal Grandmother     Cerebrovascular Disease Maternal Grandmother     Other - See Comments Mother         on Celexa    Hyperthyroidism Father         Rx'd with methimazole. Father's side with mild allergy/asthma issues    Hyperlipidemia Father     Anxiety Disorder Brother         Stuttering and tics    Lymphoma Maternal Grandfather          from Lymphoma    Other - See Comments Paternal Grandmother         vaso-vagal syncope    Other - See Comments Paternal Grandfather          from kidney/liver CA; CAD and had pacemaker    Heart Disease Paternal Grandfather 65    Arrhythmia No family hx of      Social History   Social History     Tobacco Use    Smoking status: Every Day     Current packs/day: 0.50     Types: Cigarettes    Smokeless tobacco: Never   Substance Use Topics    Alcohol use: Not Currently     Comment: 3-4 days ago    Drug use: Not Currently     Types: Marijuana, \"Crack\" cocaine     Comment: Used marijuanna 3 days ago, cocaine in feburary, vyvanse 3 days ago       A medically appropriate review of systems was performed with pertinent positives and negatives noted in the HPI, and all other systems negative.    Physical Exam   BP: 138/73  Pulse: (!) 125  Temp: 97.2  F (36.2  C)  Resp: 18  SpO2: 96 %  Physical Exam  Constitutional:       General: He is not in acute distress.     Appearance: Normal appearance. He is not toxic-appearing.   HENT:      Head: Atraumatic.   Eyes:      General: No scleral icterus.     Conjunctiva/sclera: Conjunctivae normal.   Cardiovascular:      Rate and Rhythm: Normal rate.      Heart sounds: Normal heart sounds.   Pulmonary:      Effort: Pulmonary effort is normal. No respiratory distress.      Breath sounds: Normal breath sounds.   Abdominal:      Palpations: Abdomen is soft.      Tenderness: There is no abdominal tenderness.   Musculoskeletal:         General: No deformity.      Cervical " back: Neck supple.   Skin:     General: Skin is warm.   Neurological:      General: No focal deficit present.      Mental Status: He is alert and oriented to person, place, and time.      Sensory: No sensory deficit.      Motor: No weakness.      Coordination: Coordination normal.   Psychiatric:         Mood and Affect: Mood is anxious. Affect is labile.         Speech: Speech is rapid and pressured.         Behavior: Behavior is agitated and aggressive.         Thought Content: Thought content is paranoid and delusional.           ED Course, Procedures, & Data      Procedures     Results for orders placed or performed during the hospital encounter of 01/17/25   Urine Drug Screen Panel     Status: Normal   Result Value Ref Range    Amphetamines Urine Screen Negative Screen Negative    Barbituates Urine Screen Negative Screen Negative    Benzodiazepine Urine Screen Negative Screen Negative    Cannabinoids Urine Screen Negative Screen Negative    Cocaine Urine Screen Negative Screen Negative    Fentanyl Qual Urine Screen Negative Screen Negative    Opiates Urine Screen Negative Screen Negative    PCP Urine Screen Negative Screen Negative   Comprehensive metabolic panel     Status: Abnormal   Result Value Ref Range    Sodium 143 135 - 145 mmol/L    Potassium 4.8 3.4 - 5.3 mmol/L    Carbon Dioxide (CO2) 25 22 - 29 mmol/L    Anion Gap 10 7 - 15 mmol/L    Urea Nitrogen 12.7 6.0 - 20.0 mg/dL    Creatinine 1.04 0.67 - 1.17 mg/dL    GFR Estimate >90 >60 mL/min/1.73m2    Calcium 8.9 8.8 - 10.4 mg/dL    Chloride 108 (H) 98 - 107 mmol/L    Glucose 86 70 - 99 mg/dL    Alkaline Phosphatase 54 40 - 150 U/L    AST 26 0 - 45 U/L    ALT 15 0 - 70 U/L    Protein Total 6.3 (L) 6.4 - 8.3 g/dL    Albumin 4.1 3.5 - 5.2 g/dL    Bilirubin Total <0.2 <=1.2 mg/dL   Valproic acid (Depakote level)     Status: Abnormal   Result Value Ref Range    Valproic acid 47.2 (L)   ug/mL   CBC with platelets and differential     Status: None   Result Value  Ref Range    WBC Count 8.2 4.0 - 11.0 10e3/uL    RBC Count 4.54 4.40 - 5.90 10e6/uL    Hemoglobin 13.9 13.3 - 17.7 g/dL    Hematocrit 41.1 40.0 - 53.0 %    MCV 91 78 - 100 fL    MCH 30.6 26.5 - 33.0 pg    MCHC 33.8 31.5 - 36.5 g/dL    RDW 12.3 10.0 - 15.0 %    Platelet Count 263 150 - 450 10e3/uL    % Neutrophils 61 %    % Lymphocytes 28 %    % Monocytes 8 %    % Eosinophils 2 %    % Basophils 1 %    % Immature Granulocytes 0 %    NRBCs per 100 WBC 0 <1 /100    Absolute Neutrophils 5.0 1.6 - 8.3 10e3/uL    Absolute Lymphocytes 2.2 0.8 - 5.3 10e3/uL    Absolute Monocytes 0.7 0.0 - 1.3 10e3/uL    Absolute Eosinophils 0.2 0.0 - 0.7 10e3/uL    Absolute Basophils 0.1 0.0 - 0.2 10e3/uL    Absolute Immature Granulocytes 0.0 <=0.4 10e3/uL    Absolute NRBCs 0.0 10e3/uL   Urine Drug Screen     Status: Normal    Narrative    The following orders were created for panel order Urine Drug Screen.  Procedure                               Abnormality         Status                     ---------                               -----------         ------                     Urine Drug Screen Panel[[id number]]      Normal              Final result                 Please view results for these tests on the individual orders.   CBC with platelets differential     Status: None    Narrative    The following orders were created for panel order CBC with platelets differential.  Procedure                               Abnormality         Status                     ---------                               -----------         ------                     CBC with platelets and d...[[id number]]                      Final result                 Please view results for these tests on the individual orders.     Medications   haloperidol lactate (HALDOL) injection 5 mg (5 mg Intramuscular $Given 1/17/25 1737)   LORazepam (ATIVAN) injection 2 mg (2 mg Intramuscular $Given 1/17/25 1738)   diphenhydrAMINE (BENADRYL) injection 25 mg (25 mg Intramuscular $Given  1/17/25 2571)     Labs Ordered and Resulted from Time of ED Arrival to Time of ED Departure   COMPREHENSIVE METABOLIC PANEL - Abnormal       Result Value    Sodium 143      Potassium 4.8      Carbon Dioxide (CO2) 25      Anion Gap 10      Urea Nitrogen 12.7      Creatinine 1.04      GFR Estimate >90      Calcium 8.9      Chloride 108 (*)     Glucose 86      Alkaline Phosphatase 54      AST 26      ALT 15      Protein Total 6.3 (*)     Albumin 4.1      Bilirubin Total <0.2     VALPROIC ACID - Abnormal    Valproic acid 47.2 (*)    URINE DRUG SCREEN PANEL - Normal    Amphetamines Urine Screen Negative      Barbituates Urine Screen Negative      Benzodiazepine Urine Screen Negative      Cannabinoids Urine Screen Negative      Cocaine Urine Screen Negative      Fentanyl Qual Urine Screen Negative      Opiates Urine Screen Negative      PCP Urine Screen Negative     CBC WITH PLATELETS AND DIFFERENTIAL    WBC Count 8.2      RBC Count 4.54      Hemoglobin 13.9      Hematocrit 41.1      MCV 91      MCH 30.6      MCHC 33.8      RDW 12.3      Platelet Count 263      % Neutrophils 61      % Lymphocytes 28      % Monocytes 8      % Eosinophils 2      % Basophils 1      % Immature Granulocytes 0      NRBCs per 100 WBC 0      Absolute Neutrophils 5.0      Absolute Lymphocytes 2.2      Absolute Monocytes 0.7      Absolute Eosinophils 0.2      Absolute Basophils 0.1      Absolute Immature Granulocytes 0.0      Absolute NRBCs 0.0     CLOZAPINE AND NORCLOZAPINE, STAT ONLY     No orders to display          Critical care was not performed.     Medical Decision Making  The patient's presentation was of high complexity (a chronic illness severe exacerbation, progression, or side effect of treatment).    The patient's evaluation involved:  ordering and/or review of 3+ test(s) in this encounter (see separate area of note for details)  discussion of management or test interpretation with another health professional (patient was seen and  evaluated by [facility]  independent provider with discussion of hospitalization versus outpatient management was agreed the patient will require inpatient stabilization.)    The patient's management necessitated high risk (a decision regarding hospitalization).    Assessment & Plan        I have reviewed the nursing notes. I have reviewed the findings, diagnosis, plan and need for follow up with the patient.    Patient with history of schizoaffective disorder now with acute psychosis aggressive behaviors paranoia will be placed on 72-hour hold and admitted for stabilization and treatment.    Final diagnoses:   Schizoaffective disorder, bipolar type (H)   Psychosis, atypical (H)   Aggressive behavior   Paranoia (H)   I, John Tavarez, am serving as a trained medical scribe to document services personally performed by Aftab Dhaliwal MD, based on the provider's statements to me.     I, Aftab Dhaliwal MD, was physically present and have reviewed and verified the accuracy of this note documented by John Tavarez.      Aftab Dhaliwal MD  [facility] EMERGENCY DEPARTMENT  1/17/2025     Aftab Dhaliwal MD  01/18/25 0009
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
## 2025-01-18 ENCOUNTER — TELEPHONE (OUTPATIENT)
Dept: BEHAVIORAL HEALTH | Facility: CLINIC | Age: 25
End: 2025-01-18

## 2025-01-18 PROCEDURE — 250N000013 HC RX MED GY IP 250 OP 250 PS 637: Performed by: FAMILY MEDICINE

## 2025-01-18 PROCEDURE — 99418 PROLNG IP/OBS E/M EA 15 MIN: CPT | Performed by: NURSE PRACTITIONER

## 2025-01-18 PROCEDURE — 99255 IP/OBS CONSLTJ NEW/EST HI 80: CPT | Performed by: NURSE PRACTITIONER

## 2025-01-18 PROCEDURE — 250N000013 HC RX MED GY IP 250 OP 250 PS 637: Performed by: EMERGENCY MEDICINE

## 2025-01-18 RX ORDER — FLUPHENAZINE HYDROCHLORIDE 10 MG/1
10 TABLET ORAL EVERY EVENING
Status: DISPENSED | OUTPATIENT
Start: 2025-01-18

## 2025-01-18 RX ORDER — LEVOMEFOLATE CALCIUM 15 MG
15 TABLET ORAL DAILY
Status: DISPENSED | OUTPATIENT
Start: 2025-01-18

## 2025-01-18 RX ORDER — CLOZAPINE 25 MG/1
12.5 TABLET ORAL AT BEDTIME
Status: COMPLETED | OUTPATIENT
Start: 2025-01-18 | End: 2025-01-18

## 2025-01-18 RX ORDER — CLOZAPINE 25 MG/1
25 TABLET ORAL AT BEDTIME
Status: COMPLETED | OUTPATIENT
Start: 2025-01-19 | End: 2025-01-19

## 2025-01-18 RX ORDER — CLOZAPINE 50 MG/1
50 TABLET ORAL AT BEDTIME
Status: DISCONTINUED | OUTPATIENT
Start: 2025-01-21 | End: 2025-01-20

## 2025-01-18 RX ORDER — DIVALPROEX SODIUM 500 MG/1
1000 TABLET, FILM COATED, EXTENDED RELEASE ORAL AT BEDTIME
Status: DISPENSED | OUTPATIENT
Start: 2025-01-18

## 2025-01-18 RX ORDER — SENNOSIDES 8.6 MG
1 TABLET ORAL 2 TIMES DAILY
Status: DISCONTINUED | OUTPATIENT
Start: 2025-01-18 | End: 2025-01-28

## 2025-01-18 RX ORDER — CLOZAPINE 50 MG/1
50 TABLET ORAL AT BEDTIME
Status: DISCONTINUED | OUTPATIENT
Start: 2025-01-18 | End: 2025-01-18

## 2025-01-18 RX ADMIN — NICOTINE POLACRILEX 4 MG: 4 GUM, CHEWING BUCCAL at 17:32

## 2025-01-18 RX ADMIN — Medication 15 MG: at 15:36

## 2025-01-18 RX ADMIN — FLUPHENAZINE HYDROCHLORIDE 10 MG: 10 TABLET, FILM COATED ORAL at 19:56

## 2025-01-18 RX ADMIN — NICOTINE POLACRILEX 4 MG: 4 GUM, CHEWING BUCCAL at 20:42

## 2025-01-18 RX ADMIN — NICOTINE POLACRILEX 4 MG: 4 GUM, CHEWING BUCCAL at 19:40

## 2025-01-18 RX ADMIN — NICOTINE POLACRILEX 4 MG: 4 GUM, CHEWING BUCCAL at 16:21

## 2025-01-18 RX ADMIN — DIVALPROEX SODIUM 1000 MG: 500 TABLET, FILM COATED, EXTENDED RELEASE ORAL at 20:41

## 2025-01-18 RX ADMIN — NICOTINE POLACRILEX 4 MG: 4 GUM, CHEWING BUCCAL at 11:59

## 2025-01-18 RX ADMIN — NICOTINE POLACRILEX 4 MG: 4 GUM, CHEWING BUCCAL at 14:56

## 2025-01-18 RX ADMIN — NICOTINE POLACRILEX 4 MG: 4 GUM, CHEWING BUCCAL at 10:35

## 2025-01-18 RX ADMIN — NICOTINE POLACRILEX 4 MG: 4 GUM, CHEWING BUCCAL at 13:01

## 2025-01-18 RX ADMIN — NICOTINE POLACRILEX 4 MG: 4 GUM, CHEWING BUCCAL at 21:53

## 2025-01-18 RX ADMIN — NICOTINE POLACRILEX 4 MG: 4 GUM, CHEWING BUCCAL at 14:09

## 2025-01-18 RX ADMIN — NICOTINE POLACRILEX 4 MG: 4 GUM, CHEWING BUCCAL at 09:43

## 2025-01-18 ASSESSMENT — ACTIVITIES OF DAILY LIVING (ADL)
ADLS_ACUITY_SCORE: 52
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
## 2025-01-18 NOTE — MEDICATION SCRIBE - ADMISSION MEDICATION HISTORY
"Medication Scribe Admission Medication History    Admission medication history is complete. The information provided in this note is only as accurate as the sources available at the time of the update.    Information Source(s): Patient via in-person    Pertinent Information: Patient was seen in the ED and and discharged on 01/09/25. lamoTRIgine 200 mg tablet,lithium  mg CR tablet,Lurasidone 120 mg and 40 mg Tabs were stopped during discharge. Pt confirms stopping these medications and reports taking current meds on PTA . Benztropine 0.5 mg BID was dispensed on 01/15/25 for 28 day supply. Pt reports he was taking this medication for orofacial-linguinal, and limb dyskinesia but stopped taking it because \" it stopped working ''     Patent also reports not wanting to take clozapine tablets and states ''I won't take it unless you force me to take a shot '' and reports clozapine makes him faint.     Changes made to PTA medication list:    Added: None  Deleted: None  Changed: None    Allergies reviewed with patient and updates made in EHR: yes    Medication History Completed By: Arsalan Garsia 1/18/2025 9:13 AM    PTA Med List   Medication Sig Last Dose/Taking    cloZAPine (CLOZARIL) 50 MG tablet Take 1 tablet (50 mg) by mouth at bedtime. 1/15/2025 Bedtime    divalproex sodium extended-release (DEPAKOTE ER) 500 MG 24 hr tablet Take 2 tablets (1,000 mg) by mouth at bedtime. 1/15/2025 Bedtime    fluPHENAZine (PROLIXIN) 10 MG tablet Take 1 tablet (10 mg) by mouth every evening. 1/17/2025 Evening    methylfolate (DEPLIN) 15 MG TABS tablet Take 15 mg by mouth daily. 1/17/2025 Morning    nicotine polacrilex (NICORETTE) 4 MG gum Place 1 each (4 mg) inside cheek 4 times daily as needed for nicotine withdrawal symptoms. 1/17/2025 Morning    sennosides (SENOKOT) 8.6 MG tablet Take 1 tablet by mouth 2 times daily. 1/17/2025 Evening      "

## 2025-01-18 NOTE — ED NOTES
"Patient came out of his room yelling the same racial slurs as stated in the previous note. Pt also again stating that \"slavery was better\". Security and staff escorted patient to his room where he continued to make vulgar statements and make threats to \"slit our throats\". A code 21 was called, MD at bedside. B52 ordered and administered to patient. Patient educated on behavioral expectations while he is a patient in the ED and explained that his racial slurs will not be tolerated. Patient did not respond. Explained to patient that he must remain in his room and cannot wander the halls making racial slurs. Patient agrees to stay in his room if he is given food. Patient given sandwich and juice and urinal.   "
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
## 2025-01-18 NOTE — PHARMACY
Pharmacy Clozapine Continuation Note    Patient's Name: Junior Fernández  Patient's : 2000    Current cloZAPine regimen: 50 mg po QHS  Has there been a known interruption in therapy for greater than/equal to 48 hours which would warrant retitration Yes    Recent ANC Value(s) for last 30 days:  2025: Absolute Neutrophils 6.3 10e3/uL (Ref range: 1.6 - 8.3 10e3/uL)  2025: Absolute Neutrophils 2.1 10e3/uL (Ref range: 1.6 - 8.3 10e3/uL)  2025: Absolute Neutrophils 5.0 10e3/uL (Ref range: 1.6 - 8.3 10e3/uL)      A REMS Dispense Authorization was obtained from the clozapine REMS prgram? Yes   If no, why was the REMS Dispense Authorization not successful:  A Dispense Rationale was needed to obtain the REMS Dispense Authorization? Yes  Is the ANC (if available) within recommended limits? Yes  Does the patient have any signs or symptoms of infection, including fever? No    Plan:  Continue clozapine therapy at 12.5 mg PO at bedtime for 1 dose then 25 mg PO at bedtime for 1 dose then 37.5 mg PO at bedtime for 1 dose then 50 mg po at bedtime.  A WBC with differential will be ordered at least weekly, NEXT DUE 2025. ANC values will be entered into the REMS program.    Savi Barajas, PharmD, BCPP  Behavioral Health Pharmacist  [facility])  Available on [employer]

## 2025-01-18 NOTE — TELEPHONE ENCOUNTER
R: [place]  Access Inpatient Bed Call Log 1/18/25 at 3:05 PM: Intake has called facilities that have not updated the bed status within the last 12 hours.          [facility] is at capacity.                   [facility] is posting 7 beds. [phone] Per call @8:49am, no beds available          [facility] is posting 0 beds. Negative covid required.          [facility] is posting 0 beds. Neg covid. No high school/Yusra-psych.          [facility] is posting 0 beds. [phone]          [facility] is posting 0 beds. [phone]          [facility] is posting 6 beds. Negative covid. Per call @8:26am          [facility] is posting 0 beds [phone].             [facility] is posting 4 beds. LOW acuity. Ages 12+. Neg covid- [phone]          [facility] has 2 beds posted. No aggression. Negative Covid. Low acuity.          [facility] is posting 1 bed. Low acuity only. Neg covid.  [phone]           [facility] is posting 2 beds. Low acuity. No current aggression.           [facility] is posting 0 beds. Negative covid. [phone]          [facility] ([place] is posting 10 beds available. Negative covid.  [phone].              [facility] is posting 1 bed. Low acuity. 72 HH hold preferred. Yusra unit. Negative covid required. [phone]          [facility] ([place] is posting 5 beds. Low acuity only. Neg covid.  [phone]          [facility] [place] is posting 3 beds.  Negative covid required.   Vol only, No history of aggression, violence, or assault. No sexual offenders. No 72 HH holds. [phone]             [facility] is posting 3 beds. Negative covid required.  (Must have the cognitive ability to do programming. No aggressive or violent behavior or recent HX in the last 2 yrs. MH must be primary.) Always low acuity.           [facility] has 2 beds posted. Negative covid required.  Low acuity only. Violence and aggression capped.  [phone] Per call @8:40am          [facility] is posting 2 beds. Low acuity, Negative covid required. [phone]          [facility] posting 2 beds. Negative covid required.  [phone]           [facility] [place] is posting 1 bed. Negative covid. LOW acuity. (No lines, drains, or tubes, oxygen, CPAP, IV, etc.) Must Have a Ride Home. [phone]          [facility] is posting 2 beds. Negative covid. (No. lines, drains, or tubes, oxygen, CPAP, IV, etc.) [phone]          [facility] is posting 30 beds. No covid test required. [phone] Per call @8:23am, no answer.     Pt remains on the work list pending appropriate bed availability.

## 2025-01-18 NOTE — ED PROVIDER NOTES
"Emergency Department I-PASS Sign-out      Illness Severity: \"Watcher\"    Patient Summary:  24 year old male with pertinent PMH of schizoaffective disorder who presented with aggression and acute psychosis    ED Course/treatment plan: On a 72-hour hold admit    Clinical Impression:  (F25.0) Schizoaffective disorder, bipolar type (H)    (F29) Psychosis, atypical (H)    (R46.89) Aggressive behavior    (F22) Paranoia (H)      Edited by: Aftab Dhaliwal MD at 2025 001    Action List:  Tests to Follow-up:  None    Medications Reconciled/Ordered:  Yes    ED Mental Health Boarding Order Set Used for Diet/PRNs/Other:  Yes    DEC, Extended Care, Psych Consult Orders:  Extended Care and Psychiatry consult ordered.    Situational Awareness & Contingency Plannin Hour Hold Status:  On 72 hour hold.  Active Orders  N/A    Disposition:  Admit/Transfer to Behavioral Health, medically clear for admit/transfer    Boarding subsequent shift/day updates:      Edited by: Aftab Dhaliwal MD at 2025    Synthesis & Events after sign-out:  Meds reconciled and ordered.  He agrees to restart medications.        Anthony Smiley MD   Emergency Medicine     Anthony Smiley MD  [phone]    "
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
## 2025-01-18 NOTE — ED NOTES
IP MH Referral Acuity Rating Score (RARS)    LMHP complete at referral to IP MH, with DEC; and, daily while awaiting IP MH placement. Call score to PPS.  CRITERIA SCORING   New 72 HH and Involuntary for IP MH (not adolescent) 1/1   Boarding over 24 hours 0/1   Vulnerable adult at least 55+ with multiple co morbidities; or, Patient age 11 or under 0/1   Suicide ideation without relief of precipitating factors 0/1   Current plan for suicide 0/1   Current plan for homicide 0/1   Imminent risk or actual attempt to seriously harm another without relief of factors precipitating the attempt 1/1   Severe dysfunction in daily living (ex: complete neglect for self care, extreme disruption in vegetative function, extreme deterioration in social interactions) 0/1   Recent (last 2 weeks) or current physical aggression in the ED 1/1   Restraints or seclusion episode in ED 1/1   Verbal aggression, agitation, yelling, etc., while in the ED 1/1   Active psychosis with psychomotor agitation or catatonia 1/1   Need for constant or near constant redirection (from leaving, from others, etc).  0/1   Intrusive or disruptive behaviors 0/1   TOTAL Acuity Total Score: 6

## 2025-01-19 ENCOUNTER — TELEPHONE (OUTPATIENT)
Dept: BEHAVIORAL HEALTH | Facility: CLINIC | Age: 25
End: 2025-01-19

## 2025-01-19 LAB — SARS-COV-2 RNA RESP QL NAA+PROBE: NEGATIVE

## 2025-01-19 PROCEDURE — 250N000013 HC RX MED GY IP 250 OP 250 PS 637: Performed by: EMERGENCY MEDICINE

## 2025-01-19 PROCEDURE — 250N000013 HC RX MED GY IP 250 OP 250 PS 637: Performed by: FAMILY MEDICINE

## 2025-01-19 PROCEDURE — 87635 SARS-COV-2 COVID-19 AMP PRB: CPT | Performed by: EMERGENCY MEDICINE

## 2025-01-19 RX ADMIN — NICOTINE POLACRILEX 4 MG: 4 GUM, CHEWING BUCCAL at 19:27

## 2025-01-19 RX ADMIN — DIVALPROEX SODIUM 1000 MG: 500 TABLET, FILM COATED, EXTENDED RELEASE ORAL at 21:28

## 2025-01-19 RX ADMIN — NICOTINE POLACRILEX 4 MG: 4 GUM, CHEWING BUCCAL at 18:20

## 2025-01-19 RX ADMIN — Medication 15 MG: at 09:45

## 2025-01-19 RX ADMIN — NICOTINE POLACRILEX 4 MG: 4 GUM, CHEWING BUCCAL at 14:33

## 2025-01-19 RX ADMIN — NICOTINE POLACRILEX 4 MG: 4 GUM, CHEWING BUCCAL at 12:29

## 2025-01-19 RX ADMIN — NICOTINE POLACRILEX 4 MG: 4 GUM, CHEWING BUCCAL at 15:36

## 2025-01-19 RX ADMIN — NICOTINE POLACRILEX 4 MG: 4 GUM, CHEWING BUCCAL at 21:28

## 2025-01-19 RX ADMIN — NICOTINE POLACRILEX 4 MG: 4 GUM, CHEWING BUCCAL at 09:48

## 2025-01-19 RX ADMIN — NICOTINE POLACRILEX 4 MG: 4 GUM, CHEWING BUCCAL at 08:55

## 2025-01-19 RX ADMIN — NICOTINE POLACRILEX 4 MG: 4 GUM, CHEWING BUCCAL at 10:49

## 2025-01-19 RX ADMIN — NICOTINE POLACRILEX 4 MG: 4 GUM, CHEWING BUCCAL at 07:49

## 2025-01-19 RX ADMIN — FLUPHENAZINE HYDROCHLORIDE 10 MG: 10 TABLET, FILM COATED ORAL at 20:15

## 2025-01-19 RX ADMIN — NICOTINE POLACRILEX 4 MG: 4 GUM, CHEWING BUCCAL at 13:23

## 2025-01-19 RX ADMIN — NICOTINE POLACRILEX 4 MG: 4 GUM, CHEWING BUCCAL at 17:04

## 2025-01-19 ASSESSMENT — ACTIVITIES OF DAILY LIVING (ADL)
ADLS_ACUITY_SCORE: 52

## 2025-01-19 NOTE — TELEPHONE ENCOUNTER
4:41 PM Intake called [facility] ED and spoke to RODDY Mishra, requesting a covid test for the pt.     5:57 pm Intake called [facility]. Per Princess, they are capped on acuity.     7:37 PM Intake called [facility]. Per Thao, they are capped.     7:38 PM Intake called [facility]. Per Dang, they can review for low acuity. They will review this pt.     7:54 PM Intake called [facility]. They are willing to review.     10:02 PM Intake received a call from [facility]anthosh [facility]. They are declining this pt due to acuity.           R: [place]  Access Inpatient Bed Call Log 1/19/25 at 3:30PM: Intake has called facilities that have not updated the bed status within the last 12 hours.                 [facility] is at capacity.            [facility] is posting 7 beds. [phone] Per call at 8:00am to APS no beds can check back tomorrow.   [facility] is posting 0 beds. Negative covid required.   [facility] is posting 0 beds. Neg covid. No high school/Yusra-psych. [phone] Per call at 8:03am to Chantelle can check back at noon.    [facility] is posting 0 beds. [phone]   [facility] is posting 0 beds. [phone]    [facility] is posting 6 beds. Negative covid. [phone] Per call at 8:04am to Dianna no child beds, 1 Adol male and 4 YA beds available.   [facility] ([facility]) is posting 0 beds [phone].     [facility] is posting 4 beds. LOW acuity. Ages 12+. Neg covid- [phone]   [facility] has 1 bed posted. No aggression. Negative Covid. Low acuity.   [facility] is posting 0 bed. Low acuity only. Neg covid.  [phone]    [facility] is posting 0 bed. Low acuity. No current aggression.     [facility] is posting 0 beds. Negative covid. [phone]. Per call at 8:06am to Zuri currently no beds but can check bed later for any update.   [facility] ([place]) is posting 7 beds available. Negative covid.  [phone]. Per call at 8:10am to Rolanda very low  acuity mood disorder.   [facility] is posting 0 bed. Low acuity. 72 HH hold preferred. Yusra unit. Negative covid required. [phone] Per call at 8:09am to Aline reviewing low acuity pts only.   [facility] ([place] is posting 5 beds. Low acuity only. Neg covid.  [phone]. Per call at 8:10am to Rolanda no beds available.   [facility] [place] is posting 2 beds.  Negative covid required.   Vol only, No history of aggression, violence, or assault. No sexual offenders. No 72 HH holds. [phone]    [facility] is posting 3 beds. Negative covid required.  (Must have the cognitive ability to do programming. No aggressive or violent behavior or recent HX in the last 2 yrs. MH must be primary.) Always low acuity.      [facility] has 2 beds posted. Negative covid required.  Low acuity only. Violence and aggression capped.  [phone]    [facility] is posting 2 beds. Low acuity, Negative covid required. [phone]. Per call at 8:13am no answer and unable to LVM.   [facility] posting 2 beds. Negative covid required.  [phone]  3 low acuity beds only.  [facility] [place] is posting 1 bed. Negative covid. LOW acuity. (No lines, drains, or tubes, oxygen, CPAP, IV, etc.) Must Have a Ride Home. [phone]. Per call at 8:15am to Glo bed is available for review.   [facility] is posting 2 beds. Negative covid. (No. lines, drains, or tubes, oxygen, CPAP, IV, etc.) [phone]. Per call at 8:17am to Cailin, low and high acuity beds available.       Pt remains on the work list pending appropriate bed availability.

## 2025-01-19 NOTE — ED PROVIDER NOTES
"Emergency Department I-PASS Sign-out      Illness Severity: \"Watcher\"    Patient Summary:  24 year old male with pertinent PMH of schizoaffective disorder who presented with aggression and acute psychosis    ED Course/treatment plan: On a 72-hour hold admit    Clinical Impression:  (F25.0) Schizoaffective disorder, bipolar type (H)    (F29) Psychosis, atypical (H)    (R46.89) Aggressive behavior    (F22) Paranoia (H)      Edited by: Aftab Dhaliwal MD at 2025    Action List:  Tests to Follow-up:  None    Medications Reconciled/Ordered:  Yes    ED Mental Health Boarding Order Set Used for Diet/PRNs/Other:  Yes    DEC, Extended Care, Psych Consult Orders:  Extended Care and Psychiatry consult ordered.    Situational Awareness & Contingency Plannin Hour Hold Status:  On 72 hour hold.  Active Orders  N/A    Disposition:  Admit/Transfer to Behavioral Health, medically clear for admit/transfer    Boarding subsequent shift/day updates:      Edited by: Aftab Dhaliwal MD at 2025    Synthesis & Events after sign-out:  No acute events my shift.        Dexter Freitas MD   Emergency Medicine     Dexter Freitas MD  25 [id number]    "

## 2025-01-19 NOTE — TELEPHONE ENCOUNTER
R: [facility] Access Inpatient Bed Call Log  1/19/25 @ 12:30am   Intake has called facilities that have not updated their bed status within the last 12 hours.     *METRO:  [place] -- [facility]: @ capacity.  [facility]: POSTING 7 BEDS. No reviews overnight.  [place] -- [facility]: @ cap per website. Low acuity  [place] -- [facility]: @ cap per website. Low acuity only.  [place] -- [facility]: @ cap per website.  [facility]: @ cap per website.   [facility]/ beds: POSTING 6 BEDS. Ages 18-33, Voluntary only, NO aggression/physical or sexual assault/violence hx, or drug abuse. Negative Covid   [place] -- [facility]: @ cap per website.   [place] -- [facility]: @ cap per website.  [place] -- [facility]: @ cap per website. Do not review overnight.     *STATEWIDE (by distance):  [facility]: POSTING 4 BEDS. Mixed unit - Ages 12 and up/Low acuity only.   [facility] - POSTING 2 BEDS. Low acuity, No aggression.    [facility] - @ cap per website.   [facility] - @ cap per website. Low acuity only. No current aggression.  [facility] - POSTING 1 BED. Negative Covid. Lower acuity only.   [facility] - POSTING 7 BEDS. Low acuity only. Prefer med-adjustment placements.  [facility] - POSTING 5 BEDS. No aggression. - Only Low Acuity reviews.  [place] - [facility]: POSTING 1 BED. No aggressive behaviors. Do not review overnight.  [place] -- [facility]: POSTING 4 BEDS.  No hx of aggression. No sexual offenders. Voluntary patients only.  [place] -- [facility]: POSTING 3 BEDS. Low acuity only. Must be able to do programming. No aggression/violent behavior in 2 years. No CD treatment.  [place] -- [facility]: POSTING 2 BEDS. Negative Covid test. Must be low acuity ONLY.  [facility]: POSTING 2 BEDS. Low acuity. Negative Covid.     [place] -- [place] [facility]: POSTING 2 BEDS. - Reports 2 SDU beds.  [facility]: POSTING 1 BED. No hx of aggression/assault. No lines, drains or tubes. Does not provide detox or CD treatment. Require a confirmed ride upon discharge.  [place] -- [facility]: POSTING 2 BEDS. Negative COVID. No medical devices.     Pt remains on waitlist pending appropriate placement availability.

## 2025-01-19 NOTE — TELEPHONE ENCOUNTER
R: STATEWIDE     [facility] Access Inpatient Bed Call Log 1/19/25 at 8:00 AM: Intake has called facilities that have not updated the bed status within the last 12 hours.                                      [facility] is at capacity.                               [facility] is posting 7 beds. [phone] Per call at 8:00am to APS no beds can check back tomorrow.                      [facility] is posting 0 beds. Negative covid required.                      [facility] is posting 0 beds. Neg covid. No high school/Yusra-psych. [phone] Per call at 8:03am to Chantelle can check back at noon.                       [facility] is posting 0 beds. [phone]                      [facility] is posting 0 beds. [phone]                       [facility] is posting 6 beds. Negative covid. [phone] Per call at 8:04am to Dianna no child beds, 1 Adol male and 4 YA beds available.                      [facility] is posting 0 beds [phone].                            [facility] is posting 4 beds. LOW acuity. Ages 12+. Neg covid- [phone]                      [facility] has 2 beds posted. No aggression. Negative Covid. Low acuity.                      [facility] is posting 0 bed. Low acuity only. Neg covid.  [phone]                       [facility] is posting 1 beds. Low acuity. No current aggression.                        [facility] is posting 0 beds. Negative covid. [phone]. Per call at 8:06am to Zuri currently no beds but can check bed later for any update.                      [facility] ([place]) is posting 7 beds available. Negative covid.  [phone]. Per call at 8:10am to Rolanda very low acuity mood disorder.                      [facility] [place] is posting 1 bed. Low acuity. 72 HH hold preferred. Yusra unit. Negative covid required. [phone] Per call at 8:09am to Aline barajas low  acuity pts only.                      [facility] ([place]) is posting 5 beds. Low acuity only. Neg covid.  [phone]. Per call at 8:10am to Rolanda no beds available.                      [facility] [place] is posting 2 beds.  Negative covid required.   Vol only, No history of aggression, violence, or assault. No sexual offenders. No 72 HH holds. [phone]                             [facility] is posting 3 beds. Negative covid required.  (Must have the cognitive ability to do programming. No aggressive or violent behavior or recent HX in the last 2 yrs. MH must be primary.) Always low acuity.                       [facility] has 2 beds posted. Negative covid required.  Low acuity only. Violence and aggression capped.  [phone]                       [facility] is posting 2 beds. Low acuity, Negative covid required. [phone]. Per call at 8:13am no answer and unable to LVM.                      [facility] posting 2 bed. Negative covid required.  [phone]                       [facility] is posting 1 bed. Negative covid. LOW acuity. (No lines, drains, or tubes, oxygen, CPAP, IV, etc.) Must Have a Ride Home. [phone]. Per call at 8:15am to Glo bed is available for review.                      [facility] is posting 2 beds. Negative covid. (No. lines, drains, or tubes, oxygen, CPAP, IV, etc.) [phone]. Per call at 8:17am to Cailin, low and high acuity beds available.       Pt remains on the work list pending appropriate bed availability.

## 2025-01-20 ENCOUNTER — TELEPHONE (OUTPATIENT)
Dept: BEHAVIORAL HEALTH | Facility: CLINIC | Age: 25
End: 2025-01-20

## 2025-01-20 LAB
CLOZAPINE SERPL-MCNC: <20 NG/ML
CLOZAPINE+NOR SERPL-MCNC: <40 NG/ML
NORCLOZAPINE SERPL-MCNC: <20 NG/ML

## 2025-01-20 PROCEDURE — 250N000013 HC RX MED GY IP 250 OP 250 PS 637: Performed by: FAMILY MEDICINE

## 2025-01-20 PROCEDURE — 250N000013 HC RX MED GY IP 250 OP 250 PS 637: Performed by: EMERGENCY MEDICINE

## 2025-01-20 RX ORDER — CLOZAPINE 25 MG/1
12.5 TABLET ORAL AT BEDTIME
Status: DISCONTINUED | OUTPATIENT
Start: 2025-01-20 | End: 2025-01-22

## 2025-01-20 RX ADMIN — NICOTINE POLACRILEX 4 MG: 4 GUM, CHEWING BUCCAL at 11:52

## 2025-01-20 RX ADMIN — NICOTINE POLACRILEX 4 MG: 4 GUM, CHEWING BUCCAL at 08:39

## 2025-01-20 RX ADMIN — Medication 15 MG: at 08:38

## 2025-01-20 RX ADMIN — NICOTINE POLACRILEX 4 MG: 4 GUM, CHEWING BUCCAL at 17:03

## 2025-01-20 RX ADMIN — NICOTINE POLACRILEX 4 MG: 4 GUM, CHEWING BUCCAL at 09:43

## 2025-01-20 RX ADMIN — DIVALPROEX SODIUM 1000 MG: 500 TABLET, FILM COATED, EXTENDED RELEASE ORAL at 21:06

## 2025-01-20 RX ADMIN — NICOTINE POLACRILEX 4 MG: 4 GUM, CHEWING BUCCAL at 19:49

## 2025-01-20 RX ADMIN — NICOTINE POLACRILEX 4 MG: 4 GUM, CHEWING BUCCAL at 10:44

## 2025-01-20 RX ADMIN — FLUPHENAZINE HYDROCHLORIDE 10 MG: 10 TABLET, FILM COATED ORAL at 21:06

## 2025-01-20 RX ADMIN — NICOTINE POLACRILEX 4 MG: 4 GUM, CHEWING BUCCAL at 16:04

## 2025-01-20 RX ADMIN — NICOTINE POLACRILEX 4 MG: 4 GUM, CHEWING BUCCAL at 13:02

## 2025-01-20 RX ADMIN — NICOTINE POLACRILEX 4 MG: 4 GUM, CHEWING BUCCAL at 14:20

## 2025-01-20 RX ADMIN — NICOTINE POLACRILEX 4 MG: 4 GUM, CHEWING BUCCAL at 18:39

## 2025-01-20 RX ADMIN — NICOTINE POLACRILEX 4 MG: 4 GUM, CHEWING BUCCAL at 20:56

## 2025-01-20 ASSESSMENT — ACTIVITIES OF DAILY LIVING (ADL)
ADLS_ACUITY_SCORE: 52

## 2025-01-20 NOTE — TELEPHONE ENCOUNTER
----- Message from Anastasia Brunner sent at 1/20/2025  9:18 AM CST -----  Regarding: Patient discharging IOP/DT 1 today, end of day.  Patient discharging IOP/DT 1 today, end of day. Please remove from EN. Thank you!!

## 2025-01-20 NOTE — TELEPHONE ENCOUNTER
R: [facility] Access Inpatient Bed Call Log  1/20/25 @ 12:30am   Intake has called facilities that have not updated their bed status within the last 12 hours.     *METRO:  [place] -- [facility]: @ capacity.  [facility]: POSTING 7 BEDS. No reviews overnight.  [place] -- [facility]: @ cap per website. Low acuity  [place] -- [facility]: @ cap per website. Low acuity only.  [place] -- [facility]: @ cap per website.  [facility]: @ cap per website.   [facility] beds: POSTING 6 BEDS. Ages 18-33, Voluntary only, NO aggression/physical or sexual assault/violence hx, or drug abuse. Negative Covid   [place] [facility]: @ cap per website.   [place] -- [email or web address] @ cap per website.  [place] -- [facility]: @ cap per website. Do not review overnight.     *STATEWIDE (by distance):  [facility]: POSTING 4 BEDS. Mixed unit - Ages 12 and up/Low acuity only.   [facility] - POSTING 1 BED. Low acuity, No aggression.    [facility] - @ cap per website.   [facility] - @ cap per website. Low acuity only. No current aggression.  [facility] - @ cap per website. Negative Covid. Lower acuity only.   [facility] - POSTING 3 BEDS. Low acuity only. Prefer med-adjustment placements.  [place] [facility] - @ cap per website. No aggression. - Only Low Acuity reviews.  [place] - [facility]: @ cap per website. No aggressive behaviors. Do not review overnight.  [place] -- [facility]: POSTING 2 BEDS.  No hx of aggression. No sexual offenders. Voluntary patients only.  [place] -- [facility]: POSTING 2 BEDS. Low acuity only. Must be able to do programming. No aggression/violent behavior in 2 years. No CD treatment.  [place] [facility]: POSTING 1 BED. Negative Covid test. Must be low acuity ONLY.  [facility]: POSTING 2 BEDS. Low acuity. Negative  Covid.    [place] -- [place] [facility]: POSTING 2 BEDS. - Reports 2 SDU beds.  [facility]: POSTING 1 BED. No hx of aggression/assault. No lines, drains or tubes. Does not provide detox or CD treatment. Require a confirmed ride upon discharge.  [place] -- [facility]: POSTING 2 BEDS. Negative COVID. No medical devices.     Pt remains on waitlist pending appropriate placement availability.

## 2025-01-20 NOTE — ED NOTES
"Pt states that he does not want to take clozapine ever again because he \"fainted from it and it just not worth the risk.\"  Pt remained calm all shift.  Ate all his meals.  Goes to bathroom and appears to be getting enough fluids in.  Lays on his cart in his room most of the day with intermittent napping and watching TV.  "

## 2025-01-20 NOTE — ED NOTES
Pt has been calm and cooperative this shift. Intake called to state that patient would be transferred to outside facility and needed Covid test. Pt's results are negative. Pt watched news the entire shift except for a brief nap. Pt ate breakfast, lunch and dinner. Pt took AM meds appropriately. Pt has taken nicotine gum every hour when it is available. Writer gave pt toiletries to brush teeth, wash face, etc.

## 2025-01-20 NOTE — ED NOTES
Writer informed pt of possibility of being transferred to outside facility. Pt remained calm, but has inquired as to when he'll transfer out. Writer informed pt they were unsure. Pt calm. No acute events this shift.

## 2025-01-20 NOTE — TELEPHONE ENCOUNTER
R: [place]  Access Inpatient Bed Call Log 01/20/2025 @ 9:07 AM:  Intake has called facilities that have not updated the bed status within the last 12 hours.             10:04 AM Carmencita from  will review - Faxed clinical     11:37 AM Devante from  declined pt d/t acuity and aggression     METRO:  [facility] is posting [facility] beds.  [facility] is posting 7 beds. [phone] Per call @ 9:35 AM, at CAP.   [facility] is posting 0 beds.   [facility] is posting 0 beds. No high school or renee psych. [phone] Per call @ 9:34 AM, no high acuity beds. May have low acuity later.   [place] ([facility]) is posting 0 beds.  [facility]) is posting 0 beds. [phone]  [facility] is posting 6 beds. Ages 18-35, labs required. [phone] Per call @ 9:18 AM, a few YA beds. DECLINED D/T ACUITY   [facility] ([facility]) is posting 0 beds.  [facility] is posting 0 beds. [phone]    STATEWIDE:   [facility] () is posting 4 beds. Mixed unit (12+), low acuity. [phone] PT IS NOT APPROPRIATE D/T ACUITY   [facility] ([facility]) is posting 0 beds. No current aggression, low acuity.  [facility] is posting 0 beds. [phone] ext. [phone]  [facility] is posting 0 beds. [phone]  [facility] ([facility]) is posting 0 beds. No current aggression, low acuity.   [facility] ([place]) is posting 4 beds. [phone] @ CAPACITY   [facility] is posting 0 beds. Low acuity, 72HH preferred. [phone] Per call @ 9:33 AM, no beds currently, can call back later.   [facility] ([place]) is posting 0 beds. [phone]   [facility]) is posting 2 beds. VOL only, no hx of aggression/violence/assault. No sexual offenders, no 72HH. [phone] PT IS NOT APPROPRIATE D/T AGGRESSION   [facility] is posting 2 beds. Must have cognitive ability to program. No aggression or violent  hx in the last 2 years. [phone] PT IS NOT APPROPRIATE D/T AGGRESSION   [facility] is posting 1 bed. Low acuity, violence and aggression capped. [phone] Per call @ 9:31 AM, on divert.   [facility] is posting 2 beds. [phone] Per call @ 9:26 AM, may have 1 d/c later.  [facility] is posting 2 beds. [phone] DECLINED D/T ACUITY   [facility] is posting 1 bed. No lines, drains, tubes, oxygen, IV or CPAP. [phone] Per call @ 9:26 AM, FULL.   [facility] is posting 2 beds. MIXED UNIT. No lines, drains, tubes, oxygen, IV or CPAP. [phone] Per call @ 9:22 AM, few gen and high acuity beds. Case by case for both. DECLINED D/T NOT MEETING IP CRITERIA       Pt remains on work list pending appropriate bed availability.

## 2025-01-20 NOTE — ED NOTES
Writer introduced self to patient- pt denied needs except nicotine gum. Gum provided to patient. Denies other needs.
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
## 2025-01-20 NOTE — TELEPHONE ENCOUNTER
9:01 AM  Intake received call from [facility] pt is declined d/t not meeting their IPMH criteria. WL and admit board updated.    R;  Intake received RF 01/20/2025 10:18:24 AM COURT REPORT REVOCATION OF PROVISIONAL DISCHARGE. Filed in Court/Commitment/Nguyen/Hold folder.  COORDINATOR NOTIFIED.

## 2025-01-21 ENCOUNTER — TELEPHONE (OUTPATIENT)
Dept: BEHAVIORAL HEALTH | Facility: CLINIC | Age: 25
End: 2025-01-21

## 2025-01-21 PROCEDURE — 250N000013 HC RX MED GY IP 250 OP 250 PS 637: Performed by: FAMILY MEDICINE

## 2025-01-21 PROCEDURE — 250N000013 HC RX MED GY IP 250 OP 250 PS 637: Performed by: EMERGENCY MEDICINE

## 2025-01-21 PROCEDURE — 99223 1ST HOSP IP/OBS HIGH 75: CPT | Performed by: PSYCHIATRY & NEUROLOGY

## 2025-01-21 RX ADMIN — Medication 15 MG: at 07:46

## 2025-01-21 RX ADMIN — NICOTINE POLACRILEX 4 MG: 4 GUM, CHEWING BUCCAL at 19:20

## 2025-01-21 RX ADMIN — NICOTINE POLACRILEX 4 MG: 4 GUM, CHEWING BUCCAL at 18:19

## 2025-01-21 RX ADMIN — NICOTINE POLACRILEX 4 MG: 4 GUM, CHEWING BUCCAL at 15:54

## 2025-01-21 RX ADMIN — FLUPHENAZINE HYDROCHLORIDE 10 MG: 10 TABLET, FILM COATED ORAL at 20:40

## 2025-01-21 RX ADMIN — NICOTINE POLACRILEX 4 MG: 4 GUM, CHEWING BUCCAL at 11:41

## 2025-01-21 RX ADMIN — NICOTINE POLACRILEX 4 MG: 4 GUM, CHEWING BUCCAL at 14:34

## 2025-01-21 RX ADMIN — DIVALPROEX SODIUM 1000 MG: 500 TABLET, FILM COATED, EXTENDED RELEASE ORAL at 20:40

## 2025-01-21 RX ADMIN — NICOTINE POLACRILEX 4 MG: 4 GUM, CHEWING BUCCAL at 13:26

## 2025-01-21 RX ADMIN — NICOTINE POLACRILEX 4 MG: 4 GUM, CHEWING BUCCAL at 12:43

## 2025-01-21 RX ADMIN — NICOTINE POLACRILEX 4 MG: 4 GUM, CHEWING BUCCAL at 09:15

## 2025-01-21 RX ADMIN — NICOTINE POLACRILEX 4 MG: 4 GUM, CHEWING BUCCAL at 17:08

## 2025-01-21 RX ADMIN — NICOTINE POLACRILEX 4 MG: 4 GUM, CHEWING BUCCAL at 07:33

## 2025-01-21 RX ADMIN — NICOTINE POLACRILEX 4 MG: 4 GUM, CHEWING BUCCAL at 20:41

## 2025-01-21 ASSESSMENT — ACTIVITIES OF DAILY LIVING (ADL)
ADLS_ACUITY_SCORE: 52

## 2025-01-21 NOTE — ED NOTES
Patient given nighttime medications. Patient refused to take clozapine and senokot. Offered the patient water to take the prolixin and depakote but patient declined and swallowed pills without difficulty.

## 2025-01-21 NOTE — TELEPHONE ENCOUNTER
R: [place]  Access Inpatient Bed Call Log 01/20/2025 @ 3:03 PM:  Intake has called facilities that have not updated the bed status within the last 12 hours.          STATEWIDE - PT IS ON A 72 HH    [facility] is posting 0 beds.  [facility] is posting 7 beds. [phone]. Per Brandon @ 3:49pm, they are full  [facility] is posting 0 beds.  [facility] is posting 0 beds. No high school or renee psych. [phone]. Per Emiliana @ 3:50pm, they are full  [place] ([facility]) is posting 0 beds.  [facility] () is posting 0 beds. [phone]  [facility] is posting 6 beds. Ages 18-35, labs required. [phone]. Declined 1/19 d/t pt acuity and aggression   [facility] ([facility]) is posting 0 beds.  [facility] ([facility]) is posting 0 beds. [phone]     [facility] () is posting 4 beds. Mixed unit (12+), low acuity. [phone]. Pt not appropriate d/t acuity/hx of aggression  [facility]) is posting 1 beds. No current aggression, low acuity. Per Una @ 3:54pm, they are at capacity  [facility] is posting 0 beds. [phone] ext. [phone]  [facility]) is posting 0 beds. [phone]  [facility] ([facility]) is posting 0 beds. No current aggression, low acuity.  [facility]) is posting 3 beds. [phone]. Per Galilea @ 3:55pm, they have low acuity beds and capped on Individual Assignments. Pt not appropriate for current beds d/t acuity/hx of aggression     [facility] is posting 0 beds. Low acuity, 72HH preferred. [phone]   [facility]) is posting 0 beds. [phone]  [facility]) is posting 2 beds. VOL only, no hx of aggression/violence/assault. No sexual offenders, no 72HH. [phone]. Meets exclusionary d/t 72 HH and recent aggression  [facility] is posting 2 beds. Must have cognitive ability to program. No aggression or violent hx  in the last 2 years. [phone]. Pt not appropriate d/t acuity/hx of aggression  [facility] is posting 1 bed. Low acuity, violence and aggression capped. [phone]. Per Pino @ 3:57pm, beds available - reviewing another referral on  at this time. Intake to check back later  [facility] is posting 2 beds. [phone]. Per Sylvie @ 4:01pm, they are full  [facility] is posting 2 beds. [phone]. Per Teena @ 3:11pm, [place]/low acuity beds only. Declined on 1/19 d/t acuity  [facility] is posting 1 bed. No lines, drains, tubes, oxygen, IV or CPAP. [phone]. Per staff @ 4:02pm, they are at capacity  [facility] is posting 2 beds. MIXED UNIT. No lines, drains, tubes, oxygen, IV or CPAP. [phone]. Declined 1/19 d/t not meeting their IPMH criteria    [facility] is posting 4 beds. OUT OF STATE. [phone]. Per  Intake policy, patients must be voluntary for placement in [place]. Pt is on a 72 HH     Pt remains on work list pending appropriate bed availability.

## 2025-01-21 NOTE — ED PROVIDER NOTES
"Emergency Department I-PASS Sign-out      Illness Severity: \"Watcher\"    Patient Summary:  24 year old male with pertinent PMH of schizoaffective disorder who presented with aggression and acute psychosis    ED Course/treatment plan: On a 72-hour hold admit    Clinical Impression:  (F25.0) Schizoaffective disorder, bipolar type (H)    (F29) Psychosis, atypical (H)    (R46.89) Aggressive behavior    (F22) Paranoia (H)      Edited by: Aftab Dhaliwal MD at 2025    Action List:  Tests to Follow-up:  None    Medications Reconciled/Ordered:  Yes    ED Mental Health Boarding Order Set Used for Diet/PRNs/Other:  Yes    DEC, Extended Care, Psych Consult Orders:  Extended Care and Psychiatry consult ordered.    Situational Awareness & Contingency Plannin Hour Hold Status:  On 72 hour hold.  Active Orders  N/A    Disposition:  Admit/Transfer to Behavioral Health, medically clear for admit/transfer    Boarding subsequent shift/day updates:      Edited by: Aftab Dhaliwal MD at 2025    Synthesis & Events after sign-out:  No acute events my shift        Dexter Freitas MD   Emergency Medicine     Fortino, Dexter Hernandez MD  [phone]    "

## 2025-01-21 NOTE — ED NOTES
[facility]   ED Nurse to Floor Handoff     Junior Fernández is a 24 year old male who speaks English and lives with others,  in a home  They arrived in the ED by ambulance from home    ED Chief Complaint: Mental Health Problem    ED Dx;   Final diagnoses:   Schizoaffective disorder, bipolar type (H)   Psychosis, atypical (H)   Aggressive behavior   Paranoia (H)         Needed?: No    Allergies:   Allergies   Allergen Reactions    Clozapine      Syncope per Pt.     Seasonal Allergies     Seroquel [Quetiapine]      Fainting and slowed breathing    .  Past Medical Hx:   Past Medical History:   Diagnosis Date    Anisometropia     Anxiety     Depression     Elevated blood pressure reading without diagnosis of hypertension     Fracture 07/01/2003    Left clavicle    Fracture 04/01/2005    Left Monteggia    History of substance abuse (H) 11/23/2016    THC, ETOH, stimulants    History of suicide attempt     10/2021 laceration of left arm    Multiple nevi 10/14/2015    Other insomnia     Pneumonia 03/01/2003    RUL    RAD (reactive airway disease)     outgrown    SARS (severe acute respiratory syndrome)     Sinus tachycardia       Baseline Mental status: WDL  Current Mental Status changes: at basesline and impulsive    Infection present or suspected this encounter: no  Sepsis suspected: No  Isolation type: No active isolations  Patient tested for COVID 19 prior to admission: YES     Activity level - Baseline/Home:  Independent  Activity Level - Current:   Independent    Bariatric equipment needed?: No    In the ED these meds were given:   Medications   divalproex sodium extended-release (DEPAKOTE ER) 24 hr tablet 1,000 mg (1,000 mg Oral $Given 1/20/25 2106)   fluPHENAZine (PROLIXIN) tablet 10 mg (10 mg Oral $Given 1/20/25 2106)   methylfolate (DEPLIN) tablet 15 mg (15 mg Oral $Given 1/20/25 0838)   sennosides (SENOKOT) tablet 1 tablet (1 tablet Oral Not Given 1/20/25  2107)   nicotine polacrilex (NICORETTE) gum 4 mg (4 mg Buccal $Given 1/20/25 2056)   cloZAPine (CLOZARIL) half-tab 12.5 mg (12.5 mg Oral Not Given 1/20/25 2106)   haloperidol lactate (HALDOL) injection 5 mg (5 mg Intramuscular $Given 1/17/25 1737)   LORazepam (ATIVAN) injection 2 mg (2 mg Intramuscular $Given 1/17/25 1738)   diphenhydrAMINE (BENADRYL) injection 25 mg (25 mg Intramuscular $Given 1/17/25 1737)   cloZAPine (CLOZARIL) half-tab 12.5 mg (12.5 mg Oral Not Given 1/18/25 2125)     Followed by   cloZAPine (CLOZARIL) tablet 25 mg (25 mg Oral Not Given 1/19/25 2111)       Drips running?  No    Home pump  No    Current LDAs       Labs results:   Labs Ordered and Resulted from Time of ED Arrival to Time of ED Departure   COMPREHENSIVE METABOLIC PANEL - Abnormal       Result Value    Sodium 143      Potassium 4.8      Carbon Dioxide (CO2) 25      Anion Gap 10      Urea Nitrogen 12.7      Creatinine 1.04      GFR Estimate >90      Calcium 8.9      Chloride 108 (*)     Glucose 86      Alkaline Phosphatase 54      AST 26      ALT 15      Protein Total 6.3 (*)     Albumin 4.1      Bilirubin Total <0.2     VALPROIC ACID - Abnormal    Valproic acid 47.2 (*)    CLOZAPINE AND NORCLOZAPINE, STAT ONLY - Abnormal    Clozapine <20 (*)     Norclozapine <20      Total Clozapine and Norclozapine <40 (*)    URINE DRUG SCREEN PANEL - Normal    Amphetamines Urine Screen Negative      Barbituates Urine Screen Negative      Benzodiazepine Urine Screen Negative      Cannabinoids Urine Screen Negative      Cocaine Urine Screen Negative      Fentanyl Qual Urine Screen Negative      Opiates Urine Screen Negative      PCP Urine Screen Negative     COVID-19 VIRUS (CORONAVIRUS) BY PCR - Normal    SARS CoV2 PCR Negative     CBC WITH PLATELETS AND DIFFERENTIAL    WBC Count 8.2      RBC Count 4.54      Hemoglobin 13.9      Hematocrit 41.1      MCV 91      MCH 30.6      MCHC 33.8      RDW 12.3      Platelet Count 263      % Neutrophils 61       "% Lymphocytes 28      % Monocytes 8      % Eosinophils 2      % Basophils 1      % Immature Granulocytes 0      NRBCs per 100 WBC 0      Absolute Neutrophils 5.0      Absolute Lymphocytes 2.2      Absolute Monocytes 0.7      Absolute Eosinophils 0.2      Absolute Basophils 0.1      Absolute Immature Granulocytes 0.0      Absolute NRBCs 0.0         Imaging Studies: No results found for this or any previous visit (from the past 24 hours).    Recent vital signs:   /63   Pulse 74   Temp 98.1  F (36.7  C) (Oral)   Resp 16   Ht 1.803 m (5' 11\")   Wt 63.5 kg (140 lb)   SpO2 99%   BMI 19.53 kg/m      Armida Coma Scale Score: 15 (01/20/25 0835)  Assessment Qualifiers: patient not sedated/intubated    Cardiac Rhythm: Normal Sinus  Pt needs tele? No  Skin/wound Issues: None    Code Status: Full Code    Pain control: pt had none    Nausea control: pt had none    Abnormal labs/tests/findings requiring intervention: N/A    Family present during ED course? No   Family Comments/Social Situation comments: N/A    Tasks needing completion: None    Cathi Mendez RN  [facility] --   [phone] [place] [facility]  [phone] [facility] ED      "

## 2025-01-21 NOTE — TELEPHONE ENCOUNTER
R: [place]  Access Inpatient Bed Call Log 01/21/2025 @ 8:13 AM:  Intake has called facilities that have not updated the bed status within the last 12 hours.         Pt not appropriate for beds available.   [facility] is posting 0 beds.  [facility] is posting 7 beds. [phone] Per call @ 8:32 AM, at CAP. cb later.  Per call at 10:11 am to Kari, they are at cap.   [facility] ([facility]) is posting 0 beds.   [facility] is posting 0 beds. No high school or renee psych. [phone] Call after morning meeting. Per call at 10:12 am to Emiliana, they are at cap.   [place] ([facility]) is posting 0 beds.  [facility] () is posting 0 beds. [phone]  [facility] is posting 6 beds. Ages 18-35, labs required. [phone] Per call @ 8:25 AM, couple of YA beds. Declined 1/20/25 due to pt's acuity and aggression.   [facility] ([facility]) is posting 0 beds.  [facility] ([facility]) is posting 0 beds. [phone]     [facility] () is posting 4 beds. Mixed unit (12+), low acuity. [phone]  [facility] ([facility]) is posting 1 bed. No current aggression, low acuity.  [facility] is posting 0 beds. [phone] ext. [phone]  [facility] ([place]) is posting 1 bed. Low acuity. [phone] Per call @ 8:34 AM  [facility] ([facility]) is posting 0 beds. No current aggression, low acuity.   [facility] ([place]) is posting 0 beds. [phone] Per call @ 8:34 AM, [place] and [place] on divert.  [facility] is posting 0 beds. Low acuity, 72HH preferred. [phone]    [facility] ([place]) is posting 0 beds. [phone] Per call @ 8:34 AM, [place] and [place] on divert.  [facility] () is posting 1 bed. VOL only, no hx of aggression/violence/assault. No sexual offenders, no 72HH. [phone]  [facility] is posting 4 beds. Must have cognitive ability to program. No aggression or violent hx in the last 2  years. Always low acuity. [phone]  [facility] is posting 1 bed. Low acuity, violence and aggression capped. [phone]   [facility] is posting 2 beds. [phone] Low acuity. Per call @ 8:31 AM, may have 1 bed open.    [facility] is posting 2 beds. [phone]; declined 1/19/25 due to pt's acuity.   [facility] is posting 1 bed. No lines, drains, tubes, oxygen, IV or CPAP. [phone] Per call @ 8:31 AM, FULL.   [facility] is posting 2 beds. MIXED UNIT. No lines, drains, tubes, oxygen, IV or CPAP. [phone] Per call @ 8:24 AM, open beds, number unknown. Declined 1/20/25 due to not meeting their inpt criteria.   [facility] is posting 4 beds. OUT OF STATE. [phone] Per call @ 8:22 AM, no adult beds.     Pt remains on work list pending appropriate bed availability.
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
## 2025-01-21 NOTE — ED NOTES
Triage & Transition Services, Extended Care     Junior Fernández  January 21, 2025      Cam is followed related to long wait time in ED for IP MH placement with intent to revoke PD received. Please see initial DEC Crisis Assessment completed for complete assessment information. Medical record is reviewed. Pt had initial code upon arrival but has since been calm and cooperative. Med compliant aside from Clozaril which pt declines due to side effects of feeling faint. Met with pt to review plan of care. Pt wanted to know if he could leave and when reviewed 72HH and intent, pt expressed understanding. Asked for decaf coffee.    Patient was seen yes  Mode of Assessment: In person    There are no significant status changes.     Recommend to continue care coordination with  CM, IP MH and psychiatry.    Plan:  inpatient mental health     Plan for Care reviewed with Assigned Medical Provider?   yes    Comments:Dr. Purcell       Extended [facility] will follow and meet with patient/family/care team as able or requested.     Radha Mclaughlin, [facility]  [facility], Extended Care   [phone]

## 2025-01-22 PROBLEM — F29 PSYCHOSIS, ATYPICAL (H): Status: ACTIVE | Noted: 2024-12-22

## 2025-01-22 PROBLEM — F22 PARANOIA (H): Status: ACTIVE | Noted: 2024-12-20

## 2025-01-22 PROCEDURE — 99223 1ST HOSP IP/OBS HIGH 75: CPT | Performed by: CLINICAL NURSE SPECIALIST

## 2025-01-22 PROCEDURE — 250N000013 HC RX MED GY IP 250 OP 250 PS 637: Performed by: CLINICAL NURSE SPECIALIST

## 2025-01-22 PROCEDURE — 250N000013 HC RX MED GY IP 250 OP 250 PS 637: Performed by: FAMILY MEDICINE

## 2025-01-22 PROCEDURE — 124N000002 HC R&B MH UMMC

## 2025-01-22 RX ORDER — ACETAMINOPHEN 325 MG/1
650 TABLET ORAL EVERY 4 HOURS PRN
Status: DISPENSED | OUTPATIENT
Start: 2025-01-22

## 2025-01-22 RX ORDER — TRAZODONE HYDROCHLORIDE 50 MG/1
50 TABLET ORAL
Status: ACTIVE | OUTPATIENT
Start: 2025-01-22

## 2025-01-22 RX ORDER — OLANZAPINE 10 MG/2ML
10 INJECTION, POWDER, FOR SOLUTION INTRAMUSCULAR 3 TIMES DAILY PRN
Status: ACTIVE | OUTPATIENT
Start: 2025-01-22

## 2025-01-22 RX ORDER — PALIPERIDONE 3 MG/1
3 TABLET, EXTENDED RELEASE ORAL DAILY
Status: DISCONTINUED | OUTPATIENT
Start: 2025-01-22 | End: 2025-01-25

## 2025-01-22 RX ORDER — OLANZAPINE 10 MG/1
10 TABLET ORAL 3 TIMES DAILY PRN
Status: ACTIVE | OUTPATIENT
Start: 2025-01-22

## 2025-01-22 RX ORDER — MAGNESIUM HYDROXIDE/ALUMINUM HYDROXICE/SIMETHICONE 120; 1200; 1200 MG/30ML; MG/30ML; MG/30ML
30 SUSPENSION ORAL EVERY 4 HOURS PRN
Status: ACTIVE | OUTPATIENT
Start: 2025-01-22

## 2025-01-22 RX ORDER — HYDROXYZINE HYDROCHLORIDE 25 MG/1
25 TABLET, FILM COATED ORAL EVERY 4 HOURS PRN
Status: ACTIVE | OUTPATIENT
Start: 2025-01-22

## 2025-01-22 RX ORDER — AMOXICILLIN 250 MG
1 CAPSULE ORAL 2 TIMES DAILY PRN
Status: ACTIVE | OUTPATIENT
Start: 2025-01-22

## 2025-01-22 RX ADMIN — NICOTINE POLACRILEX 4 MG: 4 GUM, CHEWING BUCCAL at 09:22

## 2025-01-22 RX ADMIN — NICOTINE POLACRILEX 4 MG: 4 GUM, CHEWING BUCCAL at 20:11

## 2025-01-22 RX ADMIN — NICOTINE POLACRILEX 4 MG: 4 GUM, CHEWING BUCCAL at 11:46

## 2025-01-22 RX ADMIN — FLUPHENAZINE HYDROCHLORIDE 10 MG: 10 TABLET, FILM COATED ORAL at 20:39

## 2025-01-22 RX ADMIN — NICOTINE POLACRILEX 4 MG: 4 GUM, CHEWING BUCCAL at 17:43

## 2025-01-22 RX ADMIN — NICOTINE POLACRILEX 4 MG: 4 GUM, CHEWING BUCCAL at 14:24

## 2025-01-22 RX ADMIN — NICOTINE POLACRILEX 4 MG: 4 GUM, CHEWING BUCCAL at 08:25

## 2025-01-22 RX ADMIN — PALIPERIDONE 3 MG: 3 TABLET, EXTENDED RELEASE ORAL at 15:40

## 2025-01-22 RX ADMIN — Medication 15 MG: at 08:23

## 2025-01-22 RX ADMIN — DIVALPROEX SODIUM 1000 MG: 500 TABLET, FILM COATED, EXTENDED RELEASE ORAL at 19:15

## 2025-01-22 RX ADMIN — NICOTINE POLACRILEX 4 MG: 4 GUM, CHEWING BUCCAL at 10:42

## 2025-01-22 RX ADMIN — NICOTINE POLACRILEX 4 MG: 4 GUM, CHEWING BUCCAL at 07:19

## 2025-01-22 RX ADMIN — NICOTINE POLACRILEX 4 MG: 4 GUM, CHEWING BUCCAL at 19:15

## 2025-01-22 RX ADMIN — NICOTINE POLACRILEX 4 MG: 4 GUM, CHEWING BUCCAL at 15:27

## 2025-01-22 RX ADMIN — NICOTINE POLACRILEX 4 MG: 4 GUM, CHEWING BUCCAL at 16:38

## 2025-01-22 ASSESSMENT — ACTIVITIES OF DAILY LIVING (ADL)
HYGIENE/GROOMING: INDEPENDENT
ADLS_ACUITY_SCORE: 26
DRESS: INDEPENDENT
ADLS_ACUITY_SCORE: 26
LAUNDRY: WITH SUPERVISION
ADLS_ACUITY_SCORE: 26
ADLS_ACUITY_SCORE: 52
ADLS_ACUITY_SCORE: 26
ADLS_ACUITY_SCORE: 26
ORAL_HYGIENE: INDEPENDENT
ADLS_ACUITY_SCORE: 26
DRESS: SCRUBS (BEHAVIORAL HEALTH);INDEPENDENT
HYGIENE/GROOMING: INDEPENDENT
ADLS_ACUITY_SCORE: 52
ORAL_HYGIENE: INDEPENDENT
ADLS_ACUITY_SCORE: 26

## 2025-01-22 ASSESSMENT — LIFESTYLE VARIABLES: SKIP TO QUESTIONS 9-10: 1

## 2025-01-22 NOTE — PROGRESS NOTES
Patient was present in the milieu today pacing the halls. OT introduced self and group programming to him. He acknowledged this and invitation to group but did not join. OT will continue to encourage participation as appropriate.
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
## 2025-01-22 NOTE — PROVIDER NOTIFICATION
No Wallet on admission    Locker Room: 1 pair of tennis, 1 snow jacket, 1 sweat shirt( hoodie), 1 pair jeans, 1 brown T shirt, 1 pair of boxer, 1 .  A               Admission:  I am responsible for any personal items that are not sent to the safe or pharmacy.  [facility] is not responsible for loss, theft or damage of any property in my possession.  Signature:  _________________________________ Date: _______  Time: _____                                              Staff Signature:  ____________________________ Date: ________  Time: _____      2nd Staff person, if patient is unable/unwilling to sign:    Signature: ________________________________ Date: ________  Time: _____     Discharge:  [facility] has returned all of my personal belongings:    Signature: _________________________________ Date: ________  Time: _____                                          Staff Signature:  ____________________________ Date: ________  Time: _____

## 2025-01-22 NOTE — PROGRESS NOTES
Writer called patient's  from [facility] team, Polina to update that patient has been admitted to [facility].     Writer LVM for Polina for best phone number to reach patient at station 10's phone number at:   Ph : [phone]  Fax : [phone]    Magdalene Armendariz  Extended Care Coordinator  01/22/25 10:01 AM

## 2025-01-22 NOTE — PLAN OF CARE
BEH IP Unit Acuity Rating Score (UARS)  Patient is given one point for every criteria they meet.    CRITERIA SCORING   On a 72 hour hold, court hold, committed, stay of commitment, or revocation. 1    Patient LOS on BEH unit exceeds 20 days. 0  LOS: 0   Patient under guardianship, 55+, otherwise medically complex, or under age 11. 0   Suicide ideation without relief of precipitating factors. 0   Current plan for suicide. 0   Current plan for homicide. 0   Imminent risk or actual attempt to seriously harm another without relief of factors precipitating the attempt. 0   Severe dysfunction in daily living (ex: complete neglect for self care, extreme disruption in vegetative function, extreme deterioration in social interactions). 1   Recent (last 7 days) or current physical aggression in the ED or on unit. 1   Restraints or seclusion episode in past 72 hours. 0   Recent (last 7 days) or current verbal aggression, agitation, yelling, etc., while in the ED or unit. 1   Active psychosis. 1   Need for constant or near constant redirection (from leaving, from others, etc).  0   Intrusive or disruptive behaviors. 0   Patient requires 3 or more hours of individualized nursing care per 8-hour shift (i.e. for ADLs, meds, therapeutic interventions). 0   TOTAL 5

## 2025-01-22 NOTE — PLAN OF CARE
Problem: Sleep Disturbance  Goal: Adequate Sleep/Rest  Outcome: Progressing     Problem: Psychotic Signs/Symptoms  Goal: Improved Behavioral Control (Psychotic Signs/Symptoms)  Outcome: Progressing   Goal Outcome Evaluation:    Plan of Care Reviewed With: patient      The patient slept for 4.75 hours. Ongoing safety checks are being conducted every 15 minutes, and there have been no related incidents, as he is on precautions for suicide, elopement, and assault. There were no respiratory issues during the night, and he did not receive any medications.

## 2025-01-22 NOTE — DISCHARGE INSTRUCTIONS
Behavioral Discharge Planning and Instructions    Summary: You were admitted to Station 10 on 1/17/2025 due to {Mental Health 1:758849}. You were treated by {[facility] Providers:801870} and discharged on ***/***/2025 to {[facility] D/C Locations:740756}.    Main Diagnosis:   ***    Health Care Follow-up:   Appointment Date/Time: ***   Psychiatrist/Medication Management: ***     Address: ***   Phone Number: ***    Fax: ***    Appointment Date/Time: ***   Therapist: ***     Address: ***   Phone Number: ***     Fax: ***    Appointment Date/Time: ***   Primary Care Provider: ***     Address: ***   Phone Number: ***     Fax: ***     If no appointments scheduled, explain ***    Attend all scheduled appointments with your outpatient providers. Call at least 24 hours in advance if you need to reschedule an appointment to ensure continued access to your outpatient providers.     Major Treatments, Procedures and Findings:  You were provided with: {Major Treatments:826732}    Symptoms to Report: Feeling more aggressive, increased confusion, losing more sleep, mood getting worse, or thoughts of suicide.    Early warning signs can include: Increased depression or anxiety sleep disturbances increased thoughts or behaviors of suicide or self-harm  increased unusual thinking, such as paranoia or hearing voices.    Safety and Wellness: Take all medicines as directed. Make no changes unless your doctor suggests them. Follow treatment recommendations. Refrain from alcohol and non-prescribed drugs. Ask your support system to help you reduce your access to items that could harm yourself or others. If there is a concern for safety, call 911.    Resources:   General Mental Health Resources:   Throughout [place]: call **CRISIS (**169224)  Crisis Text Line: is available for free, 24/7 by texting [place] to 891012  Suicide Awareness Voices of Education (SAVE) (www.save.org): 408-529-ZSKB (2534)  The National Suicide Prevention Lifeline is now: 988 Suicide  and Crisis Lifeline. Call 988 anytime.  National Harper on Mental Illness (www.[place].charla.org): 749.376.5267 or 758-841-5898.  Schj3kdyn: text the word LIFE to 71051 for immediate support and crisis intervention  Mental Health Consumer/Survivor Network of [place] (www.mhcsn.net): 913.833.4278 or 717-324-7620  Mental Health Association of [place] (www.mentalhealth.org): 197.839.7618 or 416-705-7028  Peer Support Connection [place] Warmline (PSC) 1-568.807.2214 Available from 5pm - 9am (7 days a week/365 days a year)  {OP Beh CRISIS PHONE NUMBERS:973915}    General Medication Instructions:   See your medication sheet(s) for instructions.   Take all medicines as directed.  Make no changes unless your doctor suggests them.   Go to all your doctor visits.  Be sure to have all your required lab tests. This way, your medicines can be refilled on time.  Do not use any drugs not prescribed by your doctor.  Avoid alcohol.    Advance Directives:   Scanned document on file with [employer]? No scanned doc  Is document scanned? Pt states no documents  Honoring Choices Your Rights Handout: Informed and given  Was more information offered? Pt declined    The Treatment team has appreciated the opportunity to work with you. If you have any questions or concerns about your recent admission, you can contact the unit which can receive your call 24 hours a day, 7 days a week. They will be able to get in touch with a Provider if needed. The unit number is [phone].    Your Rights Handout: Informed and given  Was more information offered? Pt declined    The Treatment team has appreciated the opportunity to work with you. If you have any questions or concerns about your recent admission, you can contact the unit which can receive your call 24 hours a day, 7 days a week. They will be able to get in touch with a Provider if needed. The unit number is [phone].
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
## 2025-01-22 NOTE — PLAN OF CARE
Problem: Adult Behavioral Health Plan of Care  Goal: Adheres to Safety Considerations for Self and Others  Outcome: Progressing  Intervention: Develop and Maintain Individualized Safety Plan  Safety Measures:   environmental rounds completed   safety rounds completed    Pt presented as alert and oriented to place and self throughout shift.  They were intermittently visible in the milieu pacing the halls.  Pt did not attend OT groups on this shift.  They were dressed appropriately and appeared adequately hygenic.  Pt is eating and drinking adequately.  They were compliant with their scheduled medication, aside from senna.  Practitioner made a couple med changed that pt was agreeable to.  Pt requested PRN nicotine almost every hour on this shift.  VSS and no issues with bowel or bladder.  Pt denied anxiety and depression.  They did not endorse any symptoms of psychosis.  Pt appears flat and guarded.  Pt denied pain or any acute physical health concerns.  No side effects to medications noted this shift.    Goal Outcome Evaluation:    Plan of Care Reviewed With: patient

## 2025-01-22 NOTE — TELEPHONE ENCOUNTER
R: [place]  Access Inpatient Bed Call Log 01/21/2025 @ 4:30PM:  Intake has called facilities that have not updated the bed status within the last 12 hours.           [place]:  [facility] is posting 0 beds.  [facility] is posting 7 beds. [phone]. Per Brandon @ 5:46PM, they are full  [facility] ([facility]) is posting 0 beds.  [facility] is posting 0 beds. No high school or renee psych. [phone]  [place] ([facility]) is posting 0 beds.  [facility] () is posting 0 beds. [phone]  [facility] is posting 6 beds. Ages 18-35, labs required. [phone] Per call at 5pm- Dev[place] reports 1 YA bed  [facility]) is posting 0 beds.  [facility] ([facility]) is posting 0 beds. [phone]     [place]:  [facility] () is posting 4 beds. Mixed unit (12+), low acuity. [phone]  [facility] ([facility]) is posting 1 bed. No current aggression, low acuity.  [facility] is posting 0 beds. [phone] ext. [phone]  [facility] ([place]) is posting 1 bed. [phone]. Per Jessica @ 5:52PM, they are at full capacity  [facility] ([facility]) is posting 1 bed. No current aggression, low acuity.  [facility] ([place]) is posting 3 beds. [phone]. Per Jessica @ 5:52PM, they are at full capacity   [facility] is posting 0 beds. Low acuity, 72HH preferred. [phone]   [facility] ([place]) is posting 0 beds. [phone]      [facility]) is posting 0 bed. VOL only, no hx of aggression/violence/assault. No sexual offenders, no 72HH. [phone]  [facility] is posting 4 beds. Must have cognitive ability to program. No aggression or violent hx in the last 2 years. [phone]  [facility] is posting 1 bed. Low acuity, violence and aggression capped. [phone]  [facility] is posting 2 beds. [phone]. Per Deng @ 5:53PM, they have a wait list  [facility] is posting 2  beds. [phone]. Low acuity only.  [facility] [place] is posting 1 bed. No lines, drains, tubes, oxygen, IV or CPAP. [phone]. Per staff @ 5:57PM, they are full  [facility] is posting 2 beds. MIXED UNIT. No lines, drains, tubes, oxygen, IV or CPAP. [phone]  [facility] is posting 4 beds. OUT OF STATE. [phone]. Per Intake policy, patients must be voluntary for placement out of state        Pt remains on work list pending appropriate bed availability.    R: 6:30 PM paged iliana Chau to review pt for unit 10/[place].      R: 7:00 PMProvider accepts pt for unit 10/[place].  Pt added to unit queue, unit called with disposition.  CRN unavailable.  Will call intake back    7:25 PM intake called unit 10 again, CRN on break.  Intake notified unit 10 I am updating the ED on pt placement.  RN notified unit may not currently be staffed for an admission.  Intake will page ANS regarding staffing for pt in queue for unit 10.      ED updated with pt placement on unit 10.

## 2025-01-23 VITALS
OXYGEN SATURATION: 99 % | DIASTOLIC BLOOD PRESSURE: 76 MMHG | RESPIRATION RATE: 16 BRPM | WEIGHT: 145.4 LBS | HEIGHT: 71 IN | BODY MASS INDEX: 20.35 KG/M2 | SYSTOLIC BLOOD PRESSURE: 117 MMHG | HEART RATE: 87 BPM | TEMPERATURE: 97.4 F

## 2025-01-23 PROCEDURE — 250N000013 HC RX MED GY IP 250 OP 250 PS 637: Performed by: CLINICAL NURSE SPECIALIST

## 2025-01-23 PROCEDURE — 124N000002 HC R&B MH UMMC

## 2025-01-23 PROCEDURE — 99232 SBSQ HOSP IP/OBS MODERATE 35: CPT | Performed by: CLINICAL NURSE SPECIALIST

## 2025-01-23 PROCEDURE — 250N000013 HC RX MED GY IP 250 OP 250 PS 637: Performed by: FAMILY MEDICINE

## 2025-01-23 RX ADMIN — NICOTINE POLACRILEX 4 MG: 4 GUM, CHEWING BUCCAL at 18:01

## 2025-01-23 RX ADMIN — NICOTINE POLACRILEX 4 MG: 4 GUM, CHEWING BUCCAL at 14:45

## 2025-01-23 RX ADMIN — NICOTINE POLACRILEX 4 MG: 4 GUM, CHEWING BUCCAL at 08:22

## 2025-01-23 RX ADMIN — Medication 15 MG: at 08:22

## 2025-01-23 RX ADMIN — NICOTINE POLACRILEX 4 MG: 4 GUM, CHEWING BUCCAL at 16:56

## 2025-01-23 RX ADMIN — NICOTINE POLACRILEX 4 MG: 4 GUM, CHEWING BUCCAL at 10:58

## 2025-01-23 RX ADMIN — NICOTINE POLACRILEX 4 MG: 4 GUM, CHEWING BUCCAL at 15:55

## 2025-01-23 RX ADMIN — NICOTINE POLACRILEX 4 MG: 4 GUM, CHEWING BUCCAL at 12:06

## 2025-01-23 RX ADMIN — PALIPERIDONE 3 MG: 3 TABLET, EXTENDED RELEASE ORAL at 08:22

## 2025-01-23 RX ADMIN — FLUPHENAZINE HYDROCHLORIDE 10 MG: 10 TABLET, FILM COATED ORAL at 21:30

## 2025-01-23 RX ADMIN — NICOTINE POLACRILEX 4 MG: 4 GUM, CHEWING BUCCAL at 13:41

## 2025-01-23 RX ADMIN — DIVALPROEX SODIUM 1000 MG: 500 TABLET, FILM COATED, EXTENDED RELEASE ORAL at 21:30

## 2025-01-23 RX ADMIN — SENNOSIDES 1 TABLET: 8.6 TABLET, FILM COATED ORAL at 21:30

## 2025-01-23 RX ADMIN — NICOTINE POLACRILEX 4 MG: 4 GUM, CHEWING BUCCAL at 09:22

## 2025-01-23 RX ADMIN — NICOTINE POLACRILEX 4 MG: 4 GUM, CHEWING BUCCAL at 07:13

## 2025-01-23 ASSESSMENT — ACTIVITIES OF DAILY LIVING (ADL)
ADLS_ACUITY_SCORE: 26
ADLS_ACUITY_SCORE: 26
ORAL_HYGIENE: INDEPENDENT
ADLS_ACUITY_SCORE: 26
DRESS: SCRUBS (BEHAVIORAL HEALTH);INDEPENDENT
ADLS_ACUITY_SCORE: 26
HYGIENE/GROOMING: INDEPENDENT
ADLS_ACUITY_SCORE: 26

## 2025-01-23 NOTE — PROVIDER NOTIFICATION
01/22/25 1900   C-SSRS (Daily/Shift Screen)   Q2 Suicidal Thoughts (Since Last Contact) 0-->no   Q6 Suicide Behavior 0-->no   Assess Risk to Self and Maintain Safety   Behavior Management behavioral plan reviewed;boundaries reinforced   Self-Harm Prevention environmental self-harm risks assessed   Enhanced Safety Measures review medications for side effects with activity   Promote Psychosocial Wellbeing   Family/Support System Care self-care encouraged;support provided   Sleep/Rest Enhancement noise level reduced;relaxation techniques promoted;regular sleep/rest pattern promoted;comfort measures;medication   Supportive Measures goal-setting facilitated;guided imagery facilitated;journaling promoted;decision-making supported;relaxation techniques promoted;self-care encouraged;self-reflection promoted;self-responsibility promoted   Establish Safety Plan and Continuity of Care   Safe Transition Promotion protective factors promoted   Environment of Care Checklist   Potentially harmful objects out of patient reach? yes   Personal belongings secured? yes   Patient dressed in hospital-provided attire only? yes   Plastic bags out of patient reach? yes   Patient care equipment (cords, cables, call bells, lines, and drains) shortened, removed, or accounted for? yes   Potentially toxic materials removed or secured? yes   Sharps container removed or secured? yes   Cabinets secured? yes   Room secured by RN per shift? yes

## 2025-01-23 NOTE — PLAN OF CARE
Problem: Psychotic Signs/Symptoms  Goal: Improved Behavioral Control (Psychotic Signs/Symptoms)  Outcome: Progressing   Goal Outcome Evaluation:       Pt was visible in the milieu, pacing in the gould most of the time. Pt alert and oriented x4. Was withdrawn and isolative. Had minimal encounter with staff and peers. Pleasant, calm and cooperative. Pt was encouraged to attend OT group but declined saying that he will attend another day.  Denies all mental health symptoms. Pt contracted for safety. Denies pain or discomfort. Pt was medication compliant. Requested nicotine gum few times. Ate and drank adequately.

## 2025-01-23 NOTE — PLAN OF CARE
Problem: Sleep Disturbance  Goal: Adequate Sleep/Rest  Outcome: Progressing   Pt in bed at the beginning of the shift.Pt appeared asleep for.No behavior or concerns noted during this shift.Pt remains on 15 minutes safety checks.Will continue to monitor.
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
## 2025-01-23 NOTE — PLAN OF CARE
Goal Outcome Evaluation:       Pt was in bed at the beginning of the shift.Pt appeared asleep for 6.25 hours.No behavior or concerns noted during this shift.Pt remains on 15 minutes safety checks.Will continue to monitor.

## 2025-01-23 NOTE — PLAN OF CARE
BEH IP Unit Acuity Rating Score (UARS)  Patient is given one point for every criteria they meet.    CRITERIA SCORING   On a 72 hour hold, court hold, committed, stay of commitment, or revocation. 1    Patient LOS on BEH unit exceeds 20 days. 0  LOS: 1   Patient under guardianship, 55+, otherwise medically complex, or under age 11. 0   Suicide ideation without relief of precipitating factors. 0   Current plan for suicide. 0   Current plan for homicide. 0   Imminent risk or actual attempt to seriously harm another without relief of factors precipitating the attempt. 0   Severe dysfunction in daily living (ex: complete neglect for self care, extreme disruption in vegetative function, extreme deterioration in social interactions). 1   Recent (last 7 days) or current physical aggression in the ED or on unit. 1   Restraints or seclusion episode in past 72 hours. 0   Recent (last 7 days) or current verbal aggression, agitation, yelling, etc., while in the ED or unit. 1   Active psychosis. 1   Need for constant or near constant redirection (from leaving, from others, etc).  0   Intrusive or disruptive behaviors. 0   Patient requires 3 or more hours of individualized nursing care per 8-hour shift (i.e. for ADLs, meds, therapeutic interventions). 0   TOTAL 5
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
## 2025-01-23 NOTE — PLAN OF CARE
Problem: Adult Behavioral Health Plan of Care  Goal: Adheres to Safety Considerations for Self and Others  Outcome: Progressing  Intervention: Develop and Maintain Individualized Safety Plan  Safety Measures:   environmental rounds completed   safety rounds completed    Pt presented as alert and oriented to place and self throughout shift.  They were intermittently visible in the milieu pacing the halls.  Pt did not attend OT groups on this shift.  They were dressed appropriately and appeared adequately hygenic.  Pt is eating and drinking adequately.  They were compliant with their scheduled medication, aside from senna.  Pt requested PRN nicotine several times during this shift.  VSS and no issues with bowel or bladder.  Pt denied anxiety and depression.  They did not endorse any symptoms of psychosis, however pt appears withdrawn and guarded.   Pt denied pain or any acute physical health concerns.  No side effects to medications noted this shift.    Goal Outcome Evaluation:    Plan of Care Reviewed With: patient

## 2025-01-23 NOTE — PLAN OF CARE
Goal Outcome Evaluation:    Plan of Care Reviewed With: patient        Pt visible in the milieu, paced the hallway, and briefly watched TV. Pt denied all mental health psych symptoms and committed for safety. Pt described mood as ok and presented with flat affect, but calm and polite. Pt speech moderate in tone and coherent. Pt less engaged in conversation and mostly kept to self.   Adequate fluids and food intake, voiding freely and no BM concern per pt and pt refused his HS senna.

## 2025-01-24 LAB
BASOPHILS # BLD AUTO: 0 10E3/UL (ref 0–0.2)
BASOPHILS NFR BLD AUTO: 1 %
EOSINOPHIL # BLD AUTO: 0.5 10E3/UL (ref 0–0.7)
EOSINOPHIL NFR BLD AUTO: 10 %
HOLD SPECIMEN: NORMAL
IMM GRANULOCYTES # BLD: 0 10E3/UL
IMM GRANULOCYTES NFR BLD: 0 %
LYMPHOCYTES # BLD AUTO: 1.8 10E3/UL (ref 0.8–5.3)
LYMPHOCYTES NFR BLD AUTO: 38 %
MONOCYTES # BLD AUTO: 0.3 10E3/UL (ref 0–1.3)
MONOCYTES NFR BLD AUTO: 7 %
NEUTROPHILS # BLD AUTO: 2 10E3/UL (ref 1.6–8.3)
NEUTROPHILS NFR BLD AUTO: 44 %
NRBC # BLD AUTO: 0 10E3/UL
NRBC BLD AUTO-RTO: 0 /100
WBC # BLD AUTO: 4.6 10E3/UL (ref 4–11)

## 2025-01-24 PROCEDURE — 250N000013 HC RX MED GY IP 250 OP 250 PS 637: Performed by: CLINICAL NURSE SPECIALIST

## 2025-01-24 PROCEDURE — 250N000013 HC RX MED GY IP 250 OP 250 PS 637: Performed by: FAMILY MEDICINE

## 2025-01-24 PROCEDURE — 99232 SBSQ HOSP IP/OBS MODERATE 35: CPT | Performed by: CLINICAL NURSE SPECIALIST

## 2025-01-24 PROCEDURE — 85048 AUTOMATED LEUKOCYTE COUNT: CPT | Performed by: CLINICAL NURSE SPECIALIST

## 2025-01-24 PROCEDURE — 124N000002 HC R&B MH UMMC

## 2025-01-24 PROCEDURE — 85004 AUTOMATED DIFF WBC COUNT: CPT | Performed by: CLINICAL NURSE SPECIALIST

## 2025-01-24 PROCEDURE — 36415 COLL VENOUS BLD VENIPUNCTURE: CPT | Performed by: CLINICAL NURSE SPECIALIST

## 2025-01-24 RX ADMIN — FLUPHENAZINE HYDROCHLORIDE 10 MG: 10 TABLET, FILM COATED ORAL at 21:27

## 2025-01-24 RX ADMIN — NICOTINE POLACRILEX 4 MG: 4 GUM, CHEWING BUCCAL at 09:34

## 2025-01-24 RX ADMIN — NICOTINE POLACRILEX 4 MG: 4 GUM, CHEWING BUCCAL at 13:41

## 2025-01-24 RX ADMIN — NICOTINE POLACRILEX 4 MG: 4 GUM, CHEWING BUCCAL at 07:29

## 2025-01-24 RX ADMIN — NICOTINE POLACRILEX 4 MG: 4 GUM, CHEWING BUCCAL at 16:43

## 2025-01-24 RX ADMIN — NICOTINE POLACRILEX 4 MG: 4 GUM, CHEWING BUCCAL at 08:27

## 2025-01-24 RX ADMIN — PALIPERIDONE 3 MG: 3 TABLET, EXTENDED RELEASE ORAL at 07:29

## 2025-01-24 RX ADMIN — NICOTINE POLACRILEX 4 MG: 4 GUM, CHEWING BUCCAL at 12:39

## 2025-01-24 RX ADMIN — NICOTINE POLACRILEX 4 MG: 4 GUM, CHEWING BUCCAL at 15:00

## 2025-01-24 RX ADMIN — DIVALPROEX SODIUM 1000 MG: 500 TABLET, FILM COATED, EXTENDED RELEASE ORAL at 21:27

## 2025-01-24 RX ADMIN — Medication 15 MG: at 07:29

## 2025-01-24 RX ADMIN — NICOTINE POLACRILEX 4 MG: 4 GUM, CHEWING BUCCAL at 15:45

## 2025-01-24 RX ADMIN — NICOTINE POLACRILEX 4 MG: 4 GUM, CHEWING BUCCAL at 10:49

## 2025-01-24 RX ADMIN — NICOTINE POLACRILEX 4 MG: 4 GUM, CHEWING BUCCAL at 17:44

## 2025-01-24 ASSESSMENT — ACTIVITIES OF DAILY LIVING (ADL)
ADLS_ACUITY_SCORE: 26
ADLS_ACUITY_SCORE: 26
ORAL_HYGIENE: INDEPENDENT
ADLS_ACUITY_SCORE: 26
DRESS: INDEPENDENT
ADLS_ACUITY_SCORE: 26
HYGIENE/GROOMING: INDEPENDENT
ADLS_ACUITY_SCORE: 26
HYGIENE/GROOMING: INDEPENDENT
ADLS_ACUITY_SCORE: 26
ORAL_HYGIENE: INDEPENDENT
ADLS_ACUITY_SCORE: 26
DRESS: SCRUBS (BEHAVIORAL HEALTH);INDEPENDENT
ADLS_ACUITY_SCORE: 26
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
## 2025-01-24 NOTE — PROGRESS NOTES
"Patient did not join any OT groups today. He primarily paced the halls throughout the day, not initiating any engagement with peers or staff. OT stopped him and informed him about the games/ping pong group for 11:15 and that he may enjoy since the more structured groups seem less interesting to him. He engaged appropriately and expressed gratefulness to this offer. When the 11:15 group arrived, OT stopped patient in the gould again to encourage him to join. He smiled at this invitation but appeared hesitant to join, declining. OT encouraged him to think about it and he said, \"okay I will.\" He did not join. OT will continue to encourage participation.   "

## 2025-01-24 NOTE — PLAN OF CARE
Problem: Sleep Disturbance  Goal: Adequate Sleep/Rest  Outcome: Progressing   Goal Outcome Evaluation:         Pt had an uneventful night. No signs of pain or discomfort noted during 15 minutes safety checks. Pt appeared sleeping comfortable with no distress noted. Pt observed breathing normally. Pt slept about 7 hours. No prn given this shift. No safety concern noted this shift. No behavioral concern noted this shift. Will continue to monitor and will assist if need arise.

## 2025-01-24 NOTE — PLAN OF CARE
BEH IP Unit Acuity Rating Score (UARS)  Patient is given one point for every criteria they meet.    CRITERIA SCORING   On a 72 hour hold, court hold, committed, stay of commitment, or revocation. 1    Patient LOS on BEH unit exceeds 20 days. 0  LOS: 2   Patient under guardianship, 55+, otherwise medically complex, or under age 11. 0   Suicide ideation without relief of precipitating factors. 0   Current plan for suicide. 0   Current plan for homicide. 0   Imminent risk or actual attempt to seriously harm another without relief of factors precipitating the attempt. 0   Severe dysfunction in daily living (ex: complete neglect for self care, extreme disruption in vegetative function, extreme deterioration in social interactions). 1   Recent (last 7 days) or current physical aggression in the ED or on unit. 1   Restraints or seclusion episode in past 72 hours. 0   Recent (last 7 days) or current verbal aggression, agitation, yelling, etc., while in the ED or unit. 1   Active psychosis. 1   Need for constant or near constant redirection (from leaving, from others, etc).  0   Intrusive or disruptive behaviors. 0   Patient requires 3 or more hours of individualized nursing care per 8-hour shift (i.e. for ADLs, meds, therapeutic interventions). 0   TOTAL 5

## 2025-01-24 NOTE — PLAN OF CARE
"This note was created for the purpose of coordinating care.    Junior Fernández \"Cam\" #[id number] (CSN:[id number]) (24 year old M) (Adm: 01/17/25)  [id number]  Patient Demographics    Name Patient ID SSN Legal Sex Birth Date   Junior Fernández [id number]  Male [birth year] (24 yrs)     Address Phone Email     [address]  [place] [address] [phone] (M)  [phone] (H) [email or web address]       [facility]         Reg Status PCP      Verified Jair Cash  [phone]        Marital Status Latter day Language     Single No Latter day English       PCP and Center    Primary Care Provider  Phone Center     Jair Cash [phone] JAIR CASH MD 2021  [address]*         Emergency Contacts    None on File    Other Contacts    Name Relation Home Work Mobile   Van Fernández Father [phone]   Sticker,Rolanda Mother [phone]       Documents Filed to Patient    Power of  Living Will Code Status MyChart Status    Not on File  Not on File  FULL [Updated on 01/22/25 0204]  Active       Auth/Cert Information    Open auth/cert linked to hospital account [id number]        IP Admit Date/Time    Date Time   Jan 22, 2025  2:04 AM       Admission Information    Arrival Date/Time: 01/17/2025  5:05 PM Admit Date/Time: 01/17/2025  5:05 PM IP Adm. Date/Time: 01/22/2025  2:04 AM   Admission Type: Emergency Point of Origin: Emergency Department Admit Category:    Means of Arrival: Ambulance Primary Service: Mental Health Secondary Service:    Transfer Source:  Service Area: [facility] SERVICES Unit: [facility] Mental Health & Addiction Services   Admit Provider: Nabeel Glover MD Attending Provider: Aftab Dhaliwal MD Referring Provider:        Admission Information      Current Information    Attending Provider Admitting Provider Admission Type Admission Status   Nabeel Glover MD " Nabeel Glover MD Emergency Confirmed Admission          Admission Date/Time Discharge Date Hospital Service Auth/Cert Status   25  1705  Mental Health [facility]          Hospital Area Unit Room/Bed    [facility] UR 10NB [id number]            Point of Origin      Emergency Department                    Hospital Account    Name Acct ID Class Status Primary Coverage   Junior Fernández [id number] Mental Health Open [employer] [facility]            Guarantor Account (for Hospital Account #[id number])    Name Relation to Pt Service Area Active? Acct Type   Junior Fernández Self FCS Yes Behavioral   Address Phone     [address] [place] [address] [phone](H)              Coverage Information (for Hospital Account #[id number])    F/O Payor/Plan Subscriber  Precert #   [employer] MARY VILLARREAL [birth year]    Subscriber Relation to Pt Subscriber #   Junior Fernández Self [id number]   [facility] #   [id number]   Address Phone   [address]  [place] [address] [phone]     Policy Number Effective Date   [id number]

## 2025-01-24 NOTE — PLAN OF CARE
"Pt has a blunted affect with calm mood not attending groups. Pt was guarded during check in. Pt reported he was in the hospital for a medication change due to SE of \"fainting\" due to Clozaril. Pt rates anxiety at 0/10 and depression 0/10. No aggression noted this shift. Pt rates pain at 0/10. Pt reports no SI/HI/SIB and contracts for safety. Pt denies any hallucinations and not noted responding to any internal stimuli. Pt was medication compliant. No reported or observed medication side effects. Pt was visible on unit pacing the halls. Pt is not social with any peers. Continue current POC.    Goal Outcome Evaluation: ongoing    Plan of Care Reviewed With: patient Plan of Care Reviewed With: patient    Overall Patient Progress: no changeOverall Patient Progress: no change           "
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
## 2025-01-25 PROCEDURE — 250N000013 HC RX MED GY IP 250 OP 250 PS 637: Performed by: CLINICAL NURSE SPECIALIST

## 2025-01-25 PROCEDURE — 250N000013 HC RX MED GY IP 250 OP 250 PS 637: Performed by: FAMILY MEDICINE

## 2025-01-25 PROCEDURE — 97150 GROUP THERAPEUTIC PROCEDURES: CPT | Mod: GO

## 2025-01-25 PROCEDURE — 124N000002 HC R&B MH UMMC

## 2025-01-25 RX ORDER — PALIPERIDONE 3 MG/1
6 TABLET, EXTENDED RELEASE ORAL DAILY
Status: DISCONTINUED | OUTPATIENT
Start: 2025-01-25 | End: 2025-01-30

## 2025-01-25 RX ADMIN — NICOTINE POLACRILEX 4 MG: 4 GUM, CHEWING BUCCAL at 12:02

## 2025-01-25 RX ADMIN — NICOTINE POLACRILEX 4 MG: 4 GUM, CHEWING BUCCAL at 16:14

## 2025-01-25 RX ADMIN — NICOTINE POLACRILEX 4 MG: 4 GUM, CHEWING BUCCAL at 13:05

## 2025-01-25 RX ADMIN — NICOTINE POLACRILEX 4 MG: 4 GUM, CHEWING BUCCAL at 07:22

## 2025-01-25 RX ADMIN — NICOTINE POLACRILEX 4 MG: 4 GUM, CHEWING BUCCAL at 15:05

## 2025-01-25 RX ADMIN — NICOTINE POLACRILEX 4 MG: 4 GUM, CHEWING BUCCAL at 14:05

## 2025-01-25 RX ADMIN — FLUPHENAZINE HYDROCHLORIDE 10 MG: 10 TABLET, FILM COATED ORAL at 21:08

## 2025-01-25 RX ADMIN — NICOTINE POLACRILEX 4 MG: 4 GUM, CHEWING BUCCAL at 08:36

## 2025-01-25 RX ADMIN — PALIPERIDONE 6 MG: 3 TABLET, EXTENDED RELEASE ORAL at 07:23

## 2025-01-25 RX ADMIN — NICOTINE POLACRILEX 4 MG: 4 GUM, CHEWING BUCCAL at 17:21

## 2025-01-25 RX ADMIN — NICOTINE POLACRILEX 4 MG: 4 GUM, CHEWING BUCCAL at 10:59

## 2025-01-25 RX ADMIN — NICOTINE POLACRILEX 4 MG: 4 GUM, CHEWING BUCCAL at 18:21

## 2025-01-25 RX ADMIN — NICOTINE POLACRILEX 4 MG: 4 GUM, CHEWING BUCCAL at 19:37

## 2025-01-25 RX ADMIN — DIVALPROEX SODIUM 1000 MG: 500 TABLET, FILM COATED, EXTENDED RELEASE ORAL at 21:08

## 2025-01-25 RX ADMIN — Medication 15 MG: at 07:22

## 2025-01-25 RX ADMIN — NICOTINE POLACRILEX 4 MG: 4 GUM, CHEWING BUCCAL at 09:39

## 2025-01-25 RX ADMIN — NICOTINE POLACRILEX 4 MG: 4 GUM, CHEWING BUCCAL at 21:13

## 2025-01-25 ASSESSMENT — ACTIVITIES OF DAILY LIVING (ADL)
ADLS_ACUITY_SCORE: 26
DRESS: INDEPENDENT
ADLS_ACUITY_SCORE: 26
ORAL_HYGIENE: INDEPENDENT
ADLS_ACUITY_SCORE: 26
HYGIENE/GROOMING: INDEPENDENT
DRESS: INDEPENDENT;SCRUBS (BEHAVIORAL HEALTH)
ADLS_ACUITY_SCORE: 26
ORAL_HYGIENE: INDEPENDENT
ADLS_ACUITY_SCORE: 26
HYGIENE/GROOMING: INDEPENDENT
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
## 2025-01-25 NOTE — PROVIDER NOTIFICATION
01/25/25 0600   Sleep/Rest   Night Time # Hours 6.75 hours     Pt had a quiet night with no behavioral or safety concerns. Pt was observed sleeping the entire night, no acute events noted or reported.

## 2025-01-25 NOTE — PLAN OF CARE
"  Problem: Adult Behavioral Health Plan of Care  Goal: Adheres to Safety Considerations for Self and Others  Outcome: Progressing  Intervention: Develop and Maintain Individualized Safety Plan  Recent Flowsheet Documentation  Taken 1/24/2025 1826 by Bonita Desir RN  Safety Measures:   clinical history reviewed   safety rounds completed   suicide check-in completed   Goal Outcome Evaluation:    Plan of Care Reviewed With: patient        Patient was visible in the milieu, observed pacing the hallway most of the time, socially isolative to self. Patient denied all mental health symptoms including anxiety, depression, SI, HI, SIB, AVH. Patient denied pain, VSS.    Patient had an adequate oral intake, no complaints of bowel or bladder concerns. Patient refused scheduled HS Senna but took the other scheduled medications, PRN Nicotine gum was utilized this shift. No side effects of medication was reported or observed.    Patient has remained safe on the unit, no agitation, behavioral outbursts or acute safety concerns. Staff will continue to follow the plan of care.    /78 (BP Location: Left arm)   Pulse 86   Temp 97.4  F (36.3  C) (Oral)   Resp 16   Ht 1.803 m (5' 11\")   Wt 66 kg (145 lb 6.4 oz)   SpO2 97%   BMI 20.28 kg/m                "

## 2025-01-25 NOTE — PLAN OF CARE
Pt has a blunted affect with calm mood not attending groups. Pt was guarded during check in. Pt rates anxiety at 0/10 and depression 0/10. No aggression noted this shift. Pt rates pain at 0/10. Pt reports no SI/HI/SIB and contracts for safety. Pt denies any hallucinations and not noted responding to any internal stimuli. Pt was medication compliant. Pt requested and received prn nicotine gum throughout the shift. No reported or observed medication side effects. Pt was visible on unit pacing the halls. Pt is not social with any peers. Continue current POC.   Goal Outcome Evaluation: ongoing    Plan of Care Reviewed With: patient Plan of Care Reviewed With: patient    Overall Patient Progress: no changeOverall Patient Progress: no change

## 2025-01-26 PROCEDURE — 250N000013 HC RX MED GY IP 250 OP 250 PS 637: Performed by: CLINICAL NURSE SPECIALIST

## 2025-01-26 PROCEDURE — 124N000002 HC R&B MH UMMC

## 2025-01-26 PROCEDURE — 250N000013 HC RX MED GY IP 250 OP 250 PS 637: Performed by: FAMILY MEDICINE

## 2025-01-26 RX ADMIN — NICOTINE POLACRILEX 4 MG: 4 GUM, CHEWING BUCCAL at 14:41

## 2025-01-26 RX ADMIN — NICOTINE POLACRILEX 4 MG: 4 GUM, CHEWING BUCCAL at 10:59

## 2025-01-26 RX ADMIN — FLUPHENAZINE HYDROCHLORIDE 10 MG: 10 TABLET, FILM COATED ORAL at 19:59

## 2025-01-26 RX ADMIN — Medication 15 MG: at 07:51

## 2025-01-26 RX ADMIN — NICOTINE POLACRILEX 4 MG: 4 GUM, CHEWING BUCCAL at 07:51

## 2025-01-26 RX ADMIN — NICOTINE POLACRILEX 4 MG: 4 GUM, CHEWING BUCCAL at 16:42

## 2025-01-26 RX ADMIN — NICOTINE POLACRILEX 4 MG: 4 GUM, CHEWING BUCCAL at 19:56

## 2025-01-26 RX ADMIN — PALIPERIDONE 6 MG: 3 TABLET, EXTENDED RELEASE ORAL at 07:51

## 2025-01-26 RX ADMIN — NICOTINE POLACRILEX 4 MG: 4 GUM, CHEWING BUCCAL at 08:53

## 2025-01-26 RX ADMIN — NICOTINE POLACRILEX 4 MG: 4 GUM, CHEWING BUCCAL at 18:53

## 2025-01-26 RX ADMIN — NICOTINE POLACRILEX 4 MG: 4 GUM, CHEWING BUCCAL at 13:39

## 2025-01-26 RX ADMIN — DIVALPROEX SODIUM 1000 MG: 500 TABLET, FILM COATED, EXTENDED RELEASE ORAL at 20:00

## 2025-01-26 RX ADMIN — NICOTINE POLACRILEX 4 MG: 4 GUM, CHEWING BUCCAL at 17:52

## 2025-01-26 RX ADMIN — NICOTINE POLACRILEX 4 MG: 4 GUM, CHEWING BUCCAL at 09:55

## 2025-01-26 RX ADMIN — NICOTINE POLACRILEX 4 MG: 4 GUM, CHEWING BUCCAL at 15:49

## 2025-01-26 RX ADMIN — NICOTINE POLACRILEX 4 MG: 4 GUM, CHEWING BUCCAL at 11:58

## 2025-01-26 ASSESSMENT — ACTIVITIES OF DAILY LIVING (ADL)
HYGIENE/GROOMING: INDEPENDENT
HYGIENE/GROOMING: INDEPENDENT
ADLS_ACUITY_SCORE: 26
ORAL_HYGIENE: INDEPENDENT
ADLS_ACUITY_SCORE: 26
DRESS: INDEPENDENT
ADLS_ACUITY_SCORE: 26
DRESS: SCRUBS (BEHAVIORAL HEALTH);INDEPENDENT
ADLS_ACUITY_SCORE: 26
ORAL_HYGIENE: INDEPENDENT
ADLS_ACUITY_SCORE: 26

## 2025-01-26 NOTE — PLAN OF CARE
"  Problem: Adult Behavioral Health Plan of Care  Goal: Adheres to Safety Considerations for Self and Others  Outcome: Progressing  Intervention: Develop and Maintain Individualized Safety Plan  Recent Flowsheet Documentation  Taken 1/25/2025 1941 by Bonita Desir RN  Safety Measures:   clinical history reviewed   safety rounds completed   suicide check-in completed   Goal Outcome Evaluation:    Plan of Care Reviewed With: patient          Patient was visible in the milieu, pacing the hallway, socially isolative to self. Patient denied all mental health symptoms, denied pain, VSS. Patient presents with a flat/blunted affect, mood is calm, patient appeared preoccupied. Patient Patient denied pain, VSS.     Patient had an adequate oral intake, no complains of bowel or bladder concerns. Patient was medication compliant, refused HS scheduled Senna but took the other scheduled medications. PRN Nicotine gum was utilized this shift. No side effects of medication was reported or observed.    Patient has remained safe on the unit, no agitation, behavioral outbursts or acute safety concerns. Staff will continue to follow the plan of care.    /77   Pulse 79   Temp 98.2  F (36.8  C) (Temporal)   Resp 16   Ht 1.803 m (5' 11\")   Wt 66 kg (145 lb 6.4 oz)   SpO2 98%   BMI 20.28 kg/m               "

## 2025-01-26 NOTE — PROVIDER NOTIFICATION
01/26/25 0600   Sleep/Rest   Night Time # Hours 7 hours     Pt had a quiet night with no behavioral or safety concerns. Pt was observed sleeping the entire night, no acute events noted or reported.     Consent: The rationale for Repairs was explained to the patient and consent was obtained. The risks, benefits and alternatives to therapy were discussed in detail. Specifically, the risks of infection, scarring, bleeding, prolonged wound healing, incomplete removal, allergy to anesthesia, nerve injury and recurrence were addressed. Prior to the procedure, the treatment site was clearly identified and confirmed by the patient. All components of Universal Protocol/PAUSE Rule completed.

## 2025-01-27 PROCEDURE — 250N000013 HC RX MED GY IP 250 OP 250 PS 637: Performed by: FAMILY MEDICINE

## 2025-01-27 PROCEDURE — 124N000002 HC R&B MH UMMC

## 2025-01-27 PROCEDURE — 250N000013 HC RX MED GY IP 250 OP 250 PS 637: Performed by: CLINICAL NURSE SPECIALIST

## 2025-01-27 PROCEDURE — 99232 SBSQ HOSP IP/OBS MODERATE 35: CPT | Performed by: NURSE PRACTITIONER

## 2025-01-27 PROCEDURE — 90853 GROUP PSYCHOTHERAPY: CPT

## 2025-01-27 RX ADMIN — Medication 15 MG: at 07:55

## 2025-01-27 RX ADMIN — NICOTINE POLACRILEX 4 MG: 4 GUM, CHEWING BUCCAL at 14:38

## 2025-01-27 RX ADMIN — NICOTINE POLACRILEX 4 MG: 4 GUM, CHEWING BUCCAL at 10:11

## 2025-01-27 RX ADMIN — NICOTINE POLACRILEX 4 MG: 4 GUM, CHEWING BUCCAL at 16:08

## 2025-01-27 RX ADMIN — NICOTINE POLACRILEX 4 MG: 4 GUM, CHEWING BUCCAL at 12:15

## 2025-01-27 RX ADMIN — DIVALPROEX SODIUM 1000 MG: 500 TABLET, FILM COATED, EXTENDED RELEASE ORAL at 20:23

## 2025-01-27 RX ADMIN — NICOTINE POLACRILEX 4 MG: 4 GUM, CHEWING BUCCAL at 11:14

## 2025-01-27 RX ADMIN — NICOTINE POLACRILEX 4 MG: 4 GUM, CHEWING BUCCAL at 13:19

## 2025-01-27 RX ADMIN — PALIPERIDONE 6 MG: 3 TABLET, EXTENDED RELEASE ORAL at 07:55

## 2025-01-27 RX ADMIN — NICOTINE POLACRILEX 4 MG: 4 GUM, CHEWING BUCCAL at 18:21

## 2025-01-27 RX ADMIN — NICOTINE POLACRILEX 4 MG: 4 GUM, CHEWING BUCCAL at 08:56

## 2025-01-27 RX ADMIN — FLUPHENAZINE HYDROCHLORIDE 10 MG: 10 TABLET, FILM COATED ORAL at 19:32

## 2025-01-27 RX ADMIN — NICOTINE POLACRILEX 4 MG: 4 GUM, CHEWING BUCCAL at 06:53

## 2025-01-27 RX ADMIN — NICOTINE POLACRILEX 4 MG: 4 GUM, CHEWING BUCCAL at 17:17

## 2025-01-27 RX ADMIN — NICOTINE POLACRILEX 4 MG: 4 GUM, CHEWING BUCCAL at 07:55

## 2025-01-27 RX ADMIN — NICOTINE POLACRILEX 4 MG: 4 GUM, CHEWING BUCCAL at 19:32

## 2025-01-27 ASSESSMENT — ACTIVITIES OF DAILY LIVING (ADL)
ORAL_HYGIENE: INDEPENDENT
ADLS_ACUITY_SCORE: 26
DRESS: INDEPENDENT;SCRUBS (BEHAVIORAL HEALTH)
ADLS_ACUITY_SCORE: 26
DRESS: INDEPENDENT;SCRUBS (BEHAVIORAL HEALTH)
HYGIENE/GROOMING: INDEPENDENT
ADLS_ACUITY_SCORE: 26
ADLS_ACUITY_SCORE: 26
ORAL_HYGIENE: INDEPENDENT
HYGIENE/GROOMING: INDEPENDENT
ADLS_ACUITY_SCORE: 26

## 2025-01-27 NOTE — PLAN OF CARE
BEH IP Unit Acuity Rating Score (UARS)  Patient is given one point for every criteria they meet.    CRITERIA SCORING   On a 72 hour hold, court hold, committed, stay of commitment, or revocation. 1    Patient LOS on BEH unit exceeds 20 days. 0  LOS: 5   Patient under guardianship, 55+, otherwise medically complex, or under age 11. 0   Suicide ideation without relief of precipitating factors. 0   Current plan for suicide. 0   Current plan for homicide. 0   Imminent risk or actual attempt to seriously harm another without relief of factors precipitating the attempt. 0   Severe dysfunction in daily living (ex: complete neglect for self care, extreme disruption in vegetative function, extreme deterioration in social interactions). 1   Recent (last 7 days) or current physical aggression in the ED or on unit. 0   Restraints or seclusion episode in past 72 hours. 0   Recent (last 7 days) or current verbal aggression, agitation, yelling, etc., while in the ED or unit. 0   Active psychosis. 1   Need for constant or near constant redirection (from leaving, from others, etc).  0   Intrusive or disruptive behaviors. 0   Patient requires 3 or more hours of individualized nursing care per 8-hour shift (i.e. for ADLs, meds, therapeutic interventions). 0   TOTAL 3
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
## 2025-01-27 NOTE — PROVIDER NOTIFICATION
01/27/25 0600   Sleep/Rest   Night Time # Hours 7 hours     Pt had a quiet night with no behavioral or safety concerns. Pt was observed sleeping the entire night, no acute events noted or reported.

## 2025-01-27 NOTE — PLAN OF CARE
"  Problem: Adult Behavioral Health Plan of Care  Goal: Adheres to Safety Considerations for Self and Others  Outcome: Progressing  Intervention: Develop and Maintain Individualized Safety Plan  Recent Flowsheet Documentation  Taken 1/26/2025 1948 by Bonita Desir RN  Safety Measures:   clinical history reviewed   safety rounds completed   suicide check-in completed   Goal Outcome Evaluation:    Plan of Care Reviewed With: patient      Patient was visible in the milieu, pacing the hallway, socially isolative to self. Patient denied all mental health symptoms, denied pain, VSS. Patient presents with a flat/blunted affect, mood is calm, patient appeared preoccupied. Patient Patient denied pain, VSS.      Patient had an adequate oral intake, no complains of bowel or bladder concerns. Patient was medication compliant, refused HS scheduled Senna but took the other scheduled medications. PRN Nicotine gum was utilized this shift. No side effects of medication was reported or observed.     Patient has remained safe on the unit, no agitation, behavioral outbursts or acute safety concerns. Staff will continue to follow the plan of care.    /69   Pulse 79   Temp 98.4  F (36.9  C) (Oral)   Resp 16   Ht 1.803 m (5' 11\")   Wt 67.1 kg (148 lb)   SpO2 98%   BMI 20.64 kg/m                 "

## 2025-01-28 PROCEDURE — 250N000013 HC RX MED GY IP 250 OP 250 PS 637: Performed by: CLINICAL NURSE SPECIALIST

## 2025-01-28 PROCEDURE — 250N000013 HC RX MED GY IP 250 OP 250 PS 637: Performed by: FAMILY MEDICINE

## 2025-01-28 PROCEDURE — 99232 SBSQ HOSP IP/OBS MODERATE 35: CPT | Performed by: NURSE PRACTITIONER

## 2025-01-28 PROCEDURE — 124N000002 HC R&B MH UMMC

## 2025-01-28 RX ORDER — SENNOSIDES 8.6 MG
1 TABLET ORAL 2 TIMES DAILY PRN
Status: DISCONTINUED | OUTPATIENT
Start: 2025-01-28 | End: 2025-02-03

## 2025-01-28 RX ADMIN — NICOTINE POLACRILEX 4 MG: 4 GUM, CHEWING BUCCAL at 14:25

## 2025-01-28 RX ADMIN — FLUPHENAZINE HYDROCHLORIDE 10 MG: 10 TABLET, FILM COATED ORAL at 20:16

## 2025-01-28 RX ADMIN — NICOTINE POLACRILEX 4 MG: 4 GUM, CHEWING BUCCAL at 13:21

## 2025-01-28 RX ADMIN — NICOTINE POLACRILEX 4 MG: 4 GUM, CHEWING BUCCAL at 18:04

## 2025-01-28 RX ADMIN — NICOTINE POLACRILEX 4 MG: 4 GUM, CHEWING BUCCAL at 20:16

## 2025-01-28 RX ADMIN — NICOTINE POLACRILEX 4 MG: 4 GUM, CHEWING BUCCAL at 11:12

## 2025-01-28 RX ADMIN — Medication 15 MG: at 08:11

## 2025-01-28 RX ADMIN — PALIPERIDONE 6 MG: 3 TABLET, EXTENDED RELEASE ORAL at 08:11

## 2025-01-28 RX ADMIN — NICOTINE POLACRILEX 4 MG: 4 GUM, CHEWING BUCCAL at 12:19

## 2025-01-28 RX ADMIN — NICOTINE POLACRILEX 4 MG: 4 GUM, CHEWING BUCCAL at 19:10

## 2025-01-28 RX ADMIN — DIVALPROEX SODIUM 1000 MG: 500 TABLET, FILM COATED, EXTENDED RELEASE ORAL at 20:16

## 2025-01-28 RX ADMIN — NICOTINE POLACRILEX 4 MG: 4 GUM, CHEWING BUCCAL at 15:34

## 2025-01-28 RX ADMIN — NICOTINE POLACRILEX 4 MG: 4 GUM, CHEWING BUCCAL at 09:56

## 2025-01-28 RX ADMIN — NICOTINE POLACRILEX 4 MG: 4 GUM, CHEWING BUCCAL at 16:36

## 2025-01-28 RX ADMIN — NICOTINE POLACRILEX 4 MG: 4 GUM, CHEWING BUCCAL at 08:11

## 2025-01-28 RX ADMIN — NICOTINE POLACRILEX 4 MG: 4 GUM, CHEWING BUCCAL at 21:19

## 2025-01-28 ASSESSMENT — ACTIVITIES OF DAILY LIVING (ADL)
ADLS_ACUITY_SCORE: 26
LAUNDRY: WITH SUPERVISION
ADLS_ACUITY_SCORE: 26
HYGIENE/GROOMING: INDEPENDENT
ADLS_ACUITY_SCORE: 26
ORAL_HYGIENE: INDEPENDENT
DRESS: INDEPENDENT;SCRUBS (BEHAVIORAL HEALTH)
ADLS_ACUITY_SCORE: 26

## 2025-01-28 NOTE — PLAN OF CARE
Pt has a blunted affect with calm mood. Pt attended some groups. Pt was guarded during check in. Pt rates anxiety at 0/10 and depression 0/10. No aggression noted this shift. Pt rates pain at 0/10. Pt reports no SI/HI/SIB and contracts for safety. Pt denies any hallucinations and not noted responding to any internal stimuli. Pt was medication compliant. Pt received 1st dose of Invega Sustenna this shift. Pt requested and received prn nicotine gum throughout the shift. No reported or observed medication side effects. Pt was visible on unit pacing the halls. Pt is not social with any peers. Continue current POC.  Goal Outcome Evaluation: ongoing    Plan of Care Reviewed With: patient Plan of Care Reviewed With: patient    Overall Patient Progress: improvingOverall Patient Progress: improving
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
## 2025-01-28 NOTE — PLAN OF CARE

## 2025-01-28 NOTE — PLAN OF CARE
"Goal Outcome Evaluation:    Plan of Care Reviewed With: patient        Patient was observed pacing the halls most of the shift. Patient listened to head phones. Patient denies pain. Patient denies anxiety, depression, suicidal, homicidal, auditory, and visual hallucinations. Contracts for safety. Patient requested prn Nicorette gum several times. Patient was partially  med compliant. Patient declined scheduled Senna. Patient said, \"It has given me diarrhea before\".  Patient did not clarify whether he had loose stools this shift. Vitals are within new limit.           "

## 2025-01-28 NOTE — PLAN OF CARE
Problem: Sleep Disturbance  Goal: Adequate Sleep/Rest  Outcome: Progressing   Goal Outcome Evaluation:         NOC Shift Report    Pt in bed at beginning of shift, breathing quiet and unlabored. Pt slept through shift. Pt slept for 7  hours. Q 15 mins check was completed this shift.     No pt complaints or concerns at this time.     No PRNs given. Staff will continue to follow the plan of care.

## 2025-01-29 PROCEDURE — 250N000013 HC RX MED GY IP 250 OP 250 PS 637: Performed by: FAMILY MEDICINE

## 2025-01-29 PROCEDURE — 90853 GROUP PSYCHOTHERAPY: CPT

## 2025-01-29 PROCEDURE — 250N000013 HC RX MED GY IP 250 OP 250 PS 637: Performed by: CLINICAL NURSE SPECIALIST

## 2025-01-29 PROCEDURE — 124N000002 HC R&B MH UMMC

## 2025-01-29 PROCEDURE — 99232 SBSQ HOSP IP/OBS MODERATE 35: CPT | Performed by: NURSE PRACTITIONER

## 2025-01-29 RX ADMIN — PALIPERIDONE 6 MG: 3 TABLET, EXTENDED RELEASE ORAL at 07:32

## 2025-01-29 RX ADMIN — NICOTINE POLACRILEX 4 MG: 4 GUM, CHEWING BUCCAL at 20:25

## 2025-01-29 RX ADMIN — NICOTINE POLACRILEX 4 MG: 4 GUM, CHEWING BUCCAL at 16:08

## 2025-01-29 RX ADMIN — NICOTINE POLACRILEX 4 MG: 4 GUM, CHEWING BUCCAL at 14:07

## 2025-01-29 RX ADMIN — Medication 15 MG: at 07:32

## 2025-01-29 RX ADMIN — DIVALPROEX SODIUM 1000 MG: 500 TABLET, FILM COATED, EXTENDED RELEASE ORAL at 20:25

## 2025-01-29 RX ADMIN — NICOTINE POLACRILEX 4 MG: 4 GUM, CHEWING BUCCAL at 11:59

## 2025-01-29 RX ADMIN — FLUPHENAZINE HYDROCHLORIDE 10 MG: 10 TABLET, FILM COATED ORAL at 20:25

## 2025-01-29 RX ADMIN — NICOTINE POLACRILEX 4 MG: 4 GUM, CHEWING BUCCAL at 10:56

## 2025-01-29 RX ADMIN — NICOTINE POLACRILEX 4 MG: 4 GUM, CHEWING BUCCAL at 08:52

## 2025-01-29 RX ADMIN — NICOTINE POLACRILEX 4 MG: 4 GUM, CHEWING BUCCAL at 17:15

## 2025-01-29 RX ADMIN — NICOTINE POLACRILEX 4 MG: 4 GUM, CHEWING BUCCAL at 13:11

## 2025-01-29 RX ADMIN — NICOTINE POLACRILEX 4 MG: 4 GUM, CHEWING BUCCAL at 19:21

## 2025-01-29 RX ADMIN — NICOTINE POLACRILEX 4 MG: 4 GUM, CHEWING BUCCAL at 09:54

## 2025-01-29 RX ADMIN — NICOTINE POLACRILEX 4 MG: 4 GUM, CHEWING BUCCAL at 15:09

## 2025-01-29 RX ADMIN — NICOTINE POLACRILEX 4 MG: 4 GUM, CHEWING BUCCAL at 18:14

## 2025-01-29 RX ADMIN — NICOTINE POLACRILEX 4 MG: 4 GUM, CHEWING BUCCAL at 07:32

## 2025-01-29 ASSESSMENT — ACTIVITIES OF DAILY LIVING (ADL)
ADLS_ACUITY_SCORE: 26
ADLS_ACUITY_SCORE: 26
DRESS: INDEPENDENT
ADLS_ACUITY_SCORE: 26
HYGIENE/GROOMING: INDEPENDENT
ADLS_ACUITY_SCORE: 26
ORAL_HYGIENE: INDEPENDENT
ADLS_ACUITY_SCORE: 26

## 2025-01-29 NOTE — PLAN OF CARE
Problem: Suicide Risk  Goal: Absence of Self-Harm  Outcome: Progressing   Goal Outcome Evaluation:       Pt was visible in the milieu. Was withdrawn and isolative avoiding social interactions. Presents with flat and blunted affect. Alert and oriented x4. Pt was pacing most of the time in the gould. Pt denies all mental health symptoms. Contracted for safety. Was medication compliant.  Ate and drank adequately. No behavioral of safety concern noticed or reported on this shift.

## 2025-01-29 NOTE — PLAN OF CARE
Goal Outcome Evaluation:       Pt in bed at the beginning of the shift.Pt appeared a sleep for 7 hours .No behavior or concerns noted during this shift.Pt remains on 15 minutes safety checks.Will continue to monitor.
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
## 2025-01-29 NOTE — PLAN OF CARE
Goal Outcome Evaluation:    Plan of Care Reviewed With: patient        Patient was observed pacing the halls listening to head phones. Patient made his needs known. Patient was isolative and withdrawn to self. Patient denies pain. Patient denies anxiety, depression, suicidal, homicidal, auditory, and visual hallucinations. Contracts for safety. Patient was med compliant.. Vitals are within normal limit. Patient denies bowel and bladder issues. Prn given: Nicorette gum.
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
## 2025-01-29 NOTE — PLAN OF CARE
Pt has a blunted affect with calm mood. Pt attended some groups. Pt was guarded during check in. Pt rates anxiety at 0/10 and depression 0/10. No aggression noted this shift. Pt rates pain at 0/10. Pt reports no SI/HI/SIB and contracts for safety. Pt denies any hallucinations and not noted responding to any internal stimuli. Pt was medication compliant. Pt requested and received prn nicotine gum throughout the shift. No reported or observed medication side effects. Pt was visible on unit pacing the halls. Pt is not social with any peers. Continue current POC.   Goal Outcome Evaluation: ongoing    Plan of Care Reviewed With: patient Plan of Care Reviewed With: patient    Overall Patient Progress: improvingOverall Patient Progress: improving

## 2025-01-30 VITALS
OXYGEN SATURATION: 97 % | SYSTOLIC BLOOD PRESSURE: 131 MMHG | DIASTOLIC BLOOD PRESSURE: 82 MMHG | RESPIRATION RATE: 16 BRPM | HEART RATE: 94 BPM | HEIGHT: 71 IN | WEIGHT: 149 LBS | TEMPERATURE: 98.3 F | BODY MASS INDEX: 20.86 KG/M2

## 2025-01-30 PROCEDURE — 250N000013 HC RX MED GY IP 250 OP 250 PS 637: Performed by: NURSE PRACTITIONER

## 2025-01-30 PROCEDURE — 124N000002 HC R&B MH UMMC

## 2025-01-30 PROCEDURE — 250N000013 HC RX MED GY IP 250 OP 250 PS 637: Performed by: CLINICAL NURSE SPECIALIST

## 2025-01-30 PROCEDURE — 97150 GROUP THERAPEUTIC PROCEDURES: CPT | Mod: GO

## 2025-01-30 PROCEDURE — 250N000013 HC RX MED GY IP 250 OP 250 PS 637: Performed by: FAMILY MEDICINE

## 2025-01-30 PROCEDURE — 90832 PSYTX W PT 30 MINUTES: CPT

## 2025-01-30 RX ORDER — PALIPERIDONE 3 MG/1
9 TABLET, EXTENDED RELEASE ORAL DAILY
Status: DISPENSED | OUTPATIENT
Start: 2025-01-30

## 2025-01-30 RX ADMIN — NICOTINE POLACRILEX 4 MG: 4 GUM, CHEWING BUCCAL at 07:59

## 2025-01-30 RX ADMIN — NICOTINE POLACRILEX 4 MG: 4 GUM, CHEWING BUCCAL at 06:52

## 2025-01-30 RX ADMIN — Medication 15 MG: at 09:02

## 2025-01-30 RX ADMIN — NICOTINE POLACRILEX 4 MG: 4 GUM, CHEWING BUCCAL at 09:02

## 2025-01-30 RX ADMIN — DIVALPROEX SODIUM 1000 MG: 500 TABLET, FILM COATED, EXTENDED RELEASE ORAL at 20:18

## 2025-01-30 RX ADMIN — NICOTINE POLACRILEX 4 MG: 4 GUM, CHEWING BUCCAL at 10:09

## 2025-01-30 RX ADMIN — FLUPHENAZINE HYDROCHLORIDE 10 MG: 10 TABLET, FILM COATED ORAL at 20:17

## 2025-01-30 RX ADMIN — NICOTINE POLACRILEX 4 MG: 4 GUM, CHEWING BUCCAL at 20:23

## 2025-01-30 RX ADMIN — NICOTINE POLACRILEX 4 MG: 4 GUM, CHEWING BUCCAL at 12:30

## 2025-01-30 RX ADMIN — NICOTINE POLACRILEX 4 MG: 4 GUM, CHEWING BUCCAL at 15:29

## 2025-01-30 RX ADMIN — NICOTINE POLACRILEX 4 MG: 4 GUM, CHEWING BUCCAL at 19:19

## 2025-01-30 RX ADMIN — NICOTINE POLACRILEX 4 MG: 4 GUM, CHEWING BUCCAL at 16:38

## 2025-01-30 RX ADMIN — NICOTINE POLACRILEX 4 MG: 4 GUM, CHEWING BUCCAL at 13:58

## 2025-01-30 RX ADMIN — PALIPERIDONE 9 MG: 3 TABLET, EXTENDED RELEASE ORAL at 09:48

## 2025-01-30 RX ADMIN — NICOTINE POLACRILEX 4 MG: 4 GUM, CHEWING BUCCAL at 11:15

## 2025-01-30 RX ADMIN — NICOTINE POLACRILEX 4 MG: 4 GUM, CHEWING BUCCAL at 21:32

## 2025-01-30 RX ADMIN — NICOTINE POLACRILEX 4 MG: 4 GUM, CHEWING BUCCAL at 17:45

## 2025-01-30 ASSESSMENT — ACTIVITIES OF DAILY LIVING (ADL)
ORAL_HYGIENE: INDEPENDENT
ADLS_ACUITY_SCORE: 26
HYGIENE/GROOMING: INDEPENDENT
ADLS_ACUITY_SCORE: 26
HYGIENE/GROOMING: INDEPENDENT
ADLS_ACUITY_SCORE: 26
DRESS: SCRUBS (BEHAVIORAL HEALTH);INDEPENDENT
ADLS_ACUITY_SCORE: 26
ADLS_ACUITY_SCORE: 26
ORAL_HYGIENE: INDEPENDENT
ADLS_ACUITY_SCORE: 26
DRESS: INDEPENDENT
ADLS_ACUITY_SCORE: 26
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
## 2025-01-30 NOTE — PLAN OF CARE
BEH IP Unit Acuity Rating Score (UARS)  Patient is given one point for every criteria they meet.    CRITERIA SCORING   On a 72 hour hold, court hold, committed, stay of commitment, or revocation. 1    Patient LOS on BEH unit exceeds 20 days. 0  LOS: 8   Patient under guardianship, 55+, otherwise medically complex, or under age 11. 0   Suicide ideation without relief of precipitating factors. 0   Current plan for suicide. 0   Current plan for homicide. 0   Imminent risk or actual attempt to seriously harm another without relief of factors precipitating the attempt. 0   Severe dysfunction in daily living (ex: complete neglect for self care, extreme disruption in vegetative function, extreme deterioration in social interactions). 1   Recent (last 7 days) or current physical aggression in the ED or on unit. 0   Restraints or seclusion episode in past 72 hours. 0   Recent (last 7 days) or current verbal aggression, agitation, yelling, etc., while in the ED or unit. 0   Active psychosis. 0   Need for constant or near constant redirection (from leaving, from others, etc).  0   Intrusive or disruptive behaviors. 0   Patient requires 3 or more hours of individualized nursing care per 8-hour shift (i.e. for ADLs, meds, therapeutic interventions). 0   TOTAL 2

## 2025-01-30 NOTE — PLAN OF CARE
Problem: Adult Behavioral Health Plan of Care  Goal: Adheres to Safety Considerations for Self and Others  Outcome: Progressing    Pt presented as alert and oriented to place and self throughout shift.  They were intermittently visible in the milieu pacing the halls.  Pt attended last OT group of the day..  They were dressed appropriately and appeared adequately hygenic.  Pt is eating and drinking adequately.  They were compliant with their scheduled medication.  Pt requested PRN nicotine several times during this shift.  VSS and no issues with bowel or bladder.  Pt denied anxiety and depression.  They did not endorse any symptoms of psychosis, however pt continues to appears withdrawn and guarded.   Pt denied pain or any acute physical health concerns.  No side effects to medications noted this shift.    Goal Outcome Evaluation:    Plan of Care Reviewed With: patient

## 2025-01-30 NOTE — PLAN OF CARE
Problem: Sleep Disturbance  Goal: Adequate Sleep/Rest  Outcome: Progressing   Goal Outcome Evaluation:    Plan of Care Reviewed With: patient             Pt appear to be sleeping without distress during 15 minutes safety checks. No behavioral or safety concerns noticed or reported on this shift. No PRN requested or required on this shift. A total of 7 hours of sleep was recorded on this shift.
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
## 2025-01-31 PROCEDURE — 250N000013 HC RX MED GY IP 250 OP 250 PS 637: Performed by: FAMILY MEDICINE

## 2025-01-31 PROCEDURE — 250N000013 HC RX MED GY IP 250 OP 250 PS 637: Performed by: CLINICAL NURSE SPECIALIST

## 2025-01-31 PROCEDURE — 250N000013 HC RX MED GY IP 250 OP 250 PS 637: Performed by: NURSE PRACTITIONER

## 2025-01-31 PROCEDURE — 97150 GROUP THERAPEUTIC PROCEDURES: CPT | Mod: GO

## 2025-01-31 PROCEDURE — 124N000002 HC R&B MH UMMC

## 2025-01-31 RX ADMIN — NICOTINE POLACRILEX 4 MG: 4 GUM, CHEWING BUCCAL at 08:47

## 2025-01-31 RX ADMIN — NICOTINE POLACRILEX 4 MG: 4 GUM, CHEWING BUCCAL at 13:42

## 2025-01-31 RX ADMIN — NICOTINE POLACRILEX 4 MG: 4 GUM, CHEWING BUCCAL at 17:47

## 2025-01-31 RX ADMIN — NICOTINE POLACRILEX 4 MG: 4 GUM, CHEWING BUCCAL at 14:37

## 2025-01-31 RX ADMIN — Medication 15 MG: at 07:32

## 2025-01-31 RX ADMIN — NICOTINE POLACRILEX 4 MG: 4 GUM, CHEWING BUCCAL at 11:22

## 2025-01-31 RX ADMIN — NICOTINE POLACRILEX 4 MG: 4 GUM, CHEWING BUCCAL at 15:41

## 2025-01-31 RX ADMIN — FLUPHENAZINE HYDROCHLORIDE 10 MG: 10 TABLET, FILM COATED ORAL at 19:59

## 2025-01-31 RX ADMIN — NICOTINE POLACRILEX 4 MG: 4 GUM, CHEWING BUCCAL at 21:09

## 2025-01-31 RX ADMIN — NICOTINE POLACRILEX 4 MG: 4 GUM, CHEWING BUCCAL at 19:54

## 2025-01-31 RX ADMIN — NICOTINE POLACRILEX 4 MG: 4 GUM, CHEWING BUCCAL at 07:32

## 2025-01-31 RX ADMIN — PALIPERIDONE 9 MG: 3 TABLET, EXTENDED RELEASE ORAL at 07:32

## 2025-01-31 RX ADMIN — NICOTINE POLACRILEX 4 MG: 4 GUM, CHEWING BUCCAL at 12:26

## 2025-01-31 RX ADMIN — NICOTINE POLACRILEX 4 MG: 4 GUM, CHEWING BUCCAL at 18:48

## 2025-01-31 RX ADMIN — NICOTINE POLACRILEX 4 MG: 4 GUM, CHEWING BUCCAL at 10:02

## 2025-01-31 RX ADMIN — DIVALPROEX SODIUM 1000 MG: 500 TABLET, FILM COATED, EXTENDED RELEASE ORAL at 21:08

## 2025-01-31 RX ADMIN — NICOTINE POLACRILEX 4 MG: 4 GUM, CHEWING BUCCAL at 16:45

## 2025-01-31 ASSESSMENT — ACTIVITIES OF DAILY LIVING (ADL)
ADLS_ACUITY_SCORE: 26
ORAL_HYGIENE: INDEPENDENT
ADLS_ACUITY_SCORE: 26
HYGIENE/GROOMING: INDEPENDENT
ADLS_ACUITY_SCORE: 26
HYGIENE/GROOMING: INDEPENDENT
ADLS_ACUITY_SCORE: 26
ADLS_ACUITY_SCORE: 26
DRESS: SCRUBS (BEHAVIORAL HEALTH);INDEPENDENT
ADLS_ACUITY_SCORE: 26
ORAL_HYGIENE: INDEPENDENT
ADLS_ACUITY_SCORE: 26
ADLS_ACUITY_SCORE: 26
DRESS: INDEPENDENT;SCRUBS (BEHAVIORAL HEALTH)
ADLS_ACUITY_SCORE: 26
ADLS_ACUITY_SCORE: 26
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
## 2025-01-31 NOTE — PLAN OF CARE
Problem: Suicide Risk  Goal: Absence of Self-Harm  Outcome: Progressing   Goal Outcome Evaluation:    Plan of Care Reviewed With: patient      Pt was observed pacing the hallway for much of the shift. Requested a nicotine gum on top of the hour. Was withdrawn to himself. Was brief and guarded during check-in. Appeared to be responding to internal stimuli; however, pt denied all mental health symptoms. Denied auditory and visual hallucinations as well as SI/SIB/HI. Denied anxiety and depression. Denied having any problem with eating or sleeping. Took his medications as prescribed. No behavioral outburst.   Plan: Continue to provide safe environment and therapeutic milieu.

## 2025-01-31 NOTE — PLAN OF CARE
Problem: Suicide Risk  Goal: Absence of Self-Harm  Intervention: Assess Risk to Self and Maintain Safety  Self-Harm Prevention: environmental self-harm risks assessed    Pt presented as alert and oriented to place and self throughout shift.  They were intermittently visible in the milieu pacing the halls.  Pt attended some OT groups.  They were dressed appropriately and appeared adequately hygenic.  Pt is eating and drinking adequately.  They were compliant with their scheduled medication.  Pt requested PRN nicotine several times during this shift.  VSS and no issues with bowel or bladder.  Pt denied anxiety and depression.  They did not endorse any symptoms of psychosis.  Pt denied pain or any acute physical health concerns.  No side effects to medications noted this shift.    Goal Outcome Evaluation:    Plan of Care Reviewed With: patient

## 2025-01-31 NOTE — PLAN OF CARE
Problem: Sleep Disturbance  Goal: Adequate Sleep/Rest  Outcome: Progressing   Goal Outcome Evaluation:             Pt appeared sleeping at the beginning of the shift and slept for 7 hours and pt observed with even breathing pattern. No prn medication administered during the night shift and safety checks completed per protocol.

## 2025-01-31 NOTE — PLAN OF CARE
BEH IP Unit Acuity Rating Score (UARS)  Patient is given one point for every criteria they meet.    CRITERIA SCORING   On a 72 hour hold, court hold, committed, stay of commitment, or revocation. 1    Patient LOS on BEH unit exceeds 20 days. 0  LOS: 9   Patient under guardianship, 55+, otherwise medically complex, or under age 11. 0   Suicide ideation without relief of precipitating factors. 0   Current plan for suicide. 0   Current plan for homicide. 0   Imminent risk or actual attempt to seriously harm another without relief of factors precipitating the attempt. 0   Severe dysfunction in daily living (ex: complete neglect for self care, extreme disruption in vegetative function, extreme deterioration in social interactions). 1   Recent (last 7 days) or current physical aggression in the ED or on unit. 0   Restraints or seclusion episode in past 72 hours. 0   Recent (last 7 days) or current verbal aggression, agitation, yelling, etc., while in the ED or unit. 0   Active psychosis. 0   Need for constant or near constant redirection (from leaving, from others, etc).  0   Intrusive or disruptive behaviors. 0   Patient requires 3 or more hours of individualized nursing care per 8-hour shift (i.e. for ADLs, meds, therapeutic interventions). 0   TOTAL 2
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
## 2025-02-01 PROCEDURE — H2032 ACTIVITY THERAPY, PER 15 MIN: HCPCS

## 2025-02-01 PROCEDURE — 250N000013 HC RX MED GY IP 250 OP 250 PS 637: Performed by: NURSE PRACTITIONER

## 2025-02-01 PROCEDURE — 124N000002 HC R&B MH UMMC

## 2025-02-01 PROCEDURE — 250N000013 HC RX MED GY IP 250 OP 250 PS 637: Performed by: CLINICAL NURSE SPECIALIST

## 2025-02-01 PROCEDURE — 250N000013 HC RX MED GY IP 250 OP 250 PS 637: Performed by: FAMILY MEDICINE

## 2025-02-01 RX ADMIN — NICOTINE POLACRILEX 4 MG: 4 GUM, CHEWING BUCCAL at 11:21

## 2025-02-01 RX ADMIN — NICOTINE POLACRILEX 4 MG: 4 GUM, CHEWING BUCCAL at 08:03

## 2025-02-01 RX ADMIN — NICOTINE POLACRILEX 4 MG: 4 GUM, CHEWING BUCCAL at 12:38

## 2025-02-01 RX ADMIN — NICOTINE POLACRILEX 4 MG: 4 GUM, CHEWING BUCCAL at 09:12

## 2025-02-01 RX ADMIN — NICOTINE POLACRILEX 4 MG: 4 GUM, CHEWING BUCCAL at 17:13

## 2025-02-01 RX ADMIN — PALIPERIDONE 9 MG: 3 TABLET, EXTENDED RELEASE ORAL at 08:06

## 2025-02-01 RX ADMIN — NICOTINE POLACRILEX 4 MG: 4 GUM, CHEWING BUCCAL at 18:20

## 2025-02-01 RX ADMIN — DIVALPROEX SODIUM 1000 MG: 500 TABLET, FILM COATED, EXTENDED RELEASE ORAL at 20:31

## 2025-02-01 RX ADMIN — NICOTINE POLACRILEX 4 MG: 4 GUM, CHEWING BUCCAL at 16:09

## 2025-02-01 RX ADMIN — NICOTINE POLACRILEX 4 MG: 4 GUM, CHEWING BUCCAL at 10:15

## 2025-02-01 RX ADMIN — Medication 15 MG: at 08:06

## 2025-02-01 RX ADMIN — NICOTINE POLACRILEX 4 MG: 4 GUM, CHEWING BUCCAL at 20:31

## 2025-02-01 RX ADMIN — NICOTINE POLACRILEX 4 MG: 4 GUM, CHEWING BUCCAL at 15:00

## 2025-02-01 RX ADMIN — NICOTINE POLACRILEX 4 MG: 4 GUM, CHEWING BUCCAL at 13:53

## 2025-02-01 RX ADMIN — NICOTINE POLACRILEX 4 MG: 4 GUM, CHEWING BUCCAL at 19:22

## 2025-02-01 RX ADMIN — FLUPHENAZINE HYDROCHLORIDE 10 MG: 10 TABLET, FILM COATED ORAL at 19:23

## 2025-02-01 ASSESSMENT — ACTIVITIES OF DAILY LIVING (ADL)
ADLS_ACUITY_SCORE: 26
ORAL_HYGIENE: INDEPENDENT
ADLS_ACUITY_SCORE: 26
HYGIENE/GROOMING: INDEPENDENT
ADLS_ACUITY_SCORE: 26
DRESS: SCRUBS (BEHAVIORAL HEALTH)
ADLS_ACUITY_SCORE: 26
ORAL_HYGIENE: INDEPENDENT
ADLS_ACUITY_SCORE: 26
ADLS_ACUITY_SCORE: 26
DRESS: SCRUBS (BEHAVIORAL HEALTH);INDEPENDENT
ADLS_ACUITY_SCORE: 26
ADLS_ACUITY_SCORE: 26
HYGIENE/GROOMING: INDEPENDENT
ADLS_ACUITY_SCORE: 26

## 2025-02-01 NOTE — PLAN OF CARE
RN: Pt A/O x4,  VSS. Pt was observed pacing the hallway frequently this shift. Pt was withdrawn to self, no interaction with peers. Pt presented as flat affect, mood was calm, interaction was slightly guarded. Pt appeared to be responding to internal stimuli, however, pt denied hallucinations. Pt also denied SI/SIB/HI, anxiety and depression. Pt requested nicotine gum on top of the hour. Pt is medication compliant, no side effects noted or observed. Pt is eating and drinking adequately, appetite good. Pt reports no issues with bowel and bladder. Pt denied pain or physical discomfort. Continue with POC.         Goal Outcome Evaluation:    Plan of Care Reviewed With: patient

## 2025-02-01 NOTE — PLAN OF CARE
Problem: Adult Behavioral Health Plan of Care  Goal: Adheres to Safety Considerations for Self and Others  Outcome: Progressing  Intervention: Develop and Maintain Individualized Safety Plan  Safety Measures:   environmental rounds completed   safety rounds completed    Pt presented as alert and oriented to place and self throughout shift.  They were intermittently visible in the milieu pacing the halls.  They were dressed appropriately and appeared adequately hygenic.  Pt is eating and drinking adequately.  They were compliant with their scheduled medication.  Pt received it LEÓN this morning just after 1000, pt noted no discomfort x2 following the injection.  Pt requested PRN nicotine several times during this shift.  VSS and no issues with bowel or bladder.  Pt denied anxiety and depression.  They did not endorse any symptoms of psychosis.  Pt denied pain or any acute physical health concerns.  No side effects to medications noted this shift.    Goal Outcome Evaluation:    Plan of Care Reviewed With: patient

## 2025-02-01 NOTE — PLAN OF CARE
Goal Outcome Evaluation:  Problem: Suicide Risk  Goal: Absence of Self-Harm  Outcome: Progressing         Pt in bed at beginning of shift, breathing quiet and unlabored. Pt slept through shift. Pt slept 7 hours.      No pt complaints or concerns at this time.      No PRNs given. Will continue to monitor.

## 2025-02-02 PROCEDURE — 250N000013 HC RX MED GY IP 250 OP 250 PS 637: Performed by: CLINICAL NURSE SPECIALIST

## 2025-02-02 PROCEDURE — 250N000013 HC RX MED GY IP 250 OP 250 PS 637: Performed by: FAMILY MEDICINE

## 2025-02-02 PROCEDURE — 124N000002 HC R&B MH UMMC

## 2025-02-02 PROCEDURE — 250N000013 HC RX MED GY IP 250 OP 250 PS 637: Performed by: NURSE PRACTITIONER

## 2025-02-02 RX ADMIN — NICOTINE POLACRILEX 4 MG: 4 GUM, CHEWING BUCCAL at 19:31

## 2025-02-02 RX ADMIN — NICOTINE POLACRILEX 4 MG: 4 GUM, CHEWING BUCCAL at 18:41

## 2025-02-02 RX ADMIN — NICOTINE POLACRILEX 4 MG: 4 GUM, CHEWING BUCCAL at 09:24

## 2025-02-02 RX ADMIN — NICOTINE POLACRILEX 4 MG: 4 GUM, CHEWING BUCCAL at 11:33

## 2025-02-02 RX ADMIN — NICOTINE POLACRILEX 4 MG: 4 GUM, CHEWING BUCCAL at 20:30

## 2025-02-02 RX ADMIN — NICOTINE POLACRILEX 4 MG: 4 GUM, CHEWING BUCCAL at 12:33

## 2025-02-02 RX ADMIN — DIVALPROEX SODIUM 1000 MG: 500 TABLET, FILM COATED, EXTENDED RELEASE ORAL at 20:29

## 2025-02-02 RX ADMIN — FLUPHENAZINE HYDROCHLORIDE 10 MG: 10 TABLET, FILM COATED ORAL at 19:30

## 2025-02-02 RX ADMIN — PALIPERIDONE 9 MG: 3 TABLET, EXTENDED RELEASE ORAL at 08:13

## 2025-02-02 RX ADMIN — NICOTINE POLACRILEX 4 MG: 4 GUM, CHEWING BUCCAL at 17:33

## 2025-02-02 RX ADMIN — NICOTINE POLACRILEX 4 MG: 4 GUM, CHEWING BUCCAL at 21:45

## 2025-02-02 RX ADMIN — NICOTINE POLACRILEX 4 MG: 4 GUM, CHEWING BUCCAL at 16:19

## 2025-02-02 RX ADMIN — Medication 15 MG: at 08:13

## 2025-02-02 RX ADMIN — NICOTINE POLACRILEX 4 MG: 4 GUM, CHEWING BUCCAL at 08:13

## 2025-02-02 RX ADMIN — NICOTINE POLACRILEX 4 MG: 4 GUM, CHEWING BUCCAL at 13:38

## 2025-02-02 RX ADMIN — NICOTINE POLACRILEX 4 MG: 4 GUM, CHEWING BUCCAL at 07:04

## 2025-02-02 RX ADMIN — NICOTINE POLACRILEX 4 MG: 4 GUM, CHEWING BUCCAL at 14:45

## 2025-02-02 RX ADMIN — NICOTINE POLACRILEX 4 MG: 4 GUM, CHEWING BUCCAL at 10:27

## 2025-02-02 ASSESSMENT — ACTIVITIES OF DAILY LIVING (ADL)
ADLS_ACUITY_SCORE: 26
ORAL_HYGIENE: INDEPENDENT
ORAL_HYGIENE: INDEPENDENT
DRESS: SCRUBS (BEHAVIORAL HEALTH);INDEPENDENT
ADLS_ACUITY_SCORE: 26
HYGIENE/GROOMING: INDEPENDENT
ADLS_ACUITY_SCORE: 26
HYGIENE/GROOMING: INDEPENDENT
ADLS_ACUITY_SCORE: 26
DRESS: SCRUBS (BEHAVIORAL HEALTH)
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
## 2025-02-02 NOTE — PLAN OF CARE
Problem: Suicide Risk  Goal: Absence of Self-Harm  Intervention: Assess Risk to Self and Maintain Safety  Self-Harm Prevention: environmental self-harm risks assessed    Pt presented as alert and oriented to place and self throughout shift.  They were intermittently visible in the milieu pacing the halls.  They were dressed appropriately and appeared adequately hygenic.  Pt is eating and drinking adequately.  They were compliant with their scheduled medication.  Pt requested PRN nicotine several times during this shift.  VSS and no issues with bowel or bladder.  Pt denied anxiety and depression.  They did not endorse any symptoms of psychosis, however it was reported to RN writer that it appeared the pt may be responding to internal stimuli on occasion while pacing the gould, due to facial expression made.  Pt denied pain or any acute physical health concerns.  No side effects to medications noted this shift.    Goal Outcome Evaluation:    Plan of Care Reviewed With: patient

## 2025-02-02 NOTE — PLAN OF CARE
RN: Pt A/O x4, VSS. Pt attended art group this shift. Pt was observed pacing the hallway intermittently this shift. Pt was withdrawn to self, no interaction with peers. Pt presented as flat affect, mood was calm, interaction was slightly guarded. Pt denied SI/SIB/HI, hallucinations, anxiety and depression. Pt requested nicotine gum on top of the hour. Pt is medication compliant, no side effects noted or observed. Pt is eating and drinking adequately, reports no issues with bowel and bladder. Pt denied pain or physical discomfort. Continue with POC.        Goal Outcome Evaluation:

## 2025-02-02 NOTE — PLAN OF CARE
Goal Outcome Evaluation:       Pt in bed at the beginning of the shift.Pt appeared asleep for 7 hours.No behavior or concerns noted during this shift.Pt remains on 15 minutes safety checks.Will continue to monitor.

## 2025-02-03 PROCEDURE — 250N000013 HC RX MED GY IP 250 OP 250 PS 637: Performed by: NURSE PRACTITIONER

## 2025-02-03 PROCEDURE — 124N000002 HC R&B MH UMMC

## 2025-02-03 PROCEDURE — H2032 ACTIVITY THERAPY, PER 15 MIN: HCPCS

## 2025-02-03 PROCEDURE — 250N000013 HC RX MED GY IP 250 OP 250 PS 637: Performed by: FAMILY MEDICINE

## 2025-02-03 PROCEDURE — 250N000013 HC RX MED GY IP 250 OP 250 PS 637: Performed by: CLINICAL NURSE SPECIALIST

## 2025-02-03 RX ADMIN — NICOTINE POLACRILEX 4 MG: 4 GUM, CHEWING BUCCAL at 11:35

## 2025-02-03 RX ADMIN — NICOTINE POLACRILEX 4 MG: 4 GUM, CHEWING BUCCAL at 17:45

## 2025-02-03 RX ADMIN — FLUPHENAZINE HYDROCHLORIDE 10 MG: 10 TABLET, FILM COATED ORAL at 19:47

## 2025-02-03 RX ADMIN — PALIPERIDONE 9 MG: 3 TABLET, EXTENDED RELEASE ORAL at 08:26

## 2025-02-03 RX ADMIN — NICOTINE POLACRILEX 4 MG: 4 GUM, CHEWING BUCCAL at 21:10

## 2025-02-03 RX ADMIN — NICOTINE POLACRILEX 4 MG: 4 GUM, CHEWING BUCCAL at 13:42

## 2025-02-03 RX ADMIN — NICOTINE POLACRILEX 4 MG: 4 GUM, CHEWING BUCCAL at 09:29

## 2025-02-03 RX ADMIN — DIVALPROEX SODIUM 1000 MG: 500 TABLET, FILM COATED, EXTENDED RELEASE ORAL at 18:49

## 2025-02-03 RX ADMIN — NICOTINE POLACRILEX 4 MG: 4 GUM, CHEWING BUCCAL at 08:26

## 2025-02-03 RX ADMIN — NICOTINE POLACRILEX 4 MG: 4 GUM, CHEWING BUCCAL at 15:46

## 2025-02-03 RX ADMIN — NICOTINE POLACRILEX 4 MG: 4 GUM, CHEWING BUCCAL at 12:40

## 2025-02-03 RX ADMIN — NICOTINE POLACRILEX 4 MG: 4 GUM, CHEWING BUCCAL at 16:48

## 2025-02-03 RX ADMIN — NICOTINE POLACRILEX 4 MG: 4 GUM, CHEWING BUCCAL at 19:47

## 2025-02-03 RX ADMIN — Medication 15 MG: at 08:26

## 2025-02-03 RX ADMIN — NICOTINE POLACRILEX 4 MG: 4 GUM, CHEWING BUCCAL at 18:47

## 2025-02-03 RX ADMIN — NICOTINE POLACRILEX 4 MG: 4 GUM, CHEWING BUCCAL at 10:31

## 2025-02-03 RX ADMIN — NICOTINE POLACRILEX 4 MG: 4 GUM, CHEWING BUCCAL at 14:46

## 2025-02-03 RX ADMIN — NICOTINE POLACRILEX 4 MG: 4 GUM, CHEWING BUCCAL at 07:26

## 2025-02-03 ASSESSMENT — ACTIVITIES OF DAILY LIVING (ADL)
ADLS_ACUITY_SCORE: 26
ORAL_HYGIENE: INDEPENDENT
ADLS_ACUITY_SCORE: 26
ADLS_ACUITY_SCORE: 26
DRESS: SCRUBS (BEHAVIORAL HEALTH);INDEPENDENT
HYGIENE/GROOMING: INDEPENDENT
ADLS_ACUITY_SCORE: 26
ORAL_HYGIENE: INDEPENDENT
ADLS_ACUITY_SCORE: 26
DRESS: SCRUBS (BEHAVIORAL HEALTH);INDEPENDENT
ADLS_ACUITY_SCORE: 26
HYGIENE/GROOMING: INDEPENDENT
ADLS_ACUITY_SCORE: 26

## 2025-02-03 NOTE — PLAN OF CARE
RN: Pt A/O x4, VSS. Pt was visible pacing the hallway intermittently this shift. Pt was withdrawn to self, no interaction with peers. Pt presented as flat affect, mood was calm, interaction was slightly guarded. Pt denied SI/SIB/HI, hallucinations. Pt endorsed mild anxiety and depression, declined intervention. Pt requested nicotine gum on top of the hour. Pt is medication compliant, no side effects noted or observed. Pt is eating and drinking adequately, reports no issues with bowel and bladder. Pt denied pain or physical discomfort. Continue with POC         Goal Outcome Evaluation:    Plan of Care Reviewed With: patient
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
## 2025-02-03 NOTE — PLAN OF CARE
Goal Outcome Evaluation:       Pt had a quiet night.Pt appeared evenly and unlabored asleep for 7 hours.No behavior or concerns noted during this shift.Pt remains on 15 minutes safety checks.Will continue to monitor.

## 2025-02-03 NOTE — PLAN OF CARE
He is active in the [facility] area by pacing in the hallways during the shift.  He is brief, guarded, yet polite in discussions about his condition.  He denies having problematic anxiety, depression, suicidal, and homicidal ideations when asked.  He denies having any pain/discomfort and cold, flu like symptoms when asked.  He was grimacing to himself in the hallways appearing to be responding to internal stimuli.  He did attend afternoon group.  He agrees to come to staff for concerns.

## 2025-02-03 NOTE — PLAN OF CARE
BEH IP Unit Acuity Rating Score (UARS)  Patient is given one point for every criteria they meet.    CRITERIA SCORING   On a 72 hour hold, court hold, committed, stay of commitment, or revocation. 1    Patient LOS on BEH unit exceeds 20 days. 0  LOS: 12   Patient under guardianship, 55+, otherwise medically complex, or under age 11. 0   Suicide ideation without relief of precipitating factors. 0   Current plan for suicide. 0   Current plan for homicide. 0   Imminent risk or actual attempt to seriously harm another without relief of factors precipitating the attempt. 0   Severe dysfunction in daily living (ex: complete neglect for self care, extreme disruption in vegetative function, extreme deterioration in social interactions). 1   Recent (last 7 days) or current physical aggression in the ED or on unit. 0   Restraints or seclusion episode in past 72 hours. 0   Recent (last 7 days) or current verbal aggression, agitation, yelling, etc., while in the ED or unit. 0   Active psychosis. 0   Need for constant or near constant redirection (from leaving, from others, etc).  0   Intrusive or disruptive behaviors. 0   Patient requires 3 or more hours of individualized nursing care per 8-hour shift (i.e. for ADLs, meds, therapeutic interventions). 0   TOTAL 2

## 2025-02-03 NOTE — PROGRESS NOTES
OT checked in with patient at the beginning of the day. Patient identified a goal to attend one group today. OT agreed this was appropriate. Patient was attentive to OT orientation to the choices for today. He appeared moderately interested in a couple and chose the 4pm group. He was calm and organized during this interaction, but responses were minimal.

## 2025-02-03 NOTE — PLAN OF CARE
"  Problem: Psychotic Signs/Symptoms  Goal: Improved Behavioral Control (Psychotic Signs/Symptoms)  Outcome: Progressing    Pt presented as alert and oriented to place and self throughout shift.  They were intermittently visible in the milieu pacing the halls.  Pt did no attend OT groups, however said he will \"attend the 4pm one.\"  They were dressed appropriately and appeared adequately hygenic.  Pt is eating and drinking adequately.  They were compliant with their scheduled medication.  Pt requested PRN nicotine several times during this shift.  VSS and no issues with bowel or bladder.  Pt denied anxiety and depression.  They did not endorse any symptoms of psychosis.  Pt denied pain or any acute physical health concerns.  No side effects to medications noted this shift.    Goal Outcome Evaluation:    Plan of Care Reviewed With: patient                   "
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
## 2025-02-04 PROCEDURE — 250N000013 HC RX MED GY IP 250 OP 250 PS 637: Performed by: CLINICAL NURSE SPECIALIST

## 2025-02-04 PROCEDURE — 90832 PSYTX W PT 30 MINUTES: CPT

## 2025-02-04 PROCEDURE — 124N000002 HC R&B MH UMMC

## 2025-02-04 PROCEDURE — 97150 GROUP THERAPEUTIC PROCEDURES: CPT | Mod: GO

## 2025-02-04 PROCEDURE — 250N000013 HC RX MED GY IP 250 OP 250 PS 637: Performed by: FAMILY MEDICINE

## 2025-02-04 PROCEDURE — 250N000013 HC RX MED GY IP 250 OP 250 PS 637: Performed by: NURSE PRACTITIONER

## 2025-02-04 RX ADMIN — NICOTINE POLACRILEX 4 MG: 4 GUM, CHEWING BUCCAL at 16:19

## 2025-02-04 RX ADMIN — FLUPHENAZINE HYDROCHLORIDE 10 MG: 10 TABLET, FILM COATED ORAL at 19:24

## 2025-02-04 RX ADMIN — NICOTINE POLACRILEX 4 MG: 4 GUM, CHEWING BUCCAL at 08:36

## 2025-02-04 RX ADMIN — NICOTINE POLACRILEX 4 MG: 4 GUM, CHEWING BUCCAL at 18:22

## 2025-02-04 RX ADMIN — NICOTINE POLACRILEX 4 MG: 4 GUM, CHEWING BUCCAL at 10:50

## 2025-02-04 RX ADMIN — NICOTINE POLACRILEX 4 MG: 4 GUM, CHEWING BUCCAL at 09:48

## 2025-02-04 RX ADMIN — NICOTINE POLACRILEX 4 MG: 4 GUM, CHEWING BUCCAL at 11:56

## 2025-02-04 RX ADMIN — NICOTINE POLACRILEX 4 MG: 4 GUM, CHEWING BUCCAL at 19:22

## 2025-02-04 RX ADMIN — NICOTINE POLACRILEX 4 MG: 4 GUM, CHEWING BUCCAL at 07:35

## 2025-02-04 RX ADMIN — NICOTINE POLACRILEX 4 MG: 4 GUM, CHEWING BUCCAL at 12:53

## 2025-02-04 RX ADMIN — DIVALPROEX SODIUM 1000 MG: 500 TABLET, FILM COATED, EXTENDED RELEASE ORAL at 19:22

## 2025-02-04 RX ADMIN — PALIPERIDONE 9 MG: 3 TABLET, EXTENDED RELEASE ORAL at 07:34

## 2025-02-04 RX ADMIN — Medication 15 MG: at 07:35

## 2025-02-04 RX ADMIN — NICOTINE POLACRILEX 4 MG: 4 GUM, CHEWING BUCCAL at 17:18

## 2025-02-04 RX ADMIN — NICOTINE POLACRILEX 4 MG: 4 GUM, CHEWING BUCCAL at 15:07

## 2025-02-04 RX ADMIN — NICOTINE POLACRILEX 4 MG: 4 GUM, CHEWING BUCCAL at 14:08

## 2025-02-04 ASSESSMENT — ACTIVITIES OF DAILY LIVING (ADL)
ADLS_ACUITY_SCORE: 26
HYGIENE/GROOMING: INDEPENDENT
ADLS_ACUITY_SCORE: 26
ADLS_ACUITY_SCORE: 26
DRESS: SCRUBS (BEHAVIORAL HEALTH);INDEPENDENT
ADLS_ACUITY_SCORE: 26
ORAL_HYGIENE: INDEPENDENT
ADLS_ACUITY_SCORE: 26

## 2025-02-04 NOTE — PLAN OF CARE
Shift Summary ([id number])  Mental Health Status   Pt is alert and oriented x 4. Able to communicate needs. Paced the hallway ~ 8-7 hours this shift.   No delusion or hallucination noted and appears to be in touch with reality. Mood is guarded, labile with flat affect.   Pt denies SI, SIB, AH, VH, anxiety, depression, racing or intrusive thoughts. During assessment, pt asked if any staff documented that he had auditory hallucination. Writer asled why he is asking this question. Pt declined to answer and walked away.   Pt was visible in lounge isolative and socially withdrawn to self. Did not go to group.   Pt took scheduled medication ok with no problem. Tolerated medications well. No side effect reported or observed.  No acute events or safety concern this shift.   Prn given: Nicotine gums every one hour  Physical Health Status   Moves around independently with no difficulties.   Hygiene is fair.   Appetite and fluid intake are adequate. Ate both breakfast and lunch.   Reported no problem with bowel and bladder.   Slept 7.0 hours last night per staff's report.   No c/o pain or discomfort.   Today's Lab orders: None   Sitting /95 & pulse 97  Standing BP & pulse- Not checked   Prn given: None   Continue to monitor pt's status Q 15 minutes and stabilize the patient's symptoms with the use of medications and therapeutic programming.
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
## 2025-02-04 NOTE — PLAN OF CARE
Problem: Sleep Disturbance  Goal: Adequate Sleep/Rest  Outcome: Progressing  Intervention: Promote Sleep/Rest  Flowsheets (Taken 2/4/2025 0002)  Sleep/Rest Enhancement:   awakenings minimized   consistent schedule promoted   relaxation techniques promoted   Goal Outcome Evaluation:ongoing       Pt asleep at the start of shift. Slept a total of  7 hours per q15 safety checks. No s/s pain or discomfort noted.Even respirations , non labored breathing. No behavorial concerns.

## 2025-02-04 NOTE — PLAN OF CARE
BEH IP Unit Acuity Rating Score (UARS)  Patient is given one point for every criteria they meet.    CRITERIA SCORING   On a 72 hour hold, court hold, committed, stay of commitment, or revocation. 1   Patient LOS on BEH unit exceeds 20 days. 0  LOS: 13   Patient under guardianship, 55+, otherwise medically complex, or under age 11. 0   Suicide ideation without relief of precipitating factors. 0   Current plan for suicide. 0   Current plan for homicide. 0   Imminent risk or actual attempt to seriously harm another without relief of factors precipitating the attempt. 0   Severe dysfunction in daily living (ex: complete neglect for self care, extreme disruption in vegetative function, extreme deterioration in social interactions). 1   Recent (last 7 days) or current physical aggression in the ED or on unit. 0   Restraints or seclusion episode in past 72 hours. 0   Recent (last 7 days) or current verbal aggression, agitation, yelling, etc., while in the ED or unit. 0   Active psychosis. 0   Need for constant or near constant redirection (from leaving, from others, etc).  0   Intrusive or disruptive behaviors. 0   Patient requires 3 or more hours of individualized nursing care per 8-hour shift (i.e. for ADLs, meds, therapeutic interventions). 0   TOTAL 2
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
## 2025-02-05 PROCEDURE — 250N000013 HC RX MED GY IP 250 OP 250 PS 637: Performed by: CLINICAL NURSE SPECIALIST

## 2025-02-05 PROCEDURE — 250N000013 HC RX MED GY IP 250 OP 250 PS 637: Performed by: NURSE PRACTITIONER

## 2025-02-05 PROCEDURE — 124N000002 HC R&B MH UMMC

## 2025-02-05 PROCEDURE — 250N000013 HC RX MED GY IP 250 OP 250 PS 637: Performed by: FAMILY MEDICINE

## 2025-02-05 RX ADMIN — NICOTINE POLACRILEX 4 MG: 4 GUM, CHEWING BUCCAL at 17:09

## 2025-02-05 RX ADMIN — NICOTINE POLACRILEX 4 MG: 4 GUM, CHEWING BUCCAL at 09:10

## 2025-02-05 RX ADMIN — NICOTINE POLACRILEX 4 MG: 4 GUM, CHEWING BUCCAL at 12:42

## 2025-02-05 RX ADMIN — NICOTINE POLACRILEX 4 MG: 4 GUM, CHEWING BUCCAL at 06:55

## 2025-02-05 RX ADMIN — NICOTINE POLACRILEX 4 MG: 4 GUM, CHEWING BUCCAL at 20:32

## 2025-02-05 RX ADMIN — NICOTINE POLACRILEX 4 MG: 4 GUM, CHEWING BUCCAL at 14:50

## 2025-02-05 RX ADMIN — NICOTINE POLACRILEX 4 MG: 4 GUM, CHEWING BUCCAL at 13:36

## 2025-02-05 RX ADMIN — NICOTINE POLACRILEX 4 MG: 4 GUM, CHEWING BUCCAL at 08:01

## 2025-02-05 RX ADMIN — NICOTINE POLACRILEX 4 MG: 4 GUM, CHEWING BUCCAL at 19:25

## 2025-02-05 RX ADMIN — NICOTINE POLACRILEX 4 MG: 4 GUM, CHEWING BUCCAL at 22:00

## 2025-02-05 RX ADMIN — FLUPHENAZINE HYDROCHLORIDE 10 MG: 10 TABLET, FILM COATED ORAL at 20:32

## 2025-02-05 RX ADMIN — PALIPERIDONE 9 MG: 3 TABLET, EXTENDED RELEASE ORAL at 08:01

## 2025-02-05 RX ADMIN — NICOTINE POLACRILEX 4 MG: 4 GUM, CHEWING BUCCAL at 18:16

## 2025-02-05 RX ADMIN — Medication 15 MG: at 08:01

## 2025-02-05 RX ADMIN — NICOTINE POLACRILEX 4 MG: 4 GUM, CHEWING BUCCAL at 11:27

## 2025-02-05 RX ADMIN — DIVALPROEX SODIUM 1000 MG: 500 TABLET, FILM COATED, EXTENDED RELEASE ORAL at 19:30

## 2025-02-05 RX ADMIN — NICOTINE POLACRILEX 4 MG: 4 GUM, CHEWING BUCCAL at 10:12

## 2025-02-05 RX ADMIN — NICOTINE POLACRILEX 4 MG: 4 GUM, CHEWING BUCCAL at 15:58

## 2025-02-05 ASSESSMENT — ACTIVITIES OF DAILY LIVING (ADL)
ADLS_ACUITY_SCORE: 26
HYGIENE/GROOMING: INDEPENDENT
ORAL_HYGIENE: INDEPENDENT
ADLS_ACUITY_SCORE: 26
ORAL_HYGIENE: INDEPENDENT
ADLS_ACUITY_SCORE: 26
DRESS: SCRUBS (BEHAVIORAL HEALTH)
ADLS_ACUITY_SCORE: 26
HYGIENE/GROOMING: INDEPENDENT
ADLS_ACUITY_SCORE: 26
DRESS: SCRUBS (BEHAVIORAL HEALTH)
ADLS_ACUITY_SCORE: 26

## 2025-02-05 NOTE — PLAN OF CARE
Shift Summary ([id number])  Mental Health Status   Pt is alert and oriented x 4. Able to communicate needs. No new concern. Mood is guarded with flat affect. Pacing in hallway.   Pt denies SI, SIB, AH, VH, anxiety, depression, racing or intrusive thoughts.   Pt was visible in lounge isolative and socially withdrawn to self. Did not go to group.    Pt took scheduled medication ok with no problem. Tolerated medications well. No side effect reported or observed.  Prn given: Nicotine gums   Physical Health Status   Moves around independently with no difficulties.   Hygiene is appropriate.   Appetite and fluid intake are adequate. Ate both breakfast and lunch.   Reported no problem with bowel and bladder.   Slept 7.0 hours last night per staff's report.   No c/o pain or discomfort.   Today's Lab orders: None   Sitting /81 & pulse 89  Standing /85 & pulse 109  Prn given: None   Continue to monitor pt's status Q 15 minutes and stabilize the patient's symptoms with the use of medications and therapeutic programming.
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
## 2025-02-05 NOTE — PLAN OF CARE
BEH IP Unit Acuity Rating Score (UARS)  Patient is given one point for every criteria they meet.    CRITERIA SCORING   On a 72 hour hold, court hold, committed, stay of commitment, or revocation. 1    Patient LOS on BEH unit exceeds 20 days. 0  LOS: 14   Patient under guardianship, 55+, otherwise medically complex, or under age 11. 0   Suicide ideation without relief of precipitating factors. 0   Current plan for suicide. 0   Current plan for homicide. 0   Imminent risk or actual attempt to seriously harm another without relief of factors precipitating the attempt. 0   Severe dysfunction in daily living (ex: complete neglect for self care, extreme disruption in vegetative function, extreme deterioration in social interactions). 1   Recent (last 7 days) or current physical aggression in the ED or on unit. 0   Restraints or seclusion episode in past 72 hours. 0   Recent (last 7 days) or current verbal aggression, agitation, yelling, etc., while in the ED or unit. 0   Active psychosis. 0   Need for constant or near constant redirection (from leaving, from others, etc).  0   Intrusive or disruptive behaviors. 0   Patient requires 3 or more hours of individualized nursing care per 8-hour shift (i.e. for ADLs, meds, therapeutic interventions). 0   TOTAL 2
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
## 2025-02-06 VITALS
TEMPERATURE: 98.7 F | OXYGEN SATURATION: 96 % | HEIGHT: 71 IN | BODY MASS INDEX: 20.94 KG/M2 | RESPIRATION RATE: 20 BRPM | DIASTOLIC BLOOD PRESSURE: 61 MMHG | HEART RATE: 114 BPM | SYSTOLIC BLOOD PRESSURE: 113 MMHG | WEIGHT: 149.6 LBS

## 2025-02-06 PROCEDURE — 97150 GROUP THERAPEUTIC PROCEDURES: CPT | Mod: GO

## 2025-02-06 PROCEDURE — 124N000002 HC R&B MH UMMC

## 2025-02-06 PROCEDURE — 250N000013 HC RX MED GY IP 250 OP 250 PS 637: Performed by: NURSE PRACTITIONER

## 2025-02-06 PROCEDURE — 250N000013 HC RX MED GY IP 250 OP 250 PS 637: Performed by: FAMILY MEDICINE

## 2025-02-06 PROCEDURE — 250N000013 HC RX MED GY IP 250 OP 250 PS 637: Performed by: CLINICAL NURSE SPECIALIST

## 2025-02-06 RX ADMIN — NICOTINE POLACRILEX 4 MG: 4 GUM, CHEWING BUCCAL at 15:49

## 2025-02-06 RX ADMIN — NICOTINE POLACRILEX 4 MG: 4 GUM, CHEWING BUCCAL at 07:59

## 2025-02-06 RX ADMIN — NICOTINE POLACRILEX 4 MG: 4 GUM, CHEWING BUCCAL at 08:58

## 2025-02-06 RX ADMIN — NICOTINE POLACRILEX 4 MG: 4 GUM, CHEWING BUCCAL at 13:31

## 2025-02-06 RX ADMIN — PALIPERIDONE 9 MG: 3 TABLET, EXTENDED RELEASE ORAL at 07:58

## 2025-02-06 RX ADMIN — Medication 15 MG: at 07:58

## 2025-02-06 RX ADMIN — NICOTINE POLACRILEX 4 MG: 4 GUM, CHEWING BUCCAL at 10:00

## 2025-02-06 RX ADMIN — FLUPHENAZINE HYDROCHLORIDE 10 MG: 10 TABLET, FILM COATED ORAL at 19:06

## 2025-02-06 RX ADMIN — NICOTINE POLACRILEX 4 MG: 4 GUM, CHEWING BUCCAL at 11:03

## 2025-02-06 RX ADMIN — NICOTINE POLACRILEX 4 MG: 4 GUM, CHEWING BUCCAL at 14:32

## 2025-02-06 RX ADMIN — NICOTINE POLACRILEX 4 MG: 4 GUM, CHEWING BUCCAL at 12:26

## 2025-02-06 RX ADMIN — DIVALPROEX SODIUM 1000 MG: 500 TABLET, FILM COATED, EXTENDED RELEASE ORAL at 19:06

## 2025-02-06 RX ADMIN — NICOTINE POLACRILEX 4 MG: 4 GUM, CHEWING BUCCAL at 19:06

## 2025-02-06 RX ADMIN — NICOTINE POLACRILEX 4 MG: 4 GUM, CHEWING BUCCAL at 16:55

## 2025-02-06 RX ADMIN — NICOTINE POLACRILEX 4 MG: 4 GUM, CHEWING BUCCAL at 18:01

## 2025-02-06 ASSESSMENT — ACTIVITIES OF DAILY LIVING (ADL)
ADLS_ACUITY_SCORE: 26
DRESS: SCRUBS (BEHAVIORAL HEALTH)
ADLS_ACUITY_SCORE: 26
ADLS_ACUITY_SCORE: 26
HYGIENE/GROOMING: INDEPENDENT
ADLS_ACUITY_SCORE: 26
ORAL_HYGIENE: INDEPENDENT
ADLS_ACUITY_SCORE: 26

## 2025-02-06 NOTE — PLAN OF CARE
Goal Outcome Evaluation:       Pt had a quiet night.Pt appeared asleep for 7 hours.No behavior or concerns noted during this shift.Pt remains on 15 minutes safety checks.Will continue to monitor.
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
## 2025-02-06 NOTE — PLAN OF CARE
RN: Pt A/O x4, VSS. Pt was visible pacing the hallway intermittently this shift. Pt was withdrawn to self, no interaction with peers. Pt presented as flat affect, mood was calm, interaction was slightly guarded. Pt denied SI/SIB/HI, hallucinations. anxiety or depression. Pt requested nicotine gum on top of the hour. Pt is medication compliant, no observed or reported side effects.  Pt is eating and drinking adequately, reports no issues with bowel and bladder. Pt denied pain or physical discomfort. Continue with POC      Goal Outcome Evaluation:
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
## 2025-02-06 NOTE — PLAN OF CARE
BEH IP Unit Acuity Rating Score (UARS)  Patient is given one point for every criteria they meet.    CRITERIA SCORING   On a 72 hour hold, court hold, committed, stay of commitment, or revocation. 1    Patient LOS on BEH unit exceeds 20 days. 0  LOS: 15   Patient under guardianship, 55+, otherwise medically complex, or under age 11. 0   Suicide ideation without relief of precipitating factors. 0   Current plan for suicide. 0   Current plan for homicide. 0   Imminent risk or actual attempt to seriously harm another without relief of factors precipitating the attempt. 0   Severe dysfunction in daily living (ex: complete neglect for self care, extreme disruption in vegetative function, extreme deterioration in social interactions). 1   Recent (last 7 days) or current physical aggression in the ED or on unit. 0   Restraints or seclusion episode in past 72 hours. 0   Recent (last 7 days) or current verbal aggression, agitation, yelling, etc., while in the ED or unit. 0   Active psychosis. 0   Need for constant or near constant redirection (from leaving, from others, etc).  0   Intrusive or disruptive behaviors. 0   Patient requires 3 or more hours of individualized nursing care per 8-hour shift (i.e. for ADLs, meds, therapeutic interventions). 0   TOTAL 2
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
## 2025-02-06 NOTE — PLAN OF CARE
Shift Summary ([id number])  Mental Health Status   Pt is alert and oriented x 3. Able to communicate needs. Mood is calm with flat affect. Pacing in hallway.   Pt denies SI, SIB, AH, VH, anxiety, depression, racing or intrusive thoughts.   Pt was visible in lounge isolative and socially withdrawn to self. Did not go to group.   Pt took scheduled medication ok with no problem. Tolerated medications well. No side effect reported or observed.  No acute events or safety concern this shift.   Prn given: Nicotine gums  Physical Health Status   Moves around independently with no difficulties.   Hygiene is fair.   Appetite and fluid intake are adequate. Ate both breakfast and lunch.   Reported no problem with bowel and bladder.   Slept 7.0 hours last night per staff's report.   No c/o pain or discomfort.   Today's Lab orders: None   Sitting /88 & pulse 90  Standing /68 & pulse 110  Prn given: None   Continue to monitor pt's status Q 15 minutes and stabilize the patient's symptoms with the use of medications and therapeutic programming.

## 2025-02-07 NOTE — PLAN OF CARE
Goal Outcome Evaluation:    Plan of Care Reviewed With: patient    Patient was observed pacing the halls most of the shift. Patient's socks appeared dirty. Patient was encouraged to change into clean socks. Patient was compliant. This writer asked patient whether she could look at his feet but declined. Patient appears tense but denies anxiety. Patient appears sad but denies depression. Patient denies suicidal, homicidal, auditory, and visual hallucinations. Contracts for safety.Patient denies bowel and bladder issues.Patient ate 50% of his dinner meal. Patient was med compliant.  Vitals are within normal limit. Prn given: Nicorette gum.

## 2025-02-11 ENCOUNTER — DOCUMENTATION ONLY (OUTPATIENT)
Dept: BEHAVIORAL HEALTH | Facility: CLINIC | Age: 25
End: 2025-02-11
Payer: COMMERCIAL

## 2025-02-11 NOTE — PROGRESS NOTES
Prior Authorization **INITIATED**    Medication: L-METHYLFOLATE 15 MG PO TABS  Insurance Company: [employer] - Phone [phone] Fax [phone]  Pharmacy Filling the Rx: [place], [place] - [address]  Filling Pharmacy Phone: [phone]  Filling Pharmacy Fax: [phone]  Start Date: 2/11/2025  Reference #: CoverMyMeds Key: [id number]  Comments:  Capsule version is available OTC.      Jess Collins CPhT  Discharge Pharmacy Liaison  [facility] Discharge Pharm[facility]  Pronouns: She/Her/Hers    Securely message with Aur[employer], Epic Secure Chat, or [employer]attos  Phone: [phone]  Fax: [phone]  [email or web address]

## 2025-02-12 NOTE — PROGRESS NOTES
Prior Authorization **DENIED**    Medication: L-METHYLFOLATE 15 MG PO TABS  Denial Date: 2/12/2025  Reference #: CoverMyMeds Key: [id number], Auth #: [id number]  Denial Rational:     Appeal Information:     Comments:  Capsule version is available OTC--patient has historically bought this OTC.            Jess Collins [facility]  Discharge Pharmacy Liaison  [facility] Discharge Pharmacy  Pronouns: She/Her/Hers    Securely message with [employer], Epic Secure Chat, or [employer]  Phone: [phone]  Fax: [phone]  [email or web address]

## 2025-07-19 ENCOUNTER — HEALTH MAINTENANCE LETTER (OUTPATIENT)
Age: 25
End: 2025-07-19